# Patient Record
Sex: FEMALE | URBAN - METROPOLITAN AREA
[De-identification: names, ages, dates, MRNs, and addresses within clinical notes are randomized per-mention and may not be internally consistent; named-entity substitution may affect disease eponyms.]

---

## 2023-08-30 ENCOUNTER — TRANSFERRED RECORDS (OUTPATIENT)
Dept: HEALTH INFORMATION MANAGEMENT | Facility: CLINIC | Age: 68
End: 2023-08-30

## 2023-11-17 ENCOUNTER — TELEPHONE (OUTPATIENT)
Dept: TRANSPLANT | Facility: CLINIC | Age: 68
End: 2023-11-17
Payer: MEDICAID

## 2023-11-17 ENCOUNTER — MEDICAL CORRESPONDENCE (OUTPATIENT)
Dept: TRANSPLANT | Facility: CLINIC | Age: 68
End: 2023-11-17
Payer: MEDICAID

## 2023-11-17 DIAGNOSIS — C90.00 MULTIPLE MYELOMA, REMISSION STATUS UNSPECIFIED (H): Primary | ICD-10-CM

## 2023-12-14 ENCOUNTER — CARE COORDINATION (OUTPATIENT)
Dept: TRANSPLANT | Facility: CLINIC | Age: 68
End: 2023-12-14
Payer: MEDICAID

## 2023-12-14 NOTE — PROGRESS NOTES
[facility] BMT and Cell Therapy Program  RN Coordinator Pre-Visit Documentation      Rosario Terrazas is a 68 year old female who has been referred to the [facility] BMT and Cell Therapy Program for hematopoietic cell transplant or immune effector cell therapy.      Referring MD Name: Dr. Ellison at [facility]    Reason for referral: Multiple Myeloma    Link to BMT & CT Program Algorithms        All relevant clinical notes, labs, imaging, and pathology may be reviewed in Epic Bookmarks under name: Romina Santiago      Patient Care Team    No active team members.           Romina Santiago RN

## 2023-12-20 ENCOUNTER — ALLIED HEALTH/NURSE VISIT (OUTPATIENT)
Dept: TRANSPLANT | Facility: CLINIC | Age: 68
End: 2023-12-20
Attending: INTERNAL MEDICINE
Payer: MEDICAID

## 2023-12-20 VITALS
SYSTOLIC BLOOD PRESSURE: 106 MMHG | HEART RATE: 53 BPM | HEIGHT: 63 IN | TEMPERATURE: 97.7 F | WEIGHT: 209.8 LBS | DIASTOLIC BLOOD PRESSURE: 72 MMHG | RESPIRATION RATE: 20 BRPM | BODY MASS INDEX: 37.17 KG/M2 | OXYGEN SATURATION: 100 %

## 2023-12-20 DIAGNOSIS — C90.00 MULTIPLE MYELOMA, REMISSION STATUS UNSPECIFIED (H): Primary | ICD-10-CM

## 2023-12-20 DIAGNOSIS — Z71.9 VISIT FOR COUNSELING: Primary | ICD-10-CM

## 2023-12-20 PROCEDURE — G0463 HOSPITAL OUTPT CLINIC VISIT: HCPCS | Performed by: INTERNAL MEDICINE

## 2023-12-20 PROCEDURE — 99215 OFFICE O/P EST HI 40 MIN: CPT | Performed by: INTERNAL MEDICINE

## 2023-12-20 RX ORDER — ONDANSETRON 4 MG/1
4 TABLET, FILM COATED ORAL EVERY 8 HOURS
COMMUNITY
Start: 2023-08-23 | End: 2024-03-17

## 2023-12-20 RX ORDER — SULFAMETHOXAZOLE AND TRIMETHOPRIM 400; 80 MG/1; MG/1
1 TABLET ORAL DAILY
COMMUNITY
Start: 2023-09-13 | End: 2024-04-08

## 2023-12-20 RX ORDER — BORTEZOMIB 3.5 MG/1
INJECTION, POWDER, LYOPHILIZED, FOR SOLUTION INTRAVENOUS; SUBCUTANEOUS
COMMUNITY
Start: 2023-09-28 | End: 2024-03-15

## 2023-12-20 RX ORDER — FOLIC ACID 1 MG/1
1 TABLET ORAL DAILY
Status: ON HOLD | COMMUNITY
Start: 2023-08-23 | End: 2024-04-02

## 2023-12-20 RX ORDER — GABAPENTIN 100 MG/1
1 CAPSULE ORAL 3 TIMES DAILY
COMMUNITY
Start: 2023-10-12 | End: 2024-03-17

## 2023-12-20 RX ORDER — PROCHLORPERAZINE MALEATE 10 MG
10 TABLET ORAL
Status: ON HOLD | COMMUNITY
Start: 2023-10-30 | End: 2024-04-02

## 2023-12-20 RX ORDER — DOCUSATE SODIUM 100 MG/1
100 CAPSULE, LIQUID FILLED ORAL 2 TIMES DAILY
COMMUNITY
Start: 2023-10-04 | End: 2024-03-17

## 2023-12-20 RX ORDER — LOPERAMIDE HCL 2 MG
2 CAPSULE ORAL 4 TIMES DAILY PRN
Status: ON HOLD | COMMUNITY
Start: 2023-10-26 | End: 2024-04-02

## 2023-12-20 RX ORDER — VITAMIN B COMPLEX
2 TABLET ORAL DAILY
Status: ON HOLD | COMMUNITY
Start: 2023-11-02 | End: 2024-04-02

## 2023-12-20 RX ORDER — ISONIAZID 300 MG/1
1 TABLET ORAL DAILY
Status: ON HOLD | COMMUNITY
Start: 2023-09-28 | End: 2024-04-02

## 2023-12-20 RX ORDER — OXYCODONE HYDROCHLORIDE 5 MG/1
1 TABLET ORAL EVERY 4 HOURS PRN
COMMUNITY
Start: 2023-12-08 | End: 2024-01-05

## 2023-12-20 RX ORDER — LIDOCAINE 50 MG/G
OINTMENT TOPICAL
Status: ON HOLD | COMMUNITY
Start: 2023-09-27 | End: 2024-04-02

## 2023-12-20 RX ORDER — NYSTATIN 100000 U/G
CREAM TOPICAL
Status: ON HOLD | COMMUNITY
Start: 2023-10-12 | End: 2024-04-02

## 2023-12-20 RX ORDER — ACYCLOVIR 400 MG/1
400 TABLET ORAL
COMMUNITY
Start: 2023-09-13 | End: 2024-03-15

## 2023-12-20 RX ORDER — ACETAMINOPHEN 325 MG/1
2 TABLET ORAL EVERY 4 HOURS PRN
COMMUNITY
Start: 2023-10-04 | End: 2024-03-04

## 2023-12-20 RX ORDER — PYRIDOXINE HCL (VITAMIN B6) 25 MG
1 TABLET ORAL DAILY
Status: ON HOLD | COMMUNITY
Start: 2023-09-28 | End: 2024-04-02

## 2023-12-20 RX ORDER — AMOXICILLIN 250 MG
1 CAPSULE ORAL 2 TIMES DAILY
Status: ON HOLD | COMMUNITY
Start: 2023-10-12 | End: 2024-04-02

## 2023-12-20 RX ORDER — POLYETHYLENE GLYCOL 3350 17 G/17G
17 POWDER, FOR SOLUTION ORAL DAILY PRN
COMMUNITY
Start: 2023-08-23 | End: 2024-03-17

## 2023-12-20 ASSESSMENT — PAIN SCALES - GENERAL: PAINLEVEL: SEVERE PAIN (6)

## 2023-12-20 NOTE — NURSING NOTE
"Oncology Rooming Note    December 20, 2023 8:37 AM   Rosario Terrazas is a 68 year old female who presents for:    Chief Complaint   Patient presents with    Oncology Clinic Visit     Patient with Multiple Myeloma here for provider visit      Initial Vitals: /72 (BP Location: Right arm, Patient Position: Sitting, Cuff Size: Adult Large)   Pulse 53   Temp 97.7  F (36.5  C) (Tympanic)   Resp 20   Ht 1.606 m (5' 3.23\")   Wt 95.2 kg (209 lb 12.8 oz)   SpO2 100%   BMI 36.90 kg/m   Estimated body mass index is 36.9 kg/m  as calculated from the following:    Height as of this encounter: 1.606 m (5' 3.23\").    Weight as of this encounter: 95.2 kg (209 lb 12.8 oz). Body surface area is 2.06 meters squared.  Severe Pain (6) Comment: Data Unavailable   No LMP recorded. Patient is postmenopausal.  Allergies reviewed: Yes  Medications reviewed: Yes    Medications: Medication refills not needed today.  Pharmacy name entered into EPIC: Data Unavailable    Frailty Screening:   Is the patient here for a new oncology consult visit in cancer care? 2. No      Clinical concerns:     BMT Fried Frailty was performed and charted in chart.       service not available.  Patient is here with her daughter Katherin Hernandez.  Patient and her daughter signed the Patient or  waiver of  service form.  Provider was notified.        Bettina Fisher CMA                "

## 2023-12-20 NOTE — PROGRESS NOTES
Blood and Marrow Transplant - New Evaluation Appointment    Spoke with Tza, patient's child, following visit with Dr. Rocha. I explained the role of the nurse coordinator throughout the process, as well as general time line and expectations for next steps. We discussed the necessity of a caregiver and the program's proximity requirements. All questions were answered.     Plan: Autologous Transplant Myeloma-Outpatient, pending 2 cycles of therapy, around February     Contact information provided for :  yes      Auto for MM/Amyloidosis:  Outpatient Infusion: Yes    Phase Status updated: yes

## 2023-12-20 NOTE — LETTER
12/20/2023         RE: Rosario Terrazas  [address]  [facility] [address]        Dear Colleague,    Thank you for referring your patient, Rosario Terrazas, to the [facility] BLOOD AND MARROW TRANSPLANT PROGRAM [place]. Please see a copy of my visit note below.    Date: Dec 20, 2023    Referring Physician:   Rome Jones    CC: IgD [facility] Myeloma    HPI:    Declined . Daughter was helping interpret.    Was hospitalized in August of 2023 with compression fractures and found to have myeloma. Started on VRd. Pain improved. Still in a wheelchair and TLSO brace. Fatigued and takes regular naps but is able to care for herself independently. Her son and daughter help with housework and making food but she is able to move around and bathe and dress herself, heat up food, etc. No fevers. Has some shoulder pain that she attributes to arthritis. 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    ROS: A 14-point ROS has been performed and is negative unless otherwise specified in the HPI.    Brief Onc History:  08/30/23 - IgD KLA. VRdx4. ME.    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  - IgD MM    PSH:  L TKA - 2017    SH:  Social History     Tobacco Use    Smoking status: Never    Smokeless tobacco: Never   Substance Use Topics    Alcohol use: Never    Drug use: Never      - E/T/I: occasional drinking, none now  - Sib/parents/children Info: 3 brothers and 2 sisters, 5 kids, 3 back in [facility], 2 here taking care of her  - Occupation: Gardening  - Geographic location: [place]    FH:  - Diabetes  - HTN  - Arthritis  - No cancers    ALL:   No Known Allergies    Medications:  Current Outpatient Medications   Medication    acetaminophen (TYLENOL) 325 MG tablet    bortezomib (VELCADE) 3.5 MG injection    calcium carbonate-vitamin D (OSCAL) 500-5 MG-MCG tablet    diclofenac (VOLTAREN) 1 % topical gel    docusate sodium (DSS) 100 MG capsule    folic acid (FOLVITE)  "1 MG tablet    gabapentin (NEURONTIN) 100 MG capsule    isoniazid (NYDRAZID) 300 MG tablet    lidocaine (XYLOCAINE) 5 % external ointment    loperamide (IMODIUM) 2 MG capsule    nystatin (MYCOSTATIN) 033893 UNIT/GM external cream    omeprazole (PRILOSEC) 20 MG DR capsule    ondansetron (ZOFRAN) 4 MG tablet    oxyCODONE (ROXICODONE) 5 MG tablet    polyethylene glycol (MIRALAX) 17 GM/Dose powder    prochlorperazine (COMPAZINE) 10 MG tablet    pyridOXINE (VITAMIN B6) 25 MG tablet    rivaroxaban ANTICOAGULANT (XARELTO) 10 MG TABS tablet    senna-docusate (SENOKOT-S/PERICOLACE) 8.6-50 MG tablet    sulfamethoxazole-trimethoprim (BACTRIM) 400-80 MG tablet    tiZANidine (ZANAFLEX) 4 MG tablet    Vitamin D3 (CHOLECALCIFEROL) 25 mcg (1000 units) tablet    acyclovir (ZOVIRAX) 400 MG tablet     No current facility-administered medications for this visit.     Physical Exam:  Vitals: /72 (BP Location: Right arm, Patient Position: Sitting, Cuff Size: Adult Large)   Pulse 53   Temp 97.7  F (36.5  C) (Tympanic)   Resp 20   Ht 1.606 m (5' 3.23\")   Wt 95.2 kg (209 lb 12.8 oz)   SpO2 100%   BMI 36.90 kg/m    GA: NAD, appears as stated age  HEENT: EOMI, PERRL, OP clear  Neck: Supple, no LAD  CV: RRR, no m/r/g  Lungs: Clear, no w/c/r  Abd: BS+, soft, NT, ND  Ext: warm and well perfused, no edema  Neuro: AAO, moves all extremities  Lymphatics: no palpable cervical, axillary, or inguinal LAD. No HSM  Skin: No acute rashes, no lesions  Psyc: appropriate, reactive    KPS: 70    Labs, pathology and other diagnostics reviewed    08/30/23 DIAGNOSIS:   Bone marrow, aspirate, clot, and trephine core:   - Bone marrow involved by multiple myeloma, see comment   - Hypercellular marrow (60% cellularity) with trilineage hematopoiesis and 80% kappa-restricted   plasma cells   - Congo red stain is negative for amyloid   - Increased iron stores and decreased sideroblastic iron (anemia of chronic disease pattern)     09/05/23 " PET/CT  Impression:   1. No definite FDG avid myelomatous lesions are identified. There is uptake associated with the presumed pathologic fractures of the T7 vertebral body and the left seventh rib, although this uptake is not out of proportion to what would be expected for fracture healing. Notably, there is a suggestion of a lucent lesion underlying the rib fracture. Diffuse lucency and heterogeneity throughout the visualized bones could be compatible with myelomatous infiltration or osteopenia, although noting the relative lack of abnormal T1 signal in the vertebral column on the comparison MRI which would be more typical for osteopenia rather than myelomatous infiltration.   2. Focal uptake in the right maxilla/mandible is most likely due to periodontal inflammatory disease.   3. Stable non-FDG avid right thyroid nodule. Thyroid ultrasound should be considered for further characterization.                   A/P    #IgD KLC  #Latent TB on INH therapy  #Dental Caries on PET  #Debility    Today, I met with Rosario Terrazas and her daughter. We discussed the natural history of the disease and treatment to date. We reviewed all the available diagnostic information and briefly discussed some of the more important prognostic points. We talked about general indications of transplant that include patient and disease factors. We also reviewed again the role of high dose chemotherapy and autologous stem cell rescue in this disease. I described the process of work up prior to collection, the process of collection itself including the possibility for a need for a central line with possible associated complications of infections and clotting. I discussed the process of work up prior to to confirm good organ function and reserve and reassess disease prior to proceeding. We also discussed in great detail the process of the actual transplant itself. We discussed the expected toxicities and side effects, including organ  toxicity, expected pancytopenia and need for transfusion support, risk of infection or bleeding, and the risk of treatment related mortality which is estimated to be around 1-2%. We also discussed supportive care, needing a line with associated complications, and the critical need for a caregiver and proximity to [facility].    All of their questions were answered to their satisfaction.    - Received 4 cycles of VRd and in a FL, bordering on VGPR. Ideally, she would get 2 more cycles and I would add Opal SC to her regimen given her sluggish response. She will need Opal maintenance regimen post ASCT  - We will refer her to [facility] for an opinion regarding this elective transplant and optimal timing with INH  - We will refer her to IR for possible vertebroplasty given pain and difficulty with mobility  - She will need dental evaluation ASAP through [facility] prior to transplant    Rishi Rocha MD  Hematology and Bone Marrow Transplant    60 minutes spent on the date of the encounter doing chart review, interpretation of results, patient visit, documentation and coordination of care.

## 2023-12-20 NOTE — PROGRESS NOTES
Date: Dec 20, 2023    Referring Physician:   Rome Jones    CC: IgD [facility] Myeloma    HPI:    Declined . Daughter was helping interpret.    Was hospitalized in August of 2023 with compression fractures and found to have myeloma. Started on VRd. Pain improved. Still in a wheelchair and TLSO brace. Fatigued and takes regular naps but is able to care for herself independently. Her son and daughter help with housework and making food but she is able to move around and bathe and dress herself, heat up food, etc. No fevers. Has some shoulder pain that she attributes to arthritis. 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    ROS: A 14-point ROS has been performed and is negative unless otherwise specified in the HPI.    Brief Onc History:  08/30/23 - IgD KLA. VRdx4. AK.    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  - IgD MM    PSH:  L TKA - 2017    SH:  Social History     Tobacco Use    Smoking status: Never    Smokeless tobacco: Never   Substance Use Topics    Alcohol use: Never    Drug use: Never      - E/T/I: occasional drinking, none now  - Sib/parents/children Info: 3 brothers and 2 sisters, 5 kids, 3 back in [facility], 2 here taking care of her  - Occupation: Gardening  - Geographic location: [place]    FH:  - Diabetes  - HTN  - Arthritis  - No cancers    ALL:   No Known Allergies    Medications:  Current Outpatient Medications   Medication    acetaminophen (TYLENOL) 325 MG tablet    bortezomib (VELCADE) 3.5 MG injection    calcium carbonate-vitamin D (OSCAL) 500-5 MG-MCG tablet    diclofenac (VOLTAREN) 1 % topical gel    docusate sodium (DSS) 100 MG capsule    folic acid (FOLVITE) 1 MG tablet    gabapentin (NEURONTIN) 100 MG capsule    isoniazid (NYDRAZID) 300 MG tablet    lidocaine (XYLOCAINE) 5 % external ointment    loperamide (IMODIUM) 2 MG capsule    nystatin (MYCOSTATIN) 723001 UNIT/GM external cream    omeprazole (PRILOSEC) 20 MG DR capsule    ondansetron (ZOFRAN) 4 MG  "tablet    oxyCODONE (ROXICODONE) 5 MG tablet    polyethylene glycol (MIRALAX) 17 GM/Dose powder    prochlorperazine (COMPAZINE) 10 MG tablet    pyridOXINE (VITAMIN B6) 25 MG tablet    rivaroxaban ANTICOAGULANT (XARELTO) 10 MG TABS tablet    senna-docusate (SENOKOT-S/PERICOLACE) 8.6-50 MG tablet    sulfamethoxazole-trimethoprim (BACTRIM) 400-80 MG tablet    tiZANidine (ZANAFLEX) 4 MG tablet    Vitamin D3 (CHOLECALCIFEROL) 25 mcg (1000 units) tablet    acyclovir (ZOVIRAX) 400 MG tablet     No current facility-administered medications for this visit.     Physical Exam:  Vitals: /72 (BP Location: Right arm, Patient Position: Sitting, Cuff Size: Adult Large)   Pulse 53   Temp 97.7  F (36.5  C) (Tympanic)   Resp 20   Ht 1.606 m (5' 3.23\")   Wt 95.2 kg (209 lb 12.8 oz)   SpO2 100%   BMI 36.90 kg/m    GA: NAD, appears as stated age  HEENT: EOMI, PERRL, OP clear  Neck: Supple, no LAD  CV: RRR, no m/r/g  Lungs: Clear, no w/c/r  Abd: BS+, soft, NT, ND  Ext: warm and well perfused, no edema  Neuro: AAO, moves all extremities  Lymphatics: no palpable cervical, axillary, or inguinal LAD. No HSM  Skin: No acute rashes, no lesions  Psyc: appropriate, reactive    KPS: 70    Labs, pathology and other diagnostics reviewed    08/30/23 DIAGNOSIS:   Bone marrow, aspirate, clot, and trephine core:   - Bone marrow involved by multiple myeloma, see comment   - Hypercellular marrow (60% cellularity) with trilineage hematopoiesis and 80% kappa-restricted   plasma cells   - Congo red stain is negative for amyloid   - Increased iron stores and decreased sideroblastic iron (anemia of chronic disease pattern)     09/05/23 PET/CT  Impression:   1. No definite FDG avid myelomatous lesions are identified. There is uptake associated with the presumed pathologic fractures of the T7 vertebral body and the left seventh rib, although this uptake is not out of proportion to what would be expected for fracture healing. Notably, there is a " suggestion of a lucent lesion underlying the rib fracture. Diffuse lucency and heterogeneity throughout the visualized bones could be compatible with myelomatous infiltration or osteopenia, although noting the relative lack of abnormal T1 signal in the vertebral column on the comparison MRI which would be more typical for osteopenia rather than myelomatous infiltration.   2. Focal uptake in the right maxilla/mandible is most likely due to periodontal inflammatory disease.   3. Stable non-FDG avid right thyroid nodule. Thyroid ultrasound should be considered for further characterization.                   A/P    #IgD KLC  #Latent TB on INH therapy  #Dental Caries on PET  #Debility    Today, I met with Rosario Terrazas and her daughter. We discussed the natural history of the disease and treatment to date. We reviewed all the available diagnostic information and briefly discussed some of the more important prognostic points. We talked about general indications of transplant that include patient and disease factors. We also reviewed again the role of high dose chemotherapy and autologous stem cell rescue in this disease. I described the process of work up prior to collection, the process of collection itself including the possibility for a need for a central line with possible associated complications of infections and clotting. I discussed the process of work up prior to to confirm good organ function and reserve and reassess disease prior to proceeding. We also discussed in great detail the process of the actual transplant itself. We discussed the expected toxicities and side effects, including organ toxicity, expected pancytopenia and need for transfusion support, risk of infection or bleeding, and the risk of treatment related mortality which is estimated to be around 1-2%. We also discussed supportive care, needing a line with associated complications, and the critical need for a caregiver and proximity to  [facility].    All of their questions were answered to their satisfaction.    - Received 4 cycles of VRd and in a OR, bordering on VGPR. Ideally, she would get 2 more cycles and I would add Opal SC to her regimen given her sluggish response. She will need Opal maintenance regimen post ASCT  - We will refer her to [facility] for an opinion regarding this elective transplant and optimal timing with INH  - We will refer her to IR for possible vertebroplasty given pain and difficulty with mobility  - She will need dental evaluation ASAP through [facility] prior to transplant    Rishi Rocha MD  Hematology and Bone Marrow Transplant    60 minutes spent on the date of the encounter doing chart review, interpretation of results, patient visit, documentation and coordination of care.

## 2023-12-20 NOTE — PROGRESS NOTES
Blood and Marrow Transplant   New Transplant Visit with   Clinical     Assessment completed on 2023 in the BMT clinic of living situation, support system, financial status, functional status, coping, stressors, need for resources and social work intervention provided as needed. Information for this assessment was provided by pt and pt's harsh Pandey's report, consultation with medical team, and medical chart review.      Present:  Patient: Rosario Terrazas  Daughter: Katherin Hernandez  : JAYDE Pritchard, [facility]    Medical Team   Nurse Coordinator: Romina Santiago RN  BMT Physician: Rishi Rocha MD  Referring Physician: Mika Ellison MD    Diagnosis: Multiple Myeloma  Diagnosis Date: 2023    Presenting Information:  Pt is a 68 year old female diagnosed with Multiple Myeloma. Pt was diagnosed on 2023. Pt presents for Autologous stem cell transplant discussion.    Contact Information:  Cell Phone: [phone]  Daughter Katherin Hernandez's Phone: [phone]    Special Needs:   Pt requires a Kisii (Eritrean) .  Pts daughter will assist with providing interpreting services.    Relocation Requirement:   Pt lives in [place], [place] (approximately [place] from [facility] which is within the required distance of the hospital. Pt does not need to relocate.     Living Situation:   Pt lives in Harsh Pandey's home with daughter, son in law, and 2 grandsons. Harsh Pandey has 3 children ages 26, 20, and 13.  Only the 20 and 13 year old live with patient/daughter at this current time.    Family Information:   Spouse:  more than 40 years ago in [place].  Cultural/Custom marriage.    Parents:   Siblings: 1 living sister in [place];  3 brothers and 1 sister .  Children: Harsh Hernandez; 1 Son in [place], [place]; 1 Son in [facility]; 2 Daughters in [place].    Education/Employment:  Currently employed: No-has never had employment in the [place].    Daughter  Katherin Hernandez  Employed: Yes-F/T Occupation  Manufacturing    Insurance:   [email or web address]. No insurance concerns identified at this time. SW provided information regarding the insurance authorization process and the role of the [employer] . SW provided contact info for the [employer]  and referred pt to them for future insurance questions.     Finances:   Pt's source of income is salary/wages from daughter Katherin's employment.  Pts daughter shared that they have applied for and received financial assistance via [employer]. At this time, Harsh Pandey shared that they will reach out to their OP SW should any further financial needs arise.    Caregiver:   SW discussed with the pt/daughter the caregiver role and expectation at length. Pt is agreeable to having a full time caregiver for the minimum of 30 days until cleared by the BMT Physician. Pt's identified caregiver is harsh Hernandez. Caregiver education and information provided. No caregiver concerns identified.     Healthcare Directive:  No. SW provided education and forms. SW encouraged pt to have discussions with their family regarding their health care wishes.  In the absence of a healthcare document, SW discussed the [place] Policy on who would make decisions on pts behalf if pt did not have the capacity to make healthcare decisions.    Resources Provided:  -BMT Information Book  -BMT Resources Packet  -Healthcare Directive  -Honoring Choices - Your Rights: Making Your Own Health Care Treatment Decisions  -Caregiver Contract/Description  -Transplant Unit Description and Information   -Lodging Resources    Identified Concerns:  No concerns identified at this time.     Summary:  Pt presents to [facility] regarding an OP Autologous stem cell transplant. Pt and pt's daughter asked good/appropriate questions regarding psychosocial factors related to BMT; all questions were addressed. Pt  presented as reserved. Pt remained silent during the assessment as pts daughter spoke on behalf of pt.  Family's affect was appropriate.     Plan:   SW provided contact information and encouraged pt to contact SW with any additional questions, concerns, resources and/or for support. SW will continue to follow pt to provide support and guidance with resources as needed.     JAYDE Pritchard, [facility]  Adult Blood & Marrow Transplant   Phone: [phone]  Pager: [phone]

## 2023-12-21 NOTE — PROGRESS NOTES
BMT work up referral to ID Routine next aval.     Plan: Autologous Transplant Myeloma-Outpatient, pending 2 cycles of therapy, around February

## 2023-12-22 ENCOUNTER — TELEPHONE (OUTPATIENT)
Dept: INTERVENTIONAL RADIOLOGY/VASCULAR | Facility: CLINIC | Age: 68
End: 2023-12-22
Payer: MEDICAID

## 2023-12-22 DIAGNOSIS — S22.000A COMPRESSION FRACTURE OF THORACIC VERTEBRA (H): Primary | ICD-10-CM

## 2023-12-22 NOTE — TELEPHONE ENCOUNTER
I called and spoke with pts' daughter Katherin. I explained that I had ordered a MRI for her mother and that it was needed for possible vertebral augmentation with Dr. Whittaker. She verbalized understanding.     Nicky Amezcua RN  Nurse Care Coordinator-Interventional Radiology  [phone]

## 2023-12-31 ENCOUNTER — HEALTH MAINTENANCE LETTER (OUTPATIENT)
Age: 68
End: 2023-12-31

## 2024-01-19 ENCOUNTER — ANCILLARY PROCEDURE (OUTPATIENT)
Dept: MRI IMAGING | Facility: CLINIC | Age: 69
End: 2024-01-19
Attending: RADIOLOGY
Payer: MEDICAID

## 2024-01-19 DIAGNOSIS — S22.000A COMPRESSION FRACTURE OF THORACIC VERTEBRA (H): ICD-10-CM

## 2024-01-19 PROCEDURE — 72146 MRI CHEST SPINE W/O DYE: CPT | Performed by: RADIOLOGY

## 2024-01-31 DIAGNOSIS — Z01.818 EXAMINATION PRIOR TO CHEMOTHERAPY: ICD-10-CM

## 2024-01-31 DIAGNOSIS — C90.00 MULTIPLE MYELOMA, REMISSION STATUS UNSPECIFIED (H): Primary | ICD-10-CM

## 2024-01-31 DIAGNOSIS — Z86.2 PERSONAL HISTORY OF DISEASES OF BLOOD AND BLOOD-FORMING ORGANS: ICD-10-CM

## 2024-02-06 PROBLEM — Z96.652 HISTORY OF TOTAL LEFT KNEE REPLACEMENT: Status: ACTIVE | Noted: 2022-10-31

## 2024-02-06 PROBLEM — R80.0 ISOLATED PROTEINURIA WITHOUT SPECIFIC MORPHOLOGIC LESION: Status: ACTIVE | Noted: 2023-08-23

## 2024-02-06 PROBLEM — N17.9 AKI (ACUTE KIDNEY INJURY) (H): Status: ACTIVE | Noted: 2023-08-23

## 2024-02-06 PROBLEM — D64.9 NORMOCYTIC ANEMIA: Status: ACTIVE | Noted: 2023-08-23

## 2024-02-06 PROBLEM — S46.219A BICEPS TENDON TEAR: Status: ACTIVE | Noted: 2023-03-20

## 2024-02-06 PROBLEM — M25.811 SHOULDER IMPINGEMENT, RIGHT: Status: ACTIVE | Noted: 2022-10-31

## 2024-02-06 PROBLEM — S22.068A: Status: ACTIVE | Noted: 2023-08-19

## 2024-02-06 PROBLEM — C80.1 MALIGNANT NEOPLASM (H): Status: ACTIVE | Noted: 2023-09-08

## 2024-02-06 PROBLEM — C90.00 MULTIPLE MYELOMA (H): Status: ACTIVE | Noted: 2023-09-13

## 2024-02-06 PROBLEM — S22.32XA CLOSED FRACTURE OF ONE RIB OF LEFT SIDE: Status: ACTIVE | Noted: 2023-08-23

## 2024-02-06 PROBLEM — M75.102 ROTATOR CUFF TEAR ARTHROPATHY OF LEFT SHOULDER: Status: ACTIVE | Noted: 2023-03-20

## 2024-02-06 PROBLEM — M12.812 ROTATOR CUFF TEAR ARTHROPATHY OF LEFT SHOULDER: Status: ACTIVE | Noted: 2023-03-20

## 2024-02-06 PROBLEM — R76.8 ELEVATED SERUM IMMUNOGLOBULIN FREE LIGHT CHAIN LEVEL: Status: ACTIVE | Noted: 2023-08-24

## 2024-02-06 PROBLEM — K59.09 OTHER CONSTIPATION: Status: ACTIVE | Noted: 2023-08-23

## 2024-02-06 NOTE — CONFIDENTIAL NOTE
DIAGNOSIS:   Multiple myeloma, remission status unspecified    DATE RECEIVED: 02.16.2024     NOTES (Gather within 2 years) STATUS DETAILS   OFFICE NOTE from referring provider   Internal 12.2023 Rishi Rocha MD    OFFICE NOTE from other specialist Care Everywhere 09.05.2023 Sabino Kyle MD  [facility]   DISCHARGE SUMMARY from hospital     DISCHARGE REPORT from the ER     LABS (any labs) Internal / CE    MEDICATION LIST Internal    IMAGING  (NEED IMAGES AND REPORTS)     Osteomyelitis: Foot imaging      Liver Abscess: Abdominal imaging     Other (anything related to diagnoses

## 2024-02-07 ENCOUNTER — TELEPHONE (OUTPATIENT)
Dept: TRANSPLANT | Facility: CLINIC | Age: 69
End: 2024-02-07
Payer: COMMERCIAL

## 2024-02-08 ENCOUNTER — OFFICE VISIT (OUTPATIENT)
Dept: VASCULAR SURGERY | Facility: CLINIC | Age: 69
End: 2024-02-08
Payer: COMMERCIAL

## 2024-02-08 VITALS — OXYGEN SATURATION: 95 % | HEART RATE: 66 BPM | SYSTOLIC BLOOD PRESSURE: 106 MMHG | DIASTOLIC BLOOD PRESSURE: 72 MMHG

## 2024-02-08 DIAGNOSIS — S22.059A T6 VERTEBRAL FRACTURE (H): ICD-10-CM

## 2024-02-08 DIAGNOSIS — C90.00 MULTIPLE MYELOMA, REMISSION STATUS UNSPECIFIED (H): Primary | ICD-10-CM

## 2024-02-08 DIAGNOSIS — S22.069A T7 VERTEBRAL FRACTURE (H): ICD-10-CM

## 2024-02-08 PROCEDURE — 99204 OFFICE O/P NEW MOD 45 MIN: CPT | Mod: GC | Performed by: RADIOLOGY

## 2024-02-08 RX ORDER — ASPIRIN 81 MG/1
1 TABLET ORAL DAILY
Status: ON HOLD | COMMUNITY
Start: 2023-12-21 | End: 2024-04-02

## 2024-02-08 NOTE — LETTER
2/8/2024       RE: Rosario Terrazas  [address] [place] [address]     Dear Colleague,    Thank you for referring your patient, Rosario Terrazas, to the [facility] VASCULAR CLINIC [place] at [facility]. Please see a copy of my visit note below.    Vascular Interventional Radiology Clinic  Rosario Terrazas   02/08/24     Assessment & Plan  Rosario Terrazas is a 68 year old woman with PMH of multiple myeloma, severe T6 anterior compression fracture, and T7 vertebra plana who presents for consultation on compression fracture treatment options.    Patient was initially found to have T7 vertebra plana and severe T6 compression deformities in the setting of multiple myeloma in August 2023. She was placed in a TLSO brace and has since experienced ongoing pain, which has worsened in the past month. Based on most recent MRI from 1/19/24, the fractures are unchanged in appearance, with persistent focal kyphosis, edema, and mild to moderate associated spinal canal narrowing.    Given her ongoing pain and unchanged appearance of the fractures on imaging, she may benefit from vertebroplasty. The T6 compression fracture would likely be amenable to treatment, however the T7 vertebra plana would not benefit from intervention, and is unlikely to be contributing significantly to pain at this stage. While intervention is likely technically possible, there is a concern for additional risk of spinal cord injury given the fracture severity and presence of associated spinal canal narrowing.    We discussed the option of vertebroplasty versus no surgical intervention for the T6 compression fracture with Rosario and her daughter, particularly highlighting the potential risks. At present, we feel the risks of treatment likely outweight the benefits in her case. However, we will plan to discuss her case with colleagues, and may consider proceeding with the  "procedure pending their review. If we were to proceed with vertebroplasty, may also consider ablation of the underlying multiple myeloma metastatic lesion at the same time.    Plan  Severe T6 anterior compression fracture  T7 vertebra plana  Multiple myeloma  - No plans for immediate vertebroplasty at this time  - Plan to review case with colleagues and re-consider treatment options pending their review      BMI  Estimated body mass index is 36.9 kg/m  as calculated from the following:    Height as of 12/20/23: 1.606 m (5' 3.23\").    Weight as of 12/20/23: 95.2 kg (209 lb 12.8 oz).       Subjective  Rosario is a 68 year old, presenting with ongoing back pain and reduced mobility secondary to T6 and T7 compression fractures in the setting of multiple myeloma.  New Patient (Consult for multiple myeloma/back pain)    HPI   After receiving her initial diagnosis and being placed in TLSO brace, Rosario's pain initially improved. However, she began to experience worsening pain in the past month. The pain is relatively constant and severe enough that she is awoken with pain every night. Overall, the pain is worse when she is changing positions, especially with twisting motions. She would rate her current pain around 6-8 while taking Tylenol 650 three times per day.    Her ability to tolerate activity had somewhat improved last Fall and she was able to walk short distances, but with the recent increase in her pain she has been largely sedentary.     No history of bowel or bladder incontinence.      Objective   /72 (BP Location: Right arm, Patient Position: Sitting, Cuff Size: Adult Large)   Pulse 66   SpO2 95%   There is no height or weight on file to calculate BMI.    Physical Exam   GENERAL: alert and no distress  MS: spine exam significant for point tenderness over the spinous processes at the fracture level  NEURO: Normal lower extremity strength and muscle tone, mentation intact and speech normal    Imaging  MR " Thoracic Spine w/o contrast (1/19/2024)  1. No change in T7 vertebra plana and severe T6 compression  deformities with focal kyphosis, persistent edema, and mild to  moderate associated spinal canal narrowing.  2. No myelopathic cord signal.        Dakota Abdul, MS4     Signed Electronically by: Christiano Whittaker MD  Physician Attestation  I, Christiano Whittaker MD, was present with the medical/YAO student who participated in the service and in the documentation of the note.  I have verified the history and personally performed the physical exam and medical decision making.  I agree with the assessment and plan of care as documented in the note.      Items personally reviewed: vitals, labs, and imaging and agree with the interpretation documented in the note.          Again, thank you for allowing me to participate in the care of your patient.      Sincerely,    Christiano Whittaker MD

## 2024-02-08 NOTE — PROGRESS NOTES
Vascular Interventional Radiology Clinic  Rosario Terrazas   02/08/24     Assessment & Plan   Rosario Terrazas is a 68 year old woman with PMH of multiple myeloma, severe T6 anterior compression fracture, and T7 vertebra plana who presents for consultation on compression fracture treatment options.    Patient was initially found to have T7 vertebra plana and severe T6 compression deformities in the setting of multiple myeloma in August 2023. She was placed in a TLSO brace and has since experienced ongoing pain, which has worsened in the past month. Based on most recent MRI from 1/19/24, the fractures are unchanged in appearance, with persistent focal kyphosis, edema, and mild to moderate associated spinal canal narrowing.    Given her ongoing pain and unchanged appearance of the fractures on imaging, she may benefit from vertebroplasty. The T6 compression fracture would likely be amenable to treatment, however the T7 vertebra plana would not benefit from intervention, and is unlikely to be contributing significantly to pain at this stage. While intervention is likely technically possible, there is a concern for additional risk of spinal cord injury given the fracture severity and presence of associated spinal canal narrowing.    We discussed the option of vertebroplasty versus no surgical intervention for the T6 compression fracture with Rosario and her daughter, particularly highlighting the potential risks. At present, we feel the risks of treatment likely outweight the benefits in her case. However, we will plan to discuss her case with colleagues, and may consider proceeding with the procedure pending their review. If we were to proceed with vertebroplasty, may also consider ablation of the underlying multiple myeloma metastatic lesion at the same time.    Plan  Severe T6 anterior compression fracture  T7 vertebra plana  Multiple myeloma  - No plans for immediate vertebroplasty at this time  - Plan to  "review case with colleagues and re-consider treatment options pending their review      BMI  Estimated body mass index is 36.9 kg/m  as calculated from the following:    Height as of 12/20/23: 1.606 m (5' 3.23\").    Weight as of 12/20/23: 95.2 kg (209 lb 12.8 oz).       Subjective   Rosario is a 68 year old, presenting with ongoing back pain and reduced mobility secondary to T6 and T7 compression fractures in the setting of multiple myeloma.  New Patient (Consult for multiple myeloma/back pain)    HPI   After receiving her initial diagnosis and being placed in TLSO brace, Rosario's pain initially improved. However, she began to experience worsening pain in the past month. The pain is relatively constant and severe enough that she is awoken with pain every night. Overall, the pain is worse when she is changing positions, especially with twisting motions. She would rate her current pain around 6-8 while taking Tylenol 650 three times per day.    Her ability to tolerate activity had somewhat improved last Fall and she was able to walk short distances, but with the recent increase in her pain she has been largely sedentary.     No history of bowel or bladder incontinence.      Objective    /72 (BP Location: Right arm, Patient Position: Sitting, Cuff Size: Adult Large)   Pulse 66   SpO2 95%   There is no height or weight on file to calculate BMI.    Physical Exam   GENERAL: alert and no distress  MS: spine exam significant for point tenderness over the spinous processes at the fracture level  NEURO: Normal lower extremity strength and muscle tone, mentation intact and speech normal    Imaging  MR Thoracic Spine w/o contrast (1/19/2024)  1. No change in T7 vertebra plana and severe T6 compression  deformities with focal kyphosis, persistent edema, and mild to  moderate associated spinal canal narrowing.  2. No myelopathic cord signal.        Dakota Abdul, MS4     Signed Electronically by: Christiano Whittaker, " MD  Physician Attestation   I, Christiano Whittaker MD, was present with the medical/YAO student who participated in the service and in the documentation of the note.  I have verified the history and personally performed the physical exam and medical decision making.  I agree with the assessment and plan of care as documented in the note.      Items personally reviewed: vitals, labs, and imaging and agree with the interpretation documented in the note.    Christiano Whittaker MD

## 2024-02-09 DIAGNOSIS — C90.00 MULTIPLE MYELOMA (H): Primary | ICD-10-CM

## 2024-02-16 ENCOUNTER — ANCILLARY PROCEDURE (OUTPATIENT)
Dept: GENERAL RADIOLOGY | Facility: CLINIC | Age: 69
End: 2024-02-16
Attending: INTERNAL MEDICINE
Payer: COMMERCIAL

## 2024-02-16 ENCOUNTER — OFFICE VISIT (OUTPATIENT)
Dept: INFECTIOUS DISEASES | Facility: CLINIC | Age: 69
End: 2024-02-16
Attending: STUDENT IN AN ORGANIZED HEALTH CARE EDUCATION/TRAINING PROGRAM
Payer: COMMERCIAL

## 2024-02-16 ENCOUNTER — PRE VISIT (OUTPATIENT)
Dept: INFECTIOUS DISEASES | Facility: CLINIC | Age: 69
End: 2024-02-16
Payer: COMMERCIAL

## 2024-02-16 VITALS
HEART RATE: 73 BPM | BODY MASS INDEX: 38.2 KG/M2 | WEIGHT: 217.2 LBS | SYSTOLIC BLOOD PRESSURE: 107 MMHG | OXYGEN SATURATION: 99 % | DIASTOLIC BLOOD PRESSURE: 74 MMHG | TEMPERATURE: 97.4 F

## 2024-02-16 DIAGNOSIS — Z01.818 EXAMINATION PRIOR TO CHEMOTHERAPY: ICD-10-CM

## 2024-02-16 DIAGNOSIS — Z86.2 PERSONAL HISTORY OF DISEASES OF BLOOD AND BLOOD-FORMING ORGANS: ICD-10-CM

## 2024-02-16 DIAGNOSIS — C90.00 MULTIPLE MYELOMA, REMISSION STATUS UNSPECIFIED (H): ICD-10-CM

## 2024-02-16 DIAGNOSIS — Z76.82 BONE MARROW TRANSPLANT CANDIDATE: ICD-10-CM

## 2024-02-16 DIAGNOSIS — Z22.7 LATENT TUBERCULOSIS INFECTION: Primary | ICD-10-CM

## 2024-02-16 PROCEDURE — 99203 OFFICE O/P NEW LOW 30 MIN: CPT | Performed by: STUDENT IN AN ORGANIZED HEALTH CARE EDUCATION/TRAINING PROGRAM

## 2024-02-16 PROCEDURE — G0463 HOSPITAL OUTPT CLINIC VISIT: HCPCS | Performed by: STUDENT IN AN ORGANIZED HEALTH CARE EDUCATION/TRAINING PROGRAM

## 2024-02-16 PROCEDURE — 71046 X-RAY EXAM CHEST 2 VIEWS: CPT | Mod: GC | Performed by: RADIOLOGY

## 2024-02-16 NOTE — LETTER
2/16/2024       RE: Rosario Terrazas  [address]  [place] [address]     Dear Colleague,    Thank you for referring your patient, Rosario Terrazas, to the [facility] INFECTIOUS DISEASE CLINIC [place] at [facility]. Please see a copy of my visit note below.          [facility] TRANSPLANT INFECTIOUS DISEASE CLINIC [place]    [address] [address]  Phone: [phone]  Fax: [phone]      Today's Date: 02/16/2024    Recommendations:   CXR today   Agree with continuing latent TB treatment through transplant     Thank you for involving Infectious Disease in the care of this patient.     Dedra Weeks  , Division of Infectious Disease  [facility]      Assessment:  Rosario Terrazas is a 68 year old with PMH of multiple myeloma on bortezomib and scheduled to get autologous bone marrow transplant next week.    Latent TB: patient from [facility], strongly pos quant, on latent TB treatment with INH and B6 for the past 4 months with plan for 6 months of treatment. I agree with this plan and to proceed with auto BMT. I will check a CXR today to be sure since 8/2023 CXR showed some changes but I dont have those images since it was done at [facility].     Dedra Weeks MD    -------------------------------------------------------------------------------------------------------------------    Reason for consult / Chief complaint:   Consulted by Dr. Rocha for latent TB     Patient daughter serves as the .     History of presenting illness:  Rosario Terrazas is a 68 year old with PMH of multiple myeloma on bortezomib and scheduled to get autologous bone marrow transplant next week.    Patient is from [facility], came [place] the [place] in [place]. No known exposure to TB. No symptoms of active TB.   Quant was strongly pos 8/28/23, strongy ab was neg   She was seen at [facility] TB clinic and started in October latent TB treatment with plan for 6 months of INH and B6 . It was Stopped for a week or so due to mildly elevatd liver enzymes within a month of starting latent TB treatment. However it was resumed and has had no problems since. She has completed 4 months treatment now.     Her only complaint is pain and fatigue but they are better now than when first diagnosed with myeloma.       Patient Active Problem List   Diagnosis    BARRERA (acute kidney injury) (H24)    Biceps tendon tear    Closed fracture of one rib of left side    Elevated serum immunoglobulin free light chain level    History of total left knee replacement    Isolated proteinuria without specific morphologic lesion    Malignant neoplasm (H)    Multiple myeloma (H)    Normocytic anemia    Other closed fracture of seventh thoracic vertebra, initial encounter (H)    Other constipation    Rotator cuff tear arthropathy of left shoulder    Shoulder impingement, right         Allergies:   No Known Allergies      Medications:  Current Outpatient Medications   Medication Sig Dispense Refill    acetaminophen (TYLENOL) 325 MG tablet Take 2 tablets by mouth every 4 hours as needed      aspirin 81 MG EC tablet Take 1 tablet by mouth daily      bortezomib (VELCADE) 3.5 MG injection Give 1.3mg/m2 (2.8) subcutaneous every week for 3 weeks. Provide enough doses for three week cycle (10.5mg).      calcium carbonate-vitamin D (OSCAL) 500-5 MG-MCG tablet Take 1 tablet by mouth 2 times daily      diclofenac (VOLTAREN) 1 % topical gel APPLY 4 GRAMS TO PAINFUL AREAS IN KNEES/SHOULDERS 4 TIMES A DAY AS DIRECTED. MAX OF 32 GRAMS PER DAY.      docusate sodium (DSS) 100 MG capsule Take 100 mg by mouth      folic acid (FOLVITE) 1 MG tablet Take 1 tablet by mouth daily      gabapentin (NEURONTIN) 100 MG capsule Take 1 capsule by mouth 3 times daily      isoniazid (NYDRAZID) 300 MG tablet Take 1 tablet by mouth daily      lidocaine (XYLOCAINE) 5 %  external ointment Apply to ribs a couple times a day up to 4 times daily as needed for pain      loperamide (IMODIUM) 2 MG capsule Take 2 mg by mouth      nystatin (MYCOSTATIN) 446171 UNIT/GM external cream Apply to rash in genital area twice daily.      omeprazole (PRILOSEC) 20 MG DR capsule Take 20 mg by mouth      ondansetron (ZOFRAN) 4 MG tablet Take 4 mg by mouth      polyethylene glycol (MIRALAX) 17 GM/Dose powder Take 17 g by mouth      prochlorperazine (COMPAZINE) 10 MG tablet Take 10 mg by mouth      pyridOXINE (VITAMIN B6) 25 MG tablet Take 1 tablet by mouth daily      senna-docusate (SENOKOT-S/PERICOLACE) 8.6-50 MG tablet Take 1 tablet by mouth 2 times daily      sulfamethoxazole-trimethoprim (BACTRIM) 400-80 MG tablet Take 1 tablet by mouth daily      tiZANidine (ZANAFLEX) 4 MG tablet Take HALF to 1 tablet by mouth up to 2 times daily for muscle pain/spasm      Vitamin D3 (CHOLECALCIFEROL) 25 mcg (1000 units) tablet Take 2 tablets by mouth daily      acyclovir (ZOVIRAX) 400 MG tablet Take 400 mg by mouth (Patient not taking: Reported on 2/16/2024)      rivaroxaban ANTICOAGULANT (XARELTO) 10 MG TABS tablet Take 10 mg by mouth daily (Patient not taking: Reported on 2/8/2024)           Immunizations:  Immunization History   Administered Date(s) Administered    COVID-19 12+ (2023-24) (MODERNA) 11/21/2023    COVID-19 MONOVALENT 12+ (Pfizer) 08/01/2021, 08/21/2021         Examination:  Vital signs:   /74   Pulse 73   Temp 97.4  F (36.3  C) (Oral)   Wt 98.5 kg (217 lb 3.2 oz)   SpO2 99%   BMI 38.20 kg/m      Constitutional: Patient in no distress  Eyes: not pale, not jaundiced  CVS: no added sounds  RS: clear  Abdomen: soft, BS+  Skin: no rash  Extremities: no pedal edema  Psych: Alert and oriented x 3      Laboratory:  Reviewed in [facility] 1/24/24, liver enzymes normal       Microbiology: [facility]  8/28/23  QuantiFERON TB Gold Plus  Negative Positive Abnormal    QFT TB 1 4.56   QFT TB 2 6.28   QFT TB MITOGEN  9.79   QFT NIL 0.21     8/28/23 strongyloides Ab neg     Imaging:  CXR 8/18/23 [facility]  1. Posterior left basilar airspace opacities of atelectasis or infiltrate.   2. Compression deformities of the midthoracic spine, presumably chronic.       Dedra Weeks MD

## 2024-02-16 NOTE — PROGRESS NOTES
[facility] TRANSPLANT INFECTIOUS DISEASE CLINIC [address] [address]  Phone: [phone]  Fax: [phone]      Today's Date: 02/16/2024    Recommendations:   CXR today   Agree with continuing latent TB treatment through transplant     Thank you for involving Infectious Disease in the care of this patient.     Dedra Weeks  , Division of Infectious Disease  [facility]      Assessment:  Rosario Terrazas is a 68 year old with PMH of multiple myeloma on bortezomib and scheduled to get autologous bone marrow transplant next week.    Latent TB: patient from [facility], strongly pos sandra, on latent TB treatment with INH and B6 for the past 4 months with plan for 6 months of treatment. I agree with this plan and to proceed with auto BMT. I will check a CXR today to be sure since 8/2023 CXR showed some changes but I dont have those images since it was done at [facility].     Dedra Weeks MD    -------------------------------------------------------------------------------------------------------------------    Reason for consult / Chief complaint:   Consulted by Dr. Rocha for latent TB     Patient daughter serves as the .     History of presenting illness:  Rosario Terrazas is a 68 year old with PMH of multiple myeloma on bortezomib and scheduled to get autologous bone marrow transplant next week.    Patient is from [facility], came [place] in [place]. No known exposure to TB. No symptoms of active TB.   Quant was strongly pos 8/28/23, strongy ab was neg   She was seen at [facility] TB clinic and started in October latent TB treatment with plan for 6 months of INH and B6 . It was Stopped for a week or so due to mildly elevatd liver enzymes within a month of starting latent TB treatment. However it was resumed and has had no problems since. She has completed 4 months treatment now.     Her only complaint is pain  and fatigue but they are better now than when first diagnosed with myeloma.       Patient Active Problem List   Diagnosis    BARRERA (acute kidney injury) (H24)    Biceps tendon tear    Closed fracture of one rib of left side    Elevated serum immunoglobulin free light chain level    History of total left knee replacement    Isolated proteinuria without specific morphologic lesion    Malignant neoplasm (H)    Multiple myeloma (H)    Normocytic anemia    Other closed fracture of seventh thoracic vertebra, initial encounter (H)    Other constipation    Rotator cuff tear arthropathy of left shoulder    Shoulder impingement, right         Allergies:   No Known Allergies      Medications:  Current Outpatient Medications   Medication Sig Dispense Refill    acetaminophen (TYLENOL) 325 MG tablet Take 2 tablets by mouth every 4 hours as needed      aspirin 81 MG EC tablet Take 1 tablet by mouth daily      bortezomib (VELCADE) 3.5 MG injection Give 1.3mg/m2 (2.8) subcutaneous every week for 3 weeks. Provide enough doses for three week cycle (10.5mg).      calcium carbonate-vitamin D (OSCAL) 500-5 MG-MCG tablet Take 1 tablet by mouth 2 times daily      diclofenac (VOLTAREN) 1 % topical gel APPLY 4 GRAMS TO PAINFUL AREAS IN KNEES/SHOULDERS 4 TIMES A DAY AS DIRECTED. MAX OF 32 GRAMS PER DAY.      docusate sodium (DSS) 100 MG capsule Take 100 mg by mouth      folic acid (FOLVITE) 1 MG tablet Take 1 tablet by mouth daily      gabapentin (NEURONTIN) 100 MG capsule Take 1 capsule by mouth 3 times daily      isoniazid (NYDRAZID) 300 MG tablet Take 1 tablet by mouth daily      lidocaine (XYLOCAINE) 5 % external ointment Apply to ribs a couple times a day up to 4 times daily as needed for pain      loperamide (IMODIUM) 2 MG capsule Take 2 mg by mouth      nystatin (MYCOSTATIN) 564114 UNIT/GM external cream Apply to rash in genital area twice daily.      omeprazole (PRILOSEC) 20 MG DR capsule Take 20 mg by mouth      ondansetron (ZOFRAN) 4  MG tablet Take 4 mg by mouth      polyethylene glycol (MIRALAX) 17 GM/Dose powder Take 17 g by mouth      prochlorperazine (COMPAZINE) 10 MG tablet Take 10 mg by mouth      pyridOXINE (VITAMIN B6) 25 MG tablet Take 1 tablet by mouth daily      senna-docusate (SENOKOT-S/PERICOLACE) 8.6-50 MG tablet Take 1 tablet by mouth 2 times daily      sulfamethoxazole-trimethoprim (BACTRIM) 400-80 MG tablet Take 1 tablet by mouth daily      tiZANidine (ZANAFLEX) 4 MG tablet Take HALF to 1 tablet by mouth up to 2 times daily for muscle pain/spasm      Vitamin D3 (CHOLECALCIFEROL) 25 mcg (1000 units) tablet Take 2 tablets by mouth daily      acyclovir (ZOVIRAX) 400 MG tablet Take 400 mg by mouth (Patient not taking: Reported on 2/16/2024)      rivaroxaban ANTICOAGULANT (XARELTO) 10 MG TABS tablet Take 10 mg by mouth daily (Patient not taking: Reported on 2/8/2024)           Immunizations:  Immunization History   Administered Date(s) Administered    COVID-19 12+ (2023-24) (MODERNA) 11/21/2023    COVID-19 MONOVALENT 12+ (Pfizer) 08/01/2021, 08/21/2021         Examination:  Vital signs:   /74   Pulse 73   Temp 97.4  F (36.3  C) (Oral)   Wt 98.5 kg (217 lb 3.2 oz)   SpO2 99%   BMI 38.20 kg/m      Constitutional: Patient in no distress  Eyes: not pale, not jaundiced  CVS: no added sounds  RS: clear  Abdomen: soft, BS+  Skin: no rash  Extremities: no pedal edema  Psych: Alert and oriented x 3      Laboratory:  Reviewed in [facility] 1/24/24, liver enzymes normal       Microbiology: [facility]  8/28/23  QuantiFERON TB Gold Plus  Negative Positive Abnormal    QFT TB 1 4.56   QFT TB 2 6.28   QFT TB MITOGEN 9.79   QFT NIL 0.21     8/28/23 strongyloides Ab neg     Imaging:  CXR 8/18/23 [facility]  1. Posterior left basilar airspace opacities of atelectasis or infiltrate.   2. Compression deformities of the midthoracic spine, presumably chronic.

## 2024-02-19 LAB
ABO/RH(D): NORMAL
ANTIBODY SCREEN: NEGATIVE
SPECIMEN EXPIRATION DATE: NORMAL

## 2024-02-20 ENCOUNTER — ONCOLOGY VISIT (OUTPATIENT)
Dept: TRANSPLANT | Facility: CLINIC | Age: 69
End: 2024-02-20
Attending: INTERNAL MEDICINE
Payer: COMMERCIAL

## 2024-02-20 ENCOUNTER — MEDICAL CORRESPONDENCE (OUTPATIENT)
Dept: TRANSPLANT | Facility: CLINIC | Age: 69
End: 2024-02-20
Payer: COMMERCIAL

## 2024-02-20 ENCOUNTER — LAB (OUTPATIENT)
Dept: LAB | Facility: CLINIC | Age: 69
End: 2024-02-20
Attending: INTERNAL MEDICINE
Payer: COMMERCIAL

## 2024-02-20 DIAGNOSIS — Z86.2 PERSONAL HISTORY OF DISEASES OF BLOOD AND BLOOD-FORMING ORGANS: ICD-10-CM

## 2024-02-20 DIAGNOSIS — C90.00 MULTIPLE MYELOMA, REMISSION STATUS UNSPECIFIED (H): Primary | ICD-10-CM

## 2024-02-20 DIAGNOSIS — Z01.818 EXAMINATION PRIOR TO CHEMOTHERAPY: ICD-10-CM

## 2024-02-20 DIAGNOSIS — C90.00 MULTIPLE MYELOMA, REMISSION STATUS UNSPECIFIED (H): ICD-10-CM

## 2024-02-20 DIAGNOSIS — Z71.9 VISIT FOR COUNSELING: Primary | ICD-10-CM

## 2024-02-20 DIAGNOSIS — C90.00 MULTIPLE MYELOMA (H): Primary | ICD-10-CM

## 2024-02-20 LAB
ALBUMIN SERPL BCG-MCNC: 3.9 G/DL (ref 3.5–5.2)
ALBUMIN UR-MCNC: NEGATIVE MG/DL
ALP SERPL-CCNC: 58 U/L (ref 40–150)
ALT SERPL W P-5'-P-CCNC: 9 U/L (ref 0–50)
ANION GAP SERPL CALCULATED.3IONS-SCNC: 9 MMOL/L (ref 7–15)
APPEARANCE UR: CLEAR
APTT PPP: 29 SECONDS (ref 22–38)
AST SERPL W P-5'-P-CCNC: 17 U/L (ref 0–45)
BASOPHILS # BLD AUTO: 0.1 10E3/UL (ref 0–0.2)
BASOPHILS NFR BLD AUTO: 2 %
BILIRUB SERPL-MCNC: 0.2 MG/DL
BILIRUB UR QL STRIP: NEGATIVE
BMT WORKUP IRRADIATED BLOOD REQUIRED: NORMAL
BUN SERPL-MCNC: 6.5 MG/DL (ref 8–23)
CALCIUM SERPL-MCNC: 9.4 MG/DL (ref 8.8–10.2)
CHLORIDE SERPL-SCNC: 105 MMOL/L (ref 98–107)
COLOR UR AUTO: ABNORMAL
CREAT SERPL-MCNC: 0.6 MG/DL (ref 0.51–0.95)
CREAT SERPL-MCNC: 0.6 MG/DL (ref 0.51–0.95)
DEPRECATED HCO3 PLAS-SCNC: 27 MMOL/L (ref 22–29)
EGFRCR SERPLBLD CKD-EPI 2021: >90 ML/MIN/1.73M2
EOSINOPHIL # BLD AUTO: 0 10E3/UL (ref 0–0.7)
EOSINOPHIL NFR BLD AUTO: 1 %
ERYTHROCYTE [DISTWIDTH] IN BLOOD BY AUTOMATED COUNT: 15.9 % (ref 10–15)
GLUCOSE SERPL-MCNC: 87 MG/DL (ref 70–99)
GLUCOSE UR STRIP-MCNC: NEGATIVE MG/DL
HCT VFR BLD AUTO: 32.3 % (ref 35–47)
HGB BLD-MCNC: 10.5 G/DL (ref 11.7–15.7)
HGB UR QL STRIP: NEGATIVE
HOLD SPECIMEN: NORMAL
HOLD SPECIMEN: NORMAL
HYALINE CASTS: 2 /LPF
IMM GRANULOCYTES # BLD: 0 10E3/UL
IMM GRANULOCYTES NFR BLD: 0 %
INR PPP: 1.18 (ref 0.85–1.15)
KETONES UR STRIP-MCNC: NEGATIVE MG/DL
LDH SERPL L TO P-CCNC: 233 U/L (ref 0–250)
LEUKOCYTE ESTERASE UR QL STRIP: NEGATIVE
LYMPHOCYTES # BLD AUTO: 0.9 10E3/UL (ref 0.8–5.3)
LYMPHOCYTES NFR BLD AUTO: 23 %
MAGNESIUM SERPL-MCNC: 2.1 MG/DL (ref 1.7–2.3)
MCH RBC QN AUTO: 32 PG (ref 26.5–33)
MCHC RBC AUTO-ENTMCNC: 32.5 G/DL (ref 31.5–36.5)
MCV RBC AUTO: 99 FL (ref 78–100)
MONOCYTES # BLD AUTO: 0.6 10E3/UL (ref 0–1.3)
MONOCYTES NFR BLD AUTO: 14 %
MUCOUS THREADS #/AREA URNS LPF: PRESENT /LPF
NEUTROPHILS # BLD AUTO: 2.5 10E3/UL (ref 1.6–8.3)
NEUTROPHILS NFR BLD AUTO: 60 %
NITRATE UR QL: NEGATIVE
NRBC # BLD AUTO: 0 10E3/UL
NRBC BLD AUTO-RTO: 0 /100
PH UR STRIP: 6.5 [PH] (ref 5–7)
PHOSPHATE SERPL-MCNC: 3.5 MG/DL (ref 2.5–4.5)
PLATELET # BLD AUTO: 221 10E3/UL (ref 150–450)
POTASSIUM SERPL-SCNC: 4 MMOL/L (ref 3.4–5.3)
PROT SERPL-MCNC: 6.2 G/DL (ref 6.4–8.3)
RBC # BLD AUTO: 3.28 10E6/UL (ref 3.8–5.2)
RBC URINE: 1 /HPF
SODIUM SERPL-SCNC: 141 MMOL/L (ref 135–145)
SP GR UR STRIP: 1.01 (ref 1–1.03)
SPECIMEN EXPIRATION DATE: NORMAL
SQUAMOUS EPITHELIAL: 2 /HPF
TOTAL PROTEIN SERUM FOR ELP: 5.7 G/DL (ref 6.4–8.3)
URATE SERPL-MCNC: 4.8 MG/DL (ref 2.4–5.7)
UROBILINOGEN UR STRIP-MCNC: NORMAL MG/DL
WBC # BLD AUTO: 4.1 10E3/UL (ref 4–11)
WBC URINE: 1 /HPF

## 2024-02-20 PROCEDURE — 84550 ASSAY OF BLOOD/URIC ACID: CPT

## 2024-02-20 PROCEDURE — 83521 IG LIGHT CHAINS FREE EACH: CPT

## 2024-02-20 PROCEDURE — 86900 BLOOD TYPING SEROLOGIC ABO: CPT

## 2024-02-20 PROCEDURE — 86665 EPSTEIN-BARR CAPSID VCA: CPT

## 2024-02-20 PROCEDURE — 86696 HERPES SIMPLEX TYPE 2 TEST: CPT

## 2024-02-20 PROCEDURE — 93010 ELECTROCARDIOGRAM REPORT: CPT | Performed by: INTERNAL MEDICINE

## 2024-02-20 PROCEDURE — 86334 IMMUNOFIX E-PHORESIS SERUM: CPT | Mod: 26 | Performed by: PATHOLOGY

## 2024-02-20 PROCEDURE — 82785 ASSAY OF IGE: CPT

## 2024-02-20 PROCEDURE — 84165 PROTEIN E-PHORESIS SERUM: CPT | Mod: 26 | Performed by: PATHOLOGY

## 2024-02-20 PROCEDURE — 93005 ELECTROCARDIOGRAM TRACING: CPT

## 2024-02-20 PROCEDURE — 86334 IMMUNOFIX E-PHORESIS SERUM: CPT | Performed by: PATHOLOGY

## 2024-02-20 PROCEDURE — 84155 ASSAY OF PROTEIN SERUM: CPT

## 2024-02-20 PROCEDURE — 82784 ASSAY IGA/IGD/IGG/IGM EACH: CPT

## 2024-02-20 PROCEDURE — 85610 PROTHROMBIN TIME: CPT

## 2024-02-20 PROCEDURE — 85730 THROMBOPLASTIN TIME PARTIAL: CPT

## 2024-02-20 PROCEDURE — 86335 IMMUNFIX E-PHORSIS/URINE/CSF: CPT | Performed by: PATHOLOGY

## 2024-02-20 PROCEDURE — 83735 ASSAY OF MAGNESIUM: CPT

## 2024-02-20 PROCEDURE — 83615 LACTATE (LD) (LDH) ENZYME: CPT

## 2024-02-20 PROCEDURE — 82306 VITAMIN D 25 HYDROXY: CPT

## 2024-02-20 PROCEDURE — 86703 HIV-1/HIV-2 1 RESULT ANTBDY: CPT

## 2024-02-20 PROCEDURE — 84165 PROTEIN E-PHORESIS SERUM: CPT | Mod: TC | Performed by: PATHOLOGY

## 2024-02-20 PROCEDURE — G0463 HOSPITAL OUTPT CLINIC VISIT: HCPCS | Mod: 25

## 2024-02-20 PROCEDURE — 86777 TOXOPLASMA ANTIBODY: CPT

## 2024-02-20 PROCEDURE — 36415 COLL VENOUS BLD VENIPUNCTURE: CPT

## 2024-02-20 PROCEDURE — 85025 COMPLETE CBC W/AUTO DIFF WBC: CPT

## 2024-02-20 PROCEDURE — 86335 IMMUNFIX E-PHORSIS/URINE/CSF: CPT | Mod: 26 | Performed by: PATHOLOGY

## 2024-02-20 PROCEDURE — 99215 OFFICE O/P EST HI 40 MIN: CPT

## 2024-02-20 PROCEDURE — 88240 CELL CRYOPRESERVE/STORAGE: CPT

## 2024-02-20 PROCEDURE — 80053 COMPREHEN METABOLIC PANEL: CPT

## 2024-02-20 PROCEDURE — 83020 HEMOGLOBIN ELECTROPHORESIS: CPT

## 2024-02-20 PROCEDURE — 86695 HERPES SIMPLEX TYPE 1 TEST: CPT

## 2024-02-20 PROCEDURE — 81001 URINALYSIS AUTO W/SCOPE: CPT

## 2024-02-20 PROCEDURE — 84100 ASSAY OF PHOSPHORUS: CPT

## 2024-02-20 PROCEDURE — 82232 ASSAY OF BETA-2 PROTEIN: CPT

## 2024-02-20 PROCEDURE — 86753 PROTOZOA ANTIBODY NOS: CPT

## 2024-02-20 PROCEDURE — 87516 HEPATITIS B DNA AMP PROBE: CPT

## 2024-02-20 ASSESSMENT — ANXIETY QUESTIONNAIRES
2. NOT BEING ABLE TO STOP OR CONTROL WORRYING: NOT AT ALL
5. BEING SO RESTLESS THAT IT IS HARD TO SIT STILL: NOT AT ALL
6. BECOMING EASILY ANNOYED OR IRRITABLE: NOT AT ALL
1. FEELING NERVOUS, ANXIOUS, OR ON EDGE: NOT AT ALL
GAD7 TOTAL SCORE: 0
IF YOU CHECKED OFF ANY PROBLEMS ON THIS QUESTIONNAIRE, HOW DIFFICULT HAVE THESE PROBLEMS MADE IT FOR YOU TO DO YOUR WORK, TAKE CARE OF THINGS AT HOME, OR GET ALONG WITH OTHER PEOPLE: NOT DIFFICULT AT ALL
GAD7 TOTAL SCORE: 0
7. FEELING AFRAID AS IF SOMETHING AWFUL MIGHT HAPPEN: NOT AT ALL
3. WORRYING TOO MUCH ABOUT DIFFERENT THINGS: NOT AT ALL

## 2024-02-20 ASSESSMENT — PATIENT HEALTH QUESTIONNAIRE - PHQ9
SUM OF ALL RESPONSES TO PHQ QUESTIONS 1-9: 0
5. POOR APPETITE OR OVEREATING: NOT AT ALL

## 2024-02-20 NOTE — NURSING NOTE
Chief Complaint   Patient presents with    Labs Only     Labs drawn via PIV by RN in lab.         Labs drawn from PIV placed by RN, then removed.  Pt checked in for appointment(s).    Radha Issa RN

## 2024-02-20 NOTE — PROGRESS NOTES
Blood and Marrow Transplant   Psychosocial Assessment with   Clinical     Assessment completed on 2/20/2024 via virtual visit of Pt's living situation, support system, financial status, functional status, coping, stressors, need for resources and social work intervention provided as needed.  Information for this assessment was provided by Pt and Pts daughter's report in addition to medical chart review and consultation with medical team.     Present at Assessment:   Patient: Rosario Terrazas  Daughter: Katherin Hernandez  : JAYDE Pritchard LGSW    Diagnosis: Multiple Myeloma     Date of Diagnosis: 8/2023    Transplant type: Autologous    Donor: Autologous     Physician: Rishi Rocha MD    Nurse Coordinator: Romina Santiago RN    Social Workers: JAYDE Pritchard LGSW    Permanent Address:   [address] [place] [address]    Living Situation: Pt lives in [place], [place] with daughter Katherin, Son-in-Law, and 2 Grandsons.    Local Address:   [address] [place] [address]    Contact Information:  Jules Pnadey's Cell Phone: [phone]   Jules Pandey's Email: [email or web address]    Presenting Information:  Pt is a 68 year old female diagnosed with MM who presents for evaluation for Autologous transplant at the [facility] ([facility]). Pt was accompanied to today's visit by daughter .     Decision Making: Self    Health Care Directive: Pt declined a completed Health Care Directive (HCD) at this time. SW previously provided pt with a copy of HCD and encouraged Pt to have discussion with family members and complete form. SW provided education and forms. SW encouraged pt to have discussions with their family regarding their health care wishes. NELLIE explained that since pt does not have a healthcare directive, legally pts children would make decisions on her behalf, if pt did not have capacity to make her own medical decisions. Pt/Daughter  understood.     Relationship Status: Single. Pt described relationship with daughter as stable/supportive.     Special Needs: None identified at this time.     Family/Support System: Pt endorsed a strong support system including family and close friends who will be available to support pt throughout transplant process.     Family Information:  Spouse: N/A  Children: Harsh Pandey; Son ([place]); Son ([facility]), and 2 Daughter ([facility])  Siblings: 1 living sister in [facility]; 3 brothers and 1 sister .   Parents:   Grandchildren: approximately 15 grandchildren total.      Caregiver: SW discussed with pt and pt's daughter the caregiver role and expectation at length. Pt is agreeable to having a full time caregiver for a minimum of 30 days until cleared by the BMT physician. Pt and pt's daughter confirmed understanding of the caregiver requirement. Pt's primary caregiver will be harsh Pandey with Son in [place] as a secondary or back-up to assist as needed. Pt reviewed and signed the caregiver contract which will be scanned into the EMR. Caregiver education and resources provided. No caregiver concerns identified.     Caregiver Contact Information:  Harsh Hernandez's Phone: [phone]    Transportation Mode: Private vehicle to be primary source of transportation anticipated. Pt is aware of driving restrictions post-BMT and the need for the caregiver is to drive until cleared to drive by the BMT physician. SW provided information on parking info and monthly parking pass options.    Insurance: Pt has [email or web address] health insurance. Pt denied specific insurance concerns at this time. SW reiterated information about the BMT Financial  should specific insurance questions arise as pt moves through transplant process.     Sources of Income: Pt's source of income is salary/wages from pts daughter's employment. No financial concerns identified at this time. SW discussed margaret options and asked  pt to let SW know if they would like to apply in the future. Pts daughter shared that they have applied for and received financial assistance via [employer]. On 2/20/2024, CSW emailed additional margaret resources-per request of pts daughter.    Employment: Pt has never been employed in the [place].     Mental Health: Pt denied a history of mental health concerns, specific diagnoses or medications at this time. We discussed how many patients may see an increase in feelings of anxiety or depression while hospitalized for extended periods of time and pursue  isolation precautions post-BMT. Encouraged pt and pts daughter to let us know if they are noticing an increase in symptoms. Pt/daughter believes she would be able to identify symptoms of depression/anxiety throughout the transplant process. We talked about the variety of modalities available to use as coping mechanisms (including but not limited to guided imagery, relaxation techniques, progressive muscle relaxation, counseling/talk therapy and medication).    PHQ-9:  Pt scored a 0 which indicates none on the depression severity scale. Pt endorses this is an accurate reflection of her emotional state.    GAD7:  Pt scored a 0 which indicates no sign of anxiety on the Generalized Anxiety Disorder Questionnaire. Pt endorses this is an accurate reflection of her emotional state.    Chemical Use:   Tobacco: No hx reported  Alcohol: No hx reported  Marijuana: No hx reported  Other Drugs: No hx reported  Based on the information provided, there appear to be no specific risks or concerns identified at this time.     Trauma/Loss/Abuse History: Multiple losses associated with cancer diagnosis and treatment, including health, changes to physical appearance, etc.     Spirituality: Pt identified as Holiness. SW explained that there are Chaplains on the unit and pt can request to meet with a  at anytime.      Coping: Pt noted that she is currently feeling  "\"positive, and hopeful\". Pt shared that her main coping mechanisms are \"prayer/spiritual practices, listening to  music, listening to sermons, and reading the bible\". Pt noted that she enjoys \"gardening and walking\". SW and pt discussed additional positive coping mechanisms that pt can utilize while in the hospital. While hospitalized, pt plans to \"listen to sermons, listening to Faith music, and walk the halls\".     Caregiver Coping: Pt's daughter noted that she is feeling \"positive, hopeful, and nervous\" at this time. Pt's daughter noted that she toan by \"prayer/spiritual practices, listening to music, and reading the Bible\". Caregiver resources offered/reviewed.     Education Provided: Transplant process expectations, Caregiver requirements, Caregiver self-care, Financial issues related to transplant, Financial resources/grants available, Common psychosocial stressors pre/post transplant, Support group(s) available, Hospital resources available, Web site information, Social Work role and Resources for children/siblings    Interventions Provided: Psychosocial Support and Education     Assessment and Recommendations for Team:  Pt is a 68 year old female diagnosed with MM who is here undergoing preparation for a planned Autologous transplant.     Assessment was completed via virtual visit as patient/daughter stated that they were not aware of appointment with  and left the clinic.  CSW primarily spoke with pts daughter Katherin via phone with pt listening in on the conversation. Pt/Daughter is pleasant, calm and able to articulate concerns/coping mechanisms in an appropriate manner. During our meeting pt/daughter were alert, interactive, and displayed appropriate memory and thought processes. Pt feels comfortable communicating with the medical team. Pt has a strong supportive network of family who are involved. Pt has developed strong coping mechanisms.     Pt will benefit from ongoing psychosocial " support in regards to coping with the adjustment to the BMT process. CSW has discussed  psychosocial support options in regards to coping with the adjustment to the BMT process and support groups opportunities.      Pt has a strong support system and a confirmed caregiver plan. Pt verbalizes understanding of the transplant process and wanting to proceed. SW provided contact information and encouraged pt to contact SW with questions, concerns, resources and for support.    Per this assessment, SW did not identify any barriers to this patient moving forward with transplant.    Important Information:   Pt requires a [place] ([place]) . Pts daughter will assist with providing interpreting services.     Follow up Planned:   Psychosocial support    JAYDE Pritchard, NELLIE  Adult Blood & Marrow Transplant   Phone: [phone]  Pager: [phone]

## 2024-02-20 NOTE — LETTER
2/20/2024         RE: Rosario Terrazas  [address]  [place] [address]        Dear Colleague,    Thank you for referring your patient, Rosario Terrazas, to the [facility] BLOOD AND MARROW TRANSPLANT PROGRAM [place]. Please see a copy of my visit note below.    [facility]  BMTCT OPEN VISIT    February 20, 2024      Rosario Terrazas is a 68 year old female undergoing evaluation prior to hematopoietic cell transplant or immune effector cell therapy.    Reason for BMTCT: IgD KLC Multiple myeloma    Recent chemotherapy: VRd + Opal SC through Feb    Recent infections: No    Blood thinner use? If yes, why?  Previously on xarelto, held since 2/8 . Given for DVT prophylaxis (US 12/2023 neg for DVT); Aspirin only moving forward    Treatment for diabetes? No    Today, the patient notes the following symptoms:  Review Of Systems  Skin: negative  Eyes: negative; occasionally itching/dry   Ears/Nose/Throat: negative  Respiratory: No shortness of breath, cough occasional, no hemoptysis   Cardiovascular: no chest pain or palpitations, stable swelling in bilateral ankles  Gastrointestinal: negative  Genitourinary: negative  Musculoskeletal: arthritis pain in shoulders stable; pain in left knee since knee replacement   Neurologic: negative  Psychiatric: negative  Hematologic/Lymphatic/Immunologic: occasional night sweats  Endocrine: negative      Rosario Terrazas's History    No chronic medical issues, arthritis     Surgical history:   Left knee replacement - 2017    No family history on file.    Social History     Tobacco Use    Smoking status: Never    Smokeless tobacco: Never   Substance Use Topics    Alcohol use: Never           Rosario Terrazas's Medications and Allergies    Current Outpatient Medications   Medication    acetaminophen (TYLENOL) 325 MG tablet    acyclovir (ZOVIRAX) 400 MG tablet    aspirin 81 MG EC tablet    bortezomib (VELCADE) 3.5 MG injection     calcium carbonate-vitamin D (OSCAL) 500-5 MG-MCG tablet    diclofenac (VOLTAREN) 1 % topical gel    docusate sodium (DSS) 100 MG capsule    folic acid (FOLVITE) 1 MG tablet    gabapentin (NEURONTIN) 100 MG capsule    isoniazid (NYDRAZID) 300 MG tablet    lidocaine (XYLOCAINE) 5 % external ointment    loperamide (IMODIUM) 2 MG capsule    nystatin (MYCOSTATIN) 245707 UNIT/GM external cream    omeprazole (PRILOSEC) 20 MG DR capsule    ondansetron (ZOFRAN) 4 MG tablet    polyethylene glycol (MIRALAX) 17 GM/Dose powder    prochlorperazine (COMPAZINE) 10 MG tablet    pyridOXINE (VITAMIN B6) 25 MG tablet    rivaroxaban ANTICOAGULANT (XARELTO) 10 MG TABS tablet    senna-docusate (SENOKOT-S/PERICOLACE) 8.6-50 MG tablet    sulfamethoxazole-trimethoprim (BACTRIM) 400-80 MG tablet    tiZANidine (ZANAFLEX) 4 MG tablet    Vitamin D3 (CHOLECALCIFEROL) 25 mcg (1000 units) tablet     No current facility-administered medications for this visit.        No Known Allergies        Physical Examination    VSS reviewed    Exam:  Constitutional: healthy, alert, and no distress  Head: Normocephalic. No masses, lesions, tenderness or abnormalities  ENT: ENT exam normal, no neck nodes or sinus tenderness  Cardiovascular: RRR  Respiratory: negative, CTA upper anterior exam only as wearing brace  Gastrointestinal: Abdomen soft, non-tender. BS normal.  : Deferred  Musculoskeletal: extremities normal- no gross deformities noted, gait normal, and normal muscle tone  Skin: no suspicious lesions or rashes  Psychiatric: mentation appears normal and affect normal/bright  Hematologic/Lymphatic/Immunologic: Normal cervical lymph nodes      Frailty Screening  BMT Fried Frailty          2/20/2024    13:17 12/20/2023    08:19   Fried Frailty   Lost>10 pounds unintenionally last year Y Y   Exhaustion Score 0 0   Slowness Score 1 1   Weakness/ Strength Score 0 1   Low Activity Level Score 1 1   Final Score Frail Frail   Final Score Number 3 4   Sit  Stand Assessment   Patient able to perform 5 chair stands N Y   Chair Stands in seconds needed to push off shair with her hands-could not have hands in front of chest and stand up from chair 26   Patient is able to perform stand with Feet Side by Side? Y Y   First attempt (in seconds): 10 10   Patient is able to perform Semi-Tandem Stand? Y Y   First attemp (in seconds):  10   Patient is able to perform Tandem Stand? N       she couldn't stand with feet infront of toes Y   First attemp (in seconds): 10 10         Overall Assessment    I have reviewed the diagnostic data, medications, frailty screening, and general processes prior to BMTCT.  I have notified the Primary BMT Physician/and or Attending Physician in the clinic of any issues. We also discussed in detail the database and biorepository research for which Rosario Terrazas is eligible. We discussed the potential risks and potential benefits of each of these protocols individually. We explained potential alternatives to the protocols discussed. We explained to the patient that participation is voluntary and that consent may be withdrawn at any time.       Consents Signed:  [facility]  [facility] BMTCT Database    Present during the discussion were Rosario and her daughter Katherin (, witness)+. Copies of the signed consent forms will be provided to the patient on admission. No procedures specific to any studies were performed prior to the patient signing the consent form.    Rosario Terrazas had the opportunity to ask questions, and I answered all of the questions to the best of my ability.      Smita Hargrove, PA-C  [phone]

## 2024-02-20 NOTE — PROGRESS NOTES
[facility]  BMTCT OPEN VISIT    February 20, 2024      Rosario Terrazas is a 68 year old female undergoing evaluation prior to hematopoietic cell transplant or immune effector cell therapy.    Reason for BMTCT: IgD KLC Multiple myeloma    Recent chemotherapy: VRd + Opal SC through Feb    Recent infections: No    Blood thinner use? If yes, why?  Previously on xarelto, held since 2/8 . Given for DVT prophylaxis (US 12/2023 neg for DVT); Aspirin only moving forward    Treatment for diabetes? No    Today, the patient notes the following symptoms:  Review Of Systems  Skin: negative  Eyes: negative; occasionally itching/dry   Ears/Nose/Throat: negative  Respiratory: No shortness of breath, cough occasional, no hemoptysis   Cardiovascular: no chest pain or palpitations, stable swelling in bilateral ankles  Gastrointestinal: negative  Genitourinary: negative  Musculoskeletal: arthritis pain in shoulders stable; pain in left knee since knee replacement   Neurologic: negative  Psychiatric: negative  Hematologic/Lymphatic/Immunologic: occasional night sweats  Endocrine: negative      Rosario Terrazas's History    No chronic medical issues, arthritis     Surgical history:   Left knee replacement - 2017    No family history on file.    Social History     Tobacco Use    Smoking status: Never    Smokeless tobacco: Never   Substance Use Topics    Alcohol use: Never           Rosario Terrazas's Medications and Allergies    Current Outpatient Medications   Medication    acetaminophen (TYLENOL) 325 MG tablet    acyclovir (ZOVIRAX) 400 MG tablet    aspirin 81 MG EC tablet    bortezomib (VELCADE) 3.5 MG injection    calcium carbonate-vitamin D (OSCAL) 500-5 MG-MCG tablet    diclofenac (VOLTAREN) 1 % topical gel    docusate sodium (DSS) 100 MG capsule    folic acid (FOLVITE) 1 MG tablet    gabapentin (NEURONTIN) 100 MG capsule    isoniazid (NYDRAZID) 300 MG tablet    lidocaine (XYLOCAINE) 5 % external ointment     loperamide (IMODIUM) 2 MG capsule    nystatin (MYCOSTATIN) 805779 UNIT/GM external cream    omeprazole (PRILOSEC) 20 MG DR capsule    ondansetron (ZOFRAN) 4 MG tablet    polyethylene glycol (MIRALAX) 17 GM/Dose powder    prochlorperazine (COMPAZINE) 10 MG tablet    pyridOXINE (VITAMIN B6) 25 MG tablet    rivaroxaban ANTICOAGULANT (XARELTO) 10 MG TABS tablet    senna-docusate (SENOKOT-S/PERICOLACE) 8.6-50 MG tablet    sulfamethoxazole-trimethoprim (BACTRIM) 400-80 MG tablet    tiZANidine (ZANAFLEX) 4 MG tablet    Vitamin D3 (CHOLECALCIFEROL) 25 mcg (1000 units) tablet     No current facility-administered medications for this visit.        No Known Allergies        Physical Examination    VSS reviewed    Exam:  Constitutional: healthy, alert, and no distress  Head: Normocephalic. No masses, lesions, tenderness or abnormalities  ENT: ENT exam normal, no neck nodes or sinus tenderness  Cardiovascular: RRR  Respiratory: negative, CTA upper anterior exam only as wearing brace  Gastrointestinal: Abdomen soft, non-tender. BS normal.  : Deferred  Musculoskeletal: extremities normal- no gross deformities noted, gait normal, and normal muscle tone  Skin: no suspicious lesions or rashes  Psychiatric: mentation appears normal and affect normal/bright  Hematologic/Lymphatic/Immunologic: Normal cervical lymph nodes      Frailty Screening  BMT Fried Frailty          2/20/2024    13:17 12/20/2023    08:19   Fried Frailty   Lost>10 pounds unintenionally last year Y Y   Exhaustion Score 0 0   Slowness Score 1 1   Weakness/ Strength Score 0 1   Low Activity Level Score 1 1   Final Score Frail Frail   Final Score Number 3 4   Sit Stand Assessment   Patient able to perform 5 chair stands N Y   Chair Stands in seconds needed to push off shair with her hands-could not have hands in front of chest and stand up from chair 26   Patient is able to perform stand with Feet Side by Side? Y Y   First attempt (in seconds): 10 10   Patient is  able to perform Semi-Tandem Stand? Y Y   First attemp (in seconds):  10   Patient is able to perform Tandem Stand? N       she couldn't stand with feet infront of toes Y   First attemp (in seconds): 10 10         Overall Assessment    I have reviewed the diagnostic data, medications, frailty screening, and general processes prior to BMTCT.  I have notified the Primary BMT Physician/and or Attending Physician in the clinic of any issues. We also discussed in detail the database and biorepository research for which Rosario Terrazas is eligible. We discussed the potential risks and potential benefits of each of these protocols individually. We explained potential alternatives to the protocols discussed. We explained to the patient that participation is voluntary and that consent may be withdrawn at any time.       Consents Signed:  [facility] database  [facility] BMTCT Database    Present during the discussion were Rosario and her daughter Katherin (, witness)+. Copies of the signed consent forms will be provided to the patient on admission. No procedures specific to any studies were performed prior to the patient signing the consent form.    Rosario Terrazas had the opportunity to ask questions, and I answered all of the questions to the best of my ability.      Smita Hargrove, PA-C  [phone]

## 2024-02-20 NOTE — LETTER
"    2/20/2024         RE: Rosario Terrazas  [address] [place] [address]        Dear Colleague,    Thank you for referring your patient, Rosario Terrazas, to the [facility] BLOOD AND MARROW TRANSPLANT PROGRAM [place]. Please see a copy of my visit note below.    BMT Teaching Flowsheet    Rosario Terrazas is a 68 year old female  There were no encounter diagnoses.    Teaching Topic: 2016-35    Person(s) involved in teaching: Patient and daughter. Patient declined an . Release form previously signed.     Motivation Level  Asks Questions: Yes  Eager to Learn: Yes  Cooperative: Yes  Receptive (willing/able to accept information): Yes  Any cultural factors/Mu-ism beliefs that may influence understanding or compliance? Yes, explain: Patient is non-English speaking. Daughter is .    Patient and Family demonstrates understanding of the following:   Reason for the appointment, diagnosis and treatment plan: Yes  Knowledge of proper use of medications and conditions for which they are ordered (with special attention to potential side effects or drug interactions): Yes  Which situations necessitate calling provider and whom to contact: Yes  Proper use and care of (medical equipment, care aids, etc.) Yes  Pain management techniques: Yes  How and/when to access community resources: Yes    Teaching/ learning concerns addressed: None identified    Infection Control:  Patient and Family instructed on hand hygiene: Yes  Signs and symptoms of infection taught: Yes    Instructional Materials Used/Given: Patient was given and reviewed BMT Auto Transplant Teaching Binder, including: medication pamphlets, sample treatment calendars, consents, contact information for \"When to Call for Help\" (including after-hours contact), post-transplant precautions and guidelines.  Patient was encouraged to call with any additional questions.      Patient was provided with handouts for " caring for their central venous catheter (proper hand hygiene, changing end caps, flushing line, changing CVC dressing). Yes    Patient was encouraged to view step-by-step instructional videos for central venous catheter care and report any questions or concerns. Yes     Time spent with patient: 90 minutes.  Specific Concerns: NA      Again, thank you for allowing me to participate in the care of your patient.        Sincerely,        Romina Santiago RN

## 2024-02-20 NOTE — NURSING NOTE
"Oncology Rooming Note    February 20, 2024 12:40 PM   Rosario Terrazas is a 68 year old female who presents for:    No chief complaint on file.    Initial Vitals: There were no vitals taken for this visit. Estimated body mass index is 38.2 kg/m  as calculated from the following:    Height as of 12/20/23: 1.606 m (5' 3.23\").    Weight as of 2/16/24: 98.5 kg (217 lb 3.2 oz). There is no height or weight on file to calculate BSA.  Data Unavailable Comment: Data Unavailable   No LMP recorded. Patient is postmenopausal.  Allergies reviewed: Yes  Medications reviewed: No    Medications: Medication refills not needed today.  Pharmacy name entered into EPIC: Data Unavailable    Frailty Screening:   Is the patient here for a new oncology consult visit in cancer care? 1. Yes. Over the past month, have you experienced difficulty or required a caregiver to assist with:   1. Balance, walking or general mobility (including any falls)? NO  2. Completion of self-care tasks such as bathing, dressing, toileting, grooming/hygiene?  YES  3. Concentration or memory that affects your daily life?  NO       Clinical concerns: NONE      Elizabeth Obregon RN              "

## 2024-02-20 NOTE — NURSING NOTE
EKG was performed today per order written by Rishi Rocha. Name and  verified with patient. Patient tolerated well without incident. File transmitted to chart.    Fabricio Martin on 2024 at 11:45 AM

## 2024-02-21 ENCOUNTER — OFFICE VISIT (OUTPATIENT)
Dept: TRANSPLANT | Facility: CLINIC | Age: 69
End: 2024-02-21
Attending: PHYSICIAN ASSISTANT
Payer: COMMERCIAL

## 2024-02-21 ENCOUNTER — OFFICE VISIT (OUTPATIENT)
Dept: PULMONOLOGY | Facility: CLINIC | Age: 69
End: 2024-02-21
Payer: COMMERCIAL

## 2024-02-21 ENCOUNTER — APPOINTMENT (OUTPATIENT)
Dept: LAB | Facility: CLINIC | Age: 69
End: 2024-02-21
Attending: PHYSICIAN ASSISTANT
Payer: COMMERCIAL

## 2024-02-21 VITALS
TEMPERATURE: 97.9 F | WEIGHT: 219.2 LBS | RESPIRATION RATE: 16 BRPM | OXYGEN SATURATION: 100 % | DIASTOLIC BLOOD PRESSURE: 63 MMHG | HEART RATE: 70 BPM | SYSTOLIC BLOOD PRESSURE: 109 MMHG | BODY MASS INDEX: 38.55 KG/M2

## 2024-02-21 DIAGNOSIS — Z01.818 EXAMINATION PRIOR TO CHEMOTHERAPY: ICD-10-CM

## 2024-02-21 DIAGNOSIS — Z86.2 PERSONAL HISTORY OF DISEASES OF BLOOD AND BLOOD-FORMING ORGANS: ICD-10-CM

## 2024-02-21 DIAGNOSIS — C90.00 MULTIPLE MYELOMA, REMISSION STATUS UNSPECIFIED (H): Primary | ICD-10-CM

## 2024-02-21 DIAGNOSIS — C90.00 MULTIPLE MYELOMA, REMISSION STATUS UNSPECIFIED (H): ICD-10-CM

## 2024-02-21 DIAGNOSIS — C90.00 MULTIPLE MYELOMA (H): Primary | ICD-10-CM

## 2024-02-21 LAB
ALBUMIN SERPL ELPH-MCNC: 3.6 G/DL (ref 3.7–5.1)
ALPHA1 GLOB SERPL ELPH-MCNC: 0.3 G/DL (ref 0.2–0.4)
ALPHA2 GLOB SERPL ELPH-MCNC: 0.8 G/DL (ref 0.5–0.9)
B-GLOBULIN SERPL ELPH-MCNC: 0.6 G/DL (ref 0.6–1)
B2 MICROGLOB TUMOR MARKER SER-MCNC: 2.7 MG/L
BASOPHILS # BLD AUTO: 0.1 10E3/UL (ref 0–0.2)
BASOPHILS NFR BLD AUTO: 2 %
EBV VCA IGG SER IA-ACNC: 203 U/ML
EBV VCA IGG SER IA-ACNC: POSITIVE
EOSINOPHIL # BLD AUTO: 0.1 10E3/UL (ref 0–0.7)
EOSINOPHIL NFR BLD AUTO: 1 %
ERYTHROCYTE [DISTWIDTH] IN BLOOD BY AUTOMATED COUNT: 16.1 % (ref 10–15)
GAMMA GLOB SERPL ELPH-MCNC: 0.3 G/DL (ref 0.7–1.6)
HCT VFR BLD AUTO: 33.4 % (ref 35–47)
HGB BLD-MCNC: 10.6 G/DL (ref 11.7–15.7)
HSV1 IGG SERPL QL IA: 4.67 INDEX
HSV1 IGG SERPL QL IA: ABNORMAL
HSV2 IGG SERPL QL IA: 0.06 INDEX
HSV2 IGG SERPL QL IA: ABNORMAL
IGA SERPL-MCNC: 9 MG/DL (ref 84–499)
IGG SERPL-MCNC: 322 MG/DL (ref 610–1616)
IGM SERPL-MCNC: <10 MG/DL (ref 35–242)
IMM GRANULOCYTES # BLD: 0 10E3/UL
IMM GRANULOCYTES NFR BLD: 0 %
INTERPRETATION: NORMAL
KAPPA LC FREE SER-MCNC: 10.89 MG/DL (ref 0.33–1.94)
KAPPA LC FREE/LAMBDA FREE SER NEPH: 11 {RATIO} (ref 0.26–1.65)
LAMBDA LC FREE SERPL-MCNC: 0.99 MG/DL (ref 0.57–2.63)
LOCATION OF TASK: ABNORMAL
LOCATION OF TASK: NORMAL
LOCATION OF TASK: NORMAL
LYMPHOCYTES # BLD AUTO: 1 10E3/UL (ref 0.8–5.3)
LYMPHOCYTES NFR BLD AUTO: 24 %
M PROTEIN SERPL ELPH-MCNC: 0 G/DL
MCH RBC QN AUTO: 31.7 PG (ref 26.5–33)
MCHC RBC AUTO-ENTMCNC: 31.7 G/DL (ref 31.5–36.5)
MCV RBC AUTO: 100 FL (ref 78–100)
MONOCYTES # BLD AUTO: 0.6 10E3/UL (ref 0–1.3)
MONOCYTES NFR BLD AUTO: 15 %
NEUTROPHILS # BLD AUTO: 2.5 10E3/UL (ref 1.6–8.3)
NEUTROPHILS NFR BLD AUTO: 58 %
NRBC # BLD AUTO: 0 10E3/UL
NRBC BLD AUTO-RTO: 0 /100
PLATELET # BLD AUTO: 222 10E3/UL (ref 150–450)
PROT ELPH PNL UR ELPH: NORMAL
PROT PATTERN SERPL ELPH-IMP: ABNORMAL
PROT PATTERN SERPL IFE-IMP: NORMAL
RBC # BLD AUTO: 3.34 10E6/UL (ref 3.8–5.2)
WBC # BLD AUTO: 4.3 10E3/UL (ref 4–11)

## 2024-02-21 PROCEDURE — 0001U RBC DNA HEA 35 AG 11 BLD GRP: CPT | Performed by: INTERNAL MEDICINE

## 2024-02-21 PROCEDURE — 36415 COLL VENOUS BLD VENIPUNCTURE: CPT

## 2024-02-21 PROCEDURE — 85097 BONE MARROW INTERPRETATION: CPT | Performed by: STUDENT IN AN ORGANIZED HEALTH CARE EDUCATION/TRAINING PROGRAM

## 2024-02-21 PROCEDURE — 85041 AUTOMATED RBC COUNT: CPT

## 2024-02-21 PROCEDURE — 94726 PLETHYSMOGRAPHY LUNG VOLUMES: CPT | Performed by: INTERNAL MEDICINE

## 2024-02-21 PROCEDURE — 88342 IMHCHEM/IMCYTCHM 1ST ANTB: CPT | Mod: TC

## 2024-02-21 PROCEDURE — 88369 M/PHMTRC ALYSISHQUANT/SEMIQ: CPT | Mod: 26 | Performed by: MEDICAL GENETICS

## 2024-02-21 PROCEDURE — 38222 DX BONE MARROW BX & ASPIR: CPT | Mod: RT

## 2024-02-21 PROCEDURE — 88311 DECALCIFY TISSUE: CPT | Mod: TC

## 2024-02-21 PROCEDURE — 88342 IMHCHEM/IMCYTCHM 1ST ANTB: CPT | Mod: 26 | Performed by: STUDENT IN AN ORGANIZED HEALTH CARE EDUCATION/TRAINING PROGRAM

## 2024-02-21 PROCEDURE — 88275 CYTOGENETICS 100-300: CPT

## 2024-02-21 PROCEDURE — 88237 TISSUE CULTURE BONE MARROW: CPT

## 2024-02-21 PROCEDURE — 38222 DX BONE MARROW BX & ASPIR: CPT | Performed by: STUDENT IN AN ORGANIZED HEALTH CARE EDUCATION/TRAINING PROGRAM

## 2024-02-21 PROCEDURE — 88368 INSITU HYBRIDIZATION MANUAL: CPT | Mod: 26 | Performed by: MEDICAL GENETICS

## 2024-02-21 PROCEDURE — 88291 CYTO/MOLECULAR REPORT: CPT | Performed by: MEDICAL GENETICS

## 2024-02-21 PROCEDURE — 88185 FLOWCYTOMETRY/TC ADD-ON: CPT | Performed by: INTERNAL MEDICINE

## 2024-02-21 PROCEDURE — 88313 SPECIAL STAINS GROUP 2: CPT | Mod: 26 | Performed by: STUDENT IN AN ORGANIZED HEALTH CARE EDUCATION/TRAINING PROGRAM

## 2024-02-21 PROCEDURE — 88305 TISSUE EXAM BY PATHOLOGIST: CPT | Mod: 26 | Performed by: STUDENT IN AN ORGANIZED HEALTH CARE EDUCATION/TRAINING PROGRAM

## 2024-02-21 PROCEDURE — 94375 RESPIRATORY FLOW VOLUME LOOP: CPT | Performed by: INTERNAL MEDICINE

## 2024-02-21 PROCEDURE — 94729 DIFFUSING CAPACITY: CPT | Performed by: INTERNAL MEDICINE

## 2024-02-21 PROCEDURE — 38221 DX BONE MARROW BIOPSIES: CPT | Performed by: STUDENT IN AN ORGANIZED HEALTH CARE EDUCATION/TRAINING PROGRAM

## 2024-02-21 PROCEDURE — 88341 IMHCHEM/IMCYTCHM EA ADD ANTB: CPT | Mod: 26 | Performed by: STUDENT IN AN ORGANIZED HEALTH CARE EDUCATION/TRAINING PROGRAM

## 2024-02-21 PROCEDURE — 88187 FLOWCYTOMETRY/READ 2-8: CPT | Mod: GC | Performed by: PATHOLOGY

## 2024-02-21 PROCEDURE — 88311 DECALCIFY TISSUE: CPT | Mod: 26 | Performed by: STUDENT IN AN ORGANIZED HEALTH CARE EDUCATION/TRAINING PROGRAM

## 2024-02-21 RX ORDER — HEPARIN SODIUM (PORCINE) LOCK FLUSH IV SOLN 100 UNIT/ML 100 UNIT/ML
5 SOLUTION INTRAVENOUS
Status: CANCELLED | OUTPATIENT
Start: 2024-02-21

## 2024-02-21 RX ORDER — OXYCODONE HYDROCHLORIDE 5 MG/1
5 TABLET ORAL EVERY 6 HOURS PRN
Status: ON HOLD | COMMUNITY
End: 2024-04-02

## 2024-02-21 RX ORDER — HEPARIN SODIUM,PORCINE 10 UNIT/ML
5-20 VIAL (ML) INTRAVENOUS DAILY PRN
Status: CANCELLED | OUTPATIENT
Start: 2024-02-21

## 2024-02-21 ASSESSMENT — PAIN SCALES - GENERAL: PAINLEVEL: SEVERE PAIN (6)

## 2024-02-21 NOTE — PROGRESS NOTES
BMBX Teaching and Assessment       Teaching concerns addressed: Bone marrow biopsy and infection prevention.     Person(s) involved in teaching: Patient  Motivation Level  Asks Questions: Yes  Eager to Learn: Yes  Cooperative: Yes  Receptive (willing/able to accept information): Yes    Patient demonstrates understanding of the following:     Reason for the appointment, diagnosis and treatment plan: Yes  Knowledge of proper use of medications and conditions for which they are ordered (with special attention to potential side effects or drug interactions): Yes  Which situations necessitate calling provider and whom to contact: Yes    Teaching concerns addressed:   Reviewed activity restrictions if received premeds, potential for bleeding and actions to take if develops any of the issues below    Pain management techniques: Yes  Patient instructed on hand hygiene: Yes  How and/when to access community resources: Yes    Infection Control:  Patient demonstrates understanding of the following:   Bone marrow procedure site care taught: Yes  Signs and symptoms of infection taught: Yes       Instructional Materials Used/Given: Pt instructed to keep bmbx site clean and dry for 24hrs. Pt educated to monitor site for signs of infection such as redness, rash, oozing, puss, bleeding, pain, and elevated temp. Pt instructed to go to call the [facility] [place] or go to the ER if any signs of infection should occur. Pt educated to not operate machinery if receiving versed. Pt and daughter verbalize understanding. Patient declined versed.     Pre-procedure labs drawn via . Post procedure: Patient vital signs stable, ambulating, site is clean, dry and intact prior to discharge and line removed. Pt discharged with daughter as .     Radha Black RN

## 2024-02-21 NOTE — PROGRESS NOTES
"BMT Teaching Flowsheet    Rosario Terrazas is a 68 year old female  There were no encounter diagnoses.    Teaching Topic: 2016-35    Person(s) involved in teaching: Patient and daughter. Patient declined an . Release form previously signed.     Motivation Level  Asks Questions: Yes  Eager to Learn: Yes  Cooperative: Yes  Receptive (willing/able to accept information): Yes  Any cultural factors/Yazidi beliefs that may influence understanding or compliance? Yes, explain: Patient is non-English speaking. Daughter is .    Patient and Family demonstrates understanding of the following:   Reason for the appointment, diagnosis and treatment plan: Yes  Knowledge of proper use of medications and conditions for which they are ordered (with special attention to potential side effects or drug interactions): Yes  Which situations necessitate calling provider and whom to contact: Yes  Proper use and care of (medical equipment, care aids, etc.) Yes  Pain management techniques: Yes  How and/when to access community resources: Yes    Teaching/ learning concerns addressed: None identified    Infection Control:  Patient and Family instructed on hand hygiene: Yes  Signs and symptoms of infection taught: Yes    Instructional Materials Used/Given: Patient was given and reviewed BMT Auto Transplant Teaching Binder, including: medication pamphlets, sample treatment calendars, consents, contact information for \"When to Call for Help\" (including after-hours contact), post-transplant precautions and guidelines.  Patient was encouraged to call with any additional questions.      Patient was provided with handouts for caring for their central venous catheter (proper hand hygiene, changing end caps, flushing line, changing CVC dressing). Yes    Patient was encouraged to view step-by-step instructional videos for central venous catheter care and report any questions or concerns. Yes     Time spent with patient: 90 " minutes.  Specific Concerns: NA

## 2024-02-21 NOTE — LETTER
2/21/2024         RE: Rosario Terrazas  [address] [place] [address]        Dear Colleague,    Thank you for referring your patient, Rosario Terrazas, to the [facility] BLOOD AND MARROW TRANSPLANT PROGRAM [place]. Please see a copy of my visit note below.    BMT ONC Adult Bone Marrow Biopsy Procedure Note  February 21, 2024  /63   Pulse 70   Temp 97.9  F (36.6  C)   Resp 16   Wt 99.4 kg (219 lb 3.2 oz)   SpO2 100%   BMI 38.55 kg/m       Learning needs assessment complete within 12 months? NO    DIAGNOSIS: multiple myeloma     PROCEDURE: Unilateral Bone Marrow Biopsy and Unilateral Aspirate    [place] [facility] [place] [facility] [place]    Patient s identification was positively verified by verbal identification and invasive procedure safety checklist was completed. Informed consent was obtained. Without pre-medication, patient was placed in the left lateral decubitus position and prepped and draped in a sterile manner. Approximately 20 cc of 1% Lidocaine was used over the right posterior iliac spine. Following this a 3 mm incision was made. Trephine bone marrow core(s) was (were) obtained from the [facility]. Bone marrow aspirates were obtained from the [facility]. Aspirates were sent for immunophenotyping, cytogenetics, and molecular diagnostics RFLP. A total of approximately 20 ml of marrow was aspirated. Following this procedure a sterile dressing was applied to the bone marrow biopsy site(s). The patient was placed in the supine position to maintain pressure on the biopsy site. Post-procedure wound care instructions were given.     Complications: YES, difficult body habitus, difficult to obtain longer core (not too soft). With discomfort, encouraged versed or sedation in future    Pre-procedural pain: 0 out of 10 on the numeric pain rating scale.     Procedural pain: 8 out of 10 on the numeric pain rating scale.     Post-procedural pain assessment: 0 out of 10 on the numeric pain rating  scale.     Interventions: NO    Length of procedure:20 minutes or less      Procedure performed by: Smita APSTOR

## 2024-02-21 NOTE — NURSING NOTE
"Oncology Rooming Note    February 21, 2024 12:53 PM   Rosario Terrazas is a 68 year old female who presents for:    Chief Complaint   Patient presents with    Oncology Clinic Visit     BMBX r/t MM     Initial Vitals: /63   Pulse 70   Temp 97.9  F (36.6  C)   Resp 16   Wt 99.4 kg (219 lb 3.2 oz)   SpO2 100%   BMI 38.55 kg/m   Estimated body mass index is 38.55 kg/m  as calculated from the following:    Height as of 12/20/23: 1.606 m (5' 3.23\").    Weight as of this encounter: 99.4 kg (219 lb 3.2 oz). Body surface area is 2.11 meters squared.  Severe Pain (6) Comment: Data Unavailable   No LMP recorded. Patient is postmenopausal.  Allergies reviewed: Yes  Medications reviewed: Yes    Medications: Medication refills not needed today.  Pharmacy name entered into EPIC: Data Unavailable    Frailty Screening:   Is the patient here for a new oncology consult visit in cancer care? 2. No      Clinical concerns: VSS. No clinical concerns at this time.       Radha Black RN              "

## 2024-02-21 NOTE — PROGRESS NOTES
"Pharmacy Assessment - Pre-Stem Cell Transplant    Assessments & Recommendations:  1) Ice chips for 2-4 hours before, during, and after melphalan. Daughter reports Rosario does not like to eat or drink cold things but I discussed purpose of ice and encouraged her to try as able.  2) Aspirin for history of DVT. Hold aspirin when platelets <50K.  3) Avoid ondansetron for 72 hours after Aloxi infusion.  4) May continue isoniazid and pyridoxine as stated per infectious disease.    If this patient is admitted under observation, the patient may bring in their own supply of the following medication for use in the hospital:  1) None  -Per \"Medications Not Supplied by Pharmacy\" policy (available on [employer])    History of Present Illness:  Rosario Terrazas is a 68 year old year old female diagnosed with multiple myeloma.  she has been treated with VRd, [place], daratumumab, and bortezomib .  she is now being work up for Autologous Stem Cell Transplant on protocol 2016-35 which utilizes melphalan as a conditioning regimen.    Pertinent labs/tests:  Viral Testing:  HSV(+/-) / EBV(+)  Ejection Fraction: ordered  QTc: 465 msec (2/20/24)    Weights:   Wt Readings from Last 3 Encounters:   02/16/24 98.5 kg (217 lb 3.2 oz)   12/20/23 95.2 kg (209 lb 12.8 oz)   Ideal body weight: 52.9 kg (116 lb 10.8 oz)  Adjusted ideal body weight: 69.8 kg (153 lb 14.8 oz)  % IBW:  180  There is no height or weight on file to calculate BMI.    Primary BMT Physician: Dr. Rocha  BMT RN Coordinator:  Romina Santiago    Past Medical History:  No past medical history on file.    Medication Allergies:  No Known Allergies    Current Medications (pre-admit):  Current Outpatient Medications   Medication Sig Dispense Refill    oxyCODONE (ROXICODONE) 5 MG tablet Take 5 mg by mouth every 6 hours as needed for severe pain      acetaminophen (TYLENOL) 325 MG tablet Take 2 tablets by mouth every 4 hours as needed      acyclovir (ZOVIRAX) 400 MG tablet Take " 400 mg by mouth      aspirin 81 MG EC tablet Take 1 tablet by mouth daily      bortezomib (VELCADE) 3.5 MG injection Give 1.3mg/m2 (2.8) subcutaneous every week for 3 weeks. Provide enough doses for three week cycle (10.5mg).      calcium carbonate-vitamin D (OSCAL) 500-5 MG-MCG tablet Take 1 tablet by mouth 3 times daily (with meals)      diclofenac (VOLTAREN) 1 % topical gel APPLY 4 GRAMS TO PAINFUL AREAS IN KNEES/SHOULDERS 4 TIMES A DAY AS DIRECTED. MAX OF 32 GRAMS PER DAY.      docusate sodium (DSS) 100 MG capsule Take 100 mg by mouth 2 times daily      folic acid (FOLVITE) 1 MG tablet Take 1 tablet by mouth daily      gabapentin (NEURONTIN) 100 MG capsule Take 1 capsule by mouth 3 times daily      isoniazid (NYDRAZID) 300 MG tablet Take 1 tablet by mouth daily      lidocaine (XYLOCAINE) 5 % external ointment Apply to ribs a couple times a day up to 4 times daily as needed for pain      loperamide (IMODIUM) 2 MG capsule Take 2 mg by mouth 4 times daily as needed for diarrhea      nystatin (MYCOSTATIN) 958860 UNIT/GM external cream Apply to rash in genital area twice daily as needed      omeprazole (PRILOSEC) 20 MG DR capsule Take 20 mg by mouth daily      ondansetron (ZOFRAN) 4 MG tablet Take 4 mg by mouth every 8 hours as needed      polyethylene glycol (MIRALAX) 17 GM/Dose powder Take 17 g by mouth daily as needed      prochlorperazine (COMPAZINE) 10 MG tablet Take 10 mg by mouth      pyridOXINE (VITAMIN B6) 25 MG tablet Take 1 tablet by mouth daily      senna-docusate (SENOKOT-S/PERICOLACE) 8.6-50 MG tablet Take 1 tablet by mouth 2 times daily      sulfamethoxazole-trimethoprim (BACTRIM) 400-80 MG tablet Take 1 tablet by mouth daily      tiZANidine (ZANAFLEX) 4 MG tablet Take HALF to 1 tablet by mouth up to 2 times daily for muscle pain/spasm      Vitamin D3 (CHOLECALCIFEROL) 25 mcg (1000 units) tablet Take 2 tablets by mouth daily         Herbal Medication/Nutritional Supplements:   Oscal, cholecalciferol,  folic acid, and vitamin B6      Smoking/Past Drug Use:  None      Nausea/Vomiting, Pain, or other issues:   Oxycodone, APAP, topical diclofenac and topical lidocaine used for arthritis and rib pain.      Summary:  I met with Rosario Terrazas and her daughter for approximately 40 minutes.  We discussed allergies, home medications, filgrastim priming, preparative regimen (melphalan), anti-infective prophylaxis (acyclovir, levofloxacin, fluconazole, Bactrim), supportive medications (allopurinol, mucositis management, antiemetics), vaccines, med box/pharmacy expectations. I reviewed and updated home medication list and drug allergies.    ALBERTO CLEARY, [facility]

## 2024-02-21 NOTE — PROGRESS NOTES
BMT ONC Adult Bone Marrow Biopsy Procedure Note  February 21, 2024  /63   Pulse 70   Temp 97.9  F (36.6  C)   Resp 16   Wt 99.4 kg (219 lb 3.2 oz)   SpO2 100%   BMI 38.55 kg/m       Learning needs assessment complete within 12 months? NO    DIAGNOSIS: multiple myeloma     PROCEDURE: Unilateral Bone Marrow Biopsy and Unilateral Aspirate    LOCATION: [facility] [place] 2nd Floor    Patient s identification was positively verified by verbal identification and invasive procedure safety checklist was completed. Informed consent was obtained. Without pre-medication, patient was placed in the left lateral decubitus position and prepped and draped in a sterile manner. Approximately 20 cc of 1% Lidocaine was used over the right posterior iliac spine. Following this a 3 mm incision was made. Trephine bone marrow core(s) was (were) obtained from the [facility]. Bone marrow aspirates were obtained from the [facility]. Aspirates were sent for immunophenotyping, cytogenetics, and molecular diagnostics RFLP. A total of approximately 20 ml of marrow was aspirated. Following this procedure a sterile dressing was applied to the bone marrow biopsy site(s). The patient was placed in the supine position to maintain pressure on the biopsy site. Post-procedure wound care instructions were given.     Complications: YES, difficult body habitus, difficult to obtain longer core (not too soft). With discomfort, encouraged versed or sedation in future    Pre-procedural pain: 0 out of 10 on the numeric pain rating scale.     Procedural pain: 8 out of 10 on the numeric pain rating scale.     Post-procedural pain assessment: 0 out of 10 on the numeric pain rating scale.     Interventions: NO    Length of procedure:20 minutes or less      Procedure performed by: Smita PASTOR

## 2024-02-21 NOTE — LETTER
"    2/21/2024         RE: Rosario Terrazas  [address] [place] [address]        Dear Colleague,    Thank you for referring your patient, Rosario Terrazas, to the [facility] BLOOD AND MARROW TRANSPLANT PROGRAM [place]. Please see a copy of my visit note below.    Pharmacy Assessment - Pre-Stem Cell Transplant    Assessments & Recommendations:  1) Ice chips for 2-4 hours before, during, and after melphalan. Daughter reports Rosario does not like to eat or drink cold things but I discussed purpose of ice and encouraged her to try as able.  2) Aspirin for history of DVT. Hold aspirin when platelets <50K.  3) Avoid ondansetron for 72 hours after Aloxi infusion.    If this patient is admitted under observation, the patient may bring in their own supply of the following medication for use in the hospital:  1) None  -Per \"Medications Not Supplied by Pharmacy\" policy (available on [employer])    History of Present Illness:  Rosario Terrazas is a 68 year old year old female diagnosed with multiple myeloma.  she has been treated with VRd, [place], daratumumab, and bortezomib .  she is now being work up for Autologous Stem Cell Transplant on protocol 2016-35 which utilizes melphalan as a conditioning regimen.    Pertinent labs/tests:  Viral Testing:  HSV(+/-) / EBV(+)  Ejection Fraction: ordered  QTc: 465 msec (2/20/24)    Weights:   Wt Readings from Last 3 Encounters:   02/16/24 98.5 kg (217 lb 3.2 oz)   12/20/23 95.2 kg (209 lb 12.8 oz)   Ideal body weight: 52.9 kg (116 lb 10.8 oz)  Adjusted ideal body weight: 69.8 kg (153 lb 14.8 oz)  % IBW:  180  There is no height or weight on file to calculate BMI.    Primary BMT Physician: Dr. Rocha  BMT RN Coordinator:  Romina Santiago    Past Medical History:  No past medical history on file.    Medication Allergies:  No Known Allergies    Current Medications (pre-admit):  Current Outpatient Medications   Medication Sig Dispense Refill    " oxyCODONE (ROXICODONE) 5 MG tablet Take 5 mg by mouth every 6 hours as needed for severe pain      acetaminophen (TYLENOL) 325 MG tablet Take 2 tablets by mouth every 4 hours as needed      acyclovir (ZOVIRAX) 400 MG tablet Take 400 mg by mouth      aspirin 81 MG EC tablet Take 1 tablet by mouth daily      bortezomib (VELCADE) 3.5 MG injection Give 1.3mg/m2 (2.8) subcutaneous every week for 3 weeks. Provide enough doses for three week cycle (10.5mg).      calcium carbonate-vitamin D (OSCAL) 500-5 MG-MCG tablet Take 1 tablet by mouth 3 times daily (with meals)      diclofenac (VOLTAREN) 1 % topical gel APPLY 4 GRAMS TO PAINFUL AREAS IN KNEES/SHOULDERS 4 TIMES A DAY AS DIRECTED. MAX OF 32 GRAMS PER DAY.      docusate sodium (DSS) 100 MG capsule Take 100 mg by mouth 2 times daily      folic acid (FOLVITE) 1 MG tablet Take 1 tablet by mouth daily      gabapentin (NEURONTIN) 100 MG capsule Take 1 capsule by mouth 3 times daily      isoniazid (NYDRAZID) 300 MG tablet Take 1 tablet by mouth daily      lidocaine (XYLOCAINE) 5 % external ointment Apply to ribs a couple times a day up to 4 times daily as needed for pain      loperamide (IMODIUM) 2 MG capsule Take 2 mg by mouth 4 times daily as needed for diarrhea      nystatin (MYCOSTATIN) 931087 UNIT/GM external cream Apply to rash in genital area twice daily as needed      omeprazole (PRILOSEC) 20 MG DR capsule Take 20 mg by mouth daily      ondansetron (ZOFRAN) 4 MG tablet Take 4 mg by mouth every 8 hours as needed      polyethylene glycol (MIRALAX) 17 GM/Dose powder Take 17 g by mouth daily as needed      prochlorperazine (COMPAZINE) 10 MG tablet Take 10 mg by mouth      pyridOXINE (VITAMIN B6) 25 MG tablet Take 1 tablet by mouth daily      senna-docusate (SENOKOT-S/PERICOLACE) 8.6-50 MG tablet Take 1 tablet by mouth 2 times daily      sulfamethoxazole-trimethoprim (BACTRIM) 400-80 MG tablet Take 1 tablet by mouth daily      tiZANidine (ZANAFLEX) 4 MG tablet Take HALF to  1 tablet by mouth up to 2 times daily for muscle pain/spasm      Vitamin D3 (CHOLECALCIFEROL) 25 mcg (1000 units) tablet Take 2 tablets by mouth daily         Herbal Medication/Nutritional Supplements:   Oscal, cholecalciferol, folic acid, and vitamin B6      Smoking/Past Drug Use:  None      Nausea/Vomiting, Pain, or other issues:   Oxycodone, APAP, topical diclofenac and topical lidocaine used for arthritis and rib pain.      Summary:  I met with Rosario Terrazas and her daughter for approximately 40 minutes.  We discussed allergies, home medications, filgrastim priming, preparative regimen (melphalan), anti-infective prophylaxis (acyclovir, levofloxacin, fluconazole, Bactrim), supportive medications (allopurinol, mucositis management, antiemetics), vaccines, med box/pharmacy expectations. I reviewed and updated home medication list and drug allergies.    ALBERTO CLEARY [facility]

## 2024-02-22 ENCOUNTER — ANCILLARY PROCEDURE (OUTPATIENT)
Dept: CARDIOLOGY | Facility: CLINIC | Age: 69
End: 2024-02-22
Attending: INTERNAL MEDICINE
Payer: COMMERCIAL

## 2024-02-22 ENCOUNTER — HOSPITAL ENCOUNTER (OUTPATIENT)
Dept: PET IMAGING | Facility: CLINIC | Age: 69
Discharge: HOME | End: 2024-02-22
Attending: INTERNAL MEDICINE | Admitting: INTERNAL MEDICINE
Payer: COMMERCIAL

## 2024-02-22 ENCOUNTER — HOSPITAL ENCOUNTER (OUTPATIENT)
Dept: LAB | Facility: CLINIC | Age: 69
Discharge: HOME | End: 2024-02-22
Attending: INTERNAL MEDICINE
Payer: COMMERCIAL

## 2024-02-22 ENCOUNTER — LAB (OUTPATIENT)
Dept: LAB | Facility: CLINIC | Age: 69
End: 2024-02-22
Attending: INTERNAL MEDICINE
Payer: COMMERCIAL

## 2024-02-22 VITALS
HEIGHT: 63 IN | SYSTOLIC BLOOD PRESSURE: 109 MMHG | DIASTOLIC BLOOD PRESSURE: 60 MMHG | TEMPERATURE: 97.9 F | WEIGHT: 219.2 LBS | BODY MASS INDEX: 38.84 KG/M2 | RESPIRATION RATE: 16 BRPM | HEART RATE: 70 BPM

## 2024-02-22 DIAGNOSIS — C90.00 MULTIPLE MYELOMA, REMISSION STATUS UNSPECIFIED (H): Primary | ICD-10-CM

## 2024-02-22 DIAGNOSIS — Z01.818 EXAMINATION PRIOR TO CHEMOTHERAPY: ICD-10-CM

## 2024-02-22 DIAGNOSIS — Z86.2 PERSONAL HISTORY OF DISEASES OF BLOOD AND BLOOD-FORMING ORGANS: ICD-10-CM

## 2024-02-22 DIAGNOSIS — C90.00 MULTIPLE MYELOMA (H): Primary | ICD-10-CM

## 2024-02-22 DIAGNOSIS — C90.00 MULTIPLE MYELOMA, REMISSION STATUS UNSPECIFIED (H): ICD-10-CM

## 2024-02-22 DIAGNOSIS — C90.00 MULTIPLE MYELOMA (H): ICD-10-CM

## 2024-02-22 LAB
ATRIAL RATE - MUSE: 66 BPM
DIASTOLIC BLOOD PRESSURE - MUSE: NORMAL MMHG
IGD SER-MCNC: 7.6 MG/DL
IGE SERPL-ACNC: <2 KU/L (ref 0–114)
INTERPRETATION ECG - MUSE: NORMAL
LVEF ECHO: NORMAL
P AXIS - MUSE: 34 DEGREES
PATH REPORT.COMMENTS IMP SPEC: ABNORMAL
PATH REPORT.COMMENTS IMP SPEC: YES
PATH REPORT.COMMENTS IMP SPEC: YES
PATH REPORT.FINAL DX SPEC: ABNORMAL
PATH REPORT.FINAL DX SPEC: ABNORMAL
PATH REPORT.GROSS SPEC: ABNORMAL
PATH REPORT.MICROSCOPIC SPEC OTHER STN: ABNORMAL
PATH REPORT.RELEVANT HX SPEC: ABNORMAL
PATH REPORT.RELEVANT HX SPEC: ABNORMAL
PR INTERVAL - MUSE: 202 MS
QRS DURATION - MUSE: 74 MS
QT - MUSE: 444 MS
QTC - MUSE: 465 MS
R AXIS - MUSE: 12 DEGREES
SYSTOLIC BLOOD PRESSURE - MUSE: NORMAL MMHG
T AXIS - MUSE: 51 DEGREES
T GONDII IGG SER-ACNC: 27.6 IU/ML
T GONDII IGM SER-ACNC: <3 AU/ML
VENTRICULAR RATE- MUSE: 66 BPM

## 2024-02-22 PROCEDURE — 343N000001 HC RX 343: Performed by: INTERNAL MEDICINE

## 2024-02-22 PROCEDURE — 78816 PET IMAGE W/CT FULL BODY: CPT | Mod: PS

## 2024-02-22 PROCEDURE — G0463 HOSPITAL OUTPT CLINIC VISIT: HCPCS | Mod: 25

## 2024-02-22 PROCEDURE — 88321 CONSLTJ&REPRT SLD PREP ELSWR: CPT | Performed by: STUDENT IN AN ORGANIZED HEALTH CARE EDUCATION/TRAINING PROGRAM

## 2024-02-22 PROCEDURE — A9552 F18 FDG: HCPCS | Performed by: INTERNAL MEDICINE

## 2024-02-22 PROCEDURE — 78816 PET IMAGE W/CT FULL BODY: CPT | Mod: 26 | Performed by: RADIOLOGY

## 2024-02-22 PROCEDURE — 93306 TTE W/DOPPLER COMPLETE: CPT | Performed by: STUDENT IN AN ORGANIZED HEALTH CARE EDUCATION/TRAINING PROGRAM

## 2024-02-22 RX ADMIN — FLUDEOXYGLUCOSE F-18 13.21 MILLICURIE: 500 INJECTION, SOLUTION INTRAVENOUS at 07:47

## 2024-02-22 NOTE — PROGRESS NOTES
"APHERESIS INITIAL CONSULT CHECKLIST    Current Encounter Information  Current Encounter Information: Reason for Visit, Allergies and Current Meds  Procedure Requested: MNC/PBSC Collection  History of: (Reason for Apheresis): Multiple Myeloma    Access Assessment  Access Assessment  Vein Assessment:  Veins are adequate: N/A  Needs a catheter placed for Apheresis?: Yes, transfusion medicine physician informed.    Vital Signs  Vital Signs  BP: 109/60  Pulse: 70  Temp: 97.9  F (36.6  C)  Temp src: Oral  Resp: 16  Height: 160.6 cm (5' 3.23\") (160.6cm)  Weight: 99.4 kg (219 lb 3.2 oz)    Reviewed   Review With Patient  Have you read the brochure Getting ready for Apheresis?: Yes  Have you had any invasive procedures, surgery, biopsy, bleeding in the last month?: Yes (BM biopsy)  Transfusion medicine physician informed: Yes  Review medications and allergies: Yes  Have you ever been transfused?: No  Do you require pre-medication for blood products?:  (N/A)  Patient given tour of the unit: No (declined)  Photophoresis: sun precautions reviewed with patient:  (N/A)    Additional Information  Notes, needs and time spent with patient  Explain procedure, side effects or reactions, instructions: Yes  Patient has special need?: No  Time spent: 20 minutes face to face time spent reviewing history, need for low fat diet starting the day prior to the collection.        "

## 2024-02-22 NOTE — CONSULTS
Transfusion Medicine Consultation    Rosario Terrazas [id number]   YOB: 1955 Age: 68 year old   Date of Consult: 2/22/2024     Reason for consult: Autologous Hematopoietic Progenitor Cell (HPC)  Collection           Assessment and Plan:   68 year old female presents for consultation for autologous HPC collection for multiple myeloma.  The plan is to collect for 1 to 3 days or until the target goal is met. A central line will be placed and will be used for access for the procedure.          Chief Complaint:   Transfusion medicine consultation.         History of Present Illness:   68 year old female presents for consultation for autologous  HPC  collection.  Her past medical history includes arthritis, T6 and T7 vertebral compression fracture and left 8th rib closed fracture.  She is currently well.  The patient complains of back pain (secondary to vertebral compression fracture), but this pain will prevent her from tolerating the procedure. The patient confirms a recent flu vaccination.  No significant travel history.  The patient has no identifiable risk factors for infectious disease. The procedure, risks/benefits were discussed with the patient and her daughter and their of her questions were addressed at this time.    Please note that the patient's daughter was the  for the patient during the consultation and the consent signature process             Past Medical History:   Multiple myeloma  Arthritis  T6 and T7 vertebral compression fracture   Left 8th rib closed fracture          Past Surgical History:   Left knee arthroplasty            Social History:     Social History     Tobacco Use    Smoking status: Never    Smokeless tobacco: Never   Substance Use Topics    Alcohol use: Never             Family History:   No significant past medical history          Immunizations:     Immunization History   Administered Date(s) Administered    COVID-19 12+ (2023-24) (MODERNA) 11/21/2023     COVID-19 MONOVALENT 12+ (Pfizer) 08/01/2021, 08/21/2021             Allergies:   No Known Allergies          Medications:     Current Outpatient Medications   Medication Sig    acetaminophen (TYLENOL) 325 MG tablet Take 2 tablets by mouth every 4 hours as needed    acyclovir (ZOVIRAX) 400 MG tablet Take 400 mg by mouth    aspirin 81 MG EC tablet Take 1 tablet by mouth daily    bortezomib (VELCADE) 3.5 MG injection Give 1.3mg/m2 (2.8) subcutaneous every week for 3 weeks. Provide enough doses for three week cycle (10.5mg).    calcium carbonate-vitamin D (OSCAL) 500-5 MG-MCG tablet Take 1 tablet by mouth 3 times daily (with meals)    diclofenac (VOLTAREN) 1 % topical gel APPLY 4 GRAMS TO PAINFUL AREAS IN KNEES/SHOULDERS 4 TIMES A DAY AS DIRECTED. MAX OF 32 GRAMS PER DAY.    docusate sodium (DSS) 100 MG capsule Take 100 mg by mouth 2 times daily    folic acid (FOLVITE) 1 MG tablet Take 1 tablet by mouth daily    gabapentin (NEURONTIN) 100 MG capsule Take 1 capsule by mouth 3 times daily    isoniazid (NYDRAZID) 300 MG tablet Take 1 tablet by mouth daily    lidocaine (XYLOCAINE) 5 % external ointment Apply to ribs a couple times a day up to 4 times daily as needed for pain    loperamide (IMODIUM) 2 MG capsule Take 2 mg by mouth 4 times daily as needed for diarrhea    nystatin (MYCOSTATIN) 718385 UNIT/GM external cream Apply to rash in genital area twice daily as needed    omeprazole (PRILOSEC) 20 MG DR capsule Take 20 mg by mouth daily    ondansetron (ZOFRAN) 4 MG tablet Take 4 mg by mouth every 8 hours as needed    oxyCODONE (ROXICODONE) 5 MG tablet Take 5 mg by mouth every 6 hours as needed for severe pain    polyethylene glycol (MIRALAX) 17 GM/Dose powder Take 17 g by mouth daily as needed    prochlorperazine (COMPAZINE) 10 MG tablet Take 10 mg by mouth    pyridOXINE (VITAMIN B6) 25 MG tablet Take 1 tablet by mouth daily    senna-docusate (SENOKOT-S/PERICOLACE) 8.6-50 MG tablet Take 1 tablet by mouth 2 times daily  "   sulfamethoxazole-trimethoprim (BACTRIM) 400-80 MG tablet Take 1 tablet by mouth daily    tiZANidine (ZANAFLEX) 4 MG tablet Take HALF to 1 tablet by mouth up to 2 times daily for muscle pain/spasm    Vitamin D3 (CHOLECALCIFEROL) 25 mcg (1000 units) tablet Take 2 tablets by mouth daily     No current facility-administered medications for this encounter.             Review of Systems:     CONSTITUTIONAL:  negative for  fevers, chills, and fatigue  RESPIRATORY:  negative for  dry cough, cough with sputum, dyspnea, and wheezing  CARDIOVASCULAR:  negative for  dyspnea, palpitations, orthopnea, fatigue  GASTROINTESTINAL:  negative for nausea, vomiting, change in bowel habits, diarrhea, and constipation  HEMATOLOGIC/LYMPHATIC:  negative for easy bruising, bleeding, and petechiae  MUSCULOSKELETAL:  positive for  chest pain secondary to rib fracture and back pain secondary to T6 and T7 vertebral bone compression fracture  NEUROLOGICAL:  negative for headaches, seizures, and numbness           Vital Signs:   /60   Pulse 70   Temp 97.9  F (36.6  C) (Oral)   Resp 16   Ht 1.606 m (5' 3.23\")   Wt 99.4 kg (219 lb 3.2 oz)   BMI 38.55 kg/m              Data:     CBC RESULTS:   Recent Labs   Lab Test 02/21/24  1113   WBC 4.3   RBC 3.34*   HGB 10.6*   HCT 33.4*      MCH 31.7   MCHC 31.7   RDW 16.1*        ABO/RH(D)   Date Value Ref Range Status   02/20/2024 A POS  Final     Antibody Screen   Date Value Ref Range Status   02/20/2024 Negative Negative Final     All other relevant laboratory data reviewed    Jihan Ayala MD  Resident, laboratory medicine and pathology  [facility]  T: [phone]    "

## 2024-02-23 ENCOUNTER — TELEPHONE (OUTPATIENT)
Dept: TRANSPLANT | Facility: CLINIC | Age: 69
End: 2024-02-23
Payer: COMMERCIAL

## 2024-02-23 DIAGNOSIS — C90.01 MULTIPLE MYELOMA IN REMISSION (H): Primary | ICD-10-CM

## 2024-02-23 LAB
HBV DNA SERPL QL NAA+PROBE: NORMAL
HCV RNA SERPL QL NAA+PROBE: NORMAL
HGB S BLD QL: NEGATIVE
HIV1+2 RNA SERPL QL NAA+PROBE: NORMAL
WNV RNA SERPL DONR QL NAA+PROBE: NORMAL

## 2024-02-23 NOTE — TELEPHONE ENCOUNTER
[facility] called patient's daughter to let her know we scheduled patient for an ultrasound of her thyroid due to right thyroid nodule seen on PET scan. Working on getting a biopsy scheduled as well. All questions answered.

## 2024-02-24 ENCOUNTER — LAB (OUTPATIENT)
Dept: LAB | Facility: CLINIC | Age: 69
End: 2024-02-24
Attending: STUDENT IN AN ORGANIZED HEALTH CARE EDUCATION/TRAINING PROGRAM
Payer: COMMERCIAL

## 2024-02-24 DIAGNOSIS — C90.01 MULTIPLE MYELOMA IN REMISSION (H): ICD-10-CM

## 2024-02-24 LAB
DEPRECATED CALCIDIOL+CALCIFEROL SERPL-MC: <57 UG/L (ref 20–75)
VITAMIN D2 SERPL-MCNC: <5 UG/L
VITAMIN D3 SERPL-MCNC: 52 UG/L

## 2024-02-24 PROCEDURE — 87798 DETECT AGENT NOS DNA AMP: CPT

## 2024-02-24 PROCEDURE — 36415 COLL VENOUS BLD VENIPUNCTURE: CPT

## 2024-02-24 NOTE — NURSING NOTE
Chief Complaint   Patient presents with    Blood Draw     Labs drawn via  by RN in lab.    Winsome Wells RN

## 2024-02-26 ENCOUNTER — APPOINTMENT (OUTPATIENT)
Dept: LAB | Facility: CLINIC | Age: 69
End: 2024-02-26
Payer: COMMERCIAL

## 2024-02-26 ENCOUNTER — ANCILLARY PROCEDURE (OUTPATIENT)
Dept: ULTRASOUND IMAGING | Facility: CLINIC | Age: 69
End: 2024-02-26
Attending: STUDENT IN AN ORGANIZED HEALTH CARE EDUCATION/TRAINING PROGRAM
Payer: COMMERCIAL

## 2024-02-26 DIAGNOSIS — C90.00 MULTIPLE MYELOMA, REMISSION STATUS UNSPECIFIED (H): ICD-10-CM

## 2024-02-26 LAB
PATH REPORT.COMMENTS IMP SPEC: ABNORMAL
PATH REPORT.COMMENTS IMP SPEC: YES
PATH REPORT.FINAL DX SPEC: ABNORMAL
PATH REPORT.GROSS SPEC: ABNORMAL
PATH REPORT.MICROSCOPIC SPEC OTHER STN: ABNORMAL
PATH REPORT.RELEVANT HX SPEC: ABNORMAL
PATH REPORT.SITE OF ORIGIN SPEC: ABNORMAL
SCANNED LAB RESULT: NORMAL

## 2024-02-26 PROCEDURE — 76536 US EXAM OF HEAD AND NECK: CPT | Performed by: STUDENT IN AN ORGANIZED HEALTH CARE EDUCATION/TRAINING PROGRAM

## 2024-02-27 ENCOUNTER — ANCILLARY PROCEDURE (OUTPATIENT)
Dept: ULTRASOUND IMAGING | Facility: CLINIC | Age: 69
End: 2024-02-27
Attending: STUDENT IN AN ORGANIZED HEALTH CARE EDUCATION/TRAINING PROGRAM
Payer: COMMERCIAL

## 2024-02-27 ENCOUNTER — OFFICE VISIT (OUTPATIENT)
Dept: TRANSPLANT | Facility: CLINIC | Age: 69
End: 2024-02-27
Attending: STUDENT IN AN ORGANIZED HEALTH CARE EDUCATION/TRAINING PROGRAM
Payer: COMMERCIAL

## 2024-02-27 ENCOUNTER — LAB (OUTPATIENT)
Dept: LAB | Facility: CLINIC | Age: 69
End: 2024-02-27
Attending: STUDENT IN AN ORGANIZED HEALTH CARE EDUCATION/TRAINING PROGRAM
Payer: COMMERCIAL

## 2024-02-27 VITALS
DIASTOLIC BLOOD PRESSURE: 77 MMHG | TEMPERATURE: 97.7 F | OXYGEN SATURATION: 97 % | HEART RATE: 68 BPM | SYSTOLIC BLOOD PRESSURE: 112 MMHG | RESPIRATION RATE: 16 BRPM | BODY MASS INDEX: 38.39 KG/M2 | WEIGHT: 218.3 LBS

## 2024-02-27 DIAGNOSIS — C90.00 MULTIPLE MYELOMA, REMISSION STATUS UNSPECIFIED (H): ICD-10-CM

## 2024-02-27 DIAGNOSIS — C90.01 MULTIPLE MYELOMA IN REMISSION (H): ICD-10-CM

## 2024-02-27 DIAGNOSIS — C90.01 MULTIPLE MYELOMA IN REMISSION (H): Primary | ICD-10-CM

## 2024-02-27 DIAGNOSIS — Z01.818 EXAMINATION PRIOR TO CHEMOTHERAPY: ICD-10-CM

## 2024-02-27 DIAGNOSIS — Z22.7 TB LUNG, LATENT: ICD-10-CM

## 2024-02-27 DIAGNOSIS — C90.00 MULTIPLE MYELOMA NOT HAVING ACHIEVED REMISSION (H): ICD-10-CM

## 2024-02-27 DIAGNOSIS — Z71.9 VISIT FOR COUNSELING: ICD-10-CM

## 2024-02-27 LAB
ALBUMIN MFR UR ELPH: <6 MG/DL
COLLECT DURATION TIME UR: 12 H
COLLECT DURATION TIME UR: 24 H
CREAT 24H UR-MRATE: 0.78 G/SPEC (ref 0.72–1.51)
CREAT CL 24H UR+SERPL-VRATE: 92 ML/MIN (ref 66–143)
CREAT CL/1.73 SQ M 24H UR+SERPL-ARVRAT: 76 ML/MIN/1.7M2 (ref 100–160)
CREAT SERPL-MCNC: 0.59 MG/DL (ref 0.51–0.95)
CREAT SERPL-MCNC: 0.59 MG/DL (ref 0.51–0.95)
CREAT UR-MCNC: 44.6 MG/DL
EGFRCR SERPLBLD CKD-EPI 2021: >90 ML/MIN/1.73M2
HOLD SPECIMEN: NORMAL
PROT 24H UR-MRATE: NORMAL G/(24.H)
SPECIMEN VOL UR: 1750 ML
SPECIMEN VOL UR: 1750 ML

## 2024-02-27 PROCEDURE — 81050 URINALYSIS VOLUME MEASURE: CPT

## 2024-02-27 PROCEDURE — 84166 PROTEIN E-PHORESIS/URINE/CSF: CPT | Mod: 26 | Performed by: PATHOLOGY

## 2024-02-27 PROCEDURE — 81050 URINALYSIS VOLUME MEASURE: CPT | Performed by: PATHOLOGY

## 2024-02-27 PROCEDURE — G2211 COMPLEX E/M VISIT ADD ON: HCPCS | Performed by: STUDENT IN AN ORGANIZED HEALTH CARE EDUCATION/TRAINING PROGRAM

## 2024-02-27 PROCEDURE — 84156 ASSAY OF PROTEIN URINE: CPT

## 2024-02-27 PROCEDURE — 36415 COLL VENOUS BLD VENIPUNCTURE: CPT

## 2024-02-27 PROCEDURE — G0463 HOSPITAL OUTPT CLINIC VISIT: HCPCS

## 2024-02-27 PROCEDURE — 88173 CYTOPATH EVAL FNA REPORT: CPT | Mod: 26 | Performed by: PATHOLOGY

## 2024-02-27 PROCEDURE — 88173 CYTOPATH EVAL FNA REPORT: CPT | Mod: TC | Performed by: STUDENT IN AN ORGANIZED HEALTH CARE EDUCATION/TRAINING PROGRAM

## 2024-02-27 PROCEDURE — 82565 ASSAY OF CREATININE: CPT | Mod: XU

## 2024-02-27 PROCEDURE — 88172 CYTP DX EVAL FNA 1ST EA SITE: CPT | Mod: 26 | Performed by: PATHOLOGY

## 2024-02-27 PROCEDURE — 10005 FNA BX W/US GDN 1ST LES: CPT | Performed by: STUDENT IN AN ORGANIZED HEALTH CARE EDUCATION/TRAINING PROGRAM

## 2024-02-27 RX ORDER — LIDOCAINE HYDROCHLORIDE 10 MG/ML
5 INJECTION, SOLUTION INFILTRATION; PERINEURAL ONCE
Status: COMPLETED | OUTPATIENT
Start: 2024-02-27 | End: 2024-02-27

## 2024-02-27 RX ADMIN — LIDOCAINE HYDROCHLORIDE 2 ML: 10 INJECTION, SOLUTION INFILTRATION; PERINEURAL at 11:48

## 2024-02-27 ASSESSMENT — PAIN SCALES - GENERAL: PAINLEVEL: EXTREME PAIN (8)

## 2024-02-27 NOTE — NURSING NOTE
"Oncology Rooming Note    February 27, 2024 2:27 PM   Rosario Terrazas is a 68 year old female who presents for:    Chief Complaint   Patient presents with    Blood Draw     Vitals taken, venipuncture JIC green top drawn, checked into next appt    Oncology Clinic Visit     Multiple myeloma, remission status unspecified     Initial Vitals: /77 (BP Location: Left arm, Patient Position: Sitting, Cuff Size: Adult Large)   Pulse 68   Temp 97.7  F (36.5  C) (Oral)   Resp 16   Wt 99 kg (218 lb 4.8 oz)   SpO2 97%   BMI 38.39 kg/m   Estimated body mass index is 38.39 kg/m  as calculated from the following:    Height as of 2/22/24: 1.606 m (5' 3.23\").    Weight as of this encounter: 99 kg (218 lb 4.8 oz). Body surface area is 2.1 meters squared.  Extreme Pain (8) Comment: whole torso area   No LMP recorded. Patient is postmenopausal.  Allergies reviewed: Yes  Medications reviewed: Yes    Medications: Medication refills not needed today.  Pharmacy name entered into EPIC: Data Unavailable    Frailty Screening:   Is the patient here for a new oncology consult visit in cancer care? 2. No      Clinical concerns: None       Gabi Wright CMA            "

## 2024-02-27 NOTE — NURSING NOTE
Chief Complaint   Patient presents with    Blood Draw     Vitals taken, venipuncture JIC green top drawn, checked into next appt     /77 (BP Location: Left arm, Patient Position: Sitting, Cuff Size: Adult Large)   Pulse 68   Temp 97.7  F (36.5  C) (Oral)   Resp 16   Wt 99 kg (218 lb 4.8 oz)   SpO2 97%   BMI 38.39 kg/m    John Sanchez RN on 2/27/2024 at 1:02 PM

## 2024-02-27 NOTE — PROGRESS NOTES
BMT / Cell Therapy Consultation      Rosario Terrazas is a 68 year old female referred by Dr. Mika Ellison for multiple myeloma.      Disease presentation and baseline characteristics:   8/30/23: IgD kappa multiple myeloma, standard risk    Date Treatment Name Response Side Effects / Toxicities   9/20/23 VRd x 4 cycles      D-VRd x 2 cycles VGPR           HPI:  Please see my entry above for hematologic history.  Ms. Terrazas is seen today accompanied by her daughter Katherin; she declined a professional  and wants her daughter to interpret.  She endorses diffuse bone pain in her ribs and back, which has been present since her diagnosis and has not changed recently.  She denies shortness of breath, nausea/vomiting, fevers/chills, or other acute concerns.       ASSESSMENT AND PLAN:    BMT and Cell Therapy Informed Consent Discussion     We reviewed the risks/benefits of transplant and discussed potential alternatives, including further chemotherapy followed by maintenance.  Ms. Terrazas feels strongly that she would like to proceed with transplant.    In today's visit, we discussed in detail the research for which Rosario Terrazas is eligible. We discussed the potential risks and potential benefits of each protocol individually. We explained potential alternatives to the protocols discussed. We explained to the patient that participation is voluntary and that consent may be withdrawn at any time.     We discussed:  The rationale for our approach to the disease treatment  The eligibility requirements for treatment in the context of clinical trials  The need for caregiver support and the caregiver's role in recovery  The importance of adherence to the treatment plan and appropriate follow up  The requirements for contraception while undergoing treatment  The potential risks of morbidity and mortality related to this treatment  The requirements for supportive care to reduce the risk of infection  and other complications  The role of the dietician and PT/OT to reduce the risk of muscle loss/sarcopenia  Support that is available through our social workers and care team to mitigate distress  The desired outcomes/goals of treatment, including the possibility of long-term disease control    The patient completed the last round of treatment 1/24.    HCT-CI score: 2 (pulm). We counseled the patient about the impact of this on the risk of treatment related and overall mortality. The score fit within treatment protocol eligibility criteria.    Karnofsky performance score: 70       Active infections:  None.  Prior infections that require additional special prophylaxis considerations: none.  I reviewed and discussed infectious disease evaluation with the patient and the management plan during treatment.    Reproductive status: What methods of birth control does the patient plan to use during the treatment period beginning with conditioning and ending with the discontinuation of immune suppression (indicate with an X all that apply):  X   The patient is confirmed to be sterile or post-menopausal  __ Sexual abstinence  __ Condoms  __ Implants  __ Injectables  __ Oral contraceptives  __ Intrauterine devices (IUD)  __ Other (describe)    The patient received appropriate reproductive counseling and agreed with the need for effective contraception during the treatment procedures.    Dental health suitable to proceed: Yes    Transplants for multiple myeloma:  The patient has Standard risk MM, and the collection goal is 8 million CD34/kg for 2 transplants..  The day for admission to begin melphalan conditioning is Day -1 for melphalan 200 mg/m2 (not frail, creatinine clearance 30+). .    After our detailed discussion above, the patient signed the following consents for treatment and protocols:  [id number]    We reviewed the workup of her thyroid abnormality and discussed possible diagnoses.  I noted that thyroid cancer may be  found based on her imaging, but there are many sub-types of this and we may still be able to proceed with transplant depending on the result.    Plan:   - Follow up thyroid biopsy results; plan to proceed with transplant unless found to be anaplastic thyroid cancer  - If other types of thyroid cancer are noted (papillary etc) will need surg onc referral but can be addressed 2-3 months after transplant  - Continue INH/B6 through transplant for latent TB    I spent 40 minutes in the care of this patient today, which included time necessary for preparation for the visit, obtaining history, ordering medications/tests/procedures as medically indicated, review of pertinent medical literature, counseling of the patient, communication of recommendations to the care team, and documentation time.    Vinny Morales MD    ROS:    10 point ROS neg other than the symptoms noted above in the HPI.      Current Outpatient Medications   Medication Sig Dispense Refill    acetaminophen (TYLENOL) 325 MG tablet Take 2 tablets by mouth every 4 hours as needed      acyclovir (ZOVIRAX) 400 MG tablet Take 400 mg by mouth      aspirin 81 MG EC tablet Take 1 tablet by mouth daily      bortezomib (VELCADE) 3.5 MG injection Give 1.3mg/m2 (2.8) subcutaneous every week for 3 weeks. Provide enough doses for three week cycle (10.5mg).      calcium carbonate-vitamin D (OSCAL) 500-5 MG-MCG tablet Take 1 tablet by mouth 3 times daily (with meals)      diclofenac (VOLTAREN) 1 % topical gel APPLY 4 GRAMS TO PAINFUL AREAS IN KNEES/SHOULDERS 4 TIMES A DAY AS DIRECTED. MAX OF 32 GRAMS PER DAY.      docusate sodium (DSS) 100 MG capsule Take 100 mg by mouth 2 times daily      folic acid (FOLVITE) 1 MG tablet Take 1 tablet by mouth daily      gabapentin (NEURONTIN) 100 MG capsule Take 1 capsule by mouth 3 times daily      isoniazid (NYDRAZID) 300 MG tablet Take 1 tablet by mouth daily      lidocaine (XYLOCAINE) 5 % external ointment Apply to ribs a couple  times a day up to 4 times daily as needed for pain      loperamide (IMODIUM) 2 MG capsule Take 2 mg by mouth 4 times daily as needed for diarrhea      nystatin (MYCOSTATIN) 065665 UNIT/GM external cream Apply to rash in genital area twice daily as needed      omeprazole (PRILOSEC) 20 MG DR capsule Take 20 mg by mouth daily      ondansetron (ZOFRAN) 4 MG tablet Take 4 mg by mouth every 8 hours as needed      oxyCODONE (ROXICODONE) 5 MG tablet Take 5 mg by mouth every 6 hours as needed for severe pain      polyethylene glycol (MIRALAX) 17 GM/Dose powder Take 17 g by mouth daily as needed      prochlorperazine (COMPAZINE) 10 MG tablet Take 10 mg by mouth      pyridOXINE (VITAMIN B6) 25 MG tablet Take 1 tablet by mouth daily      senna-docusate (SENOKOT-S/PERICOLACE) 8.6-50 MG tablet Take 1 tablet by mouth 2 times daily      sulfamethoxazole-trimethoprim (BACTRIM) 400-80 MG tablet Take 1 tablet by mouth daily      tiZANidine (ZANAFLEX) 4 MG tablet Take HALF to 1 tablet by mouth up to 2 times daily for muscle pain/spasm      Vitamin D3 (CHOLECALCIFEROL) 25 mcg (1000 units) tablet Take 2 tablets by mouth daily           Physical Exam:     Vital Signs: /77 (BP Location: Left arm, Patient Position: Sitting, Cuff Size: Adult Large)   Pulse 68   Temp 97.7  F (36.5  C) (Oral)   Resp 16   Wt 99 kg (218 lb 4.8 oz)   SpO2 97%   BMI 38.39 kg/m      KPS:  70  General Appearance: alert, wearing back brace  Eyes: PERRL, conjunctiva and lids normal, sclera nonicteric  Ears/Nose/M/Throat: Oral mucosa and posterior oropharynx normal, moist mucous membranes  Neck supple, non-tender, free range of motion, no adenopathy  Cardio/Vascular:regular rate and rhythm, normal S1 and S2, no murmur  Resp Effort And Auscultation: Normal - Clear to auscultation without rales, rhonchi, or wheezing.  GI: soft, nontender, bowel sounds present in all four quadrants, no hepatosplenomegaly  Musculoskeletal: Musculoskeletal normal  Edema:  none  Skin: Skin color, texture, turgor normal. No rashes or lesions.  Neurologic: Ambulates independently to the exam table without issue.  Sensation grossly WNL.  Psych/Affect: Mood and affect are appropriate.    Blood Counts     Recent Labs   Lab Test 02/21/24  1113 02/20/24  1212   HGB 10.6* 10.5*   HCT 33.4* 32.3*   WBC 4.3 4.1   ANEUTAUTO 2.5 2.5   ALYMPAUTO 1.0 0.9   AMONOAUTO 0.6 0.6   AEOSAUTO 0.1 0.0   ABSBASO 0.1 0.1   NRBCMAN 0.0 0.0    221         Chemistries     Basic Panel  Recent Labs   Lab Test 02/27/24  1430 02/27/24  1328 02/20/24  1212   NA  --   --  141   POTASSIUM  --   --  4.0   CHLORIDE  --   --  105   CO2  --   --  27   BUN  --   --  6.5*   CR 0.59 0.59 0.60  0.60   GLC  --   --  87        Calcium, Magnesium, Phosphorus  Recent Labs   Lab Test 02/20/24  1212   RAMIREZ 9.4   MAG 2.1   PHOS 3.5        LFTs  Recent Labs   Lab Test 02/20/24  1212   BILITOTAL 0.2   ALKPHOS 58   AST 17   ALT 9   ALBUMIN 3.9       LDH  Recent Labs   Lab Test 02/20/24  1212          B2-Microglobulin  Recent Labs   Lab Test 02/20/24  1212   ICLL3XFIR 2.7*         Immunoglobulins     Recent Labs   Lab Test 02/20/24  1212   *       Recent Labs   Lab Test 02/20/24  1212   IGA 9*       Recent Labs   Lab Test 02/20/24  1212   IGM <10*         Monocloncal Protein Studies     M spike    Recent Labs   Lab Test 02/20/24  1212   ELPM 0.0       [place] FLC    Recent Labs   Lab Test 02/20/24  1212   KFLCA 10.89*       Lambda FLC    Recent Labs   Lab Test 02/20/24  1212   LFLCA 0.99       FLC Ratio    Recent Labs   Lab Test 02/20/24  1212   KLRA 11.00*         Bone Marrow Biopsy       Morphology    Results for orders placed or performed in visit on 02/21/24 (from the past 8760 hour(s))   Bone marrow biopsy   Result Value    Final Diagnosis      Bone marrow, posterior iliac crest, right decalcified trephine biopsy and touch imprint; aspirate clot, particle crush, direct aspirate smear, and concentrated aspirate  smear; and peripheral blood smear:    Recurrent/persistent plasma cell neoplasm with the following features:  -Hypercellular marrow for age (overall 45% in intact areas) with trilineage hematopoiesis, no increase in blasts, and 5% kappa-restricted plasma cells  -Peripheral blood showing slight normochromic, normocytic anemia  -See comment       Comment      Flow cytometry analysis on concurrent specimen ([id number]) showed kappa-monotypic plasma cells.     Concurrent ancillary studies are in progress and will be reported separately. Correlation with the results of ancillary tests and clinical findings is recommended.      Clinical Information      From Epic electronic medical record; 68 year old female with a history of IgD Kappa plasma cell myeloma. This biopsy is being performed as workup for potential transplant.      Peripheral Hematologic Data      CBC WITH DIFFERENTIAL (02/21/2024 11:19 AM CST):     RESULT VALUE REF. RANGE UNITS   WBC Count   Hemoglobin    Hematocrit   Platelet Count   RBC Count   MCV  MCH  MCHC   RDW  4.3 (NORMAL)     10.6  ( L )      33.4  ( L )  222 (NORMAL)   3.34  ( L )       100 (NORMAL)     31.7 (NORMAL)     31.7 (NORMAL)      16.1  ( H ) 4.0-11.0  11.7-15.7  35.0-47.0  150-450  3.80-5.20    26.5-33.0  31.5-36.5  10.0-15.0 10e3/uL  g/dL  %  10e3/uL  10e6/uL  fL  pg  g/dL  %   % Neutrophils  % Lymphocytes  % Monocytes  % Eosinophils  % Basophils  % Immature Granulocytes  Absolute Neutrophils  Absolute Lymphocytes  Absolute Monocytes  Absolute Eosinophils  Absolute Basophils  Absolute Immature Granulocytes  NRBCs per 100 WBC  Absolute NRBCs 58  24  15  1  2  0  2.5 (NORMAL)  1.0 (NORMAL)  0.6 (NORMAL)  0.1 (NORMAL)  0.1 (NORMAL)  0.0 (NORMAL)  0 (NORMAL)  0.0 () N/A  N/A  N/A  N/A  N/A  N/A  1.6-8.3  0.8-5.3  0.0-1.3  0.0-0.7  0.0-0.2  <=0.4  <1  <=0.0 %  %  %  %  %  %  10e3/uL  10e3/uL  10e3/uL  10e3/uL  10e3/uL  10e3/uL  /100  10e3/uL          Microscopic Description      The red  cells appear normochromic.  Poikilocytosis includes occasional elliptocytes. Polychromasia is not increased. Rouleaux formation is not increased. The morphology of the platelets is normal. Lymphocytes are polymorphous. Neutrophils show unremarkable cytoplasmic granularity and nuclear morphology.    Bone marrow aspirates and touch imprints of bone biopsy are reviewed.    BONE MARROW DIFFERENTIAL   (500 cells on bone marrow biopsy touch imprints  by TS)  Percent (%) Cell Population Reference Range (%)   1.2 Blasts  (0 - 1)   0.4 Neutrophil promyelocytes (2 - 4)   67.2 Neutrophils and precursors (54 - 63)   15.8 Erythroid precursors (18 - 24)   2.2 Monocytes (1- 1.5)   3.4 Eosinophils (1 - 3)   0.4 Basophils (0 - 1)   8.2 Lymphocytes (8 - 12)   1.2 Plasma cells (0 - 1.5)   (200 cells on bone marrow biopsy touch imprints  by MT)  Percent (%) Cell Population Reference Range (%)   0.0 Blasts  (0 - 1)   1.0 Neutrophil promyelocytes (2 - 4)   55.0 Neutrophils and precursors (54 - 63)   20.0 Erythroid precursors (18 - 24)   4.0 Monocytes (1- 1.5)   5.0 Eosinophils (1 - 3)   0.0 Basophils (0 - 1)   12.0 Lymphocytes (8 - 12)   3.0 Plasma cells (0 - 1.5)   The aspirate smears are hemodilute, therefore the above differential is performed on the bone marrow trephine core biopsy touch imprints.    Granulocytes are adequate in number with progressive and complete maturation; no overt dysplasia is observed. Erythrocytes are relatively decreased in number with progressive and complete maturation; no overt dysplasia is observed. Megakaryocytes are present and show an overall normal morphologic spectrum.     Particle crush slides are reviewed and do not significantly differ from the aspirate smears or touch imprints.    IRON STAINS:   Dacie iron stain on concentrate smears:   Iron stains show 20% sideroblasts. No ringed sideroblasts are seen on scanning.     Prussian blue stain on particle crush:    Marrow iron stores are  decreased.    CLOT SECTIONS:   The clot sections show fragments of marrow with the morphologic findings similar to the trephine sections, fibrin, and blood.    TREPHINE SECTIONS:  Hematoxylin and eosin stains are reviewed. There is marked fragmentation artifact which limits the accurate assessment of marrow cellularity and topohistology of marrow elements. The quality of the trephine core biopsy is adequate. The marrow cellularity is variable in intact areas ranging from 30% to 60%; overall estimated at 45%. The cellular composition reflects the differential. Megakaryocytes with unremarkable morphology are present.    IMMUNOHISTOCHEMISTRY  Immunohistochemical stains are performed on the paraffin-embedded trephine sections with appropriately reactive control tissues.    Stains for , cytoplasmic kappa and lambda immunoglobulin light chains show kappa-restricted plasma cells comprising overall 5% of marrow cellularity; some forming clusters or aggregates.    Note: These immunohistochemical stains are deemed medically necessary. Some of the antigens may also be evaluated by flow cytometry. Concurrent evaluation by immunohistochemistry on clot and/or trephine sections is indicated in this case in order to correlate immunophenotype with cell morphology and determine extent of involvement, spatial pattern, and focality of potential disease distribution.       Gross Description      Procedure/Gross Description   Aspirate(s) and trephine(s) procured by NE[facility] Conde    Specimen sent for Special Studies:         Flow cytometry: right aspirate        Cytogenetics: right aspirate        Molecular Diagnostics: right aspirate        Other: right aspirate - clonoSEQ    Biopsy aspiration site: right posterior iliac crest                                                      (Reference Range)          Amount of aspirate           3.1   mL        Fat and P.V. cell layer        2    %               (1 - 3)        Particles                              2   %        Myeloid-erythroid layer    2    %               (5 - 8)          Clot Section: yes    Trephine biopsy site: right posterior iliac crest    Designated right posterior iliac crest is 1 cylinder of gritty tissue, labeled with the patient's name and hospital number, obtained with 8 gauge needle and a length of 8 mm; entirely submitted in 1 cassette; acetic zinc formalin fixed, decalcified, processed, and stained for hematoxylin and eosin per laboratory protocol.        MCRS Yes (A)    Performing Labs      The technical component of this testing was completed at [facility]           PET Scan       Results for orders placed during the hospital encounter of 02/22/24    PET ONCOLOGY WHOLE BODY    Status: Normal 2/22/2024    Narrative  Combined Report of: PET and CT on  2/22/2024 9:17 AM:    1. PET of the neck, chest, abdomen, and pelvis.  2. PET CT Fusion for Attenuation Correction and Anatomical  Localization:  3. 3D MIP and PET-CT fused images were processed on an independent  workstation and archived to PACS and reviewed by a radiologist.    Technique:    1. PET: The patient received 13.21 mCi of F-18-FDG; the serum glucose  was 106 mg/dL prior to administration, body weight was 99.4 kg. Images  were evaluated in the axial, sagittal, and coronal planes as well as  the rotational whole body MIP. Images were acquired from the Vertex to  the Feet.    UPTAKE WAS MEASURED AT 60 MINUTES.    2. CT: CT only obtained for attenuation correction and not diagnostic  purposes.    INDICATION: Multiple myeloma (H)    ADDITIONAL INFORMATION OBTAINED FROM EMR: 68-year-old female  hospitalized in August 2023 for compression fracture at T7, found to  have IgD kappa multiple myeloma. Started on VRd. Completed radiation  to spine T5-T8 on 11/8/2023. In the process of starting BMT.    COMPARISON: Thoracic spine MR 1/19/2024, outside  imaging CT  10/26/2023, PET 9/5/2023    FINDINGS:  BACKGROUND: Liver SUV max = 3.9, Aorta Blood SUV max = 3.3.    HEAD/NECK:  Stable 1.7 cm right thyroid nodule which demonstrates a focal area of  mild FDG uptake along the inferior lateral aspect, SUV max 4.7.  Remainder of the nodule is non-FDG avid.    Asymmetrically increased focal uptake in the right vocal cord without  CT correlate is favored physiologic; attention on follow-up.    CHEST:  No abnormal uptake. Multifocal atelectasis.    ABDOMEN AND PELVIS:  No abnormal uptake.    Increased soft tissue fullness in the presacral region with uptake  similar to blood pool likely represents a site of extramedullary  hematopoiesis (4/223).    LOWER EXTREMITIES:  No abnormal uptake.    BONES AND SOFT TISSUES:  Stable compression deformities of T6-T7 vertebral bodies, with  resolution of previous focal uptake. There is currently minimal  diffuse FDG uptake and a mildly hypermetabolic component about the  right seventh costovertebral junction, SUV max of 6.6 (4/112).    Subacute appearing anterolateral right 8th rib fracture with mild FDG  uptake and SUV max 5.3, may represent normal healing rib fracture  versus pathologic fracture. No obvious lytic lesion demonstrated on  corresponding CT. Mildly avid healing left 7th posterolateral rib  fracture may represent ongoing healing versus pathologic fracture.  Chronic non-avid posterior left 8th rib fracture. Overall bone density  appears generally decreased with diffuse non-FDG avid heterogeneity,  particularly involving the spine. This includes multifocal lucent  ill-defined regions and sclerosis.    Left knee arthroplasty with moderate FDG uptake. Degenerative changes  of the right knee and bilateral shoulders with mild avidity, likely  inflammatory. Mildly avid muscles along the plantar surface of the  left foot, likely due to muscle activity. Increased size of the  non-avid cystic lesions extending from the bilateral  shoulder joints,  likely representing paralabral cysts.    Impression  IMPRESSION:    1. Stable compression deformities of T6-T7 vertebral bodies with  interval resolution of focal FDG uptake. Currently, there is minimal  diffuse FDG uptake at this site with a mildly hypermetabolic component  about the right seventh costovertebral junction, which is favored to  represent inflammatory changes.    2. Mildly FDG avid subacute appearing anterolateral right 8th rib  fracture, may represent normal healing of a fracture versus pathologic  fracture. No obvious corresponding lytic lesion demonstrated.  Mildly-FDG avid healing left 7th posterolateral rib fracture, may  represent a pathologic fracture.    3. Additional diffuse non-FDG avid heterogenous lucency/sclerosis  throughout the visualized axial and proximal appendicular skeleton  likely represent treated sites.    4. Focal area of mild FDG avidity along the inferior lateral aspect of  the right thyroid nodule. Recommend ultrasound for further  characterization.    I have personally reviewed the examination and initial interpretation  and I agree with the findings.    WILLIAMS HEAD MD      SYSTEM ID:  [id number]     The longitudinal plan of care for the diagnosis(es)/condition(s) as documented were addressed during this visit. Due to the added complexity in care, I will continue to support Rosario in the subsequent management and with ongoing continuity of care.    ------------------------------------------------------------------------------------------------------------------------------------------------    Patient Care Team         Relationship Specialty Notifications Start End    Dusty Maya MD PCP - General Family Medicine  1/19/24     Phone: [phone] Fax: [phone] [address] [facility] [address]    Rishi Rocha MD Assigned Cancer Care Provider   12/30/23     Phone: [phone] Fax: [phone] [address] [facility]  [facility] [id number]    Dedra Weeks MD MD Infectious Diseases  1/11/24     Phone: [phone] Fax: [phone]         [address] [facility] [address] [facility] [address]    Rishi Rocha MD BMT Physician Transplant All results, Admissions 2/20/24     Phone: [phone] Fax: [phone]         [address] [place] [id number]    Niesha Mattson MSW    2/20/24     Phone: [phone] Pager: [phone]        Romina Santiago, RN Registered Nurse  All results, Admissions 2/21/24

## 2024-02-27 NOTE — LETTER
2/27/2024         RE: Rosario Terrazas  [address]  [facility] [address]        Dear Colleague,    Thank you for referring your patient, Rosario Terrazas, to the [facility] [place]. Please see a copy of my visit note below.         BMT / Cell Therapy Consultation      Rosario Terrazas is a 68 year old female referred by Dr. Mika Ellison for multiple myeloma.      Disease presentation and baseline characteristics:   8/30/23: IgD kappa multiple myeloma, standard risk    Date Treatment Name Response Side Effects / Toxicities   9/20/23 VRd x 4 cycles      D-VRd x 2 cycles VGPR           HPI:  Please see my entry above for hematologic history.  Ms. Terrazas is seen today accompanied by her daughter Katherin; she declined a professional  and wants her daughter to interpret.  She endorses diffuse bone pain in her ribs and back, which has been present since her diagnosis and has not changed recently.  She denies shortness of breath, nausea/vomiting, fevers/chills, or other acute concerns.       ASSESSMENT AND PLAN:    BMT and Cell Therapy Informed Consent Discussion     We reviewed the risks/benefits of transplant and discussed potential alternatives, including further chemotherapy followed by maintenance.  Ms. Terrazas feels strongly that she would like to proceed with transplant.    In today's visit, we discussed in detail the research for which Rosario Terrazas is eligible. We discussed the potential risks and potential benefits of each protocol individually. We explained potential alternatives to the protocols discussed. We explained to the patient that participation is voluntary and that consent may be withdrawn at any time.     We discussed:  The rationale for our approach to the disease treatment  The eligibility requirements for treatment in the context of clinical trials  The need for caregiver support and the caregiver's  role in recovery  The importance of adherence to the treatment plan and appropriate follow up  The requirements for contraception while undergoing treatment  The potential risks of morbidity and mortality related to this treatment  The requirements for supportive care to reduce the risk of infection and other complications  The role of the dietician and PT/OT to reduce the risk of muscle loss/sarcopenia  Support that is available through our social workers and care team to mitigate distress  The desired outcomes/goals of treatment, including the possibility of long-term disease control    The patient completed the last round of treatment 1/24.    HCT-CI score: 2 (pulm). We counseled the patient about the impact of this on the risk of treatment related and overall mortality. The score fit within treatment protocol eligibility criteria.    Karnofsky performance score: 70       Active infections:  None.  Prior infections that require additional special prophylaxis considerations: none.  I reviewed and discussed infectious disease evaluation with the patient and the management plan during treatment.    Reproductive status: What methods of birth control does the patient plan to use during the treatment period beginning with conditioning and ending with the discontinuation of immune suppression (indicate with an X all that apply):  X   The patient is confirmed to be sterile or post-menopausal  __ Sexual abstinence  __ Condoms  __ Implants  __ Injectables  __ Oral contraceptives  __ Intrauterine devices (IUD)  __ Other (describe)    The patient received appropriate reproductive counseling and agreed with the need for effective contraception during the treatment procedures.    Dental health suitable to proceed: Yes    Transplants for multiple myeloma:  The patient has Standard risk MM, and the collection goal is 8 million CD34/kg for 2 transplants..  The day for admission to begin melphalan conditioning is Day -1 for  melphalan 200 mg/m2 (not frail, creatinine clearance 30+). .    After our detailed discussion above, the patient signed the following consents for treatment and protocols:  [id number]    We reviewed the workup of her thyroid abnormality and discussed possible diagnoses.  I noted that thyroid cancer may be found based on her imaging, but there are many sub-types of this and we may still be able to proceed with transplant depending on the result.    Plan:   - Follow up thyroid biopsy results; plan to proceed with transplant unless found to be anaplastic thyroid cancer  - If other types of thyroid cancer are noted (papillary etc) will need surg onc referral but can be addressed 2-3 months after transplant  - Continue INH/B6 through transplant for latent TB    I spent 40 minutes in the care of this patient today, which included time necessary for preparation for the visit, obtaining history, ordering medications/tests/procedures as medically indicated, review of pertinent medical literature, counseling of the patient, communication of recommendations to the care team, and documentation time.    Vinny Morales MD    ROS:    10 point ROS neg other than the symptoms noted above in the HPI.      Current Outpatient Medications   Medication Sig Dispense Refill    acetaminophen (TYLENOL) 325 MG tablet Take 2 tablets by mouth every 4 hours as needed      acyclovir (ZOVIRAX) 400 MG tablet Take 400 mg by mouth      aspirin 81 MG EC tablet Take 1 tablet by mouth daily      bortezomib (VELCADE) 3.5 MG injection Give 1.3mg/m2 (2.8) subcutaneous every week for 3 weeks. Provide enough doses for three week cycle (10.5mg).      calcium carbonate-vitamin D (OSCAL) 500-5 MG-MCG tablet Take 1 tablet by mouth 3 times daily (with meals)      diclofenac (VOLTAREN) 1 % topical gel APPLY 4 GRAMS TO PAINFUL AREAS IN KNEES/SHOULDERS 4 TIMES A DAY AS DIRECTED. MAX OF 32 GRAMS PER DAY.      docusate sodium (DSS) 100 MG capsule Take 100 mg by mouth  2 times daily      folic acid (FOLVITE) 1 MG tablet Take 1 tablet by mouth daily      gabapentin (NEURONTIN) 100 MG capsule Take 1 capsule by mouth 3 times daily      isoniazid (NYDRAZID) 300 MG tablet Take 1 tablet by mouth daily      lidocaine (XYLOCAINE) 5 % external ointment Apply to ribs a couple times a day up to 4 times daily as needed for pain      loperamide (IMODIUM) 2 MG capsule Take 2 mg by mouth 4 times daily as needed for diarrhea      nystatin (MYCOSTATIN) 709667 UNIT/GM external cream Apply to rash in genital area twice daily as needed      omeprazole (PRILOSEC) 20 MG DR capsule Take 20 mg by mouth daily      ondansetron (ZOFRAN) 4 MG tablet Take 4 mg by mouth every 8 hours as needed      oxyCODONE (ROXICODONE) 5 MG tablet Take 5 mg by mouth every 6 hours as needed for severe pain      polyethylene glycol (MIRALAX) 17 GM/Dose powder Take 17 g by mouth daily as needed      prochlorperazine (COMPAZINE) 10 MG tablet Take 10 mg by mouth      pyridOXINE (VITAMIN B6) 25 MG tablet Take 1 tablet by mouth daily      senna-docusate (SENOKOT-S/PERICOLACE) 8.6-50 MG tablet Take 1 tablet by mouth 2 times daily      sulfamethoxazole-trimethoprim (BACTRIM) 400-80 MG tablet Take 1 tablet by mouth daily      tiZANidine (ZANAFLEX) 4 MG tablet Take HALF to 1 tablet by mouth up to 2 times daily for muscle pain/spasm      Vitamin D3 (CHOLECALCIFEROL) 25 mcg (1000 units) tablet Take 2 tablets by mouth daily           Physical Exam:     Vital Signs: /77 (BP Location: Left arm, Patient Position: Sitting, Cuff Size: Adult Large)   Pulse 68   Temp 97.7  F (36.5  C) (Oral)   Resp 16   Wt 99 kg (218 lb 4.8 oz)   SpO2 97%   BMI 38.39 kg/m      KPS:  70  General Appearance: alert, wearing back brace  Eyes: PERRL, conjunctiva and lids normal, sclera nonicteric  Ears/Nose/M/Throat: Oral mucosa and posterior oropharynx normal, moist mucous membranes  Neck supple, non-tender, free range of motion, no  adenopathy  Cardio/Vascular:regular rate and rhythm, normal S1 and S2, no murmur  Resp Effort And Auscultation: Normal - Clear to auscultation without rales, rhonchi, or wheezing.  GI: soft, nontender, bowel sounds present in all four quadrants, no hepatosplenomegaly  Musculoskeletal: Musculoskeletal normal  Edema: none  Skin: Skin color, texture, turgor normal. No rashes or lesions.  Neurologic: Ambulates independently to the exam table without issue.  Sensation grossly WNL.  Psych/Affect: Mood and affect are appropriate.    Blood Counts     Recent Labs   Lab Test 02/21/24  1113 02/20/24  1212   HGB 10.6* 10.5*   HCT 33.4* 32.3*   WBC 4.3 4.1   ANEUTAUTO 2.5 2.5   ALYMPAUTO 1.0 0.9   AMONOAUTO 0.6 0.6   AEOSAUTO 0.1 0.0   ABSBASO 0.1 0.1   NRBCMAN 0.0 0.0    221         Chemistries     Basic Panel  Recent Labs   Lab Test 02/27/24  1430 02/27/24  1328 02/20/24  1212   NA  --   --  141   POTASSIUM  --   --  4.0   CHLORIDE  --   --  105   CO2  --   --  27   BUN  --   --  6.5*   CR 0.59 0.59 0.60  0.60   GLC  --   --  87        Calcium, Magnesium, Phosphorus  Recent Labs   Lab Test 02/20/24  1212   RAMIREZ 9.4   MAG 2.1   PHOS 3.5        LFTs  Recent Labs   Lab Test 02/20/24  1212   BILITOTAL 0.2   ALKPHOS 58   AST 17   ALT 9   ALBUMIN 3.9       LDH  Recent Labs   Lab Test 02/20/24  1212          B2-Microglobulin  Recent Labs   Lab Test 02/20/24  1212   [id number] 2.7*         Immunoglobulins     Recent Labs   Lab Test 02/20/24  1212   *       Recent Labs   Lab Test 02/20/24  1212   IGA 9*       Recent Labs   Lab Test 02/20/24  1212   IGM <10*         Monocloncal Protein Studies     M spike    Recent Labs   Lab Test 02/20/24  1212   ELPM 0.0       Flanagan FLC    Recent Labs   Lab Test 02/20/24  1212   KFLCA 10.89*       Lambda FLC    Recent Labs   Lab Test 02/20/24  1212   LFLCA 0.99       FLC Ratio    Recent Labs   Lab Test 02/20/24  1212   KLRA 11.00*         Bone Marrow Biopsy        Morphology    Results for orders placed or performed in visit on 02/21/24 (from the past 8760 hour(s))   Bone marrow biopsy   Result Value    Final Diagnosis      Bone marrow, posterior iliac crest, right decalcified trephine biopsy and touch imprint; aspirate clot, particle crush, direct aspirate smear, and concentrated aspirate smear; and peripheral blood smear:    Recurrent/persistent plasma cell neoplasm with the following features:  -Hypercellular marrow for age (overall 45% in intact areas) with trilineage hematopoiesis, no increase in blasts, and 5% kappa-restricted plasma cells  -Peripheral blood showing slight normochromic, normocytic anemia  -See comment       Comment      Flow cytometry analysis on concurrent specimen ([id number]) showed kappa-monotypic plasma cells.     Concurrent ancillary studies are in progress and will be reported separately. Correlation with the results of ancillary tests and clinical findings is recommended.      Clinical Information      From Epic electronic medical record; 68 year old female with a history of IgD Kappa plasma cell myeloma. This biopsy is being performed as workup for potential transplant.      Peripheral Hematologic Data      CBC WITH DIFFERENTIAL (02/21/2024 11:19 AM CST):     RESULT VALUE REF. RANGE UNITS   WBC Count   Hemoglobin    Hematocrit   Platelet Count   RBC Count   MCV  MCH  MCHC   RDW  4.3 (NORMAL)     10.6  ( L )      33.4  ( L )  222 (NORMAL)   3.34  ( L )       100 (NORMAL)     31.7 (NORMAL)     31.7 (NORMAL)      16.1  ( H ) 4.0-11.0  11.7-15.7  35.0-47.0  150-450  3.80-5.20    26.5-33.0  31.5-36.5  10.0-15.0 10e3/uL  g/dL  %  10e3/uL  10e6/uL  fL  pg  g/dL  %   % Neutrophils  % Lymphocytes  % Monocytes  % Eosinophils  % Basophils  % Immature Granulocytes  Absolute Neutrophils  Absolute Lymphocytes  Absolute Monocytes  Absolute Eosinophils  Absolute Basophils  Absolute Immature Granulocytes  NRBCs per 100 WBC  Absolute NRBCs  58  24  15  1  2  0  2.5 (NORMAL)  1.0 (NORMAL)  0.6 (NORMAL)  0.1 (NORMAL)  0.1 (NORMAL)  0.0 (NORMAL)  0 (NORMAL)  0.0 () N/A  N/A  N/A  N/A  N/A  N/A  1.6-8.3  0.8-5.3  0.0-1.3  0.0-0.7  0.0-0.2  <=0.4  <1  <=0.0 %  %  %  %  %  %  10e3/uL  10e3/uL  10e3/uL  10e3/uL  10e3/uL  10e3/uL  /100  10e3/uL          Microscopic Description      The red cells appear normochromic.  Poikilocytosis includes occasional elliptocytes. Polychromasia is not increased. Rouleaux formation is not increased. The morphology of the platelets is normal. Lymphocytes are polymorphous. Neutrophils show unremarkable cytoplasmic granularity and nuclear morphology.    Bone marrow aspirates and touch imprints of bone biopsy are reviewed.    BONE MARROW DIFFERENTIAL   (500 cells on bone marrow biopsy touch imprints  by TS)  Percent (%) Cell Population Reference Range (%)   1.2 Blasts  (0 - 1)   0.4 Neutrophil promyelocytes (2 - 4)   67.2 Neutrophils and precursors (54 - 63)   15.8 Erythroid precursors (18 - 24)   2.2 Monocytes (1- 1.5)   3.4 Eosinophils (1 - 3)   0.4 Basophils (0 - 1)   8.2 Lymphocytes (8 - 12)   1.2 Plasma cells (0 - 1.5)   (200 cells on bone marrow biopsy touch imprints  by MT)  Percent (%) Cell Population Reference Range (%)   0.0 Blasts  (0 - 1)   1.0 Neutrophil promyelocytes (2 - 4)   55.0 Neutrophils and precursors (54 - 63)   20.0 Erythroid precursors (18 - 24)   4.0 Monocytes (1- 1.5)   5.0 Eosinophils (1 - 3)   0.0 Basophils (0 - 1)   12.0 Lymphocytes (8 - 12)   3.0 Plasma cells (0 - 1.5)   The aspirate smears are hemodilute, therefore the above differential is performed on the bone marrow trephine core biopsy touch imprints.    Granulocytes are adequate in number with progressive and complete maturation; no overt dysplasia is observed. Erythrocytes are relatively decreased in number with progressive and complete maturation; no overt dysplasia is observed. Megakaryocytes are present and show an overall normal morphologic  spectrum.     Particle crush slides are reviewed and do not significantly differ from the aspirate smears or touch imprints.    IRON STAINS:   Dacie iron stain on concentrate smears:   Iron stains show 20% sideroblasts. No ringed sideroblasts are seen on scanning.     Prussian blue stain on particle crush:    Marrow iron stores are decreased.    CLOT SECTIONS:   The clot sections show fragments of marrow with the morphologic findings similar to the trephine sections, fibrin, and blood.    TREPHINE SECTIONS:  Hematoxylin and eosin stains are reviewed. There is marked fragmentation artifact which limits the accurate assessment of marrow cellularity and topohistology of marrow elements. The quality of the trephine core biopsy is adequate. The marrow cellularity is variable in intact areas ranging from 30% to 60%; overall estimated at 45%. The cellular composition reflects the differential. Megakaryocytes with unremarkable morphology are present.    IMMUNOHISTOCHEMISTRY  Immunohistochemical stains are performed on the paraffin-embedded trephine sections with appropriately reactive control tissues.    Stains for , cytoplasmic kappa and lambda immunoglobulin light chains show kappa-restricted plasma cells comprising overall 5% of marrow cellularity; some forming clusters or aggregates.    Note: These immunohistochemical stains are deemed medically necessary. Some of the antigens may also be evaluated by flow cytometry. Concurrent evaluation by immunohistochemistry on clot and/or trephine sections is indicated in this case in order to correlate immunophenotype with cell morphology and determine extent of involvement, spatial pattern, and focality of potential disease distribution.       Gross Description      Procedure/Gross Description   Aspirate(s) and trephine(s) procured by [facility] Conde    Specimen sent for Special Studies:         Flow cytometry: right aspirate        Cytogenetics: right aspirate         Molecular Diagnostics: right aspirate        Other: right aspirate - clonoSEQ    Biopsy aspiration site: right posterior iliac crest                                                      (Reference Range)          Amount of aspirate           3.1   mL        Fat and P.V. cell layer        2    %               (1 - 3)        Particles                             2   %        Myeloid-erythroid layer    2    %               (5 - 8)          Clot Section: yes    Trephine biopsy site: right posterior iliac crest    Designated right posterior iliac crest is 1 cylinder of gritty tissue, labeled with the patient's name and hospital number, obtained with 8 gauge needle and a length of 8 mm; entirely submitted in 1 cassette; acetic zinc formalin fixed, decalcified, processed, and stained for hematoxylin and eosin per laboratory protocol.        MCRS Yes (A)    Performing Labs      The technical component of this testing was completed at [facility]           PET Scan       Results for orders placed during the hospital encounter of 02/22/24    PET ONCOLOGY WHOLE BODY    Status: Normal 2/22/2024    Narrative  Combined Report of: PET and CT on  2/22/2024 9:17 AM:    1. PET of the neck, chest, abdomen, and pelvis.  2. PET CT Fusion for Attenuation Correction and Anatomical  Localization:  3. 3D MIP and PET-CT fused images were processed on an independent  workstation and archived to PACS and reviewed by a radiologist.    Technique:    1. PET: The patient received 13.21 mCi of F-18-FDG; the serum glucose  was 106 mg/dL prior to administration, body weight was 99.4 kg. Images  were evaluated in the axial, sagittal, and coronal planes as well as  the rotational whole body MIP. Images were acquired from the Vertex to  the Feet.    UPTAKE WAS MEASURED AT 60 MINUTES.    2. CT: CT only obtained for attenuation correction and not diagnostic  purposes.    INDICATION:  Multiple myeloma (H)    ADDITIONAL INFORMATION OBTAINED FROM EMR: 68-year-old female  hospitalized in August 2023 for compression fracture at T7, found to  have IgD kappa multiple myeloma. Started on VRd. Completed radiation  to spine T5-T8 on 11/8/2023. In the process of starting BMT.    COMPARISON: Thoracic spine MR 1/19/2024, outside imaging CT  10/26/2023, PET 9/5/2023    FINDINGS:  BACKGROUND: Liver SUV max = 3.9, Aorta Blood SUV max = 3.3.    HEAD/NECK:  Stable 1.7 cm right thyroid nodule which demonstrates a focal area of  mild FDG uptake along the inferior lateral aspect, SUV max 4.7.  Remainder of the nodule is non-FDG avid.    Asymmetrically increased focal uptake in the right vocal cord without  CT correlate is favored physiologic; attention on follow-up.    CHEST:  No abnormal uptake. Multifocal atelectasis.    ABDOMEN AND PELVIS:  No abnormal uptake.    Increased soft tissue fullness in the presacral region with uptake  similar to blood pool likely represents a site of extramedullary  hematopoiesis (4/223).    LOWER EXTREMITIES:  No abnormal uptake.    BONES AND SOFT TISSUES:  Stable compression deformities of T6-T7 vertebral bodies, with  resolution of previous focal uptake. There is currently minimal  diffuse FDG uptake and a mildly hypermetabolic component about the  right seventh costovertebral junction, SUV max of 6.6 (4/112).    Subacute appearing anterolateral right 8th rib fracture with mild FDG  uptake and SUV max 5.3, may represent normal healing rib fracture  versus pathologic fracture. No obvious lytic lesion demonstrated on  corresponding CT. Mildly avid healing left 7th posterolateral rib  fracture may represent ongoing healing versus pathologic fracture.  Chronic non-avid posterior left 8th rib fracture. Overall bone density  appears generally decreased with diffuse non-FDG avid heterogeneity,  particularly involving the spine. This includes multifocal lucent  ill-defined regions and  sclerosis.    Left knee arthroplasty with moderate FDG uptake. Degenerative changes  of the right knee and bilateral shoulders with mild avidity, likely  inflammatory. Mildly avid muscles along the plantar surface of the  left foot, likely due to muscle activity. Increased size of the  non-avid cystic lesions extending from the bilateral shoulder joints,  likely representing paralabral cysts.    Impression  IMPRESSION:    1. Stable compression deformities of T6-T7 vertebral bodies with  interval resolution of focal FDG uptake. Currently, there is minimal  diffuse FDG uptake at this site with a mildly hypermetabolic component  about the right seventh costovertebral junction, which is favored to  represent inflammatory changes.    2. Mildly FDG avid subacute appearing anterolateral right 8th rib  fracture, may represent normal healing of a fracture versus pathologic  fracture. No obvious corresponding lytic lesion demonstrated.  Mildly-FDG avid healing left 7th posterolateral rib fracture, may  represent a pathologic fracture.    3. Additional diffuse non-FDG avid heterogenous lucency/sclerosis  throughout the visualized axial and proximal appendicular skeleton  likely represent treated sites.    4. Focal area of mild FDG avidity along the inferior lateral aspect of  the right thyroid nodule. Recommend ultrasound for further  characterization.    I have personally reviewed the examination and initial interpretation  and I agree with the findings.    WILILAMS HEAD MD      SYSTEM ID:  [id number]     The longitudinal plan of care for the diagnosis(es)/condition(s) as documented were addressed during this visit. Due to the added complexity in care, I will continue to support Rosario in the subsequent management and with ongoing continuity of care.    ------------------------------------------------------------------------------------------------------------------------------------------------    Patient Care Team          Relationship Specialty Notifications Start End    Dusty Maya MD PCP - General Family Medicine  1/19/24     Phone: [phone] Fax: [phone] [address] [facility] [address]    Rishi Rocha MD Assigned Cancer Care Provider   12/30/23     Phone: [phone] Fax: [phone]         [address] [facility] [address]    Dedra Weeks MD MD Infectious Diseases  1/11/24     Phone: [phone] Fax: [phone]         [address] [place] [address] [address] [facility] [address]    Rishi Rocha MD BMT Physician Transplant All results, Admissions 2/20/24     Phone: [phone] Fax: [phone]         [address] [facility] [address]    Niesha Mattson MSW    2/20/24     Phone: [phone] Pager: [phone]        Romina Santiago, RN Registered Nurse  All results, Admissions 2/21/24               [facility] Auto Cell [facility]

## 2024-02-28 ENCOUNTER — TELEPHONE (OUTPATIENT)
Dept: TRANSPLANT | Facility: CLINIC | Age: 69
End: 2024-02-28
Payer: COMMERCIAL

## 2024-02-28 DIAGNOSIS — E04.1 THYROID NODULE: Primary | ICD-10-CM

## 2024-02-28 DIAGNOSIS — C90.01 MULTIPLE MYELOMA IN REMISSION (H): Primary | ICD-10-CM

## 2024-02-28 LAB
LOCATION OF TASK: NORMAL
PATH REPORT.COMMENTS IMP SPEC: ABNORMAL
PATH REPORT.COMMENTS IMP SPEC: YES
PATH REPORT.FINAL DX SPEC: ABNORMAL
PATH REPORT.GROSS SPEC: ABNORMAL
PATH REPORT.MICROSCOPIC SPEC OTHER STN: ABNORMAL
PATH REPORT.RELEVANT HX SPEC: ABNORMAL
PROT PATTERN UR ELPH-IMP: NORMAL

## 2024-02-28 NOTE — TELEPHONE ENCOUNTER
Blood and Marrow Transplant Clinic - Care Coordination    RNCC called patient's daughter, Katherin, to discuss upcoming schedule for GCSF priming and collection. Katherin verbalized that she can see appointments in [facility]. RNCC will call next week to go over preparation for line placement.       Romina Santiago RN BSN  BMT RN Care Coordinator   Phone [phone]  Pager [phone]

## 2024-02-29 ENCOUNTER — TELEPHONE (OUTPATIENT)
Dept: ENDOCRINOLOGY | Facility: CLINIC | Age: 69
End: 2024-02-29
Payer: COMMERCIAL

## 2024-02-29 LAB
ABC CLONOSEQ B-CELL CLONALITY (ID) RESULT: NORMAL
ABC DOMINANT SEQUENCES (B-CELL): 4
ABC TOTAL NUCLEATED CELLS (B-CELL): NORMAL
T GONDII DNA SPEC QL NAA+PROBE: NOT DETECTED

## 2024-02-29 NOTE — TELEPHONE ENCOUNTER
Patient call:     Appointment type: New endocrine   Provider: See below   Return date: sooner than appt on 10/8   Speciality phone number: [phone]   Additional appointment(s) needed:   Additional notes: Call would not go through, Luz Elena Hartman MD  [id number]  Abnormal cytology.  To schedulers : please offer to move forward on schedule with either  Dr Ventura, Jere, Andrae, Ruanpeng, Marysol, Salim, Rodriguez, Radulescu ,Kohlenberg, Bantle, Moheet, Chow using NON-CALL week open new/MILIND (60 minutes) or 2 back to back 30 minute MILIND spaces.      Luz Elena Armstrong MD  Endocrine triage    Examples:  4/16 virtual radulescu mg   4/17 in person radulescu mg  4/17 virtual marysol csc  4/22 virtual alameddine csc  4/24 in person radulescu mg     *If date/times are scheduled or do not work for pt schedule next avail MILIND or 2 return back to back MILIND spots with providers listed above. Thank you.*    Please note that the above appointment(s) will require manual scheduling as they are marked as MILIND and will not appear using auto search. Do not schedule the patient if another patient has already been scheduled in the requested appointment slot.     Sussy Arizmendi on 2/29/2024 at 2:26 PM

## 2024-03-01 ENCOUNTER — TELEPHONE (OUTPATIENT)
Dept: TRANSPLANT | Facility: CLINIC | Age: 69
End: 2024-03-01
Payer: COMMERCIAL

## 2024-03-01 DIAGNOSIS — C90.01 MULTIPLE MYELOMA IN REMISSION (H): Primary | ICD-10-CM

## 2024-03-01 LAB
CULTURE HARVEST COMPLETE DATE: NORMAL
INTERPRETATION: NORMAL

## 2024-03-01 NOTE — TELEPHONE ENCOUNTER
[facility] - Care Coordination    RNCC called patient's daughter to let her know we placed a referral to Endocrine due to results of thyroid biopsy. Explained to Katherin that there were some abnormal cells and so we want to make sure we are appropriately following up. Katherin verbalized understanding.     Romina Santiago, RN BSN  BMT RN Care Coordinator   Phone [phone]  Pager [phone]

## 2024-03-03 ENCOUNTER — INFUSION THERAPY VISIT (OUTPATIENT)
Dept: TRANSPLANT | Facility: CLINIC | Age: 69
End: 2024-03-03
Attending: INTERNAL MEDICINE
Payer: COMMERCIAL

## 2024-03-03 VITALS
OXYGEN SATURATION: 98 % | TEMPERATURE: 98 F | HEART RATE: 79 BPM | RESPIRATION RATE: 16 BRPM | DIASTOLIC BLOOD PRESSURE: 72 MMHG | SYSTOLIC BLOOD PRESSURE: 110 MMHG

## 2024-03-03 DIAGNOSIS — C90.01 MULTIPLE MYELOMA IN REMISSION (H): Primary | ICD-10-CM

## 2024-03-03 PROCEDURE — 250N000011 HC RX IP 250 OP 636: Mod: JZ | Performed by: STUDENT IN AN ORGANIZED HEALTH CARE EDUCATION/TRAINING PROGRAM

## 2024-03-03 PROCEDURE — 96372 THER/PROPH/DIAG INJ SC/IM: CPT | Performed by: STUDENT IN AN ORGANIZED HEALTH CARE EDUCATION/TRAINING PROGRAM

## 2024-03-03 RX ORDER — HEPARIN SODIUM,PORCINE 10 UNIT/ML
5-20 VIAL (ML) INTRAVENOUS DAILY PRN
Status: CANCELLED | OUTPATIENT
Start: 2024-03-09

## 2024-03-03 RX ORDER — HEPARIN SODIUM (PORCINE) LOCK FLUSH IV SOLN 100 UNIT/ML 100 UNIT/ML
5 SOLUTION INTRAVENOUS
Status: CANCELLED | OUTPATIENT
Start: 2024-03-09

## 2024-03-03 RX ADMIN — FILGRASTIM-SNDZ 1080 MCG: 480 INJECTION, SOLUTION INTRAVENOUS; SUBCUTANEOUS at 10:08

## 2024-03-03 ASSESSMENT — PAIN SCALES - GENERAL: PAINLEVEL: EXTREME PAIN (8)

## 2024-03-03 NOTE — PROGRESS NOTES
First dose GCSF Nursing Note:  Rosario Terrazas presents today for scheduled infusion.    Patient seen by provider today: No   present during visit today: Patient refused  services and a waiver form has been signed.     Pregnancy Status: Pregnancy Status: Not Applicable - patient confirmed to be post-menopausal/sterile     Note: Pt here today for first dose GCSF prior to collections. Meds and allergies reviewed. VSS. Pt reports ongoing torso pain rated 8/10 and BLE to ankles and feet, worse on left than right.  denies fever/chills, bleeding, n/v/d, or respiratory symptoms. See assessment flowsheet for details. Pt was educated on medication side effects/symptoms. All questions answered and pt verbalized understanding. Injection was administered in left upper arm. Pt was monitored for 15 minutes post injection.     Post Injection Assessment:  Patient tolerated injection without incident.  Site patent and intact, free from redness, edema or discomfort.     Discharge Plan:   Patient discharged in stable condition accompanied by: daughter.  Departure Mode: Ambulatory.      Winsome Wells RN

## 2024-03-04 ENCOUNTER — ALLIED HEALTH/NURSE VISIT (OUTPATIENT)
Dept: TRANSPLANT | Facility: CLINIC | Age: 69
End: 2024-03-04
Attending: INTERNAL MEDICINE
Payer: COMMERCIAL

## 2024-03-04 ENCOUNTER — TELEPHONE (OUTPATIENT)
Dept: ENDOCRINOLOGY | Facility: CLINIC | Age: 69
End: 2024-03-04

## 2024-03-04 ENCOUNTER — HOME INFUSION (PRE-WILLOW HOME INFUSION) (OUTPATIENT)
Dept: PHARMACY | Facility: CLINIC | Age: 69
End: 2024-03-04

## 2024-03-04 VITALS
RESPIRATION RATE: 16 BRPM | SYSTOLIC BLOOD PRESSURE: 110 MMHG | TEMPERATURE: 97.5 F | OXYGEN SATURATION: 99 % | HEART RATE: 76 BPM | DIASTOLIC BLOOD PRESSURE: 74 MMHG

## 2024-03-04 DIAGNOSIS — C90.01 MULTIPLE MYELOMA IN REMISSION (H): Primary | ICD-10-CM

## 2024-03-04 DIAGNOSIS — S22.068A OTHER CLOSED FRACTURE OF SEVENTH THORACIC VERTEBRA, INITIAL ENCOUNTER (H): ICD-10-CM

## 2024-03-04 DIAGNOSIS — C90.00 MULTIPLE MYELOMA, REMISSION STATUS UNSPECIFIED (H): Primary | ICD-10-CM

## 2024-03-04 LAB
ABC 95% CONFIDENCE INTERVAL (B-CELL): NORMAL
ABC CLONOSEQ B-CELL TRACKING (MRD) RESULT: NORMAL
ABC DOMINANT SEQUENCES (B-CELL): 4
ABC RESIDUAL CLONAL CELLS/ MILL NUCLEATED CELLS BCELL: NORMAL PER MILLION NUCLEATED CELLS

## 2024-03-04 PROCEDURE — 96372 THER/PROPH/DIAG INJ SC/IM: CPT | Performed by: STUDENT IN AN ORGANIZED HEALTH CARE EDUCATION/TRAINING PROGRAM

## 2024-03-04 PROCEDURE — 250N000011 HC RX IP 250 OP 636: Performed by: STUDENT IN AN ORGANIZED HEALTH CARE EDUCATION/TRAINING PROGRAM

## 2024-03-04 RX ORDER — ACETAMINOPHEN 325 MG/1
650 TABLET ORAL EVERY 4 HOURS PRN
Qty: 120 TABLET | Refills: 1 | Status: ON HOLD | OUTPATIENT
Start: 2024-03-04 | End: 2024-04-02

## 2024-03-04 RX ORDER — HEPARIN SODIUM (PORCINE) LOCK FLUSH IV SOLN 100 UNIT/ML 100 UNIT/ML
5 SOLUTION INTRAVENOUS
Status: CANCELLED | OUTPATIENT
Start: 2024-03-09

## 2024-03-04 RX ORDER — HEPARIN SODIUM,PORCINE 10 UNIT/ML
5-20 VIAL (ML) INTRAVENOUS DAILY PRN
Status: CANCELLED | OUTPATIENT
Start: 2024-03-09

## 2024-03-04 RX ADMIN — FILGRASTIM-SNDZ 1080 MCG: 480 INJECTION, SOLUTION INTRAVENOUS; SUBCUTANEOUS at 09:05

## 2024-03-04 ASSESSMENT — PAIN SCALES - GENERAL: PAINLEVEL: EXTREME PAIN (8)

## 2024-03-04 NOTE — PROGRESS NOTES
Therapy: Line Care  Insurance: [employer]   Ded: $0.00  Co-Insurance: 100/0  Max Out of Pocket: $0.00    This patient has 100% coverage for Linecare with her [employer] Plan, No ded or OOP will apply.     In Referenced to [facility] BMT to check Line Care Coverage.   Please contact Intake with any questions, [phone] or In Basket pool, [facility] ([phone]).

## 2024-03-04 NOTE — TELEPHONE ENCOUNTER
Patient call:      Appointment type: New endocrine   Provider: Cordell   Return date: 4/16   Speciality phone number: [phone]   Additional appointment(s) needed:   Additional notes: Called pt daughter and scheduled next avail per below triage that worked for pt and daughter  Luz Elena Armstrong MD  [facility]  Abnormal cytology.  To schedulers : please offer to move forward on schedule with either  Dr Ventura, Jere, Rojas Abebe Trost, Salim, Herrera, Cordell ,Kohlenberg, Bantle, Moheet, Chow using NON-CALL week open new/MILIND (60 minutes) or 2 back to back 30 minute MILIND spaces.      Luz Elena Armstrong MD  Endocrine triage        Sussy Arizmendi on 3/4/2024 at 9:57 AM

## 2024-03-04 NOTE — PROGRESS NOTES
Called patient's daughter Katherin to discuss appointments for GCSF (began 3/3), line placement and stem cell collections.  Patient was provided pre-op instructions for line placement as per recommendations via IR.  They are viewing the schedule in [facility] [employer].  Katherin verbalized understanding of instructions via teach-back and no further questions at this time.      Jazmyn Early RN Care Coordinator - BMT  Phone: [phone]  Pager: [phone]

## 2024-03-05 ENCOUNTER — ANESTHESIA EVENT (OUTPATIENT)
Dept: SURGERY | Facility: AMBULATORY SURGERY CENTER | Age: 69
End: 2024-03-05
Payer: COMMERCIAL

## 2024-03-05 ENCOUNTER — ALLIED HEALTH/NURSE VISIT (OUTPATIENT)
Dept: TRANSPLANT | Facility: CLINIC | Age: 69
End: 2024-03-05
Attending: INTERNAL MEDICINE
Payer: COMMERCIAL

## 2024-03-05 VITALS
DIASTOLIC BLOOD PRESSURE: 70 MMHG | TEMPERATURE: 97.9 F | HEART RATE: 92 BPM | SYSTOLIC BLOOD PRESSURE: 109 MMHG | RESPIRATION RATE: 20 BRPM | OXYGEN SATURATION: 99 %

## 2024-03-05 DIAGNOSIS — C90.01 MULTIPLE MYELOMA IN REMISSION (H): Primary | ICD-10-CM

## 2024-03-05 LAB
DLCOCOR-%PRED-PRE: 136 %
DLCOCOR-PRE: 25.46 ML/MIN/MMHG
DLCOUNC-%PRED-PRE: 122 %
DLCOUNC-PRE: 22.86 ML/MIN/MMHG
DLCOUNC-PRED: 18.6 ML/MIN/MMHG
ERV-%PRED-PRE: 26 %
ERV-PRE: 0.25 L
ERV-PRED: 0.96 L
EXPTIME-PRE: 2.13 SEC
FEF2575-%PRED-PRE: 71 %
FEF2575-PRE: 1.28 L/SEC
FEF2575-PRED: 1.78 L/SEC
FEFMAX-%PRED-PRE: 68 %
FEFMAX-PRE: 3.82 L/SEC
FEFMAX-PRED: 5.59 L/SEC
FEV1-%PRED-PRE: 65 %
FEV1-PRE: 1.33 L
FEV1FEV6-PRE: 85 %
FEV1FEV6-PRED: 79 %
FEV1FVC-PRE: 85 %
FEV1FVC-PRED: 79 %
FEV1SVC-PRE: 69 %
FEV1SVC-PRED: 67 %
FIFMAX-PRE: 2.27 L/SEC
FRCPLETH-%PRED-PRE: 109 %
FRCPLETH-PRE: 2.92 L
FRCPLETH-PRED: 2.65 L
FVC-%PRED-PRE: 61 %
FVC-PRE: 1.58 L
FVC-PRED: 2.57 L
IC-%PRED-PRE: 87 %
IC-PRE: 1.69 L
IC-PRED: 1.93 L
RVPLETH-%PRED-PRE: 133 %
RVPLETH-PRE: 2.67 L
RVPLETH-PRED: 1.99 L
TLCPLETH-%PRED-PRE: 96 %
TLCPLETH-PRE: 4.61 L
TLCPLETH-PRED: 4.77 L
VA-%PRED-PRE: 93 %
VA-PRE: 4.12 L
VC-%PRED-PRE: 64 %
VC-PRE: 1.94 L
VC-PRED: 3.01 L

## 2024-03-05 PROCEDURE — 96372 THER/PROPH/DIAG INJ SC/IM: CPT | Performed by: STUDENT IN AN ORGANIZED HEALTH CARE EDUCATION/TRAINING PROGRAM

## 2024-03-05 PROCEDURE — 250N000011 HC RX IP 250 OP 636: Performed by: STUDENT IN AN ORGANIZED HEALTH CARE EDUCATION/TRAINING PROGRAM

## 2024-03-05 RX ORDER — HEPARIN SODIUM,PORCINE 10 UNIT/ML
5-20 VIAL (ML) INTRAVENOUS DAILY PRN
Status: CANCELLED | OUTPATIENT
Start: 2024-03-09

## 2024-03-05 RX ORDER — HEPARIN SODIUM (PORCINE) LOCK FLUSH IV SOLN 100 UNIT/ML 100 UNIT/ML
5 SOLUTION INTRAVENOUS
Status: CANCELLED | OUTPATIENT
Start: 2024-03-09

## 2024-03-05 RX ADMIN — FILGRASTIM-SNDZ 1080 MCG: 480 INJECTION, SOLUTION INTRAVENOUS; SUBCUTANEOUS at 09:08

## 2024-03-05 ASSESSMENT — PAIN SCALES - GENERAL: PAINLEVEL: WORST PAIN (10)

## 2024-03-06 ENCOUNTER — ONCOLOGY VISIT (OUTPATIENT)
Dept: TRANSPLANT | Facility: CLINIC | Age: 69
End: 2024-03-06
Attending: INTERNAL MEDICINE
Payer: COMMERCIAL

## 2024-03-06 ENCOUNTER — HOSPITAL ENCOUNTER (OUTPATIENT)
Facility: AMBULATORY SURGERY CENTER | Age: 69
Discharge: HOME | End: 2024-03-06
Attending: RADIOLOGY
Payer: COMMERCIAL

## 2024-03-06 ENCOUNTER — ANESTHESIA (OUTPATIENT)
Dept: SURGERY | Facility: AMBULATORY SURGERY CENTER | Age: 69
End: 2024-03-06
Payer: COMMERCIAL

## 2024-03-06 ENCOUNTER — ANCILLARY PROCEDURE (OUTPATIENT)
Dept: RADIOLOGY | Facility: AMBULATORY SURGERY CENTER | Age: 69
End: 2024-03-06
Attending: INTERNAL MEDICINE
Payer: COMMERCIAL

## 2024-03-06 ENCOUNTER — INFUSION THERAPY VISIT (OUTPATIENT)
Dept: TRANSPLANT | Facility: CLINIC | Age: 69
End: 2024-03-06
Attending: PHYSICIAN ASSISTANT
Payer: COMMERCIAL

## 2024-03-06 ENCOUNTER — APPOINTMENT (OUTPATIENT)
Dept: LAB | Facility: CLINIC | Age: 69
End: 2024-03-06
Attending: INTERNAL MEDICINE
Payer: COMMERCIAL

## 2024-03-06 VITALS
SYSTOLIC BLOOD PRESSURE: 112 MMHG | TEMPERATURE: 98.2 F | HEART RATE: 80 BPM | OXYGEN SATURATION: 95 % | DIASTOLIC BLOOD PRESSURE: 67 MMHG | RESPIRATION RATE: 16 BRPM

## 2024-03-06 VITALS
TEMPERATURE: 97.3 F | BODY MASS INDEX: 38.2 KG/M2 | WEIGHT: 217.2 LBS | HEART RATE: 85 BPM | OXYGEN SATURATION: 97 % | SYSTOLIC BLOOD PRESSURE: 104 MMHG | DIASTOLIC BLOOD PRESSURE: 67 MMHG

## 2024-03-06 VITALS
HEART RATE: 69 BPM | HEIGHT: 63 IN | SYSTOLIC BLOOD PRESSURE: 109 MMHG | OXYGEN SATURATION: 97 % | TEMPERATURE: 96.9 F | BODY MASS INDEX: 38.48 KG/M2 | RESPIRATION RATE: 16 BRPM | WEIGHT: 217.2 LBS | DIASTOLIC BLOOD PRESSURE: 73 MMHG

## 2024-03-06 DIAGNOSIS — C90.00 MULTIPLE MYELOMA, REMISSION STATUS UNSPECIFIED (H): Primary | ICD-10-CM

## 2024-03-06 DIAGNOSIS — C90.01 MULTIPLE MYELOMA IN REMISSION (H): Primary | ICD-10-CM

## 2024-03-06 DIAGNOSIS — C90.00 MULTIPLE MYELOMA, REMISSION STATUS UNSPECIFIED (H): ICD-10-CM

## 2024-03-06 DIAGNOSIS — C90.01 MULTIPLE MYELOMA IN REMISSION (H): ICD-10-CM

## 2024-03-06 DIAGNOSIS — C80.1 MALIGNANT NEOPLASM (H): Primary | ICD-10-CM

## 2024-03-06 LAB
BASOPHILS # BLD AUTO: 0.2 10E3/UL (ref 0–0.2)
BASOPHILS NFR BLD AUTO: 1 %
CD34 ABSOLUTE COUNT COMMENT: NORMAL
CD34 CELLS # SPEC: 10 CELLS/UL
CD34 CELLS NFR SPEC: 0.04 %
EOSINOPHIL # BLD AUTO: 0.2 10E3/UL (ref 0–0.7)
EOSINOPHIL NFR BLD AUTO: 1 %
ERYTHROCYTE [DISTWIDTH] IN BLOOD BY AUTOMATED COUNT: 16.9 % (ref 10–15)
HCT VFR BLD AUTO: 32.3 % (ref 35–47)
HGB BLD-MCNC: 10.4 G/DL (ref 11.7–15.7)
HOLD SPECIMEN: NORMAL
IMM GRANULOCYTES # BLD: 1 10E3/UL
IMM GRANULOCYTES NFR BLD: 4 %
LYMPHOCYTES # BLD AUTO: 1.4 10E3/UL (ref 0.8–5.3)
LYMPHOCYTES NFR BLD AUTO: 5 %
MCH RBC QN AUTO: 32.4 PG (ref 26.5–33)
MCHC RBC AUTO-ENTMCNC: 32.2 G/DL (ref 31.5–36.5)
MCV RBC AUTO: 101 FL (ref 78–100)
MONOCYTES # BLD AUTO: 3.3 10E3/UL (ref 0–1.3)
MONOCYTES NFR BLD AUTO: 13 %
NEUTROPHILS # BLD AUTO: 20.4 10E3/UL (ref 1.6–8.3)
NEUTROPHILS NFR BLD AUTO: 76 %
NRBC # BLD AUTO: 0 10E3/UL
NRBC BLD AUTO-RTO: 0 /100
PLATELET # BLD AUTO: 199 10E3/UL (ref 150–450)
PRODUCT NUMBER FLOW CYTOMETRY: NORMAL
RBC # BLD AUTO: 3.21 10E6/UL (ref 3.8–5.2)
VIABLE CD34 CELLS NFR FLD: 94.38 %
WBC # BLD AUTO: 26.4 10E3/UL (ref 4–11)

## 2024-03-06 PROCEDURE — 96372 THER/PROPH/DIAG INJ SC/IM: CPT | Performed by: PHYSICIAN ASSISTANT

## 2024-03-06 PROCEDURE — 36558 INSERT TUNNELED CV CATH: CPT | Performed by: STUDENT IN AN ORGANIZED HEALTH CARE EDUCATION/TRAINING PROGRAM

## 2024-03-06 PROCEDURE — 99000 SPECIMEN HANDLING OFFICE-LAB: CPT | Performed by: PATHOLOGY

## 2024-03-06 PROCEDURE — 96372 THER/PROPH/DIAG INJ SC/IM: CPT | Performed by: STUDENT IN AN ORGANIZED HEALTH CARE EDUCATION/TRAINING PROGRAM

## 2024-03-06 PROCEDURE — 99215 OFFICE O/P EST HI 40 MIN: CPT

## 2024-03-06 PROCEDURE — 250N000011 HC RX IP 250 OP 636: Performed by: STUDENT IN AN ORGANIZED HEALTH CARE EDUCATION/TRAINING PROGRAM

## 2024-03-06 PROCEDURE — 250N000011 HC RX IP 250 OP 636: Mod: JZ | Performed by: PHYSICIAN ASSISTANT

## 2024-03-06 PROCEDURE — 36558 INSERT TUNNELED CV CATH: CPT | Performed by: NURSE ANESTHETIST, CERTIFIED REGISTERED

## 2024-03-06 PROCEDURE — 77001 FLUOROGUIDE FOR VEIN DEVICE: CPT | Mod: 26 | Performed by: RADIOLOGY

## 2024-03-06 PROCEDURE — G0463 HOSPITAL OUTPT CLINIC VISIT: HCPCS | Mod: 25

## 2024-03-06 PROCEDURE — 85025 COMPLETE CBC W/AUTO DIFF WBC: CPT | Performed by: PATHOLOGY

## 2024-03-06 PROCEDURE — G0463 HOSPITAL OUTPT CLINIC VISIT: HCPCS

## 2024-03-06 PROCEDURE — 36558 INSERT TUNNELED CV CATH: CPT | Mod: LT | Performed by: RADIOLOGY

## 2024-03-06 PROCEDURE — 86367 STEM CELLS TOTAL COUNT: CPT | Performed by: RADIOLOGY

## 2024-03-06 PROCEDURE — 76937 US GUIDE VASCULAR ACCESS: CPT | Mod: 26 | Performed by: RADIOLOGY

## 2024-03-06 RX ORDER — HYDROXYZINE HYDROCHLORIDE 10 MG/1
10 TABLET, FILM COATED ORAL EVERY 6 HOURS PRN
Status: DISCONTINUED | OUTPATIENT
Start: 2024-03-06 | End: 2024-03-07 | Stop reason: HOSPADM

## 2024-03-06 RX ORDER — SODIUM CHLORIDE 9 MG/ML
INJECTION, SOLUTION INTRAVENOUS CONTINUOUS
Status: DISCONTINUED | OUTPATIENT
Start: 2024-03-06 | End: 2024-03-07 | Stop reason: HOSPADM

## 2024-03-06 RX ORDER — OXYCODONE HYDROCHLORIDE 5 MG/1
10 TABLET ORAL
Status: DISCONTINUED | OUTPATIENT
Start: 2024-03-06 | End: 2024-03-07 | Stop reason: HOSPADM

## 2024-03-06 RX ORDER — SODIUM CHLORIDE, SODIUM LACTATE, POTASSIUM CHLORIDE, CALCIUM CHLORIDE 600; 310; 30; 20 MG/100ML; MG/100ML; MG/100ML; MG/100ML
INJECTION, SOLUTION INTRAVENOUS CONTINUOUS
Status: DISCONTINUED | OUTPATIENT
Start: 2024-03-06 | End: 2024-03-07 | Stop reason: HOSPADM

## 2024-03-06 RX ORDER — ONDANSETRON 2 MG/ML
4 INJECTION INTRAMUSCULAR; INTRAVENOUS EVERY 30 MIN PRN
Status: DISCONTINUED | OUTPATIENT
Start: 2024-03-06 | End: 2024-03-07 | Stop reason: HOSPADM

## 2024-03-06 RX ORDER — LIDOCAINE 40 MG/G
CREAM TOPICAL
Status: DISCONTINUED | OUTPATIENT
Start: 2024-03-06 | End: 2024-03-07 | Stop reason: HOSPADM

## 2024-03-06 RX ORDER — HEPARIN SODIUM,PORCINE 10 UNIT/ML
5-20 VIAL (ML) INTRAVENOUS DAILY PRN
Status: CANCELLED | OUTPATIENT
Start: 2024-03-07

## 2024-03-06 RX ORDER — HYDROMORPHONE HYDROCHLORIDE 1 MG/ML
0.4 INJECTION, SOLUTION INTRAMUSCULAR; INTRAVENOUS; SUBCUTANEOUS EVERY 5 MIN PRN
Status: DISCONTINUED | OUTPATIENT
Start: 2024-03-06 | End: 2024-03-07 | Stop reason: HOSPADM

## 2024-03-06 RX ORDER — PLERIXAFOR 24 MG/1.2ML
0.24 SOLUTION SUBCUTANEOUS DAILY
Status: CANCELLED
Start: 2024-03-07

## 2024-03-06 RX ORDER — PLERIXAFOR 24 MG/1.2ML
24 SOLUTION SUBCUTANEOUS DAILY
Status: DISCONTINUED | OUTPATIENT
Start: 2024-03-06 | End: 2024-03-06 | Stop reason: HOSPADM

## 2024-03-06 RX ORDER — HEPARIN SODIUM (PORCINE) LOCK FLUSH IV SOLN 100 UNIT/ML 100 UNIT/ML
3 SOLUTION INTRAVENOUS EVERY 24 HOURS
Status: CANCELLED | OUTPATIENT
Start: 2024-03-06

## 2024-03-06 RX ORDER — LORAZEPAM 2 MG/ML
.5-1 INJECTION INTRAMUSCULAR
Status: DISCONTINUED | OUTPATIENT
Start: 2024-03-06 | End: 2024-03-07 | Stop reason: HOSPADM

## 2024-03-06 RX ORDER — HYDRALAZINE HYDROCHLORIDE 20 MG/ML
2.5-5 INJECTION INTRAMUSCULAR; INTRAVENOUS EVERY 10 MIN PRN
Status: DISCONTINUED | OUTPATIENT
Start: 2024-03-06 | End: 2024-03-07 | Stop reason: HOSPADM

## 2024-03-06 RX ORDER — HEPARIN SODIUM (PORCINE) LOCK FLUSH IV SOLN 100 UNIT/ML 100 UNIT/ML
3 SOLUTION INTRAVENOUS ONCE
Status: CANCELLED | OUTPATIENT
Start: 2024-03-06 | End: 2024-03-06

## 2024-03-06 RX ORDER — HALOPERIDOL 5 MG/ML
1 INJECTION INTRAMUSCULAR
Status: DISCONTINUED | OUTPATIENT
Start: 2024-03-06 | End: 2024-03-07 | Stop reason: HOSPADM

## 2024-03-06 RX ORDER — HEPARIN SODIUM (PORCINE) LOCK FLUSH IV SOLN 100 UNIT/ML 100 UNIT/ML
5 SOLUTION INTRAVENOUS
Status: CANCELLED | OUTPATIENT
Start: 2024-03-07

## 2024-03-06 RX ORDER — OXYCODONE HYDROCHLORIDE 5 MG/1
5 TABLET ORAL
Status: DISCONTINUED | OUTPATIENT
Start: 2024-03-06 | End: 2024-03-07 | Stop reason: HOSPADM

## 2024-03-06 RX ORDER — ACETAMINOPHEN 325 MG/1
975 TABLET ORAL ONCE
Status: COMPLETED | OUTPATIENT
Start: 2024-03-06 | End: 2024-03-06

## 2024-03-06 RX ORDER — PROPOFOL 10 MG/ML
INJECTION, EMULSION INTRAVENOUS CONTINUOUS PRN
Status: DISCONTINUED | OUTPATIENT
Start: 2024-03-06 | End: 2024-03-06

## 2024-03-06 RX ORDER — FENTANYL CITRATE 50 UG/ML
25 INJECTION, SOLUTION INTRAMUSCULAR; INTRAVENOUS
Status: DISCONTINUED | OUTPATIENT
Start: 2024-03-06 | End: 2024-03-07 | Stop reason: HOSPADM

## 2024-03-06 RX ORDER — MEPERIDINE HYDROCHLORIDE 25 MG/ML
12.5 INJECTION INTRAMUSCULAR; INTRAVENOUS; SUBCUTANEOUS EVERY 5 MIN PRN
Status: DISCONTINUED | OUTPATIENT
Start: 2024-03-06 | End: 2024-03-07 | Stop reason: HOSPADM

## 2024-03-06 RX ORDER — PLERIXAFOR 24 MG/1.2ML
0.24 SOLUTION SUBCUTANEOUS DAILY
Status: CANCELLED
Start: 2024-03-06

## 2024-03-06 RX ORDER — DEXAMETHASONE SODIUM PHOSPHATE 10 MG/ML
4 INJECTION, SOLUTION INTRAMUSCULAR; INTRAVENOUS
Status: DISCONTINUED | OUTPATIENT
Start: 2024-03-06 | End: 2024-03-07 | Stop reason: HOSPADM

## 2024-03-06 RX ORDER — KETOROLAC TROMETHAMINE 30 MG/ML
15 INJECTION, SOLUTION INTRAMUSCULAR; INTRAVENOUS
Status: DISCONTINUED | OUTPATIENT
Start: 2024-03-06 | End: 2024-03-07 | Stop reason: HOSPADM

## 2024-03-06 RX ORDER — HEPARIN SODIUM (PORCINE) LOCK FLUSH IV SOLN 100 UNIT/ML 100 UNIT/ML
3 SOLUTION INTRAVENOUS
Status: CANCELLED | OUTPATIENT
Start: 2024-03-06

## 2024-03-06 RX ORDER — ACETAMINOPHEN 325 MG/1
975 TABLET ORAL ONCE
Status: DISCONTINUED | OUTPATIENT
Start: 2024-03-06 | End: 2024-03-07 | Stop reason: HOSPADM

## 2024-03-06 RX ORDER — FENTANYL CITRATE 50 UG/ML
50 INJECTION, SOLUTION INTRAMUSCULAR; INTRAVENOUS EVERY 5 MIN PRN
Status: DISCONTINUED | OUTPATIENT
Start: 2024-03-06 | End: 2024-03-07 | Stop reason: HOSPADM

## 2024-03-06 RX ORDER — HEPARIN SODIUM (PORCINE) LOCK FLUSH IV SOLN 100 UNIT/ML 100 UNIT/ML
SOLUTION INTRAVENOUS DAILY PRN
Status: DISCONTINUED | OUTPATIENT
Start: 2024-03-06 | End: 2024-03-06 | Stop reason: HOSPADM

## 2024-03-06 RX ORDER — FENTANYL CITRATE 50 UG/ML
25 INJECTION, SOLUTION INTRAMUSCULAR; INTRAVENOUS EVERY 5 MIN PRN
Status: DISCONTINUED | OUTPATIENT
Start: 2024-03-06 | End: 2024-03-07 | Stop reason: HOSPADM

## 2024-03-06 RX ORDER — ONDANSETRON 4 MG/1
4 TABLET, ORALLY DISINTEGRATING ORAL EVERY 30 MIN PRN
Status: DISCONTINUED | OUTPATIENT
Start: 2024-03-06 | End: 2024-03-07 | Stop reason: HOSPADM

## 2024-03-06 RX ORDER — ALBUTEROL SULFATE 0.83 MG/ML
2.5 SOLUTION RESPIRATORY (INHALATION) EVERY 4 HOURS PRN
Status: DISCONTINUED | OUTPATIENT
Start: 2024-03-06 | End: 2024-03-07 | Stop reason: HOSPADM

## 2024-03-06 RX ORDER — CEFAZOLIN SODIUM 2 G/50ML
2 SOLUTION INTRAVENOUS
Status: COMPLETED | OUTPATIENT
Start: 2024-03-06 | End: 2024-03-06

## 2024-03-06 RX ORDER — HYDROMORPHONE HYDROCHLORIDE 1 MG/ML
0.2 INJECTION, SOLUTION INTRAMUSCULAR; INTRAVENOUS; SUBCUTANEOUS EVERY 5 MIN PRN
Status: DISCONTINUED | OUTPATIENT
Start: 2024-03-06 | End: 2024-03-07 | Stop reason: HOSPADM

## 2024-03-06 RX ORDER — NALOXONE HYDROCHLORIDE 0.4 MG/ML
0.1 INJECTION, SOLUTION INTRAMUSCULAR; INTRAVENOUS; SUBCUTANEOUS
Status: DISCONTINUED | OUTPATIENT
Start: 2024-03-06 | End: 2024-03-07 | Stop reason: HOSPADM

## 2024-03-06 RX ORDER — LABETALOL HYDROCHLORIDE 5 MG/ML
10 INJECTION, SOLUTION INTRAVENOUS
Status: DISCONTINUED | OUTPATIENT
Start: 2024-03-06 | End: 2024-03-07 | Stop reason: HOSPADM

## 2024-03-06 RX ORDER — LIDOCAINE HYDROCHLORIDE 20 MG/ML
INJECTION, SOLUTION INFILTRATION; PERINEURAL PRN
Status: DISCONTINUED | OUTPATIENT
Start: 2024-03-06 | End: 2024-03-06

## 2024-03-06 RX ORDER — DIMENHYDRINATE 50 MG/ML
25 INJECTION, SOLUTION INTRAMUSCULAR; INTRAVENOUS
Status: DISCONTINUED | OUTPATIENT
Start: 2024-03-06 | End: 2024-03-07 | Stop reason: HOSPADM

## 2024-03-06 RX ADMIN — CEFAZOLIN SODIUM 2 G: 2 SOLUTION INTRAVENOUS at 09:00

## 2024-03-06 RX ADMIN — LIDOCAINE HYDROCHLORIDE 80 MG: 20 INJECTION, SOLUTION INFILTRATION; PERINEURAL at 09:02

## 2024-03-06 RX ADMIN — FILGRASTIM-SNDZ 1080 MCG: 480 INJECTION, SOLUTION INTRAVENOUS; SUBCUTANEOUS at 11:31

## 2024-03-06 RX ADMIN — SODIUM CHLORIDE, SODIUM LACTATE, POTASSIUM CHLORIDE, CALCIUM CHLORIDE: 600; 310; 30; 20 INJECTION, SOLUTION INTRAVENOUS at 08:07

## 2024-03-06 RX ADMIN — PLERIXAFOR 24 MG: 24 SOLUTION SUBCUTANEOUS at 17:01

## 2024-03-06 RX ADMIN — PROPOFOL 200 MCG/KG/MIN: 10 INJECTION, EMULSION INTRAVENOUS at 09:03

## 2024-03-06 ASSESSMENT — PAIN SCALES - GENERAL
PAINLEVEL: EXTREME PAIN (8)
PAINLEVEL: WORST PAIN (10)

## 2024-03-06 NOTE — LETTER
3/6/2024         RE: Rosario Terrazas  [address] [place] [address]        Dear Colleague,    Thank you for referring your patient, Rosario Terrazas, to the [facility] BLOOD AND MARROW TRANSPLANT PROGRAM [place]. Please see a copy of my visit note below.    BMT/Cell Therapy Daily Progress Note   03/06/2024    Patient ID:  Rosario Terrazas is a 68 year old female, hx of MM, day +4 GCSF today and line placement in prep for stem cell collections.       INTERVAL  HISTORY     Line placement went okay.  Discussed coming back this afternoon for mozobil.  Daughter preferred to serve as . She says she will always serve as . Her mom is okay with this plan. Declined phone .      Review of Systems: 10 point ROS negative except as noted above.      PHYSICAL EXAM     Weight In/Out     Wt Readings from Last 3 Encounters:   03/06/24 98.5 kg (217 lb 3.2 oz)   03/06/24 98.5 kg (217 lb 3.2 oz)   02/27/24 99 kg (218 lb 4.8 oz)      [unfilled]       /67 (BP Location: Right arm, Patient Position: Sitting, Cuff Size: Adult Regular)   Pulse 85   Temp 97.3  F (36.3  C) (Oral)   Wt 98.5 kg (217 lb 3.2 oz)   SpO2 97%   BMI 38.20 kg/m       General: NAD   Eyes: VALERIY, sclera anicteric   Lungs: CTA bilaterally  Cardiovascular: RRR, no M/R/G   Lymphatics: no edema  Skin: no rashes or petechiae  Neuro: A&O     Additional Findings: Wilder site little bit of dried blood on bandage.     LABS AND IMAGING: I have assessed all abnormal lab values for their clinical significance and any values considered clinically significant have been addressed in the assessment and plan.        Lab Results   Component Value Date    WBC 26.4 (H) 03/06/2024    HGB 10.4 (L) 03/06/2024    HCT 32.3 (L) 03/06/2024     03/06/2024     02/20/2024    POTASSIUM 4.0 02/20/2024    CHLORIDE 105 02/20/2024    CO2 27 02/20/2024    GLC 87 02/20/2024    BUN 6.5 (L) 02/20/2024     CR 0.59 02/27/2024    MAG 2.1 02/20/2024    INR 1.18 (H) 02/20/2024       SYSTEMS-BASED ASSESSMENT AND PLAN       Rosario Terrazas is a 68 year old female with MM, undergoing G + mozobil primed collections.      BMT/IEC PROTOCOL for MM Auto  - Day +4 G today.  - CD34 10  - line placed today went fine.   - Will add mozobil this afternoon and start collecting tomorrow.  - Apheresis aware.   - Of note she is on 81mg ASA be aware with plts.   Transplants for multiple myeloma:  The patient has Standard risk MM, and the collection goal is 8 million CD34/kg for 2 transplants..  The day for admission to begin melphalan conditioning is Day -1 for melphalan 200 mg/m2 (not frail, creatinine clearance 30+).    - Continue INH/B6 through transplant for latent TB   - Thyroid FNA Atypia of undetermined significance diagnosis has a 22 (13-30)% risk of malignancy. Repeat FNA (least 4-6 weeks from previous aspiration with optimal time being 12 weeks after last aspiration) , molecular testing, diagnostic lobectomy , or surveillance is recommended.      40 minutes spent by me on the date of the encounter doing chart review, history and exam, documentation and further activities per the note       Brittaney Morales PA-C

## 2024-03-06 NOTE — ANESTHESIA POSTPROCEDURE EVALUATION
Patient: Rosario Terrazas    Procedure: Procedure(s):  central venous catheter tunneled line Insert vascular access       Anesthesia Type:  No value filed.    Note:  Disposition: Outpatient   Postop Pain Control: Uneventful            Sign Out: Well controlled pain   PONV: No   Neuro/Psych: Uneventful            Sign Out: Acceptable/Baseline neuro status   Airway/Respiratory: Uneventful            Sign Out: Acceptable/Baseline resp. status   CV/Hemodynamics: Uneventful            Sign Out: Acceptable CV status; No obvious hypovolemia; No obvious fluid overload   Other NRE:    DID A NON-ROUTINE EVENT OCCUR?            Last vitals:  Vitals Value Taken Time   /73 03/06/24 1003   Temp 36.1  C (96.9  F) 03/06/24 1003   Pulse 69 03/06/24 1003   Resp 16 03/06/24 1003   SpO2 97 % 03/06/24 1003       Electronically Signed By: Bigg Vazquez MD  March 6, 2024  10:45 AM

## 2024-03-06 NOTE — ANESTHESIA PREPROCEDURE EVALUATION
"Anesthesia Pre-Procedure Evaluation    Patient: Rosario Terrazas   MRN: [id number] : 1955        Procedure : Procedure(s):  central venous catheter tunneled line Insert vascular access          No past medical history on file.   Past Surgical History:   Procedure Laterality Date    IR CVC TUNNEL PLACEMENT > 5 YRS OF AGE  3/6/2024      No Known Allergies   Social History     Tobacco Use    Smoking status: Never    Smokeless tobacco: Never   Substance Use Topics    Alcohol use: Never      Wt Readings from Last 1 Encounters:   24 98.5 kg (217 lb 3.2 oz)           Physical Exam    Airway        Mallampati: II   TM distance: > 3 FB   Neck ROM: full   Mouth opening: > 3 cm    Respiratory Devices and Support         Dental       (+) Modest Abnormalities - crowns, retainers, 1 or 2 missing teeth      Cardiovascular   cardiovascular exam normal          Pulmonary   pulmonary exam normal                OUTSIDE LABS:  CBC:   Lab Results   Component Value Date    WBC 26.4 (H) 2024    WBC 4.3 2024    HGB 10.4 (L) 2024    HGB 10.6 (L) 2024    HCT 32.3 (L) 2024    HCT 33.4 (L) 2024     2024     2024     BMP:   Lab Results   Component Value Date     2024    POTASSIUM 4.0 2024    CHLORIDE 105 2024    CO2 27 2024    BUN 6.5 (L) 2024    CR 0.59 2024    CR 0.59 2024    GLC 87 2024     COAGS:   Lab Results   Component Value Date    PTT 29 2024    INR 1.18 (H) 2024     POC: No results found for: \"BGM\", \"HCG\", \"HCGS\"  HEPATIC:   Lab Results   Component Value Date    ALBUMIN 3.9 2024    PROTTOTAL 6.2 (L) 2024    ALT 9 2024    AST 17 2024    ALKPHOS 58 2024    BILITOTAL 0.2 2024     OTHER:   Lab Results   Component Value Date    RAMIREZ 9.4 2024    PHOS 3.5 2024    MAG 2.1 2024       Anesthesia Plan    ASA Status:  3    NPO Status:  NPO " "Appropriate    Anesthesia Type: MAC.     - Reason for MAC: straight local not clinically adequate   Induction: Intravenous, Propofol.   Maintenance: TIVA.        Consents    Anesthesia Plan(s) and associated risks, benefits, and realistic alternatives discussed. Questions answered and patient/representative(s) expressed understanding.     - Discussed:     - Discussed with:  Patient, Other (See Comment),       - Extended Intubation/Ventilatory Support Discussed: No.      - Patient is DNR/DNI Status: No     Use of blood products discussed: No .     Postoperative Care    Pain management: IV analgesics, Oral pain medications, Multi-modal analgesia.   PONV prophylaxis: Dexamethasone or Solumedrol, Ondansetron (or other 5HT-3), Background Propofol Infusion     Comments:               Bigg Vazquez MD    I have reviewed the pertinent notes and labs in the chart from the past 30 days and (re)examined the patient.  Any updates or changes from those notes are reflected in this note.            # Coagulation Defect: INR = 1.18 (Ref range: 0.85 - 1.15) and/or PTT = 29 Seconds (Ref range: 22 - 38 Seconds), will monitor for bleeding  # Drug Induced Platelet Defect: home medication list includes an antiplatelet medication  # Obesity: Estimated body mass index is 38.48 kg/m  as calculated from the following:    Height as of this encounter: 1.6 m (5' 3\").    Weight as of this encounter: 98.5 kg (217 lb 3.2 oz).      "

## 2024-03-06 NOTE — BRIEF OP NOTE
[facility]    Brief Operative Note    Pre-operative diagnosis: Multiple myeloma in remission (H) [C90.01]  Post-operative diagnosis Same as pre-operative diagnosis    Procedure: central venous catheter tunneled line Insert vascular access, Left - Chest    Surgeon: Surgeon(s) and Role:     * Onel Leonardo MD - Primary  Anesthesia: MAC   Estimated Blood Loss: 7 mL from 3/6/2024  8:57 AM to 3/6/2024  9:46 AM      Drains: None  Specimens: * No specimens in log *  Findings:   None.  Complications: None.  Implants:   Implant Name Type Inv. Item Serial No.  Lot No. LRB No. 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 Tip in the mid right atrium. Heparin locked and ready for use.

## 2024-03-06 NOTE — ANESTHESIA CARE TRANSFER NOTE
Patient: Rosario Terrazas    Procedure: Procedure(s):  central venous catheter tunneled line Insert vascular access       Diagnosis: Multiple myeloma in remission (H) [C90.01]  Diagnosis Additional Information: No value filed.    Anesthesia Type:   No value filed.     Note:  Anesthesia Care Transfer Notewriter  Vitals:  Vitals Value Taken Time   /70 03/06/24 0949   Temp 36  C (96.8  F) 03/06/24 0949   Pulse 72 03/06/24 0949   Resp 15 03/06/24 0949   SpO2 96 % 03/06/24 0949       Electronically Signed By: Yuli De La Vega, KIKO CRNA  March 6, 2024  9:54 AM

## 2024-03-06 NOTE — ANESTHESIA CARE TRANSFER NOTE
Patient: Rosario Terrazas    Procedure: Procedure(s):  central venous catheter tunneled line Insert vascular access       Diagnosis: Multiple myeloma in remission (H) [C90.01]  Diagnosis Additional Information: No value filed.    Anesthesia Type:   No value filed.     Note:    Oropharynx: oropharynx clear of all foreign objects  Level of Consciousness: awake  Oxygen Supplementation: room air    Independent Airway: airway patency satisfactory and stable  Dentition: dentition unchanged  Vital Signs Stable: post-procedure vital signs reviewed and stable  Report to RN Given: handoff report given  Patient transferred to: Phase II  Comments: Report to Phase II RN. Resps easy and reg.   Handoff Report: Identifed the Patient, Identified the Reponsible Provider, Reviewed the pertinent medical history, Discussed the surgical course, Reviewed Intra-OP anesthesia mangement and issues during anesthesia, Set expectations for post-procedure period and Allowed opportunity for questions and acknowledgement of understanding      Vitals:  Vitals Value Taken Time   /70 03/06/24 0949   Temp 36  C (96.8  F) 03/06/24 0949   Pulse 72 03/06/24 0949   Resp 15 03/06/24 0949   SpO2 96 % 03/06/24 0949       Electronically Signed By: KIKO Sorto CRNA  March 6, 2024  9:54 AM

## 2024-03-06 NOTE — PROGRESS NOTES
BMT/Cell Therapy Daily Progress Note   03/06/2024    Patient ID:  Rosario Terrazas is a 68 year old female, hx of MM, day +4 GCSF today and line placement in prep for stem cell collections.       INTERVAL  HISTORY     Line placement went okay.  Discussed coming back this afternoon for mozobil.  Daughter preferred to serve as . She says she will always serve as . Her mom is okay with this plan. Declined phone .      Review of Systems: 10 point ROS negative except as noted above.      PHYSICAL EXAM     Weight In/Out     Wt Readings from Last 3 Encounters:   03/06/24 98.5 kg (217 lb 3.2 oz)   03/06/24 98.5 kg (217 lb 3.2 oz)   02/27/24 99 kg (218 lb 4.8 oz)      [unfilled]       /67 (BP Location: Right arm, Patient Position: Sitting, Cuff Size: Adult Regular)   Pulse 85   Temp 97.3  F (36.3  C) (Oral)   Wt 98.5 kg (217 lb 3.2 oz)   SpO2 97%   BMI 38.20 kg/m       General: NAD   Eyes: VALERIY, sclera anicteric   Lungs: CTA bilaterally  Cardiovascular: RRR, no M/R/G   Lymphatics: no edema  Skin: no rashes or petechiae  Neuro: A&O     Additional Findings: Wilder site little bit of dried blood on bandage.     LABS AND IMAGING: I have assessed all abnormal lab values for their clinical significance and any values considered clinically significant have been addressed in the assessment and plan.        Lab Results   Component Value Date    WBC 26.4 (H) 03/06/2024    HGB 10.4 (L) 03/06/2024    HCT 32.3 (L) 03/06/2024     03/06/2024     02/20/2024    POTASSIUM 4.0 02/20/2024    CHLORIDE 105 02/20/2024    CO2 27 02/20/2024    GLC 87 02/20/2024    BUN 6.5 (L) 02/20/2024    CR 0.59 02/27/2024    MAG 2.1 02/20/2024    INR 1.18 (H) 02/20/2024       SYSTEMS-BASED ASSESSMENT AND PLAN       Rosario Terrazas is a 68 year old female with MM, undergoing G + mozobil primed collections.      BMT/IEC PROTOCOL for MM Auto  - Day +4 G today.  - CD34 10  - line placed today  went fine.   - Will add mozobil this afternoon and start collecting tomorrow.  - Apheresis aware.   - Of note she is on 81mg ASA be aware with plts.   Transplants for multiple myeloma:  The patient has Standard risk MM, and the collection goal is 8 million CD34/kg for 2 transplants..  The day for admission to begin melphalan conditioning is Day -1 for melphalan 200 mg/m2 (not frail, creatinine clearance 30+).    - Continue INH/B6 through transplant for latent TB   - Thyroid FNA Atypia of undetermined significance diagnosis has a 22 (13-30)% risk of malignancy. Repeat FNA (least 4-6 weeks from previous aspiration with optimal time being 12 weeks after last aspiration) , molecular testing, diagnostic lobectomy , or surveillance is recommended.      40 minutes spent by me on the date of the encounter doing chart review, history and exam, documentation and further activities per the note       Brittaney Morales PA-C

## 2024-03-06 NOTE — DISCHARGE INSTRUCTIONS
A collaboration between [facility] and [facility]  Experts in minimally invasive, targeted treatments performed using imaging guidance    Venous Access Device,  Port Catheter or Tunneled or Non-Tunneled Central Line Placement    Today you had a procedure today to install a venous access device; either a tunneled central vein catheter or a subcutaneous port catheter.    After you go home:  Drink plenty of fluids.  Generally 6-8 (8 ounce) glasses a day is recommended.  Resume your regular diet unless otherwise ordered by a medical provider.  Keep any applied tape/gauze dressings clean and dry.  Change tape/gauze dressings if they get wet or soiled.  You may shower the following day after procedure, however cover and protect from moisture any tape/gauze dressings.  You may let water hit and run over dried skin glue, but do not scrub.  Pat the area dry after showering.  Port placement incisions are closed with absorbable suture, meaning they do not need to be removed at a later date, and a topical skin adhesive (skin glue).  This glue will wear off in 7-14 days.  Do not remove before this time.  If 14 days have passed and residual glue is present, you may gently remove it.  Do not apply gels, lotions, or ointments to the glue site for the first 10 days as this may cause the glue to prematurely soften and fail.  Do not perform strenuous activities or lift greater than 10 pounds for the next three days.  If there is bleeding or oozing from the procedure site, apply firm pressure to the area for 5-10 minutes.  If the bleeding continues seek medical advice at the numbers below.  Mild procedure site discomfort can be treated with an ice pack and over-the-counter pain relievers.        For 24 hours after any sedation used:  Relax and take it easy.  No strenuous activities.  Do not drive or operate machines at home or at work.  No alcohol consumption.  Do not make any  important or legal decisions.    Call our Interventional Radiology (IR) service if:  If you start bleeding from the procedure site.  If you do start to bleed from the site, lie down and hold some pressure on the site.  Our radiology provider can help you decide if you need to return to the hospital.  If you have new or worsening pain related to the procedure.  If you have concerning swelling at the procedure site.  If you develop persistent nausea or vomiting.  If you develop hives or a rash or any unexplained itching.  If you have a fever of greater than 100.5  F and chills in the first 5 days after procedure.  Any other concerns related to your procedure.      [facility]  Interventional Radiology (IR)  [address]  [address] [facility] [place] [address]    Contact Number:  [phone]  (IR Nurse Triage)  Monday - Friday 7am - 4pm    After hours for urgent concerns:  [phone]  After 4pm Monday - Friday, Weekends and Holidays.   Ask for Interventional Radiology on-call.  Someone is available 24 hours a day.  [facility] toll free number:  [phone]

## 2024-03-06 NOTE — PROGRESS NOTES
Infusion Nursing Note:  Rosario Terrazas presents today for first dose Mozobil.    Patient seen by provider today: Yes: Brittaney Morales, YAO   present during visit today: Declined , daughter to interpret    Note: Patient and daughter educated on use and side effect of drug. Mozobil injection given subcutaneously in LLQ of abdomen without complication. See MAR for details. Patient monitored for 15 minutes post injection.     Intravenous Access:  No Intravenous access/labs at this visit.    Treatment Conditions:  Results reviewed, labs MET treatment parameters, ok to proceed with treatment.    Post Infusion Assessment:  Patient tolerated injection without incident.     Discharge Plan:   Patient discharged in stable condition accompanied by: daughter.    Radha Black RN

## 2024-03-06 NOTE — NURSING NOTE
"Oncology Rooming Note    March 6, 2024 10:51 AM   Rosario Terrazas is a 68 year old female who presents for:    Chief Complaint   Patient presents with    Blood Draw     Labs drawn via  by RN in lab. VS taken.     Oncology Clinic Visit     Multiple myeloma     Initial Vitals: /67 (BP Location: Right arm, Patient Position: Sitting, Cuff Size: Adult Regular)   Pulse 85   Temp 97.3  F (36.3  C) (Oral)   Wt 98.5 kg (217 lb 3.2 oz)   SpO2 97%   BMI 38.20 kg/m   Estimated body mass index is 38.2 kg/m  as calculated from the following:    Height as of 2/22/24: 1.606 m (5' 3.23\").    Weight as of this encounter: 98.5 kg (217 lb 3.2 oz). Body surface area is 2.1 meters squared.  Worst Pain (10) Comment: Data Unavailable   No LMP recorded. Patient is postmenopausal.  Allergies reviewed: Yes  Medications reviewed: Yes    Medications: Medication refills not needed today.  Pharmacy name entered into EPIC: Data Unavailable    Frailty Screening:   Is the patient here for a new oncology consult visit in cancer care? 2. No      Clinical concerns: Pt was in extreme pain (10) earlier in their upper chest and shoulders. Pain has gone down to a 6 after they took oxycodone.      Caroline Lofton"

## 2024-03-06 NOTE — NURSING NOTE
Chief Complaint   Patient presents with    Blood Draw     Labs drawn via  by RN in lab. VS taken.      Labs collected from venipuncture by RN. Vitals taken.     Lazara Arroyo RN

## 2024-03-07 ENCOUNTER — ONCOLOGY VISIT (OUTPATIENT)
Dept: TRANSPLANT | Facility: CLINIC | Age: 69
End: 2024-03-07
Attending: PHYSICIAN ASSISTANT
Payer: COMMERCIAL

## 2024-03-07 ENCOUNTER — INFUSION THERAPY VISIT (OUTPATIENT)
Dept: TRANSPLANT | Facility: CLINIC | Age: 69
End: 2024-03-07
Attending: INTERNAL MEDICINE
Payer: COMMERCIAL

## 2024-03-07 ENCOUNTER — MEDICAL CORRESPONDENCE (OUTPATIENT)
Dept: HEALTH INFORMATION MANAGEMENT | Facility: CLINIC | Age: 69
End: 2024-03-07

## 2024-03-07 ENCOUNTER — HOSPITAL ENCOUNTER (OUTPATIENT)
Dept: LAB | Facility: CLINIC | Age: 69
Discharge: HOME | End: 2024-03-07
Attending: PHYSICIAN ASSISTANT
Payer: COMMERCIAL

## 2024-03-07 VITALS
TEMPERATURE: 98.2 F | SYSTOLIC BLOOD PRESSURE: 108 MMHG | WEIGHT: 213.63 LBS | DIASTOLIC BLOOD PRESSURE: 66 MMHG | RESPIRATION RATE: 16 BRPM | HEART RATE: 90 BPM | BODY MASS INDEX: 37.84 KG/M2

## 2024-03-07 DIAGNOSIS — C90.01 MULTIPLE MYELOMA IN REMISSION (H): Primary | ICD-10-CM

## 2024-03-07 DIAGNOSIS — C90.00 MULTIPLE MYELOMA, REMISSION STATUS UNSPECIFIED (H): ICD-10-CM

## 2024-03-07 LAB
ANION GAP SERPL CALCULATED.3IONS-SCNC: 9 MMOL/L (ref 7–15)
BASOPHILS # BLD AUTO: ABNORMAL 10*3/UL
BASOPHILS # BLD MANUAL: 1.2 10E3/UL (ref 0–0.2)
BASOPHILS NFR BLD AUTO: ABNORMAL %
BASOPHILS NFR BLD MANUAL: 3 %
BILL ONLY AUTO PBPC FREEZE: NORMAL
BUN SERPL-MCNC: 4.8 MG/DL (ref 8–23)
CA-I BLD-MCNC: 5.1 MG/DL (ref 4.4–5.2)
CALCIUM SERPL-MCNC: 9.2 MG/DL (ref 8.8–10.2)
CD34 ABSOLUTE COUNT COMMENT: NORMAL
CD34 CELLS # SPEC: 67 CELLS/UL
CD34 CELLS NFR SPEC: 0.14 %
CHLORIDE SERPL-SCNC: 107 MMOL/L (ref 98–107)
CREAT SERPL-MCNC: 0.6 MG/DL (ref 0.51–0.95)
CULTURE HARVEST COMPLETE DATE: NORMAL
DEPRECATED HCO3 PLAS-SCNC: 24 MMOL/L (ref 22–29)
EGFRCR SERPLBLD CKD-EPI 2021: >90 ML/MIN/1.73M2
ELLIPTOCYTES BLD QL SMEAR: SLIGHT
EOSINOPHIL # BLD AUTO: ABNORMAL 10*3/UL
EOSINOPHIL # BLD MANUAL: 0 10E3/UL (ref 0–0.7)
EOSINOPHIL NFR BLD AUTO: ABNORMAL %
EOSINOPHIL NFR BLD MANUAL: 0 %
ERYTHROCYTE [DISTWIDTH] IN BLOOD BY AUTOMATED COUNT: 17.1 % (ref 10–15)
GLUCOSE SERPL-MCNC: 121 MG/DL (ref 70–99)
HCT VFR BLD AUTO: 28.8 % (ref 35–47)
HGB BLD-MCNC: 9.1 G/DL (ref 11.7–15.7)
IMM GRANULOCYTES # BLD: ABNORMAL 10*3/UL
IMM GRANULOCYTES NFR BLD: ABNORMAL %
LYMPHOCYTES # BLD AUTO: ABNORMAL 10*3/UL
LYMPHOCYTES # BLD MANUAL: 4.8 10E3/UL (ref 0.8–5.3)
LYMPHOCYTES NFR BLD AUTO: ABNORMAL %
LYMPHOCYTES NFR BLD MANUAL: 12 %
MAGNESIUM SERPL-MCNC: 1.7 MG/DL (ref 1.7–2.3)
MCH RBC QN AUTO: 31.9 PG (ref 26.5–33)
MCHC RBC AUTO-ENTMCNC: 31.6 G/DL (ref 31.5–36.5)
MCV RBC AUTO: 101 FL (ref 78–100)
MONOCYTES # BLD AUTO: ABNORMAL 10*3/UL
MONOCYTES # BLD MANUAL: 4.8 10E3/UL (ref 0–1.3)
MONOCYTES NFR BLD AUTO: ABNORMAL %
MONOCYTES NFR BLD MANUAL: 12 %
NEUTROPHILS # BLD AUTO: ABNORMAL 10*3/UL
NEUTROPHILS # BLD MANUAL: 29.5 10E3/UL (ref 1.6–8.3)
NEUTROPHILS NFR BLD AUTO: ABNORMAL %
NEUTROPHILS NFR BLD MANUAL: 73 %
NRBC # BLD AUTO: 0 10E3/UL
NRBC BLD AUTO-RTO: 0 /100
PLAT MORPH BLD: ABNORMAL
PLATELET # BLD AUTO: 160 10E3/UL (ref 150–450)
POLYCHROMASIA BLD QL SMEAR: SLIGHT
POTASSIUM SERPL-SCNC: 3.4 MMOL/L (ref 3.4–5.3)
PRODUCT NUMBER FLOW CYTOMETRY: NORMAL
RBC # BLD AUTO: 2.85 10E6/UL (ref 3.8–5.2)
RBC MORPH BLD: ABNORMAL
SODIUM SERPL-SCNC: 140 MMOL/L (ref 135–145)
VIABLE CD34 CELLS NFR FLD: 100 %
WBC # BLD AUTO: 40.4 10E3/UL (ref 4–11)

## 2024-03-07 PROCEDURE — 250N000011 HC RX IP 250 OP 636: Performed by: STUDENT IN AN ORGANIZED HEALTH CARE EDUCATION/TRAINING PROGRAM

## 2024-03-07 PROCEDURE — 82330 ASSAY OF CALCIUM: CPT

## 2024-03-07 PROCEDURE — 80048 BASIC METABOLIC PNL TOTAL CA: CPT

## 2024-03-07 PROCEDURE — 96372 THER/PROPH/DIAG INJ SC/IM: CPT | Mod: XS | Performed by: PHYSICIAN ASSISTANT

## 2024-03-07 PROCEDURE — 85007 BL SMEAR W/DIFF WBC COUNT: CPT

## 2024-03-07 PROCEDURE — 38207 CRYOPRESERVE STEM CELLS: CPT

## 2024-03-07 PROCEDURE — 86367 STEM CELLS TOTAL COUNT: CPT

## 2024-03-07 PROCEDURE — 250N000011 HC RX IP 250 OP 636: Mod: JZ | Performed by: PHYSICIAN ASSISTANT

## 2024-03-07 PROCEDURE — 83735 ASSAY OF MAGNESIUM: CPT

## 2024-03-07 PROCEDURE — 38206 HARVEST AUTO STEM CELLS: CPT

## 2024-03-07 PROCEDURE — 85027 COMPLETE CBC AUTOMATED: CPT

## 2024-03-07 PROCEDURE — 99214 OFFICE O/P EST MOD 30 MIN: CPT | Mod: 24

## 2024-03-07 PROCEDURE — 250N000013 HC RX MED GY IP 250 OP 250 PS 637: Performed by: PATHOLOGY

## 2024-03-07 PROCEDURE — 96372 THER/PROPH/DIAG INJ SC/IM: CPT | Performed by: STUDENT IN AN ORGANIZED HEALTH CARE EDUCATION/TRAINING PROGRAM

## 2024-03-07 PROCEDURE — 36592 COLLECT BLOOD FROM PICC: CPT

## 2024-03-07 PROCEDURE — 250N000009 HC RX 250: Performed by: STUDENT IN AN ORGANIZED HEALTH CARE EDUCATION/TRAINING PROGRAM

## 2024-03-07 RX ORDER — HEPARIN SODIUM (PORCINE) LOCK FLUSH IV SOLN 100 UNIT/ML 100 UNIT/ML
3 SOLUTION INTRAVENOUS ONCE
Status: COMPLETED | OUTPATIENT
Start: 2024-03-07 | End: 2024-03-07

## 2024-03-07 RX ORDER — HEPARIN SODIUM (PORCINE) LOCK FLUSH IV SOLN 100 UNIT/ML 100 UNIT/ML
3 SOLUTION INTRAVENOUS ONCE
Status: CANCELLED | OUTPATIENT
Start: 2024-03-07 | End: 2024-03-07

## 2024-03-07 RX ORDER — ACETAMINOPHEN 325 MG/1
650 TABLET ORAL ONCE
Status: COMPLETED | OUTPATIENT
Start: 2024-03-07 | End: 2024-03-07

## 2024-03-07 RX ORDER — HEPARIN SODIUM,PORCINE 10 UNIT/ML
5-20 VIAL (ML) INTRAVENOUS DAILY PRN
Status: CANCELLED | OUTPATIENT
Start: 2024-03-08

## 2024-03-07 RX ORDER — PLERIXAFOR 24 MG/1.2ML
0.24 SOLUTION SUBCUTANEOUS DAILY
Status: CANCELLED
Start: 2024-03-08

## 2024-03-07 RX ORDER — PLERIXAFOR 24 MG/1.2ML
24 SOLUTION SUBCUTANEOUS DAILY
Status: DISCONTINUED | OUTPATIENT
Start: 2024-03-07 | End: 2024-03-07 | Stop reason: HOSPADM

## 2024-03-07 RX ORDER — HEPARIN SODIUM (PORCINE) LOCK FLUSH IV SOLN 100 UNIT/ML 100 UNIT/ML
5 SOLUTION INTRAVENOUS
Status: CANCELLED | OUTPATIENT
Start: 2024-03-08

## 2024-03-07 RX ADMIN — PLERIXAFOR 24 MG: 24 SOLUTION SUBCUTANEOUS at 15:38

## 2024-03-07 RX ADMIN — ANTICOAGULANT CITRATE DEXTROSE SOLUTION FORMULA A 1944 ML: 12.25; 11; 3.65 SOLUTION INTRAVENOUS at 08:43

## 2024-03-07 RX ADMIN — Medication 3 ML: at 14:37

## 2024-03-07 RX ADMIN — ACETAMINOPHEN 650 MG: 325 TABLET ORAL at 11:45

## 2024-03-07 RX ADMIN — FILGRASTIM-SNDZ 1080 MCG: 480 INJECTION, SOLUTION INTRAVENOUS; SUBCUTANEOUS at 08:21

## 2024-03-07 RX ADMIN — CALCIUM GLUCONATE 1970 MG/HR: 98 INJECTION, SOLUTION INTRAVENOUS at 08:44

## 2024-03-07 NOTE — DISCHARGE INSTRUCTIONS
Apheresis Blood Donor Center Post Instructions  You may feel tired after your procedure today.   Please call your doctor if you have:  bleeding that doesn t stop, fever, pain where a needle or tube (catheter) was placed, seizures, trouble breathing, red urine, nausea or vomiting, other health concerns.     If your symptoms are severe, call 421.  If you have a Central Venous Catheter:  Notify your doctor if you have had a fever, chills, shaking  or redness, warmth, swelling, drainage at the exit-site.  This could be a sign of infection.    The Apheresis/Blood Donor Center is open Monday-Friday 7:30 a.m. to 5 p.m.  The phone number is [phone].  A Transfusion Medicine physician can be reached after 5:00 p.m. weekdays and on weekends /Holidays by calling [phone], and asking for the physician on call.      Autologous HPC/MNC Collection:   In most cases, the cell dose report will be available tomorrow morning.   The Bone Marrow Transplant (BMT) clinic staff looks at your report, and a decision is made if you will need another collection.   Remember it is important to follow a low fat diet during the collection process. Sometimes following the procedure, your blood platelet count may be low.  If you are told your platelet count is low, you need to avoid taking aspirin/aspirin containing products and avoid heavy physical activity and activities that may result in bruising or traumatic injury.  To contact the BMT fellow or attending physician after 5 p.m. call [phone].

## 2024-03-07 NOTE — PROGRESS NOTES
BMT/Cell Therapy Daily Progress Note   03/07/2024    Patient ID:  Rosario Terrazas is a 68 year old female, hx of MM, day +4 GCSF today and line placement in prep for stem cell collections.       INTERVAL  HISTORY     Daughter preferred to serve as  today.   Declined phone .  1 episode of diarrhea post mozobil.    Line tenderness. Took oxy which helped.   No bleeding.   No fevers.     Review of Systems: 10 point ROS negative except as noted above.      PHYSICAL EXAM     Weight In/Out     Wt Readings from Last 3 Encounters:   03/07/24 96.9 kg (213 lb 10 oz)   03/06/24 98.5 kg (217 lb 3.2 oz)   03/06/24 98.5 kg (217 lb 3.2 oz)      [unfilled]       VSS--BP a little softer then baseline asymptomatic     General: NAD   Eyes: VALERIY, sclera anicteric   Lungs: CTA anteriorly   Cardiovascular: RRR, no M/R/G   Lymphatics: no edema  Skin: no rashes or petechiae  Neuro: A&O     Additional Findings: Wilder site little bit of dried blood on bandage.     LABS AND IMAGING: I have assessed all abnormal lab values for their clinical significance and any values considered clinically significant have been addressed in the assessment and plan.        Lab Results   Component Value Date    WBC 26.4 (H) 03/06/2024    HGB 9.1 (L) 03/07/2024    HCT 28.8 (L) 03/07/2024     03/07/2024     02/20/2024    POTASSIUM 4.0 02/20/2024    CHLORIDE 105 02/20/2024    CO2 27 02/20/2024    GLC 87 02/20/2024    BUN 6.5 (L) 02/20/2024    CR 0.59 02/27/2024    MAG 2.1 02/20/2024    INR 1.18 (H) 02/20/2024       SYSTEMS-BASED ASSESSMENT AND PLAN       Rosario Terrazas is a 68 year old female with MM, undergoing G + mozobil primed collections.      BMT/IEC PROTOCOL for MM Auto  - Day +5 G/mozobil added yesterday. Day 1 of collections today. Will get mozobil again this afternoon in prep for day 2 of collections.   - Apheresis aware.   - Of note she is on 81mg ASA be aware with plts.   - BP a little softer  today. Asymptomatic. Took an oxy for line pain before coming in. Monitor.   Transplants for multiple myeloma:  The patient has Standard risk MM, and the collection goal is 8 million CD34/kg for 2 transplants..  The day for admission to begin melphalan conditioning is Day -1 for melphalan 200 mg/m2 (not frail, creatinine clearance 30+).    - Continue INH/B6 through transplant for latent TB   - Thyroid FNA Atypia of undetermined significance diagnosis has a 22 (13-30)% risk of malignancy. Repeat FNA (least 4-6 weeks from previous aspiration with optimal time being 12 weeks after last aspiration) , molecular testing, diagnostic lobectomy , or surveillance is recommended.      35 minutes spent by me on the date of the encounter doing chart review, history and exam, documentation and further activities per the note       Brittaney Morales PA-C

## 2024-03-07 NOTE — PROCEDURES
Laboratory Medicine and Pathology  Transfusion Medicine - Apheresis Procedure Note    Rosario Terrazas MRN# [id number]   YOB: 1955 Age: 68 year old   Date of Procedure: 3/7/2024  Procedure: Auto  PBSC Collection  Reason for Procedure: MM                      Assessment and Plan:   Rosario Terrazas is a 68 year old female with MM is undergoing her 1st autologous HSCT collection. She is tolerating the procedure well and is on our schedule for tomorrow      Attestation:   This patient has been seen and evaluated by me, Larry Abbasi.              History of Present Illness   Rosario Terrazas is a 68 year old female with past medical history of osteoarthritis with chest pain and back pain (p/t an OSH found to have a T7 compression fracture and L rib fracture of unknown etiology in Aug 2023-Neurosurgery followed and opted to  manage non-surgically). She was subsequently found to have MM, began treatment in Sept. 2023 and is now here for an autologous HSCT collection.         Past Medical History:   No past medical history on file.          Past Surgical History:     Past Surgical History:   Procedure Laterality Date    INSERT CATHETER VASCULAR ACCESS Left 3/6/2024    Procedure: central venous catheter tunneled line Insert vascular access;  Surgeon: Onel Leonardo MD;  Location: [facility] OR    IR CVC TUNNEL PLACEMENT > 5 YRS OF AGE  3/6/2024              Social History:     Social History     Tobacco Use    Smoking status: Never    Smokeless tobacco: Never   Substance Use Topics    Alcohol use: Never            Allergies:   No Known Allergies          Medications:     Current Outpatient Medications   Medication Sig Dispense Refill    acetaminophen (TYLENOL) 325 MG tablet Take 2 tablets (650 mg) by mouth every 4 hours as needed for pain 120 tablet 1    acyclovir (ZOVIRAX) 400 MG tablet Take 400 mg by mouth      aspirin 81 MG EC tablet Take 1 tablet by mouth daily      calcium  carbonate-vitamin D (OSCAL) 500-5 MG-MCG tablet Take 1 tablet by mouth 3 times daily (with meals) 90 tablet 2    diclofenac (VOLTAREN) 1 % topical gel APPLY 4 GRAMS TO PAINFUL AREAS IN KNEES/SHOULDERS 4 TIMES A DAY AS DIRECTED. MAX OF 32 GRAMS PER DAY.      folic acid (FOLVITE) 1 MG tablet Take 1 tablet by mouth daily      gabapentin (NEURONTIN) 100 MG capsule Take 1 capsule by mouth 3 times daily      isoniazid (NYDRAZID) 300 MG tablet Take 1 tablet by mouth daily      lidocaine (XYLOCAINE) 5 % external ointment Apply to ribs a couple times a day up to 4 times daily as needed for pain      omeprazole (PRILOSEC) 20 MG DR capsule Take 20 mg by mouth daily      oxyCODONE (ROXICODONE) 5 MG tablet Take 5 mg by mouth every 6 hours as needed for severe pain      prochlorperazine (COMPAZINE) 10 MG tablet Take 10 mg by mouth      pyridOXINE (VITAMIN B6) 25 MG tablet Take 1 tablet by mouth daily      sulfamethoxazole-trimethoprim (BACTRIM) 400-80 MG tablet Take 1 tablet by mouth daily      tiZANidine (ZANAFLEX) 4 MG tablet Take HALF to 1 tablet by mouth up to 2 times daily for muscle pain/spasm      Vitamin D3 (CHOLECALCIFEROL) 25 mcg (1000 units) tablet Take 2 tablets by mouth daily      bortezomib (VELCADE) 3.5 MG injection       docusate sodium (DSS) 100 MG capsule Take 100 mg by mouth 2 times daily      loperamide (IMODIUM) 2 MG capsule Take 2 mg by mouth 4 times daily as needed for diarrhea      nystatin (MYCOSTATIN) 396889 UNIT/GM external cream Apply to rash in genital area twice daily as needed      ondansetron (ZOFRAN) 4 MG tablet Take 4 mg by mouth every 8 hours as needed      polyethylene glycol (MIRALAX) 17 GM/Dose powder Take 17 g by mouth daily as needed      senna-docusate (SENOKOT-S/PERICOLACE) 8.6-50 MG tablet Take 1 tablet by mouth 2 times daily                 Abbreviated Physical Exam:   BP 98/50   Pulse 82   Temp 97.4  F (36.3  C) (Oral)   Resp 16   Wt 96.9 kg (213 lb 10 oz)   BMI 37.84 kg/m     Patient Alert & Oriented and in No Acute Distress.         Laboratory Data:     BMP  Recent Labs   Lab 03/07/24  0756      POTASSIUM 3.4   CHLORIDE 107   RAMIREZ 9.2   CO2 24   BUN 4.8*   CR 0.60   *     CBC  Recent Labs   Lab 03/07/24  0756 03/06/24  0802   WBC 40.4* 26.4*   RBC 2.85* 3.21*   HGB 9.1* 10.4*   HCT 28.8* 32.3*   * 101*   MCH 31.9 32.4   MCHC 31.6 32.2   RDW 17.1* 16.9*    199         CD34  CD34 Absolute Count: [id number] Collected 3/7/2024  7:56 AM     Dx: Multiple myeloma, remission status un...        Component  Ref Range & Units    Product Number PRE COLLECTION   CD34 Absolute Count (cells/uL) 67   Viable CD34/Via CD45 (%) 0.14   CD34 Viability (%) 100.00   CD34 Absolute Count Comment    This test was developed and it's performance characteristics determined by [facility]. It has not been cleared or approved by the US Food and Drug Administration. FDA does not require this test to go through premarket FDA review. This test is used for clinical purposes and should not be regarded as investigational or for research. This laboratory is certified under the Clinical Laboratory Improvement Amendments (CLIA) as a qualified to perform high complexity clinical laboratory testing.   [facility]ing [facility]               Procedure Summary:     An ~ 5 hour PBSC collection  is currently being  performed. The L tunneled IJ chest catheter  was used for access. ACD-A was used  for anticoagulation. To offset the effects of the citrate, calcium gluconate was given in the return line. The patient's vital signs are stable and she is tolerating the procedure well.      Attestation:   This patient has been seen and evaluated by me, Larry Abbasi.  Larry Abbasi MD   Division of Transfusion Medicine   Department [place] [address]   Pager: [phone]

## 2024-03-07 NOTE — LETTER
3/7/2024         RE: Rosario Terrazas  [address] [place] [address]        Dear Colleague,    Thank you for referring your patient, Rosario Terrazas, to the [facility]LOOD AND [facility]ARROW [facility]ANSPLANT PRO[facility] [place]. Please see a copy of my visit note below.    BMT/Cell Therapy Daily Progress Note   03/07/2024    Patient ID:  Rosario Terrazas is a 68 year old female, hx of MM, day +4 GCSF today and line placement in prep for stem cell collections.       INTERVAL  HISTORY     Daughter preferred to serve as  today.   Declined phone .  1 episode of diarrhea post mozobil.    Line tenderness. Took oxy which helped.   No bleeding.   No fevers.     Review of Systems: 10 point ROS negative except as noted above.      PHYSICAL EXAM     Weight In/Out     Wt Readings from Last 3 Encounters:   03/07/24 96.9 kg (213 lb 10 oz)   03/06/24 98.5 kg (217 lb 3.2 oz)   03/06/24 98.5 kg (217 lb 3.2 oz)      [unfilled]       VSS--BP a little softer then baseline asymptomatic     General: NAD   Eyes: VALERIY, sclera anicteric   Lungs: CTA anteriorly   Cardiovascular: RRR, no M/R/G   Lymphatics: no edema  Skin: no rashes or petechiae  Neuro: A&O     Additional Findings: Wilder site little bit of dried blood on bandage.     LABS AND IMAGING: I have assessed all abnormal lab values for their clinical significance and any values considered clinically significant have been addressed in the assessment and plan.        Lab Results   Component Value Date    WBC 26.4 (H) 03/06/2024    HGB 9.1 (L) 03/07/2024    HCT 28.8 (L) 03/07/2024     03/07/2024     02/20/2024    POTASSIUM 4.0 02/20/2024    CHLORIDE 105 02/20/2024    CO2 27 02/20/2024    GLC 87 02/20/2024    BUN 6.5 (L) 02/20/2024    CR 0.59 02/27/2024    MAG 2.1 02/20/2024    INR 1.18 (H) 02/20/2024       SYSTEMS-BASED ASSESSMENT AND PLAN       Rosario Terrazas is a 68 year old female with MM, undergoing G +  mozobil primed collections.      BMT/IEC PROTOCOL for MM Auto  - Day +5 G/mozobil added yesterday. Day 1 of collections today. Will get mozobil again this afternoon in prep for day 2 of collections.   - Apheresis aware.   - Of note she is on 81mg ASA be aware with plts.   - BP a little softer today. Asymptomatic. Took an oxy for line pain before coming in. Monitor.   Transplants for multiple myeloma:  The patient has Standard risk MM, and the collection goal is 8 million CD34/kg for 2 transplants..  The day for admission to begin melphalan conditioning is Day -1 for melphalan 200 mg/m2 (not frail, creatinine clearance 30+).    - Continue INH/B6 through transplant for latent TB   - Thyroid FNA Atypia of undetermined significance diagnosis has a 22 (13-30)% risk of malignancy. Repeat FNA (least 4-6 weeks from previous aspiration with optimal time being 12 weeks after last aspiration) , molecular testing, diagnostic lobectomy , or surveillance is recommended.      35 minutes spent by me on the date of the encounter doing chart review, history and exam, documentation and further activities per the note       Brittaney Morales PA-C

## 2024-03-07 NOTE — PROGRESS NOTES
Infusion Nursing Note:  Rosario Terrazas presents today for 2nd dose mozobil.    Patient seen by provider today: Yes: Shantell Morales   present during visit today: Not Applicable.    Note: Mozobil administered in left abd.      Intravenous Access:  No Intravenous access/labs at this visit.    Treatment Conditions:  Results reviewed, labs MET treatment parameters, ok to proceed with treatment.      Post Infusion Assessment:  Patient tolerated injection without incident.       Discharge Plan:   AVS to patient via MYCHART.  Patient will return tomorrow for next appointment.   Patient discharged in stable condition accompanied by: daughter.  Departure Mode: Ambulatory.      Abilio Alatorre RN

## 2024-03-08 ENCOUNTER — HOSPITAL ENCOUNTER (OUTPATIENT)
Dept: LAB | Facility: CLINIC | Age: 69
Discharge: HOME | End: 2024-03-08
Payer: COMMERCIAL

## 2024-03-08 ENCOUNTER — TRANSFERRED RECORDS (OUTPATIENT)
Dept: HEALTH INFORMATION MANAGEMENT | Facility: CLINIC | Age: 69
End: 2024-03-08

## 2024-03-08 VITALS
SYSTOLIC BLOOD PRESSURE: 101 MMHG | RESPIRATION RATE: 16 BRPM | DIASTOLIC BLOOD PRESSURE: 68 MMHG | HEART RATE: 86 BPM | TEMPERATURE: 98.6 F

## 2024-03-08 DIAGNOSIS — C90.01 MULTIPLE MYELOMA IN REMISSION (H): Primary | ICD-10-CM

## 2024-03-08 LAB
ADDITIONAL COMMENTS: NORMAL
ANION GAP SERPL CALCULATED.3IONS-SCNC: 10 MMOL/L (ref 7–15)
BASOPHILS # BLD AUTO: ABNORMAL 10*3/UL
BASOPHILS # BLD MANUAL: 0.5 10E3/UL (ref 0–0.2)
BASOPHILS NFR BLD AUTO: ABNORMAL %
BASOPHILS NFR BLD MANUAL: 1 %
BUN SERPL-MCNC: 6.2 MG/DL (ref 8–23)
CA-I BLD-MCNC: 4.5 MG/DL (ref 4.4–5.2)
CA-I BLD-MCNC: 5.7 MG/DL (ref 4.4–5.2)
CALCIUM SERPL-MCNC: 10.8 MG/DL (ref 8.8–10.2)
CD34 ABSOLUTE COUNT COMMENT: NORMAL
CD34 CELLS # SPEC: 57 CELLS/UL
CD34 CELLS NFR SPEC: 0.11 %
CHLORIDE SERPL-SCNC: 103 MMOL/L (ref 98–107)
CREAT SERPL-MCNC: 0.62 MG/DL (ref 0.51–0.95)
DEPRECATED HCO3 PLAS-SCNC: 30 MMOL/L (ref 22–29)
EGFRCR SERPLBLD CKD-EPI 2021: >90 ML/MIN/1.73M2
EOSINOPHIL # BLD AUTO: ABNORMAL 10*3/UL
EOSINOPHIL # BLD MANUAL: 0.5 10E3/UL (ref 0–0.7)
EOSINOPHIL NFR BLD AUTO: ABNORMAL %
EOSINOPHIL NFR BLD MANUAL: 1 %
ERYTHROCYTE [DISTWIDTH] IN BLOOD BY AUTOMATED COUNT: 17 % (ref 10–15)
GLUCOSE SERPL-MCNC: 97 MG/DL (ref 70–99)
HCT VFR BLD AUTO: 28.5 % (ref 35–47)
HGB BLD-MCNC: 9.3 G/DL (ref 11.7–15.7)
IMM GRANULOCYTES # BLD: ABNORMAL 10*3/UL
IMM GRANULOCYTES NFR BLD: ABNORMAL %
INTERPRETATION: NORMAL
ISCN: NORMAL
LYMPHOCYTES # BLD AUTO: ABNORMAL 10*3/UL
LYMPHOCYTES # BLD MANUAL: 1.8 10E3/UL (ref 0.8–5.3)
LYMPHOCYTES NFR BLD AUTO: ABNORMAL %
LYMPHOCYTES NFR BLD MANUAL: 4 %
MAGNESIUM SERPL-MCNC: 1.3 MG/DL (ref 1.7–2.3)
MCH RBC QN AUTO: 32.9 PG (ref 26.5–33)
MCHC RBC AUTO-ENTMCNC: 32.6 G/DL (ref 31.5–36.5)
MCV RBC AUTO: 101 FL (ref 78–100)
METHODS: NORMAL
MONOCYTES # BLD AUTO: ABNORMAL 10*3/UL
MONOCYTES # BLD MANUAL: 4.1 10E3/UL (ref 0–1.3)
MONOCYTES NFR BLD AUTO: ABNORMAL %
MONOCYTES NFR BLD MANUAL: 9 %
MYELOCYTES # BLD MANUAL: 0.9 10E3/UL
MYELOCYTES NFR BLD MANUAL: 2 %
NEUTROPHILS # BLD AUTO: ABNORMAL 10*3/UL
NEUTROPHILS # BLD MANUAL: 36.2 10E3/UL (ref 1.6–8.3)
NEUTROPHILS NFR BLD AUTO: ABNORMAL %
NEUTROPHILS NFR BLD MANUAL: 80 %
NRBC # BLD AUTO: 0 10E3/UL
NRBC BLD AUTO-RTO: 0 /100
PLAT MORPH BLD: ABNORMAL
PLATELET # BLD AUTO: 112 10E3/UL (ref 150–450)
POTASSIUM SERPL-SCNC: 3.1 MMOL/L (ref 3.4–5.3)
PRODUCT NUMBER FLOW CYTOMETRY: NORMAL
PROMYELOCYTES # BLD MANUAL: 1.4 10E3/UL
PROMYELOCYTES NFR BLD MANUAL: 3 %
RBC # BLD AUTO: 2.83 10E6/UL (ref 3.8–5.2)
RBC MORPH BLD: ABNORMAL
SODIUM SERPL-SCNC: 143 MMOL/L (ref 135–145)
VIABLE CD34 CELLS NFR FLD: 100 %
WBC # BLD AUTO: 45.3 10E3/UL (ref 4–11)

## 2024-03-08 PROCEDURE — 250N000011 HC RX IP 250 OP 636: Performed by: STUDENT IN AN ORGANIZED HEALTH CARE EDUCATION/TRAINING PROGRAM

## 2024-03-08 PROCEDURE — 85007 BL SMEAR W/DIFF WBC COUNT: CPT

## 2024-03-08 PROCEDURE — 250N000009 HC RX 250: Performed by: STUDENT IN AN ORGANIZED HEALTH CARE EDUCATION/TRAINING PROGRAM

## 2024-03-08 PROCEDURE — 82330 ASSAY OF CALCIUM: CPT

## 2024-03-08 PROCEDURE — 250N000011 HC RX IP 250 OP 636: Mod: JZ | Performed by: STUDENT IN AN ORGANIZED HEALTH CARE EDUCATION/TRAINING PROGRAM

## 2024-03-08 PROCEDURE — 96365 THER/PROPH/DIAG IV INF INIT: CPT

## 2024-03-08 PROCEDURE — 96372 THER/PROPH/DIAG INJ SC/IM: CPT | Performed by: STUDENT IN AN ORGANIZED HEALTH CARE EDUCATION/TRAINING PROGRAM

## 2024-03-08 PROCEDURE — 36592 COLLECT BLOOD FROM PICC: CPT | Performed by: STUDENT IN AN ORGANIZED HEALTH CARE EDUCATION/TRAINING PROGRAM

## 2024-03-08 PROCEDURE — 83735 ASSAY OF MAGNESIUM: CPT

## 2024-03-08 PROCEDURE — 82330 ASSAY OF CALCIUM: CPT | Performed by: STUDENT IN AN ORGANIZED HEALTH CARE EDUCATION/TRAINING PROGRAM

## 2024-03-08 PROCEDURE — 80048 BASIC METABOLIC PNL TOTAL CA: CPT

## 2024-03-08 PROCEDURE — 96367 TX/PROPH/DG ADDL SEQ IV INF: CPT | Mod: XS

## 2024-03-08 PROCEDURE — 36592 COLLECT BLOOD FROM PICC: CPT

## 2024-03-08 PROCEDURE — 250N000011 HC RX IP 250 OP 636

## 2024-03-08 PROCEDURE — 96366 THER/PROPH/DIAG IV INF ADDON: CPT

## 2024-03-08 PROCEDURE — 86367 STEM CELLS TOTAL COUNT: CPT

## 2024-03-08 PROCEDURE — 85027 COMPLETE CBC AUTOMATED: CPT

## 2024-03-08 PROCEDURE — 38206 HARVEST AUTO STEM CELLS: CPT

## 2024-03-08 RX ORDER — POTASSIUM CHLORIDE 29.8 MG/ML
20 INJECTION INTRAVENOUS ONCE
Status: COMPLETED | OUTPATIENT
Start: 2024-03-08 | End: 2024-03-08

## 2024-03-08 RX ORDER — HEPARIN SODIUM (PORCINE) LOCK FLUSH IV SOLN 100 UNIT/ML 100 UNIT/ML
3 SOLUTION INTRAVENOUS ONCE
Status: COMPLETED | OUTPATIENT
Start: 2024-03-08 | End: 2024-03-08

## 2024-03-08 RX ORDER — PLERIXAFOR 24 MG/1.2ML
0.24 SOLUTION SUBCUTANEOUS DAILY
Status: CANCELLED
Start: 2024-03-09

## 2024-03-08 RX ORDER — HEPARIN SODIUM (PORCINE) LOCK FLUSH IV SOLN 100 UNIT/ML 100 UNIT/ML
5 SOLUTION INTRAVENOUS
Status: CANCELLED | OUTPATIENT
Start: 2024-03-09

## 2024-03-08 RX ORDER — HEPARIN SODIUM,PORCINE 10 UNIT/ML
5-20 VIAL (ML) INTRAVENOUS DAILY PRN
Status: CANCELLED | OUTPATIENT
Start: 2024-03-09

## 2024-03-08 RX ORDER — MAGNESIUM SULFATE HEPTAHYDRATE 40 MG/ML
2 INJECTION, SOLUTION INTRAVENOUS ONCE
Status: COMPLETED | OUTPATIENT
Start: 2024-03-08 | End: 2024-03-08

## 2024-03-08 RX ADMIN — Medication 3 ML: at 13:32

## 2024-03-08 RX ADMIN — CALCIUM GLUCONATE 1938 MG/HR: 98 INJECTION, SOLUTION INTRAVENOUS at 08:30

## 2024-03-08 RX ADMIN — MAGNESIUM SULFATE 2 G: 2 INJECTION INTRAVENOUS at 11:43

## 2024-03-08 RX ADMIN — POTASSIUM CHLORIDE 20 MEQ: 29.8 INJECTION, SOLUTION INTRAVENOUS at 09:49

## 2024-03-08 RX ADMIN — Medication 3 ML: at 13:31

## 2024-03-08 RX ADMIN — POTASSIUM CHLORIDE 20 MEQ: 29.8 INJECTION, SOLUTION INTRAVENOUS at 10:50

## 2024-03-08 RX ADMIN — FILGRASTIM-SNDZ 1080 MCG: 480 INJECTION, SOLUTION INTRAVENOUS; SUBCUTANEOUS at 08:41

## 2024-03-08 RX ADMIN — ANTICOAGULANT CITRATE DEXTROSE SOLUTION FORMULA A 1525 ML: 12.25; 11; 3.65 SOLUTION INTRAVENOUS at 08:30

## 2024-03-08 NOTE — DISCHARGE INSTRUCTIONS
Autologous HPC/MNC Collection:   In most cases, the cell dose report will be available tomorrow morning.   The Bone Marrow Transplant (BMT) clinic staff looks at your report, and a decision is made if you will need another collection.   Remember it is important to follow a low fat diet during the collection process. Sometimes following the procedure, your blood platelet count may be low.  If you are told your platelet count is low, you need to avoid taking aspirin/aspirin containing products and avoid heavy physical activity and activities that may result in bruising or traumatic injury.  To contact the BMT fellow or attending physician after 5 p.m. call [phone].

## 2024-03-08 NOTE — PROCEDURES
Laboratory Medicine and Pathology  Transfusion Medicine - Apheresis Procedure Note    Rosario Terrazas MRN# [id number]   YOB: 1955 Age: 68 year old   Date of Procedure: 3/8/2024  Procedure: Auto  PBSC Collection  Reason for Procedure: MM                      Assessment and Plan:   Rosario Terrazas is a 68 year old female with MM is undergoing her 1st autologous HSCT collection. She is tolerating the procedure well. By report, today is to be the last scheduled procedure  Attestation:   This patient has been seen and evaluated by me, Larry Abbasi.            History of Present Illness   Rosario Terrazas is a 68 year old female with past medical history of osteoarthritis with chest pain and back pain (p/t an OSH found to have a T7 compression fracture and L rib fracture of unknown etiology in Aug 2023-Neurosurgery followed and opted to  manage non-surgically). She was subsequently found to have MM, began treatment in Sept. 2023 and is now here for an autologous HSCT collection.         Past Medical History:   No past medical history on file.          Past Surgical History:     Past Surgical History:   Procedure Laterality Date    INSERT CATHETER VASCULAR ACCESS Left 3/6/2024    Procedure: central venous catheter tunneled line Insert vascular access;  Surgeon: Onel Leonardo MD;  Location: [facility] OR    IR CVC TUNNEL PLACEMENT > 5 YRS OF AGE  3/6/2024              Social History:     Social History     Tobacco Use    Smoking status: Never    Smokeless tobacco: Never   Substance Use Topics    Alcohol use: Never            Allergies:   No Known Allergies          Medications:     Current Outpatient Medications   Medication Sig Dispense Refill    acetaminophen (TYLENOL) 325 MG tablet Take 2 tablets (650 mg) by mouth every 4 hours as needed for pain 120 tablet 1    acyclovir (ZOVIRAX) 400 MG tablet Take 400 mg by mouth      aspirin 81 MG EC tablet Take 1 tablet by mouth daily       bortezomib (VELCADE) 3.5 MG injection       calcium carbonate-vitamin D (OSCAL) 500-5 MG-MCG tablet Take 1 tablet by mouth 3 times daily (with meals) 90 tablet 2    diclofenac (VOLTAREN) 1 % topical gel APPLY 4 GRAMS TO PAINFUL AREAS IN KNEES/SHOULDERS 4 TIMES A DAY AS DIRECTED. MAX OF 32 GRAMS PER DAY.      docusate sodium (DSS) 100 MG capsule Take 100 mg by mouth 2 times daily      folic acid (FOLVITE) 1 MG tablet Take 1 tablet by mouth daily      gabapentin (NEURONTIN) 100 MG capsule Take 1 capsule by mouth 3 times daily      isoniazid (NYDRAZID) 300 MG tablet Take 1 tablet by mouth daily      lidocaine (XYLOCAINE) 5 % external ointment Apply to ribs a couple times a day up to 4 times daily as needed for pain      loperamide (IMODIUM) 2 MG capsule Take 2 mg by mouth 4 times daily as needed for diarrhea      nystatin (MYCOSTATIN) 773032 UNIT/GM external cream Apply to rash in genital area twice daily as needed      omeprazole (PRILOSEC) 20 MG DR capsule Take 20 mg by mouth daily      ondansetron (ZOFRAN) 4 MG tablet Take 4 mg by mouth every 8 hours as needed      oxyCODONE (ROXICODONE) 5 MG tablet Take 5 mg by mouth every 6 hours as needed for severe pain      polyethylene glycol (MIRALAX) 17 GM/Dose powder Take 17 g by mouth daily as needed      prochlorperazine (COMPAZINE) 10 MG tablet Take 10 mg by mouth      pyridOXINE (VITAMIN B6) 25 MG tablet Take 1 tablet by mouth daily      senna-docusate (SENOKOT-S/PERICOLACE) 8.6-50 MG tablet Take 1 tablet by mouth 2 times daily      sulfamethoxazole-trimethoprim (BACTRIM) 400-80 MG tablet Take 1 tablet by mouth daily      tiZANidine (ZANAFLEX) 4 MG tablet Take HALF to 1 tablet by mouth up to 2 times daily for muscle pain/spasm      Vitamin D3 (CHOLECALCIFEROL) 25 mcg (1000 units) tablet Take 2 tablets by mouth daily            Abbreviated Physical Exam:   /68   Pulse 86   Temp 98.6  F (37  C) (Oral)   Resp 16   Patient Alert & Oriented and in No Acute  Distress.         Laboratory Data:     BMP  Recent Labs   Lab 03/08/24  0836 03/07/24  0756    140   POTASSIUM 3.1* 3.4   CHLORIDE 103 107   RAMIREZ 10.8* 9.2   CO2 30* 24   BUN 6.2* 4.8*   CR 0.62 0.60   GLC 97 121*     CBC  Recent Labs   Lab 03/08/24  0836 03/07/24  0756 03/06/24  0802   WBC 45.3* 40.4* 26.4*   RBC 2.83* 2.85* 3.21*   HGB 9.3* 9.1* 10.4*   HCT 28.5* 28.8* 32.3*   * 101* 101*   MCH 32.9 31.9 32.4   MCHC 32.6 31.6 32.2   RDW 17.0* 17.1* 16.9*   * 160 199         CD34      CD34 Absolute Count: [id number] Collected 3/7/2024  7:56 AM     Dx: Multiple myeloma, remission status un...        Component  Ref Range & Units    Product Number PRE COLLECTION   CD34 Absolute Count (cells/uL) 67   Viable CD34/Via CD45 (%) 0.14   CD34 Viability (%) 100.00   CD34 Absolute Count Comment    This test was developed and it's performance characteristics determined by [facility]. It has not been cleared or approved by the US Food and Drug Administration. FDA does not require this test to go through premarket FDA review. This test is used for clinical purposes and should not be regarded as investigational or for research. This laboratory is certified under the Clinical Laboratory Improvement Amendments (CLIA) as a qualified to perform high complexity clinical laboratory testing.   [facility]        CD34 Absolute Count: [id number]  Collected 3/8/2024  8:36 AM     Dx: Multiple myeloma in remission (H)        Component  Ref Range & Units    Product Number PRE COLLECTION   CD34 Absolute Count  cells/uL 57   Viable CD34/Via CD45  % 0.11   CD34 Viability  % 100.00   CD34 Absolute Count Comment    This test was developed and it's performance characteristics determined by [facility]. It has not been cleared or approved by the US Food and Drug Administration. FDA does not require  this test to go through premarket FDA review. This test is used for clinical purposes and should not be regarded as investigational or for research. This laboratory is certified under the Clinical Laboratory Improvement Amendments (CLIA) as a qualified to perform high complexity clinical laboratory testing.   [facility] Agency [facility]             Procedure Summary:   An ~ 5 hour PBSC collection  is currently being  performed. The L tunneled IJ chest catheter  was used for access. ACD-A was used  for anticoagulation. To offset the effects of the citrate, calcium gluconate was given in the return line. The patient's vital signs are stable and she is tolerating the procedure well.      Attestation:   This patient has been seen and evaluated by meLarry.  Larry Ababsi MD   Division of Transfusion Medicine   Department [place] [address]   Pager: [phone]

## 2024-03-11 DIAGNOSIS — C90.00 MULTIPLE MYELOMA, REMISSION STATUS UNSPECIFIED (H): Primary | ICD-10-CM

## 2024-03-12 ENCOUNTER — OFFICE VISIT (OUTPATIENT)
Dept: TRANSPLANT | Facility: CLINIC | Age: 69
End: 2024-03-12
Attending: INTERNAL MEDICINE
Payer: COMMERCIAL

## 2024-03-12 ENCOUNTER — LAB (OUTPATIENT)
Dept: LAB | Facility: CLINIC | Age: 69
End: 2024-03-12
Attending: INTERNAL MEDICINE
Payer: COMMERCIAL

## 2024-03-12 VITALS
DIASTOLIC BLOOD PRESSURE: 71 MMHG | SYSTOLIC BLOOD PRESSURE: 107 MMHG | HEART RATE: 76 BPM | BODY MASS INDEX: 38.03 KG/M2 | WEIGHT: 214.7 LBS | TEMPERATURE: 97.5 F | RESPIRATION RATE: 16 BRPM | OXYGEN SATURATION: 98 %

## 2024-03-12 DIAGNOSIS — C90.00 MULTIPLE MYELOMA (H): Primary | ICD-10-CM

## 2024-03-12 DIAGNOSIS — C90.00 MULTIPLE MYELOMA NOT HAVING ACHIEVED REMISSION (H): Primary | ICD-10-CM

## 2024-03-12 DIAGNOSIS — C90.00 MULTIPLE MYELOMA, REMISSION STATUS UNSPECIFIED (H): Primary | ICD-10-CM

## 2024-03-12 DIAGNOSIS — C90.01 MULTIPLE MYELOMA IN REMISSION (H): Primary | ICD-10-CM

## 2024-03-12 DIAGNOSIS — C90.00 MULTIPLE MYELOMA (H): ICD-10-CM

## 2024-03-12 LAB
ALBUMIN SERPL BCG-MCNC: 3.6 G/DL (ref 3.5–5.2)
ALP SERPL-CCNC: 123 U/L (ref 40–150)
ALT SERPL W P-5'-P-CCNC: 12 U/L (ref 0–50)
ANION GAP SERPL CALCULATED.3IONS-SCNC: 9 MMOL/L (ref 7–15)
AST SERPL W P-5'-P-CCNC: 24 U/L (ref 0–45)
BASOPHILS # BLD AUTO: ABNORMAL 10*3/UL
BASOPHILS # BLD MANUAL: 0 10E3/UL (ref 0–0.2)
BASOPHILS NFR BLD AUTO: ABNORMAL %
BASOPHILS NFR BLD MANUAL: 0 %
BILIRUB SERPL-MCNC: 0.2 MG/DL
BUN SERPL-MCNC: 8.7 MG/DL (ref 8–23)
CALCIUM SERPL-MCNC: 8.4 MG/DL (ref 8.8–10.2)
CHLORIDE SERPL-SCNC: 105 MMOL/L (ref 98–107)
CREAT SERPL-MCNC: 0.58 MG/DL (ref 0.51–0.95)
DEPRECATED HCO3 PLAS-SCNC: 25 MMOL/L (ref 22–29)
EGFRCR SERPLBLD CKD-EPI 2021: >90 ML/MIN/1.73M2
ELLIPTOCYTES BLD QL SMEAR: SLIGHT
EOSINOPHIL # BLD AUTO: ABNORMAL 10*3/UL
EOSINOPHIL # BLD MANUAL: 0.1 10E3/UL (ref 0–0.7)
EOSINOPHIL NFR BLD AUTO: ABNORMAL %
EOSINOPHIL NFR BLD MANUAL: 3 %
ERYTHROCYTE [DISTWIDTH] IN BLOOD BY AUTOMATED COUNT: 16.7 % (ref 10–15)
FLUAV RNA SPEC QL NAA+PROBE: NEGATIVE
FLUBV RNA RESP QL NAA+PROBE: NEGATIVE
GLUCOSE SERPL-MCNC: 78 MG/DL (ref 70–99)
HCT VFR BLD AUTO: 27.6 % (ref 35–47)
HGB BLD-MCNC: 8.8 G/DL (ref 11.7–15.7)
IMM GRANULOCYTES # BLD: ABNORMAL 10*3/UL
IMM GRANULOCYTES NFR BLD: ABNORMAL %
LYMPHOCYTES # BLD AUTO: ABNORMAL 10*3/UL
LYMPHOCYTES # BLD MANUAL: 0.5 10E3/UL (ref 0.8–5.3)
LYMPHOCYTES NFR BLD AUTO: ABNORMAL %
LYMPHOCYTES NFR BLD MANUAL: 10 %
MCH RBC QN AUTO: 31.9 PG (ref 26.5–33)
MCHC RBC AUTO-ENTMCNC: 31.9 G/DL (ref 31.5–36.5)
MCV RBC AUTO: 100 FL (ref 78–100)
METAMYELOCYTES # BLD MANUAL: 0.1 10E3/UL
METAMYELOCYTES NFR BLD MANUAL: 2 %
MONOCYTES # BLD AUTO: ABNORMAL 10*3/UL
MONOCYTES # BLD MANUAL: 0.2 10E3/UL (ref 0–1.3)
MONOCYTES NFR BLD AUTO: ABNORMAL %
MONOCYTES NFR BLD MANUAL: 5 %
MYELOCYTES # BLD MANUAL: 0 10E3/UL
MYELOCYTES NFR BLD MANUAL: 1 %
NEUTROPHILS # BLD AUTO: ABNORMAL 10*3/UL
NEUTROPHILS # BLD MANUAL: 3.8 10E3/UL (ref 1.6–8.3)
NEUTROPHILS NFR BLD AUTO: ABNORMAL %
NEUTROPHILS NFR BLD MANUAL: 79 %
NRBC # BLD AUTO: 0 10E3/UL
NRBC # BLD AUTO: 0 10E3/UL
NRBC BLD AUTO-RTO: 0 /100
NRBC BLD MANUAL-RTO: 1 %
PLAT MORPH BLD: ABNORMAL
PLATELET # BLD AUTO: 97 10E3/UL (ref 150–450)
POTASSIUM SERPL-SCNC: 3.8 MMOL/L (ref 3.4–5.3)
PROT SERPL-MCNC: 5.5 G/DL (ref 6.4–8.3)
RBC # BLD AUTO: 2.76 10E6/UL (ref 3.8–5.2)
RBC MORPH BLD: ABNORMAL
RSV RNA SPEC NAA+PROBE: NEGATIVE
SARS-COV-2 RNA RESP QL NAA+PROBE: NEGATIVE
SODIUM SERPL-SCNC: 139 MMOL/L (ref 135–145)
URATE SERPL-MCNC: 7 MG/DL (ref 2.4–5.7)
WBC # BLD AUTO: 4.8 10E3/UL (ref 4–11)

## 2024-03-12 PROCEDURE — 96361 HYDRATE IV INFUSION ADD-ON: CPT

## 2024-03-12 PROCEDURE — 85007 BL SMEAR W/DIFF WBC COUNT: CPT | Performed by: STUDENT IN AN ORGANIZED HEALTH CARE EDUCATION/TRAINING PROGRAM

## 2024-03-12 PROCEDURE — 80053 COMPREHEN METABOLIC PANEL: CPT | Performed by: STUDENT IN AN ORGANIZED HEALTH CARE EDUCATION/TRAINING PROGRAM

## 2024-03-12 PROCEDURE — 85027 COMPLETE CBC AUTOMATED: CPT | Performed by: STUDENT IN AN ORGANIZED HEALTH CARE EDUCATION/TRAINING PROGRAM

## 2024-03-12 PROCEDURE — 96375 TX/PRO/DX INJ NEW DRUG ADDON: CPT

## 2024-03-12 PROCEDURE — 258N000003 HC RX IP 258 OP 636: Performed by: STUDENT IN AN ORGANIZED HEALTH CARE EDUCATION/TRAINING PROGRAM

## 2024-03-12 PROCEDURE — 250N000011 HC RX IP 250 OP 636: Performed by: STUDENT IN AN ORGANIZED HEALTH CARE EDUCATION/TRAINING PROGRAM

## 2024-03-12 PROCEDURE — 87637 SARSCOV2&INF A&B&RSV AMP PRB: CPT

## 2024-03-12 PROCEDURE — 36592 COLLECT BLOOD FROM PICC: CPT | Performed by: STUDENT IN AN ORGANIZED HEALTH CARE EDUCATION/TRAINING PROGRAM

## 2024-03-12 PROCEDURE — 96367 TX/PROPH/DG ADDL SEQ IV INF: CPT

## 2024-03-12 PROCEDURE — 96413 CHEMO IV INFUSION 1 HR: CPT

## 2024-03-12 PROCEDURE — 99214 OFFICE O/P EST MOD 30 MIN: CPT | Mod: 24

## 2024-03-12 PROCEDURE — 84550 ASSAY OF BLOOD/URIC ACID: CPT | Performed by: STUDENT IN AN ORGANIZED HEALTH CARE EDUCATION/TRAINING PROGRAM

## 2024-03-12 RX ORDER — LABETALOL 20 MG/4 ML (5 MG/ML) INTRAVENOUS SYRINGE
10-20
Status: CANCELLED | OUTPATIENT
Start: 2024-03-14

## 2024-03-12 RX ORDER — OXYCODONE HYDROCHLORIDE 5 MG/1
5 TABLET ORAL ONCE
Status: CANCELLED
Start: 2024-03-12 | End: 2024-03-12

## 2024-03-12 RX ORDER — HEPARIN SODIUM,PORCINE 10 UNIT/ML
5-20 VIAL (ML) INTRAVENOUS DAILY PRN
Status: CANCELLED | OUTPATIENT
Start: 2024-03-13

## 2024-03-12 RX ORDER — HEPARIN SODIUM (PORCINE) LOCK FLUSH IV SOLN 100 UNIT/ML 100 UNIT/ML
5 SOLUTION INTRAVENOUS
Status: CANCELLED | OUTPATIENT
Start: 2024-03-13

## 2024-03-12 RX ORDER — ACETAMINOPHEN 325 MG/1
650 TABLET ORAL ONCE
Status: CANCELLED
Start: 2024-03-14 | End: 2024-03-14

## 2024-03-12 RX ORDER — ALBUTEROL SULFATE 90 UG/1
1-2 AEROSOL, METERED RESPIRATORY (INHALATION)
Status: CANCELLED
Start: 2024-03-13

## 2024-03-12 RX ORDER — LORAZEPAM 2 MG/ML
0.5 INJECTION INTRAMUSCULAR EVERY 4 HOURS PRN
Status: CANCELLED | OUTPATIENT
Start: 2024-03-13

## 2024-03-12 RX ORDER — HEPARIN SODIUM,PORCINE 10 UNIT/ML
5-20 VIAL (ML) INTRAVENOUS DAILY PRN
Status: CANCELLED | OUTPATIENT
Start: 2024-03-14

## 2024-03-12 RX ORDER — PALONOSETRON 0.05 MG/ML
0.25 INJECTION, SOLUTION INTRAVENOUS ONCE
Status: COMPLETED | OUTPATIENT
Start: 2024-03-12 | End: 2024-03-12

## 2024-03-12 RX ORDER — MEPERIDINE HYDROCHLORIDE 25 MG/ML
25-50 INJECTION INTRAMUSCULAR; INTRAVENOUS; SUBCUTANEOUS
Status: CANCELLED | OUTPATIENT
Start: 2024-03-14 | End: 2024-03-15

## 2024-03-12 RX ORDER — OLANZAPINE 2.5 MG/1
2.5 TABLET, FILM COATED ORAL 2 TIMES DAILY PRN
Qty: 30 TABLET | Refills: 0 | Status: ON HOLD | OUTPATIENT
Start: 2024-03-12 | End: 2024-04-02

## 2024-03-12 RX ORDER — PROCHLORPERAZINE MALEATE 5 MG
5 TABLET ORAL EVERY 6 HOURS PRN
Qty: 30 TABLET | Refills: 1 | Status: ON HOLD | OUTPATIENT
Start: 2024-03-12 | End: 2024-04-02

## 2024-03-12 RX ORDER — ALLOPURINOL 300 MG/1
300 TABLET ORAL DAILY
Qty: 7 TABLET | Refills: 0 | Status: ON HOLD | OUTPATIENT
Start: 2024-03-12 | End: 2024-04-02

## 2024-03-12 RX ORDER — DEXAMETHASONE 4 MG/1
8 TABLET ORAL
Qty: 4 TABLET | Refills: 0 | Status: SHIPPED | OUTPATIENT
Start: 2024-03-12 | End: 2024-03-15

## 2024-03-12 RX ORDER — DIPHENHYDRAMINE HYDROCHLORIDE 50 MG/ML
50 INJECTION INTRAMUSCULAR; INTRAVENOUS
Status: CANCELLED
Start: 2024-03-13

## 2024-03-12 RX ORDER — LEVOFLOXACIN 250 MG/1
250 TABLET, FILM COATED ORAL DAILY
Qty: 30 TABLET | Refills: 0 | Status: ON HOLD | OUTPATIENT
Start: 2024-03-12 | End: 2024-04-02

## 2024-03-12 RX ORDER — HEPARIN SODIUM (PORCINE) LOCK FLUSH IV SOLN 100 UNIT/ML 100 UNIT/ML
5 SOLUTION INTRAVENOUS
Status: CANCELLED | OUTPATIENT
Start: 2024-03-14

## 2024-03-12 RX ORDER — ACYCLOVIR 800 MG/1
800 TABLET ORAL 2 TIMES DAILY
Qty: 60 TABLET | Refills: 11 | Status: ON HOLD | OUTPATIENT
Start: 2024-03-12 | End: 2024-04-02

## 2024-03-12 RX ORDER — EPINEPHRINE 1 MG/ML
0.3 INJECTION, SOLUTION INTRAMUSCULAR; SUBCUTANEOUS EVERY 5 MIN PRN
Status: CANCELLED | OUTPATIENT
Start: 2024-03-13

## 2024-03-12 RX ORDER — ONDANSETRON 8 MG/1
8 TABLET, FILM COATED ORAL EVERY 8 HOURS PRN
Qty: 30 TABLET | Refills: 0 | Status: ON HOLD | OUTPATIENT
Start: 2024-03-12 | End: 2024-04-02

## 2024-03-12 RX ORDER — FLUCONAZOLE 200 MG/1
200 TABLET ORAL DAILY
Qty: 30 TABLET | Refills: 0 | Status: ON HOLD | OUTPATIENT
Start: 2024-03-12 | End: 2024-04-02

## 2024-03-12 RX ORDER — PALONOSETRON 0.05 MG/ML
0.25 INJECTION, SOLUTION INTRAVENOUS ONCE
Status: CANCELLED
Start: 2024-03-13

## 2024-03-12 RX ORDER — PANTOPRAZOLE SODIUM 40 MG/1
40 TABLET, DELAYED RELEASE ORAL DAILY
Qty: 30 TABLET | Refills: 1 | Status: ON HOLD | OUTPATIENT
Start: 2024-03-12 | End: 2024-04-02

## 2024-03-12 RX ORDER — DIPHENHYDRAMINE HCL 25 MG
25 CAPSULE ORAL ONCE
Status: CANCELLED
Start: 2024-03-14 | End: 2024-03-14

## 2024-03-12 RX ORDER — MEPERIDINE HYDROCHLORIDE 25 MG/ML
25 INJECTION INTRAMUSCULAR; INTRAVENOUS; SUBCUTANEOUS EVERY 30 MIN PRN
Status: CANCELLED | OUTPATIENT
Start: 2024-03-13

## 2024-03-12 RX ORDER — ALBUTEROL SULFATE 0.83 MG/ML
2.5 SOLUTION RESPIRATORY (INHALATION)
Status: CANCELLED | OUTPATIENT
Start: 2024-03-13

## 2024-03-12 RX ORDER — METHYLPREDNISOLONE SODIUM SUCCINATE 125 MG/2ML
125 INJECTION, POWDER, LYOPHILIZED, FOR SOLUTION INTRAMUSCULAR; INTRAVENOUS
Status: CANCELLED
Start: 2024-03-13

## 2024-03-12 RX ADMIN — MELPHALAN HYDROCHLORIDE 400 MG: KIT at 13:55

## 2024-03-12 RX ADMIN — DEXAMETHASONE SODIUM PHOSPHATE 12 MG: 10 INJECTION, SOLUTION INTRAMUSCULAR; INTRAVENOUS at 11:54

## 2024-03-12 RX ADMIN — SODIUM CHLORIDE 1000 ML: 9 INJECTION, SOLUTION INTRAVENOUS at 11:11

## 2024-03-12 RX ADMIN — PALONOSETRON HYDROCHLORIDE 0.25 MG: 0.25 INJECTION INTRAVENOUS at 11:54

## 2024-03-12 ASSESSMENT — PAIN SCALES - GENERAL: PAINLEVEL: WORST PAIN (10)

## 2024-03-12 NOTE — PROGRESS NOTES
BMT/Cell Therapy Daily Progress Note   03/12/2024    Patient ID:  Rosario Terrazas is a 68 year old female, hx of MM, day -1 Melphalan today.     INTERVAL  HISTORY     Daughter preferred to serve as  today.  Ms. Ponce has been doing well. Reports ongoing chronic pain under left sided anterior lower rib area. Requesting oxycodone 5 mg dose at infusion center today. She usually takes very 6 hours PRN.  Some arthralgias with granix. Claritin helped a lot. Resolved  Denies fever,chills,  Sob, chest pain, GI or  symptoms. Had 1 episode of loose stools after first dose of granix.   Chronic mild occasional cough, no sputum.  Eating well. Energy levels are ok.      Review of Systems: 10 point ROS negative except as noted above.      PHYSICAL EXAM     Weight In/Out     Wt Readings from Last 3 Encounters:   03/12/24 97.4 kg (214 lb 11.2 oz)   03/07/24 96.9 kg (213 lb 10 oz)   03/06/24 98.5 kg (217 lb 3.2 oz)      [unfilled]       General: NAD   Eyes: VALERIY, sclera anicteric   Lungs: CTA anteriorly   Cardiovascular: RRR, no M/R/G   Lymphatics: no edema  Skin: no rashes or petechiae  Neuro: A&O     Additional Findings: left CVAD     LABS AND IMAGING: I have assessed all abnormal lab values for their clinical significance and any values considered clinically significant have been addressed in the assessment and plan.        Lab Results   Component Value Date    WBC 4.8 03/12/2024    ANEU 3.8 03/12/2024    HGB 8.8 (L) 03/12/2024    HCT 27.6 (L) 03/12/2024    PLT 97 (L) 03/12/2024     03/12/2024    POTASSIUM 3.8 03/12/2024    CHLORIDE 105 03/12/2024    CO2 25 03/12/2024    GLC 78 03/12/2024    BUN 8.7 03/12/2024    CR 0.58 03/12/2024    MAG 1.3 (L) 03/08/2024    INR 1.18 (H) 02/20/2024       SYSTEMS-BASED ASSESSMENT AND PLAN       Rosario Terrazas is a 68 year old female with MM, day -1 melphalan today      BMT/IEC PROTOCOL for MM Auto  - Day -1 Melphalan 200 mg/m2 today.   - Of note she is on  81mg ASA be aware with plts.     Transplants for multiple myeloma:  The patient has Standard risk IgG D MM, and goal of 8 million CD34/kg for 2 transplants collected. She had 6.33E+06 CD34/kg collected 3/7/24 and 5.36E+06 CD34/kg collected on 3/8/24.  Today begin conditioning Day -1 for melphalan 200 mg/m2 (not frail, creatinine clearance 30+).   Reviewed side effects and next steps today. Chemo education. Consent for blood transfusions obtained.  - Continue INH/B6 through transplant for latent TB   - Ppx antibiotics acyclovir, diflucan, levofloxacin started today    #Anemia:  #Mild thrombocytopenia:  Mild drops without bleeding. CTM    #Thyroid FNA Atypia of undetermined significance diagnosis has a 22 (13-30)% risk of malignancy. Repeat FNA (least 4-6 weeks from previous aspiration with optimal time being 12 weeks after last aspiration) , molecular testing, diagnostic lobectomy , or surveillance is recommended.    Has next appt with endocrine 4/16/24.      RETURN TO CLINIC:  Tomorrow for Day 0 Auto HSCT    30 minutes spent by me on the date of the encounter doing chart review, history and exam, documentation and further activities per the note    Patient seen and discussed with Dr. Lao.    Josefa Chamberlain MD  Hematology and Medical Oncology Fellow, PGY-5  [facility]  Pager: [phone]

## 2024-03-12 NOTE — PROGRESS NOTES
"BMT Teaching Flowsheet    Rosario Terrazas is a 68 year old female  There were no encounter diagnoses.    Teaching Topic: 2016-35    Person(s) involved in teaching: Patient and daughter    Motivation Level  Asks Questions: Yes  Eager to Learn: Yes  Cooperative: Yes  Receptive (willing/able to accept information): Yes  Any cultural factors/Taoism beliefs that may influence understanding or compliance? No    Patient demonstrates understanding of the following:   Reason for the appointment, diagnosis and treatment plan: Yes  Knowledge of proper use of medications and conditions for which they are ordered (with special attention to potential side effects or drug interactions): Yes  Which situations necessitate calling provider and whom to contact: Yes  Proper use and care of (medical equipment, care aids, etc.) Yes  Pain management techniques: Yes  How and/when to access community resources: Yes    Teaching/ learning concerns addressed: none    Infection Control:  Patient and Family instructed on hand hygiene: Yes  Signs and symptoms of infection taught: Yes    Instructional Materials Used/Given: Patient was given and reviewed BMT Auto Transplant Teaching Binder, including: medication pamphlets, sample treatment calendars, consents, contact information for \"When to Call for Help\" (including after-hours contact), post-transplant precautions and guidelines.  Patient was encouraged to call with any additional questions.      Time spent with patient: 30 minutes.  Specific Concerns: NA  "

## 2024-03-12 NOTE — LETTER
"    3/12/2024         RE: Rosario Terrazas  [address]  [place] [address]        Dear Colleague,    Thank you for referring your patient, Rosario Terrazas, to the [facility] BLOOD AND MARROW TRANSPLANT PROGRAM [place]. Please see a copy of my visit note below.    BMT Teaching Flowsheet    Rosario Terrazas is a 68 year old female  There were no encounter diagnoses.    Teaching Topic: 2016-35    Person(s) involved in teaching: Patient and daughter    Motivation Level  Asks Questions: Yes  Eager to Learn: Yes  Cooperative: Yes  Receptive (willing/able to accept information): Yes  Any cultural factors/Restorationist beliefs that may influence understanding or compliance? No    Patient demonstrates understanding of the following:   Reason for the appointment, diagnosis and treatment plan: Yes  Knowledge of proper use of medications and conditions for which they are ordered (with special attention to potential side effects or drug interactions): Yes  Which situations necessitate calling provider and whom to contact: Yes  Proper use and care of (medical equipment, care aids, etc.) Yes  Pain management techniques: Yes  How and/when to access community resources: Yes    Teaching/ learning concerns addressed: none    Infection Control:  Patient and Family instructed on hand hygiene: Yes  Signs and symptoms of infection taught: Yes    Instructional Materials Used/Given: Patient was given and reviewed BMT Auto Transplant Teaching Binder, including: medication pamphlets, sample treatment calendars, consents, contact information for \"When to Call for Help\" (including after-hours contact), post-transplant precautions and guidelines.  Patient was encouraged to call with any additional questions.      Time spent with patient: 30 minutes.  Specific Concerns: NA      Again, thank you for allowing me to participate in the care of your patient.        Sincerely,        Romina Santiago RN  "

## 2024-03-12 NOTE — PROGRESS NOTES
Infusion Nursing Note:  Rosario Terrazas presents today for day -1 melphalan.    Patient seen by provider today: Yes: Dr. Lao   present during visit today: Yes, Language: Kisii  used to obtain  waiver, daughter- Katherin, then intrepreted for remainder of visit.     Note: Rosario here today for day -1 melphalan infusion. Labs monitored, no additional infusion needs identified. Pt received 500ml pre flush, premedicated with dexamethasone and aloxi. Tolerated melphalan over 15 minutes followed by 500ml post flush. Pt was hesitant with cryotherapy and needed quite a bit of encouragement. RN provided education to patient and daughter on importance of cryotherapy to prevent mucositis.       Intravenous Access:  [place].    Treatment Conditions:  Lab Results   Component Value Date    HGB 8.8 (L) 03/12/2024    WBC 4.8 03/12/2024    ANEU 3.8 03/12/2024    ANEUTAUTO 20.4 (H) 03/06/2024    PLT 97 (L) 03/12/2024        Lab Results   Component Value Date     03/12/2024    POTASSIUM 3.8 03/12/2024    MAG 1.3 (L) 03/08/2024    CR 0.58 03/12/2024    RAMIREZ 8.4 (L) 03/12/2024    BILITOTAL 0.2 03/12/2024    ALBUMIN 3.6 03/12/2024    ALT 12 03/12/2024    AST 24 03/12/2024         Post Infusion Assessment:  Patient tolerated infusion without incident.  Blood return noted pre and post infusion.  Site patent and intact, free from redness, edema or discomfort.  No evidence of extravasations.  Access discontinued per protocol.       Discharge Plan:   Patient discharged in stable condition accompanied by: daughter.  Departure Mode: Ambulatory.      Radha Weir RN

## 2024-03-12 NOTE — LETTER
3/12/2024         RE: Rosario Terrazas  [address] [place] [address]        Dear Colleague,    Thank you for referring your patient, Rosario Terrazas, to the [facility] BLOOD AND MARROW TRANSPLANT PROGRAM [place]. Please see a copy of my visit note below.    BMT/Cell Therapy Daily Progress Note   03/12/2024    Patient ID:  Rosario Terrazas is a 68 year old female, hx of MM, day -1 Melphalan today.     INTERVAL  HISTORY     Daughter preferred to serve as  today.  Ms. Ponce has been doing well. Reports ongoing chronic pain under left sided anterior lower rib area. Requesting oxycodone 5 mg dose at infusion center today. She usually takes very 6 hours PRN.  Some arthralgias with granix. Claritin helped a lot. Resolved  Denies fever,chills,  Sob, chest pain, GI or  symptoms. Had 1 episode of loose stools after first dose of granix.   Chronic mild occasional cough, no sputum.  Eating well. Energy levels are ok.      Review of Systems: 10 point ROS negative except as noted above.      PHYSICAL EXAM     Weight In/Out     Wt Readings from Last 3 Encounters:   03/12/24 97.4 kg (214 lb 11.2 oz)   03/07/24 96.9 kg (213 lb 10 oz)   03/06/24 98.5 kg (217 lb 3.2 oz)      [unfilled]       General: NAD   Eyes: VALERIY, sclera anicteric   Lungs: CTA anteriorly   Cardiovascular: RRR, no M/R/G   Lymphatics: no edema  Skin: no rashes or petechiae  Neuro: A&O     Additional Findings: left CVAD     LABS AND IMAGING: I have assessed all abnormal lab values for their clinical significance and any values considered clinically significant have been addressed in the assessment and plan.        Lab Results   Component Value Date    WBC 4.8 03/12/2024    ANEU 3.8 03/12/2024    HGB 8.8 (L) 03/12/2024    HCT 27.6 (L) 03/12/2024    PLT 97 (L) 03/12/2024     03/12/2024    POTASSIUM 3.8 03/12/2024    CHLORIDE 105 03/12/2024    CO2 25 03/12/2024    GLC 78 03/12/2024    BUN 8.7  03/12/2024    CR 0.58 03/12/2024    MAG 1.3 (L) 03/08/2024    INR 1.18 (H) 02/20/2024       SYSTEMS-BASED ASSESSMENT AND PLAN       Rosario Terrazas is a 68 year old female with MM, day -1 melphalan today      BMT/IEC PROTOCOL for MM Auto  - Day -1 Melphalan 200 mg/m2 today.   - Of note she is on 81mg ASA be aware with plts.     Transplants for multiple myeloma:  The patient has Standard risk IgG D MM, and goal of 8 million CD34/kg for 2 transplants collected. She had 6.33E+06 CD34/kg collected 3/7/24 and 5.36E+06 CD34/kg collected on 3/8/24.  Today begin conditioning Day -1 for melphalan 200 mg/m2 (not frail, creatinine clearance 30+).   Reviewed side effects and next steps today. Chemo education. Consent for blood transfusions obtained.  - Continue INH/B6 through transplant for latent TB   - Ppx antibiotics acyclovir, diflucan, levofloxacin started today    #Anemia:  #Mild thrombocytopenia:  Mild drops without bleeding. CTM    #Thyroid FNA Atypia of undetermined significance diagnosis has a 22 (13-30)% risk of malignancy. Repeat FNA (least 4-6 weeks from previous aspiration with optimal time being 12 weeks after last aspiration) , molecular testing, diagnostic lobectomy , or surveillance is recommended.    Has next appt with endocrine 4/16/24.      RETURN TO CLINIC:  Tomorrow for Day 0 Auto HSCT    30 minutes spent by me on the date of the encounter doing chart review, history and exam, documentation and further activities per the note    Patient seen and discussed with Dr. Lao.    Josefa Chamberlain MD  Hematology and Medical Oncology Fellow, PGY-5  [facility]  Pager: [phone]        Attestation signed by Minda Lao MBBS at 3/27/2024  9:05 AM:  Physician Attestation   I saw this patient with the fellow and agree with the resident/fellow's findings and plan of care as documented in the note.      69 y/o F with standard risk multiple myeloma who is here for D-1 melphalan.     Minda  Shilo  Department of Hematology, Oncology and Transplantation  [facility] Pager 1300/Text via [employer]

## 2024-03-12 NOTE — LETTER
3/12/2024         RE: Rosario Terrazas  [address] [place] [address]        Dear Colleague,    Thank you for referring your patient, Rosario Terrazas, to the [facility] BLOOD AND MARROW TRANSPLANT PROGRAM [place]. Please see a copy of my visit note below.    Teaching/Med review regarding OP Autologous SCT with HD Melphalan.     I reconciled Rosario Terrazas home medications including the transplant medications, which were filled by the [facility] OP pharmacy, and added to her provided medication box. she did not have any home medications prescribed that needed adding to the med box.     I reviewed the conditioning chemotherapy with Melphalan, including ice cryotherapy for 2 -4 hours around the infusion and at least 2-4 hours following the infusion. I reviewed the antiemetics including the palonosetron and to avoid using ondansetron until 3 days after the infusion to avoid possible additive side effects. I reviewed the emend/dexamethasone infusion as well. Breakthrough nausea/vomiting can be covered with prochlorperazine as needed, Additional medications include allopurinol daily x 7 days.     I reviewed the prophylactic antibiotics including levofloxacin, acyclovir, fluconazole which start today and bactrim which starts in 1 month.        CURRENT MEDICATIONS:   Current Outpatient Medications   Medication Sig Dispense Refill    acetaminophen (TYLENOL) 325 MG tablet Take 2 tablets (650 mg) by mouth every 4 hours as needed for pain 120 tablet 1    acyclovir (ZOVIRAX) 400 MG tablet Take 400 mg by mouth      acyclovir (ZOVIRAX) 800 MG tablet Take 1 tablet (800 mg) by mouth 2 times daily Start Day -1 60 tablet 11    allopurinol (ZYLOPRIM) 300 MG tablet Take 1 tablet (300 mg) by mouth daily Start Day -1 7 tablet 0    aspirin 81 MG EC tablet Take 1 tablet by mouth daily      bortezomib (VELCADE) 3.5 MG injection       calcium carbonate-vitamin D (OSCAL) 500-5 MG-MCG tablet Take 1  tablet by mouth 3 times daily (with meals) 90 tablet 2    dexAMETHasone (DECADRON) 4 MG tablet Take 2 tablets (8 mg) by mouth daily (with breakfast) for 2 days Start Day 0.  To prevent delayed nausea/vomiting. 4 tablet 0    diclofenac (VOLTAREN) 1 % topical gel APPLY 4 GRAMS TO PAINFUL AREAS IN KNEES/SHOULDERS 4 TIMES A DAY AS DIRECTED. MAX OF 32 GRAMS PER DAY.      docusate sodium (DSS) 100 MG capsule Take 100 mg by mouth 2 times daily      fluconazole (DIFLUCAN) 200 MG tablet Take 1 tablet (200 mg) by mouth daily Start Day -1 30 tablet 0    folic acid (FOLVITE) 1 MG tablet Take 1 tablet by mouth daily      gabapentin (NEURONTIN) 100 MG capsule Take 1 capsule by mouth 3 times daily      isoniazid (NYDRAZID) 300 MG tablet Take 1 tablet by mouth daily      levofloxacin (LEVAQUIN) 250 MG tablet Take 1 tablet (250 mg) by mouth daily Start Day -1 30 tablet 0    lidocaine (XYLOCAINE) 5 % external ointment Apply to ribs a couple times a day up to 4 times daily as needed for pain      loperamide (IMODIUM) 2 MG capsule Take 2 mg by mouth 4 times daily as needed for diarrhea      nystatin (MYCOSTATIN) 020480 UNIT/GM external cream Apply to rash in genital area twice daily as needed      OLANZapine (ZYPREXA) 2.5 MG tablet Take 1 tablet (2.5 mg) by mouth 2 times daily as needed (For nausea or vomiting) 30 tablet 0    omeprazole (PRILOSEC) 20 MG DR capsule Take 20 mg by mouth daily      ondansetron (ZOFRAN) 4 MG tablet Take 4 mg by mouth every 8 hours as needed      ondansetron (ZOFRAN) 8 MG tablet Take 1 tablet (8 mg) by mouth every 8 hours as needed (for nausea / vomiting) 30 tablet 0    oxyCODONE (ROXICODONE) 5 MG tablet Take 5 mg by mouth every 6 hours as needed for severe pain      pantoprazole (PROTONIX) 40 MG EC tablet Take 1 tablet (40 mg) by mouth daily Start Day -1 30 tablet 1    polyethylene glycol (MIRALAX) 17 GM/Dose powder Take 17 g by mouth daily as needed      prochlorperazine (COMPAZINE) 10 MG tablet Take 10 mg  by mouth      prochlorperazine (COMPAZINE) 5 MG tablet Take 1 tablet (5 mg) by mouth every 6 hours as needed for nausea or vomiting 30 tablet 1    pyridOXINE (VITAMIN B6) 25 MG tablet Take 1 tablet by mouth daily      senna-docusate (SENOKOT-S/PERICOLACE) 8.6-50 MG tablet Take 1 tablet by mouth 2 times daily      sulfamethoxazole-trimethoprim (BACTRIM) 400-80 MG tablet Take 1 tablet by mouth daily      tiZANidine (ZANAFLEX) 4 MG tablet Take HALF to 1 tablet by mouth up to 2 times daily for muscle pain/spasm      Vitamin D3 (CHOLECALCIFEROL) 25 mcg (1000 units) tablet Take 2 tablets by mouth daily         Pertinent labs considered today:  Lab Results   Component Value Date     03/08/2024     Lab Results   Component Value Date    POTASSIUM 3.1 03/08/2024     Lab Results   Component Value Date    CHLORIDE 103 03/08/2024     Lab Results   Component Value Date    CO2 30 03/08/2024     Lab Results   Component Value Date    BUN 6.2 03/08/2024     Lab Results   Component Value Date    GLC 97 03/08/2024     Lab Results   Component Value Date    CR 0.62 03/08/2024     Lab Results   Component Value Date    RAMIREZ 10.8 03/08/2024     Lab Results   Component Value Date    BILITOTAL 0.2 02/20/2024     Lab Results   Component Value Date    PROTTOTAL 6.2 02/20/2024     Lab Results   Component Value Date    ALBUMIN 3.9 02/20/2024     Lab Results   Component Value Date    ALKPHOS 58 02/20/2024     Lab Results   Component Value Date    ALT 9 02/20/2024     Lab Results   Component Value Date    AST 17 02/20/2024     Lab Results   Component Value Date    HGB 8.8 03/12/2024     Lab Results   Component Value Date    WBC 4.8 03/12/2024     Lab Results   Component Value Date    PLT 97 03/12/2024     Estimated Creatinine Clearance: 96.9 mL/min (based on SCr of 0.62 mg/dL).       Time spent in this activity: 30 minutes     ALBERTO CLEARY [facility]

## 2024-03-12 NOTE — PROGRESS NOTES
Teaching/Med review regarding OP Autologous SCT with HD Melphalan.     I reconciled Rosario Terrazas home medications including the transplant medications, which were filled by the [facility] OP pharmacy, and added to her provided medication box. she did not have any home medications prescribed that needed adding to the med box.     I reviewed the conditioning chemotherapy with Melphalan, including ice cryotherapy for 2 -4 hours around the infusion and at least 2-4 hours following the infusion. I reviewed the antiemetics including the palonosetron and to avoid using ondansetron until 3 days after the infusion to avoid possible additive side effects. I reviewed the emend/dexamethasone infusion as well. Breakthrough nausea/vomiting can be covered with prochlorperazine as needed, Additional medications include allopurinol daily x 7 days.     I reviewed the prophylactic antibiotics including levofloxacin, acyclovir, fluconazole which start today and bactrim which starts in 1 month.        CURRENT MEDICATIONS:   Current Outpatient Medications   Medication Sig Dispense Refill    acetaminophen (TYLENOL) 325 MG tablet Take 2 tablets (650 mg) by mouth every 4 hours as needed for pain 120 tablet 1    acyclovir (ZOVIRAX) 400 MG tablet Take 400 mg by mouth      acyclovir (ZOVIRAX) 800 MG tablet Take 1 tablet (800 mg) by mouth 2 times daily Start Day -1 60 tablet 11    allopurinol (ZYLOPRIM) 300 MG tablet Take 1 tablet (300 mg) by mouth daily Start Day -1 7 tablet 0    aspirin 81 MG EC tablet Take 1 tablet by mouth daily      bortezomib (VELCADE) 3.5 MG injection       calcium carbonate-vitamin D (OSCAL) 500-5 MG-MCG tablet Take 1 tablet by mouth 3 times daily (with meals) 90 tablet 2    dexAMETHasone (DECADRON) 4 MG tablet Take 2 tablets (8 mg) by mouth daily (with breakfast) for 2 days Start Day 0.  To prevent delayed nausea/vomiting. 4 tablet 0    diclofenac (VOLTAREN) 1 % topical gel APPLY 4 GRAMS TO PAINFUL AREAS IN  KNEES/SHOULDERS 4 TIMES A DAY AS DIRECTED. MAX OF 32 GRAMS PER DAY.      docusate sodium (DSS) 100 MG capsule Take 100 mg by mouth 2 times daily      fluconazole (DIFLUCAN) 200 MG tablet Take 1 tablet (200 mg) by mouth daily Start Day -1 30 tablet 0    folic acid (FOLVITE) 1 MG tablet Take 1 tablet by mouth daily      gabapentin (NEURONTIN) 100 MG capsule Take 1 capsule by mouth 3 times daily      isoniazid (NYDRAZID) 300 MG tablet Take 1 tablet by mouth daily      levofloxacin (LEVAQUIN) 250 MG tablet Take 1 tablet (250 mg) by mouth daily Start Day -1 30 tablet 0    lidocaine (XYLOCAINE) 5 % external ointment Apply to ribs a couple times a day up to 4 times daily as needed for pain      loperamide (IMODIUM) 2 MG capsule Take 2 mg by mouth 4 times daily as needed for diarrhea      nystatin (MYCOSTATIN) 555431 UNIT/GM external cream Apply to rash in genital area twice daily as needed      OLANZapine (ZYPREXA) 2.5 MG tablet Take 1 tablet (2.5 mg) by mouth 2 times daily as needed (For nausea or vomiting) 30 tablet 0    omeprazole (PRILOSEC) 20 MG DR capsule Take 20 mg by mouth daily      ondansetron (ZOFRAN) 4 MG tablet Take 4 mg by mouth every 8 hours as needed      ondansetron (ZOFRAN) 8 MG tablet Take 1 tablet (8 mg) by mouth every 8 hours as needed (for nausea / vomiting) 30 tablet 0    oxyCODONE (ROXICODONE) 5 MG tablet Take 5 mg by mouth every 6 hours as needed for severe pain      pantoprazole (PROTONIX) 40 MG EC tablet Take 1 tablet (40 mg) by mouth daily Start Day -1 30 tablet 1    polyethylene glycol (MIRALAX) 17 GM/Dose powder Take 17 g by mouth daily as needed      prochlorperazine (COMPAZINE) 10 MG tablet Take 10 mg by mouth      prochlorperazine (COMPAZINE) 5 MG tablet Take 1 tablet (5 mg) by mouth every 6 hours as needed for nausea or vomiting 30 tablet 1    pyridOXINE (VITAMIN B6) 25 MG tablet Take 1 tablet by mouth daily      senna-docusate (SENOKOT-S/PERICOLACE) 8.6-50 MG tablet Take 1 tablet by mouth  2 times daily      sulfamethoxazole-trimethoprim (BACTRIM) 400-80 MG tablet Take 1 tablet by mouth daily      tiZANidine (ZANAFLEX) 4 MG tablet Take HALF to 1 tablet by mouth up to 2 times daily for muscle pain/spasm      Vitamin D3 (CHOLECALCIFEROL) 25 mcg (1000 units) tablet Take 2 tablets by mouth daily         Pertinent labs considered today:  Lab Results   Component Value Date     03/08/2024     Lab Results   Component Value Date    POTASSIUM 3.1 03/08/2024     Lab Results   Component Value Date    CHLORIDE 103 03/08/2024     Lab Results   Component Value Date    CO2 30 03/08/2024     Lab Results   Component Value Date    BUN 6.2 03/08/2024     Lab Results   Component Value Date    GLC 97 03/08/2024     Lab Results   Component Value Date    CR 0.62 03/08/2024     Lab Results   Component Value Date    RAMIREZ 10.8 03/08/2024     Lab Results   Component Value Date    BILITOTAL 0.2 02/20/2024     Lab Results   Component Value Date    PROTTOTAL 6.2 02/20/2024     Lab Results   Component Value Date    ALBUMIN 3.9 02/20/2024     Lab Results   Component Value Date    ALKPHOS 58 02/20/2024     Lab Results   Component Value Date    ALT 9 02/20/2024     Lab Results   Component Value Date    AST 17 02/20/2024     Lab Results   Component Value Date    HGB 8.8 03/12/2024     Lab Results   Component Value Date    WBC 4.8 03/12/2024     Lab Results   Component Value Date    PLT 97 03/12/2024     Estimated Creatinine Clearance: 96.9 mL/min (based on SCr of 0.62 mg/dL).       Time spent in this activity: 30 minutes     ALBERTO CLEARY RP

## 2024-03-13 ENCOUNTER — INFUSION THERAPY VISIT (OUTPATIENT)
Dept: TRANSPLANT | Facility: CLINIC | Age: 69
End: 2024-03-13
Attending: INTERNAL MEDICINE
Payer: COMMERCIAL

## 2024-03-13 ENCOUNTER — APPOINTMENT (OUTPATIENT)
Dept: LAB | Facility: CLINIC | Age: 69
End: 2024-03-13
Attending: INTERNAL MEDICINE
Payer: COMMERCIAL

## 2024-03-13 VITALS
TEMPERATURE: 97.9 F | HEART RATE: 87 BPM | SYSTOLIC BLOOD PRESSURE: 109 MMHG | OXYGEN SATURATION: 97 % | RESPIRATION RATE: 18 BRPM | DIASTOLIC BLOOD PRESSURE: 68 MMHG

## 2024-03-13 VITALS
DIASTOLIC BLOOD PRESSURE: 68 MMHG | RESPIRATION RATE: 16 BRPM | WEIGHT: 216.7 LBS | HEART RATE: 85 BPM | BODY MASS INDEX: 38.39 KG/M2 | SYSTOLIC BLOOD PRESSURE: 104 MMHG | TEMPERATURE: 97.6 F | OXYGEN SATURATION: 100 %

## 2024-03-13 DIAGNOSIS — C90.00 MULTIPLE MYELOMA, REMISSION STATUS UNSPECIFIED (H): Primary | ICD-10-CM

## 2024-03-13 DIAGNOSIS — C90.00 MULTIPLE MYELOMA NOT HAVING ACHIEVED REMISSION (H): Primary | ICD-10-CM

## 2024-03-13 LAB
ANION GAP SERPL CALCULATED.3IONS-SCNC: 11 MMOL/L (ref 7–15)
BASOPHILS # BLD AUTO: ABNORMAL 10*3/UL
BASOPHILS # BLD MANUAL: 0 10E3/UL (ref 0–0.2)
BASOPHILS NFR BLD AUTO: ABNORMAL %
BASOPHILS NFR BLD MANUAL: 0 %
BILL ONLY STEM CELL INFUSION: NORMAL
BUN SERPL-MCNC: 14.1 MG/DL (ref 8–23)
CALCIUM SERPL-MCNC: 8.4 MG/DL (ref 8.8–10.2)
CHLORIDE SERPL-SCNC: 108 MMOL/L (ref 98–107)
CREAT SERPL-MCNC: 0.56 MG/DL (ref 0.51–0.95)
DEPRECATED HCO3 PLAS-SCNC: 22 MMOL/L (ref 22–29)
EGFRCR SERPLBLD CKD-EPI 2021: >90 ML/MIN/1.73M2
ELLIPTOCYTES BLD QL SMEAR: SLIGHT
EOSINOPHIL # BLD AUTO: ABNORMAL 10*3/UL
EOSINOPHIL # BLD MANUAL: 0 10E3/UL (ref 0–0.7)
EOSINOPHIL NFR BLD AUTO: ABNORMAL %
EOSINOPHIL NFR BLD MANUAL: 0 %
ERYTHROCYTE [DISTWIDTH] IN BLOOD BY AUTOMATED COUNT: 16.6 % (ref 10–15)
FRAGMENTS BLD QL SMEAR: SLIGHT
GLUCOSE SERPL-MCNC: 120 MG/DL (ref 70–99)
HCT VFR BLD AUTO: 29.1 % (ref 35–47)
HGB BLD-MCNC: 9.3 G/DL (ref 11.7–15.7)
IMM GRANULOCYTES # BLD: ABNORMAL 10*3/UL
IMM GRANULOCYTES NFR BLD: ABNORMAL %
INR PPP: 1.22 (ref 0.85–1.15)
LYMPHOCYTES # BLD AUTO: ABNORMAL 10*3/UL
LYMPHOCYTES # BLD MANUAL: 0.4 10E3/UL (ref 0.8–5.3)
LYMPHOCYTES NFR BLD AUTO: ABNORMAL %
LYMPHOCYTES NFR BLD MANUAL: 4 %
MCH RBC QN AUTO: 32 PG (ref 26.5–33)
MCHC RBC AUTO-ENTMCNC: 32 G/DL (ref 31.5–36.5)
MCV RBC AUTO: 100 FL (ref 78–100)
METAMYELOCYTES # BLD MANUAL: 0.2 10E3/UL
METAMYELOCYTES NFR BLD MANUAL: 2 %
MONOCYTES # BLD AUTO: ABNORMAL 10*3/UL
MONOCYTES # BLD MANUAL: 0.1 10E3/UL (ref 0–1.3)
MONOCYTES NFR BLD AUTO: ABNORMAL %
MONOCYTES NFR BLD MANUAL: 1 %
MYELOCYTES # BLD MANUAL: 0.1 10E3/UL
MYELOCYTES NFR BLD MANUAL: 1 %
NEUTROPHILS # BLD AUTO: ABNORMAL 10*3/UL
NEUTROPHILS # BLD MANUAL: 8.6 10E3/UL (ref 1.6–8.3)
NEUTROPHILS NFR BLD AUTO: ABNORMAL %
NEUTROPHILS NFR BLD MANUAL: 92 %
NRBC # BLD AUTO: 0 10E3/UL
NRBC BLD AUTO-RTO: 0 /100
PLAT MORPH BLD: ABNORMAL
PLATELET # BLD AUTO: 116 10E3/UL (ref 150–450)
POTASSIUM SERPL-SCNC: 4.2 MMOL/L (ref 3.4–5.3)
RBC # BLD AUTO: 2.91 10E6/UL (ref 3.8–5.2)
RBC MORPH BLD: ABNORMAL
SODIUM SERPL-SCNC: 141 MMOL/L (ref 135–145)
WBC # BLD AUTO: 9.3 10E3/UL (ref 4–11)

## 2024-03-13 PROCEDURE — G0463 HOSPITAL OUTPT CLINIC VISIT: HCPCS | Mod: 25

## 2024-03-13 PROCEDURE — 85610 PROTHROMBIN TIME: CPT | Performed by: STUDENT IN AN ORGANIZED HEALTH CARE EDUCATION/TRAINING PROGRAM

## 2024-03-13 PROCEDURE — 38241 TRANSPLT AUTOL HCT/DONOR: CPT | Performed by: STUDENT IN AN ORGANIZED HEALTH CARE EDUCATION/TRAINING PROGRAM

## 2024-03-13 PROCEDURE — 999N000022 HC STATISTIC AUTOLOGOUS BM-INITIAL INFUSION

## 2024-03-13 PROCEDURE — 85007 BL SMEAR W/DIFF WBC COUNT: CPT | Performed by: STUDENT IN AN ORGANIZED HEALTH CARE EDUCATION/TRAINING PROGRAM

## 2024-03-13 PROCEDURE — 250N000013 HC RX MED GY IP 250 OP 250 PS 637: Performed by: STUDENT IN AN ORGANIZED HEALTH CARE EDUCATION/TRAINING PROGRAM

## 2024-03-13 PROCEDURE — 85027 COMPLETE CBC AUTOMATED: CPT | Performed by: STUDENT IN AN ORGANIZED HEALTH CARE EDUCATION/TRAINING PROGRAM

## 2024-03-13 PROCEDURE — 38208 THAW PRESERVED STEM CELLS: CPT | Performed by: INTERNAL MEDICINE

## 2024-03-13 PROCEDURE — 80048 BASIC METABOLIC PNL TOTAL CA: CPT | Performed by: STUDENT IN AN ORGANIZED HEALTH CARE EDUCATION/TRAINING PROGRAM

## 2024-03-13 PROCEDURE — 250N000011 HC RX IP 250 OP 636: Performed by: INTERNAL MEDICINE

## 2024-03-13 RX ORDER — OXYCODONE HYDROCHLORIDE 5 MG/1
5 TABLET ORAL ONCE
Status: COMPLETED | OUTPATIENT
Start: 2024-03-13 | End: 2024-03-13

## 2024-03-13 RX ORDER — DIPHENHYDRAMINE HCL 25 MG
25 CAPSULE ORAL ONCE
Status: COMPLETED | OUTPATIENT
Start: 2024-03-13 | End: 2024-03-13

## 2024-03-13 RX ORDER — HEPARIN SODIUM,PORCINE 10 UNIT/ML
5 VIAL (ML) INTRAVENOUS
Status: DISCONTINUED | OUTPATIENT
Start: 2024-03-13 | End: 2024-03-13 | Stop reason: HOSPADM

## 2024-03-13 RX ORDER — ACETAMINOPHEN 325 MG/1
650 TABLET ORAL ONCE
Status: COMPLETED | OUTPATIENT
Start: 2024-03-13 | End: 2024-03-13

## 2024-03-13 RX ADMIN — Medication 5 ML: at 11:49

## 2024-03-13 RX ADMIN — OXYCODONE HYDROCHLORIDE 5 MG: 5 TABLET ORAL at 13:42

## 2024-03-13 RX ADMIN — DIPHENHYDRAMINE HYDROCHLORIDE 25 MG: 25 CAPSULE ORAL at 12:02

## 2024-03-13 RX ADMIN — ACETAMINOPHEN 650 MG: 325 TABLET ORAL at 12:02

## 2024-03-13 ASSESSMENT — PAIN SCALES - GENERAL: PAINLEVEL: SEVERE PAIN (6)

## 2024-03-13 NOTE — LETTER
3/13/2024         RE: Rosario Terrazas  [address]  [place] [address]        Dear Colleague,    Thank you for referring your patient, Rosario Terrazas, to the [facility] BLOOD AND MARROW TRANSPLANT PROGRAM [place]. Please see a copy of my visit note below.    BMT/Cell Therapy Daily Progress Note   03/13/2024    Patient ID:  Rosario Terrazas is a 68 year old female, hx of MM, day s/p Melphalan; day 0 transplant hsct.    INTERVAL  HISTORY     Daughter preferred to serve as  today.  Ms. Terrazas tolerated melphalan fine yesterday. Nursing notes about a half cup of ice chips was what she could tolerate. No mouth sores, n/v or diarrhea to report today. She continues to have small, harder stools.   She is urinating more frequently today, no pain or blood in urine.  Ongoing chronic pain under left sided anterior lower rib area. Worse since she isn't using her brace, someone told her not to wear it with her CVC.  Eating well. Energy levels are good today.    Review of Systems: 10 point ROS negative except as noted above.      PHYSICAL EXAM     Wt Readings from Last 4 Encounters:   03/13/24 98.3 kg (216 lb 11.2 oz)   03/12/24 97.4 kg (214 lb 11.2 oz)   03/07/24 96.9 kg (213 lb 10 oz)   03/06/24 98.5 kg (217 lb 3.2 oz)     /68   Pulse 85   Temp 97.6  F (36.4  C) (Oral)   Resp 16   Wt 98.3 kg (216 lb 11.2 oz)   SpO2 100%   BMI 38.39 kg/m    General: NAD   Eyes: VALERIY, sclera anicteric   Lungs: CTA anteriorly   Cardiovascular: RRR, no M/R/G   Lymphatics: no edema  Skin: no rashes or petechiae  Neuro: A&O   Additional Findings: left CVAD     LABS AND IMAGING: I have assessed all abnormal lab values for their clinical significance and any values considered clinically significant have been addressed in the assessment and plan.        Lab Results   Component Value Date    WBC 9.3 03/13/2024    ANEU 8.6 (H) 03/13/2024    HGB 9.3 (L) 03/13/2024    HCT 29.1 (L)  03/13/2024     (L) 03/13/2024     03/13/2024    POTASSIUM 4.2 03/13/2024    CHLORIDE 108 (H) 03/13/2024    CO2 22 03/13/2024     (H) 03/13/2024    BUN 14.1 03/13/2024    CR 0.56 03/13/2024    MAG 1.3 (L) 03/08/2024    INR 1.22 (H) 03/13/2024       Infusion Procedure Note    Type: Autologous transplant  Diagnosis: multiple myeloma  Indication for Infusion: Reconstitution of hematopoiesis (transplant graft)  Reviewed and Confirmed for the Infusion: Consent previously obtained  Donor:  autologous  Product Characteristics, Cell Dose and Volume: as described on the infusion form ([id number]).  Patient was Premedicated as Ordered: Yes  Complications:  no    I was present at the beginning of the infusion and available on the floor/unit/clinic through the entire infusion.    Smita Hargrove PA-C          SYSTEMS-BASED ASSESSMENT AND PLAN       Rosario Adan Terrazas is a 68 year old female with MM, s/p melphalan, day 0 transplant.    BMT/IEC PROTOCOL for MM Auto    Transplants for multiple myeloma:  The patient has Standard risk IgG D MM, and goal of 8 million CD34/kg for 2 transplants collected. She had 6.33E+06 CD34/kg collected 3/7/24 and 5.36E+06 CD34/kg collected on 3/8/24.  Today begin conditioning Day -1 for melphalan 200 mg/m2 (not frail, creatinine clearance 30+).   Reviewed side effects and next steps today. Chemo education. Consent for blood transfusions obtained.  3/12 Day -1 Melphalan 200 mg/m2   3/13 Day 0 Transplant, cell total post wash pending in media chart  Allopurinol per protocol    ID  - Continue INH/B6 through transplant for latent TB   - Ppx antibiotics acyclovir, diflucan, levofloxacin     Heme transfuse to keep hgb >7, plt >10  Anemia: secondary to disease, melphalan prep  Mild thrombocytopenia: secondary to disease, melphalan prep. Of note she is on 81mg ASA be aware with plts.    FEN/Renal  Cr/Lytes wnl    CV   ECHO 55-60%   EKG NSR    GI:   Risk for n/v/d, has zyprexa,  compazine, zofran.   Historical constipation: use anticonstipation meds with caution going forward    Endocrine  Thyroid FNA Atypia of undetermined significance diagnosis has a 22 (13-30)% risk of malignancy. Repeat FNA (least 4-6 weeks from previous aspiration with optimal time being 12 weeks after last aspiration) , molecular testing, diagnostic lobectomy, or surveillance is recommended.    Has next appt with endocrine 4/16/24.    Neuro/Pain  Neuropathy: continues on eleni, xanaflex   Rib pain from disease, oxy q 6. 5mg given in clinic today. She will return to wearing her brace as this helps control pain.      Plan: transplant today    RETURN TO CLINIC:  Through Friday, then could likely have Saturday off  Requested appointments starting Jose 3/17 through 3/30  GCSF starts Mon 3/18    30 minutes spent by me on the date of the encounter doing chart review, history and exam, documentation and further activities per the note    Smita Hragrove PA-C on 3/13/2024   [phone]          BMT/Cellular Autologous Product Infusion         Patient Vitals for the past 24 hrs:   Temp Temp src Pulse Resp BP   03/13/24 1148 97.6  F (36.4  C) Oral 85 16 104/68      BMT INFUSION DOCUMENTATION (last 48 hours)       BMT/Cellular Product Infusion    No documentation.                                 Baseline Pre-Infusion Evaluation (to be completed by Provider):   Dyspnea: Grade 0 - none  Hypoxia: Grade 0 - not present  Fever: Grade 0 - afebrile  Chills: Grade 0 - none  Febrile Neutropenia: Grade 0 - not present  Sinus Bradycardia: Grade 0 - none  Hypertension: Grade 0 - none  Hypotension: Grade 0 - none  Chest Pain: Grade 0 - none  Bronchospasm: Grade 0 - none  Pain: Grade 0 - none  Rash: Grade 0 - None  Neurologic Specify: none    If adverse reactions, events or complications occur (fever greater than 2 degrees fahrenheit increase, and severe reactions of the following types: chills, dyspnea, bronchospasm, hyper/hypotension,  hypoxia, bradycardia, chest pain, back/flank pain, hypoxia, and any other reaction deemed severe or life threatening; any instance of product bag breakage or unusual product appearance)    Any other events that are >= grade 3, then immediately contact the BMT Attending physician, the Cell Therapy Laboratory Medical Director (pager [phone]) and the Cell Therapy Laboratory ([phone]).  After midnight, holidays & weekends contact the [facility] Bank on the appropriate campus ([facility] [place]: [phone]; [facility] [place]: [phone]).    Smita Hargrove PA-C

## 2024-03-13 NOTE — NURSING NOTE
Chief Complaint   Patient presents with    Blood Draw     Labs drawn via CVC by RN in lab. VS taken.      CVC accessed, labs drawn. Line flushed and Heparin locked. Vital signs taken. Checked into next appointment.   Winsome Wells RN

## 2024-03-13 NOTE — PROGRESS NOTES
BMT/Cellular Autologous Product Infusion         Patient Vitals for the past 24 hrs:   Temp Temp src Pulse Resp BP   03/13/24 1148 97.6  F (36.4  C) Oral 85 16 104/68      BMT INFUSION DOCUMENTATION (last 48 hours)       BMT/Cellular Product Infusion    No documentation.                                 Baseline Pre-Infusion Evaluation (to be completed by Provider):   Dyspnea: Grade 0 - none  Hypoxia: Grade 0 - not present  Fever: Grade 0 - afebrile  Chills: Grade 0 - none  Febrile Neutropenia: Grade 0 - not present  Sinus Bradycardia: Grade 0 - none  Hypertension: Grade 0 - none  Hypotension: Grade 0 - none  Chest Pain: Grade 0 - none  Bronchospasm: Grade 0 - none  Pain: Grade 0 - none  Rash: Grade 0 - None  Neurologic Specify: none    If adverse reactions, events or complications occur (fever greater than 2 degrees fahrenheit increase, and severe reactions of the following types: chills, dyspnea, bronchospasm, hyper/hypotension, hypoxia, bradycardia, chest pain, back/flank pain, hypoxia, and any other reaction deemed severe or life threatening; any instance of product bag breakage or unusual product appearance)    Any other events that are >= grade 3, then immediately contact the BMT Attending physician, the Cell Therapy Laboratory Medical Director (pager [phone]) and the Cell Therapy Laboratory ([phone]).  After midnight, holidays & weekends contact the [facility] Blood Bank on the appropriate campus ([facility] [place]: [phone]; [facility] [place]: [phone]).    Smita Hargrove PA-C

## 2024-03-13 NOTE — PROGRESS NOTES
BMT/Cell Therapy Daily Progress Note   03/13/2024    Patient ID:  Rosario Terrazas is a 68 year old female, hx of MM, day s/p Melphalan; day 0 transplant hsct.    INTERVAL  HISTORY     Daughter preferred to serve as  today.  Ms. Terrazas tolerated melphalan fine yesterday. Nursing notes about a half cup of ice chips was what she could tolerate. No mouth sores, n/v or diarrhea to report today. She continues to have small, harder stools.   She is urinating more frequently today, no pain or blood in urine.  Ongoing chronic pain under left sided anterior lower rib area. Worse since she isn't using her brace, someone told her not to wear it with her CVC.  Eating well. Energy levels are good today.    Review of Systems: 10 point ROS negative except as noted above.      PHYSICAL EXAM     Wt Readings from Last 4 Encounters:   03/13/24 98.3 kg (216 lb 11.2 oz)   03/12/24 97.4 kg (214 lb 11.2 oz)   03/07/24 96.9 kg (213 lb 10 oz)   03/06/24 98.5 kg (217 lb 3.2 oz)     /68   Pulse 85   Temp 97.6  F (36.4  C) (Oral)   Resp 16   Wt 98.3 kg (216 lb 11.2 oz)   SpO2 100%   BMI 38.39 kg/m    General: NAD   Eyes: VALERIY, sclera anicteric   Lungs: CTA anteriorly   Cardiovascular: RRR, no M/R/G   Lymphatics: no edema  Skin: no rashes or petechiae  Neuro: A&O   Additional Findings: left CVAD     LABS AND IMAGING: I have assessed all abnormal lab values for their clinical significance and any values considered clinically significant have been addressed in the assessment and plan.        Lab Results   Component Value Date    WBC 9.3 03/13/2024    ANEU 8.6 (H) 03/13/2024    HGB 9.3 (L) 03/13/2024    HCT 29.1 (L) 03/13/2024     (L) 03/13/2024     03/13/2024    POTASSIUM 4.2 03/13/2024    CHLORIDE 108 (H) 03/13/2024    CO2 22 03/13/2024     (H) 03/13/2024    BUN 14.1 03/13/2024    CR 0.56 03/13/2024    MAG 1.3 (L) 03/08/2024    INR 1.22 (H) 03/13/2024       Infusion Procedure Note    Type:  Autologous transplant  Diagnosis: multiple myeloma  Indication for Infusion: Reconstitution of hematopoiesis (transplant graft)  Reviewed and Confirmed for the Infusion: Consent previously obtained  Donor:  autologous  Product Characteristics, Cell Dose and Volume: as described on the infusion form ([id number]).  Patient was Premedicated as Ordered: Yes  Complications:  no    I was present at the beginning of the infusion and available on the floor/unit/clinic through the entire infusion.    Smita Hargrove PA-C          SYSTEMS-BASED ASSESSMENT AND PLAN       Rosario Adan Terrazas is a 68 year old female with MM, s/p melphalan, day 0 transplant.    BMT/IEC PROTOCOL for MM Auto    Transplants for multiple myeloma:  The patient has Standard risk IgG D MM, and goal of 8 million CD34/kg for 2 transplants collected. She had 6.33E+06 CD34/kg collected 3/7/24 and 5.36E+06 CD34/kg collected on 3/8/24.  Today begin conditioning Day -1 for melphalan 200 mg/m2 (not frail, creatinine clearance 30+).   Reviewed side effects and next steps today. Chemo education. Consent for blood transfusions obtained.  3/12 Day -1 Melphalan 200 mg/m2   3/13 Day 0 Transplant, cell total post wash pending in media chart  Allopurinol per protocol    ID  - Continue INH/B6 through transplant for latent TB   - Ppx antibiotics acyclovir, diflucan, levofloxacin     Heme transfuse to keep hgb >7, plt >10  Anemia: secondary to disease, melphalan prep  Mild thrombocytopenia: secondary to disease, melphalan prep. Of note she is on 81mg ASA be aware with plts.    FEN/Renal  Cr/Lytes wnl    CV   ECHO 55-60%   EKG NSR    GI:   Risk for n/v/d, has zyprexa, compazine, zofran.   Historical constipation: use anticonstipation meds with caution going forward    : frequency urinating likely from melphalan flush 3/12. With dysuria or hematuria will obtain UA/Ucx. Hematuria may be from fragmented RBCs infused with transplant, but will still check if other sx  arise.    Endocrine  Thyroid FNA Atypia of undetermined significance diagnosis has a 22 (13-30)% risk of malignancy. Repeat FNA (least 4-6 weeks from previous aspiration with optimal time being 12 weeks after last aspiration) , molecular testing, diagnostic lobectomy, or surveillance is recommended.    Has next appt with endocrine 4/16/24.    Neuro/Pain  Neuropathy: continues on eleni, xanaflex   Rib pain from disease, oxy q 6. 5mg given in clinic today. She will return to wearing her brace as this helps control pain.      Plan: transplant today    RETURN TO CLINIC:  Through Friday, then could likely have Saturday off  Requested appointments starting Jose 3/17 through 3/30  GCSF starts Mon 3/18, dose adjusted down from collections dose    30 minutes spent by me on the date of the encounter doing chart review, history and exam, documentation and further activities per the note    Smita Hargrove PA-C on 3/13/2024 at 1:48 PM  [phone]

## 2024-03-13 NOTE — PROGRESS NOTES
Type of transplant: Donor: Autologous  Product:   BMT INFUSION DOCUMENTATION (last 48 hours)       BMT/Cellular Product Infusion       Row Name 03/13/24 1200 03/13/24 0003             [REMOVED] Product 03/13/24 1240 HPC, Apheresis    Product Details Product Release Date: 03/13/24  -LL Product Release Time: 1240  -LL Product Type: HPC, Apheresis  -LL DIN: [id number]  -LL Product Description Code: [id number]  -LL Volume Dispensed (mL): 658 mL  -LL Completion Date (RN to complete): 03/13/24  -LG Completion Time (RN to complete): 1405  -LG    Checked by (Patient RN) Radha Weir RN  -LG --      Checked by (Witness) Elizabeth Obregon RN  -SALOME --      Product Volume Infused (mL) 658 mL  -LG --      Flush Volume (mL) 60 mL  -LG --      Volume Dispensed (mL) -- 658 mL  -LL                User Key  (r) = Recorded By, (t) = Taken By, (c) = Cosigned By      Initials Name Effective Dates    CM Elizabeth Obregon RN 07/28/23 -     LL SheilaEstrellita Jimmie 01/08/24 -     LG Radha Weir RN 07/11/21 -                   Preparation: RN Documentation  Patient was premedicated as ordered: yes  Line Type: central line, left  Patient Stable Prior to Infusion: yes  Time Infusion Started: 1330  Teaching: side effects/monitoring  Tolerated/Reaction: Patient tolerance of product infusion  Immediate suspected transfusion reaction to the product: none  Did patient have prior history of similar signs/symptoms during this hospitalization?: NA  Did the patient tolerate the infusion well: yes    Plan: Pt arrives for transplant feeling well. Labs monitored, no additional infusion needs identified. Premedicated with tylenol and benadryl. Tolerated transplant w/o side effect or symptom of reaction. 1x dose oxycodone administered for chronic pain.    Radha Weir RN

## 2024-03-14 ENCOUNTER — APPOINTMENT (OUTPATIENT)
Dept: LAB | Facility: CLINIC | Age: 69
End: 2024-03-14
Attending: INTERNAL MEDICINE
Payer: COMMERCIAL

## 2024-03-14 ENCOUNTER — ALLIED HEALTH/NURSE VISIT (OUTPATIENT)
Dept: TRANSPLANT | Facility: CLINIC | Age: 69
End: 2024-03-14
Attending: INTERNAL MEDICINE
Payer: COMMERCIAL

## 2024-03-14 VITALS
WEIGHT: 217.6 LBS | BODY MASS INDEX: 38.55 KG/M2 | DIASTOLIC BLOOD PRESSURE: 77 MMHG | OXYGEN SATURATION: 100 % | TEMPERATURE: 98 F | SYSTOLIC BLOOD PRESSURE: 113 MMHG | RESPIRATION RATE: 18 BRPM | HEART RATE: 101 BPM

## 2024-03-14 DIAGNOSIS — Z71.9 VISIT FOR COUNSELING: Primary | ICD-10-CM

## 2024-03-14 DIAGNOSIS — C90.00 MULTIPLE MYELOMA, REMISSION STATUS UNSPECIFIED (H): ICD-10-CM

## 2024-03-14 PROBLEM — C90.01 MULTIPLE MYELOMA IN REMISSION (H): Status: ACTIVE | Noted: 2023-09-13

## 2024-03-14 LAB
ANION GAP SERPL CALCULATED.3IONS-SCNC: 10 MMOL/L (ref 7–15)
BASOPHILS # BLD AUTO: ABNORMAL 10*3/UL
BASOPHILS # BLD MANUAL: 0 10E3/UL (ref 0–0.2)
BASOPHILS NFR BLD AUTO: ABNORMAL %
BASOPHILS NFR BLD MANUAL: 0 %
BUN SERPL-MCNC: 17.4 MG/DL (ref 8–23)
CALCIUM SERPL-MCNC: 8 MG/DL (ref 8.8–10.2)
CHLORIDE SERPL-SCNC: 109 MMOL/L (ref 98–107)
CREAT SERPL-MCNC: 0.55 MG/DL (ref 0.51–0.95)
DEPRECATED HCO3 PLAS-SCNC: 22 MMOL/L (ref 22–29)
EGFRCR SERPLBLD CKD-EPI 2021: >90 ML/MIN/1.73M2
ELLIPTOCYTES BLD QL SMEAR: SLIGHT
EOSINOPHIL # BLD AUTO: ABNORMAL 10*3/UL
EOSINOPHIL # BLD MANUAL: 0 10E3/UL (ref 0–0.7)
EOSINOPHIL NFR BLD AUTO: ABNORMAL %
EOSINOPHIL NFR BLD MANUAL: 0 %
ERYTHROCYTE [DISTWIDTH] IN BLOOD BY AUTOMATED COUNT: 17.3 % (ref 10–15)
FRAGMENTS BLD QL SMEAR: SLIGHT
GLUCOSE SERPL-MCNC: 121 MG/DL (ref 70–99)
HCT VFR BLD AUTO: 27.4 % (ref 35–47)
HGB BLD-MCNC: 8.9 G/DL (ref 11.7–15.7)
IMM GRANULOCYTES # BLD: ABNORMAL 10*3/UL
IMM GRANULOCYTES NFR BLD: ABNORMAL %
LYMPHOCYTES # BLD AUTO: ABNORMAL 10*3/UL
LYMPHOCYTES # BLD MANUAL: 0 10E3/UL (ref 0.8–5.3)
LYMPHOCYTES NFR BLD AUTO: ABNORMAL %
LYMPHOCYTES NFR BLD MANUAL: 0 %
MCH RBC QN AUTO: 32.5 PG (ref 26.5–33)
MCHC RBC AUTO-ENTMCNC: 32.5 G/DL (ref 31.5–36.5)
MCV RBC AUTO: 100 FL (ref 78–100)
METAMYELOCYTES # BLD MANUAL: 0.3 10E3/UL
METAMYELOCYTES NFR BLD MANUAL: 3 %
MONOCYTES # BLD AUTO: ABNORMAL 10*3/UL
MONOCYTES # BLD MANUAL: 0 10E3/UL (ref 0–1.3)
MONOCYTES NFR BLD AUTO: ABNORMAL %
MONOCYTES NFR BLD MANUAL: 0 %
MYELOCYTES # BLD MANUAL: 0.2 10E3/UL
MYELOCYTES NFR BLD MANUAL: 2 %
NEUTROPHILS # BLD AUTO: ABNORMAL 10*3/UL
NEUTROPHILS # BLD MANUAL: 8.9 10E3/UL (ref 1.6–8.3)
NEUTROPHILS NFR BLD AUTO: ABNORMAL %
NEUTROPHILS NFR BLD MANUAL: 95 %
NRBC # BLD AUTO: 0 10E3/UL
NRBC BLD AUTO-RTO: 0 /100
PLAT MORPH BLD: ABNORMAL
PLATELET # BLD AUTO: 114 10E3/UL (ref 150–450)
POTASSIUM SERPL-SCNC: 4.1 MMOL/L (ref 3.4–5.3)
RBC # BLD AUTO: 2.74 10E6/UL (ref 3.8–5.2)
RBC MORPH BLD: ABNORMAL
SODIUM SERPL-SCNC: 141 MMOL/L (ref 135–145)
WBC # BLD AUTO: 9.4 10E3/UL (ref 4–11)

## 2024-03-14 PROCEDURE — 99214 OFFICE O/P EST MOD 30 MIN: CPT | Mod: 24

## 2024-03-14 PROCEDURE — 250N000011 HC RX IP 250 OP 636: Performed by: INTERNAL MEDICINE

## 2024-03-14 PROCEDURE — G0463 HOSPITAL OUTPT CLINIC VISIT: HCPCS

## 2024-03-14 PROCEDURE — 36592 COLLECT BLOOD FROM PICC: CPT

## 2024-03-14 PROCEDURE — 80048 BASIC METABOLIC PNL TOTAL CA: CPT

## 2024-03-14 PROCEDURE — 85007 BL SMEAR W/DIFF WBC COUNT: CPT

## 2024-03-14 PROCEDURE — 85027 COMPLETE CBC AUTOMATED: CPT

## 2024-03-14 RX ORDER — DIPHENHYDRAMINE HYDROCHLORIDE 50 MG/ML
50 INJECTION INTRAMUSCULAR; INTRAVENOUS
Status: CANCELLED
Start: 2024-03-15

## 2024-03-14 RX ORDER — HEPARIN SODIUM,PORCINE 10 UNIT/ML
5 VIAL (ML) INTRAVENOUS ONCE
Status: COMPLETED | OUTPATIENT
Start: 2024-03-14 | End: 2024-03-14

## 2024-03-14 RX ORDER — EPINEPHRINE 1 MG/ML
0.3 INJECTION, SOLUTION INTRAMUSCULAR; SUBCUTANEOUS EVERY 5 MIN PRN
Status: CANCELLED | OUTPATIENT
Start: 2024-03-15

## 2024-03-14 RX ORDER — HEPARIN SODIUM,PORCINE 10 UNIT/ML
5-20 VIAL (ML) INTRAVENOUS DAILY PRN
Status: CANCELLED | OUTPATIENT
Start: 2024-03-15

## 2024-03-14 RX ORDER — HEPARIN SODIUM (PORCINE) LOCK FLUSH IV SOLN 100 UNIT/ML 100 UNIT/ML
5 SOLUTION INTRAVENOUS
Status: CANCELLED | OUTPATIENT
Start: 2024-03-15

## 2024-03-14 RX ADMIN — Medication 5 ML: at 12:46

## 2024-03-14 RX ADMIN — Medication 5 ML: at 12:47

## 2024-03-14 ASSESSMENT — PAIN SCALES - GENERAL: PAINLEVEL: SEVERE PAIN (6)

## 2024-03-14 NOTE — PROGRESS NOTES
BMT Post Infusion Documentation    Data   Patient Vitals for the past 72 hrs:   Temp Temp src Pulse Resp BP   03/14/24 1231 98  F (36.7  C) Oral 101 18 113/77     BMT INFUSION DOCUMENTATION (last 24 hours)       BMT/Cellular Product Infusion    No documentation.                     Post-Infusion Evaluation:   Infusion Related Reaction: Grade 0 - none  Dyspnea: Grade 0 - none  Hypoxia: Grade 0 - not present  Fever: Grade 0 - afebrile  Chills: Grade 0 - none  Febrile Neutropenia: Grade 0 - not present  Sinus Bradycardia: Grade 0 - none  Hypertension: Grade 0 - none  Hypotension: Grade 0 - none  Chest Pain: Grade 0 - none  Bronchospasm: Grade 0 - none  Pain: Grade 0 - none  Rash: Grade 0 - None  Neurologic Specify: none    If this was a cord blood transplant, was more than one cord blood unit infused? no    Smita Hargrove PA-C

## 2024-03-14 NOTE — NURSING NOTE
"Oncology Rooming Note    March 14, 2024 12:52 PM   Rosario Terrazas is a 68 year old female who presents for:    Chief Complaint   Patient presents with    Oncology Clinic Visit     Multiple myeloma, remission status unspecified    Blood Draw     Labs drawn via CVC by RN. VS taken.     Initial Vitals: /77 (BP Location: Right arm, Patient Position: Sitting, Cuff Size: Adult Large)   Pulse 101   Temp 98  F (36.7  C) (Oral)   Resp 18   Wt 98.7 kg (217 lb 9.6 oz)   SpO2 100%   BMI 38.55 kg/m   Estimated body mass index is 38.55 kg/m  as calculated from the following:    Height as of 3/6/24: 1.6 m (5' 3\").    Weight as of this encounter: 98.7 kg (217 lb 9.6 oz). Body surface area is 2.09 meters squared.  Severe Pain (6) Comment: Data Unavailable   No LMP recorded. Patient is postmenopausal.  Allergies reviewed: Yes  Medications reviewed: Yes    Medications: Medication refills not needed today.  Pharmacy name entered into EPIC: Data Unavailable    Frailty Screening:   Is the patient here for a new oncology consult visit in cancer care? 2. No      Clinical concerns: none       Caroline Lofton              "

## 2024-03-14 NOTE — LETTER
3/14/2024         RE: Rosario Terrazas  [address] [place] [address]        Dear Colleague,    Thank you for referring your patient, Rosario Terrazas, to the [facility] BLOOD AND MARROW TRANSPLANT PROGRAM [place]. Please see a copy of my visit note below.    BMT/Cell Therapy Daily Progress Note   03/14/2024    Patient ID:  Rosario Terrazas is a 68 year old female, hx of MM, day s/p Melphalan; day 0 transplant hsct.    INTERVAL  HISTORY     Daughter preferred to serve as  today.  Ms. Terrazas notes decrease in appetite, but tolerating porridge, rice, eggs and vegetables. She urinated about four times overnight, but they feel that is normal when they take in more porridge. No dysuria or hematuria.  Good energy, still doing some house chores.  They want to adjust the brace before using it due to the CVC, the daughter feels she can adjust it easily on her own so Rosario can get back to wearing it.    Review of Systems: 10 point ROS negative except as noted above.      PHYSICAL EXAM     Wt Readings from Last 4 Encounters:   03/14/24 98.7 kg (217 lb 9.6 oz)   03/13/24 98.3 kg (216 lb 11.2 oz)   03/12/24 97.4 kg (214 lb 11.2 oz)   03/07/24 96.9 kg (213 lb 10 oz)     /77 (BP Location: Right arm, Patient Position: Sitting, Cuff Size: Adult Large)   Pulse 101   Temp 98  F (36.7  C) (Oral)   Resp 18   Wt 98.7 kg (217 lb 9.6 oz)   SpO2 100%   BMI 38.55 kg/m    General: NAD   Eyes: VALERIY, sclera anicteric   Lungs: CTA anteriorly   Cardiovascular: RRR, no M/R/G   Lymphatics: doughy ankles, no pitting edema  Skin: no rashes or petechiae  Neuro: A&O   Additional Findings: left CVAD     LABS AND IMAGING: I have assessed all abnormal lab values for their clinical significance and any values considered clinically significant have been addressed in the assessment and plan.        Lab Results   Component Value Date    WBC 9.4 03/14/2024    ANEU 8.9 (H) 03/14/2024    HGB  8.9 (L) 03/14/2024    HCT 27.4 (L) 03/14/2024     (L) 03/14/2024     03/14/2024    POTASSIUM 4.1 03/14/2024    CHLORIDE 109 (H) 03/14/2024    CO2 22 03/14/2024     (H) 03/14/2024    BUN 17.4 03/14/2024    CR 0.55 03/14/2024    MAG 1.3 (L) 03/08/2024    INR 1.22 (H) 03/13/2024         SYSTEMS-BASED ASSESSMENT AND PLAN       Rosario Terrazas is a 68 year old female with MM, s/p melphalan, day 1 transplant.    BMT/IEC PROTOCOL for MM Auto    Transplants for multiple myeloma:  The patient has Standard risk IgG D MM, and goal of 8 million CD34/kg for 2 transplants collected. She had 6.33E+06 CD34/kg collected 3/7/24 and 5.36E+06 CD34/kg collected on 3/8/24.  Today begin conditioning Day -1 for melphalan 200 mg/m2 (not frail, creatinine clearance 30+).   Reviewed side effects and next steps today. Chemo education. Consent for blood transfusions obtained.  3/12 Day -1 Melphalan 200 mg/m2   3/13 Day 0 Transplant, cell total post wash pending in media chart  Allopurinol per protocol    ID  - Continue INH/B6 through transplant for latent TB   - Ppx antibiotics acyclovir, diflucan, levofloxacin     Heme transfuse to keep hgb >7, plt >10  Anemia: secondary to disease, melphalan prep  Mild thrombocytopenia: secondary to disease, melphalan prep. Of note she is on 81mg ASA be aware with plts.    FEN/Renal  Cr/Lytes wnl    CV   ECHO 55-60%   EKG NSR    GI:   Risk for n/v/d, has zyprexa, compazine, zofran.   Historical constipation: use anticonstipation meds with caution going forward    : frequency urinating likely from melphalan flush 3/12. With dysuria or hematuria will obtain UA/Ucx. Hematuria may be from fragmented RBCs infused with transplant, but will still check if other sx arise.    Endocrine  Thyroid FNA Atypia of undetermined significance diagnosis has a 22 (13-30)% risk of malignancy. Repeat FNA (least 4-6 weeks from previous aspiration with optimal time being 12 weeks after last aspiration) ,  molecular testing, diagnostic lobectomy, or surveillance is recommended.    Has next appt with endocrine 4/16/24.    Neuro/Pain  Neuropathy: continues on eleni, xanaflex   Rib pain from disease, oxy q 6. 5mg given in clinic today. She will return to wearing her brace as this helps control pain.      Plan/RTC  Sx and counts stable today  Through tomorrow, then plan for Saturday off  Daily appointments starting Jose 3/17 through 3/30  GCSF starts Mon 3/18, dose adjusted down from collections dose    30 minutes spent by me on the date of the encounter doing chart review, history and exam, documentation and further activities per the note    Smita Hargrove PA-C on 3/14/2024 at 2:02 PM    [phone]          BMT Post Infusion Documentation    Data  Patient Vitals for the past 72 hrs:   Temp Temp src Pulse Resp BP   03/14/24 1231 98  F (36.7  C) Oral 101 18 113/77     BMT INFUSION DOCUMENTATION (last 24 hours)       BMT/Cellular Product Infusion    No documentation.                     Post-Infusion Evaluation:   Infusion Related Reaction: Grade 0 - none  Dyspnea: Grade 0 - none  Hypoxia: Grade 0 - not present  Fever: Grade 0 - afebrile  Chills: Grade 0 - none  Febrile Neutropenia: Grade 0 - not present  Sinus Bradycardia: Grade 0 - none  Hypertension: Grade 0 - none  Hypotension: Grade 0 - none  Chest Pain: Grade 0 - none  Bronchospasm: Grade 0 - none  Pain: Grade 0 - none  Rash: Grade 0 - None  Neurologic Specify: none    If this was a cord blood transplant, was more than one cord blood unit infused? no    Smita Hargrove PA-C

## 2024-03-14 NOTE — PROGRESS NOTES
BMT/Cell Therapy Daily Progress Note   03/14/2024    Patient ID:  Rosario Terrazas is a 68 year old female, hx of MM, day s/p Melphalan; day 0 transplant hsct.    INTERVAL  HISTORY     Daughter preferred to serve as  today.  Ms. Terrazas notes decrease in appetite, but tolerating porridge, rice, eggs and vegetables. She urinated about four times overnight, but they feel that is normal when they take in more porridge. No dysuria or hematuria.  Good energy, still doing some house chores.  They want to adjust the brace before using it due to the CVC, the daughter feels she can adjust it easily on her own so Rosario can get back to wearing it.    Review of Systems: 10 point ROS negative except as noted above.      PHYSICAL EXAM     Wt Readings from Last 4 Encounters:   03/14/24 98.7 kg (217 lb 9.6 oz)   03/13/24 98.3 kg (216 lb 11.2 oz)   03/12/24 97.4 kg (214 lb 11.2 oz)   03/07/24 96.9 kg (213 lb 10 oz)     /77 (BP Location: Right arm, Patient Position: Sitting, Cuff Size: Adult Large)   Pulse 101   Temp 98  F (36.7  C) (Oral)   Resp 18   Wt 98.7 kg (217 lb 9.6 oz)   SpO2 100%   BMI 38.55 kg/m    General: NAD   Eyes: VALERIY, sclera anicteric   Lungs: CTA anteriorly   Cardiovascular: RRR, no M/R/G   Lymphatics: doughy ankles, no pitting edema  Skin: no rashes or petechiae  Neuro: A&O   Additional Findings: left CVAD     LABS AND IMAGING: I have assessed all abnormal lab values for their clinical significance and any values considered clinically significant have been addressed in the assessment and plan.        Lab Results   Component Value Date    WBC 9.4 03/14/2024    ANEU 8.9 (H) 03/14/2024    HGB 8.9 (L) 03/14/2024    HCT 27.4 (L) 03/14/2024     (L) 03/14/2024     03/14/2024    POTASSIUM 4.1 03/14/2024    CHLORIDE 109 (H) 03/14/2024    CO2 22 03/14/2024     (H) 03/14/2024    BUN 17.4 03/14/2024    CR 0.55 03/14/2024    MAG 1.3 (L) 03/08/2024    INR 1.22 (H) 03/13/2024          SYSTEMS-BASED ASSESSMENT AND PLAN       Rosario Terrazas is a 68 year old female with MM, s/p melphalan, day 1 transplant.    BMT/IEC PROTOCOL for MM Auto    Transplants for multiple myeloma:  The patient has Standard risk IgG D MM, and goal of 8 million CD34/kg for 2 transplants collected. She had 6.33E+06 CD34/kg collected 3/7/24 and 5.36E+06 CD34/kg collected on 3/8/24.  Today begin conditioning Day -1 for melphalan 200 mg/m2 (not frail, creatinine clearance 30+).   Reviewed side effects and next steps today. Chemo education. Consent for blood transfusions obtained.  3/12 Day -1 Melphalan 200 mg/m2   3/13 Day 0 Transplant, cell total post wash pending in media chart  Allopurinol per protocol    ID  - Continue INH/B6 through transplant for latent TB   - Ppx antibiotics acyclovir, diflucan, levofloxacin     Heme transfuse to keep hgb >7, plt >10  Anemia: secondary to disease, melphalan prep  Mild thrombocytopenia: secondary to disease, melphalan prep. Of note she is on 81mg ASA be aware with plts.    FEN/Renal  Cr/Lytes wnl    CV   ECHO 55-60%   EKG NSR    GI:   Risk for n/v/d, has zyprexa, compazine, zofran.   Historical constipation: use anticonstipation meds with caution going forward    : frequency urinating likely from melphalan flush 3/12. With dysuria or hematuria will obtain UA/Ucx. Hematuria may be from fragmented RBCs infused with transplant, but will still check if other sx arise.    Endocrine  Thyroid FNA Atypia of undetermined significance diagnosis has a 22 (13-30)% risk of malignancy. Repeat FNA (least 4-6 weeks from previous aspiration with optimal time being 12 weeks after last aspiration) , molecular testing, diagnostic lobectomy, or surveillance is recommended.    Has next appt with endocrine 4/16/24.    Neuro/Pain  Neuropathy: continues on eleni, xanaflex   Rib pain from disease, oxy q 6. 5mg given in clinic today. She will return to wearing her brace as this helps control  pain.      Plan/RTC  Sx and counts stable today  Through tomorrow, then plan for Saturday off  Daily appointments starting Jose 3/17 through 3/30  GCSF starts Mon 3/18, dose adjusted down from collections dose    30 minutes spent by me on the date of the encounter doing chart review, history and exam, documentation and further activities per the note    Smita Hargrove PA-C on 3/14/2024 at 2:02 PM    [phone]

## 2024-03-14 NOTE — NURSING NOTE
Chief Complaint   Patient presents with    Oncology Clinic Visit     Multiple myeloma, remission status unspecified    Blood Draw     Labs drawn via CVC by RN. VS taken.     Labs collected from CVC by RN, line flushed with saline and heparin.  Vitals taken. Pt checked in for appointment(s).     Shanice Sarabia RN

## 2024-03-14 NOTE — PROGRESS NOTES
BMT/Cell Therapy Daily Progress Note   03/15/2024    Patient ID:  Rosario Terrazas is a 68 year old female, hx of MM, day + 2 s/p HD melphalan and auto transplant hsct.    INTERVAL  HISTORY     Daughter served as  today.    Ms. Terrazas notes nausea with an episode of emesis this AM.  No diarrhea.  Eating ok.  She hasn't taken any antinausea medications as she wasn't having nausea prior.  We discussed the nausea meds she has and will now take zofran and zyprexa scheduled with compazine as needed.  No fever or bleeding.      Pain is significant but stable.      Review of Systems: 10 point ROS negative except as noted above.      PHYSICAL EXAM     Wt Readings from Last 4 Encounters:   03/15/24 98.1 kg (216 lb 4.8 oz)   03/14/24 98.7 kg (217 lb 9.6 oz)   03/13/24 98.3 kg (216 lb 11.2 oz)   03/12/24 97.4 kg (214 lb 11.2 oz)     /65   Pulse 73   Temp 97.7  F (36.5  C) (Oral)   Resp 16   Wt 98.1 kg (216 lb 4.8 oz)   SpO2 96%   BMI 38.32 kg/m    General: NAD   Eyes: VALERIY, sclera anicteric   Lungs: CTA anteriorly   Cardiovascular: RRR, no M/R/G   Lymphatics: doughy ankles, no pitting edema  Skin: no rashes or petechiae  Neuro: A&O   Additional Findings: left CVAD     LABS AND IMAGING: I have assessed all abnormal lab values for their clinical significance and any values considered clinically significant have been addressed in the assessment and plan.        Lab Results   Component Value Date    WBC 9.4 03/14/2024    ANEU 8.9 (H) 03/14/2024    HGB 8.9 (L) 03/14/2024    HCT 27.4 (L) 03/14/2024     (L) 03/14/2024     03/14/2024    POTASSIUM 4.1 03/14/2024    CHLORIDE 109 (H) 03/14/2024    CO2 22 03/14/2024     (H) 03/14/2024    BUN 17.4 03/14/2024    CR 0.55 03/14/2024    MAG 1.3 (L) 03/08/2024    INR 1.22 (H) 03/13/2024         SYSTEMS-BASED ASSESSMENT AND PLAN       Rosario Terrazas is a 68 year old female with MM, s/p melphalan, day 1 transplant.    BMT/IEC PROTOCOL for  MM Auto    Transplants for multiple myeloma:  The patient has Standard risk IgG D MM, and goal of 8 million CD34/kg for 2 transplants collected. She had 6.33E+06 CD34/kg collected 3/7/24 and 5.36E+06 CD34/kg collected on 3/8/24.    - Day -1 for melphalan 200 mg/m2 (not frail, creatinine clearance 30+).     3/12 Day -1 Melphalan 200 mg/m2   3/13 Day 0 Transplant, cell total post wash 2.23 x 10^6 CD34 + cells/kg (pre freeze dose: 5.36 x 10 ^6 CD34+ cells/kg)  Allopurinol per protocol    ID  - Continue INH/B6 through transplant for latent TB   - Ppx antibiotics acyclovir, diflucan, levofloxacin     Heme transfuse to keep hgb >7g/dL, plt >10k  Pancytopenia: from chemo  - Of note she is on 81mg ASA be aware with plts.    FEN/Renal  Cr/Lytes wnl    CV   ECHO 55-60%   EKG NSR    GI:   Risk for n/v/d, has zyprexa, compazine, zofran--hasn't used these.  Will schedule zofran & zyprexa; compazine prn.   Historical constipation: use anticonstipation meds with caution going forward    Endocrine  Thyroid FNA Atypia of undetermined significance diagnosis has a 22 (13-30)% risk of malignancy. Repeat FNA (least 4-6 weeks from previous aspiration with optimal time being 12 weeks after last aspiration) , molecular testing, diagnostic lobectomy, or surveillance is recommended.    Has next appt with endocrine 4/16/24.    Neuro/Pain  Neuropathy: continues on eleni, xanaflex   Rib pain from disease, oxy q 6. 5mg given in clinic today. She will return to wearing her brace as this helps control pain.    Plan/RTC  Schedule zofran/zyprexa; use compazine prn  IV zofran and 1L IVF today  BMT therapy plan is up to date  Added blood plan today  Daily appointments starting Jose 3/17--can come in 3/16 if needed  GCSF starts Mon 3/18    40 minutes spent by me on the date of the encounter doing chart review, history and exam, documentation and further activities per the note    Sowmya Higuera PA-C

## 2024-03-14 NOTE — PROGRESS NOTES
OUTPATIENT AUTOLOGOUS BMT  BMT SOCIAL WORK POST-TRANSPLANT NOTE    Focus: Discharge Plan    Data: Rosario Terrazas is a 68 year old female, hx of MM, day s/p Melphalan; day +1 transplant hsct.    Interventions: SW inquired about  for Patient and Pt declined requesting her dtr to interpret as they do consistently. SW met with pt and her dtr to assess coping, provide psychosocial support and provide packet with post-transplant resources for patient and caregiver. Patient and daughter state they are doing well and decline concerns or needs. SW provided empathetic listening, validation of concerns, and encouragement. SW encouraged pt to contact SW for support, questions and/or resources.     Education Provided: Post-Transplant Resource Packet, Caregiver Self-Care Education, Expected Emotional Responses to Post-Transplant.    Assessment: Pt presented as pleasant and calm.  Pt appears to be coping appropriately. Pt continues to be supported by her daughter.    Plan: Pt to return home from clinic with her dtr Katherin as primary caregiver. SW will continue to provide psychosocial support and assistance with resources as needed. NELLIE will continue to collaborate with multidisciplinary team regarding pt's plan of care.       JAYDE Vasquez, [facility]  Adult Blood & Marrow Transplant   Phone: [phone]  [facility] Search[employer] at BMT SW 2

## 2024-03-15 ENCOUNTER — ONCOLOGY VISIT (OUTPATIENT)
Dept: TRANSPLANT | Facility: CLINIC | Age: 69
End: 2024-03-15
Attending: PHYSICIAN ASSISTANT
Payer: COMMERCIAL

## 2024-03-15 ENCOUNTER — APPOINTMENT (OUTPATIENT)
Dept: LAB | Facility: CLINIC | Age: 69
End: 2024-03-15
Attending: PHYSICIAN ASSISTANT
Payer: COMMERCIAL

## 2024-03-15 VITALS
OXYGEN SATURATION: 96 % | SYSTOLIC BLOOD PRESSURE: 101 MMHG | WEIGHT: 216.3 LBS | BODY MASS INDEX: 38.32 KG/M2 | DIASTOLIC BLOOD PRESSURE: 65 MMHG | RESPIRATION RATE: 16 BRPM | TEMPERATURE: 97.7 F | HEART RATE: 73 BPM

## 2024-03-15 DIAGNOSIS — C90.00 MULTIPLE MYELOMA, REMISSION STATUS UNSPECIFIED (H): Primary | ICD-10-CM

## 2024-03-15 DIAGNOSIS — C90.01 MULTIPLE MYELOMA IN REMISSION (H): ICD-10-CM

## 2024-03-15 DIAGNOSIS — C90.00 MULTIPLE MYELOMA, REMISSION STATUS UNSPECIFIED (H): ICD-10-CM

## 2024-03-15 LAB
ANION GAP SERPL CALCULATED.3IONS-SCNC: 11 MMOL/L (ref 7–15)
BASOPHILS # BLD AUTO: ABNORMAL 10*3/UL
BASOPHILS # BLD MANUAL: 0 10E3/UL (ref 0–0.2)
BASOPHILS NFR BLD AUTO: ABNORMAL %
BASOPHILS NFR BLD MANUAL: 0 %
BUN SERPL-MCNC: 18.3 MG/DL (ref 8–23)
CALCIUM SERPL-MCNC: 8 MG/DL (ref 8.8–10.2)
CHLORIDE SERPL-SCNC: 108 MMOL/L (ref 98–107)
CREAT SERPL-MCNC: 0.55 MG/DL (ref 0.51–0.95)
DACRYOCYTES BLD QL SMEAR: SLIGHT
DEPRECATED HCO3 PLAS-SCNC: 23 MMOL/L (ref 22–29)
EGFRCR SERPLBLD CKD-EPI 2021: >90 ML/MIN/1.73M2
ELLIPTOCYTES BLD QL SMEAR: SLIGHT
EOSINOPHIL # BLD AUTO: ABNORMAL 10*3/UL
EOSINOPHIL # BLD MANUAL: 0 10E3/UL (ref 0–0.7)
EOSINOPHIL NFR BLD AUTO: ABNORMAL %
EOSINOPHIL NFR BLD MANUAL: 0 %
ERYTHROCYTE [DISTWIDTH] IN BLOOD BY AUTOMATED COUNT: 17.3 % (ref 10–15)
GLUCOSE SERPL-MCNC: 120 MG/DL (ref 70–99)
HCT VFR BLD AUTO: 25.8 % (ref 35–47)
HGB BLD-MCNC: 8.5 G/DL (ref 11.7–15.7)
IMM GRANULOCYTES # BLD: ABNORMAL 10*3/UL
IMM GRANULOCYTES NFR BLD: ABNORMAL %
INR PPP: 1.12 (ref 0.85–1.15)
LYMPHOCYTES # BLD AUTO: ABNORMAL 10*3/UL
LYMPHOCYTES # BLD MANUAL: 0.1 10E3/UL (ref 0.8–5.3)
LYMPHOCYTES NFR BLD AUTO: ABNORMAL %
LYMPHOCYTES NFR BLD MANUAL: 3 %
MCH RBC QN AUTO: 32.8 PG (ref 26.5–33)
MCHC RBC AUTO-ENTMCNC: 32.9 G/DL (ref 31.5–36.5)
MCV RBC AUTO: 100 FL (ref 78–100)
MONOCYTES # BLD AUTO: ABNORMAL 10*3/UL
MONOCYTES # BLD MANUAL: 0 10E3/UL (ref 0–1.3)
MONOCYTES NFR BLD AUTO: ABNORMAL %
MONOCYTES NFR BLD MANUAL: 1 %
NEUTROPHILS # BLD AUTO: ABNORMAL 10*3/UL
NEUTROPHILS # BLD MANUAL: 2.2 10E3/UL (ref 1.6–8.3)
NEUTROPHILS NFR BLD AUTO: ABNORMAL %
NEUTROPHILS NFR BLD MANUAL: 96 %
NRBC # BLD AUTO: 0 10E3/UL
NRBC BLD AUTO-RTO: 0 /100
PLAT MORPH BLD: ABNORMAL
PLATELET # BLD AUTO: 112 10E3/UL (ref 150–450)
POTASSIUM SERPL-SCNC: 3.6 MMOL/L (ref 3.4–5.3)
RBC # BLD AUTO: 2.59 10E6/UL (ref 3.8–5.2)
RBC MORPH BLD: ABNORMAL
SODIUM SERPL-SCNC: 142 MMOL/L (ref 135–145)
WBC # BLD AUTO: 2.3 10E3/UL (ref 4–11)

## 2024-03-15 PROCEDURE — 99215 OFFICE O/P EST HI 40 MIN: CPT | Mod: 24

## 2024-03-15 PROCEDURE — 250N000011 HC RX IP 250 OP 636: Performed by: PHYSICIAN ASSISTANT

## 2024-03-15 PROCEDURE — 36592 COLLECT BLOOD FROM PICC: CPT

## 2024-03-15 PROCEDURE — 85610 PROTHROMBIN TIME: CPT

## 2024-03-15 PROCEDURE — 85007 BL SMEAR W/DIFF WBC COUNT: CPT

## 2024-03-15 PROCEDURE — G0463 HOSPITAL OUTPT CLINIC VISIT: HCPCS | Mod: 25

## 2024-03-15 PROCEDURE — 258N000003 HC RX IP 258 OP 636: Performed by: PHYSICIAN ASSISTANT

## 2024-03-15 PROCEDURE — 85027 COMPLETE CBC AUTOMATED: CPT

## 2024-03-15 PROCEDURE — 82374 ASSAY BLOOD CARBON DIOXIDE: CPT

## 2024-03-15 PROCEDURE — 96361 HYDRATE IV INFUSION ADD-ON: CPT

## 2024-03-15 PROCEDURE — 96374 THER/PROPH/DIAG INJ IV PUSH: CPT

## 2024-03-15 RX ORDER — HEPARIN SODIUM,PORCINE 10 UNIT/ML
5 VIAL (ML) INTRAVENOUS ONCE
Status: COMPLETED | OUTPATIENT
Start: 2024-03-15 | End: 2024-03-15

## 2024-03-15 RX ORDER — ONDANSETRON 2 MG/ML
8 INJECTION INTRAMUSCULAR; INTRAVENOUS ONCE
Status: COMPLETED | OUTPATIENT
Start: 2024-03-15 | End: 2024-03-15

## 2024-03-15 RX ORDER — TRIAMCINOLONE ACETONIDE 5 MG/G
CREAM TOPICAL
Status: ON HOLD | COMMUNITY
Start: 2023-12-21 | End: 2024-04-02

## 2024-03-15 RX ADMIN — SODIUM CHLORIDE 1000 ML: 9 INJECTION, SOLUTION INTRAVENOUS at 11:25

## 2024-03-15 RX ADMIN — Medication 5 ML: at 09:58

## 2024-03-15 RX ADMIN — ONDANSETRON 8 MG: 2 INJECTION INTRAMUSCULAR; INTRAVENOUS at 11:27

## 2024-03-15 ASSESSMENT — PAIN SCALES - GENERAL: PAINLEVEL: SEVERE PAIN (6)

## 2024-03-15 NOTE — NURSING NOTE
"Oncology Rooming Note    March 15, 2024 10:48 AM   Rosario Terrazas is a 68 year old female who presents for:    Chief Complaint   Patient presents with    Blood Draw     Labs drawn via CVC    Oncology Clinic Visit     Multiple myeloma, remission status unspecified     Initial Vitals: /65   Pulse 73   Temp 97.7  F (36.5  C) (Oral)   Resp 16   Wt 98.1 kg (216 lb 4.8 oz)   SpO2 96%   BMI 38.32 kg/m   Estimated body mass index is 38.32 kg/m  as calculated from the following:    Height as of 3/6/24: 1.6 m (5' 3\").    Weight as of this encounter: 98.1 kg (216 lb 4.8 oz). Body surface area is 2.09 meters squared.  Severe Pain (6) Comment: Data Unavailable   No LMP recorded. Patient is postmenopausal.  Allergies reviewed: Yes  Medications reviewed: Yes    Medications: Medication refills not needed today.  Pharmacy name entered into [facility]: [place], [place] - [address] [place] [address] [phone]    Frailty Screening:   Is the patient here for a new oncology consult visit in cancer care? 2. No      Clinical concerns: pt has nausea. Vomited once on the way here.       Fabricio Martin"

## 2024-03-15 NOTE — PROGRESS NOTES
Infusion Nursing Note:  Rosario Terrazas presents today for add on infusion.    Patient seen by provider today: Yes: NEHA Chase   present during visit today: Not Applicable.    Note: Received 1L NS bolus and Zofran 8 mg IVP. Reports feeling improved following IVF.       Intravenous Access:  [place]ilder.    Treatment Conditions:  Lab Results   Component Value Date    HGB 8.5 (L) 03/15/2024    WBC 2.3 (L) 03/15/2024    ANEU 2.2 03/15/2024    ANEUTAUTO 20.4 (H) 03/06/2024     (L) 03/15/2024        Lab Results   Component Value Date     03/15/2024    POTASSIUM 3.6 03/15/2024    MAG 1.3 (L) 03/08/2024    CR 0.55 03/15/2024    RAMIREZ 8.0 (L) 03/15/2024    BILITOTAL 0.2 03/12/2024    ALBUMIN 3.6 03/12/2024    ALT 12 03/12/2024    AST 24 03/12/2024         Post Infusion Assessment:  Patient tolerated infusion without incident.  Blood return noted pre and post infusion.  Site patent and intact, free from redness, edema or discomfort.       Discharge Plan:   AVS to patient via MYCHART.  Patient will return 3/17 for next appointment.   Patient discharged in stable condition accompanied by: daughter.  Departure Mode: Ambulatory.      Winsome Wells RN

## 2024-03-15 NOTE — LETTER
3/15/2024         RE: Rosario Terrazas  [address] [place] [address]        Dear Colleague,    Thank you for referring your patient, Rosario Terrazas, to the [facility] BLOOD AND MARROW TRANSPLANT PROGRAM [place]. Please see a copy of my visit note below.    BMT/Cell Therapy Daily Progress Note   03/15/2024    Patient ID:  Rosario Terrazas is a 68 year old female, hx of MM, day + 2 s/p HD melphalan and auto transplant hsct.    INTERVAL  HISTORY     Daughter served as  today.    Ms. Terrazas notes nausea with an episode of emesis this AM.  No diarrhea.  Eating ok.  She hasn't taken any antinausea medications as she wasn't having nausea prior.  We discussed the nausea meds she has and will now take zofran and zyprexa scheduled with compazine as needed.  No fever or bleeding.      Pain is significant but stable.      Review of Systems: 10 point ROS negative except as noted above.      PHYSICAL EXAM     Wt Readings from Last 4 Encounters:   03/15/24 98.1 kg (216 lb 4.8 oz)   03/14/24 98.7 kg (217 lb 9.6 oz)   03/13/24 98.3 kg (216 lb 11.2 oz)   03/12/24 97.4 kg (214 lb 11.2 oz)     /65   Pulse 73   Temp 97.7  F (36.5  C) (Oral)   Resp 16   Wt 98.1 kg (216 lb 4.8 oz)   SpO2 96%   BMI 38.32 kg/m    General: NAD   Eyes: VALERIY, sclera anicteric   Lungs: CTA anteriorly   Cardiovascular: RRR, no M/R/G   Lymphatics: doughy ankles, no pitting edema  Skin: no rashes or petechiae  Neuro: A&O   Additional Findings: left CVAD     LABS AND IMAGING: I have assessed all abnormal lab values for their clinical significance and any values considered clinically significant have been addressed in the assessment and plan.        Lab Results   Component Value Date    WBC 9.4 03/14/2024    ANEU 8.9 (H) 03/14/2024    HGB 8.9 (L) 03/14/2024    HCT 27.4 (L) 03/14/2024     (L) 03/14/2024     03/14/2024    POTASSIUM 4.1 03/14/2024    CHLORIDE 109 (H) 03/14/2024    CO2  22 03/14/2024     (H) 03/14/2024    BUN 17.4 03/14/2024    CR 0.55 03/14/2024    MAG 1.3 (L) 03/08/2024    INR 1.22 (H) 03/13/2024         SYSTEMS-BASED ASSESSMENT AND PLAN       Rosario Terrazas is a 68 year old female with MM, s/p melphalan, day 1 transplant.    BMT/IEC PROTOCOL for MM Auto    Transplants for multiple myeloma:  The patient has Standard risk IgG D MM, and goal of 8 million CD34/kg for 2 transplants collected. She had 6.33E+06 CD34/kg collected 3/7/24 and 5.36E+06 CD34/kg collected on 3/8/24.    - Day -1 for melphalan 200 mg/m2 (not frail, creatinine clearance 30+).     3/12 Day -1 Melphalan 200 mg/m2   3/13 Day 0 Transplant, cell total post wash 2.23 x 10^6 CD34 + cells/kg (pre freeze dose: 5.36 x 10 ^6 CD34+ cells/kg)  Allopurinol per protocol    ID  - Continue INH/B6 through transplant for latent TB   - Ppx antibiotics acyclovir, diflucan, levofloxacin     Heme transfuse to keep hgb >7g/dL, plt >10k  Pancytopenia: from chemo  - Of note she is on 81mg ASA be aware with plts.    FEN/Renal  Cr/Lytes wnl    CV   ECHO 55-60%   EKG NSR    GI:   Risk for n/v/d, has zyprexa, compazine, zofran--hasn't used these.  Will schedule zofran & zyprexa; compazine prn.   Historical constipation: use anticonstipation meds with caution going forward    Endocrine  Thyroid FNA Atypia of undetermined significance diagnosis has a 22 (13-30)% risk of malignancy. Repeat FNA (least 4-6 weeks from previous aspiration with optimal time being 12 weeks after last aspiration) , molecular testing, diagnostic lobectomy, or surveillance is recommended.    Has next appt with endocrine 4/16/24.    Neuro/Pain  Neuropathy: continues on eleni, xanaflex   Rib pain from disease, oxy q 6. 5mg given in clinic today. She will return to wearing her brace as this helps control pain.    Plan/RTC  Schedule zofran/zyprexa; use compazine prn  IV zofran and 1L IVF today  BMT therapy plan is up to date  Added blood plan today  Daily  appointments starting Jose 3/17--can come in 3/16 if needed  GCSF starts Mon 3/18    40 minutes spent by me on the date of the encounter doing chart review, history and exam, documentation and further activities per the note    ROYAL HartC

## 2024-03-15 NOTE — NURSING NOTE
Chief Complaint   Patient presents with    Blood Draw     Labs drawn via CVC     Labs collected from CVC by RN, line flushed with saline and heparin.  Vitals taken. Pt checked in for appointment(s).     Gladys RIDLEY RN PHN BSN  BMT/Oncology Lab

## 2024-03-17 ENCOUNTER — ONCOLOGY VISIT (OUTPATIENT)
Dept: TRANSPLANT | Facility: CLINIC | Age: 69
End: 2024-03-17
Attending: STUDENT IN AN ORGANIZED HEALTH CARE EDUCATION/TRAINING PROGRAM
Payer: COMMERCIAL

## 2024-03-17 ENCOUNTER — APPOINTMENT (OUTPATIENT)
Dept: LAB | Facility: CLINIC | Age: 69
End: 2024-03-17
Attending: STUDENT IN AN ORGANIZED HEALTH CARE EDUCATION/TRAINING PROGRAM
Payer: COMMERCIAL

## 2024-03-17 VITALS
OXYGEN SATURATION: 99 % | SYSTOLIC BLOOD PRESSURE: 93 MMHG | RESPIRATION RATE: 18 BRPM | DIASTOLIC BLOOD PRESSURE: 62 MMHG | HEART RATE: 97 BPM | BODY MASS INDEX: 38.35 KG/M2 | WEIGHT: 216.5 LBS | TEMPERATURE: 98.2 F

## 2024-03-17 DIAGNOSIS — C90.00 MULTIPLE MYELOMA, REMISSION STATUS UNSPECIFIED (H): ICD-10-CM

## 2024-03-17 LAB
ACANTHOCYTES BLD QL SMEAR: ABNORMAL
ALBUMIN SERPL BCG-MCNC: 3.5 G/DL (ref 3.5–5.2)
ALP SERPL-CCNC: 78 U/L (ref 40–150)
ALT SERPL W P-5'-P-CCNC: 21 U/L (ref 0–50)
ANION GAP SERPL CALCULATED.3IONS-SCNC: 12 MMOL/L (ref 7–15)
AST SERPL W P-5'-P-CCNC: 32 U/L (ref 0–45)
AUER BODIES BLD QL SMEAR: ABNORMAL
BASO STIPL BLD QL SMEAR: ABNORMAL
BASOPHILS # BLD AUTO: ABNORMAL 10*3/UL
BASOPHILS NFR BLD AUTO: ABNORMAL %
BILIRUB SERPL-MCNC: 0.2 MG/DL
BITE CELLS BLD QL SMEAR: ABNORMAL
BLISTER CELLS BLD QL SMEAR: ABNORMAL
BUN SERPL-MCNC: 13.1 MG/DL (ref 8–23)
BURR CELLS BLD QL SMEAR: ABNORMAL
CALCIUM SERPL-MCNC: 8 MG/DL (ref 8.8–10.2)
CHLORIDE SERPL-SCNC: 107 MMOL/L (ref 98–107)
CREAT SERPL-MCNC: 0.58 MG/DL (ref 0.51–0.95)
DACRYOCYTES BLD QL SMEAR: ABNORMAL
DEPRECATED HCO3 PLAS-SCNC: 22 MMOL/L (ref 22–29)
EGFRCR SERPLBLD CKD-EPI 2021: >90 ML/MIN/1.73M2
ELLIPTOCYTES BLD QL SMEAR: SLIGHT
EOSINOPHIL # BLD AUTO: ABNORMAL 10*3/UL
EOSINOPHIL NFR BLD AUTO: ABNORMAL %
ERYTHROCYTE [DISTWIDTH] IN BLOOD BY AUTOMATED COUNT: 16.8 % (ref 10–15)
FRAGMENTS BLD QL SMEAR: ABNORMAL
GLUCOSE SERPL-MCNC: 115 MG/DL (ref 70–99)
HCT VFR BLD AUTO: 25.8 % (ref 35–47)
HGB BLD-MCNC: 8.3 G/DL (ref 11.7–15.7)
HGB C CRYSTALS: ABNORMAL
HOWELL-JOLLY BOD BLD QL SMEAR: ABNORMAL
IMM GRANULOCYTES # BLD: ABNORMAL 10*3/UL
IMM GRANULOCYTES NFR BLD: ABNORMAL %
LYMPHOCYTES # BLD AUTO: ABNORMAL 10*3/UL
LYMPHOCYTES NFR BLD AUTO: ABNORMAL %
MCH RBC QN AUTO: 32.5 PG (ref 26.5–33)
MCHC RBC AUTO-ENTMCNC: 32.2 G/DL (ref 31.5–36.5)
MCV RBC AUTO: 101 FL (ref 78–100)
MONOCYTES # BLD AUTO: ABNORMAL 10*3/UL
MONOCYTES NFR BLD AUTO: ABNORMAL %
NEUTROPHILS # BLD AUTO: ABNORMAL 10*3/UL
NEUTROPHILS NFR BLD AUTO: ABNORMAL %
NEUTS HYPERSEG BLD QL SMEAR: ABNORMAL
PLAT MORPH BLD: ABNORMAL
PLATELET # BLD AUTO: 89 10E3/UL (ref 150–450)
POLYCHROMASIA BLD QL SMEAR: ABNORMAL
POTASSIUM SERPL-SCNC: 3.5 MMOL/L (ref 3.4–5.3)
PROT SERPL-MCNC: 5 G/DL (ref 6.4–8.3)
RBC # BLD AUTO: 2.55 10E6/UL (ref 3.8–5.2)
RBC AGGLUT BLD QL: ABNORMAL
RBC MORPH BLD: ABNORMAL
ROULEAUX BLD QL SMEAR: ABNORMAL
SICKLE CELLS BLD QL SMEAR: ABNORMAL
SMUDGE CELLS BLD QL SMEAR: ABNORMAL
SODIUM SERPL-SCNC: 141 MMOL/L (ref 135–145)
SPHEROCYTES BLD QL SMEAR: ABNORMAL
STOMATOCYTES BLD QL SMEAR: ABNORMAL
TARGETS BLD QL SMEAR: ABNORMAL
TOXIC GRANULES BLD QL SMEAR: ABNORMAL
VARIANT LYMPHS BLD QL SMEAR: ABNORMAL
WBC # BLD AUTO: 0.2 10E3/UL (ref 4–11)

## 2024-03-17 PROCEDURE — 85018 HEMOGLOBIN: CPT

## 2024-03-17 PROCEDURE — G0463 HOSPITAL OUTPT CLINIC VISIT: HCPCS

## 2024-03-17 PROCEDURE — 99215 OFFICE O/P EST HI 40 MIN: CPT

## 2024-03-17 PROCEDURE — 36592 COLLECT BLOOD FROM PICC: CPT

## 2024-03-17 PROCEDURE — 80053 COMPREHEN METABOLIC PANEL: CPT

## 2024-03-17 PROCEDURE — 250N000011 HC RX IP 250 OP 636: Performed by: STUDENT IN AN ORGANIZED HEALTH CARE EDUCATION/TRAINING PROGRAM

## 2024-03-17 RX ORDER — HEPARIN SODIUM,PORCINE 10 UNIT/ML
5 VIAL (ML) INTRAVENOUS ONCE
Status: COMPLETED | OUTPATIENT
Start: 2024-03-17 | End: 2024-03-17

## 2024-03-17 RX ORDER — GABAPENTIN 100 MG/1
100 CAPSULE ORAL 3 TIMES DAILY
Qty: 90 CAPSULE | Refills: 0 | Status: ON HOLD | OUTPATIENT
Start: 2024-03-17 | End: 2024-04-02

## 2024-03-17 RX ADMIN — Medication 5 ML: at 08:12

## 2024-03-17 ASSESSMENT — PAIN SCALES - GENERAL: PAINLEVEL: EXTREME PAIN (8)

## 2024-03-17 NOTE — PROGRESS NOTES
BMT/Cell Therapy Daily Progress Note   03/17/2024    Patient ID:  Rosario Terrazas is a 68 year old female, hx of MM, day + 4 s/p HD melphalan and auto transplant hsct.    INTERVAL  HISTORY     Daughter served as  today.    Main issue today is pain under the ribcage and general tiredness/ aches. Has been taking oxycodone with relief and is also wearing her back brace today. She has also been on gabapentin, which was refilled today.  No nausea, controlled with scheduled zyprexa and zofran. One episode of diarrhea yesterday morning. She has imodium if diarrhea increases. Adequate PO intake. No abdominal pain. No dizziness or lightheadedness.     Review of Systems: 10 point ROS negative except as noted above    Current Outpatient Medications   Medication Sig Dispense Refill    acetaminophen (TYLENOL) 325 MG tablet Take 2 tablets (650 mg) by mouth every 4 hours as needed for pain 120 tablet 1    acyclovir (ZOVIRAX) 800 MG tablet Take 1 tablet (800 mg) by mouth 2 times daily Start Day -1 60 tablet 11    allopurinol (ZYLOPRIM) 300 MG tablet Take 1 tablet (300 mg) by mouth daily Start Day -1 7 tablet 0    aspirin 81 MG EC tablet Take 1 tablet by mouth daily      calcium carbonate-vitamin D (OSCAL) 500-5 MG-MCG tablet Take 1 tablet by mouth 3 times daily (with meals) 90 tablet 2    diclofenac (VOLTAREN) 1 % topical gel APPLY 4 GRAMS TO PAINFUL AREAS IN KNEES/SHOULDERS 4 TIMES A DAY AS DIRECTED. MAX OF 32 GRAMS PER DAY.      fluconazole (DIFLUCAN) 200 MG tablet Take 1 tablet (200 mg) by mouth daily Start Day -1 30 tablet 0    folic acid (FOLVITE) 1 MG tablet Take 1 tablet by mouth daily      gabapentin (NEURONTIN) 100 MG capsule Take 1 capsule (100 mg) by mouth 3 times daily for 30 days 90 capsule 0    isoniazid (NYDRAZID) 300 MG tablet Take 1 tablet by mouth daily      levofloxacin (LEVAQUIN) 250 MG tablet Take 1 tablet (250 mg) by mouth daily Start Day -1 30 tablet 0    lidocaine (XYLOCAINE) 5 % external  ointment Apply to ribs a couple times a day up to 4 times daily as needed for pain      OLANZapine (ZYPREXA) 2.5 MG tablet Take 1 tablet (2.5 mg) by mouth 2 times daily as needed (For nausea or vomiting) 30 tablet 0    ondansetron (ZOFRAN) 8 MG tablet Take 1 tablet (8 mg) by mouth every 8 hours as needed (for nausea / vomiting) 30 tablet 0    oxyCODONE (ROXICODONE) 5 MG tablet Take 5 mg by mouth every 6 hours as needed for severe pain      pantoprazole (PROTONIX) 40 MG EC tablet Take 1 tablet (40 mg) by mouth daily Start Day -1 30 tablet 1    prochlorperazine (COMPAZINE) 5 MG tablet Take 1 tablet (5 mg) by mouth every 6 hours as needed for nausea or vomiting 30 tablet 1    pyridOXINE (VITAMIN B6) 25 MG tablet Take 1 tablet by mouth daily      triamcinolone (ARISTOCORT HP) 0.5 % external cream Apply to rash on abdomen twice daily      Vitamin D3 (CHOLECALCIFEROL) 25 mcg (1000 units) tablet Take 2 tablets by mouth daily      loperamide (IMODIUM) 2 MG capsule Take 2 mg by mouth 4 times daily as needed for diarrhea (Patient not taking: Reported on 3/13/2024)      nystatin (MYCOSTATIN) 356951 UNIT/GM external cream Apply to rash in genital area twice daily as needed (Patient not taking: Reported on 3/17/2024)      prochlorperazine (COMPAZINE) 10 MG tablet Take 10 mg by mouth (Patient not taking: Reported on 3/17/2024)      senna-docusate (SENOKOT-S/PERICOLACE) 8.6-50 MG tablet Take 1 tablet by mouth 2 times daily (Patient not taking: Reported on 3/17/2024)      sulfamethoxazole-trimethoprim (BACTRIM) 400-80 MG tablet Take 1 tablet by mouth daily (Patient not taking: Reported on 3/13/2024)          PHYSICAL EXAM     Wt Readings from Last 4 Encounters:   03/17/24 98.2 kg (216 lb 8 oz)   03/15/24 98.1 kg (216 lb 4.8 oz)   03/14/24 98.7 kg (217 lb 9.6 oz)   03/13/24 98.3 kg (216 lb 11.2 oz)     BP 93/62 (BP Location: Right arm, Patient Position: Sitting, Cuff Size: Adult Large)   Pulse 97   Temp 98.2  F (36.8  C) (Oral)    Resp 18   Wt 98.2 kg (216 lb 8 oz)   SpO2 99%   BMI 38.35 kg/m      General: NAD, wearing back brace, walks with cane   Eyes: VALERIY, sclera anicteric   Lungs: no distress    Abdomen: no tenderness to palpation  Lymphatics: no pitting edema  Skin: no rashes or petechiae  Neuro: A&O   Additional Findings: left CVAD     LABS AND IMAGING: I have assessed all abnormal lab values for their clinical significance and any values considered clinically significant have been addressed in the assessment and plan.        Lab Results   Component Value Date    WBC 0.2 (LL) 03/17/2024    ANEU 2.2 03/15/2024    HGB 8.3 (L) 03/17/2024    HCT 25.8 (L) 03/17/2024    PLT 89 (L) 03/17/2024     03/17/2024    POTASSIUM 3.5 03/17/2024    CHLORIDE 107 03/17/2024    CO2 22 03/17/2024     (H) 03/17/2024    BUN 13.1 03/17/2024    CR 0.58 03/17/2024    MAG 1.3 (L) 03/08/2024    INR 1.12 03/15/2024         SYSTEMS-BASED ASSESSMENT AND PLAN       Rosario Terrazas is a 68 year old female with MM, s/p melphalan, day 4 transplant.    BMT/IEC PROTOCOL for MM Auto    Transplants for multiple myeloma:  The patient has Standard risk IgG D MM, and goal of 8 million CD34/kg for 2 transplants collected. She had 6.33E+06 CD34/kg collected 3/7/24 and 5.36E+06 CD34/kg collected on 3/8/24.    3/12 Day -1 Melphalan 200 mg/m2   3/13 Day 0 Transplant, cell total post wash 2.23 x 10^6 CD34 + cells/kg (pre freeze dose: 5.36 x 10 ^6 CD34+ cells/kg)  Allopurinol per protocol    ID  Continue INH/B6 through transplant for latent TB   Ppx antibiotics acyclovir, fluconazole, levofloxacin. Bactrim from D28     Heme   - transfuse to keep hgb >7g/dL, plt >10k  - Of note she is on 81mg ASA. Hold aspirin (3/17), resume once platelets have recovered.     FEN/Renal  Mild hypocalcemia: On calcium supplements three times daily. Continue.     CV   ECHO 55-60%   EKG NSR  BP runs low at baseline (100s/ 60s).     GI:   Nausea: schedule zofran TID & zyprexa qHS;  compazine prn.   Diarrhea: Use imodium PRN     Endocrine  Thyroid FNA Atypia of undetermined significance diagnosis has a 22 (13-30)% risk of malignancy. Repeat FNA (least 4-6 weeks from previous aspiration with optimal time being 12 weeks after last aspiration) , molecular testing, diagnostic lobectomy, or surveillance is recommended.    Has next appt with endocrine 4/16/24.    Neuro/Pain  Neuropathy: continues on eleni, xanaflex. Gabapentin refilled.   Rib pain: continue oxycodone and back brace    Plan/RTC  Hold aspirin   G-CSF starting 3/18   RTC for daily labs and appointments     40 minutes spent by me on the date of the encounter doing chart review, history and exam, documentation and further activities per the note    Minad Lao  Department of Hematology, Oncology and Transplantation  [facility] Pager 1300/Text via Qlue

## 2024-03-17 NOTE — NURSING NOTE
Chief Complaint   Patient presents with    Blood Draw     Labs drawn via CVC by RN. VS taken.     Labs collected from CVC by RN, line flushed with saline and heparin.  Vitals taken. Pt checked in for appointment(s).     Shanice Sarabia RN

## 2024-03-17 NOTE — LETTER
3/17/2024         RE: Rosario Terrazas  [address] [place] [address]        Dear Colleague,    Thank you for referring your patient, Rosario Terrazas, to the [facility] BLOOD AND MARROW TRANSPLANT PROGRAM [place]. Please see a copy of my visit note below.    BMT/Cell Therapy Daily Progress Note   03/17/2024    Patient ID:  Rosario Terrazas is a 68 year old female, hx of MM, day + 4 s/p HD melphalan and auto transplant hsct.    INTERVAL  HISTORY     Daughter served as  today.    Main issue today is pain under the ribcage and general tiredness/ aches. Has been taking oxycodone with relief and is also wearing her back brace today. She has also been on gabapentin, which was refilled today.  No nausea, controlled with scheduled zyprexa and zofran. One episode of diarrhea yesterday morning. She has imodium if diarrhea increases. Adequate PO intake. No abdominal pain. No dizziness or lightheadedness.     Review of Systems: 10 point ROS negative except as noted above    Current Outpatient Medications   Medication Sig Dispense Refill    acetaminophen (TYLENOL) 325 MG tablet Take 2 tablets (650 mg) by mouth every 4 hours as needed for pain 120 tablet 1    acyclovir (ZOVIRAX) 800 MG tablet Take 1 tablet (800 mg) by mouth 2 times daily Start Day -1 60 tablet 11    allopurinol (ZYLOPRIM) 300 MG tablet Take 1 tablet (300 mg) by mouth daily Start Day -1 7 tablet 0    aspirin 81 MG EC tablet Take 1 tablet by mouth daily      calcium carbonate-vitamin D (OSCAL) 500-5 MG-MCG tablet Take 1 tablet by mouth 3 times daily (with meals) 90 tablet 2    diclofenac (VOLTAREN) 1 % topical gel APPLY 4 GRAMS TO PAINFUL AREAS IN KNEES/SHOULDERS 4 TIMES A DAY AS DIRECTED. MAX OF 32 GRAMS PER DAY.      fluconazole (DIFLUCAN) 200 MG tablet Take 1 tablet (200 mg) by mouth daily Start Day -1 30 tablet 0    folic acid (FOLVITE) 1 MG tablet Take 1 tablet by mouth daily      gabapentin (NEURONTIN)  100 MG capsule Take 1 capsule (100 mg) by mouth 3 times daily for 30 days 90 capsule 0    isoniazid (NYDRAZID) 300 MG tablet Take 1 tablet by mouth daily      levofloxacin (LEVAQUIN) 250 MG tablet Take 1 tablet (250 mg) by mouth daily Start Day -1 30 tablet 0    lidocaine (XYLOCAINE) 5 % external ointment Apply to ribs a couple times a day up to 4 times daily as needed for pain      OLANZapine (ZYPREXA) 2.5 MG tablet Take 1 tablet (2.5 mg) by mouth 2 times daily as needed (For nausea or vomiting) 30 tablet 0    ondansetron (ZOFRAN) 8 MG tablet Take 1 tablet (8 mg) by mouth every 8 hours as needed (for nausea / vomiting) 30 tablet 0    oxyCODONE (ROXICODONE) 5 MG tablet Take 5 mg by mouth every 6 hours as needed for severe pain      pantoprazole (PROTONIX) 40 MG EC tablet Take 1 tablet (40 mg) by mouth daily Start Day -1 30 tablet 1    prochlorperazine (COMPAZINE) 5 MG tablet Take 1 tablet (5 mg) by mouth every 6 hours as needed for nausea or vomiting 30 tablet 1    pyridOXINE (VITAMIN B6) 25 MG tablet Take 1 tablet by mouth daily      triamcinolone (ARISTOCORT HP) 0.5 % external cream Apply to rash on abdomen twice daily      Vitamin D3 (CHOLECALCIFEROL) 25 mcg (1000 units) tablet Take 2 tablets by mouth daily      loperamide (IMODIUM) 2 MG capsule Take 2 mg by mouth 4 times daily as needed for diarrhea (Patient not taking: Reported on 3/13/2024)      nystatin (MYCOSTATIN) 137837 UNIT/GM external cream Apply to rash in genital area twice daily as needed (Patient not taking: Reported on 3/17/2024)      prochlorperazine (COMPAZINE) 10 MG tablet Take 10 mg by mouth (Patient not taking: Reported on 3/17/2024)      senna-docusate (SENOKOT-S/PERICOLACE) 8.6-50 MG tablet Take 1 tablet by mouth 2 times daily (Patient not taking: Reported on 3/17/2024)      sulfamethoxazole-trimethoprim (BACTRIM) 400-80 MG tablet Take 1 tablet by mouth daily (Patient not taking: Reported on 3/13/2024)          PHYSICAL EXAM     Wt Readings  from Last 4 Encounters:   03/17/24 98.2 kg (216 lb 8 oz)   03/15/24 98.1 kg (216 lb 4.8 oz)   03/14/24 98.7 kg (217 lb 9.6 oz)   03/13/24 98.3 kg (216 lb 11.2 oz)     BP 93/62 (BP Location: Right arm, Patient Position: Sitting, Cuff Size: Adult Large)   Pulse 97   Temp 98.2  F (36.8  C) (Oral)   Resp 18   Wt 98.2 kg (216 lb 8 oz)   SpO2 99%   BMI 38.35 kg/m      General: NAD, wearing back brace, walks with cane   Eyes: VALERIY, sclera anicteric   Lungs: no distress    Abdomen: no tenderness to palpation  Lymphatics: no pitting edema  Skin: no rashes or petechiae  Neuro: A&O   Additional Findings: left CVAD     LABS AND IMAGING: I have assessed all abnormal lab values for their clinical significance and any values considered clinically significant have been addressed in the assessment and plan.        Lab Results   Component Value Date    WBC 0.2 (LL) 03/17/2024    ANEU 2.2 03/15/2024    HGB 8.3 (L) 03/17/2024    HCT 25.8 (L) 03/17/2024    PLT 89 (L) 03/17/2024     03/17/2024    POTASSIUM 3.5 03/17/2024    CHLORIDE 107 03/17/2024    CO2 22 03/17/2024     (H) 03/17/2024    BUN 13.1 03/17/2024    CR 0.58 03/17/2024    MAG 1.3 (L) 03/08/2024    INR 1.12 03/15/2024         SYSTEMS-BASED ASSESSMENT AND PLAN       Rosario Terrazas is a 68 year old female with MM, s/p melphalan, day 4 transplant.    BMT/IEC PROTOCOL for MM Auto    Transplants for multiple myeloma:  The patient has Standard risk IgG D MM, and goal of 8 million CD34/kg for 2 transplants collected. She had 6.33E+06 CD34/kg collected 3/7/24 and 5.36E+06 CD34/kg collected on 3/8/24.    3/12 Day -1 Melphalan 200 mg/m2   3/13 Day 0 Transplant, cell total post wash 2.23 x 10^6 CD34 + cells/kg (pre freeze dose: 5.36 x 10 ^6 CD34+ cells/kg)  Allopurinol per protocol    ID  Continue INH/B6 through transplant for latent TB   Ppx antibiotics acyclovir, fluconazole, levofloxacin. Bactrim from D28     Heme   - transfuse to keep hgb >7g/dL, plt  >10k  - Of note she is on 81mg ASA. Hold aspirin (3/17), resume once platelets have recovered.     FEN/Renal  Mild hypocalcemia: On calcium supplements three times daily. Continue.     CV   ECHO 55-60%   EKG NSR  BP runs low at baseline (100s/ 60s).     GI:   Nausea: schedule zofran TID & zyprexa qHS; compazine prn.   Diarrhea: Use imodium PRN     Endocrine  Thyroid FNA Atypia of undetermined significance diagnosis has a 22 (13-30)% risk of malignancy. Repeat FNA (least 4-6 weeks from previous aspiration with optimal time being 12 weeks after last aspiration) , molecular testing, diagnostic lobectomy, or surveillance is recommended.    Has next appt with endocrine 4/16/24.    Neuro/Pain  Neuropathy: continues on eleni, xanaflex. Gabapentin refilled.   Rib pain: continue oxycodone and back brace    Plan/RTC  Hold aspirin   G-CSF starting 3/18   RTC for daily labs and appointments     40 minutes spent by me on the date of the encounter doing chart review, history and exam, documentation and further activities per the note    Minda Lao  Department of Hematology, Oncology and Transplantation  [facility] Pager 1300/Text via [employer]

## 2024-03-18 ENCOUNTER — INFUSION THERAPY VISIT (OUTPATIENT)
Dept: TRANSPLANT | Facility: CLINIC | Age: 69
End: 2024-03-18
Attending: STUDENT IN AN ORGANIZED HEALTH CARE EDUCATION/TRAINING PROGRAM
Payer: COMMERCIAL

## 2024-03-18 ENCOUNTER — APPOINTMENT (OUTPATIENT)
Dept: LAB | Facility: CLINIC | Age: 69
End: 2024-03-18
Attending: STUDENT IN AN ORGANIZED HEALTH CARE EDUCATION/TRAINING PROGRAM
Payer: COMMERCIAL

## 2024-03-18 VITALS
RESPIRATION RATE: 18 BRPM | HEART RATE: 104 BPM | WEIGHT: 212.5 LBS | BODY MASS INDEX: 37.65 KG/M2 | TEMPERATURE: 97.7 F | OXYGEN SATURATION: 99 % | SYSTOLIC BLOOD PRESSURE: 96 MMHG | HEIGHT: 63 IN | DIASTOLIC BLOOD PRESSURE: 61 MMHG

## 2024-03-18 DIAGNOSIS — C90.01 MULTIPLE MYELOMA IN REMISSION (H): Primary | ICD-10-CM

## 2024-03-18 DIAGNOSIS — C90.00 MULTIPLE MYELOMA, REMISSION STATUS UNSPECIFIED (H): ICD-10-CM

## 2024-03-18 DIAGNOSIS — C90.00 MULTIPLE MYELOMA, REMISSION STATUS UNSPECIFIED (H): Primary | ICD-10-CM

## 2024-03-18 LAB
ACANTHOCYTES BLD QL SMEAR: ABNORMAL
ALBUMIN SERPL BCG-MCNC: 3.6 G/DL (ref 3.5–5.2)
ALP SERPL-CCNC: 74 U/L (ref 40–150)
ALT SERPL W P-5'-P-CCNC: 19 U/L (ref 0–50)
ANION GAP SERPL CALCULATED.3IONS-SCNC: 11 MMOL/L (ref 7–15)
AST SERPL W P-5'-P-CCNC: 24 U/L (ref 0–45)
AUER BODIES BLD QL SMEAR: ABNORMAL
BASO STIPL BLD QL SMEAR: ABNORMAL
BASOPHILS # BLD AUTO: ABNORMAL 10*3/UL
BASOPHILS NFR BLD AUTO: ABNORMAL %
BILIRUB DIRECT SERPL-MCNC: <0.2 MG/DL (ref 0–0.3)
BILIRUB SERPL-MCNC: 0.3 MG/DL
BITE CELLS BLD QL SMEAR: ABNORMAL
BLISTER CELLS BLD QL SMEAR: ABNORMAL
BUN SERPL-MCNC: 8.9 MG/DL (ref 8–23)
BURR CELLS BLD QL SMEAR: ABNORMAL
CALCIUM SERPL-MCNC: 7.7 MG/DL (ref 8.8–10.2)
CHLORIDE SERPL-SCNC: 108 MMOL/L (ref 98–107)
CREAT SERPL-MCNC: 0.52 MG/DL (ref 0.51–0.95)
DACRYOCYTES BLD QL SMEAR: ABNORMAL
DEPRECATED HCO3 PLAS-SCNC: 23 MMOL/L (ref 22–29)
EGFRCR SERPLBLD CKD-EPI 2021: >90 ML/MIN/1.73M2
ELLIPTOCYTES BLD QL SMEAR: SLIGHT
EOSINOPHIL # BLD AUTO: ABNORMAL 10*3/UL
EOSINOPHIL NFR BLD AUTO: ABNORMAL %
ERYTHROCYTE [DISTWIDTH] IN BLOOD BY AUTOMATED COUNT: 16.8 % (ref 10–15)
FRAGMENTS BLD QL SMEAR: SLIGHT
GLUCOSE SERPL-MCNC: 126 MG/DL (ref 70–99)
HCT VFR BLD AUTO: 26.3 % (ref 35–47)
HGB BLD-MCNC: 8.4 G/DL (ref 11.7–15.7)
HGB C CRYSTALS: ABNORMAL
HOWELL-JOLLY BOD BLD QL SMEAR: ABNORMAL
IMM GRANULOCYTES # BLD: ABNORMAL 10*3/UL
IMM GRANULOCYTES NFR BLD: ABNORMAL %
LYMPHOCYTES # BLD AUTO: ABNORMAL 10*3/UL
LYMPHOCYTES NFR BLD AUTO: ABNORMAL %
MCH RBC QN AUTO: 32.2 PG (ref 26.5–33)
MCHC RBC AUTO-ENTMCNC: 31.9 G/DL (ref 31.5–36.5)
MCV RBC AUTO: 101 FL (ref 78–100)
MONOCYTES # BLD AUTO: ABNORMAL 10*3/UL
MONOCYTES NFR BLD AUTO: ABNORMAL %
NEUTROPHILS # BLD AUTO: ABNORMAL 10*3/UL
NEUTROPHILS NFR BLD AUTO: ABNORMAL %
NEUTS HYPERSEG BLD QL SMEAR: ABNORMAL
PLAT MORPH BLD: ABNORMAL
PLATELET # BLD AUTO: 69 10E3/UL (ref 150–450)
POLYCHROMASIA BLD QL SMEAR: ABNORMAL
POTASSIUM SERPL-SCNC: 3.6 MMOL/L (ref 3.4–5.3)
PROT SERPL-MCNC: 5.2 G/DL (ref 6.4–8.3)
RBC # BLD AUTO: 2.61 10E6/UL (ref 3.8–5.2)
RBC AGGLUT BLD QL: ABNORMAL
RBC MORPH BLD: ABNORMAL
ROULEAUX BLD QL SMEAR: ABNORMAL
SICKLE CELLS BLD QL SMEAR: ABNORMAL
SMUDGE CELLS BLD QL SMEAR: ABNORMAL
SODIUM SERPL-SCNC: 142 MMOL/L (ref 135–145)
SPHEROCYTES BLD QL SMEAR: ABNORMAL
STOMATOCYTES BLD QL SMEAR: ABNORMAL
TARGETS BLD QL SMEAR: ABNORMAL
TOXIC GRANULES BLD QL SMEAR: ABNORMAL
VARIANT LYMPHS BLD QL SMEAR: ABNORMAL
WBC # BLD AUTO: 0.2 10E3/UL (ref 4–11)

## 2024-03-18 PROCEDURE — 258N000003 HC RX IP 258 OP 636: Performed by: PHYSICIAN ASSISTANT

## 2024-03-18 PROCEDURE — 99214 OFFICE O/P EST MOD 30 MIN: CPT

## 2024-03-18 PROCEDURE — 85027 COMPLETE CBC AUTOMATED: CPT

## 2024-03-18 PROCEDURE — 96360 HYDRATION IV INFUSION INIT: CPT

## 2024-03-18 PROCEDURE — 250N000011 HC RX IP 250 OP 636: Performed by: STUDENT IN AN ORGANIZED HEALTH CARE EDUCATION/TRAINING PROGRAM

## 2024-03-18 PROCEDURE — 80048 BASIC METABOLIC PNL TOTAL CA: CPT

## 2024-03-18 PROCEDURE — 250N000011 HC RX IP 250 OP 636: Mod: JZ | Performed by: STUDENT IN AN ORGANIZED HEALTH CARE EDUCATION/TRAINING PROGRAM

## 2024-03-18 PROCEDURE — 96372 THER/PROPH/DIAG INJ SC/IM: CPT | Mod: XS | Performed by: STUDENT IN AN ORGANIZED HEALTH CARE EDUCATION/TRAINING PROGRAM

## 2024-03-18 PROCEDURE — 82248 BILIRUBIN DIRECT: CPT

## 2024-03-18 PROCEDURE — G0463 HOSPITAL OUTPT CLINIC VISIT: HCPCS | Mod: 25

## 2024-03-18 PROCEDURE — 36592 COLLECT BLOOD FROM PICC: CPT

## 2024-03-18 RX ORDER — HEPARIN SODIUM,PORCINE 10 UNIT/ML
5-20 VIAL (ML) INTRAVENOUS DAILY PRN
Status: CANCELLED | OUTPATIENT
Start: 2024-03-19

## 2024-03-18 RX ORDER — HEPARIN SODIUM (PORCINE) LOCK FLUSH IV SOLN 100 UNIT/ML 100 UNIT/ML
5 SOLUTION INTRAVENOUS
Status: CANCELLED | OUTPATIENT
Start: 2024-03-19

## 2024-03-18 RX ORDER — HEPARIN SODIUM,PORCINE 10 UNIT/ML
5-20 VIAL (ML) INTRAVENOUS DAILY PRN
Status: DISCONTINUED | OUTPATIENT
Start: 2024-03-18 | End: 2024-03-18 | Stop reason: HOSPADM

## 2024-03-18 RX ORDER — HEPARIN SODIUM,PORCINE 10 UNIT/ML
5 VIAL (ML) INTRAVENOUS
Status: DISCONTINUED | OUTPATIENT
Start: 2024-03-18 | End: 2024-03-18 | Stop reason: HOSPADM

## 2024-03-18 RX ADMIN — SODIUM CHLORIDE 1000 ML: 9 INJECTION, SOLUTION INTRAVENOUS at 08:23

## 2024-03-18 RX ADMIN — Medication 5 ML: at 09:26

## 2024-03-18 RX ADMIN — Medication 5 ML: at 07:11

## 2024-03-18 RX ADMIN — FILGRASTIM-SNDZ 480 MCG: 480 INJECTION, SOLUTION INTRAVENOUS; SUBCUTANEOUS at 08:42

## 2024-03-18 ASSESSMENT — PAIN SCALES - GENERAL: PAINLEVEL: EXTREME PAIN (8)

## 2024-03-18 NOTE — NURSING NOTE
Chief Complaint   Patient presents with    Infusion     Add on IV fluids, scheduled g-csf injection. History of multiple myeloma.     Rachelle Palumbo RN

## 2024-03-18 NOTE — LETTER
3/18/2024         RE: Rosario Terrazas  [address] [place] [address]        Dear Colleague,    Thank you for referring your patient, Rosario Terrazas, to the [facility] BLOOD AND MARROW TRANSPLANT PROGRAM [place]. Please see a copy of my visit note below.    BMT/Cell Therapy Daily Progress Note   03/18/2024    Patient ID:  Rosario Terrazas is a 68 year old female, hx of MM, day + 5 s/p HD melphalan and auto transplant hsct.    INTERVAL  HISTORY     Daughter served as  today.    She feels poorly.  She is having diarrhea and sore throat with swallowing.  No abd pain.  No n/v.  Still on scheduled antiemetics.     No dizziness or lightheadedness.  Feels steady on her feet.  No bleeding or fever.    Review of Systems: 10 point ROS negative except as noted above      PHYSICAL EXAM     Wt Readings from Last 4 Encounters:   03/18/24 96.4 kg (212 lb 8 oz)   03/17/24 98.2 kg (216 lb 8 oz)   03/15/24 98.1 kg (216 lb 4.8 oz)   03/14/24 98.7 kg (217 lb 9.6 oz)     BP 96/61   Pulse 104   Temp 97.7  F (36.5  C) (Oral)   Resp 18   Wt 96.4 kg (212 lb 8 oz)   SpO2 99%   BMI 37.64 kg/m      General: NAD, wearing back brace, walks with cane   Eyes: VALERIY, sclera anicteric   Lungs: CTA  CV:  mild tachy, regular rhythm  Abdomen: no tenderness to palpation  Lymphatics: no pitting edema  Skin: no rashes or petechiae  Neuro: A&O   Additional Findings: left CVAD     LABS AND IMAGING: I have assessed all abnormal lab values for their clinical significance and any values considered clinically significant have been addressed in the assessment and plan.        Lab Results   Component Value Date    WBC 0.2 (LL) 03/17/2024    ANEU 2.2 03/15/2024    HGB 8.3 (L) 03/17/2024    HCT 25.8 (L) 03/17/2024    PLT 89 (L) 03/17/2024     03/17/2024    POTASSIUM 3.5 03/17/2024    CHLORIDE 107 03/17/2024    CO2 22 03/17/2024     (H) 03/17/2024    BUN 13.1 03/17/2024    CR 0.58  03/17/2024    MAG 1.3 (L) 03/08/2024    INR 1.12 03/15/2024         SYSTEMS-BASED ASSESSMENT AND PLAN       Rosario Terrazas is a 68 year old female with MM, s/p melphalan, day + 5 transplant.    BMT/IEC PROTOCOL for MM Auto    Transplants for multiple myeloma:  The patient has Standard risk IgG D MM, planning on 2 transplants    3/12 Day -1 Melphalan 200 mg/m2   3/13 Day 0 Transplant, cell total post wash 2.23 x 10^6 CD34 + cells/kg (pre freeze dose: 5.36 x 10 ^6 CD34+ cells/kg)  Allopurinol per protocol    ID  Continue INH/B6 through transplant for latent TB   Ppx antibiotics acyclovir, fluconazole, levofloxacin. Bactrim from D28     Heme   - transfuse to keep hgb >7g/dL, plt >10k  - Of note she is on 81mg ASA. Hold aspirin (3/17), resume once platelets have recovered.     FEN/Renal  Mild hypocalcemia: On calcium supplements three times daily. Continue.     CV   ECHO 55-60%   EKG NSR  BP runs low at baseline (100s/ 60s).     GI:   Nausea: schedule zofran TID & zyprexa qHS; compazine prn.   Diarrhea: Use imodium PRN     Endocrine  Thyroid FNA Atypia of undetermined significance diagnosis has a 22 (13-30)% risk of malignancy. Repeat FNA (least 4-6 weeks from previous aspiration with optimal time being 12 weeks after last aspiration) , molecular testing, diagnostic lobectomy, or surveillance is recommended.    Has next appt with endocrine 4/16/24.    Neuro/Pain  Neuropathy: continues on eleni, xanaflex. Gabapentin refilled.   Rib pain: continue oxycodone and back brace    Plan/RTC  Continue to hold aspirin   G-CSF starting today,3/18   RTC for daily labs and appointments   1 L IVF today  Neutropenic precautions reiterated    30 minutes spent by me on the date of the encounter doing chart review, history and exam, documentation and further activities per the note    Sowmya Higuera pa-c  [phone]

## 2024-03-18 NOTE — PROGRESS NOTES
Infusion Nursing Note:  Rosario Terrazas presents today for IV fluids and g-csf injection.    Patient seen by provider today: Yes: Sowmya SAMAYOA   present during visit today: Not Applicable.    Note:   Pt received a liter of 0.9NS over an hour.    Pt received zarxio 480 mcg by subcutaneous route to right lower abdomen.      Intravenous Access:  Wilder.    Treatment Conditions:  Labs were monitored. Pt above parameters for rbc, platelets, or electrolyte replacement.      Post Infusion Assessment:  Patient tolerated infusion without incident.  Patient tolerated injection without incident.  Blood return noted pre and post infusion.  Site patent and intact, free from redness, edema or discomfort.  No evidence of extravasations.  Access discontinued per protocol.       Discharge Plan:   Discharge instructions reviewed with: Patient.  Patient and/or family verbalized understanding of discharge instructions and all questions answered.  Patient discharged in stable condition accompanied by: self and daughter.  Departure Mode: Ambulatory.      Bree Palumbo RN

## 2024-03-18 NOTE — NURSING NOTE
"Oncology Rooming Note    March 18, 2024 8:02 AM   Rosario Terrazas is a 68 year old female who presents for:    Chief Complaint   Patient presents with    Blood Draw     Labs drawn via CVC by RN in lab. VS taken.     Oncology Clinic Visit     UMP RETURN - MULTIPLE MYELOMA      Initial Vitals: BP 96/61   Pulse 104   Temp 97.7  F (36.5  C) (Oral)   Resp 18   Ht 1.606 m (5' 3.23\")   Wt 96.4 kg (212 lb 8 oz)   SpO2 99%   BMI 37.37 kg/m   Estimated body mass index is 37.37 kg/m  as calculated from the following:    Height as of this encounter: 1.606 m (5' 3.23\").    Weight as of this encounter: 96.4 kg (212 lb 8 oz). Body surface area is 2.07 meters squared.  Extreme Pain (8) Comment: Data Unavailable   No LMP recorded. Patient is postmenopausal.  Allergies reviewed: Yes  Medications reviewed: Yes    Medications: Medication refills not needed today.  Pharmacy name entered into [facility]: [place], [place] - 0 [facility] [phone]    Frailty Screening:   Is the patient here for a new oncology consult visit in cancer care? 2. No    Stephen Peterson LPN              "

## 2024-03-18 NOTE — PROGRESS NOTES
BMT/Cell Therapy Daily Progress Note   03/18/2024    Patient ID:  Rosario Terrazas is a 68 year old female, hx of MM, day + 5 s/p HD melphalan and auto transplant hsct.    INTERVAL  HISTORY     Daughter served as  today.    She feels poorly.  She is having diarrhea and sore throat with swallowing.  No abd pain.  No n/v.  Still on scheduled antiemetics.     No dizziness or lightheadedness.  Feels steady on her feet.  No bleeding or fever.    Review of Systems: 10 point ROS negative except as noted above      PHYSICAL EXAM     Wt Readings from Last 4 Encounters:   03/18/24 96.4 kg (212 lb 8 oz)   03/17/24 98.2 kg (216 lb 8 oz)   03/15/24 98.1 kg (216 lb 4.8 oz)   03/14/24 98.7 kg (217 lb 9.6 oz)     BP 96/61   Pulse 104   Temp 97.7  F (36.5  C) (Oral)   Resp 18   Wt 96.4 kg (212 lb 8 oz)   SpO2 99%   BMI 37.64 kg/m      General: NAD, wearing back brace, walks with cane   Eyes: VALERIY, sclera anicteric   Lungs: CTA  CV:  mild tachy, regular rhythm  Abdomen: no tenderness to palpation  Lymphatics: no pitting edema  Skin: no rashes or petechiae  Neuro: A&O   Additional Findings: left CVAD     LABS AND IMAGING: I have assessed all abnormal lab values for their clinical significance and any values considered clinically significant have been addressed in the assessment and plan.        Lab Results   Component Value Date    WBC 0.2 (LL) 03/17/2024    ANEU 2.2 03/15/2024    HGB 8.3 (L) 03/17/2024    HCT 25.8 (L) 03/17/2024    PLT 89 (L) 03/17/2024     03/17/2024    POTASSIUM 3.5 03/17/2024    CHLORIDE 107 03/17/2024    CO2 22 03/17/2024     (H) 03/17/2024    BUN 13.1 03/17/2024    CR 0.58 03/17/2024    MAG 1.3 (L) 03/08/2024    INR 1.12 03/15/2024         SYSTEMS-BASED ASSESSMENT AND PLAN       Rosario Terrazas is a 68 year old female with MM, s/p melphalan, day + 5 transplant.    BMT/IEC PROTOCOL for MM Auto    Transplants for multiple myeloma:  The patient has Standard risk IgG D MM,  planning on 2 transplants    3/12 Day -1 Melphalan 200 mg/m2   3/13 Day 0 Transplant, cell total post wash 2.23 x 10^6 CD34 + cells/kg (pre freeze dose: 5.36 x 10 ^6 CD34+ cells/kg)  Allopurinol per protocol    ID  Continue INH/B6 through transplant for latent TB   Ppx antibiotics acyclovir, fluconazole, levofloxacin. Bactrim from D28     Heme   - transfuse to keep hgb >7g/dL, plt >10k  - Of note she is on 81mg ASA. Hold aspirin (3/17), resume once platelets have recovered.     FEN/Renal  Mild hypocalcemia: On calcium supplements three times daily. Continue.     CV   ECHO 55-60%   EKG NSR  BP runs low at baseline (100s/ 60s).     GI:   Nausea: schedule zofran TID & zyprexa qHS; compazine prn.   Diarrhea: Use imodium PRN     Endocrine  Thyroid FNA Atypia of undetermined significance diagnosis has a 22 (13-30)% risk of malignancy. Repeat FNA (least 4-6 weeks from previous aspiration with optimal time being 12 weeks after last aspiration) , molecular testing, diagnostic lobectomy, or surveillance is recommended.    Has next appt with endocrine 4/16/24.    Neuro/Pain  Neuropathy: continues on eleni, xanaflex. Gabapentin refilled.   Rib pain: continue oxycodone and back brace    Plan/RTC  Continue to hold aspirin   G-CSF starting today,3/18   RTC for daily labs and appointments   1 L IVF today  Neutropenic precautions reiterated    30 minutes spent by me on the date of the encounter doing chart review, history and exam, documentation and further activities per the note    Sowmya Higuera pa-c  [phone]

## 2024-03-19 ENCOUNTER — INFUSION THERAPY VISIT (OUTPATIENT)
Dept: TRANSPLANT | Facility: CLINIC | Age: 69
End: 2024-03-19
Attending: STUDENT IN AN ORGANIZED HEALTH CARE EDUCATION/TRAINING PROGRAM
Payer: COMMERCIAL

## 2024-03-19 ENCOUNTER — APPOINTMENT (OUTPATIENT)
Dept: LAB | Facility: CLINIC | Age: 69
End: 2024-03-19
Attending: STUDENT IN AN ORGANIZED HEALTH CARE EDUCATION/TRAINING PROGRAM
Payer: COMMERCIAL

## 2024-03-19 VITALS
RESPIRATION RATE: 16 BRPM | TEMPERATURE: 98 F | BODY MASS INDEX: 37.34 KG/M2 | SYSTOLIC BLOOD PRESSURE: 95 MMHG | WEIGHT: 212.3 LBS | OXYGEN SATURATION: 100 % | HEART RATE: 104 BPM | DIASTOLIC BLOOD PRESSURE: 62 MMHG

## 2024-03-19 VITALS — SYSTOLIC BLOOD PRESSURE: 107 MMHG | DIASTOLIC BLOOD PRESSURE: 59 MMHG | HEART RATE: 79 BPM

## 2024-03-19 DIAGNOSIS — C90.01 MULTIPLE MYELOMA IN REMISSION (H): Primary | ICD-10-CM

## 2024-03-19 DIAGNOSIS — C90.00 MULTIPLE MYELOMA, REMISSION STATUS UNSPECIFIED (H): ICD-10-CM

## 2024-03-19 LAB
ACANTHOCYTES BLD QL SMEAR: NORMAL
ANION GAP SERPL CALCULATED.3IONS-SCNC: 11 MMOL/L (ref 7–15)
AUER BODIES BLD QL SMEAR: NORMAL
BASO STIPL BLD QL SMEAR: NORMAL
BASOPHILS # BLD AUTO: ABNORMAL 10*3/UL
BASOPHILS NFR BLD AUTO: ABNORMAL %
BITE CELLS BLD QL SMEAR: NORMAL
BLD PROD TYP BPU: NORMAL
BLISTER CELLS BLD QL SMEAR: NORMAL
BLOOD COMPONENT TYPE: NORMAL
BUN SERPL-MCNC: 8.1 MG/DL (ref 8–23)
BURR CELLS BLD QL SMEAR: NORMAL
CALCIUM SERPL-MCNC: 7.3 MG/DL (ref 8.8–10.2)
CHLORIDE SERPL-SCNC: 110 MMOL/L (ref 98–107)
CODING SYSTEM: NORMAL
CREAT SERPL-MCNC: 0.46 MG/DL (ref 0.51–0.95)
DACRYOCYTES BLD QL SMEAR: NORMAL
DEPRECATED HCO3 PLAS-SCNC: 22 MMOL/L (ref 22–29)
EGFRCR SERPLBLD CKD-EPI 2021: >90 ML/MIN/1.73M2
ELLIPTOCYTES BLD QL SMEAR: NORMAL
EOSINOPHIL # BLD AUTO: ABNORMAL 10*3/UL
EOSINOPHIL NFR BLD AUTO: ABNORMAL %
ERYTHROCYTE [DISTWIDTH] IN BLOOD BY AUTOMATED COUNT: 16.4 % (ref 10–15)
FRAGMENTS BLD QL SMEAR: NORMAL
GLUCOSE SERPL-MCNC: 122 MG/DL (ref 70–99)
HCT VFR BLD AUTO: 25.5 % (ref 35–47)
HGB BLD-MCNC: 8.4 G/DL (ref 11.7–15.7)
HGB C CRYSTALS: NORMAL
HOWELL-JOLLY BOD BLD QL SMEAR: NORMAL
IMM GRANULOCYTES # BLD: ABNORMAL 10*3/UL
IMM GRANULOCYTES NFR BLD: ABNORMAL %
LYMPHOCYTES # BLD AUTO: ABNORMAL 10*3/UL
LYMPHOCYTES NFR BLD AUTO: ABNORMAL %
MCH RBC QN AUTO: 32.4 PG (ref 26.5–33)
MCHC RBC AUTO-ENTMCNC: 32.9 G/DL (ref 31.5–36.5)
MCV RBC AUTO: 99 FL (ref 78–100)
MONOCYTES # BLD AUTO: ABNORMAL 10*3/UL
MONOCYTES NFR BLD AUTO: ABNORMAL %
NEUTROPHILS # BLD AUTO: ABNORMAL 10*3/UL
NEUTROPHILS NFR BLD AUTO: ABNORMAL %
NEUTS HYPERSEG BLD QL SMEAR: NORMAL
PLAT MORPH BLD: NORMAL
PLATELET # BLD AUTO: 53 10E3/UL (ref 150–450)
POLYCHROMASIA BLD QL SMEAR: NORMAL
POTASSIUM SERPL-SCNC: 3.5 MMOL/L (ref 3.4–5.3)
RBC # BLD AUTO: 2.59 10E6/UL (ref 3.8–5.2)
RBC AGGLUT BLD QL: NORMAL
RBC MORPH BLD: NORMAL
ROULEAUX BLD QL SMEAR: NORMAL
SICKLE CELLS BLD QL SMEAR: NORMAL
SMUDGE CELLS BLD QL SMEAR: NORMAL
SODIUM SERPL-SCNC: 143 MMOL/L (ref 135–145)
SPHEROCYTES BLD QL SMEAR: NORMAL
STOMATOCYTES BLD QL SMEAR: NORMAL
TARGETS BLD QL SMEAR: NORMAL
TOXIC GRANULES BLD QL SMEAR: NORMAL
UNIT ABO/RH: NORMAL
UNIT NUMBER: NORMAL
UNIT STATUS: NORMAL
UNIT TYPE ISBT: 6200
VARIANT LYMPHS BLD QL SMEAR: NORMAL
WBC # BLD AUTO: 0.1 10E3/UL (ref 4–11)

## 2024-03-19 PROCEDURE — 99211 OFF/OP EST MAY X REQ PHY/QHP: CPT

## 2024-03-19 PROCEDURE — 96372 THER/PROPH/DIAG INJ SC/IM: CPT | Performed by: STUDENT IN AN ORGANIZED HEALTH CARE EDUCATION/TRAINING PROGRAM

## 2024-03-19 PROCEDURE — 36592 COLLECT BLOOD FROM PICC: CPT

## 2024-03-19 PROCEDURE — 85027 COMPLETE CBC AUTOMATED: CPT

## 2024-03-19 PROCEDURE — 80048 BASIC METABOLIC PNL TOTAL CA: CPT

## 2024-03-19 PROCEDURE — G0463 HOSPITAL OUTPT CLINIC VISIT: HCPCS

## 2024-03-19 PROCEDURE — 258N000003 HC RX IP 258 OP 636: Performed by: STUDENT IN AN ORGANIZED HEALTH CARE EDUCATION/TRAINING PROGRAM

## 2024-03-19 PROCEDURE — 96360 HYDRATION IV INFUSION INIT: CPT

## 2024-03-19 PROCEDURE — 250N000011 HC RX IP 250 OP 636: Mod: JZ | Performed by: STUDENT IN AN ORGANIZED HEALTH CARE EDUCATION/TRAINING PROGRAM

## 2024-03-19 PROCEDURE — 99214 OFFICE O/P EST MOD 30 MIN: CPT

## 2024-03-19 PROCEDURE — 250N000011 HC RX IP 250 OP 636: Performed by: STUDENT IN AN ORGANIZED HEALTH CARE EDUCATION/TRAINING PROGRAM

## 2024-03-19 RX ORDER — HEPARIN SODIUM (PORCINE) LOCK FLUSH IV SOLN 100 UNIT/ML 100 UNIT/ML
5 SOLUTION INTRAVENOUS
Status: CANCELLED | OUTPATIENT
Start: 2024-03-19

## 2024-03-19 RX ORDER — HEPARIN SODIUM,PORCINE 10 UNIT/ML
5 VIAL (ML) INTRAVENOUS
Status: DISCONTINUED | OUTPATIENT
Start: 2024-03-19 | End: 2024-03-19 | Stop reason: HOSPADM

## 2024-03-19 RX ORDER — EPINEPHRINE 1 MG/ML
0.3 INJECTION, SOLUTION INTRAMUSCULAR; SUBCUTANEOUS EVERY 5 MIN PRN
Status: CANCELLED | OUTPATIENT
Start: 2024-03-19

## 2024-03-19 RX ORDER — DIPHENHYDRAMINE HYDROCHLORIDE 50 MG/ML
50 INJECTION INTRAMUSCULAR; INTRAVENOUS
Status: CANCELLED
Start: 2024-03-19

## 2024-03-19 RX ORDER — HEPARIN SODIUM,PORCINE 10 UNIT/ML
5-20 VIAL (ML) INTRAVENOUS DAILY PRN
Status: CANCELLED | OUTPATIENT
Start: 2024-03-20

## 2024-03-19 RX ORDER — HEPARIN SODIUM (PORCINE) LOCK FLUSH IV SOLN 100 UNIT/ML 100 UNIT/ML
5 SOLUTION INTRAVENOUS
Status: CANCELLED | OUTPATIENT
Start: 2024-03-20

## 2024-03-19 RX ORDER — HEPARIN SODIUM,PORCINE 10 UNIT/ML
5-20 VIAL (ML) INTRAVENOUS DAILY PRN
Status: CANCELLED | OUTPATIENT
Start: 2024-03-19

## 2024-03-19 RX ADMIN — Medication 5 ML: at 07:22

## 2024-03-19 RX ADMIN — SODIUM CHLORIDE 1000 ML: 9 INJECTION, SOLUTION INTRAVENOUS at 07:44

## 2024-03-19 RX ADMIN — FILGRASTIM-SNDZ 480 MCG: 480 INJECTION, SOLUTION INTRAVENOUS; SUBCUTANEOUS at 08:47

## 2024-03-19 ASSESSMENT — PAIN SCALES - GENERAL: PAINLEVEL: SEVERE PAIN (6)

## 2024-03-19 NOTE — NURSING NOTE
Chief Complaint   Patient presents with    Oncology Clinic Visit     Multiple myeloma, remission status unspecified    Blood Draw     Labs drawn via CVC by RN in lab. VS Taken.      Labs drawn via CVC. Caps changed. Line flushed and Heparin locked. Vital signs taken. Checked into next appointment.   Winsome Wells RN

## 2024-03-19 NOTE — PROGRESS NOTES
Infusion Nursing Note:  Rosario Terrazas presents today for scheduled infusion.    Patient seen by provider today: Yes: Sanchez Kyle   present during visit today: Not Applicable.    Note: Labs monitored. 1L NS bolus for nausea and diarrhea. Pt is having poor oral intake per daughter. G given. Dressing changed.      Intravenous Access:  Wilder.    Treatment Conditions:  Lab Results   Component Value Date    HGB 8.4 (L) 03/19/2024    WBC 0.1 (LL) 03/19/2024    ANEU 2.2 03/15/2024    ANEUTAUTO 20.4 (H) 03/06/2024    PLT 53 (L) 03/19/2024        Lab Results   Component Value Date     03/19/2024    POTASSIUM 3.5 03/19/2024    MAG 1.3 (L) 03/08/2024    CR 0.46 (L) 03/19/2024    RAMIREZ 7.3 (L) 03/19/2024    BILITOTAL 0.3 03/18/2024    ALBUMIN 3.6 03/18/2024    ALT 19 03/18/2024    AST 24 03/18/2024         Post Infusion Assessment:  Patient tolerated infusion without incident.  Patient tolerated injection without incident.       Discharge Plan:   Patient discharged in stable condition accompanied by: daughter.  Departure Mode: Ambulatory.      Gladys Dotson RN

## 2024-03-19 NOTE — PROGRESS NOTES
BMT/Cell Therapy Daily Progress Note   03/19/2024    Patient ID:  Rosario Terrazas is a 68 year old female, hx of MM, day + 6 s/p HD melphalan and auto transplant hsct.    INTERVAL  HISTORY     Daughter served as  today.    Emesis x4 (small volume) and loose stools x4 (small volume) yesterday, poor food and fluid intake, using all three antiemetics and imodium 2 mg x5 yesterday. Throat is bothersome. No cough. She is still taking Tylenol for pain control and instructed that she needs to stop since she is neutropenic. Denies dizziness/LH.     Review of Systems: 10 point ROS negative except as noted above      PHYSICAL EXAM     Wt Readings from Last 4 Encounters:   03/18/24 96.4 kg (212 lb 8 oz)   03/17/24 98.2 kg (216 lb 8 oz)   03/15/24 98.1 kg (216 lb 4.8 oz)   03/14/24 98.7 kg (217 lb 9.6 oz)     There were no vitals taken for this visit.    General: NAD, wearing back brace, walks with cane   Eyes: VALERIY, sclera anicteric   Lungs: CTA  CV:  mild tachy, regular rhythm  Abdomen: no tenderness to palpation  Lymphatics: no pitting edema  Skin: no rashes or petechiae  Neuro: A&O   Additional Findings: left CVAD     LABS AND IMAGING: I have assessed all abnormal lab values for their clinical significance and any values considered clinically significant have been addressed in the assessment and plan.        Lab Results   Component Value Date    WBC 0.2 (LL) 03/18/2024    ANEU 2.2 03/15/2024    HGB 8.4 (L) 03/18/2024    HCT 26.3 (L) 03/18/2024    PLT 69 (L) 03/18/2024     03/18/2024    POTASSIUM 3.6 03/18/2024    CHLORIDE 108 (H) 03/18/2024    CO2 23 03/18/2024     (H) 03/18/2024    BUN 8.9 03/18/2024    CR 0.52 03/18/2024    MAG 1.3 (L) 03/08/2024    INR 1.12 03/15/2024         SYSTEMS-BASED ASSESSMENT AND PLAN       Rosario Terrazas is a 68 year old female with MM, s/p melphalan, day + 6 transplant.    BMT/IEC PROTOCOL for MM Auto    Transplants for multiple myeloma:  The patient has  Standard risk IgG D MM, planning on 2 transplants    3/12 Day -1 Melphalan 200 mg/m2   3/13 Day 0 Transplant, cell total post wash 2.23 x 10^6 CD34 + cells/kg (pre freeze dose: 5.36 x 10 ^6 CD34+ cells/kg)  Allopurinol per protocol    ID  Continue INH/B6 through transplant for latent TB   Ppx antibiotics acyclovir, fluconazole, levofloxacin. Bactrim from D28     Heme   - transfuse to keep hgb >7g/dL, plt >10k  - Of note she is on 81mg ASA. Hold aspirin (3/17), resume once platelets have recovered.     FEN/Renal  Mild hypocalcemia: On calcium supplements three times daily. Continue.     CV   ECHO 55-60%   EKG NSR  BP runs low at baseline (100s/ 60s).     GI:   Nausea: schedule zofran TID & zyprexa qHS; compazine TID.   Diarrhea: Use imodium 4 mg up to QID     Endocrine  Thyroid FNA Atypia of undetermined significance diagnosis has a 22 (13-30)% risk of malignancy. Repeat FNA (least 4-6 weeks from previous aspiration with optimal time being 12 weeks after last aspiration) , molecular testing, diagnostic lobectomy, or surveillance is recommended.    Has next appt with endocrine 4/16/24.    Neuro/Pain  Neuropathy: continues on eleni, xanaflex. Gabapentin   Rib pain: continue oxycodone and back brace, Tylenol on hold since 3/19     Summary:   - Continue Zofran TID, compazine TID and Zyprexa at night (given instructions)   - G-CSF today   - 1L IVF   - instructed to stop taking Tylenol   - Continue imodium 4 mg up to QID   - dressing change     30 minutes spent by me on the date of the encounter doing chart review, history and exam, documentation and further activities per the note    Sanchez Kyle PA-C

## 2024-03-19 NOTE — LETTER
3/19/2024         RE: Rosario Terrazas  [address] [place] [address]        Dear Colleague,    Thank you for referring your patient, Rosario Terrazas, to the [facility] BLOOD AND MARROW TRANSPLANT PROGRAM [place]. Please see a copy of my visit note below.    BMT/Cell Therapy Daily Progress Note   03/19/2024    Patient ID:  Rosario Terrazas is a 68 year old female, hx of MM, day + 6 s/p HD melphalan and auto transplant hsct.    INTERVAL  HISTORY     Daughter served as  today.    Emesis x4 (small volume) and loose stools x4 (small volume) yesterday, poor food and fluid intake, using all three antiemetics and imodium 2 mg x5 yesterday. Throat is bothersome. No cough. She is still taking Tylenol for pain control and instructed that she needs to stop since she is neutropenic. Denies dizziness/LH.     Review of Systems: 10 point ROS negative except as noted above      PHYSICAL EXAM     Wt Readings from Last 4 Encounters:   03/18/24 96.4 kg (212 lb 8 oz)   03/17/24 98.2 kg (216 lb 8 oz)   03/15/24 98.1 kg (216 lb 4.8 oz)   03/14/24 98.7 kg (217 lb 9.6 oz)     There were no vitals taken for this visit.    General: NAD, wearing back brace, walks with cane   Eyes: VALERIY, sclera anicteric   Lungs: CTA  CV:  mild tachy, regular rhythm  Abdomen: no tenderness to palpation  Lymphatics: no pitting edema  Skin: no rashes or petechiae  Neuro: A&O   Additional Findings: left CVAD     LABS AND IMAGING: I have assessed all abnormal lab values for their clinical significance and any values considered clinically significant have been addressed in the assessment and plan.        Lab Results   Component Value Date    WBC 0.2 (LL) 03/18/2024    ANEU 2.2 03/15/2024    HGB 8.4 (L) 03/18/2024    HCT 26.3 (L) 03/18/2024    PLT 69 (L) 03/18/2024     03/18/2024    POTASSIUM 3.6 03/18/2024    CHLORIDE 108 (H) 03/18/2024    CO2 23 03/18/2024     (H) 03/18/2024    BUN 8.9  03/18/2024    CR 0.52 03/18/2024    MAG 1.3 (L) 03/08/2024    INR 1.12 03/15/2024         SYSTEMS-BASED ASSESSMENT AND PLAN       Rosario Terrazas is a 68 year old female with MM, s/p melphalan, day + 6 transplant.    BMT/IEC PROTOCOL for MM Auto    Transplants for multiple myeloma:  The patient has Standard risk IgG D MM, planning on 2 transplants    3/12 Day -1 Melphalan 200 mg/m2   3/13 Day 0 Transplant, cell total post wash 2.23 x 10^6 CD34 + cells/kg (pre freeze dose: 5.36 x 10 ^6 CD34+ cells/kg)  Allopurinol per protocol    ID  Continue INH/B6 through transplant for latent TB   Ppx antibiotics acyclovir, fluconazole, levofloxacin. Bactrim from D28     Heme   - transfuse to keep hgb >7g/dL, plt >10k  - Of note she is on 81mg ASA. Hold aspirin (3/17), resume once platelets have recovered.     FEN/Renal  Mild hypocalcemia: On calcium supplements three times daily. Continue.     CV   ECHO 55-60%   EKG NSR  BP runs low at baseline (100s/ 60s).     GI:   Nausea: schedule zofran TID & zyprexa qHS; compazine TID.   Diarrhea: Use imodium 4 mg up to QID     Endocrine  Thyroid FNA Atypia of undetermined significance diagnosis has a 22 (13-30)% risk of malignancy. Repeat FNA (least 4-6 weeks from previous aspiration with optimal time being 12 weeks after last aspiration) , molecular testing, diagnostic lobectomy, or surveillance is recommended.    Has next appt with endocrine 4/16/24.    Neuro/Pain  Neuropathy: continues on eleni, xanaflex. Gabapentin   Rib pain: continue oxycodone and back brace, Tylenol on hold since 3/19     Summary:   - Continue Zofran TID, compazine TID and Zyprexa at night (given instructions)   - G-CSF today   - 1L IVF   - instructed to stop taking Tylenol   - Continue imodium 4 mg up to QID   - dressing change     30 minutes spent by me on the date of the encounter doing chart review, history and exam, documentation and further activities per the note    Sanchez Kyle PA-C

## 2024-03-20 ENCOUNTER — INFUSION THERAPY VISIT (OUTPATIENT)
Dept: TRANSPLANT | Facility: CLINIC | Age: 69
End: 2024-03-20
Attending: STUDENT IN AN ORGANIZED HEALTH CARE EDUCATION/TRAINING PROGRAM
Payer: COMMERCIAL

## 2024-03-20 ENCOUNTER — APPOINTMENT (OUTPATIENT)
Dept: LAB | Facility: CLINIC | Age: 69
End: 2024-03-20
Attending: STUDENT IN AN ORGANIZED HEALTH CARE EDUCATION/TRAINING PROGRAM
Payer: COMMERCIAL

## 2024-03-20 VITALS
RESPIRATION RATE: 18 BRPM | BODY MASS INDEX: 36.81 KG/M2 | TEMPERATURE: 97.9 F | HEART RATE: 101 BPM | WEIGHT: 209.3 LBS | SYSTOLIC BLOOD PRESSURE: 125 MMHG | OXYGEN SATURATION: 100 % | DIASTOLIC BLOOD PRESSURE: 69 MMHG

## 2024-03-20 DIAGNOSIS — C90.01 MULTIPLE MYELOMA IN REMISSION (H): Primary | ICD-10-CM

## 2024-03-20 LAB
ABO/RH(D): NORMAL
ACANTHOCYTES BLD QL SMEAR: ABNORMAL
ANION GAP SERPL CALCULATED.3IONS-SCNC: 14 MMOL/L (ref 7–15)
ANTIBODY SCREEN: NEGATIVE
AUER BODIES BLD QL SMEAR: ABNORMAL
BASO STIPL BLD QL SMEAR: ABNORMAL
BASOPHILS # BLD AUTO: ABNORMAL 10*3/UL
BASOPHILS NFR BLD AUTO: ABNORMAL %
BITE CELLS BLD QL SMEAR: ABNORMAL
BLD PROD TYP BPU: NORMAL
BLISTER CELLS BLD QL SMEAR: ABNORMAL
BLOOD COMPONENT TYPE: NORMAL
BUN SERPL-MCNC: 7.6 MG/DL (ref 8–23)
BURR CELLS BLD QL SMEAR: ABNORMAL
CALCIUM SERPL-MCNC: 7.3 MG/DL (ref 8.8–10.2)
CHLORIDE SERPL-SCNC: 108 MMOL/L (ref 98–107)
CODING SYSTEM: NORMAL
CREAT SERPL-MCNC: 0.46 MG/DL (ref 0.51–0.95)
DACRYOCYTES BLD QL SMEAR: SLIGHT
DEPRECATED HCO3 PLAS-SCNC: 20 MMOL/L (ref 22–29)
DONOR CYTOMEGALOVIRUS ABY: POSITIVE
DONOR HEP B CORE ABY: ABNORMAL
DONOR HEP B SURF AGN: ABNORMAL
DONOR HEPATITIS C ABY: ABNORMAL
DONOR HTLV 1&2 ANTIBODY: ABNORMAL
DONOR TREPONEMA PAL ABY: ABNORMAL
EGFRCR SERPLBLD CKD-EPI 2021: >90 ML/MIN/1.73M2
ELLIPTOCYTES BLD QL SMEAR: ABNORMAL
EOSINOPHIL # BLD AUTO: ABNORMAL 10*3/UL
EOSINOPHIL NFR BLD AUTO: ABNORMAL %
ERYTHROCYTE [DISTWIDTH] IN BLOOD BY AUTOMATED COUNT: 15.9 % (ref 10–15)
FRAGMENTS BLD QL SMEAR: ABNORMAL
GLUCOSE SERPL-MCNC: 121 MG/DL (ref 70–99)
HCT VFR BLD AUTO: 24.6 % (ref 35–47)
HGB BLD-MCNC: 8.3 G/DL (ref 11.7–15.7)
HGB C CRYSTALS: ABNORMAL
HIV1+2 AB SERPL QL IA: ABNORMAL
HOWELL-JOLLY BOD BLD QL SMEAR: ABNORMAL
IMM GRANULOCYTES # BLD: ABNORMAL 10*3/UL
IMM GRANULOCYTES NFR BLD: ABNORMAL %
ISSUE DATE AND TIME: NORMAL
LYMPHOCYTES # BLD AUTO: ABNORMAL 10*3/UL
LYMPHOCYTES NFR BLD AUTO: ABNORMAL %
MCH RBC QN AUTO: 33.1 PG (ref 26.5–33)
MCHC RBC AUTO-ENTMCNC: 33.7 G/DL (ref 31.5–36.5)
MCV RBC AUTO: 98 FL (ref 78–100)
MONOCYTES # BLD AUTO: ABNORMAL 10*3/UL
MONOCYTES NFR BLD AUTO: ABNORMAL %
NEUTROPHILS # BLD AUTO: ABNORMAL 10*3/UL
NEUTROPHILS NFR BLD AUTO: ABNORMAL %
NEUTS HYPERSEG BLD QL SMEAR: ABNORMAL
PLAT MORPH BLD: ABNORMAL
PLATELET # BLD AUTO: 22 10E3/UL (ref 150–450)
POLYCHROMASIA BLD QL SMEAR: ABNORMAL
POTASSIUM SERPL-SCNC: 3.3 MMOL/L (ref 3.4–5.3)
RBC # BLD AUTO: 2.51 10E6/UL (ref 3.8–5.2)
RBC AGGLUT BLD QL: ABNORMAL
RBC MORPH BLD: ABNORMAL
ROULEAUX BLD QL SMEAR: ABNORMAL
SICKLE CELLS BLD QL SMEAR: ABNORMAL
SMUDGE CELLS BLD QL SMEAR: ABNORMAL
SODIUM SERPL-SCNC: 142 MMOL/L (ref 135–145)
SPECIMEN EXPIRATION DATE: NORMAL
SPHEROCYTES BLD QL SMEAR: ABNORMAL
STOMATOCYTES BLD QL SMEAR: ABNORMAL
TARGETS BLD QL SMEAR: ABNORMAL
TOXIC GRANULES BLD QL SMEAR: ABNORMAL
TRYPANOSOMA CRUZI: ABNORMAL
UNIT ABO/RH: NORMAL
UNIT NUMBER: NORMAL
UNIT STATUS: NORMAL
UNIT TYPE ISBT: 6200
VARIANT LYMPHS BLD QL SMEAR: ABNORMAL
WBC # BLD AUTO: <0.1 10E3/UL (ref 4–11)

## 2024-03-20 PROCEDURE — 85027 COMPLETE CBC AUTOMATED: CPT

## 2024-03-20 PROCEDURE — 250N000011 HC RX IP 250 OP 636: Mod: JZ | Performed by: STUDENT IN AN ORGANIZED HEALTH CARE EDUCATION/TRAINING PROGRAM

## 2024-03-20 PROCEDURE — 36592 COLLECT BLOOD FROM PICC: CPT

## 2024-03-20 PROCEDURE — 99214 OFFICE O/P EST MOD 30 MIN: CPT

## 2024-03-20 PROCEDURE — 96366 THER/PROPH/DIAG IV INF ADDON: CPT

## 2024-03-20 PROCEDURE — 80048 BASIC METABOLIC PNL TOTAL CA: CPT

## 2024-03-20 PROCEDURE — 96372 THER/PROPH/DIAG INJ SC/IM: CPT | Mod: XS | Performed by: STUDENT IN AN ORGANIZED HEALTH CARE EDUCATION/TRAINING PROGRAM

## 2024-03-20 PROCEDURE — G0463 HOSPITAL OUTPT CLINIC VISIT: HCPCS | Mod: 25

## 2024-03-20 PROCEDURE — 250N000011 HC RX IP 250 OP 636: Performed by: STUDENT IN AN ORGANIZED HEALTH CARE EDUCATION/TRAINING PROGRAM

## 2024-03-20 PROCEDURE — 250N000011 HC RX IP 250 OP 636: Performed by: INTERNAL MEDICINE

## 2024-03-20 PROCEDURE — 96368 THER/DIAG CONCURRENT INF: CPT

## 2024-03-20 PROCEDURE — 96365 THER/PROPH/DIAG IV INF INIT: CPT

## 2024-03-20 PROCEDURE — 250N000011 HC RX IP 250 OP 636

## 2024-03-20 PROCEDURE — 258N000003 HC RX IP 258 OP 636

## 2024-03-20 RX ORDER — HEPARIN SODIUM,PORCINE 10 UNIT/ML
5-20 VIAL (ML) INTRAVENOUS DAILY PRN
Status: CANCELLED | OUTPATIENT
Start: 2024-03-20

## 2024-03-20 RX ORDER — HEPARIN SODIUM,PORCINE 10 UNIT/ML
5-20 VIAL (ML) INTRAVENOUS DAILY PRN
Status: CANCELLED | OUTPATIENT
Start: 2024-03-21

## 2024-03-20 RX ORDER — POTASSIUM CHLORIDE 29.8 MG/ML
20 INJECTION INTRAVENOUS
Status: COMPLETED | OUTPATIENT
Start: 2024-03-20 | End: 2024-03-20

## 2024-03-20 RX ORDER — HEPARIN SODIUM (PORCINE) LOCK FLUSH IV SOLN 100 UNIT/ML 100 UNIT/ML
5 SOLUTION INTRAVENOUS
Status: CANCELLED | OUTPATIENT
Start: 2024-03-20

## 2024-03-20 RX ORDER — DIPHENHYDRAMINE HYDROCHLORIDE 50 MG/ML
50 INJECTION INTRAMUSCULAR; INTRAVENOUS
Status: CANCELLED
Start: 2024-03-20

## 2024-03-20 RX ORDER — EPINEPHRINE 1 MG/ML
0.3 INJECTION, SOLUTION INTRAMUSCULAR; SUBCUTANEOUS EVERY 5 MIN PRN
Status: CANCELLED | OUTPATIENT
Start: 2024-03-20

## 2024-03-20 RX ORDER — HEPARIN SODIUM (PORCINE) LOCK FLUSH IV SOLN 100 UNIT/ML 100 UNIT/ML
5 SOLUTION INTRAVENOUS
Status: CANCELLED | OUTPATIENT
Start: 2024-03-21

## 2024-03-20 RX ORDER — HEPARIN SODIUM,PORCINE 10 UNIT/ML
5-20 VIAL (ML) INTRAVENOUS DAILY PRN
Status: DISCONTINUED | OUTPATIENT
Start: 2024-03-20 | End: 2024-03-20 | Stop reason: HOSPADM

## 2024-03-20 RX ADMIN — FILGRASTIM-SNDZ 480 MCG: 480 INJECTION, SOLUTION INTRAVENOUS; SUBCUTANEOUS at 08:44

## 2024-03-20 RX ADMIN — CALCIUM GLUCONATE 1 G: 98 INJECTION, SOLUTION INTRAVENOUS at 09:43

## 2024-03-20 RX ADMIN — Medication 5 ML: at 07:57

## 2024-03-20 RX ADMIN — POTASSIUM CHLORIDE 20 MEQ: 29.8 INJECTION, SOLUTION INTRAVENOUS at 09:43

## 2024-03-20 RX ADMIN — FOSAPREPITANT 150 MG: 150 INJECTION, POWDER, LYOPHILIZED, FOR SOLUTION INTRAVENOUS at 09:21

## 2024-03-20 RX ADMIN — SODIUM CHLORIDE 1000 ML: 9 INJECTION, SOLUTION INTRAVENOUS at 08:12

## 2024-03-20 RX ADMIN — POTASSIUM CHLORIDE 20 MEQ: 29.8 INJECTION, SOLUTION INTRAVENOUS at 08:44

## 2024-03-20 RX ADMIN — Medication 5 ML: at 07:56

## 2024-03-20 ASSESSMENT — PAIN SCALES - GENERAL: PAINLEVEL: SEVERE PAIN (6)

## 2024-03-20 NOTE — PROGRESS NOTES
Infusion Nursing Note:  Rosario Terrazas presents today for IVF.    Patient seen by provider today: Yes: Sanchez Kyle   present during visit today: Patient refused  services and a waiver form has been signed.     Note: Rosario here today feeling poorly, having lots of nausea/vomiting/diarrhea. Received 1L NS bolus, GCSF, 40mEQ IV KCL, 1gm Calcium gluconate, and Emend. Pt tolerated infusion without difficulty. Encouraged fluids at home. Pt/daughter know to call with worsening symptoms.      Intravenous Access:  Wilder.    Treatment Conditions:  Lab Results   Component Value Date    HGB 8.3 (L) 03/20/2024    WBC <0.1 (LL) 03/20/2024    ANEU 2.2 03/15/2024    ANEUTAUTO 20.4 (H) 03/06/2024    PLT 22 (LL) 03/20/2024        Lab Results   Component Value Date     03/20/2024    POTASSIUM 3.3 (L) 03/20/2024    MAG 1.3 (L) 03/08/2024    CR 0.46 (L) 03/20/2024    RAMIREZ 7.3 (L) 03/20/2024    BILITOTAL 0.3 03/18/2024    ALBUMIN 3.6 03/18/2024    ALT 19 03/18/2024    AST 24 03/18/2024         Post Infusion Assessment:  Patient tolerated infusion without incident.  Blood return noted pre and post infusion.  Site patent and intact, free from redness, edema or discomfort.  No evidence of extravasations.  Access discontinued per protocol.       Discharge Plan:   Patient discharged in stable condition accompanied by: daughter.  Departure Mode: Ambulatory.      Radha Weir RN

## 2024-03-20 NOTE — NURSING NOTE
Chief Complaint   Patient presents with    Blood Draw     CVC accessed and labs drawn by rn in lab. Vital signs taken.     CVC accessed by RN in lab, labs drawn. Both lines flushed with heparin. Vital signs taken. Pt checked in to next appointment.    Rachelle Palumbo RN

## 2024-03-20 NOTE — PROGRESS NOTES
BMT/Cell Therapy Daily Progress Note   03/20/2024    Patient ID:  Rosario Terrazas is a 68 year old female, hx of MM, day + 7 s/p HD melphalan and auto transplant hsct.    INTERVAL  HISTORY     Daughter served as  today.    Rosario is not feeling well again today. Having difficulty tolerating any PO intake despite taking her Zofran and compazine on a scheduled basis. She had 3 episodes of diarrhea yesterday associated with lower abdominal cramping that is better after her BM. No urinary complaints, she feels cold but has been afebrile.     Review of Systems: 10 point ROS negative except as noted above      PHYSICAL EXAM     Wt Readings from Last 4 Encounters:   03/20/24 94.9 kg (209 lb 4.8 oz)   03/19/24 96.3 kg (212 lb 4.8 oz)   03/18/24 96.4 kg (212 lb 8 oz)   03/17/24 98.2 kg (216 lb 8 oz)     /69 (BP Location: Right arm, Patient Position: Sitting, Cuff Size: Adult Regular)   Pulse 101   Temp 97.9  F (36.6  C) (Oral)   Resp 18   Wt 94.9 kg (209 lb 4.8 oz)   SpO2 100%   BMI 36.81 kg/m      General: wrapped in blankets, appears uncomfortable, walks with cane   Eyes: VALERIY, sclera anicteric   Lungs: CTA  CV:  mild tachy, regular rhythm  Abdomen: no tenderness to palpation  Lymphatics: no pitting edema  Skin: no rashes or petechiae  Neuro: A&O   Additional Findings: left CVAD     LABS AND IMAGING: I have assessed all abnormal lab values for their clinical significance and any values considered clinically significant have been addressed in the assessment and plan.        Lab Results   Component Value Date    WBC <0.1 (LL) 03/20/2024    ANEU 2.2 03/15/2024    HGB 8.3 (L) 03/20/2024    HCT 24.6 (L) 03/20/2024    PLT 22 (LL) 03/20/2024     03/20/2024    POTASSIUM 3.3 (L) 03/20/2024    CHLORIDE 108 (H) 03/20/2024    CO2 20 (L) 03/20/2024     (H) 03/20/2024    BUN 7.6 (L) 03/20/2024    CR 0.46 (L) 03/20/2024    MAG 1.3 (L) 03/08/2024    INR 1.12 03/15/2024         SYSTEMS-BASED  ASSESSMENT AND PLAN       Rosario Terrazas is a 68 year old female with MM, s/p melphalan, day + 7 transplant.    BMT/IEC PROTOCOL for MM Auto    Transplants for multiple myeloma:  The patient has Standard risk IgG D MM, planning on 2 transplants    3/12 Day -1 Melphalan 200 mg/m2   3/13 Day 0 Transplant, cell total post wash 2.23 x 10^6 CD34 + cells/kg (pre freeze dose: 5.36 x 10 ^6 CD34+ cells/kg)  Allopurinol per protocol    ID  Continue INH/B6 through transplant for latent TB   Ppx antibiotics acyclovir, fluconazole, levofloxacin. Bactrim from D28     Heme   - transfuse to keep hgb >7g/dL, plt >10k  - Of note she is on 81mg ASA. Hold aspirin (3/17), resume once platelets have recovered.     FEN/Renal  Mild hypocalcemia: On calcium supplements three times daily. Continue. Administered calcium gluconate 3/20, for Calcium 7.3 and not tolerating oral replacement.     CV   ECHO 55-60%   EKG NSR  BP runs low at baseline (100s/ 60s).     GI:   Nausea: schedule zofran TID & zyprexa qHS; compazine TID.   Diarrhea: Use imodium 4 mg up to QID     Endocrine  Thyroid FNA Atypia of undetermined significance diagnosis has a 22 (13-30)% risk of malignancy. Repeat FNA (least 4-6 weeks from previous aspiration with optimal time being 12 weeks after last aspiration) , molecular testing, diagnostic lobectomy, or surveillance is recommended.    Has next appt with endocrine 4/16/24.    Neuro/Pain  Neuropathy: continues on eleni, xanaflex. Gabapentin   Rib pain: continue oxycodone and back brace, Tylenol on hold since 3/19     Summary:   - Emend   - IV K+   - IV calcium gluconate   - G-CSF   - continue scheduled Zofran, compazine and imodium     30 minutes spent by me on the date of the encounter doing chart review, history and exam, documentation and further activities per the note    Sanchez Kyle PA-C

## 2024-03-20 NOTE — LETTER
3/20/2024         RE: Rosario Terrazas  [address] [place] [address]        Dear Colleague,    Thank you for referring your patient, Rosario Terrazas, to the [facility] BLOOD AND MARROW TRANSPLANT PROGRAM [place]. Please see a copy of my visit note below.    BMT/Cell Therapy Daily Progress Note   03/20/2024    Patient ID:  Rosario Terrazas is a 68 year old female, hx of MM, day + 7 s/p HD melphalan and auto transplant hsct.    INTERVAL  HISTORY     Daughter served as  today.    Rosario is not feeling well again today. Having difficulty tolerating any PO intake despite taking her Zofran and compazine on a scheduled basis. She had 3 episodes of diarrhea yesterday associated with lower abdominal cramping that is better after her BM. No urinary complaints, she feels cold but has been afebrile.     Review of Systems: 10 point ROS negative except as noted above      PHYSICAL EXAM     Wt Readings from Last 4 Encounters:   03/20/24 94.9 kg (209 lb 4.8 oz)   03/19/24 96.3 kg (212 lb 4.8 oz)   03/18/24 96.4 kg (212 lb 8 oz)   03/17/24 98.2 kg (216 lb 8 oz)     /69 (BP Location: Right arm, Patient Position: Sitting, Cuff Size: Adult Regular)   Pulse 101   Temp 97.9  F (36.6  C) (Oral)   Resp 18   Wt 94.9 kg (209 lb 4.8 oz)   SpO2 100%   BMI 36.81 kg/m      General: wrapped in blankets, appears uncomfortable, walks with cane   Eyes: VALERIY, sclera anicteric   Lungs: CTA  CV:  mild tachy, regular rhythm  Abdomen: no tenderness to palpation  Lymphatics: no pitting edema  Skin: no rashes or petechiae  Neuro: A&O   Additional Findings: left CVAD     LABS AND IMAGING: I have assessed all abnormal lab values for their clinical significance and any values considered clinically significant have been addressed in the assessment and plan.        Lab Results   Component Value Date    WBC <0.1 (LL) 03/20/2024    ANEU 2.2 03/15/2024    HGB 8.3 (L) 03/20/2024    HCT 24.6 (L)  03/20/2024    PLT 22 (LL) 03/20/2024     03/20/2024    POTASSIUM 3.3 (L) 03/20/2024    CHLORIDE 108 (H) 03/20/2024    CO2 20 (L) 03/20/2024     (H) 03/20/2024    BUN 7.6 (L) 03/20/2024    CR 0.46 (L) 03/20/2024    MAG 1.3 (L) 03/08/2024    INR 1.12 03/15/2024         SYSTEMS-BASED ASSESSMENT AND PLAN       Rosario Terrazas is a 68 year old female with MM, s/p melphalan, day + 7 transplant.    BMT/IEC PROTOCOL for MM Auto    Transplants for multiple myeloma:  The patient has Standard risk IgG D MM, planning on 2 transplants    3/12 Day -1 Melphalan 200 mg/m2   3/13 Day 0 Transplant, cell total post wash 2.23 x 10^6 CD34 + cells/kg (pre freeze dose: 5.36 x 10 ^6 CD34+ cells/kg)  Allopurinol per protocol    ID  Continue INH/B6 through transplant for latent TB   Ppx antibiotics acyclovir, fluconazole, levofloxacin. Bactrim from D28     Heme   - transfuse to keep hgb >7g/dL, plt >10k  - Of note she is on 81mg ASA. Hold aspirin (3/17), resume once platelets have recovered.     FEN/Renal  Mild hypocalcemia: On calcium supplements three times daily. Continue. Administered calcium gluconate 3/20, for Calcium 7.3 and not tolerating oral replacement.     CV   ECHO 55-60%   EKG NSR  BP runs low at baseline (100s/ 60s).     GI:   Nausea: schedule zofran TID & zyprexa qHS; compazine TID.   Diarrhea: Use imodium 4 mg up to QID     Endocrine  Thyroid FNA Atypia of undetermined significance diagnosis has a 22 (13-30)% risk of malignancy. Repeat FNA (least 4-6 weeks from previous aspiration with optimal time being 12 weeks after last aspiration) , molecular testing, diagnostic lobectomy, or surveillance is recommended.    Has next appt with endocrine 4/16/24.    Neuro/Pain  Neuropathy: continues on eleni, xanaflex. Gabapentin   Rib pain: continue oxycodone and back brace, Tylenol on hold since 3/19     Summary:   - Emend   - IV K+   - IV calcium gluconate   - G-CSF   - continue scheduled Zofran, compazine and  imodium     30 minutes spent by me on the date of the encounter doing chart review, history and exam, documentation and further activities per the note    Sanchez Kyle PA-C

## 2024-03-21 ENCOUNTER — INFUSION THERAPY VISIT (OUTPATIENT)
Dept: TRANSPLANT | Facility: CLINIC | Age: 69
End: 2024-03-21
Attending: INTERNAL MEDICINE
Payer: COMMERCIAL

## 2024-03-21 ENCOUNTER — APPOINTMENT (OUTPATIENT)
Dept: LAB | Facility: CLINIC | Age: 69
End: 2024-03-21
Attending: INTERNAL MEDICINE
Payer: COMMERCIAL

## 2024-03-21 VITALS
RESPIRATION RATE: 18 BRPM | DIASTOLIC BLOOD PRESSURE: 70 MMHG | WEIGHT: 208.7 LBS | OXYGEN SATURATION: 100 % | TEMPERATURE: 98.3 F | HEART RATE: 108 BPM | SYSTOLIC BLOOD PRESSURE: 118 MMHG | BODY MASS INDEX: 36.7 KG/M2

## 2024-03-21 VITALS
TEMPERATURE: 99.8 F | OXYGEN SATURATION: 100 % | RESPIRATION RATE: 18 BRPM | SYSTOLIC BLOOD PRESSURE: 120 MMHG | DIASTOLIC BLOOD PRESSURE: 78 MMHG | HEART RATE: 108 BPM

## 2024-03-21 DIAGNOSIS — C90.00 MULTIPLE MYELOMA, REMISSION STATUS UNSPECIFIED (H): ICD-10-CM

## 2024-03-21 DIAGNOSIS — C90.01 MULTIPLE MYELOMA IN REMISSION (H): ICD-10-CM

## 2024-03-21 DIAGNOSIS — C90.01 MULTIPLE MYELOMA IN REMISSION (H): Primary | ICD-10-CM

## 2024-03-21 LAB
ACANTHOCYTES BLD QL SMEAR: ABNORMAL
ANION GAP SERPL CALCULATED.3IONS-SCNC: 14 MMOL/L (ref 7–15)
AUER BODIES BLD QL SMEAR: ABNORMAL
BASO STIPL BLD QL SMEAR: ABNORMAL
BASOPHILS # BLD AUTO: ABNORMAL 10*3/UL
BASOPHILS NFR BLD AUTO: ABNORMAL %
BITE CELLS BLD QL SMEAR: ABNORMAL
BLISTER CELLS BLD QL SMEAR: ABNORMAL
BUN SERPL-MCNC: 8.3 MG/DL (ref 8–23)
BURR CELLS BLD QL SMEAR: ABNORMAL
CALCIUM SERPL-MCNC: 7.3 MG/DL (ref 8.8–10.2)
CHLORIDE SERPL-SCNC: 109 MMOL/L (ref 98–107)
CREAT SERPL-MCNC: 0.5 MG/DL (ref 0.51–0.95)
DACRYOCYTES BLD QL SMEAR: SLIGHT
DEPRECATED HCO3 PLAS-SCNC: 19 MMOL/L (ref 22–29)
EGFRCR SERPLBLD CKD-EPI 2021: >90 ML/MIN/1.73M2
ELLIPTOCYTES BLD QL SMEAR: ABNORMAL
EOSINOPHIL # BLD AUTO: ABNORMAL 10*3/UL
EOSINOPHIL NFR BLD AUTO: ABNORMAL %
ERYTHROCYTE [DISTWIDTH] IN BLOOD BY AUTOMATED COUNT: 15.8 % (ref 10–15)
FRAGMENTS BLD QL SMEAR: ABNORMAL
GLUCOSE SERPL-MCNC: 107 MG/DL (ref 70–99)
HCT VFR BLD AUTO: 23.5 % (ref 35–47)
HGB BLD-MCNC: 7.7 G/DL (ref 11.7–15.7)
HGB C CRYSTALS: ABNORMAL
HOWELL-JOLLY BOD BLD QL SMEAR: ABNORMAL
IMM GRANULOCYTES # BLD: ABNORMAL 10*3/UL
IMM GRANULOCYTES NFR BLD: ABNORMAL %
INR PPP: 1.74 (ref 0.85–1.15)
LYMPHOCYTES # BLD AUTO: ABNORMAL 10*3/UL
LYMPHOCYTES NFR BLD AUTO: ABNORMAL %
MAGNESIUM SERPL-MCNC: 1.5 MG/DL (ref 1.7–2.3)
MCH RBC QN AUTO: 31.7 PG (ref 26.5–33)
MCHC RBC AUTO-ENTMCNC: 32.8 G/DL (ref 31.5–36.5)
MCV RBC AUTO: 97 FL (ref 78–100)
MONOCYTES # BLD AUTO: ABNORMAL 10*3/UL
MONOCYTES NFR BLD AUTO: ABNORMAL %
NEUTROPHILS # BLD AUTO: ABNORMAL 10*3/UL
NEUTROPHILS NFR BLD AUTO: ABNORMAL %
NEUTS HYPERSEG BLD QL SMEAR: ABNORMAL
PLAT MORPH BLD: ABNORMAL
PLATELET # BLD AUTO: 7 10E3/UL (ref 150–450)
POLYCHROMASIA BLD QL SMEAR: ABNORMAL
POTASSIUM SERPL-SCNC: 3.2 MMOL/L (ref 3.4–5.3)
RBC # BLD AUTO: 2.43 10E6/UL (ref 3.8–5.2)
RBC AGGLUT BLD QL: ABNORMAL
RBC MORPH BLD: ABNORMAL
ROULEAUX BLD QL SMEAR: ABNORMAL
SICKLE CELLS BLD QL SMEAR: ABNORMAL
SMUDGE CELLS BLD QL SMEAR: ABNORMAL
SODIUM SERPL-SCNC: 142 MMOL/L (ref 135–145)
SPHEROCYTES BLD QL SMEAR: ABNORMAL
STOMATOCYTES BLD QL SMEAR: ABNORMAL
TARGETS BLD QL SMEAR: ABNORMAL
TOXIC GRANULES BLD QL SMEAR: ABNORMAL
URATE SERPL-MCNC: 3.1 MG/DL (ref 2.4–5.7)
VARIANT LYMPHS BLD QL SMEAR: ABNORMAL
WBC # BLD AUTO: 0.1 10E3/UL (ref 4–11)

## 2024-03-21 PROCEDURE — 250N000011 HC RX IP 250 OP 636: Performed by: PHYSICIAN ASSISTANT

## 2024-03-21 PROCEDURE — 80048 BASIC METABOLIC PNL TOTAL CA: CPT

## 2024-03-21 PROCEDURE — 96372 THER/PROPH/DIAG INJ SC/IM: CPT | Performed by: STUDENT IN AN ORGANIZED HEALTH CARE EDUCATION/TRAINING PROGRAM

## 2024-03-21 PROCEDURE — 36430 TRANSFUSION BLD/BLD COMPNT: CPT

## 2024-03-21 PROCEDURE — 258N000003 HC RX IP 258 OP 636: Performed by: PHYSICIAN ASSISTANT

## 2024-03-21 PROCEDURE — 250N000013 HC RX MED GY IP 250 OP 250 PS 637: Performed by: PHYSICIAN ASSISTANT

## 2024-03-21 PROCEDURE — 84550 ASSAY OF BLOOD/URIC ACID: CPT | Performed by: INTERNAL MEDICINE

## 2024-03-21 PROCEDURE — 96365 THER/PROPH/DIAG IV INF INIT: CPT

## 2024-03-21 PROCEDURE — 86923 COMPATIBILITY TEST ELECTRIC: CPT | Performed by: PHYSICIAN ASSISTANT

## 2024-03-21 PROCEDURE — 250N000011 HC RX IP 250 OP 636: Mod: JZ | Performed by: STUDENT IN AN ORGANIZED HEALTH CARE EDUCATION/TRAINING PROGRAM

## 2024-03-21 PROCEDURE — 87186 SC STD MICRODIL/AGAR DIL: CPT

## 2024-03-21 PROCEDURE — 99215 OFFICE O/P EST HI 40 MIN: CPT

## 2024-03-21 PROCEDURE — 96366 THER/PROPH/DIAG IV INF ADDON: CPT

## 2024-03-21 PROCEDURE — 86900 BLOOD TYPING SEROLOGIC ABO: CPT

## 2024-03-21 PROCEDURE — G0463 HOSPITAL OUTPT CLINIC VISIT: HCPCS

## 2024-03-21 PROCEDURE — 250N000011 HC RX IP 250 OP 636: Performed by: INTERNAL MEDICINE

## 2024-03-21 PROCEDURE — 36592 COLLECT BLOOD FROM PICC: CPT

## 2024-03-21 PROCEDURE — P9037 PLATE PHERES LEUKOREDU IRRAD: HCPCS | Performed by: PHYSICIAN ASSISTANT

## 2024-03-21 PROCEDURE — 36415 COLL VENOUS BLD VENIPUNCTURE: CPT

## 2024-03-21 PROCEDURE — 96368 THER/DIAG CONCURRENT INF: CPT

## 2024-03-21 PROCEDURE — 87149 DNA/RNA DIRECT PROBE: CPT

## 2024-03-21 PROCEDURE — 83735 ASSAY OF MAGNESIUM: CPT

## 2024-03-21 PROCEDURE — 85027 COMPLETE CBC AUTOMATED: CPT

## 2024-03-21 PROCEDURE — G0463 HOSPITAL OUTPT CLINIC VISIT: HCPCS | Mod: 25

## 2024-03-21 PROCEDURE — 85610 PROTHROMBIN TIME: CPT | Performed by: INTERNAL MEDICINE

## 2024-03-21 RX ORDER — HEPARIN SODIUM,PORCINE 10 UNIT/ML
5 VIAL (ML) INTRAVENOUS
Status: DISCONTINUED | OUTPATIENT
Start: 2024-03-21 | End: 2024-03-21 | Stop reason: HOSPADM

## 2024-03-21 RX ORDER — LOPERAMIDE HCL 2 MG
4 CAPSULE ORAL ONCE
Status: COMPLETED | OUTPATIENT
Start: 2024-03-21 | End: 2024-03-21

## 2024-03-21 RX ORDER — MAGNESIUM SULFATE HEPTAHYDRATE 40 MG/ML
2 INJECTION, SOLUTION INTRAVENOUS ONCE
Status: COMPLETED | OUTPATIENT
Start: 2024-03-21 | End: 2024-03-21

## 2024-03-21 RX ORDER — HEPARIN SODIUM (PORCINE) LOCK FLUSH IV SOLN 100 UNIT/ML 100 UNIT/ML
5 SOLUTION INTRAVENOUS
Status: CANCELLED | OUTPATIENT
Start: 2024-03-22

## 2024-03-21 RX ORDER — HEPARIN SODIUM,PORCINE 10 UNIT/ML
5-20 VIAL (ML) INTRAVENOUS DAILY PRN
Status: CANCELLED | OUTPATIENT
Start: 2024-03-22

## 2024-03-21 RX ORDER — POTASSIUM CHLORIDE 29.8 MG/ML
20 INJECTION INTRAVENOUS
Status: COMPLETED | OUTPATIENT
Start: 2024-03-21 | End: 2024-03-21

## 2024-03-21 RX ADMIN — CALCIUM GLUCONATE 2 G: 98 INJECTION, SOLUTION INTRAVENOUS at 09:28

## 2024-03-21 RX ADMIN — PHYTONADIONE 10 MG: 10 INJECTION, EMULSION INTRAMUSCULAR; INTRAVENOUS; SUBCUTANEOUS at 09:29

## 2024-03-21 RX ADMIN — MAGNESIUM SULFATE 2 G: 2 INJECTION INTRAVENOUS at 08:18

## 2024-03-21 RX ADMIN — POTASSIUM CHLORIDE 20 MEQ: 29.8 INJECTION, SOLUTION INTRAVENOUS at 08:18

## 2024-03-21 RX ADMIN — POTASSIUM CHLORIDE 20 MEQ: 29.8 INJECTION, SOLUTION INTRAVENOUS at 09:10

## 2024-03-21 RX ADMIN — Medication 5 ML: at 07:02

## 2024-03-21 RX ADMIN — SODIUM CHLORIDE 1000 ML: 9 INJECTION, SOLUTION INTRAVENOUS at 08:18

## 2024-03-21 RX ADMIN — LOPERAMIDE HYDROCHLORIDE 4 MG: 2 CAPSULE ORAL at 09:29

## 2024-03-21 RX ADMIN — FILGRASTIM-SNDZ 480 MCG: 480 INJECTION, SOLUTION INTRAVENOUS; SUBCUTANEOUS at 09:36

## 2024-03-21 ASSESSMENT — PAIN SCALES - GENERAL: PAINLEVEL: EXTREME PAIN (8)

## 2024-03-21 NOTE — PROGRESS NOTES
BMT/Cell Therapy Daily Progress Note   03/21/2024    Patient ID:  Rosario Terrazas is a 68 year old female, hx of MM, day + 8 s/p HD melphalan and auto transplant hsct.    INTERVAL  HISTORY     Daughter served as  today.    Rosario is not feeling well again today. Getting her pills in but otherwise difficult to tolerate PO. Some diarrhea with some cramping at times. No fever. Using her cane. Denies fever. Offered to admit her since she is feeling so poorly but she declined. We discussed if something changes or she spikes a fever later she will need to come in to ED for admission. Did use imodium last night. Does think emend was helpful.    Review of Systems: 10 point ROS negative except as noted above      PHYSICAL EXAM     Wt Readings from Last 4 Encounters:   03/21/24 94.7 kg (208 lb 11.2 oz)   03/20/24 94.9 kg (209 lb 4.8 oz)   03/19/24 96.3 kg (212 lb 4.8 oz)   03/18/24 96.4 kg (212 lb 8 oz)     /70 (BP Location: Right arm, Patient Position: Sitting, Cuff Size: Adult Regular)   Pulse 108   Temp 98.3  F (36.8  C) (Oral)   Resp 18   Wt 94.7 kg (208 lb 11.2 oz)   SpO2 100%   BMI 36.70 kg/m      General: wrapped in blankets, appears uncomfortable, walks with cane   Eyes: VALERIY, sclera anicteric   Lungs: CTA  CV:  mild tachy, regular rhythm  Abdomen: no tenderness to moderate palpation. Some mild tenderness to deep palpation but vague.   Lymphatics: no pitting edema  Skin: no rashes or petechiae  Neuro: A&O   Additional Findings: left CVAD     LABS AND IMAGING: I have assessed all abnormal lab values for their clinical significance and any values considered clinically significant have been addressed in the assessment and plan.        Lab Results   Component Value Date    WBC 0.1 (LL) 03/21/2024    ANEU 2.2 03/15/2024    HGB 7.7 (L) 03/21/2024    HCT 23.5 (L) 03/21/2024    PLT 7 (LL) 03/21/2024     03/20/2024    POTASSIUM 3.3 (L) 03/20/2024    CHLORIDE 108 (H) 03/20/2024    CO2 20 (L)  03/20/2024     (H) 03/20/2024    BUN 7.6 (L) 03/20/2024    CR 0.46 (L) 03/20/2024    MAG 1.3 (L) 03/08/2024    INR 1.74 (H) 03/21/2024         SYSTEMS-BASED ASSESSMENT AND PLAN       Rosario Terrazas is a 68 year old female with MM, s/p melphalan, day + 8 transplant.    BMT/IEC PROTOCOL for MM Auto    Transplants for multiple myeloma:  The patient has Standard risk IgG D MM, planning on 2 transplants    3/12 Day -1 Melphalan 200 mg/m2   3/13 Day 0 Transplant, cell total post wash 2.23 x 10^6 CD34 + cells/kg (pre freeze dose: 5.36 x 10 ^6 CD34+ cells/kg)  Allopurinol per protocol    ID  Continue INH/B6 through transplant for latent TB   Ppx antibiotics acyclovir, fluconazole, levofloxacin. Bactrim from D28     Check c.diff and enteric.   Sent BC's for surveillance.    Heme   - transfuse to keep hgb >7g/dL, plt >10k  - Of note she is on 81mg ASA. Hold aspirin (3/17), resume once platelets have recovered.   - plts today.  - type and screen today for possible blood tomorrow.  - INR was ordered per treatment plan today and it's 1.7 with plts 7K. Likely nutritional as above. Will give IV vit K today as can't tolerate more PO. Will repeat INR tomorrow. No bleeding.    FEN/Renal  hypocalcemia: On calcium supplements three times daily. Continue. Given 2g ca gluconate today.  IV mg today.  IV K today.  IVF today.    CV   ECHO 55-60%   EKG NSR  BP runs low at baseline (100s/ 60s).     GI:   Nausea: schedule zofran TID & zyprexa qHS; compazine TID.   Diarrhea: can use imodium but sending a c.diff and enteric today.     Endocrine  Thyroid FNA Atypia of undetermined significance diagnosis has a 22 (13-30)% risk of malignancy. Repeat FNA (least 4-6 weeks from previous aspiration with optimal time being 12 weeks after last aspiration) , molecular testing, diagnostic lobectomy, or surveillance is recommended.    Has next appt with endocrine 4/16/24.    Neuro/Pain  Neuropathy: continues on eleni, xanaflex. Gabapentin    Rib pain: continue oxycodone and back brace, Tylenol on hold since 3/19     RTC: daily. Discussed if clinical change or fever needs to come in to our ED for admission.     40 minutes spent by me on the date of the encounter doing chart review, history and exam, documentation and further activities per the note    Brittaney Morales PA-C  [phone]

## 2024-03-21 NOTE — LETTER
3/21/2024         RE: Rosario Terrazas  [address] [place] [address]        Dear Colleague,    Thank you for referring your patient, Rosario Terrazas, to the [facility] BLOOD AND MARROW TRANSPLANT PROGRAM [place]. Please see a copy of my visit note below.    BMT/Cell Therapy Daily Progress Note   03/21/2024    Patient ID:  Rosario Terrazas is a 68 year old female, hx of MM, day + 8 s/p HD melphalan and auto transplant hsct.    INTERVAL  HISTORY     Daughter served as  today.    Rosario is not feeling well again today. Getting her pills in but otherwise difficult to tolerate PO. Some diarrhea with some cramping at times. No fever. Using her cane. Denies fever. Offered to admit her since she is feeling so poorly but she declined. We discussed if something changes or she spikes a fever later she will need to come in to ED for admission. Did use imodium last night. Does think emend was helpful.    Review of Systems: 10 point ROS negative except as noted above      PHYSICAL EXAM     Wt Readings from Last 4 Encounters:   03/21/24 94.7 kg (208 lb 11.2 oz)   03/20/24 94.9 kg (209 lb 4.8 oz)   03/19/24 96.3 kg (212 lb 4.8 oz)   03/18/24 96.4 kg (212 lb 8 oz)     /70 (BP Location: Right arm, Patient Position: Sitting, Cuff Size: Adult Regular)   Pulse 108   Temp 98.3  F (36.8  C) (Oral)   Resp 18   Wt 94.7 kg (208 lb 11.2 oz)   SpO2 100%   BMI 36.70 kg/m      General: wrapped in blankets, appears uncomfortable, walks with cane   Eyes: VALERIY, sclera anicteric   Lungs: CTA  CV:  mild tachy, regular rhythm  Abdomen: no tenderness to moderate palpation. Some mild tenderness to deep palpation but vague.   Lymphatics: no pitting edema  Skin: no rashes or petechiae  Neuro: A&O   Additional Findings: left CVAD     LABS AND IMAGING: I have assessed all abnormal lab values for their clinical significance and any values considered clinically significant have been  addressed in the assessment and plan.        Lab Results   Component Value Date    WBC 0.1 (LL) 03/21/2024    ANEU 2.2 03/15/2024    HGB 7.7 (L) 03/21/2024    HCT 23.5 (L) 03/21/2024    PLT 7 (LL) 03/21/2024     03/20/2024    POTASSIUM 3.3 (L) 03/20/2024    CHLORIDE 108 (H) 03/20/2024    CO2 20 (L) 03/20/2024     (H) 03/20/2024    BUN 7.6 (L) 03/20/2024    CR 0.46 (L) 03/20/2024    MAG 1.3 (L) 03/08/2024    INR 1.74 (H) 03/21/2024         SYSTEMS-BASED ASSESSMENT AND PLAN       Rosario Terrazas is a 68 year old female with MM, s/p melphalan, day + 8 transplant.    BMT/IEC PROTOCOL for MM Auto    Transplants for multiple myeloma:  The patient has Standard risk IgG D MM, planning on 2 transplants    3/12 Day -1 Melphalan 200 mg/m2   3/13 Day 0 Transplant, cell total post wash 2.23 x 10^6 CD34 + cells/kg (pre freeze dose: 5.36 x 10 ^6 CD34+ cells/kg)  Allopurinol per protocol    ID  Continue INH/B6 through transplant for latent TB   Ppx antibiotics acyclovir, fluconazole, levofloxacin. Bactrim from D28     Check c.diff and enteric.     Heme   - transfuse to keep hgb >7g/dL, plt >10k  - Of note she is on 81mg ASA. Hold aspirin (3/17), resume once platelets have recovered.   - plts today.  - type and screen today for possible blood tomorrow.  - INR was ordered per treatment plan today and it's 1.7 with plts 7K. Likely nutritional as above. Will give IV vit K today as can't tolerate more PO. Will repeat INR tomorrow. No bleeding.    FEN/Renal  hypocalcemia: On calcium supplements three times daily. Continue. Given 2g ca gluconate today.  IV mg today.  IV K today.  IVF today.    CV   ECHO 55-60%   EKG NSR  BP runs low at baseline (100s/ 60s).     GI:   Nausea: schedule zofran TID & zyprexa qHS; compazine TID.   Diarrhea: can use imodium but sending a c.diff and enteric today.     Endocrine  Thyroid FNA Atypia of undetermined significance diagnosis has a 22 (13-30)% risk of malignancy. Repeat FNA (least  4-6 weeks from previous aspiration with optimal time being 12 weeks after last aspiration) , molecular testing, diagnostic lobectomy, or surveillance is recommended.    Has next appt with endocrine 4/16/24.    Neuro/Pain  Neuropathy: continues on eleni, xanaflex. Gabapentin   Rib pain: continue oxycodone and back brace, Tylenol on hold since 3/19     RTC: daily. Discussed if clinical change or fever needs to come in to our ED for admission.     40 minutes spent by me on the date of the encounter doing chart review, history and exam, documentation and further activities per the note    Brittaney Morales PA-C  [phone]

## 2024-03-21 NOTE — PROGRESS NOTES
Infusion Nursing Note:  Rosario Terrazas presents today for add-on infusion.    Patient seen by provider today: Yes: Brittaney Morales PA-C   present during visit today: Not Applicable.    Note: Pt assessed by provider prior to infusion. Labs monitored; Plt 7, K+ 3.2, and Mag 1.5- all meet parameters for replacement. Pt received 1 unit platelets, 1L 0.9% NS, 40 mEq K+ IV, 2G magnesium sulfate IV, 2G calcium gluconate IV,10 mg phytonadione IV, and 4 mg Imodium PO. Pt also received 480 mcg Zarxio subcutaneously into left abdomen. Blood cultures collected per orders and sent to lab for processing. Tmax 100.2 during infusions but down to 99.8 at time of discharge. Reinforced that patient should continue to monitor temperature at home today and call if temp is 100.4 or higher. Pt and daughter verbalized understanding.      Intravenous Access:  Wilder.    Treatment Conditions:  Lab Results   Component Value Date    HGB 7.7 (L) 03/21/2024    WBC 0.1 (LL) 03/21/2024    ANEU 2.2 03/15/2024    ANEUTAUTO 20.4 (H) 03/06/2024    PLT 7 (LL) 03/21/2024        Lab Results   Component Value Date     03/21/2024    POTASSIUM 3.2 (L) 03/21/2024    MAG 1.5 (L) 03/21/2024    CR 0.50 (L) 03/21/2024    RAMIREZ 7.3 (L) 03/21/2024    BILITOTAL 0.3 03/18/2024    ALBUMIN 3.6 03/18/2024    ALT 19 03/18/2024    AST 24 03/18/2024       Results reviewed, labs MET treatment parameters, ok to proceed with treatment.      Post Infusion Assessment:  Patient tolerated infusion without incident.  Patient tolerated injection without incident.  Blood return noted pre and post infusion.  Site patent and intact, free from redness, edema or discomfort.  No evidence of extravasations.       Discharge Plan:   Patient discharged in stable condition accompanied by: daughter.  Departure Mode: Ambulatory.      Aaliyah Fam RN

## 2024-03-21 NOTE — NURSING NOTE
"Oncology Rooming Note    March 21, 2024 7:30 AM   Rosario Terrazas is a 68 year old female who presents for:    Chief Complaint   Patient presents with    Blood Draw     Labs drawn via CVC by RN.     Oncology Clinic Visit     Multiple myeloma in remission     Initial Vitals: /70 (BP Location: Right arm, Patient Position: Sitting, Cuff Size: Adult Regular)   Pulse 108   Temp 98.3  F (36.8  C) (Oral)   Resp 18   Wt 94.7 kg (208 lb 11.2 oz)   SpO2 100%   BMI 36.70 kg/m   Estimated body mass index is 36.7 kg/m  as calculated from the following:    Height as of 3/18/24: 1.606 m (5' 3.23\").    Weight as of this encounter: 94.7 kg (208 lb 11.2 oz). Body surface area is 2.06 meters squared.  Extreme Pain (8) Comment: Data Unavailable   No LMP recorded. Patient is postmenopausal.  Allergies reviewed: Yes  Medications reviewed: Yes    Medications: Medication refills not needed today.  Pharmacy name entered into [employer]: [place], [place] - [address] [place] [address] [phone]    Frailty Screening:   Is the patient here for a new oncology consult visit in cancer care? 2. No      Clinical concerns: Pt family member states she has some concerns for the provider.      Angely Soto CMA              "

## 2024-03-21 NOTE — NURSING NOTE
Chief Complaint   Patient presents with    Blood Draw     Labs drawn via CVC by RN.      Labs drawn via CVC by RN. Flushed with saline and heparin. Pt tolerated well. Vitals taken. Pt checked into next appointment.    Azra Collins RN

## 2024-03-22 ENCOUNTER — TELEPHONE (OUTPATIENT)
Dept: ONCOLOGY | Facility: CLINIC | Age: 69
End: 2024-03-22
Payer: COMMERCIAL

## 2024-03-22 ENCOUNTER — APPOINTMENT (OUTPATIENT)
Dept: GENERAL RADIOLOGY | Facility: CLINIC | Age: 69
End: 2024-03-22
Attending: EMERGENCY MEDICINE
Payer: COMMERCIAL

## 2024-03-22 ENCOUNTER — HOSPITAL ENCOUNTER (INPATIENT)
Facility: CLINIC | Age: 69
LOS: 11 days | Discharge: HOME | End: 2024-04-02
Attending: EMERGENCY MEDICINE | Admitting: EMERGENCY MEDICINE
Payer: COMMERCIAL

## 2024-03-22 DIAGNOSIS — R78.81 POSITIVE BLOOD CULTURE: ICD-10-CM

## 2024-03-22 DIAGNOSIS — C90.01 MULTIPLE MYELOMA IN REMISSION (H): ICD-10-CM

## 2024-03-22 DIAGNOSIS — Z94.81 BONE MARROW TRANSPLANT STATUS (H): ICD-10-CM

## 2024-03-22 DIAGNOSIS — Z94.81 STATUS POST BONE MARROW TRANSPLANT (H): ICD-10-CM

## 2024-03-22 DIAGNOSIS — R50.81 FEVER PRESENTING WITH CONDITIONS CLASSIFIED ELSEWHERE: ICD-10-CM

## 2024-03-22 DIAGNOSIS — E87.6 HYPOKALEMIA: ICD-10-CM

## 2024-03-22 DIAGNOSIS — Z16.12 EXTENDED SPECTRUM BETA LACTAMASE (ESBL) RESISTANCE: ICD-10-CM

## 2024-03-22 DIAGNOSIS — B96.20 UNSPECIFIED ESCHERICHIA COLI (E. COLI) AS THE CAUSE OF DISEASES CLASSIFIED ELSEWHERE: ICD-10-CM

## 2024-03-22 DIAGNOSIS — R50.9 FEVER, UNSPECIFIED FEVER CAUSE: ICD-10-CM

## 2024-03-22 DIAGNOSIS — C90.00 MULTIPLE MYELOMA NOT HAVING ACHIEVED REMISSION (H): ICD-10-CM

## 2024-03-22 DIAGNOSIS — S22.068A OTHER CLOSED FRACTURE OF SEVENTH THORACIC VERTEBRA, INITIAL ENCOUNTER (H): ICD-10-CM

## 2024-03-22 DIAGNOSIS — I48.0 PAROXYSMAL ATRIAL FIBRILLATION (H): ICD-10-CM

## 2024-03-22 DIAGNOSIS — D70.9 NEUTROPENIA, UNSPECIFIED TYPE (H): ICD-10-CM

## 2024-03-22 DIAGNOSIS — I47.10 SUPRAVENTRICULAR TACHYCARDIA (H): ICD-10-CM

## 2024-03-22 DIAGNOSIS — C90.00 MULTIPLE MYELOMA, REMISSION STATUS UNSPECIFIED (H): ICD-10-CM

## 2024-03-22 DIAGNOSIS — D64.9 ANEMIA, UNSPECIFIED TYPE: Primary | ICD-10-CM

## 2024-03-22 LAB
ACANTHOCYTES BLD QL SMEAR: ABNORMAL
ACANTHOCYTES BLD QL SMEAR: NORMAL
ACANTHOCYTES BLD QL SMEAR: NORMAL
ACINETOBACTER SPECIES: NOT DETECTED
ALBUMIN SERPL BCG-MCNC: 3.1 G/DL (ref 3.5–5.2)
ALBUMIN UR-MCNC: 100 MG/DL
ALP SERPL-CCNC: 56 U/L (ref 40–150)
ALT SERPL W P-5'-P-CCNC: 17 U/L (ref 0–50)
ANION GAP SERPL CALCULATED.3IONS-SCNC: 11 MMOL/L (ref 7–15)
ANION GAP SERPL CALCULATED.3IONS-SCNC: 12 MMOL/L (ref 7–15)
ANION GAP SERPL CALCULATED.3IONS-SCNC: 15 MMOL/L (ref 7–15)
ANION GAP SERPL CALCULATED.3IONS-SCNC: 15 MMOL/L (ref 7–15)
APPEARANCE UR: ABNORMAL
AST SERPL W P-5'-P-CCNC: 17 U/L (ref 0–45)
ATRIAL RATE - MUSE: 110 BPM
ATRIAL RATE - MUSE: 115 BPM
ATRIAL RATE - MUSE: NORMAL BPM
AUER BODIES BLD QL SMEAR: ABNORMAL
AUER BODIES BLD QL SMEAR: NORMAL
AUER BODIES BLD QL SMEAR: NORMAL
BASE EXCESS BLDV CALC-SCNC: -7.6 MMOL/L (ref -3–3)
BASO STIPL BLD QL SMEAR: ABNORMAL
BASO STIPL BLD QL SMEAR: NORMAL
BASO STIPL BLD QL SMEAR: NORMAL
BASOPHILS # BLD AUTO: ABNORMAL 10*3/UL
BASOPHILS NFR BLD AUTO: ABNORMAL %
BILIRUB SERPL-MCNC: 0.5 MG/DL
BILIRUB UR QL STRIP: NEGATIVE
BITE CELLS BLD QL SMEAR: ABNORMAL
BITE CELLS BLD QL SMEAR: NORMAL
BITE CELLS BLD QL SMEAR: NORMAL
BLD PROD TYP BPU: NORMAL
BLD PROD TYP BPU: NORMAL
BLISTER CELLS BLD QL SMEAR: ABNORMAL
BLISTER CELLS BLD QL SMEAR: NORMAL
BLISTER CELLS BLD QL SMEAR: NORMAL
BLOOD COMPONENT TYPE: NORMAL
BLOOD COMPONENT TYPE: NORMAL
BUN SERPL-MCNC: 10.6 MG/DL (ref 8–23)
BUN SERPL-MCNC: 11.2 MG/DL (ref 8–23)
BUN SERPL-MCNC: 9.3 MG/DL (ref 8–23)
BUN SERPL-MCNC: 9.3 MG/DL (ref 8–23)
BURR CELLS BLD QL SMEAR: NORMAL
BURR CELLS BLD QL SMEAR: NORMAL
BURR CELLS BLD QL SMEAR: SLIGHT
C PNEUM DNA SPEC QL NAA+PROBE: NOT DETECTED
CALCIUM SERPL-MCNC: 7.1 MG/DL (ref 8.8–10.2)
CALCIUM SERPL-MCNC: 7.1 MG/DL (ref 8.8–10.2)
CALCIUM SERPL-MCNC: 7.2 MG/DL (ref 8.8–10.2)
CALCIUM SERPL-MCNC: 7.2 MG/DL (ref 8.8–10.2)
CHLORIDE SERPL-SCNC: 110 MMOL/L (ref 98–107)
CHLORIDE SERPL-SCNC: 110 MMOL/L (ref 98–107)
CHLORIDE SERPL-SCNC: 113 MMOL/L (ref 98–107)
CHLORIDE SERPL-SCNC: 114 MMOL/L (ref 98–107)
CITROBACTER SPECIES: NOT DETECTED
CODING SYSTEM: NORMAL
CODING SYSTEM: NORMAL
COLOR UR AUTO: YELLOW
CREAT SERPL-MCNC: 0.58 MG/DL (ref 0.51–0.95)
CREAT SERPL-MCNC: 0.59 MG/DL (ref 0.51–0.95)
CROSSMATCH: NORMAL
CTX-M: DETECTED
DACRYOCYTES BLD QL SMEAR: NORMAL
DACRYOCYTES BLD QL SMEAR: NORMAL
DACRYOCYTES BLD QL SMEAR: SLIGHT
DEPRECATED HCO3 PLAS-SCNC: 16 MMOL/L (ref 22–29)
DEPRECATED HCO3 PLAS-SCNC: 16 MMOL/L (ref 22–29)
DEPRECATED HCO3 PLAS-SCNC: 17 MMOL/L (ref 22–29)
DEPRECATED HCO3 PLAS-SCNC: 17 MMOL/L (ref 22–29)
DIASTOLIC BLOOD PRESSURE - MUSE: NORMAL MMHG
EGFRCR SERPLBLD CKD-EPI 2021: >90 ML/MIN/1.73M2
ELLIPTOCYTES BLD QL SMEAR: ABNORMAL
ELLIPTOCYTES BLD QL SMEAR: NORMAL
ELLIPTOCYTES BLD QL SMEAR: NORMAL
ENTEROBACTER SPECIES: NOT DETECTED
ENTEROCOCCUS FAECALIS: NOT DETECTED
ENTEROCOCCUS FAECIUM: NOT DETECTED
EOSINOPHIL # BLD AUTO: ABNORMAL 10*3/UL
EOSINOPHIL NFR BLD AUTO: ABNORMAL %
ERYTHROCYTE [DISTWIDTH] IN BLOOD BY AUTOMATED COUNT: 16.1 % (ref 10–15)
ERYTHROCYTE [DISTWIDTH] IN BLOOD BY AUTOMATED COUNT: 16.4 % (ref 10–15)
ERYTHROCYTE [DISTWIDTH] IN BLOOD BY AUTOMATED COUNT: 18.6 % (ref 10–15)
ESCHERICHIA COLI: DETECTED
FLUAV H1 2009 PAND RNA SPEC QL NAA+PROBE: NOT DETECTED
FLUAV H1 RNA SPEC QL NAA+PROBE: NOT DETECTED
FLUAV H3 RNA SPEC QL NAA+PROBE: NOT DETECTED
FLUAV RNA SPEC QL NAA+PROBE: NEGATIVE
FLUAV RNA SPEC QL NAA+PROBE: NOT DETECTED
FLUBV RNA RESP QL NAA+PROBE: NEGATIVE
FLUBV RNA SPEC QL NAA+PROBE: NOT DETECTED
FRAGMENTS BLD QL SMEAR: ABNORMAL
FRAGMENTS BLD QL SMEAR: NORMAL
FRAGMENTS BLD QL SMEAR: NORMAL
GLUCOSE BLDC GLUCOMTR-MCNC: 78 MG/DL (ref 70–99)
GLUCOSE BLDC GLUCOMTR-MCNC: 81 MG/DL (ref 70–99)
GLUCOSE SERPL-MCNC: 82 MG/DL (ref 70–99)
GLUCOSE SERPL-MCNC: 88 MG/DL (ref 70–99)
GLUCOSE SERPL-MCNC: 88 MG/DL (ref 70–99)
GLUCOSE SERPL-MCNC: 94 MG/DL (ref 70–99)
GLUCOSE UR STRIP-MCNC: NEGATIVE MG/DL
GROUP A STREP BY PCR: NOT DETECTED
HADV DNA SPEC QL NAA+PROBE: NOT DETECTED
HCO3 BLDV-SCNC: 18 MMOL/L (ref 21–28)
HCOV PNL SPEC NAA+PROBE: NOT DETECTED
HCT VFR BLD AUTO: 20.6 % (ref 35–47)
HCT VFR BLD AUTO: 20.9 % (ref 35–47)
HCT VFR BLD AUTO: 22.3 % (ref 35–47)
HGB BLD-MCNC: 6.7 G/DL (ref 11.7–15.7)
HGB BLD-MCNC: 6.9 G/DL (ref 11.7–15.7)
HGB BLD-MCNC: 7.4 G/DL (ref 11.7–15.7)
HGB C CRYSTALS: ABNORMAL
HGB C CRYSTALS: NORMAL
HGB C CRYSTALS: NORMAL
HGB UR QL STRIP: ABNORMAL
HMPV RNA SPEC QL NAA+PROBE: NOT DETECTED
HOLD SPECIMEN: NORMAL
HOLD SPECIMEN: NORMAL
HOWELL-JOLLY BOD BLD QL SMEAR: ABNORMAL
HOWELL-JOLLY BOD BLD QL SMEAR: NORMAL
HOWELL-JOLLY BOD BLD QL SMEAR: NORMAL
HPIV1 RNA SPEC QL NAA+PROBE: NOT DETECTED
HPIV2 RNA SPEC QL NAA+PROBE: NOT DETECTED
HPIV3 RNA SPEC QL NAA+PROBE: NOT DETECTED
HPIV4 RNA SPEC QL NAA+PROBE: NOT DETECTED
IMM GRANULOCYTES # BLD: ABNORMAL 10*3/UL
IMM GRANULOCYTES NFR BLD: ABNORMAL %
IMP: NOT DETECTED
INTERPRETATION ECG - MUSE: NORMAL
ISSUE DATE AND TIME: NORMAL
ISSUE DATE AND TIME: NORMAL
KETONES UR STRIP-MCNC: 40 MG/DL
KLEBSIELLA OXYTOCA: NOT DETECTED
KLEBSIELLA PNEUMONIAE: NOT DETECTED
KPC: NOT DETECTED
LACTATE SERPL-SCNC: 0.5 MMOL/L (ref 0.7–2)
LACTATE SERPL-SCNC: 0.6 MMOL/L (ref 0.7–2)
LEUKOCYTE ESTERASE UR QL STRIP: NEGATIVE
LISTERIA SPECIES (DETECTED/NOT DETECTED): NOT DETECTED
LYMPHOCYTES # BLD AUTO: ABNORMAL 10*3/UL
LYMPHOCYTES NFR BLD AUTO: ABNORMAL %
M PNEUMO DNA SPEC QL NAA+PROBE: NOT DETECTED
MAGNESIUM SERPL-MCNC: 1.9 MG/DL (ref 1.7–2.3)
MCH RBC QN AUTO: 31.4 PG (ref 26.5–33)
MCH RBC QN AUTO: 31.9 PG (ref 26.5–33)
MCH RBC QN AUTO: 33 PG (ref 26.5–33)
MCHC RBC AUTO-ENTMCNC: 32.5 G/DL (ref 31.5–36.5)
MCHC RBC AUTO-ENTMCNC: 33 G/DL (ref 31.5–36.5)
MCHC RBC AUTO-ENTMCNC: 33.2 G/DL (ref 31.5–36.5)
MCV RBC AUTO: 100 FL (ref 78–100)
MCV RBC AUTO: 95 FL (ref 78–100)
MCV RBC AUTO: 98 FL (ref 78–100)
MONOCYTES # BLD AUTO: ABNORMAL 10*3/UL
MONOCYTES NFR BLD AUTO: ABNORMAL %
MUCOUS THREADS #/AREA URNS LPF: PRESENT /LPF
NDM: NOT DETECTED
NEUTROPHILS # BLD AUTO: ABNORMAL 10*3/UL
NEUTROPHILS NFR BLD AUTO: ABNORMAL %
NEUTS HYPERSEG BLD QL SMEAR: ABNORMAL
NEUTS HYPERSEG BLD QL SMEAR: NORMAL
NEUTS HYPERSEG BLD QL SMEAR: NORMAL
NITRATE UR QL: NEGATIVE
O2/TOTAL GAS SETTING VFR VENT: 21 %
OXA (DETECTED/NOT DETECTED): NOT DETECTED
OXYHGB MFR BLDV: 69 % (ref 70–75)
P AXIS - MUSE: 79 DEGREES
P AXIS - MUSE: 88 DEGREES
P AXIS - MUSE: NORMAL DEGREES
PCO2 BLDV: 33 MM HG (ref 40–50)
PH BLDV: 7.33 [PH] (ref 7.32–7.43)
PH UR STRIP: 6 [PH] (ref 5–7)
PHOSPHATE SERPL-MCNC: 2.1 MG/DL (ref 2.5–4.5)
PLAT MORPH BLD: ABNORMAL
PLAT MORPH BLD: NORMAL
PLAT MORPH BLD: NORMAL
PLATELET # BLD AUTO: 12 10E3/UL (ref 150–450)
PLATELET # BLD AUTO: 14 10E3/UL (ref 150–450)
PLATELET # BLD AUTO: 34 10E3/UL (ref 150–450)
PO2 BLDV: 39 MM HG (ref 25–47)
POLYCHROMASIA BLD QL SMEAR: ABNORMAL
POLYCHROMASIA BLD QL SMEAR: NORMAL
POLYCHROMASIA BLD QL SMEAR: NORMAL
POTASSIUM SERPL-SCNC: 3.2 MMOL/L (ref 3.4–5.3)
POTASSIUM SERPL-SCNC: 3.2 MMOL/L (ref 3.4–5.3)
POTASSIUM SERPL-SCNC: 3.6 MMOL/L (ref 3.4–5.3)
POTASSIUM SERPL-SCNC: 3.8 MMOL/L (ref 3.4–5.3)
PR INTERVAL - MUSE: 130 MS
PR INTERVAL - MUSE: 134 MS
PR INTERVAL - MUSE: NORMAL MS
PROCALCITONIN SERPL IA-MCNC: 2.68 NG/ML
PROT SERPL-MCNC: 5.1 G/DL (ref 6.4–8.3)
PROTEUS SPECIES: NOT DETECTED
PSEUDOMONAS AERUGINOSA: NOT DETECTED
QRS DURATION - MUSE: 68 MS
QRS DURATION - MUSE: 68 MS
QRS DURATION - MUSE: 70 MS
QT - MUSE: 292 MS
QT - MUSE: 320 MS
QT - MUSE: 338 MS
QTC - MUSE: 442 MS
QTC - MUSE: 457 MS
QTC - MUSE: 479 MS
R AXIS - MUSE: 48 DEGREES
R AXIS - MUSE: 49 DEGREES
R AXIS - MUSE: 51 DEGREES
RBC # BLD AUTO: 2.09 10E6/UL (ref 3.8–5.2)
RBC # BLD AUTO: 2.1 10E6/UL (ref 3.8–5.2)
RBC # BLD AUTO: 2.36 10E6/UL (ref 3.8–5.2)
RBC AGGLUT BLD QL: ABNORMAL
RBC AGGLUT BLD QL: NORMAL
RBC AGGLUT BLD QL: NORMAL
RBC MORPH BLD: ABNORMAL
RBC MORPH BLD: NORMAL
RBC MORPH BLD: NORMAL
RBC URINE: <1 /HPF
ROULEAUX BLD QL SMEAR: ABNORMAL
ROULEAUX BLD QL SMEAR: NORMAL
ROULEAUX BLD QL SMEAR: NORMAL
RSV RNA SPEC NAA+PROBE: NEGATIVE
RSV RNA SPEC QL NAA+PROBE: NOT DETECTED
RSV RNA SPEC QL NAA+PROBE: NOT DETECTED
RV+EV RNA SPEC QL NAA+PROBE: NOT DETECTED
SAO2 % BLDV: 70.5 % (ref 70–75)
SARS-COV-2 RNA RESP QL NAA+PROBE: NEGATIVE
SARS-COV-2 RNA RESP QL NAA+PROBE: NEGATIVE
SICKLE CELLS BLD QL SMEAR: ABNORMAL
SICKLE CELLS BLD QL SMEAR: NORMAL
SICKLE CELLS BLD QL SMEAR: NORMAL
SMUDGE CELLS BLD QL SMEAR: ABNORMAL
SMUDGE CELLS BLD QL SMEAR: NORMAL
SMUDGE CELLS BLD QL SMEAR: NORMAL
SODIUM SERPL-SCNC: 140 MMOL/L (ref 135–145)
SODIUM SERPL-SCNC: 142 MMOL/L (ref 135–145)
SP GR UR STRIP: 1.02 (ref 1–1.03)
SPHEROCYTES BLD QL SMEAR: ABNORMAL
SPHEROCYTES BLD QL SMEAR: NORMAL
SPHEROCYTES BLD QL SMEAR: NORMAL
SQUAMOUS EPITHELIAL: 4 /HPF
STAPHYLOCOCCUS AUREUS: NOT DETECTED
STAPHYLOCOCCUS EPIDERMIDIS: NOT DETECTED
STAPHYLOCOCCUS LUGDUNENSIS: NOT DETECTED
STAPHYLOCOCCUS SPECIES: NOT DETECTED
STOMATOCYTES BLD QL SMEAR: ABNORMAL
STOMATOCYTES BLD QL SMEAR: NORMAL
STOMATOCYTES BLD QL SMEAR: NORMAL
STREPTOCOCCUS AGALACTIAE: NOT DETECTED
STREPTOCOCCUS ANGINOSUS GROUP: NOT DETECTED
STREPTOCOCCUS PNEUMONIAE: NOT DETECTED
STREPTOCOCCUS PYOGENES: NOT DETECTED
STREPTOCOCCUS SPECIES: NOT DETECTED
SYSTOLIC BLOOD PRESSURE - MUSE: NORMAL MMHG
T AXIS - MUSE: 55 DEGREES
T AXIS - MUSE: 57 DEGREES
T AXIS - MUSE: 58 DEGREES
TARGETS BLD QL SMEAR: ABNORMAL
TARGETS BLD QL SMEAR: NORMAL
TARGETS BLD QL SMEAR: NORMAL
TOBRAMYCIN SERPL-MCNC: 8.5 UG/ML
TOXIC GRANULES BLD QL SMEAR: ABNORMAL
TOXIC GRANULES BLD QL SMEAR: NORMAL
TOXIC GRANULES BLD QL SMEAR: NORMAL
TRANSITIONAL EPI: <1 /HPF
TROPONIN T SERPL HS-MCNC: 32 NG/L
TSH SERPL DL<=0.005 MIU/L-ACNC: 1.5 UIU/ML (ref 0.3–4.2)
UNIT ABO/RH: NORMAL
UNIT ABO/RH: NORMAL
UNIT NUMBER: NORMAL
UNIT NUMBER: NORMAL
UNIT STATUS: NORMAL
UNIT STATUS: NORMAL
UNIT TYPE ISBT: 6200
UNIT TYPE ISBT: 6200
UROBILINOGEN UR STRIP-MCNC: NORMAL MG/DL
VARIANT LYMPHS BLD QL SMEAR: ABNORMAL
VARIANT LYMPHS BLD QL SMEAR: NORMAL
VARIANT LYMPHS BLD QL SMEAR: NORMAL
VENTRICULAR RATE- MUSE: 110 BPM
VENTRICULAR RATE- MUSE: 115 BPM
VENTRICULAR RATE- MUSE: 162 BPM
VIM: NOT DETECTED
WBC # BLD AUTO: 0.1 10E3/UL (ref 4–11)
WBC # BLD AUTO: <0.1 10E3/UL (ref 4–11)
WBC # BLD AUTO: <0.1 10E3/UL (ref 4–11)
WBC URINE: 3 /HPF

## 2024-03-22 PROCEDURE — 87149 DNA/RNA DIRECT PROBE: CPT | Performed by: EMERGENCY MEDICINE

## 2024-03-22 PROCEDURE — 85027 COMPLETE CBC AUTOMATED: CPT | Performed by: STUDENT IN AN ORGANIZED HEALTH CARE EDUCATION/TRAINING PROGRAM

## 2024-03-22 PROCEDURE — P9037 PLATE PHERES LEUKOREDU IRRAD: HCPCS

## 2024-03-22 PROCEDURE — 250N000009 HC RX 250: Performed by: STUDENT IN AN ORGANIZED HEALTH CARE EDUCATION/TRAINING PROGRAM

## 2024-03-22 PROCEDURE — 93005 ELECTROCARDIOGRAM TRACING: CPT

## 2024-03-22 PROCEDURE — 82330 ASSAY OF CALCIUM: CPT | Performed by: PHYSICIAN ASSISTANT

## 2024-03-22 PROCEDURE — 250N000013 HC RX MED GY IP 250 OP 250 PS 637: Performed by: PHYSICIAN ASSISTANT

## 2024-03-22 PROCEDURE — 84443 ASSAY THYROID STIM HORMONE: CPT | Performed by: STUDENT IN AN ORGANIZED HEALTH CARE EDUCATION/TRAINING PROGRAM

## 2024-03-22 PROCEDURE — 250N000013 HC RX MED GY IP 250 OP 250 PS 637: Performed by: STUDENT IN AN ORGANIZED HEALTH CARE EDUCATION/TRAINING PROGRAM

## 2024-03-22 PROCEDURE — 250N000013 HC RX MED GY IP 250 OP 250 PS 637

## 2024-03-22 PROCEDURE — 84100 ASSAY OF PHOSPHORUS: CPT | Performed by: PHYSICIAN ASSISTANT

## 2024-03-22 PROCEDURE — 87637 SARSCOV2&INF A&B&RSV AMP PRB: CPT | Performed by: EMERGENCY MEDICINE

## 2024-03-22 PROCEDURE — 83605 ASSAY OF LACTIC ACID: CPT

## 2024-03-22 PROCEDURE — 87633 RESP VIRUS 12-25 TARGETS: CPT | Performed by: STUDENT IN AN ORGANIZED HEALTH CARE EDUCATION/TRAINING PROGRAM

## 2024-03-22 PROCEDURE — 84145 PROCALCITONIN (PCT): CPT | Performed by: STUDENT IN AN ORGANIZED HEALTH CARE EDUCATION/TRAINING PROGRAM

## 2024-03-22 PROCEDURE — 250N000011 HC RX IP 250 OP 636: Performed by: EMERGENCY MEDICINE

## 2024-03-22 PROCEDURE — 85027 COMPLETE CBC AUTOMATED: CPT

## 2024-03-22 PROCEDURE — 81001 URINALYSIS AUTO W/SCOPE: CPT | Performed by: EMERGENCY MEDICINE

## 2024-03-22 PROCEDURE — 71045 X-RAY EXAM CHEST 1 VIEW: CPT

## 2024-03-22 PROCEDURE — 87651 STREP A DNA AMP PROBE: CPT | Performed by: STUDENT IN AN ORGANIZED HEALTH CARE EDUCATION/TRAINING PROGRAM

## 2024-03-22 PROCEDURE — 99285 EMERGENCY DEPT VISIT HI MDM: CPT | Mod: 25 | Performed by: EMERGENCY MEDICINE

## 2024-03-22 PROCEDURE — 99254 IP/OBS CNSLTJ NEW/EST MOD 60: CPT | Mod: GC | Performed by: INTERNAL MEDICINE

## 2024-03-22 PROCEDURE — 87086 URINE CULTURE/COLONY COUNT: CPT | Performed by: EMERGENCY MEDICINE

## 2024-03-22 PROCEDURE — 250N000011 HC RX IP 250 OP 636: Mod: JZ

## 2024-03-22 PROCEDURE — 258N000003 HC RX IP 258 OP 636: Performed by: EMERGENCY MEDICINE

## 2024-03-22 PROCEDURE — 250N000011 HC RX IP 250 OP 636: Performed by: INTERNAL MEDICINE

## 2024-03-22 PROCEDURE — 250N000009 HC RX 250

## 2024-03-22 PROCEDURE — 82805 BLOOD GASES W/O2 SATURATION: CPT

## 2024-03-22 PROCEDURE — 80053 COMPREHEN METABOLIC PANEL: CPT | Performed by: EMERGENCY MEDICINE

## 2024-03-22 PROCEDURE — 200N000002 HC R&B ICU UMMC

## 2024-03-22 PROCEDURE — 85027 COMPLETE CBC AUTOMATED: CPT | Performed by: EMERGENCY MEDICINE

## 2024-03-22 PROCEDURE — 87081 CULTURE SCREEN ONLY: CPT | Performed by: PHYSICIAN ASSISTANT

## 2024-03-22 PROCEDURE — 85610 PROTHROMBIN TIME: CPT | Performed by: PHYSICIAN ASSISTANT

## 2024-03-22 PROCEDURE — 99255 IP/OBS CONSLTJ NEW/EST HI 80: CPT | Performed by: INTERNAL MEDICINE

## 2024-03-22 PROCEDURE — 93010 ELECTROCARDIOGRAM REPORT: CPT | Performed by: EMERGENCY MEDICINE

## 2024-03-22 PROCEDURE — 86923 COMPATIBILITY TEST ELECTRIC: CPT | Performed by: EMERGENCY MEDICINE

## 2024-03-22 PROCEDURE — 99223 1ST HOSP IP/OBS HIGH 75: CPT | Mod: 25 | Performed by: STUDENT IN AN ORGANIZED HEALTH CARE EDUCATION/TRAINING PROGRAM

## 2024-03-22 PROCEDURE — 258N000003 HC RX IP 258 OP 636: Performed by: PHYSICIAN ASSISTANT

## 2024-03-22 PROCEDURE — 258N000003 HC RX IP 258 OP 636: Performed by: STUDENT IN AN ORGANIZED HEALTH CARE EDUCATION/TRAINING PROGRAM

## 2024-03-22 PROCEDURE — 258N000003 HC RX IP 258 OP 636: Performed by: INTERNAL MEDICINE

## 2024-03-22 PROCEDURE — 71045 X-RAY EXAM CHEST 1 VIEW: CPT | Mod: 26 | Performed by: RADIOLOGY

## 2024-03-22 PROCEDURE — 250N000009 HC RX 250: Performed by: INTERNAL MEDICINE

## 2024-03-22 PROCEDURE — P9040 RBC LEUKOREDUCED IRRADIATED: HCPCS | Performed by: EMERGENCY MEDICINE

## 2024-03-22 PROCEDURE — 83735 ASSAY OF MAGNESIUM: CPT | Performed by: PHYSICIAN ASSISTANT

## 2024-03-22 PROCEDURE — 87635 SARS-COV-2 COVID-19 AMP PRB: CPT | Performed by: STUDENT IN AN ORGANIZED HEALTH CARE EDUCATION/TRAINING PROGRAM

## 2024-03-22 PROCEDURE — 84484 ASSAY OF TROPONIN QUANT: CPT | Performed by: PHYSICIAN ASSISTANT

## 2024-03-22 PROCEDURE — 87186 SC STD MICRODIL/AGAR DIL: CPT | Performed by: EMERGENCY MEDICINE

## 2024-03-22 PROCEDURE — 83605 ASSAY OF LACTIC ACID: CPT | Performed by: EMERGENCY MEDICINE

## 2024-03-22 PROCEDURE — 258N000003 HC RX IP 258 OP 636

## 2024-03-22 PROCEDURE — 99291 CRITICAL CARE FIRST HOUR: CPT | Performed by: PHYSICIAN ASSISTANT

## 2024-03-22 PROCEDURE — 80048 BASIC METABOLIC PNL TOTAL CA: CPT

## 2024-03-22 PROCEDURE — 250N000011 HC RX IP 250 OP 636: Mod: JZ | Performed by: STUDENT IN AN ORGANIZED HEALTH CARE EDUCATION/TRAINING PROGRAM

## 2024-03-22 PROCEDURE — 80200 ASSAY OF TOBRAMYCIN: CPT | Performed by: INTERNAL MEDICINE

## 2024-03-22 PROCEDURE — 80048 BASIC METABOLIC PNL TOTAL CA: CPT | Performed by: PHYSICIAN ASSISTANT

## 2024-03-22 PROCEDURE — 93005 ELECTROCARDIOGRAM TRACING: CPT | Performed by: EMERGENCY MEDICINE

## 2024-03-22 PROCEDURE — 36415 COLL VENOUS BLD VENIPUNCTURE: CPT | Performed by: EMERGENCY MEDICINE

## 2024-03-22 RX ORDER — METOPROLOL TARTRATE 25 MG/1
25 TABLET, FILM COATED ORAL 2 TIMES DAILY
Status: DISCONTINUED | OUTPATIENT
Start: 2024-03-22 | End: 2024-03-23

## 2024-03-22 RX ORDER — OXYCODONE HYDROCHLORIDE 5 MG/1
5 TABLET ORAL EVERY 6 HOURS PRN
Status: DISCONTINUED | OUTPATIENT
Start: 2024-03-22 | End: 2024-03-31

## 2024-03-22 RX ORDER — ACETAMINOPHEN 325 MG/1
325-650 TABLET ORAL EVERY 4 HOURS PRN
Status: DISCONTINUED | OUTPATIENT
Start: 2024-03-22 | End: 2024-04-02 | Stop reason: HOSPADM

## 2024-03-22 RX ORDER — POTASSIUM CHLORIDE 29.8 MG/ML
20 INJECTION INTRAVENOUS
Status: COMPLETED | OUTPATIENT
Start: 2024-03-22 | End: 2024-03-22

## 2024-03-22 RX ORDER — GABAPENTIN 100 MG/1
100 CAPSULE ORAL 3 TIMES DAILY
Status: DISCONTINUED | OUTPATIENT
Start: 2024-03-22 | End: 2024-04-02 | Stop reason: HOSPADM

## 2024-03-22 RX ORDER — CEFEPIME HYDROCHLORIDE 2 G/1
2 INJECTION, POWDER, FOR SOLUTION INTRAVENOUS EVERY 8 HOURS
Status: DISCONTINUED | OUTPATIENT
Start: 2024-03-22 | End: 2024-03-22

## 2024-03-22 RX ORDER — SODIUM CHLORIDE, SODIUM LACTATE, POTASSIUM CHLORIDE, CALCIUM CHLORIDE 600; 310; 30; 20 MG/100ML; MG/100ML; MG/100ML; MG/100ML
INJECTION, SOLUTION INTRAVENOUS CONTINUOUS
Status: DISCONTINUED | OUTPATIENT
Start: 2024-03-22 | End: 2024-03-25

## 2024-03-22 RX ORDER — CALCIUM GLUCONATE 20 MG/ML
2 INJECTION, SOLUTION INTRAVENOUS ONCE
Status: COMPLETED | OUTPATIENT
Start: 2024-03-22 | End: 2024-03-22

## 2024-03-22 RX ORDER — POTASSIUM PHOS IN 0.9 % NACL 15MMOL/250
15 PLASTIC BAG, INJECTION (ML) INTRAVENOUS ONCE
Qty: 250 ML | Refills: 0 | Status: COMPLETED | OUTPATIENT
Start: 2024-03-22 | End: 2024-03-23

## 2024-03-22 RX ORDER — NALOXONE HYDROCHLORIDE 0.4 MG/ML
0.4 INJECTION, SOLUTION INTRAMUSCULAR; INTRAVENOUS; SUBCUTANEOUS
Status: DISCONTINUED | OUTPATIENT
Start: 2024-03-22 | End: 2024-04-02 | Stop reason: HOSPADM

## 2024-03-22 RX ORDER — NALOXONE HYDROCHLORIDE 0.4 MG/ML
0.2 INJECTION, SOLUTION INTRAMUSCULAR; INTRAVENOUS; SUBCUTANEOUS
Status: DISCONTINUED | OUTPATIENT
Start: 2024-03-22 | End: 2024-04-02 | Stop reason: HOSPADM

## 2024-03-22 RX ORDER — OLANZAPINE 2.5 MG/1
2.5 TABLET, FILM COATED ORAL 2 TIMES DAILY PRN
Status: DISCONTINUED | OUTPATIENT
Start: 2024-03-22 | End: 2024-03-24

## 2024-03-22 RX ORDER — PROCHLORPERAZINE MALEATE 5 MG
5 TABLET ORAL EVERY 6 HOURS PRN
Status: DISCONTINUED | OUTPATIENT
Start: 2024-03-22 | End: 2024-03-22

## 2024-03-22 RX ORDER — LOPERAMIDE HCL 2 MG
2 CAPSULE ORAL 4 TIMES DAILY PRN
Status: DISCONTINUED | OUTPATIENT
Start: 2024-03-22 | End: 2024-03-22

## 2024-03-22 RX ORDER — SODIUM CHLORIDE 9 MG/ML
INJECTION, SOLUTION INTRAVENOUS CONTINUOUS
Status: DISCONTINUED | OUTPATIENT
Start: 2024-03-22 | End: 2024-03-22

## 2024-03-22 RX ORDER — SULFAMETHOXAZOLE AND TRIMETHOPRIM 400; 80 MG/1; MG/1
1 TABLET ORAL DAILY
Status: DISCONTINUED | OUTPATIENT
Start: 2024-03-22 | End: 2024-04-02 | Stop reason: HOSPADM

## 2024-03-22 RX ORDER — ALLOPURINOL 300 MG/1
300 TABLET ORAL DAILY
Status: DISCONTINUED | OUTPATIENT
Start: 2024-03-22 | End: 2024-03-22

## 2024-03-22 RX ORDER — ONDANSETRON 2 MG/ML
4 INJECTION INTRAMUSCULAR; INTRAVENOUS EVERY 6 HOURS PRN
Status: DISCONTINUED | OUTPATIENT
Start: 2024-03-22 | End: 2024-03-24

## 2024-03-22 RX ORDER — AMOXICILLIN 250 MG
2 CAPSULE ORAL 2 TIMES DAILY PRN
Status: DISCONTINUED | OUTPATIENT
Start: 2024-03-22 | End: 2024-03-30

## 2024-03-22 RX ORDER — AMOXICILLIN 250 MG
1 CAPSULE ORAL 2 TIMES DAILY PRN
Status: DISCONTINUED | OUTPATIENT
Start: 2024-03-22 | End: 2024-03-30

## 2024-03-22 RX ORDER — LIDOCAINE 4 G/G
1 PATCH TOPICAL
Status: DISCONTINUED | OUTPATIENT
Start: 2024-03-22 | End: 2024-04-02

## 2024-03-22 RX ORDER — DEXTROSE MONOHYDRATE 50 MG/ML
10-20 INJECTION, SOLUTION INTRAVENOUS
Status: DISCONTINUED | OUTPATIENT
Start: 2024-03-22 | End: 2024-03-29

## 2024-03-22 RX ORDER — FLUCONAZOLE 200 MG/1
200 TABLET ORAL DAILY
Status: DISCONTINUED | OUTPATIENT
Start: 2024-03-22 | End: 2024-03-26

## 2024-03-22 RX ORDER — ONDANSETRON 4 MG/1
4 TABLET, ORALLY DISINTEGRATING ORAL EVERY 6 HOURS PRN
Status: DISCONTINUED | OUTPATIENT
Start: 2024-03-22 | End: 2024-03-24

## 2024-03-22 RX ORDER — LORAZEPAM 0.5 MG/1
.5-1 TABLET ORAL EVERY 4 HOURS PRN
Status: DISCONTINUED | OUTPATIENT
Start: 2024-03-22 | End: 2024-04-02 | Stop reason: HOSPADM

## 2024-03-22 RX ORDER — POTASSIUM PHOS IN 0.9 % NACL 15MMOL/250
15 PLASTIC BAG, INJECTION (ML) INTRAVENOUS ONCE
Status: COMPLETED | OUTPATIENT
Start: 2024-03-22 | End: 2024-03-22

## 2024-03-22 RX ORDER — PANTOPRAZOLE SODIUM 40 MG/1
40 TABLET, DELAYED RELEASE ORAL DAILY
Status: DISCONTINUED | OUTPATIENT
Start: 2024-03-22 | End: 2024-04-02 | Stop reason: HOSPADM

## 2024-03-22 RX ORDER — MAGNESIUM SULFATE HEPTAHYDRATE 40 MG/ML
2 INJECTION, SOLUTION INTRAVENOUS ONCE
Status: COMPLETED | OUTPATIENT
Start: 2024-03-22 | End: 2024-03-22

## 2024-03-22 RX ORDER — PROCHLORPERAZINE MALEATE 5 MG
5 TABLET ORAL EVERY 6 HOURS PRN
Status: DISCONTINUED | OUTPATIENT
Start: 2024-03-22 | End: 2024-04-02 | Stop reason: HOSPADM

## 2024-03-22 RX ORDER — ACETAMINOPHEN 325 MG/1
650 TABLET ORAL EVERY 4 HOURS PRN
Status: DISCONTINUED | OUTPATIENT
Start: 2024-03-22 | End: 2024-03-22

## 2024-03-22 RX ORDER — CALCIUM CARBONATE 500 MG/1
1000 TABLET, CHEWABLE ORAL 4 TIMES DAILY PRN
Status: DISCONTINUED | OUTPATIENT
Start: 2024-03-22 | End: 2024-04-02 | Stop reason: HOSPADM

## 2024-03-22 RX ORDER — POTASSIUM CHLORIDE 1.5 G/1.58G
60 POWDER, FOR SOLUTION ORAL ONCE
Qty: 3 PACKET | Refills: 0 | Status: DISCONTINUED | OUTPATIENT
Start: 2024-03-22 | End: 2024-03-22

## 2024-03-22 RX ORDER — ISONIAZID 300 MG/1
300 TABLET ORAL DAILY
Status: DISCONTINUED | OUTPATIENT
Start: 2024-03-22 | End: 2024-04-02 | Stop reason: HOSPADM

## 2024-03-22 RX ORDER — PROCHLORPERAZINE 25 MG
12.5 SUPPOSITORY, RECTAL RECTAL EVERY 12 HOURS PRN
Status: DISCONTINUED | OUTPATIENT
Start: 2024-03-22 | End: 2024-03-24

## 2024-03-22 RX ORDER — PYRIDOXINE HCL (VITAMIN B6) 25 MG
25 TABLET ORAL DAILY
Status: DISCONTINUED | OUTPATIENT
Start: 2024-03-22 | End: 2024-04-02 | Stop reason: HOSPADM

## 2024-03-22 RX ORDER — ACYCLOVIR 800 MG/1
800 TABLET ORAL 2 TIMES DAILY
Status: DISCONTINUED | OUTPATIENT
Start: 2024-03-22 | End: 2024-04-02 | Stop reason: HOSPADM

## 2024-03-22 RX ORDER — LIDOCAINE 40 MG/G
CREAM TOPICAL
Status: DISCONTINUED | OUTPATIENT
Start: 2024-03-22 | End: 2024-04-02 | Stop reason: HOSPADM

## 2024-03-22 RX ORDER — FOLIC ACID 1 MG/1
1000 TABLET ORAL DAILY
Status: DISCONTINUED | OUTPATIENT
Start: 2024-03-22 | End: 2024-04-02 | Stop reason: HOSPADM

## 2024-03-22 RX ORDER — LORAZEPAM 2 MG/ML
.5-1 INJECTION INTRAMUSCULAR EVERY 4 HOURS PRN
Status: DISCONTINUED | OUTPATIENT
Start: 2024-03-22 | End: 2024-04-02 | Stop reason: HOSPADM

## 2024-03-22 RX ORDER — LOPERAMIDE HCL 2 MG
4 CAPSULE ORAL 4 TIMES DAILY
Status: DISCONTINUED | OUTPATIENT
Start: 2024-03-22 | End: 2024-03-27

## 2024-03-22 RX ORDER — METOPROLOL TARTRATE 1 MG/ML
5 INJECTION, SOLUTION INTRAVENOUS ONCE
Status: DISCONTINUED | OUTPATIENT
Start: 2024-03-22 | End: 2024-03-22

## 2024-03-22 RX ADMIN — LOPERAMIDE HYDROCHLORIDE 4 MG: 2 CAPSULE ORAL at 21:52

## 2024-03-22 RX ADMIN — ACETAMINOPHEN 650 MG: 325 TABLET, FILM COATED ORAL at 23:50

## 2024-03-22 RX ADMIN — ISONIAZID 300 MG: 300 TABLET ORAL at 07:40

## 2024-03-22 RX ADMIN — VANCOMYCIN HYDROCHLORIDE 2000 MG: 1 INJECTION, POWDER, LYOPHILIZED, FOR SOLUTION INTRAVENOUS at 16:11

## 2024-03-22 RX ADMIN — PANTOPRAZOLE SODIUM 40 MG: 40 TABLET, DELAYED RELEASE ORAL at 07:40

## 2024-03-22 RX ADMIN — LOPERAMIDE HYDROCHLORIDE 4 MG: 2 CAPSULE ORAL at 11:26

## 2024-03-22 RX ADMIN — SODIUM CHLORIDE 1000 ML: 9 INJECTION, SOLUTION INTRAVENOUS at 03:46

## 2024-03-22 RX ADMIN — ACETAMINOPHEN 650 MG: 325 TABLET, FILM COATED ORAL at 05:16

## 2024-03-22 RX ADMIN — GABAPENTIN 100 MG: 100 CAPSULE ORAL at 14:36

## 2024-03-22 RX ADMIN — GABAPENTIN 100 MG: 100 CAPSULE ORAL at 21:53

## 2024-03-22 RX ADMIN — LOPERAMIDE HYDROCHLORIDE 4 MG: 2 CAPSULE ORAL at 16:11

## 2024-03-22 RX ADMIN — SODIUM CHLORIDE 500 ML: 9 INJECTION, SOLUTION INTRAVENOUS at 05:19

## 2024-03-22 RX ADMIN — MEROPENEM 2 G: 1 INJECTION, POWDER, FOR SOLUTION INTRAVENOUS at 13:54

## 2024-03-22 RX ADMIN — SODIUM CHLORIDE 0.5 MG/MIN: 9 INJECTION, SOLUTION INTRAVENOUS at 18:52

## 2024-03-22 RX ADMIN — CALCIUM GLUCONATE 2 G: 20 INJECTION, SOLUTION INTRAVENOUS at 11:26

## 2024-03-22 RX ADMIN — POTASSIUM PHOSPHATE, MONOBASIC POTASSIUM PHOSPHATE, DIBASIC 15 MMOL: 224; 236 INJECTION, SOLUTION, CONCENTRATE INTRAVENOUS at 07:43

## 2024-03-22 RX ADMIN — Medication 1 LOZENGE: at 22:04

## 2024-03-22 RX ADMIN — POTASSIUM CHLORIDE 20 MEQ: 29.8 INJECTION, SOLUTION INTRAVENOUS at 05:35

## 2024-03-22 RX ADMIN — ACYCLOVIR 800 MG: 800 TABLET ORAL at 21:52

## 2024-03-22 RX ADMIN — ACYCLOVIR 800 MG: 800 TABLET ORAL at 07:40

## 2024-03-22 RX ADMIN — METOPROLOL TARTRATE 25 MG: 25 TABLET, FILM COATED ORAL at 05:46

## 2024-03-22 RX ADMIN — DEXTROSE MONOHYDRATE 480 MCG: 50 INJECTION, SOLUTION INTRAVENOUS at 20:01

## 2024-03-22 RX ADMIN — FOLIC ACID 1000 MCG: 1 TABLET ORAL at 07:40

## 2024-03-22 RX ADMIN — ACETAMINOPHEN 650 MG: 325 TABLET, FILM COATED ORAL at 16:22

## 2024-03-22 RX ADMIN — MEROPENEM 2 G: 1 INJECTION, POWDER, FOR SOLUTION INTRAVENOUS at 05:21

## 2024-03-22 RX ADMIN — SODIUM CHLORIDE, POTASSIUM CHLORIDE, SODIUM LACTATE AND CALCIUM CHLORIDE: 600; 310; 30; 20 INJECTION, SOLUTION INTRAVENOUS at 17:59

## 2024-03-22 RX ADMIN — SODIUM CHLORIDE, POTASSIUM CHLORIDE, SODIUM LACTATE AND CALCIUM CHLORIDE: 600; 310; 30; 20 INJECTION, SOLUTION INTRAVENOUS at 09:29

## 2024-03-22 RX ADMIN — GABAPENTIN 100 MG: 100 CAPSULE ORAL at 07:40

## 2024-03-22 RX ADMIN — DEXTROSE MONOHYDRATE 10 ML: 50 INJECTION, SOLUTION INTRAVENOUS at 19:58

## 2024-03-22 RX ADMIN — POTASSIUM CHLORIDE 20 MEQ: 29.8 INJECTION, SOLUTION INTRAVENOUS at 06:13

## 2024-03-22 RX ADMIN — SODIUM CHLORIDE: 9 INJECTION, SOLUTION INTRAVENOUS at 05:24

## 2024-03-22 RX ADMIN — MEROPENEM 2 G: 1 INJECTION, POWDER, FOR SOLUTION INTRAVENOUS at 22:07

## 2024-03-22 RX ADMIN — TOBRAMYCIN 360 MG: 40 INJECTION INTRAMUSCULAR; INTRAVENOUS at 09:29

## 2024-03-22 RX ADMIN — Medication 25 MG: at 07:40

## 2024-03-22 RX ADMIN — FLUCONAZOLE 200 MG: 200 TABLET ORAL at 07:40

## 2024-03-22 RX ADMIN — CEFEPIME HYDROCHLORIDE 2 G: 2 INJECTION, POWDER, FOR SOLUTION INTRAVENOUS at 03:51

## 2024-03-22 RX ADMIN — POTASSIUM PHOSPHATE, MONOBASIC POTASSIUM PHOSPHATE, DIBASIC 15 MMOL: 224; 236 INJECTION, SOLUTION, CONCENTRATE INTRAVENOUS at 21:24

## 2024-03-22 RX ADMIN — MAGNESIUM SULFATE HEPTAHYDRATE 2 G: 2 INJECTION, SOLUTION INTRAVENOUS at 05:21

## 2024-03-22 RX ADMIN — LOPERAMIDE HYDROCHLORIDE 2 MG: 2 CAPSULE ORAL at 08:20

## 2024-03-22 RX ADMIN — AMIODARONE HYDROCHLORIDE 150 MG: 1.5 INJECTION, SOLUTION INTRAVENOUS at 17:03

## 2024-03-22 RX ADMIN — DEXTROSE MONOHYDRATE 15 ML: 50 INJECTION, SOLUTION INTRAVENOUS at 20:46

## 2024-03-22 RX ADMIN — PHYTONADIONE 5 MG: 10 INJECTION, EMULSION INTRAMUSCULAR; INTRAVENOUS; SUBCUTANEOUS at 11:26

## 2024-03-22 RX ADMIN — LIDOCAINE 1 PATCH: 4 PATCH TOPICAL at 21:54

## 2024-03-22 RX ADMIN — SODIUM CHLORIDE 1000 ML: 9 INJECTION, SOLUTION INTRAVENOUS at 03:52

## 2024-03-22 RX ADMIN — SODIUM CHLORIDE, POTASSIUM CHLORIDE, SODIUM LACTATE AND CALCIUM CHLORIDE 500 ML: 600; 310; 30; 20 INJECTION, SOLUTION INTRAVENOUS at 18:00

## 2024-03-22 ASSESSMENT — ACTIVITIES OF DAILY LIVING (ADL)
ADLS_ACUITY_SCORE: 41
ADLS_ACUITY_SCORE: 35
ADLS_ACUITY_SCORE: 41
ADLS_ACUITY_SCORE: 35
ADLS_ACUITY_SCORE: 41
ADLS_ACUITY_SCORE: 39
ADLS_ACUITY_SCORE: 39
ADLS_ACUITY_SCORE: 41
ADLS_ACUITY_SCORE: 41
ADLS_ACUITY_SCORE: 35
ADLS_ACUITY_SCORE: 33
ADLS_ACUITY_SCORE: 41

## 2024-03-22 ASSESSMENT — COLUMBIA-SUICIDE SEVERITY RATING SCALE - C-SSRS
2. HAVE YOU ACTUALLY HAD ANY THOUGHTS OF KILLING YOURSELF IN THE PAST MONTH?: NO
1. IN THE PAST MONTH, HAVE YOU WISHED YOU WERE DEAD OR WISHED YOU COULD GO TO SLEEP AND NOT WAKE UP?: NO
6. HAVE YOU EVER DONE ANYTHING, STARTED TO DO ANYTHING, OR PREPARED TO DO ANYTHING TO END YOUR LIFE?: NO

## 2024-03-22 NOTE — PROGRESS NOTES
Pt transferred to/from: ER around 0430 3/22  Reason for transfer: Fever, +BC  Family aware of transfer: Yes, Katherin (daughter @ bedside)  Disposition of belongings: W/ daughter  Teaching: Fall prevention, infection prevention.  Report called to/from: JUANY Rayo  Skin double check completed by: JUANY Jha

## 2024-03-22 NOTE — CODE/RAPID RESPONSE
Rapid Response Team Note    Assessment   In assessment a rapid response was called on Rosario Terrazas due to hypotension and new onset arrhythmia. This presentation is likely due to bacteremia, new atrial fibrillation and medication side effect (amiodarone).     Plan   -   mL bolus x 1  -  Stat BMP, Mg. Phos, Trop  -  Primary team reaching out to Cardiology   -  The  BMT  primary team was  at bedside and in agreement with the plan.  -  Disposition: The patient will be transferred [place] [facility].  -  Reassessment and plan follow-up will be performed by the primary team      Addendum: Cardiology to bedside. In agreement for fluids and monitoring blood pressure. Once BP stabilizes to >90, recommended starting amiodarone infusion at 0.5 mg/min rate. Notified by rapid RN that 2 consecutive BP >90. Updated amiodarone infusion to start now and discontinue prior order for 1 mg/min rate to avoid error.    Rosario is critically ill with  atrial fibrillation with associated hypotension . These features are alarming and indicate that the patient is at imminent risk of life-threatening organ failure. Acute deterioration is being managed by the following critical interventions: anti-arrhythmics and IV fluids    Total Critical Care time spent by me, excluding procedures, was 40 minutes.  Radha Kyle PA-C  [facility] RRT [facility] Job Code Contact #[phone]  [facility] Paging/Directory    Hospital Course   Brief Summary of events leading to rapid response:   Rapid response called for hypotension in setting of atrial fibrillation and after completion of amiodarone bolus. Has been fluctuating between SVT and Afib throughout the day with HR ranging 150-170s. Completed amiodarone bolus with drop in BP to 70/50s and MAP in upper 50s. Reported some left shoulder pain as well but denies chest pressure or pain. Some pain with movement of the shoulder. EKG obtained which seems consistent with ongoing atrial fibrillation.     Admission  Diagnosis:   Positive blood culture [R78.81]  Bone marrow transplant status (H) [Z94.81]  Fever, unspecified fever cause [R50.9]    Physical Exam   Temp: (!) 101.3  F (38.5  C) Temp  Min: 99.4  F (37.4  C)  Max: 102.9  F (39.4  C)  Resp: 18 Resp  Min: 16  Max: 28  SpO2: 100 % SpO2  Min: 97 %  Max: 100 %  Pulse: (!) 150 Pulse  Min: 67  Max: 163    No data recorded  BP: 93/62 Systolic (24hrs), Av , Min:78 , Max:144   Diastolic (24hrs), Av, Min:51, Max:115     I/Os: I/O last 3 completed shifts:  In: 3430 [I.V.:1410; IV Piggyback:1500]  Out: 350 [Urine:350]     Exam:   General: No acute distress. Laying in bed. Daughter at bedside.  CV: Irregularly, irregular.   Pulm: Normal work of breathing on room air. Lungs CTAB.   Mental Status: Baseline mental status.     Significant Results and Procedures   Lactic Acid:   Recent Labs   Lab Test 24  0918 24  0231   LACT 0.5* 0.6*     CBC:   Recent Labs   Lab Test 24  1220 24  0333 24  0231   WBC 0.1* <0.1* <0.1*   HGB 7.4* 6.9* 6.7*   HCT 22.3* 20.9* 20.6*   PLT 34* 12* 14*

## 2024-03-22 NOTE — PROGRESS NOTES
03/22/24 0500   Call Information   Date of Call 03/22/24   Time of Call 0454   Name of person requesting the team Constanza Sosa   Title of person requesting team RN   RRT Arrival time 0454   Time RRT ended 0535   Reason for call   Type of RRT Adult   Primary reason for call Cardiovascular;Fluid status;Sepsis suspected   Cardiovascular SBP less than 90;HR greater than 160;EKG changes   Sepsis Suspected Temperature <95 or >101;Heart Rate > 100;WBC <4 or >12;BMT/Oncology patient with WBC<0.5 or >12 or ANC <500   Was patient transferred from the ED, ICU, or PACU within last 24 hours prior to RRT call? Yes   SBAR   Situation  MAP 59   Background Per Doctors note, 67 Y/O F with MM s/p HD melphan and auto transplant HSCT +9 admitted for neutropenic fever was found to have GNB with likely ESBL E coli.   Notable History/Conditions Cancer;Transplant   Assessment Pt resting in bed, appears to be uncomfortable. BP intermittently low with runs of SVT. Self coverting back to 's. Pt daughter at bedside reporting pt having diarrhea and general malaise.   Interventions ECG;Fluid bolus;IV fluids;Labs;Meds   RRT Team   Attending/Primary/Covering Physician SEAMUS Oates   Date Attending Physician notified 03/22/24   Time Attending Physician notified 0500   Physician(s) O Иван   Lead JUANY ANDUJAR   Other staff Bernadette CARIAS   Post RRT Intervention Assessment   Post RRT Assessment Stable/Improved   Date Follow Up Done 03/22/24   Time Follow Up Done 0849   Comments MAP 70, HR high 90's. Patient alert and speaking with visitor at bedside. Lung sounds clear. Patient on room air. S1/S2. Bowel sounds auscultated, abdomen non tender.

## 2024-03-22 NOTE — PHARMACY-VANCOMYCIN DOSING SERVICE
Pharmacy Vancomycin Initial Note  Date of Service 2024  Patient's  1955  68 year old, female    Indication: Sepsis    Current estimated CrCl = Estimated Creatinine Clearance: 105.7 mL/min (based on SCr of 0.58 mg/dL).    Creatinine for last 3 days  3/19/2024:  7:25 AM Creatinine 0.46 mg/dL  3/20/2024:  8:00 AM Creatinine 0.46 mg/dL  3/21/2024:  7:08 AM Creatinine 0.50 mg/dL  3/22/2024:  2:31 AM Creatinine 0.58 mg/dL;  2:31 AM Creatinine 0.58 mg/dL    Recent Vancomycin Level(s) for last 3 days  No results found for requested labs within last 3 days.      Vancomycin IV Administrations (past 72 hours)        No vancomycin orders with administrations in past 72 hours.                    Nephrotoxins and other renal medications (From now, onward)      Start     Dose/Rate Route Frequency Ordered Stop    24 0400  vancomycin (VANCOCIN) 2,250 mg in sodium chloride 0.9 % 500 mL intermittent infusion         2,250 mg  over 120 Minutes Intravenous ONCE 24 0319              Contrast Orders - past 72 hours (72h ago, onward)      None            [employer] Prediction of Planned Initial Vancomycin Regimen  Loading dose: 2250 mg at 04:00 20.  Regimen: 1500 mg IV every 12 hours.  Start time: 16:00 on 2024  Exposure target: AUC24 (range)400-600 mg/L.hr   AUC24,ss: 593 mg/L.hr  Probability of AUC24 > 400: 85 %  Ctrough,ss: 18.3 mg/L  Probability of Ctrough,ss > 20: 43 %  Probability of nephrotoxicity (Lodise ORLANDO ): 15 %        Plan:  Start vancomycin  2250 mg IV once, followed by 1500 mg IV q12h.   Vancomycin monitoring method: AUC  Vancomycin therapeutic monitoring goal: 400-600 mg*h/L  Pharmacy will check vancomycin levels as appropriate in 1-3 Days.    Serum creatinine levels will be ordered daily for the first week of therapy and at least twice weekly for subsequent weeks.      Ashish Jacob, [facility]

## 2024-03-22 NOTE — PROGRESS NOTES
"Cross Cover Note    Time notified: 5:45 pm    Substance of notification: \"patient has completed amiodarone bolus, but has low BP in 70s, but  & sharp pain in her left shoulder. I ordered stat EKG\"    Assessment/Plan    Tachyarrhythmia - flipping between AF with RVR and SVT, ECG shows AF w RVR  - continue amiodarone, given bolus of fluid; trop, lytes ordered  - fyi paged cardiology  - aiming to keep HR below 130 and systolics at least in the 90s  - [place] [facility] ([place] [facility] [place] available in [place])  - contingency plan to give CCB or BB unless hear otherwise from cards    Subjective    Patient seen at bedside, has some mild lower abdominal pain, otherwise feeling alright at the moment. Daughter present and declines Remysii , interprets for her mother.    Objective  Vitals:    03/22/24 1300 03/22/24 1400 03/22/24 1619 03/22/24 1713   BP: 107/69   93/62   BP Location:    Right arm   Pulse: (!) 147 (!) 149  (!) 150   Resp:   18    Temp:   (!) 101.3  F (38.5  C)    TempSrc:   Axillary    SpO2:       Weight:       Height:          Bps last 24 hours  103/60 108/78 144/... 105/69 99/66 90/68 107/69 107... 93/62 79...       Exam    awake, alert, no distress  speech is clear and coherent  RR and WOB are normal  irregularly irregular rhythm with rate > 100  abdomen soft, non tender, non distended  no lower extremity edema  Palpable pulses +2/4 bilateral radials and DP's        Echo result w/o MOPS: Interpretation SummaryGlobal and regional left ventricular function is normal with an EF of 55-60%.Global right ventricular function is normal.Mild left atrial enlargement is present.The inferior vena cava was normal in size with preserved respiratoryvariability.No pericardial effusion is present.    "

## 2024-03-22 NOTE — CONSULTS
[facility]  Transplant Infectious Disease Consult Note - New Patient     Patient:  Rosario Terrazas, Date of birth 1955, Medical record number [id number]  Date of Visit:  03/22/2024  Consult requested by Dr. Zach Monet for evaluation of ESBL Bacteremia    Recommendations:   Agree with starting Vancomycin given growth of gram positive cocci in pairs and chains - which I anticipate will either be streptococcus or enterococcus (likely also of GI origin)   Okay to continue Meropenem at this time, but would ultimately switch to Ertapenem with no Pseudomonas isolated.   Recommend to continue to  monitoring abdominal exam - if worsening obtain a CT abdomen (PO and IV contrast) to assess for neutropenic enterocolitis   I would not give any additional doses of an aminoglycoside at this time   Please repeat an additional set of blood cultures on 3/23  Continue Acyclovir and Fluconazole ppx     Thank you very much for this consultation. Transplant Infectious Disease will continue to follow with you.  Dr. Pearce ([id number]) will take over the service on 3/23.     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     Neutropenic Fevers (3/21):   3/21 blood culture: Ecoli (ESBL)   3/22 blood culture: Gram negative bacilli, Gram positive cocci in pairs and chains   3/22 blood culture: Gram negative bacilli, gram positive cocci in pairs and chains     Source - likely GI given neutropenic fevers, abdominal cramping, and tenderness to palpation with rebound tenderness.   At the time of exam, abdomen was soft and non-distended.     Does have a CVC, however, given the organisms isolated, less likely to the be source.   She had no respiratory symptoms and CXR did nto show any evidence of infiltrate or  consolidation.     Latent TB - Followed at [facility] TB clinic. Started on latent TB treatment in October with plans for 6 months of INH/B6 therapy.     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     She presented to outpatient Oncology clinic appointment with complaints of fevers, chills, abdominal pain, abdominal cramping, and fevers of 102. Blood cultures   Symptoms were acute onset. She also had nausea and vomiting, but that resolved on Wednesday after receiving antiemetic.     Review of Systems: Remaining systems all reviewed and negative.  CONSTITUTIONAL:  + fevers + chills  EYES: negative for icterus or acute vision changes  ENT:  negative for acute hearing loss, tinnitus, sore throat  RESPIRATORY:  negative for cough, sputum or dyspnea  CARDIOVASCULAR:  negative for chest pain, palpitations  GASTROINTESTINAL:  negative for nausea, vomiting, + diarrhea  GENITOURINARY:  negative for dysuria or hematuria  HEME:  No easy bruising or bleeding  INTEGUMENT:  negative for rash or pruritus  NEURO:  Negative for headache or tremor    Infectious Diseases History:    No past medical history on file.  Latent TB   Multiple Myeloma     Past Surgical History:   Procedure Laterality Date    INSERT CATHETER VASCULAR ACCESS Left 3/6/2024    Procedure: central venous catheter tunneled line Insert vascular access;  Surgeon: Onel Leonardo MD;  Location: [facility] OR    IR CVC TUNNEL PLACEMENT > 5  YRS OF AGE  3/6/2024       No family history on file.  Non-contributory     Social History     Tobacco Use    Smoking status: Never    Smokeless tobacco: Never   Substance Use Topics    Alcohol use: Never    Drug use: Never       Immunization History   Administered Date(s) Administered    COVID-19 12+ (2023-24) (MODERNA) 11/21/2023    COVID-19 MONOVALENT 12+ (Pfizer) 08/01/2021, 08/21/2021       Patient Active Problem List   Diagnosis    BARRERA (acute kidney injury) (H24)    Biceps tendon tear    Closed fracture of one rib of left side    Elevated serum immunoglobulin free light chain level    History of total left knee replacement    Isolated proteinuria without specific morphologic lesion    Malignant neoplasm (H)    Multiple myeloma in remission (H)    Normocytic anemia    Other closed fracture of seventh thoracic vertebra, initial encounter (H)    Other constipation    Rotator cuff tear arthropathy of left shoulder    Shoulder impingement, right    Positive blood culture    Bone marrow transplant status (H)    Fever, unspecified fever cause            Current Medications & Allergies:      acyclovir  800 mg Oral BID    calcium gluconate  2 g Intravenous Once    dextrose 5% water  10-20 mL Intravenous Daily at 8 pm    And    filgrastim-aafi  5 mcg/kg Intravenous Daily at 8 pm    And    dextrose 5% water  10-20 mL Intravenous Daily at 8 pm    fluconazole  200 mg Oral Daily    folic acid  1,000 mcg Oral Daily    gabapentin  100 mg Oral TID    isoniazid  300 mg Oral Daily    loperamide  4 mg Oral 4x Daily    meropenem  2 g Intravenous Q8H    metoprolol tartrate  25 mg Oral BID    pantoprazole  40 mg Oral Daily    phytonadione  5 mg Oral Once    sodium phosphate  15 mmol Intravenous Once    pyridOXINE  25 mg Oral Daily    sodium chloride (PF)  3 mL Intracatheter Q8H    [Held by provider] sulfamethoxazole-trimethoprim  1 tablet Oral Daily    tobramycin  5 mg/kg (Adjusted) Intravenous Once       Infusions/Drips:      "lactated ringers         No Known Allergies         Physical Exam:   Patient Vitals for the past 24 hrs:   BP Temp Temp src Pulse Resp SpO2 Height Weight   03/22/24 0833 91/59 (!) 100.7  F (38.2  C) Axillary 98 -- -- -- --   03/22/24 0821 (!) 88/62 -- -- 104 -- -- -- --   03/22/24 0808 -- -- -- -- -- -- -- 97.1 kg (214 lb 1.6 oz)   03/22/24 0747 105/69 (!) 100.9  F (38.3  C) Axillary 109 16 97 % -- --   03/22/24 0609 (!) 130/95 -- -- 114 -- 99 % -- --   03/22/24 0546 (!) 144/109 -- Axillary (!) 163 -- -- -- --   03/22/24 0525 116/83 (!) 102.3  F (39.1  C) Axillary 113 26 98 % 1.65 m (5' 4.96\") 95.4 kg (210 lb 6.4 oz)   03/22/24 0512 122/72 (!) 102.9  F (39.4  C) Axillary 114 28 100 % -- --   03/22/24 0508 -- -- -- (!) 161 -- -- -- --   03/22/24 0506 100/70 -- -- (!) 162 -- -- -- --   03/22/24 0458 98/64 -- -- (!) 160 -- -- -- --   03/22/24 0456 (!) 129/115 -- -- 114 -- -- -- --   03/22/24 0454 102/55 -- -- 110 -- -- -- --   03/22/24 0451 (!) 78/51 -- -- (!) 161 -- -- -- --   03/22/24 0401 -- -- -- 114 27 98 % -- --   03/22/24 0400 120/65 100.1  F (37.8  C) Oral 113 26 97 % -- --   03/22/24 0300 108/78 -- -- (!) 128 28 100 % -- --   03/22/24 0220 -- 99.4  F (37.4  C) Oral (!) 124 26 99 % -- --   03/22/24 0205 110/60 -- -- -- -- -- -- --   03/22/24 0155 103/60 99.4  F (37.4  C) -- 67 20 98 % 1.651 m (5' 5\") --     Ranges for vital signs:  Temp:  [99.4  F (37.4  C)-102.9  F (39.4  C)] 100.7  F (38.2  C)  Pulse:  [] 98  Resp:  [16-28] 16  BP: ()/() 91/59  SpO2:  [97 %-100 %] 97 %  Vitals:    03/22/24 0525 03/22/24 0808   Weight: 95.4 kg (210 lb 6.4 oz) 97.1 kg (214 lb 1.6 oz)       Physical Examination:  GENERAL:  acutely ill appearing, in no acute distress. In bed  HEAD:  Head is normocephalic, atraumatic   EYES:  Eyes have anicteric sclerae without conjunctival injection. Pupils reactive bilaterally.    ENT:  Oropharynx is dry without exudates or ulcers. Tongue is midline  NECK:  Supple. No cervical " lymphadenopathy  LUNGS:  Clear to auscultation bilaterally. No accessory muscle use. Not on oxygen.   CARDIOVASCULAR:  Tachycardic Regular rate and rhythm with no murmurs, gallops or rubs.   ABDOMEN:  soft, tender to palpation, + rebound tenderness, non-distended. No appreciable hepatosplenomegaly.  SKIN:  No acute rashes.   EXTREMITIES: no joint swelling or erythema  NEUROLOGIC:  Alert. Oriented. Grossly nonfocal. Active x4 extremities. Strength equal.  LINES: CVC in place. No erythema around insertion site and dressing intact.          Laboratory Data:     Metabolic Studies       Recent Labs   Lab Test 03/22/24  0333 03/22/24  0231 03/21/24  0708   NA  --  142  142 142   POTASSIUM  --  3.2*  3.2* 3.2*   CHLORIDE  --  110*  110* 109*   CO2  --  17*  17* 19*   ANIONGAP  --  15  15 14   BUN  --  9.3  9.3 8.3   CR  --  0.58  0.58 0.50*   GFRESTIMATED  --  >90  >90 >90   GLC  --  88  88 107*   RAMIREZ  --  7.2*  7.2* 7.3*   PHOS 2.1*  --   --    MAG 1.7  --  1.5*   LACT  --  0.6*  --    PCAL  --  2.68*  --        Hepatic Studies    Recent Labs   Lab Test 03/22/24  0231 03/18/24  0714 03/17/24  0807 03/12/24  1025 02/20/24  1212   BILITOTAL 0.5 0.3 0.2   < > 0.2   DBIL  --  <0.20  --   --   --    ALKPHOS 56 74 78   < > 58   PROTTOTAL 5.1* 5.2* 5.0*   < > 6.2*   ALBUMIN 3.1* 3.6 3.5   < > 3.9   AST 17 24 32   < > 17   ALT 17 19 21   < > 9   LDH  --   --   --   --  233    < > = values in this interval not displayed.       Hematology Studies      Recent Labs   Lab Test 03/22/24  0333 03/22/24  0231 03/21/24  0708 03/20/24  0800 03/19/24  0725 03/18/24  0714 03/17/24  0807 03/15/24  1001 03/14/24  1242   WBC <0.1* <0.1* 0.1* <0.1* 0.1* 0.2*   < > 2.3* 9.4   ANEU  --   --   --   --   --   --   --  2.2 8.9*   ALYM  --   --   --   --   --   --   --  0.1* 0.0*   RAGINI  --   --   --   --   --   --   --  0.0 0.0   AEOS  --   --   --   --   --   --   --  0.0 0.0   HGB 6.9* 6.7* 7.7* 8.3* 8.4* 8.4*   < > 8.5* 8.9*   HCT 20.9*  "20.6* 23.5* 24.6* 25.5* 26.3*   < > 25.8* 27.4*   PLT 12* 14* 7* 22* 53* 69*   < > 112* 114*    < > = values in this interval not displayed.       Arterial Blood Gas Testing  No lab results found.     Medication levels  No lab results found.    Invalid input(s): \"AMIK\"    No results found for: \"ACD4\"    Inflammatory Markers  No lab results found.    Invalid input(s): \"AUTO\", \"WESR\"    Immune Globulin Studies     Recent Labs   Lab Test 02/20/24  1212   *   IGM <10*   IGE <2   IGA 9*       Pancreatitis testing  No lab results found.    Gout Labs      Recent Labs   Lab Test 03/21/24  0708 03/12/24  1025 02/20/24  1212   URIC 3.1 7.0* 4.8       Clotting Studies    Recent Labs   Lab Test 03/22/24  0333 03/21/24  0708 03/15/24  1001 03/13/24  1152 02/20/24  1212   INR 1.83* 1.74* 1.12 1.22* 1.18*   PTT  --   --   --   --  29         Markers  No lab results found.    Invalid input(s): \"FETOPROTEIN\", \"SERUM\", \"AFP\"    Autoimmune Testing  No lab results found.    Invalid input(s): \"PRO3\", \"C3\", \"C4\", \"VASPAN\"    Thyroid Studies     Recent Labs   Lab Test 03/22/24  0231   TSH 1.50       Urine Studies     Recent Labs   Lab Test 03/22/24  0548 02/20/24  1212   URINEPH 6.0 6.5   NITRITE Negative Negative   LEUKEST Negative Negative   WBCU 3 1       CSF testing   No lab results found.    Invalid input(s): \"CADAM\", \"EVPCR\", \"ENTPCR\", \"ENTEROVIRUS\"    Body fluid stats  No lab results found.    Microbiology:  Fungal testing  No lab results found.    Invalid input(s): \"HIFUN\", \"FUNGL\"    Culture   Date Value Ref Range Status   03/21/2024 Positive on the 1st day of incubation (A)  Preliminary   03/21/2024 Gram negative bacilli (AA)  Preliminary     Comment:     1 of 2 bottles   03/21/2024 No growth after 12 hours  Preliminary   (    Last check of C difficile  No results found for: \"CDBPCT\"    Infection Studies to assess Diarrhea No lab results found.    Virology:  Coronavirus-19 testing    Recent Labs   Lab Test 03/22/24  0413 " "03/12/24  1528 08/14/23  1252   [id number] Negative Negative  --    COVIDPCREXT  --   --  Not Detected       Respiratory virus testing    Recent Labs   Lab Test 03/22/24  0413   INFZA Negative   INFZB Negative   IRSV Negative       No results found for: \"CMVIGG\", \"CMVM\", \"CMVIM\", \"CMIG\", \"CMVG\", \"CMIGG\", \"CMIM\", \"CMVIGM\", \"CMLTX\", \"HSVG1\", \"HSVG2\", \"HSVTP1\", \"JH3442\", \"HS12M\", \"HS12GR\", \"HS1GR\", \"HS2GR\", \"HSIM\", \"HSIG\", \"HSIGR\", \"HSVIGMAB\", \"VZVIGG\", \"VARICZOSAB\"  EBV Capsid Antibody IgG   Date Value Ref Range Status   02/20/2024 Positive (A) No detectable antibody. Final     Comment:     Suggests recent or past exposure.     Herpes Simplex Virus Type 1 IgG Antibody   Date Value Ref Range Status   02/20/2024 Positive.  IgG antibody to HSV-1 detected. (A) No HSV-1 IgG antibodies detected Final     Herpes Simplex Virus Type 2 IgG Antibody   Date Value Ref Range Status   02/20/2024 No HSV-2 IgG antibodies detected. No HSV-2 IgG antibodies detected Final       CMV viral loads  No lab results found.    CMV resistance testing  No lab results found.    No results found for: \"H6RES\"    No results found for: \"EBRES\"    No results found for: \"60400\", \"BKRES\"    Parvovirus Testing  No lab results found.    Invalid input(s): \"PRVRES\"    Adenovirus Testing  No lab results found.    Invalid input(s): \"ADENAB\", \"ADENOVIRUS\", \"ADQT\"    Hepatitis B Testing   No lab results found.   No results found for: \"HCVAB\", \"HQTG\", \"HCGENO\", \"HCPCR\", \"HQTRNA\", \"HEPRNA\", \"CRYOG\"    Imaging:  Recent Results (from the past 48 hour(s))   XR Chest Port 1 View    Narrative    EXAM: XR CHEST PORT 1 VIEW  LOCATION: [facility]  DATE: 3/22/2024    INDICATION: fever, S P BMT  COMPARISON: 02/16/2024      Impression    IMPRESSION: New left-sided central venous catheter with the tip in the RA. No pneumothorax. Mild cardiac enlargement. Pulmonary vascularity at the upper limits of normal. No new pulmonary alveolar " infiltrate or consolidation. Degenerative changes in the   spine and both shoulders.

## 2024-03-22 NOTE — PROGRESS NOTES
"4520 - 6528 Shift Summary:      Upon arrival to the unit, RRT was called due to tachycardia. Provider at bedside. BP was 78/51, HR intermittently in 160s-170s, then around 110s. IV bolus was infusing. EKG completed x2. Pt denied chest pain. RBCs started. IV Meropenem hung through red lumen. IV Mg, IV Kcl, K Phos started. IVF maintenance started after 0.5 bolus @ 100mL/hr. UA collected. BC drawn in ER, and COVID swab done in ER too. Pt had a Tmax of 102.5 axillary, provider notified. PO tylenol given.    Most Recent Vitals:  BP (!) 130/95 (BP Location: Right arm)   Pulse 109   Temp (!) 102.3  F (39.1  C) (Axillary)   Resp 26   Ht 1.65 m (5' 4.96\")   Wt 95.4 kg (210 lb 6.4 oz)   SpO2 97%   BMI 35.05 kg/m    Body mass index is 35.05 kg/m .    Pain: 4/10 bone pain in rib cage r/t MM.    Neuro: Confusion during fever. Extreme fatigue.  HEENT: WDL  Resp: Tachypneic, denies SOB, but GUIDRY. spO2 > 92% on RA. Diminished breath sounds.  Cardiac: Irregular, service aware, and low BP initially, responded well to bolus, electrolyte replacement, and RBCs.  Diet: Regular. Per daughter, patient has not eaten in the last 3 days.  GI: 2 watery BM, both stool incontinence.  : Urinary incontinence  Skin: Flushed w/ fever, generalized bruising. Skin moist, diaphoretic. Trace-mild edema.   Activity: x1GBW, Bed Alarm for safety. Unsteady Gait.  Lines: LUE PIV, RUE PIV, L Chest LTCVC    Replacements: 1U RBC infusing, IV Kcl x2, IV Mg, K-Phos, IV Ca needs to be hung.   "

## 2024-03-22 NOTE — ED PROVIDER NOTES
"      ED Provider Note  March 22, 2024  [facility]      History     Chief Complaint: Blood Culture Positive      HPI  Rosario Terrazas is a 68 year old female hx of MM, s/p BMT 9 days ago yesterday with fever to 103 throughout day, now called back d/t + BCx GNR    On arrival with myalgias and fever.   Notes mild sore throat and abdominal cramping  Water stools for last 4 days, no blood.   Treated with immodium per BMT team.     + mild cough, minimally productive   No urinary sx.           Past Medical History  No past medical history on file.  Past Surgical History:   Procedure Laterality Date    INSERT CATHETER VASCULAR ACCESS Left 3/6/2024    Procedure: central venous catheter tunneled line Insert vascular access;  Surgeon: Onel Leonardo MD;  Location: [facility] OR    IR CVC TUNNEL PLACEMENT > 5 YRS OF AGE  3/6/2024     No current outpatient medications on file.    No Known Allergies  Family History  No family history on file.  Social History   Social History     Tobacco Use    Smoking status: Never    Smokeless tobacco: Never   Substance Use Topics    Alcohol use: Never    Drug use: Never        Past medical history, past surgical history, medications, allergies, family history, and social history were reviewed with the patient. No additional pertinent items.      A medically appropriate review of systems was performed with pertinent positives and negatives noted in the HPI, and all other systems negative.    Physical Exam   BP (!) 130/95 (BP Location: Right arm)   Pulse 114   Temp (!) 102.3  F (39.1  C) (Axillary)   Resp 26   Ht 1.65 m (5' 4.96\")   Wt 95.4 kg (210 lb 6.4 oz)   SpO2 99%   BMI 35.05 kg/m      Regular tachycardia  ?Clear lungs, somewhat decreased on right compared with left  Abd soft, mild diffuse TTP, cramping  Ext from,   Op clear, no overt lesions    ED Course, Procedures, & Data      Procedures                    Results for orders placed or performed " in visit on 03/22/24   Phosphorus     Status: Abnormal   Result Value Ref Range    Phosphorus 2.1 (L) 2.5 - 4.5 mg/dL   INR     Status: Abnormal   Result Value Ref Range    INR 1.83 (H) 0.85 - 1.15   Ionized Calcium     Status: Abnormal   Result Value Ref Range    Calcium Ionized Whole Blood 4.0 (L) 4.4 - 5.2 mg/dL   Magnesium     Status: Normal   Result Value Ref Range    Magnesium 1.7 1.7 - 2.3 mg/dL   Prepare red blood cells (unit)     Status: None (Preliminary result)   Result Value Ref Range    Blood Component Type Red Blood Cells     Product Code [id number]     Unit Status Ready for issue     Unit Number [id number]     CROSSMATCH Compatible     CODING SYSTEM [id number]    Results for orders placed or performed during the hospital encounter of 03/22/24   XR Chest Port 1 View     Status: None    Narrative    EXAM: XR CHEST PORT 1 VIEW  LOCATION: [facility]  DATE: 3/22/2024    INDICATION: fever, S P BMT  COMPARISON: 02/16/2024      Impression    IMPRESSION: New left-sided central venous catheter with the tip in the RA. No pneumothorax. Mild cardiac enlargement. Pulmonary vascularity at the upper limits of normal. No new pulmonary alveolar infiltrate or consolidation. Degenerative changes in the   spine and both shoulders.   [place] Draw     Status: None (In process)    Narrative    The following orders were created for panel order [place] Draw.  Procedure                               Abnormality         Status                     ---------                               -----------         ------                     Extra Blue Top Tube[[id number]]                              Final result               Extra Red Top Tube[[id number]]                               Final result               Extra Green Top (Lithium...[[id number]]                                                 Extra Purple Top Tube[[id number]]                                                       Extra Green  Top (Lithium...[[id number]]                                                   Please view results for these tests on the individual orders.   Extra Blue Top Tube     Status: None   Result Value Ref Range    Hold Specimen JIC    Extra Red Top Tube     Status: None   Result Value Ref Range    Hold Specimen JIC    Lactic acid whole blood     Status: Abnormal   Result Value Ref Range    Lactic Acid 0.6 (L) 0.7 - 2.0 mmol/L   Comprehensive metabolic panel     Status: Abnormal   Result Value Ref Range    Sodium 142 135 - 145 mmol/L    Potassium 3.2 (L) 3.4 - 5.3 mmol/L    Carbon Dioxide (CO2) 17 (L) 22 - 29 mmol/L    Anion Gap 15 7 - 15 mmol/L    Urea Nitrogen 9.3 8.0 - 23.0 mg/dL    Creatinine 0.58 0.51 - 0.95 mg/dL    GFR Estimate >90 >60 mL/min/1.73m2    Calcium 7.2 (L) 8.8 - 10.2 mg/dL    Chloride 110 (H) 98 - 107 mmol/L    Glucose 88 70 - 99 mg/dL    Alkaline Phosphatase 56 40 - 150 U/L    AST 17 0 - 45 U/L    ALT 17 0 - 50 U/L    Protein Total 5.1 (L) 6.4 - 8.3 g/dL    Albumin 3.1 (L) 3.5 - 5.2 g/dL    Bilirubin Total 0.5 <=1.2 mg/dL   CBC with platelets and differential     Status: Abnormal   Result Value Ref Range    WBC Count <0.1 (LL) 4.0 - 11.0 10e3/uL    RBC Count 2.10 (L) 3.80 - 5.20 10e6/uL    Hemoglobin 6.7 (LL) 11.7 - 15.7 g/dL    Hematocrit 20.6 (L) 35.0 - 47.0 %    MCV 98 78 - 100 fL    MCH 31.9 26.5 - 33.0 pg    MCHC 32.5 31.5 - 36.5 g/dL    RDW 16.1 (H) 10.0 - 15.0 %    Platelet Count 14 (LL) 150 - 450 10e3/uL    % Neutrophils      % Lymphocytes      % Monocytes      % Eosinophils      % Basophils      % Immature Granulocytes      Absolute Neutrophils      Absolute Lymphocytes      Absolute Monocytes      Absolute Eosinophils      Absolute Basophils      Absolute Immature Granulocytes     Basic metabolic panel     Status: Abnormal   Result Value Ref Range    Sodium 142 135 - 145 mmol/L    Potassium 3.2 (L) 3.4 - 5.3 mmol/L    Chloride 110 (H) 98 - 107 mmol/L    Carbon Dioxide (CO2) 17 (L) 22 - 29 mmol/L     Anion Gap 15 7 - 15 mmol/L    Urea Nitrogen 9.3 8.0 - 23.0 mg/dL    Creatinine 0.58 0.51 - 0.95 mg/dL    GFR Estimate >90 >60 mL/min/1.73m2    Calcium 7.2 (L) 8.8 - 10.2 mg/dL    Glucose 88 70 - 99 mg/dL   UA with Microscopic reflex to Culture     Status: Abnormal    Specimen: Urine, Clean Catch   Result Value Ref Range    Color Urine Yellow Colorless, Straw, Light Yellow, Yellow    Appearance Urine Slightly Cloudy (A) Clear    Glucose Urine Negative Negative mg/dL    Bilirubin Urine Negative Negative    Ketones Urine 40 (A) Negative mg/dL    Specific Gravity Urine 1.019 1.003 - 1.035    Blood Urine Trace (A) Negative    pH Urine 6.0 5.0 - 7.0    Protein Albumin Urine 100 (A) Negative mg/dL    Urobilinogen Urine Normal Normal, 2.0 mg/dL    Nitrite Urine Negative Negative    Leukocyte Esterase Urine Negative Negative    Mucus Urine Present (A) None Seen /LPF    RBC Urine <1 <=2 /HPF    WBC Urine 3 <=5 /HPF    Squamous Epithelials Urine 4 (H) <=1 /HPF    Transitional Epithelials Urine <1 <=1 /HPF    Narrative    Urine Culture not indicated   Symptomatic Influenza A/B, RSV, & SARS-CoV2 PCR (COVID-19) Nasopharyngeal     Status: Normal    Specimen: Nasopharyngeal; Swab   Result Value Ref Range    Influenza A PCR Negative Negative    Influenza B PCR Negative Negative    RSV PCR Negative Negative    SARS CoV2 PCR Negative Negative    Narrative    Testing was performed using the Xpert Xpress CoV2/Flu/RSV Assay on the [employer] GeneXpert Instrument. This test should be ordered for the detection of SARS-CoV-2, influenza, and RSV viruses in individuals who meet clinical and/or epidemiological criteria. Test performance is unknown in asymptomatic patients. This test is for in vitro diagnostic use under the FDA EUA for laboratories certified under CLIA to perform high or moderate complexity testing. This test has not been FDA cleared or approved. A negative result does not rule out the presence of PCR inhibitors in the specimen  or target RNA in concentration below the limit of detection for the assay. If only one viral target is positive but coinfection with multiple targets is suspected, the sample should be re-tested with another FDA cleared, approved, or authorized test, if coinfection would change clinical management. This test was validated by the [facility] [employer]. These laboratories are certified under the Clinical Laboratory Improvement Amendments of 1988 (CLIA-88) as qualified to perform high complexity laboratory testing.   RBC and Platelet Morphology     Status: None   Result Value Ref Range    Platelet Assessment  Automated Count Confirmed. Platelet morphology is normal.     Automated Count Confirmed. Platelet morphology is normal.    Acanthocytes      Mike Rods      Basophilic Stippling      Bite Cells      Blister Cells      [place] Cells      Elliptocytes      Hgb C Crystals      [place]ero-Jolly Bodies      Hypersegmented Neutrophils      Polychromasia      RBC agglutination      RBC Fragments      Reactive Lymphocytes      Rouleaux      Sickle Cells      Smudge Cells      Spherocytes      Stomatocytes      Target Cells      Teardrop Cells      Toxic Neutrophils      RBC Morphology Confirmed RBC Indices    Procalcitonin     Status: Abnormal   Result Value Ref Range    Procalcitonin 2.68 (H) <0.50 ng/mL   TSH with free T4 reflex     Status: Normal   Result Value Ref Range    TSH 1.50 0.30 - 4.20 uIU/mL   EKG 12-lead, tracing only     Status: None (Preliminary result)   Result Value Ref Range    Systolic Blood Pressure  mmHg    Diastolic Blood Pressure  mmHg    Ventricular Rate 110 BPM    Atrial Rate 110 BPM    NV Interval 134 ms    QRS Duration 68 ms     ms    QTc 457 ms    P Axis 88 degrees    R AXIS 48 degrees    T Axis 55 degrees    Interpretation ECG       Sinus tachycardia  Septal infarct , age undetermined  Abnormal ECG     EKG 12-lead, complete     Status: None (Preliminary result)   Result Value Ref Range     Systolic Blood Pressure  mmHg    Diastolic Blood Pressure  mmHg    Ventricular Rate 115 BPM    Atrial Rate 115 BPM    WA Interval 130 ms    QRS Duration 68 ms     ms    QTc 442 ms    P Axis 79 degrees    R AXIS 51 degrees    T Axis 58 degrees    Interpretation ECG       Sinus tachycardia with Premature atrial complexes  Otherwise normal ECG  When compared with ECG of 22-MAR-2024 02:42, (unconfirmed)  Premature atrial complexes are now Present  Criteria for Septal infarct are no longer Present     EKG 12-lead, complete     Status: None (Preliminary result)   Result Value Ref Range    Systolic Blood Pressure  mmHg    Diastolic Blood Pressure  mmHg    Ventricular Rate 162 BPM    Atrial Rate  BPM    WA Interval  ms    QRS Duration 70 ms     ms    QTc 479 ms    P Axis  degrees    R AXIS 49 degrees    T Axis 57 degrees    Interpretation ECG       Supraventricular tachycardia  Nonspecific ST and T wave abnormality  Abnormal ECG  When compared with ECG of 22-MAR-2024 05:03, (unconfirmed)  Premature atrial complexes are no longer Present  ST now depressed in Anterior leads  Nonspecific T wave abnormality now evident in Lateral leads     Prepare red blood cells (unit)     Status: None   Result Value Ref Range    Blood Component Type Red Blood Cells     Product Code [id number]     Unit Status Transfused     Unit Number [id number]     CROSSMATCH Compatible     CODING SYSTEM [id number]     ISSUE DATE AND TIME [id number]     UNIT ABO/RH A+     UNIT TYPE ISBT 6200    CBC with platelets differential     Status: Abnormal    Narrative    The following orders were created for panel order CBC with platelets differential.  Procedure                               Abnormality         Status                     ---------                               -----------         ------                     CBC with platelets and d...[[id number]]  Abnormal            Final result               RBC and Platelet Morphology[[id number]]                       Final result                 Please view results for these tests on the individual orders.   CBC with platelets differential *Canceled*     Status: None ()    Narrative    The following orders were created for panel order CBC with platelets differential.  Procedure                               Abnormality         Status                     ---------                               -----------         ------                       Please view results for these tests on the individual orders.   ABO/Rh type and screen *Canceled*     Status: None ()    Narrative    The following orders were created for panel order ABO/Rh type and screen.  Procedure                               Abnormality         Status                     ---------                               -----------         ------                     Adult Type and Screen[[id number]]                                                         Please view results for these tests on the individual orders.     Medications   acyclovir (ZOVIRAX) tablet 800 mg (has no administration in time range)   allopurinol (ZYLOPRIM) tablet 300 mg (has no administration in time range)   fluconazole (DIFLUCAN) tablet 200 mg (has no administration in time range)   folic acid (FOLVITE) tablet 1,000 mcg (has no administration in time range)   gabapentin (NEURONTIN) capsule 100 mg (has no administration in time range)   isoniazid (NYDRAZID) tablet 300 mg (has no administration in time range)   OLANZapine (zyPREXA) tablet 2.5 mg (has no administration in time range)   oxyCODONE (ROXICODONE) tablet 5 mg (has no administration in time range)   pantoprazole (PROTONIX) EC tablet 40 mg (has no administration in time range)   pyridOXINE (VITAMIN B6) tablet 25 mg (has no administration in time range)   sulfamethoxazole-trimethoprim (BACTRIM) 400-80 MG per tablet 1 tablet ( Oral Automatically Held 3/25/24 0800)   lidocaine 1 % 0.1-1 mL (has no administration in time range)    lidocaine (LMX4) cream (has no administration in time range)   sodium chloride (PF) 0.9% PF flush 3 mL (3 mLs Intracatheter Not Given 3/22/24 0524)   sodium chloride (PF) 0.9% PF flush 3 mL (has no administration in time range)   senna-docusate (SENOKOT-S/PERICOLACE) 8.6-50 MG per tablet 1 tablet (has no administration in time range)     Or   senna-docusate (SENOKOT-S/PERICOLACE) 8.6-50 MG per tablet 2 tablet (has no administration in time range)   calcium carbonate (TUMS) chewable tablet 1,000 mg (has no administration in time range)   melatonin tablet 1 mg (has no administration in time range)   ondansetron (ZOFRAN ODT) ODT tab 4 mg (has no administration in time range)     Or   ondansetron (ZOFRAN) injection 4 mg (has no administration in time range)   prochlorperazine (COMPAZINE) injection 5 mg (has no administration in time range)     Or   prochlorperazine (COMPAZINE) tablet 5 mg (has no administration in time range)     Or   prochlorperazine (COMPAZINE) suppository 12.5 mg (has no administration in time range)   acetaminophen (TYLENOL) tablet 650 mg (650 mg Oral $Given 3/22/24 0516)   calcium gluconate 2 g in  mL intermittent infusion (has no administration in time range)   meropenem (MERREM) 2 g in sodium chloride 0.9 % 100 mL intermittent infusion (has no administration in time range)   benzocaine-menthol (CHLORASEPTIC MAX) 15-10 MG lozenge 1 lozenge (has no administration in time range)   naloxone (NARCAN) injection 0.2 mg (has no administration in time range)     Or   naloxone (NARCAN) injection 0.4 mg (has no administration in time range)     Or   naloxone (NARCAN) injection 0.2 mg (has no administration in time range)     Or   naloxone (NARCAN) injection 0.4 mg (has no administration in time range)   sodium chloride 0.9 % infusion ( Intravenous $New Bag 3/22/24 0524)   Potassium Phosphate 15 mmol in NS intermittent infusion 15 mmol (has no administration in time range)   metoprolol tartrate  (LOPRESSOR) tablet 25 mg (25 mg Oral $Given 3/22/24 0546)   potassium chloride 20 mEq in 50 mL intermittent infusion (20 mEq Intravenous $New Bag 3/22/24 0613)   sodium chloride 0.9% BOLUS 1,000 mL (0 mLs Intravenous Stopped 3/22/24 0400)   sodium chloride 0.9% BOLUS 1,000 mL (1,000 mLs Intravenous $New Bag 3/22/24 0352)   meropenem (MERREM) 2 g in sodium chloride 0.9 % 100 mL intermittent infusion (2 g Intravenous $New Bag 3/22/24 0521)   magnesium sulfate 2 g in 50 mL sterile water intermittent infusion (2 g Intravenous $New Bag 3/22/24 0521)   sodium chloride 0.9% BOLUS 500 mL (500 mLs Intravenous $New Bag 3/22/24 0519)     Labs Ordered and Resulted from Time of ED Arrival to Time of ED Departure   LACTIC ACID WHOLE BLOOD - Abnormal       Result Value    Lactic Acid 0.6 (*)    COMPREHENSIVE METABOLIC PANEL - Abnormal    Sodium 142      Potassium 3.2 (*)     Carbon Dioxide (CO2) 17 (*)     Anion Gap 15      Urea Nitrogen 9.3      Creatinine 0.58      GFR Estimate >90      Calcium 7.2 (*)     Chloride 110 (*)     Glucose 88      Alkaline Phosphatase 56      AST 17      ALT 17      Protein Total 5.1 (*)     Albumin 3.1 (*)     Bilirubin Total 0.5     CBC WITH PLATELETS AND DIFFERENTIAL - Abnormal    WBC Count <0.1 (*)     RBC Count 2.10 (*)     Hemoglobin 6.7 (*)     Hematocrit 20.6 (*)     MCV 98      MCH 31.9      MCHC 32.5      RDW 16.1 (*)     Platelet Count 14 (*)     % Neutrophils        % Lymphocytes        % Monocytes        % Eosinophils        % Basophils        % Immature Granulocytes        Absolute Neutrophils        Absolute Lymphocytes        Absolute Monocytes        Absolute Eosinophils        Absolute Basophils        Absolute Immature Granulocytes       BASIC METABOLIC PANEL - Abnormal    Sodium 142      Potassium 3.2 (*)     Chloride 110 (*)     Carbon Dioxide (CO2) 17 (*)     Anion Gap 15      Urea Nitrogen 9.3      Creatinine 0.58      GFR Estimate >90      Calcium 7.2 (*)     Glucose 88      RBC AND PLATELET MORPHOLOGY    Platelet Assessment        Value: Automated Count Confirmed. Platelet morphology is normal.    Acanthocytes        Mike Rods        Basophilic Stippling        Bite Cells        Blister Cells        [place] Cells        Elliptocytes        Hgb C Crystals        [place]-Jolly Bodies        Hypersegmented Neutrophils        Polychromasia        RBC agglutination        RBC Fragments        Reactive Lymphocytes        Rouleaux        Sickle Cells        Smudge Cells        Spherocytes        Stomatocytes        Target Cells        Teardrop Cells        Toxic Neutrophils        RBC Morphology Confirmed RBC Indices     PREPARE RED BLOOD CELLS (UNIT)    Blood Component Type Red Blood Cells      Product Code [id number]      Unit Status Transfused      Unit Number [id number]      CROSSMATCH Compatible      CODING SYSTEM [id number]      ISSUE DATE AND TIME [id number]      UNIT ABO/RH A+      UNIT TYPE ISBT 6200     PREPARE RED BLOOD CELLS (UNIT)   BLOOD CULTURE   BLOOD CULTURE   BLOOD CULTURE   BLOOD CULTURE   URINE CULTURE   C. DIFFICILE TOXIN B PCR WITH REFLEX TO C. DIFFICILE ANTIGEN AND TOXINS A/B EIA   ENTERIC BACTERIA AND VIRUS PANEL BY PCR   RESPIRATORY PANEL PCR     XR Chest Port 1 View   Final Result   IMPRESSION: New left-sided central venous catheter with the tip in the RA. No pneumothorax. Mild cardiac enlargement. Pulmonary vascularity at the upper limits of normal. No new pulmonary alveolar infiltrate or consolidation. Degenerative changes in the    spine and both shoulders.               Medical Decision Making Matrix    Problems Addressed   MDM High: 1 acute or chronic illness or injury that poses a threat to life or bodily function     Data   Considered        Risk of Patient Management       High Risk: Decision regarding hospitalization or escalation of hospital level of care           Assessment & Plan    68-year-old female multiple myeloma status post BMT presenting with  neutropenic fever    - Febrile yesterday blood cultures positive now growing ESBL E. Coli  Chest x-ray without evidence of new infectious alternative etiology  Stool culture will be sent as well due to frequent watery stools although likely related to recent clinical course PMT, will continue to evaluate for potential infection here.    Anemia  - Hemoglobin transfusion goal to 7, will give 1 unit of RBCs    SVT  -Very brief and intermittently captured on monitor.  Narrow complex regular, rate in the 160s self resolves in less than 30 seconds, but has had multiple episodes since to the emergency department.    Hypokalemia  -Will replace orally, additionally suspect Kim depletion will give IV.    -Respiratory panel ordered and pending  -Repeat blood cultures ordered and pending    Discussed and admitted to the internal medicine service on BMT.      I have reviewed the nursing notes. I have reviewed the findings, diagnosis, plan and need for follow up with the patient.    Current Discharge Medication List          Final diagnoses:   Bone marrow transplant status (H)   Fever, unspecified fever cause   Positive blood culture       Rishi Stuart MD  [facility] EMERGENCY DEPARTMENT  March 22, 2024       Rishi Stuart MD  03/22/24 0679

## 2024-03-22 NOTE — PROGRESS NOTES
BMT Daily Progress Note   03/22/2024    Patient ID:  Rosario Terrazas is a 68 year old female, currently day 9 s/p auto for MM readmitted 3/21 with N/F and GNB+ sepsis.  INTERVAL  HISTORY     Overnight continued to be febrile. SVT with rates up to 170bpm, given metoprolol PO x1 and resolved to sinus tachycardia. Later in the morning she remained febrile, BP labile MAPS ~60's, lethargic and obtunded occasionally AMS. Given 2.5L bolus overnight, 125ml/hr fluids. Received RBCs and platelets and BP improved. Stable with maps 70-80's. Less confused and more A&O.     In the afternoon SVT episodes on telemetry became more and more frequent, unable to catch on EKG. Rates to 170's. Pt feels palpitations, but is otherwise asymptomatic. Valsalva manuevers difficult with diarrhea and haven't been effective. Cards consulted for better management. Persistent rates 150-170's, mostly asymptomatic and hemodynamically stable. BP maintaining.   Review of Systems: 10 point ROS negative except as noted above.    Scheduled Medications   acyclovir  800 mg Oral BID    dextrose 5% water  10-20 mL Intravenous Daily at 8 pm    And    filgrastim-aafi  5 mcg/kg Intravenous Daily at 8 pm    And    dextrose 5% water  10-20 mL Intravenous Daily at 8 pm    fluconazole  200 mg Oral Daily    folic acid  1,000 mcg Oral Daily    gabapentin  100 mg Oral TID    isoniazid  300 mg Oral Daily    lactated ringers  1,000 mL Intravenous Once    loperamide  4 mg Oral 4x Daily    meropenem  2 g Intravenous Q8H    metoprolol tartrate  25 mg Oral BID    pantoprazole  40 mg Oral Daily    pyridOXINE  25 mg Oral Daily    sodium chloride (PF)  3 mL Intracatheter Q8H    [Held by provider] sulfamethoxazole-trimethoprim  1 tablet Oral Daily     Current Facility-Administered Medications   Medication    acetaminophen (TYLENOL) tablet 325-650 mg    acyclovir (ZOVIRAX) tablet 800 mg    benzocaine-menthol (CHLORASEPTIC MAX) 15-10 MG lozenge 1 lozenge    calcium  carbonate (TUMS) chewable tablet 1,000 mg    dextrose 5 % flush PRE/POST medication    And    filgrastim-aafi (NIVESTYM) 480 mcg in D5W 25 mL infusion    And    dextrose 5 % flush PRE/POST medication    fluconazole (DIFLUCAN) tablet 200 mg    folic acid (FOLVITE) tablet 1,000 mcg    gabapentin (NEURONTIN) capsule 100 mg    isoniazid (NYDRAZID) tablet 300 mg    lactated ringers BOLUS 1,000 mL    lactated ringers infusion    lidocaine (LMX4) cream    lidocaine 1 % 0.1-1 mL    loperamide (IMODIUM) capsule 4 mg    LORazepam (ATIVAN) injection 0.5-1 mg    Or    LORazepam (ATIVAN) tablet 0.5-1 mg    melatonin tablet 1 mg    meropenem (MERREM) 2 g in sodium chloride 0.9 % 100 mL intermittent infusion    metoprolol tartrate (LOPRESSOR) tablet 25 mg    naloxone (NARCAN) injection 0.2 mg    Or    naloxone (NARCAN) injection 0.4 mg    Or    naloxone (NARCAN) injection 0.2 mg    Or    naloxone (NARCAN) injection 0.4 mg    OLANZapine (zyPREXA) tablet 2.5 mg    ondansetron (ZOFRAN ODT) ODT tab 4 mg    Or    ondansetron (ZOFRAN) injection 4 mg    oxyCODONE (ROXICODONE) tablet 5 mg    pantoprazole (PROTONIX) EC tablet 40 mg    prochlorperazine (COMPAZINE) injection 5 mg    Or    prochlorperazine (COMPAZINE) tablet 5 mg    Or    prochlorperazine (COMPAZINE) suppository 12.5 mg    pyridOXINE (VITAMIN B6) tablet 25 mg    senna-docusate (SENOKOT-S/PERICOLACE) 8.6-50 MG per tablet 1 tablet    Or    senna-docusate (SENOKOT-S/PERICOLACE) 8.6-50 MG per tablet 2 tablet    sodium chloride (PF) 0.9% PF flush 3 mL    sodium chloride (PF) 0.9% PF flush 3 mL    [Held by provider] sulfamethoxazole-trimethoprim (BACTRIM) 400-80 MG per tablet 1 tablet       PHYSICAL EXAM     Weight In/Out     Wt Readings from Last 3 Encounters:   03/22/24 97.1 kg (214 lb 1.6 oz)   03/21/24 94.7 kg (208 lb 11.2 oz)   03/20/24 94.9 kg (209 lb 4.8 oz)      I/O last 3 completed shifts:  In: 1900 [I.V.:400; IV Piggyback:1500]  Out: 250 [Urine:250]         /69    "Pulse (!) 149   Temp 99.6  F (37.6  C) (Axillary)   Resp 18   Ht 1.65 m (5' 4.96\")   Wt 97.1 kg (214 lb 1.6 oz)   SpO2 100%   BMI 35.67 kg/m         General: obtunded on exam this morning, very lethargic and eyes closed  Lungs: CTA bilaterally  Cardiovascular: RRR, no M/R/G   Abdominal/Rectal: +BS hyperactive, diffusely tender, ND  Lymphatics: no edema  Skin: no rashes or petechaie  Neuro: A&O x4, moving all extremities spontaneously, PERRL  Additional Findings: Wilder site NT, no drainage.    LABS AND IMAGING - PAST 24 HOURS     Results for orders placed or performed during the hospital encounter of 03/22/24 (from the past 24 hour(s))   [place] Draw    Narrative    The following orders were created for panel order [place] Draw.  Procedure                               Abnormality         Status                     ---------                               -----------         ------                     Extra Blue Top Tube[[id number]]                              Final result               Extra Red Top Tube[[id number]]                               Final result               Extra Green Top (Lithium...[[id number]]                                                 Extra Purple Top Tube[[id number]]                                                       Extra Green Top (Lithium...[[id number]]                                                   Please view results for these tests on the individual orders.   Extra Blue Top Tube   Result Value Ref Range    Hold Specimen JIC    Extra Red Top Tube   Result Value Ref Range    Hold Specimen JIC    Lactic acid whole blood   Result Value Ref Range    Lactic Acid 0.6 (L) 0.7 - 2.0 mmol/L   CBC with platelets differential    Narrative    The following orders were created for panel order CBC with platelets differential.  Procedure                               Abnormality         Status                     ---------                               -----------         ------               "       CBC with platelets and d...[[id number]]  Abnormal            Final result               RBC and Platelet Morphology[[id number]]                      Final result                 Please view results for these tests on the individual orders.   Comprehensive metabolic panel   Result Value Ref Range    Sodium 142 135 - 145 mmol/L    Potassium 3.2 (L) 3.4 - 5.3 mmol/L    Carbon Dioxide (CO2) 17 (L) 22 - 29 mmol/L    Anion Gap 15 7 - 15 mmol/L    Urea Nitrogen 9.3 8.0 - 23.0 mg/dL    Creatinine 0.58 0.51 - 0.95 mg/dL    GFR Estimate >90 >60 mL/min/1.73m2    Calcium 7.2 (L) 8.8 - 10.2 mg/dL    Chloride 110 (H) 98 - 107 mmol/L    Glucose 88 70 - 99 mg/dL    Alkaline Phosphatase 56 40 - 150 U/L    AST 17 0 - 45 U/L    ALT 17 0 - 50 U/L    Protein Total 5.1 (L) 6.4 - 8.3 g/dL    Albumin 3.1 (L) 3.5 - 5.2 g/dL    Bilirubin Total 0.5 <=1.2 mg/dL   CBC with platelets and differential   Result Value Ref Range    WBC Count <0.1 (LL) 4.0 - 11.0 10e3/uL    RBC Count 2.10 (L) 3.80 - 5.20 10e6/uL    Hemoglobin 6.7 (LL) 11.7 - 15.7 g/dL    Hematocrit 20.6 (L) 35.0 - 47.0 %    MCV 98 78 - 100 fL    MCH 31.9 26.5 - 33.0 pg    MCHC 32.5 31.5 - 36.5 g/dL    RDW 16.1 (H) 10.0 - 15.0 %    Platelet Count 14 (LL) 150 - 450 10e3/uL    % Neutrophils      % Lymphocytes      % Monocytes      % Eosinophils      % Basophils      % Immature Granulocytes      Absolute Neutrophils      Absolute Lymphocytes      Absolute Monocytes      Absolute Eosinophils      Absolute Basophils      Absolute Immature Granulocytes     Basic metabolic panel   Result Value Ref Range    Sodium 142 135 - 145 mmol/L    Potassium 3.2 (L) 3.4 - 5.3 mmol/L    Chloride 110 (H) 98 - 107 mmol/L    Carbon Dioxide (CO2) 17 (L) 22 - 29 mmol/L    Anion Gap 15 7 - 15 mmol/L    Urea Nitrogen 9.3 8.0 - 23.0 mg/dL    Creatinine 0.58 0.51 - 0.95 mg/dL    GFR Estimate >90 >60 mL/min/1.73m2    Calcium 7.2 (L) 8.8 - 10.2 mg/dL    Glucose 88 70 - 99 mg/dL   RBC and Platelet Morphology    Result Value Ref Range    Platelet Assessment  Automated Count Confirmed. Platelet morphology is normal.     Automated Count Confirmed. Platelet morphology is normal.    Acanthocytes      Mike Rods      Basophilic Stippling      Bite Cells      Blister Cells      [place] Cells      Elliptocytes      Hgb C Crystals      Tinajero-Jolly Bodies      Hypersegmented Neutrophils      Polychromasia      RBC agglutination      RBC Fragments      Reactive Lymphocytes      Rouleaux      Sickle Cells      Smudge Cells      Spherocytes      Stomatocytes      Target Cells      Teardrop Cells      Toxic Neutrophils      RBC Morphology Confirmed RBC Indices    Procalcitonin   Result Value Ref Range    Procalcitonin 2.68 (H) <0.50 ng/mL   TSH with free T4 reflex   Result Value Ref Range    TSH 1.50 0.30 - 4.20 uIU/mL   CBC with platelets differential *Canceled*    Narrative    The following orders were created for panel order CBC with platelets differential.  Procedure                               Abnormality         Status                     ---------                               -----------         ------                       Please view results for these tests on the individual orders.   EKG 12-lead, tracing only   Result Value Ref Range    Systolic Blood Pressure  mmHg    Diastolic Blood Pressure  mmHg    Ventricular Rate 110 BPM    Atrial Rate 110 BPM    TX Interval 134 ms    QRS Duration 68 ms     ms    QTc 457 ms    P Axis 88 degrees    R AXIS 48 degrees    T Axis 55 degrees    Interpretation ECG       Sinus tachycardia  Septal infarct , age undetermined  Abnormal ECG    Unconfirmed report - interpretation of this ECG is computer generated - see medical record for final interpretation  Confirmed by - EMERGENCY ROOM, PHYSICIAN ([id number]),  SENTHIL SHAY ([id number]) on 3/22/2024 7:28:51 AM     Blood Culture Red Lumen    Specimen: Red Lumen; Blood   Result Value Ref Range    Culture Positive on the 1st day of  incubation (A)     Culture Gram negative bacilli (AA)    Prepare red blood cells (unit)   Result Value Ref Range    Blood Component Type Red Blood Cells     Product Code [id number]     Unit Status Transfused     Unit Number [id number]     CROSSMATCH Compatible     CODING SYSTEM [id number]     ISSUE DATE AND TIME [id number]     UNIT ABO/RH A+     UNIT TYPE ISBT 6200    ABO/Rh type and screen *Canceled*    Narrative    The following orders were created for panel order ABO/Rh type and screen.  Procedure                               Abnormality         Status                     ---------                               -----------         ------                     Adult Type and Screen[[id number]]                                                         Please view results for these tests on the individual orders.   XR Chest Port 1 View    Narrative    EXAM: XR CHEST PORT 1 VIEW  LOCATION: [facility]  DATE: 3/22/2024    INDICATION: fever, S P BMT  COMPARISON: 02/16/2024      Impression    IMPRESSION: New left-sided central venous catheter with the tip in the RA. No pneumothorax. Mild cardiac enlargement. Pulmonary vascularity at the upper limits of normal. No new pulmonary alveolar infiltrate or consolidation. Degenerative changes in the   spine and both shoulders.   Symptomatic Influenza A/B, RSV, & SARS-CoV2 PCR (COVID-19) Nasopharyngeal    Specimen: Nasopharyngeal; Swab   Result Value Ref Range    Influenza A PCR Negative Negative    Influenza B PCR Negative Negative    RSV PCR Negative Negative    SARS CoV2 PCR Negative Negative    Narrative    Testing was performed using the Xpert Xpress CoV2/Flu/RSV Assay on the [employer] Gene[employer]pert Instrument. This test should be ordered for the detection of SARS-CoV-2, influenza, and RSV viruses in individuals who meet clinical and/or epidemiological criteria. Test performance is unknown in asymptomatic patients. This test is for in  vitro diagnostic use under the FDA EUA for laboratories certified under CLIA to perform high or moderate complexity testing. This test has not been FDA cleared or approved. A negative result does not rule out the presence of PCR inhibitors in the specimen or target RNA in concentration below the limit of detection for the assay. If only one viral target is positive but coinfection with multiple targets is suspected, the sample should be re-tested with another FDA cleared, approved, or authorized test, if coinfection would change clinical management. This test was validated by the [facility]. These laboratories are certified under the Clinical Laboratory Improvement Amendments of 1988 (CLIA-88) as qualified to perform high complexity laboratory testing.   EKG 12-lead, complete   Result Value Ref Range    Systolic Blood Pressure  mmHg    Diastolic Blood Pressure  mmHg    Ventricular Rate 115 BPM    Atrial Rate 115 BPM    GA Interval 130 ms    QRS Duration 68 ms     ms    QTc 442 ms    P Axis 79 degrees    R AXIS 51 degrees    T Axis 58 degrees    Interpretation ECG       Sinus tachycardia with Premature atrial complexes  Otherwise normal ECG  When compared with ECG of 22-MAR-2024 02:42, (unconfirmed)  Premature atrial complexes are now Present  Criteria for Septal infarct are no longer Present  Confirmed by MD SANTOS JANE ([id number]) on 3/22/2024 9:49:51 AM     EKG 12-lead, complete   Result Value Ref Range    Systolic Blood Pressure  mmHg    Diastolic Blood Pressure  mmHg    Ventricular Rate 162 BPM    Atrial Rate  BPM    GA Interval  ms    QRS Duration 70 ms     ms    QTc 479 ms    P Axis  degrees    R AXIS 49 degrees    T Axis 57 degrees    Interpretation ECG       Supraventricular tachycardia  Nonspecific ST and T wave abnormality  Abnormal ECG  When compared with ECG of 22-MAR-2024 05:03, (unconfirmed)  SVT has replaced sinus rhythm  Nonspecific T wave abnormality now evident in  Lateral leads  Confirmed by MD SANTOS JANE ([id number]) on 3/22/2024 9:49:41 AM     UA with Microscopic reflex to Culture    Specimen: Urine, Clean Catch   Result Value Ref Range    Color Urine Yellow Colorless, Straw, Light Yellow, Yellow    Appearance Urine Slightly Cloudy (A) Clear    Glucose Urine Negative Negative mg/dL    Bilirubin Urine Negative Negative    Ketones Urine 40 (A) Negative mg/dL    Specific Gravity Urine 1.019 1.003 - 1.035    Blood Urine Trace (A) Negative    pH Urine 6.0 5.0 - 7.0    Protein Albumin Urine 100 (A) Negative mg/dL    Urobilinogen Urine Normal Normal, 2.0 mg/dL    Nitrite Urine Negative Negative    Leukocyte Esterase Urine Negative Negative    Mucus Urine Present (A) None Seen /LPF    RBC Urine <1 <=2 /HPF    WBC Urine 3 <=5 /HPF    Squamous Epithelials Urine 4 (H) <=1 /HPF    Transitional Epithelials Urine <1 <=1 /HPF    Narrative    Urine Culture not indicated   Respiratory Panel PCR    Specimen: Nasopharyngeal; Swab   Result Value Ref Range    Adenovirus Not Detected Not Detected    Coronavirus Not Detected Not Detected    Human Metapneumovirus Not Detected Not Detected    Human Rhin/Enterovirus Not Detected Not Detected    Influenza A Not Detected Not Detected    Influenza A, H1 Not Detected Not Detected    Influenza A 2009 H1N1 Not Detected Not Detected    Influenza A, H3 Not Detected Not Detected    Influenza B Not Detected Not Detected    Parainfluenza Virus 1 Not Detected Not Detected    Parainfluenza Virus 2 Not Detected Not Detected    Parainfluenza Virus 3 Not Detected Not Detected    Parainfluenza Virus 4 Not Detected Not Detected    Respiratory Syncytial Virus A Not Detected Not Detected    Respiratory Syncytial Virus B Not Detected Not Detected    Chlamydia Pneumoniae Not Detected Not Detected    Mycoplasma Pneumoniae Not Detected Not Detected    Narrative    The ePlex Respiratory Panel is a qualitative nucleic acid, multiplex, in vitro diagnostic test for the  simultaneous detection and identification of multiple respiratory viral and bacterial nucleic acids in nasopharyngeal swabs collected in viral transport media from individual exhibiting signs and symptoms of respiratory infection. The assay has received FDA approval for the testing of nasopharyngeal (NP) swabs only. The [facility] at [facility] has validated the performance characteristics for bronchial alveolar lavage specimens. This test is used for clinical purposes and should not be regarded as investigational or for research. This laboratory is certified under the Clinical Laboratory Improvement Amendments of 1988 (CLIA-88) as qualified to perform high complexity clinical laboratory testing.    Symptomatic COVID-19 Virus (Coronavirus) by PCR Nose    Specimen: Nose; Swab   Result Value Ref Range    SARS CoV2 PCR Negative Negative    Narrative    Testing was performed using the TableAppert Xpress SARS-CoV-2 Assay on the [employer] Gene-Xpert Instrument Systems. Additional information about this Emergency Use Authorization (EUA) assay can be found via the Lab Guide. This test should be ordered for the detection of SARS-CoV-2 in individuals who meet SARS-CoV-2 clinical and/or epidemiological criteria as well as from individuals without symptoms or other reasons to suspect COVID-19. Test performance for asymptomatic patients has only been established in anterior nasal swab specimens. This test is for in vitro diagnostic use under the FDA EUA for laboratories certified under CLIA to perform high complexity testing. This test has not been FDA cleared or approved. A negative result does not rule out the presence of PCR inhibitors in the specimen or target RNA concentration below the limit of detection for the assay. The possibility of a false negative should be considered if the patient's recent exposure or clinical presentation suggests COVID-19. This test was validated by [facility]  [facility]. These Laboratories are certified under the Clinical Laboratory Improvement Amendments (CLIA) as qualified to perform high complexity testing.     Group A Streptococcus PCR Throat Swab    Specimen: Throat; Swab   Result Value Ref Range    Group A strep by PCR Not Detected Not Detected    Narrative    The Xpert Xpress Strep A test, performed on the [employer] Systems, is a rapid, qualitative in vitro diagnostic test for the detection of Streptococcus pyogenes (Group A ß-hemolytic Streptococcus, Strep A) in throat swab specimens from patients with signs and symptoms of pharyngitis. The Xpert Xpress Strep A test can be used as an aid in the diagnosis of Group A Streptococcal pharyngitis. The assay is not intended to monitor treatment for Group A Streptococcus infections. The Xpert Xpress Strep A test utilizes an automated real-time polymerase chain reaction (PCR) to detect Streptococcus pyogenes DNA.   Prepare pheresed platelets (unit)   Result Value Ref Range    Blood Component Type Platelets     Product Code [id number]     Unit Status Transfused     Unit Number [id number]     CODING SYSTEM [id number]     ISSUE DATE AND TIME [id number]     UNIT ABO/RH A+     UNIT TYPE ISBT 6200    Lactic acid whole blood   Result Value Ref Range    Lactic Acid 0.5 (L) 0.7 - 2.0 mmol/L   Blood gas venous   Result Value Ref Range    pH Venous 7.33 7.32 - 7.43    pCO2 Venous 33 (L) 40 - 50 mm Hg    pO2 Venous 39 25 - 47 mm Hg    Bicarbonate Venous 18 (L) 21 - 28 mmol/L    Base Excess/Deficit Venous -7.6 (L) -3.0 - 3.0 mmol/L    FIO2 21     Oxyhemoglobin Venous 69 (L) 70 - 75 %    O2 Sat, Venous 70.5 70.0 - 75.0 %    Narrative    In healthy individuals, oxyhemoglobin (O2Hb) and oxygen saturation (SO2) are approximately equal. In the presence of dyshemoglobins, oxyhemoglobin can be considerably lower than oxygen saturation.   ABO/RH Type and Screen     Narrative    The following orders were created for  panel order ABO/RH Type and Screen .  Procedure                               Abnormality         Status                     ---------                               -----------         ------                     Adult Type and Screen[[id number]]                                                         Please view results for these tests on the individual orders.   CBC with Platelets & Differential    Narrative    The following orders were created for panel order CBC with Platelets & Differential.  Procedure                               Abnormality         Status                     ---------                               -----------         ------                     CBC with platelets and d...[[id number]]  Abnormal            Final result               RBC and Platelet Morphology[[id number]]                      Final result                 Please view results for these tests on the individual orders.   Basic metabolic panel   Result Value Ref Range    Sodium 140 135 - 145 mmol/L    Potassium 3.8 3.4 - 5.3 mmol/L    Chloride 113 (H) 98 - 107 mmol/L    Carbon Dioxide (CO2) 16 (L) 22 - 29 mmol/L    Anion Gap 11 7 - 15 mmol/L    Urea Nitrogen 11.2 8.0 - 23.0 mg/dL    Creatinine 0.59 0.51 - 0.95 mg/dL    GFR Estimate >90 >60 mL/min/1.73m2    Calcium 7.1 (L) 8.8 - 10.2 mg/dL    Glucose 82 70 - 99 mg/dL   Tobramycin   Result Value Ref Range    Tobramycin 8.5   ug/mL   CBC with platelets and differential   Result Value Ref Range    WBC Count 0.1 (LL) 4.0 - 11.0 10e3/uL    RBC Count 2.36 (L) 3.80 - 5.20 10e6/uL    Hemoglobin 7.4 (L) 11.7 - 15.7 g/dL    Hematocrit 22.3 (L) 35.0 - 47.0 %    MCV 95 78 - 100 fL    MCH 31.4 26.5 - 33.0 pg    MCHC 33.2 31.5 - 36.5 g/dL    RDW 18.6 (H) 10.0 - 15.0 %    Platelet Count 34 (LL) 150 - 450 10e3/uL    % Neutrophils      % Lymphocytes      % Monocytes      % Eosinophils      % Basophils      % Immature Granulocytes      Absolute Neutrophils      Absolute Lymphocytes      Absolute Monocytes       Absolute Eosinophils      Absolute Basophils      Absolute Immature Granulocytes     RBC and Platelet Morphology   Result Value Ref Range    Platelet Assessment  Automated Count Confirmed. Platelet morphology is normal.     Automated Count Confirmed. Platelet morphology is normal.    Acanthocytes      Mike Rods      Basophilic Stippling      Bite Cells      Blister Cells      Antonia Cells      Elliptocytes      Hgb C Crystals      Tinajero-Jolly Bodies      Hypersegmented Neutrophils      Polychromasia      RBC agglutination      RBC Fragments      Reactive Lymphocytes      Rouleaux      Sickle Cells      Smudge Cells      Spherocytes      Stomatocytes      Target Cells      Teardrop Cells      Toxic Neutrophils      RBC Morphology Confirmed RBC Indices    EKG 12-lead, complete   Result Value Ref Range    Systolic Blood Pressure  mmHg    Diastolic Blood Pressure  mmHg    Ventricular Rate 134 BPM    Atrial Rate 141 BPM    ME Interval 200 ms    QRS Duration 72 ms     ms    QTc 465 ms    P Axis 58 degrees    R AXIS 35 degrees    T Axis 43 degrees    Interpretation ECG       Sinus tachycardia with Premature supraventricular complexes  Otherwise normal ECG  When compared with ECG of 22-MAR-2024 05:06,  Premature supraventricular complexes are now Present  Nonspecific T wave abnormality no longer evident in Lateral leads           ASSESSMENT BY SYSTEMS     Rosario Adan Terrazas is a 68 year old female, currently day 9 s/p auto for MM readmitted 3/21 with N/F and GNB+ sepsis.     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   *CTAP when patient stabilized and if persistently febrile 3/23?   *BC x2 days, will get daily cultures x3 days   *CXR unremarkable, UA unremarkable  #Sepsis (hypotension, tachycardia, fever)  #Latent TB: Continue INH/B6 through transplant  #Diarrhea: c.diff, enteric pending   Ppx antibiotics acyclovir, fluconazole, levofloxacin (held). Bactrim from D28      Heme   - transfuse to keep hgb >7g/dL, plt >10k   *given dose of platelets 3/22 AM   - Of note she is on 81mg ASA. Hold aspirin (3/17), resume once platelets have recovered.   - INR was ordered per treatment plan today and it's 1.8, given vitamin K in clinic 3/21. Will recheck 3/25     FEN/Renal  hypocalcemia: On calcium supplements three times daily. Continue. Given 2g ca gluconate today.  Electrolyte replacements per protocol, high replacements   Rehydration with MIVF     CV  #Hypotension- likely 2/2 GNB sepsis. Improved with abx and IVF.  #SVT episodes, now persistent a fib with RVR-  Mostly asx. Hemodynamically stable. - metoprolol 25mg BID started overnight . HELD with addition of amiodarone gtt in setting of sepsis. Recommend rates </130, leave room for compensatory tachycardia in setting acute illness.   *Cards consulted- amiodarone gtt started (3/22-x), bolus + 6hrs 1mg/kg, 0.5mg/kg for 3 days, no A/C w/ pancytopenia   *Patient educated to try to use valsalva manuevers during these episodes.   ECHO 55-60%   EKG NSR  BP runs low at baseline (100s/ 60s).      GI:   Nausea: schedule zofran TID & zyprexa qHS; compazine TID. S/p emend.   Diarrhea: imodium QID. sending a c.diff and enteric today.   MIVF 125ml/hr     Endocrine  Thyroid FNA Atypia of undetermined significance diagnosis has a 22 (13-30)% risk of malignancy. Repeat FNA (least 4-6 weeks from previous aspiration with optimal time being 12 weeks after last  aspiration) , molecular testing, diagnostic lobectomy, or surveillance is recommended.    Has next appt with endocrine 4/16/24.   *TSH WNL 3/21     Neuro/Pain  Neuropathy: continues on eleni, xanaflex. Gabapentin   Rib pain: continue oxycodone and back brace, Tylenol on hold since 3/19        60 minutes spent by me on the date of the encounter doing chart review, history and exam, documentation and further activities per the note    OVERALL PLAN     Anticipated hospital dismissal: post resolution of acute illness and engraftment    Mony Do PA-C  [id number]

## 2024-03-22 NOTE — TELEPHONE ENCOUNTER
Rosario Terrazas is a 68 year old female, hx of MM, day + 8 s/p HD melphalan and auto transplant hsct.    Informed by lab regarding critical lab results. Blood cultures from yesterday are growing gram negative bacilli.     Unable to contact patient however I was able to reach daughter Katherin listed as alternate contact. Informed of results and advised ED evaluation.     They were in agreement with the plan.     Shauna Yanez MD  Hematology/Oncology On call fellow

## 2024-03-22 NOTE — CODE/RAPID RESPONSE
Rapid Response Team Note    Assessment   A rapid response was called on Rosario Terrazas due to tachycardia.    Plan   -  management per primary team at bedside        KIKO Mccollum CNP  [facility] Paging/Directory    Admission Diagnosis:   Positive blood culture [R78.81]  Bone marrow transplant status (H) [Z94.81]  Fever, unspecified fever cause [R50.9]    Physical Exam   Temp: 100.1  F (37.8  C) Temp  Min: 98.3  F (36.8  C)  Max: 100.2  F (37.9  C)  Resp: 27 Resp  Min: 18  Max: 28  SpO2: 98 % SpO2  Min: 97 %  Max: 100 %  Pulse: 114 Pulse  Min: 67  Max: 128    No data recorded  BP: 120/65 (Simultaneous filing. User may not have seen previous data.) Systolic (24hrs), Av , Min:103 , Max:120   Diastolic (24hrs), Av, Min:60, Max:78     I/Os: No intake/output data recorded.       Significant Results and Procedures   Lactic Acid:   Recent Labs   Lab Test 24  0231   LACT 0.6*     CBC:   Recent Labs   Lab Test 24  0333 24  0231 24  0708   WBC <0.1* <0.1* 0.1*   HGB 6.9* 6.7* 7.7*   HCT 20.9* 20.6* 23.5*   PLT 12* 14* 7*

## 2024-03-22 NOTE — CONSULTS
Care Management Initial Consult    General Information  Assessment completed with: Patient, Family, Katherin  Type of CM/SW Visit: Compliance    Primary Care Provider verified and updated as needed: Yes   Readmission within the last 30 days: no previous admission in last 30 days      Reason for Consult: other (see comments) (BMT support)  Advance Care Planning: Advance Care Planning Reviewed: verified with patient  No HCD on file.     Communication Assessment  Patient's communication style: spoken language (non-English)        Cognitive  Cognitive/Neuro/Behavioral: WDL                      Living Environment:   People in home: child(jesus), adult, grandchild(jesus)  Katherin, Son-in-Law, and 2 Grandsons.  Current living Arrangements: house      Able to return to prior arrangements: yes     Family/Social Support:  Care provided by: child(jesus)  Provides care for: no one  Marital Status: Single  Children          Description of Support System: Supportive, Involved    Support Assessment: Adequate family and caregiver support, Adequate social supports    Current Resources:   Patient receiving home care services: No     Community Resources: None  Equipment currently used at home:    Supplies currently used at home: None    Employment/Financial:  Employment Status: unemployed        Financial Concerns: none   Referral to Financial Worker: No       Does the patient's insurance plan have a 3 day qualifying hospital stay waiver?  No    Lifestyle & Psychosocial Needs:  Social Determinants of Health     Food Insecurity: Not on file   Depression: Not at risk (2/20/2024)    PHQ-2     PHQ-2 Score: 0   Housing Stability: Not on file   Tobacco Use: Low Risk  (3/18/2024)    Patient History     Smoking Tobacco Use: Never     Smokeless Tobacco Use: Never     Passive Exposure: Not on file   Financial Resource Strain: Not on file   Alcohol Use: Not on file   Transportation Needs: Not on file   Physical Activity: Not on file   Interpersonal Safety:  "Not on file   Stress: Not on file   Social Connections: Not on file     Functional Status:  Prior to admission patient needed assistance:   Dependent ADLs:: Independent  Dependent IADLs::  (IADL support post-BMT)  Assesssment of Functional Status: Not at baseline with ADL Functioning, Not at baseline with mobility    Mental Health Status:  Mental Health Status: No Current Concerns       Chemical Dependency Status:  Chemical Dependency Status: No Current Concerns           Values/Beliefs:  Spiritual, Cultural Beliefs, Islam Practices, Values that affect care: yes          Values/Beliefs Comment: [place]    Additional Information:  Rosario Terrazas is a 68 year old female, currently day 9 s/p auto for MM readmitted 3/21 with N/F and GNB+ sepsis.    SW met w/ Pt and daughter at bedside. Pt was sleeping during SW visit. Pt's daughter shared that they are \"doing ok.\" She shared that they are \"taking it day by day.\" She plans to stay at Pt's bedside to provide interpretation needs and support to her mother. SW provided empathetic listening, validation, and support. Pt had previously indicated that her evan is important to her. SW offered spiritual care consult and Pt's daughter declined at this time. Please see initial BMT psychosocial assessment for additional details to Pt's psychosocial situation.      JAYDE Vasquez, [facility]  Adult Blood & Marrow Transplant   Phone: [phone]  VOCERA Searchable at BMT SW 2    "

## 2024-03-22 NOTE — PHARMACY-ADMISSION MEDICATION HISTORY
Pharmacist Admission Medication History    Admission medication history is complete. The information provided in this note is only as accurate as the sources available at the time of the update.    Information Source(s): Clinic records and Facility ([facility] medication list/MAR via N/A    Pertinent Information: Also receiving filgrastim daily in clinic    Changes made to PTA medication list:  Added: None  Deleted: None  Changed: Indicated that ondansetron, prochlorperazine and loperamide were being taken around the clock prior to admission.  Aspirin on hold.  Bactrim starts day +28.    Allergies reviewed with patient and updates made in EHR: no    Medication History Completed By: Andy J. Kurtzweil, [facility] 3/22/2024 8:17 AM    PTA Med List   Medication Sig Note Last Dose    acetaminophen (TYLENOL) 325 MG tablet Take 2 tablets (650 mg) by mouth every 4 hours as needed for pain  Unknown    acyclovir (ZOVIRAX) 800 MG tablet Take 1 tablet (800 mg) by mouth 2 times daily Start Day -1  Unknown    allopurinol (ZYLOPRIM) 300 MG tablet Take 1 tablet (300 mg) by mouth daily Start Day -1  Unknown    calcium carbonate-vitamin D (OSCAL) 500-5 MG-MCG tablet Take 1 tablet by mouth 3 times daily (with meals)  Unknown    diclofenac (VOLTAREN) 1 % topical gel   Unknown    fluconazole (DIFLUCAN) 200 MG tablet Take 1 tablet (200 mg) by mouth daily Start Day -1  Unknown    folic acid (FOLVITE) 1 MG tablet Take 1 tablet by mouth daily  Unknown    gabapentin (NEURONTIN) 100 MG capsule Take 1 capsule (100 mg) by mouth 3 times daily for 30 days  Unknown    isoniazid (NYDRAZID) 300 MG tablet Take 1 tablet by mouth daily  Unknown    levofloxacin (LEVAQUIN) 250 MG tablet Take 1 tablet (250 mg) by mouth daily Start Day -1  Unknown    lidocaine (XYLOCAINE) 5 % external ointment Apply to ribs a couple times a day up to 4 times daily as needed for pain  Unknown    loperamide (IMODIUM) 2 MG capsule Take 2 mg by mouth 4 times daily as needed for  diarrhea 3/22/2024: Was taking around the clock prior to admission Unknown    nystatin (MYCOSTATIN) 083157 UNIT/GM external cream Apply to rash in genital area twice daily as needed  Unknown    OLANZapine (ZYPREXA) 2.5 MG tablet Take 1 tablet (2.5 mg) by mouth 2 times daily as needed (For nausea or vomiting)  Unknown    ondansetron (ZOFRAN) 8 MG tablet Take 1 tablet (8 mg) by mouth every 8 hours as needed (for nausea / vomiting) 3/22/2024: Was taking around the clock prior to admission Unknown    oxyCODONE (ROXICODONE) 5 MG tablet Take 5 mg by mouth every 6 hours as needed for severe pain  Unknown    pantoprazole (PROTONIX) 40 MG EC tablet Take 1 tablet (40 mg) by mouth daily Start Day -1  Unknown    phenol (CHLORASEPTIC) 1.4 % spray Take 1 spray (1 mL) by mouth every hour as needed for sore throat  Unknown    prochlorperazine (COMPAZINE) 10 MG tablet Take 10 mg by mouth  Unknown    prochlorperazine (COMPAZINE) 5 MG tablet Take 1 tablet (5 mg) by mouth every 6 hours as needed for nausea or vomiting 3/22/2024: Was taking around the clock prior to admission Unknown    pyridOXINE (VITAMIN B6) 25 MG tablet Take 1 tablet by mouth daily  Unknown    senna-docusate (SENOKOT-S/PERICOLACE) 8.6-50 MG tablet Take 1 tablet by mouth 2 times daily  Unknown    sulfamethoxazole-trimethoprim (BACTRIM) 400-80 MG tablet Take 1 tablet by mouth daily 3/22/2024: Hold until at least day +28 post transplant Unknown    triamcinolone (ARISTOCORT HP) 0.5 % external cream Apply to rash on abdomen twice daily  Unknown    Vitamin D3 (CHOLECALCIFEROL) 25 mcg (1000 units) tablet Take 2 tablets by mouth daily  Unknown

## 2024-03-22 NOTE — PHARMACY-VANCOMYCIN DOSING SERVICE
Pharmacy Vancomycin Initial Note  Date of Service 2024  Patient's  1955  68 year old, female  Actual Body Weight: 97 kg    Indication: Bacteremia    Current estimated CrCl = Estimated Creatinine Clearance: 105.2 mL/min (based on SCr of 0.59 mg/dL).    Creatinine for last 3 days  3/20/2024:  8:00 AM Creatinine 0.46 mg/dL  3/21/2024:  7:08 AM Creatinine 0.50 mg/dL  3/22/2024:  2:31 AM Creatinine 0.58 mg/dL;  2:31 AM Creatinine 0.58 mg/dL; 12:20 PM Creatinine 0.59 mg/dL    Recent Vancomycin Level(s) for last 3 days  No results found for requested labs within last 3 days.      Vancomycin IV Administrations (past 72 hours)        No vancomycin orders with administrations in past 72 hours.                    Nephrotoxins and other renal medications (From now, onward)      Start     Dose/Rate Route Frequency Ordered Stop    24 0800  acyclovir (ZOVIRAX) tablet 800 mg            800 mg Oral 2 TIMES DAILY 24 0437              Contrast Orders - past 72 hours (72h ago, onward)      None            Insight[employer] Prediction of Planned Initial Vancomycin Regimen  Loading dose: 2000 mg at 16:00 2024.  Regimen: 1250 mg IV every 12 hours.  Start time: 04:00 on 2024  Exposure target: AUC24 (range) 400-600 mg/L.hr   AUC24,ss: 491 mg/L.hr  Probability of AUC24 > 400: 71 %  Ctrough,ss: 15.2 mg/L  Probability of Ctrough,ss > 20: 29 %  Probability of nephrotoxicity (Lodise ORLANDO ): 10 %         Plan:  Start vancomycin 2000 mg load then 1250 mg IV q12h.   Vancomycin monitoring method: AUC  Vancomycin therapeutic monitoring goal: 400-600 mg*h/L  Pharmacy will check vancomycin levels as appropriate in 1-3 Days.    Serum creatinine levels will be ordered daily for the first week of therapy and at least twice weekly for subsequent weeks.      Freya Baez [facility]

## 2024-03-22 NOTE — PLAN OF CARE
Patient tachycardic for most of shift. HR ranged from 99-170s. Intermittent SVT noted, but becoming more frequent & received call from telemetry technician. Patient was resting during episodes & only noted racing heart rate, but not chest pain or shortness of breath. Mony SOLOMON aware & noted SVT & also noted AFib with RVR. EKG 12 lead completed x2. Cardiology consulted & ordered to start amiodarone. After amiodarone bolus completed patient had soft BPs 72/51 & rapid response was called. Patient also reported new sudden sharp left shoulder pain. 12 lead EKG completed. Rapid response team at bedside & ordered BMP, troponin & 500cc bolus. Patient's BP recovered with systolic >90, so started amiodarone drip. Provider ordered for transfer to [facility] & will transfer to ICU ([facility] downgrade & positive pressure room with patient's neutropenia) for closer monitoring. Daughter at bedside to assist with interpreting.  Evening BMP per rapid response & patient will need Kphos replaced.   Problem: Adult Inpatient Plan of Care  Goal: Absence of Hospital-Acquired Illness or Injury  Outcome: Progressing     Problem: Adult Inpatient Plan of Care  Goal: Absence of Hospital-Acquired Illness or Injury  Intervention: Prevent Infection  Recent Flowsheet Documentation  Taken 3/22/2024 [id number] by Yudelka Linn RN  Infection Prevention:   cohorting utilized   environmental surveillance performed   equipment surfaces disinfected   hand hygiene promoted   personal protective equipment utilized   rest/sleep promoted   single patient room provided   visitors restricted/screened     Problem: Adult Inpatient Plan of Care  Goal: Optimal Comfort and Wellbeing  Outcome: Progressing   Goal Outcome Evaluation:

## 2024-03-22 NOTE — ED TRIAGE NOTES
Ambulatory to ED, daughter is providing translation assist. Went to clinic yesterday for fever, blood cultures were obtained and resulted today for positive growth and advised to come to the ED. Transplant patient.

## 2024-03-22 NOTE — CONSULTS
Cardiology Consult   03/22/2024    Rosario Terrazas MRN# [id number]   Age: 68 year old YOB: 1955        HPI   Rosario Terrazas is a pleasant 68 year old female with a history of multiple myeloma status post autologous HSCT on 3/13/24 whose post transplant course has been complicated by ESBL E coli bacteremia and new episodes of supraventricular tachycardia. Cardiology is consulted for assistance in management of these tachyarrhythmias.    Initially presented to ER after she noted progressive malaise, difficulty toelrating PO, and diarrhea at an outpatient appointment.  At the beginning of her admission it was noticed on telemetry that she would spike tachycardias into the 150s.  These episodes were brief and intermittent and initially managed with metoprolol tartrate 25 mg twice daily.  Though despite this she did increasingly frequent episodes of SVT culminating in narrow persistent tachycardia into the 140s and 50s.  Always episodes of and hemodynamically stable.  On interview with the patient she denies a prior cardiac history of atrial fibrillation or other SVT.  She also denies a history of underlying cardiomyopathy.  She had an echocardiogram done about 1 month ago which revealed normal biventricular function though she did have mild left atrial enlargement at that time.  At the current time she endorses some palpitations but otherwise denies chest pain, shortness of breath, orthopnea, PND, or lower extremity edema.      Assessment and Plan:   Assessment:  Rosario Terrazas is a pleasant 68 year old female with a history of multiple myeloma status post autologous HSCT on 3/13/24 whose post transplant course has been complicated by ESBL E coli bacteremia and new episodes of supraventricular tachycardia. Cardiology is consulted for assistance in management of these tachyarrhythmias. Our consultation today addressed the following medical issues:    Atrial fibrillation with rapid  ventricular response, new diagnosis  SVT, unspecified  ESBL E. coli bacteremia  Multiple myeloma status post bone marrow transplant    Discussion:    Patient with 2 distinct atrial tachyarrhythmias.  One is a regular narrow complex tachycardia for which the differential includes atrial tachycardia, AVNRT, or AVRT.  I am uncertain exactly which this is.  The second is atrial fibrillation.  This represents a new diagnosis for her is likely provoked in the setting of her acute illness as she otherwise has no underlying cardiomyopathy or valvular abnormalities.  Long-term she would benefit from anticoagulation (TFL2II8-OYDg of 2) though this should be deferred in the setting of her profound thrombocytopenia.  In the short-term she should be started on amiodarone while still allowing for permissive tachycardia in the setting of her sepsis.  Goal heart rates at this time less than 130.  Prior to discharge however she should ideally be in a sinus rhythm.  Rest as below    Recommendations:  Start amiodarone bolus and continuous infusion  This will treat both SVTs and atrial fibrillation  Goal heart rates less than 130 given concurrent sepsis  Can hold beta blockade to allow extra inotropic support  As her sepsis resolves, please reach back out to cardiology for further recommendations  She will need to be weaned from amiodarone  She will need an outpatient Zio patch  She will need outpatient cardiology follow-up  Defer anticoagulation at this time; revisit this issue when her counts recover as an outpatient    Thank you for the opportunity to participate in the care of Rosario Terrazas. This plan was discussed with the staff physician. Please feel free to contact me with any additional questions or concerns.    This note was dictated with voice recognition software and then edited. Please excuse any unintentional errors.      Zach Carrington MD  Cardiology Fellow      Past Medical History     Patient Active Problem List    Diagnosis    BARRERA (acute kidney injury) (H24)    Biceps tendon tear    Closed fracture of one rib of left side    Elevated serum immunoglobulin free light chain level    History of total left knee replacement    Isolated proteinuria without specific morphologic lesion    Malignant neoplasm (H)    Multiple myeloma in remission (H)    Normocytic anemia    Other closed fracture of seventh thoracic vertebra, initial encounter (H)    Other constipation    Rotator cuff tear arthropathy of left shoulder    Shoulder impingement, right    Positive blood culture    Bone marrow transplant status (H)    Fever, unspecified fever cause       Social History     Social History     Tobacco Use    Smoking status: Never    Smokeless tobacco: Never   Substance Use Topics    Alcohol use: Never    Drug use: Never        Family History   No history of cardiomyopathy or afib     Past Surgical History     Past Surgical History:   Procedure Laterality Date    INSERT CATHETER VASCULAR ACCESS Left 3/6/2024    Procedure: central venous catheter tunneled line Insert vascular access;  Surgeon: Onel Leonardo MD;  Location: [facility] OR    IR CVC TUNNEL PLACEMENT > 5 YRS OF AGE  3/6/2024       Prior to Admission Medications:     No current outpatient medications on file.        Physical Exam:   Ranges for vital signs:    Temp:  [99.4  F (37.4  C)-102.9  F (39.4  C)] (P) 99.6  F (37.6  C)  Pulse:  [] 105  Resp:  [16-28] (P) 18  BP: ()/() 90/68  SpO2:  [97 %-100 %] 100 %    Intake/Output Summary (Last 24 hours) at 3/22/2024 1305  Last data filed at 3/22/2024 1053  Gross per 24 hour   Intake 2420 ml   Output 250 ml   Net 2170 ml       Exam:  Gen: No acute distress  HEENT: Sclera anicteric  CV: irregularly irregular  Resp: Clear to auscultation bilaterally  Abd: Nondistended  Ext: No pitting edema  Skin: WWP  Neuro: No focal deficits       Labs:     CMP  Recent Labs   Lab 03/22/24  0333 03/22/24  0231 03/21/24  0708 03/20/24  0800  03/19/24  0725 03/18/24  0714 03/17/24  0807   NA  --  142  142 142 142 143 142 141   POTASSIUM  --  3.2*  3.2* 3.2* 3.3* 3.5 3.6 3.5   CHLORIDE  --  110*  110* 109* 108* 110* 108* 107   CO2  --  17*  17* 19* 20* 22 23 22   ANIONGAP  --  15  15 14 14 11 11 12   GLC  --  88  88 107* 121* 122* 126* 115*   BUN  --  9.3  9.3 8.3 7.6* 8.1 8.9 13.1   CR  --  0.58  0.58 0.50* 0.46* 0.46* 0.52 0.58   GFRESTIMATED  --  >90  >90 >90 >90 >90 >90 >90   RAMIREZ  --  7.2*  7.2* 7.3* 7.3* 7.3* 7.7* 8.0*   MAG 1.7  --  1.5*  --   --   --   --    PHOS 2.1*  --   --   --   --   --   --    PROTTOTAL  --  5.1*  --   --   --  5.2* 5.0*   ALBUMIN  --  3.1*  --   --   --  3.6 3.5   BILITOTAL  --  0.5  --   --   --  0.3 0.2   ALKPHOS  --  56  --   --   --  74 78   AST  --  17  --   --   --  24 32   ALT  --  17  --   --   --  19 21     CBC  Recent Labs   Lab 03/22/24  1220 03/22/24  0333 03/22/24  0231 03/21/24  0708   WBC 0.1* <0.1* <0.1* 0.1*   RBC 2.36* 2.09* 2.10* 2.43*   HGB 7.4* 6.9* 6.7* 7.7*   HCT 22.3* 20.9* 20.6* 23.5*   MCV 95 100 98 97   MCH 31.4 33.0 31.9 31.7   MCHC 33.2 33.0 32.5 32.8   RDW 18.6* 16.4* 16.1* 15.8*   PLT 34* 12* 14* 7*     INR  Recent Labs   Lab 03/22/24  0333 03/21/24  0708   INR 1.83* 1.74*     Arterial Blood Gas  Recent Labs   Lab 03/22/24  0926   O2PER 21         Recent Cardiac Testing:   TTE 2/2024  Interpretation Summary  Global and regional left ventricular function is normal with an EF of 55-60%.  Global right ventricular function is normal.  Mild left atrial enlargement is present.  The inferior vena cava was normal in size with preserved respiratory  variability.  No pericardial effusion is present.

## 2024-03-22 NOTE — H&P
[facility]    History and Physical - Hospitalist Service       Date of Admission:  3/22/2024    Assessment & Plan      69 Y/O F with MM s/p HD melphan and auto transplant HSCT +9 admitted for neutropenic fever was found to have GNB with likely ESBL E coli    # Neutropenic fever  # GNB likely ESBL E coli I/S/O  # MM s/p BMT +9  # NAGMA  # Pancytopenia   # Coagulopathy   Was seen outpt in BMT clinic 1 day prior to presentation. Was not feeling well then. Difficulty tolerating PO and had some diarrhea and cramping. Also had a fever and blood Cx was taken and sent home. Now coming back due to Blood Cx positive for ESBL E coli and presented to ED for further eval        Plan:  - Meropenem SOT 03/22 which covers ESBL and Anerobes in the setting of diarrhea   - Discontinue Vanc and Cefepime   - Follow up Blood Cx  - Urine Cx, CXR   - If no source found can consider CT abdomen with contrast depending on clinical status    - C diff and enteric panel ordered   - Resp panel, COVID test   - ProCal, Strep throat test  - Transfuse to keep Hb > 7 and platelets > 10 for now   - Continue prophylactic Fluconazole, Acyclovir and Bactrim. Hold Levaquin while on Abx above  - Continue Allopurinol, gabapentin  - Continue Olanzapine for N  - Continue PTA Oxycodone   - Hold ASA       # Intermittent SVT  Started in the ED and self resolves in few seconds. No Sx during the episodes. HR goes up to 170s. BP stable. Self resolves everytime in few seconds.     Plan:  - Tele  - Optimize electrolytes  - Fluids  - TSH  - Recent TTE done 02/2024 WNL  - BB tartrate BID   - No need for adenosine at this time as resolves on it's own in few seconds without sx and BP stable. Treat infection as above     # Latent TB:  - Continue PTA Isoniazid with B6    # Hypocalcemia:  - Will give 2 g Calcium Gluconate     # GERD:  - Continue PTA PPI    # Hx of thyroid nodule  - Follow up with Endo 04/16/2024                "  Diet:  Regular   DVT Prophylaxis: VTE Prophylaxis contraindicated due to Low platelets   Ricci Catheter: Not present  Lines: PRESENT             Cardiac Monitoring: None  Code Status:  Full Code     Clinically Significant Risk Factors Present on Admission        # Hypokalemia: Lowest K = 3.2 mmol/L in last 2 days, will replace as needed   # Hypocalcemia: Lowest iCa = 4 mg/dL in last 2 days, will monitor and replace as appropriate   # Hypomagnesemia: Lowest Mg = 1.5 mg/dL in last 2 days, will replace as needed   # Hypoalbuminemia: Lowest albumin = 3.1 g/dL at 3/22/2024  2:31 AM, will monitor as appropriate  # Coagulation Defect: INR = 1.74 (Ref range: 0.85 - 1.15) and/or PTT = 29 Seconds (Ref range: 22 - 38 Seconds), will monitor for bleeding  # Drug Induced Platelet Defect: home medication list includes an antiplatelet medication        # Obesity: Estimated body mass index is 34.73 kg/m  as calculated from the following:    Height as of this encounter: 1.651 m (5' 5\").    Weight as of 3/21/24: 94.7 kg (208 lb 11.2 oz).              Disposition Plan      Expected Discharge Date: 03/24/2024                  AISHA SYKES MD  Hospitalist Service  [facility]  Securely message with [employer] (more info)  Text page via [facility] Paging/Directory     ______________________________________________________________________    Chief Complaint   Bacteremia     History is obtained from the patient and chart review     History of Present Illness   69 Y/O F with MM s/p HD melphan and auto transplant HSCT +9 admitted for neutropenic fever was found to have GNB with likely ESBL E coli. Was seen in BMT clinic 1 day prior to presentation. Was not feeling well then. Difficulty tolerating PO and had some diarrhea and cramping. Also had a fever and blood Cx was taken and sent home. Now coming back due to Blood Cx positive for ESBL E coli and presented to ED for further eval      Upon interview: " Daughter Katherin was at bedside and was used for interpretation. Pt doesn't have Sob but does have some cough, Also complains of abdominal pain and diarrhea. No dysuria. Pt is also complaining of sore throat       Past Medical History    No past medical history on file.    Past Surgical History   Past Surgical History:   Procedure Laterality Date    INSERT CATHETER VASCULAR ACCESS Left 3/6/2024    Procedure: central venous catheter tunneled line Insert vascular access;  Surgeon: Onel Leonardo MD;  Location: [facility] OR    IR CVC TUNNEL PLACEMENT > 5 YRS OF AGE  3/6/2024       Prior to Admission Medications   Prior to Admission Medications   Prescriptions Last Dose Informant Patient Reported? Taking?   OLANZapine (ZYPREXA) 2.5 MG tablet   No No   Sig: Take 1 tablet (2.5 mg) by mouth 2 times daily as needed (For nausea or vomiting)   Vitamin D3 (CHOLECALCIFEROL) 25 mcg (1000 units) tablet   Yes No   Sig: Take 2 tablets by mouth daily   acetaminophen (TYLENOL) 325 MG tablet   No No   Sig: Take 2 tablets (650 mg) by mouth every 4 hours as needed for pain   acyclovir (ZOVIRAX) 800 MG tablet   No No   Sig: Take 1 tablet (800 mg) by mouth 2 times daily Start Day -1   allopurinol (ZYLOPRIM) 300 MG tablet   No No   Sig: Take 1 tablet (300 mg) by mouth daily Start Day -1   aspirin 81 MG EC tablet   Yes No   Sig: Take 1 tablet by mouth daily   calcium carbonate-vitamin D (OSCAL) 500-5 MG-MCG tablet   No No   Sig: Take 1 tablet by mouth 3 times daily (with meals)   diclofenac (VOLTAREN) 1 % topical gel   Yes No   Sig: APPLY 4 GRAMS TO PAINFUL AREAS IN KNEES/SHOULDERS 4 TIMES A DAY AS DIRECTED. MAX OF 32 GRAMS PER DAY.   Patient not taking: Reported on 3/21/2024   fluconazole (DIFLUCAN) 200 MG tablet   No No   Sig: Take 1 tablet (200 mg) by mouth daily Start Day -1   folic acid (FOLVITE) 1 MG tablet   Yes No   Sig: Take 1 tablet by mouth daily   gabapentin (NEURONTIN) 100 MG capsule   No No   Sig: Take 1 capsule (100 mg) by  mouth 3 times daily for 30 days   isoniazid (NYDRAZID) 300 MG tablet   Yes No   Sig: Take 1 tablet by mouth daily   levofloxacin (LEVAQUIN) 250 MG tablet   No No   Sig: Take 1 tablet (250 mg) by mouth daily Start Day -1   lidocaine (XYLOCAINE) 5 % external ointment   Yes No   Sig: Apply to ribs a couple times a day up to 4 times daily as needed for pain   loperamide (IMODIUM) 2 MG capsule   Yes No   Sig: Take 2 mg by mouth 4 times daily as needed for diarrhea   nystatin (MYCOSTATIN) 124461 UNIT/GM external cream   Yes No   Sig: Apply to rash in genital area twice daily as needed   Patient not taking: Reported on 3/17/2024   ondansetron (ZOFRAN) 8 MG tablet   No No   Sig: Take 1 tablet (8 mg) by mouth every 8 hours as needed (for nausea / vomiting)   oxyCODONE (ROXICODONE) 5 MG tablet   Yes No   Sig: Take 5 mg by mouth every 6 hours as needed for severe pain   pantoprazole (PROTONIX) 40 MG EC tablet   No No   Sig: Take 1 tablet (40 mg) by mouth daily Start Day -1   phenol (CHLORASEPTIC) 1.4 % spray   No No   Sig: Take 1 spray (1 mL) by mouth every hour as needed for sore throat   prochlorperazine (COMPAZINE) 10 MG tablet   Yes No   Sig: Take 10 mg by mouth   prochlorperazine (COMPAZINE) 5 MG tablet   No No   Sig: Take 1 tablet (5 mg) by mouth every 6 hours as needed for nausea or vomiting   pyridOXINE (VITAMIN B6) 25 MG tablet   Yes No   Sig: Take 1 tablet by mouth daily   senna-docusate (SENOKOT-S/PERICOLACE) 8.6-50 MG tablet   Yes No   Sig: Take 1 tablet by mouth 2 times daily   sulfamethoxazole-trimethoprim (BACTRIM) 400-80 MG tablet   Yes No   Sig: Take 1 tablet by mouth daily   Patient not taking: Reported on 3/13/2024   triamcinolone (ARISTOCORT HP) 0.5 % external cream   Yes No   Sig: Apply to rash on abdomen twice daily      Facility-Administered Medications: None        Review of Systems    The 5 point Review of Systems is negative other than noted in the HPI     Physical Exam   Vital Signs: Temp: 99.4  F  (37.4  C) Temp src: Oral BP: 108/78 Pulse: (!) 128   Resp: 28 SpO2: 100 % O2 Device: None (Room air)    Weight: 0 lbs 0 oz    Constitutional: Lying in bed in no acute distress, able to ambulate to bathroom   Respiratory: On RA with no distress   Cardiovascular: No LE edema   GI: NTTP      Medical Decision Making       88 MINUTES SPENT BY ME on the date of service doing chart review, history, exam, documentation & further activities per the note.      Data   ------------------------- PAST 24 HR DATA REVIEWED -----------------------------------------------

## 2024-03-22 NOTE — PLAN OF CARE
Goal Outcome Evaluation:      Plan of Care Reviewed With: patient, caregiver    Overall Patient Progress: improvingOverall Patient Progress: improving    Outcome Evaluation: IDT will continue to follow for discharge planning needs pending clinical course post-BMT.

## 2024-03-22 NOTE — PHARMACY-AMINOGLYCOSIDE DOSING SERVICE
Pharmacy Aminoglycoside Initial Note  Date of Service 2024  Patient's  1955  68 year old, female    Weight (Adjusted):  73 kg    Indication: Bacteremia    Current estimated CrCl = Estimated Creatinine Clearance: 107 mL/min (based on SCr of 0.58 mg/dL).    Creatinine for last 3 days  3/20/2024:  8:00 AM Creatinine 0.46 mg/dL  3/21/2024:  7:08 AM Creatinine 0.50 mg/dL  3/22/2024:  2:31 AM Creatinine 0.58 mg/dL;  2:31 AM Creatinine 0.58 mg/dL     Nephrotoxins and other renal medications (From now, onward)      Start     Dose/Rate Route Frequency Ordered Stop    24 0900  tobramycin (NEBCIN) 360 mg in sodium chloride 0.9 % 50 mL intermittent infusion         5 mg/kg × 73 kg (Adjusted)  over 60 Minutes Intravenous ONCE 24 0858      24 0800  acyclovir (ZOVIRAX) tablet 800 mg        Note to Pharmacy: PTA Sig:Take 1 tablet (800 mg) by mouth 2 times daily Start Day -1      800 mg Oral 2 TIMES DAILY 24 0437              Contrast Orders - past 72 hours (72h ago, onward)      None            Aminoglycoside Levels - past 2 days  No results found for requested labs within last 2 days.    Aminoglycosides IV Administrations (past 72 hours)        No aminoglycosides orders with administrations in past 72 hours.                        Plan:  1.  Start Tobramycin 360 mg (5 mg/kg adjustedBW) IV once.   2.  Target goals based on extended interval dosing  3.  Goal peak level: 17-24 mg/L  4.  Goal trough level: <0.5mg/L  5.  Pharmacy will continue to follow and check levels as appropriate in 1-3 Days      Joshua Nicholson [facility]

## 2024-03-23 ENCOUNTER — APPOINTMENT (OUTPATIENT)
Dept: PHYSICAL THERAPY | Facility: CLINIC | Age: 69
End: 2024-03-23
Attending: PHYSICIAN ASSISTANT
Payer: COMMERCIAL

## 2024-03-23 LAB
ACANTHOCYTES BLD QL SMEAR: ABNORMAL
ADV 40+41 DNA STL QL NAA+NON-PROBE: NEGATIVE
ALBUMIN SERPL BCG-MCNC: 2.7 G/DL (ref 3.5–5.2)
ALP SERPL-CCNC: 57 U/L (ref 40–150)
ALT SERPL W P-5'-P-CCNC: 14 U/L (ref 0–50)
ANION GAP SERPL CALCULATED.3IONS-SCNC: 10 MMOL/L (ref 7–15)
ANION GAP SERPL CALCULATED.3IONS-SCNC: 11 MMOL/L (ref 7–15)
AST SERPL W P-5'-P-CCNC: 12 U/L (ref 0–45)
ASTRO TYP 1-8 RNA STL QL NAA+NON-PROBE: NEGATIVE
AUER BODIES BLD QL SMEAR: ABNORMAL
BACTERIA BLD CULT: ABNORMAL
BACTERIA BLD CULT: ABNORMAL
BASO STIPL BLD QL SMEAR: ABNORMAL
BASOPHILS # BLD AUTO: ABNORMAL 10*3/UL
BASOPHILS NFR BLD AUTO: ABNORMAL %
BILIRUB SERPL-MCNC: 0.5 MG/DL
BITE CELLS BLD QL SMEAR: ABNORMAL
BLISTER CELLS BLD QL SMEAR: ABNORMAL
BUN SERPL-MCNC: 10 MG/DL (ref 8–23)
BUN SERPL-MCNC: 8.5 MG/DL (ref 8–23)
BURR CELLS BLD QL SMEAR: ABNORMAL
C CAYETANENSIS DNA STL QL NAA+NON-PROBE: NEGATIVE
C DIFF TOX B STL QL: NEGATIVE
CA-I BLD-MCNC: 4 MG/DL (ref 4.4–5.2)
CALCIUM SERPL-MCNC: 6.9 MG/DL (ref 8.8–10.2)
CALCIUM SERPL-MCNC: 7.3 MG/DL (ref 8.8–10.2)
CAMPYLOBACTER DNA SPEC NAA+PROBE: NEGATIVE
CHLORIDE SERPL-SCNC: 110 MMOL/L (ref 98–107)
CHLORIDE SERPL-SCNC: 114 MMOL/L (ref 98–107)
CREAT SERPL-MCNC: 0.54 MG/DL (ref 0.51–0.95)
CREAT SERPL-MCNC: 0.57 MG/DL (ref 0.51–0.95)
CRYPTOSP DNA STL QL NAA+NON-PROBE: NEGATIVE
DACRYOCYTES BLD QL SMEAR: ABNORMAL
DEPRECATED HCO3 PLAS-SCNC: 18 MMOL/L (ref 22–29)
DEPRECATED HCO3 PLAS-SCNC: 19 MMOL/L (ref 22–29)
E COLI O157 DNA STL QL NAA+NON-PROBE: NORMAL
E HISTOLYT DNA STL QL NAA+NON-PROBE: NEGATIVE
EAEC ASTA GENE ISLT QL NAA+PROBE: NEGATIVE
EC STX1+STX2 GENES STL QL NAA+NON-PROBE: NEGATIVE
EGFRCR SERPLBLD CKD-EPI 2021: >90 ML/MIN/1.73M2
EGFRCR SERPLBLD CKD-EPI 2021: >90 ML/MIN/1.73M2
ELLIPTOCYTES BLD QL SMEAR: SLIGHT
EOSINOPHIL # BLD AUTO: ABNORMAL 10*3/UL
EOSINOPHIL NFR BLD AUTO: ABNORMAL %
EPEC EAE GENE STL QL NAA+NON-PROBE: NEGATIVE
ERYTHROCYTE [DISTWIDTH] IN BLOOD BY AUTOMATED COUNT: 19.4 % (ref 10–15)
ETEC LTA+ST1A+ST1B TOX ST NAA+NON-PROBE: NEGATIVE
FRAGMENTS BLD QL SMEAR: ABNORMAL
G LAMBLIA DNA STL QL NAA+NON-PROBE: NEGATIVE
GLUCOSE BLDC GLUCOMTR-MCNC: 80 MG/DL (ref 70–99)
GLUCOSE BLDC GLUCOMTR-MCNC: 88 MG/DL (ref 70–99)
GLUCOSE BLDC GLUCOMTR-MCNC: 90 MG/DL (ref 70–99)
GLUCOSE SERPL-MCNC: 106 MG/DL (ref 70–99)
GLUCOSE SERPL-MCNC: 96 MG/DL (ref 70–99)
HCT VFR BLD AUTO: 21.7 % (ref 35–47)
HGB BLD-MCNC: 7.2 G/DL (ref 11.7–15.7)
HGB C CRYSTALS: ABNORMAL
HOWELL-JOLLY BOD BLD QL SMEAR: ABNORMAL
IMM GRANULOCYTES # BLD: ABNORMAL 10*3/UL
IMM GRANULOCYTES NFR BLD: ABNORMAL %
LIPASE SERPL-CCNC: 5 U/L (ref 13–60)
LYMPHOCYTES # BLD AUTO: ABNORMAL 10*3/UL
LYMPHOCYTES NFR BLD AUTO: ABNORMAL %
MAGNESIUM SERPL-MCNC: 2.1 MG/DL (ref 1.7–2.3)
MCH RBC QN AUTO: 31.3 PG (ref 26.5–33)
MCHC RBC AUTO-ENTMCNC: 33.2 G/DL (ref 31.5–36.5)
MCV RBC AUTO: 94 FL (ref 78–100)
MONOCYTES # BLD AUTO: ABNORMAL 10*3/UL
MONOCYTES NFR BLD AUTO: ABNORMAL %
NEUTROPHILS # BLD AUTO: ABNORMAL 10*3/UL
NEUTROPHILS NFR BLD AUTO: ABNORMAL %
NEUTS HYPERSEG BLD QL SMEAR: ABNORMAL
NOROVIRUS GI+II RNA STL QL NAA+NON-PROBE: NEGATIVE
P SHIGELLOIDES DNA STL QL NAA+NON-PROBE: NEGATIVE
PHOSPHATE SERPL-MCNC: 2.2 MG/DL (ref 2.5–4.5)
PLAT MORPH BLD: ABNORMAL
PLATELET # BLD AUTO: 26 10E3/UL (ref 150–450)
POLYCHROMASIA BLD QL SMEAR: ABNORMAL
POTASSIUM SERPL-SCNC: 3 MMOL/L (ref 3.4–5.3)
POTASSIUM SERPL-SCNC: 3.3 MMOL/L (ref 3.4–5.3)
POTASSIUM SERPL-SCNC: 3.9 MMOL/L (ref 3.4–5.3)
PROT SERPL-MCNC: 4.6 G/DL (ref 6.4–8.3)
RBC # BLD AUTO: 2.3 10E6/UL (ref 3.8–5.2)
RBC AGGLUT BLD QL: ABNORMAL
RBC MORPH BLD: ABNORMAL
ROULEAUX BLD QL SMEAR: ABNORMAL
RVA RNA STL QL NAA+NON-PROBE: NEGATIVE
SALMONELLA SP RPOD STL QL NAA+PROBE: NEGATIVE
SAPO I+II+IV+V RNA STL QL NAA+NON-PROBE: NEGATIVE
SHIGELLA SP+EIEC IPAH ST NAA+NON-PROBE: NEGATIVE
SICKLE CELLS BLD QL SMEAR: ABNORMAL
SMUDGE CELLS BLD QL SMEAR: ABNORMAL
SODIUM SERPL-SCNC: 140 MMOL/L (ref 135–145)
SODIUM SERPL-SCNC: 142 MMOL/L (ref 135–145)
SPHEROCYTES BLD QL SMEAR: ABNORMAL
STOMATOCYTES BLD QL SMEAR: ABNORMAL
TARGETS BLD QL SMEAR: ABNORMAL
TOXIC GRANULES BLD QL SMEAR: ABNORMAL
V CHOLERAE DNA SPEC QL NAA+PROBE: NEGATIVE
VARIANT LYMPHS BLD QL SMEAR: ABNORMAL
VIBRIO DNA SPEC NAA+PROBE: NEGATIVE
WBC # BLD AUTO: 0.1 10E3/UL (ref 4–11)
Y ENTEROCOL DNA STL QL NAA+PROBE: NEGATIVE

## 2024-03-23 PROCEDURE — 250N000009 HC RX 250: Performed by: INTERNAL MEDICINE

## 2024-03-23 PROCEDURE — 250N000013 HC RX MED GY IP 250 OP 250 PS 637: Performed by: PHYSICIAN ASSISTANT

## 2024-03-23 PROCEDURE — 84132 ASSAY OF SERUM POTASSIUM: CPT | Performed by: INTERNAL MEDICINE

## 2024-03-23 PROCEDURE — 120N000002 HC R&B MED SURG/OB UMMC

## 2024-03-23 PROCEDURE — 97161 PT EVAL LOW COMPLEX 20 MIN: CPT | Mod: GP | Performed by: STUDENT IN AN ORGANIZED HEALTH CARE EDUCATION/TRAINING PROGRAM

## 2024-03-23 PROCEDURE — 83690 ASSAY OF LIPASE: CPT | Performed by: PHYSICIAN ASSISTANT

## 2024-03-23 PROCEDURE — 250N000011 HC RX IP 250 OP 636: Mod: JZ | Performed by: STUDENT IN AN ORGANIZED HEALTH CARE EDUCATION/TRAINING PROGRAM

## 2024-03-23 PROCEDURE — 258N000003 HC RX IP 258 OP 636

## 2024-03-23 PROCEDURE — 80053 COMPREHEN METABOLIC PANEL: CPT | Performed by: STUDENT IN AN ORGANIZED HEALTH CARE EDUCATION/TRAINING PROGRAM

## 2024-03-23 PROCEDURE — 82330 ASSAY OF CALCIUM: CPT | Performed by: PHYSICIAN ASSISTANT

## 2024-03-23 PROCEDURE — 87040 BLOOD CULTURE FOR BACTERIA: CPT | Performed by: PHYSICIAN ASSISTANT

## 2024-03-23 PROCEDURE — 87493 C DIFF AMPLIFIED PROBE: CPT | Performed by: STUDENT IN AN ORGANIZED HEALTH CARE EDUCATION/TRAINING PROGRAM

## 2024-03-23 PROCEDURE — 85027 COMPLETE CBC AUTOMATED: CPT | Performed by: STUDENT IN AN ORGANIZED HEALTH CARE EDUCATION/TRAINING PROGRAM

## 2024-03-23 PROCEDURE — 250N000011 HC RX IP 250 OP 636: Performed by: INTERNAL MEDICINE

## 2024-03-23 PROCEDURE — 99233 SBSQ HOSP IP/OBS HIGH 50: CPT | Mod: FS | Performed by: PHYSICIAN ASSISTANT

## 2024-03-23 PROCEDURE — 87507 IADNA-DNA/RNA PROBE TQ 12-25: CPT | Performed by: STUDENT IN AN ORGANIZED HEALTH CARE EDUCATION/TRAINING PROGRAM

## 2024-03-23 PROCEDURE — 83735 ASSAY OF MAGNESIUM: CPT | Performed by: INTERNAL MEDICINE

## 2024-03-23 PROCEDURE — 250N000011 HC RX IP 250 OP 636: Performed by: PHYSICIAN ASSISTANT

## 2024-03-23 PROCEDURE — 258N000003 HC RX IP 258 OP 636: Performed by: STUDENT IN AN ORGANIZED HEALTH CARE EDUCATION/TRAINING PROGRAM

## 2024-03-23 PROCEDURE — 250N000011 HC RX IP 250 OP 636: Mod: JZ | Performed by: PHYSICIAN ASSISTANT

## 2024-03-23 PROCEDURE — 84100 ASSAY OF PHOSPHORUS: CPT | Performed by: STUDENT IN AN ORGANIZED HEALTH CARE EDUCATION/TRAINING PROGRAM

## 2024-03-23 PROCEDURE — 250N000013 HC RX MED GY IP 250 OP 250 PS 637

## 2024-03-23 PROCEDURE — 258N000003 HC RX IP 258 OP 636: Performed by: INTERNAL MEDICINE

## 2024-03-23 PROCEDURE — 250N000013 HC RX MED GY IP 250 OP 250 PS 637: Performed by: INTERNAL MEDICINE

## 2024-03-23 PROCEDURE — 250N000009 HC RX 250: Performed by: PHYSICIAN ASSISTANT

## 2024-03-23 PROCEDURE — 250N000011 HC RX IP 250 OP 636: Mod: JZ

## 2024-03-23 PROCEDURE — 250N000011 HC RX IP 250 OP 636: Performed by: STUDENT IN AN ORGANIZED HEALTH CARE EDUCATION/TRAINING PROGRAM

## 2024-03-23 PROCEDURE — 258N000003 HC RX IP 258 OP 636: Performed by: PHYSICIAN ASSISTANT

## 2024-03-23 PROCEDURE — 250N000013 HC RX MED GY IP 250 OP 250 PS 637: Performed by: STUDENT IN AN ORGANIZED HEALTH CARE EDUCATION/TRAINING PROGRAM

## 2024-03-23 PROCEDURE — 93005 ELECTROCARDIOGRAM TRACING: CPT

## 2024-03-23 PROCEDURE — 97530 THERAPEUTIC ACTIVITIES: CPT | Mod: GP | Performed by: STUDENT IN AN ORGANIZED HEALTH CARE EDUCATION/TRAINING PROGRAM

## 2024-03-23 RX ORDER — CALCIUM GLUCONATE 20 MG/ML
2 INJECTION, SOLUTION INTRAVENOUS ONCE
Status: COMPLETED | OUTPATIENT
Start: 2024-03-23 | End: 2024-03-23

## 2024-03-23 RX ORDER — FUROSEMIDE 10 MG/ML
40 INJECTION INTRAMUSCULAR; INTRAVENOUS ONCE
Status: COMPLETED | OUTPATIENT
Start: 2024-03-23 | End: 2024-03-23

## 2024-03-23 RX ORDER — POTASSIUM CHLORIDE 750 MG/1
40 TABLET, EXTENDED RELEASE ORAL ONCE
Status: COMPLETED | OUTPATIENT
Start: 2024-03-23 | End: 2024-03-23

## 2024-03-23 RX ORDER — POTASSIUM PHOS IN 0.9 % NACL 15MMOL/250
15 PLASTIC BAG, INJECTION (ML) INTRAVENOUS ONCE
Status: COMPLETED | OUTPATIENT
Start: 2024-03-23 | End: 2024-03-23

## 2024-03-23 RX ORDER — POTASSIUM CHLORIDE 750 MG/1
20 TABLET, EXTENDED RELEASE ORAL ONCE
Status: COMPLETED | OUTPATIENT
Start: 2024-03-23 | End: 2024-03-23

## 2024-03-23 RX ORDER — MAGNESIUM SULFATE 1 G/100ML
1 INJECTION INTRAVENOUS ONCE
Status: COMPLETED | OUTPATIENT
Start: 2024-03-23 | End: 2024-03-23

## 2024-03-23 RX ORDER — POTASSIUM CHLORIDE 29.8 MG/ML
20 INJECTION INTRAVENOUS ONCE
Status: COMPLETED | OUTPATIENT
Start: 2024-03-23 | End: 2024-03-23

## 2024-03-23 RX ORDER — METOPROLOL TARTRATE 1 MG/ML
5 INJECTION, SOLUTION INTRAVENOUS ONCE
Status: COMPLETED | OUTPATIENT
Start: 2024-03-23 | End: 2024-03-23

## 2024-03-23 RX ORDER — METOPROLOL TARTRATE 25 MG/1
25 TABLET, FILM COATED ORAL 2 TIMES DAILY
Status: DISCONTINUED | OUTPATIENT
Start: 2024-03-23 | End: 2024-03-24

## 2024-03-23 RX ORDER — MEROPENEM 1 G/1
1 INJECTION, POWDER, FOR SOLUTION INTRAVENOUS EVERY 8 HOURS
Status: DISCONTINUED | OUTPATIENT
Start: 2024-03-23 | End: 2024-03-27

## 2024-03-23 RX ADMIN — MEROPENEM 1 G: 1 INJECTION, POWDER, FOR SOLUTION INTRAVENOUS at 13:03

## 2024-03-23 RX ADMIN — LOPERAMIDE HYDROCHLORIDE 4 MG: 2 CAPSULE ORAL at 20:28

## 2024-03-23 RX ADMIN — METOPROLOL TARTRATE 5 MG: 5 INJECTION INTRAVENOUS at 11:12

## 2024-03-23 RX ADMIN — METOPROLOL TARTRATE 25 MG: 25 TABLET, FILM COATED ORAL at 11:16

## 2024-03-23 RX ADMIN — GABAPENTIN 100 MG: 100 CAPSULE ORAL at 13:03

## 2024-03-23 RX ADMIN — MEROPENEM 2 G: 1 INJECTION, POWDER, FOR SOLUTION INTRAVENOUS at 05:48

## 2024-03-23 RX ADMIN — SODIUM CHLORIDE 0.5 MG/MIN: 9 INJECTION, SOLUTION INTRAVENOUS at 22:19

## 2024-03-23 RX ADMIN — CALCIUM GLUCONATE 2 G: 20 INJECTION, SOLUTION INTRAVENOUS at 09:43

## 2024-03-23 RX ADMIN — FOLIC ACID 1000 MCG: 1 TABLET ORAL at 09:22

## 2024-03-23 RX ADMIN — POTASSIUM CHLORIDE 20 MEQ: 750 TABLET, EXTENDED RELEASE ORAL at 20:28

## 2024-03-23 RX ADMIN — DEXTROSE MONOHYDRATE 20 ML: 50 INJECTION, SOLUTION INTRAVENOUS at 20:29

## 2024-03-23 RX ADMIN — ACYCLOVIR 800 MG: 800 TABLET ORAL at 20:28

## 2024-03-23 RX ADMIN — ISONIAZID 300 MG: 300 TABLET ORAL at 09:21

## 2024-03-23 RX ADMIN — SODIUM CHLORIDE, POTASSIUM CHLORIDE, SODIUM LACTATE AND CALCIUM CHLORIDE: 600; 310; 30; 20 INJECTION, SOLUTION INTRAVENOUS at 18:40

## 2024-03-23 RX ADMIN — METOPROLOL TARTRATE 25 MG: 25 TABLET, FILM COATED ORAL at 20:29

## 2024-03-23 RX ADMIN — VANCOMYCIN HYDROCHLORIDE 1500 MG: 10 INJECTION, POWDER, LYOPHILIZED, FOR SOLUTION INTRAVENOUS at 18:40

## 2024-03-23 RX ADMIN — DEXTROSE MONOHYDRATE 480 MCG: 50 INJECTION, SOLUTION INTRAVENOUS at 20:17

## 2024-03-23 RX ADMIN — ACETAMINOPHEN 650 MG: 325 TABLET, FILM COATED ORAL at 23:23

## 2024-03-23 RX ADMIN — Medication 1 LOZENGE: at 16:41

## 2024-03-23 RX ADMIN — MAGNESIUM SULFATE IN DEXTROSE 1 G: 10 INJECTION, SOLUTION INTRAVENOUS at 00:54

## 2024-03-23 RX ADMIN — Medication 25 MG: at 09:21

## 2024-03-23 RX ADMIN — FUROSEMIDE 40 MG: 10 INJECTION, SOLUTION INTRAVENOUS at 11:26

## 2024-03-23 RX ADMIN — POTASSIUM CHLORIDE 40 MEQ: 750 TABLET, EXTENDED RELEASE ORAL at 18:36

## 2024-03-23 RX ADMIN — FLUCONAZOLE 200 MG: 200 TABLET ORAL at 09:22

## 2024-03-23 RX ADMIN — PANTOPRAZOLE SODIUM 40 MG: 40 TABLET, DELAYED RELEASE ORAL at 09:21

## 2024-03-23 RX ADMIN — DEXTROSE MONOHYDRATE 20 ML: 50 INJECTION, SOLUTION INTRAVENOUS at 20:17

## 2024-03-23 RX ADMIN — POTASSIUM CHLORIDE 20 MEQ: 29.8 INJECTION, SOLUTION INTRAVENOUS at 01:47

## 2024-03-23 RX ADMIN — GABAPENTIN 100 MG: 100 CAPSULE ORAL at 09:21

## 2024-03-23 RX ADMIN — GABAPENTIN 100 MG: 100 CAPSULE ORAL at 20:28

## 2024-03-23 RX ADMIN — POTASSIUM PHOSPHATE, MONOBASIC POTASSIUM PHOSPHATE, DIBASIC 15 MMOL: 224; 236 INJECTION, SOLUTION, CONCENTRATE INTRAVENOUS at 07:01

## 2024-03-23 RX ADMIN — LOPERAMIDE HYDROCHLORIDE 4 MG: 2 CAPSULE ORAL at 11:16

## 2024-03-23 RX ADMIN — ACYCLOVIR 800 MG: 800 TABLET ORAL at 09:21

## 2024-03-23 RX ADMIN — LOPERAMIDE HYDROCHLORIDE 4 MG: 2 CAPSULE ORAL at 16:41

## 2024-03-23 RX ADMIN — ACETAMINOPHEN 650 MG: 325 TABLET, FILM COATED ORAL at 11:29

## 2024-03-23 RX ADMIN — SODIUM CHLORIDE, POTASSIUM CHLORIDE, SODIUM LACTATE AND CALCIUM CHLORIDE: 600; 310; 30; 20 INJECTION, SOLUTION INTRAVENOUS at 10:14

## 2024-03-23 RX ADMIN — VANCOMYCIN HYDROCHLORIDE 1250 MG: 10 INJECTION, POWDER, LYOPHILIZED, FOR SOLUTION INTRAVENOUS at 03:46

## 2024-03-23 RX ADMIN — MEROPENEM 1 G: 1 INJECTION, POWDER, FOR SOLUTION INTRAVENOUS at 22:11

## 2024-03-23 RX ADMIN — ACETAMINOPHEN 650 MG: 325 TABLET, FILM COATED ORAL at 16:46

## 2024-03-23 ASSESSMENT — ACTIVITIES OF DAILY LIVING (ADL)
ADLS_ACUITY_SCORE: 24
ADLS_ACUITY_SCORE: 28
ADLS_ACUITY_SCORE: 24
ADLS_ACUITY_SCORE: 41
ADLS_ACUITY_SCORE: 24
ADLS_ACUITY_SCORE: 41
ADLS_ACUITY_SCORE: 24
ADLS_ACUITY_SCORE: 22
ADLS_ACUITY_SCORE: 41
ADLS_ACUITY_SCORE: 41
ADLS_ACUITY_SCORE: 24
ADLS_ACUITY_SCORE: 22
ADLS_ACUITY_SCORE: 22
ADLS_ACUITY_SCORE: 24
ADLS_ACUITY_SCORE: 22
ADLS_ACUITY_SCORE: 41
ADLS_ACUITY_SCORE: 22
ADLS_ACUITY_SCORE: 24
ADLS_ACUITY_SCORE: 41
ADLS_ACUITY_SCORE: 24
ADLS_ACUITY_SCORE: 28
ADLS_ACUITY_SCORE: 41
ADLS_ACUITY_SCORE: 41

## 2024-03-23 NOTE — PLAN OF CARE
Admitted/transferred from: 5c  Reason for admission/transfer: hypotension  Patient status upon admission/transfer: on amio .5  Interventions: skin assessments   Plan: amio, close monitoring   2 RN skin assessment: completed by bon   Sexual Orientation and Gender Identity (BRENTONGI) smartfom completed: Done  Result of skin assessment and interventions/actions: nothing found.  Height, weight, drug calc weight: Done  Patient belongings (see Flowsheet - Adult Profile for details): with daughter   MDRO education (if applicable): n/a

## 2024-03-23 NOTE — PROGRESS NOTES
BMT Daily Progress Note   03/23/2024    Patient ID:  Rosario Terrazas is a 68 year old female, currently day 10 s/p auto for MM readmitted 3/21 with N/F and GNB+ sepsis.  INTERVAL  HISTORY   Rosario was seen with her daughter at the bedside, translating and no Kisii  is available.  She was transferred to the ICU last night from hypotension associated with afib and SVT.  Her blood pressure normalized nicely on amiodarone; however, she continues to spike her heart rate up to the 150s or so, randomly and briefly. She continues to have abdominal pain in her central upper abdomen.  She continues on antibiotics for her dual bacteremia. Frequently sucking out her own oral secretions.   Review of Systems: ROS negative except as noted above.    Scheduled Medications   acyclovir  800 mg Oral BID    dextrose 5% water  10-20 mL Intravenous Daily at 8 pm    And    filgrastim-aafi  5 mcg/kg Intravenous Daily at 8 pm    And    dextrose 5% water  10-20 mL Intravenous Daily at 8 pm    fluconazole  200 mg Oral Daily    folic acid  1,000 mcg Oral Daily    furosemide  40 mg Intravenous Once    gabapentin  100 mg Oral TID    isoniazid  300 mg Oral Daily    lidocaine  1 patch Transdermal Q24h    loperamide  4 mg Oral 4x Daily    meropenem  1 g Intravenous Q8H    metoprolol  5 mg Intravenous Once    metoprolol tartrate  25 mg Oral or Feeding Tube BID    [Held by provider] metoprolol tartrate  25 mg Oral BID    pantoprazole  40 mg Oral Daily    pyridOXINE  25 mg Oral Daily    sodium chloride (PF)  3 mL Intracatheter Q8H    [Held by provider] sulfamethoxazole-trimethoprim  1 tablet Oral Daily    vancomycin  1,500 mg Intravenous Q12H     Current Facility-Administered Medications   Medication    acetaminophen (TYLENOL) tablet 325-650 mg    acyclovir (ZOVIRAX) tablet 800 mg    amiodarone (NEXTERONE) 1.8 mg/mL in sodium chloride 0.9% in non-PVC container 500 mL ADULT STANDARD infusion    benzocaine-menthol (CHLORASEPTIC MAX)  15-10 MG lozenge 1 lozenge    calcium carbonate (TUMS) chewable tablet 1,000 mg    dextrose 5 % flush PRE/POST medication    And    filgrastim-aafi (NIVESTYM) 480 mcg in D5W 25 mL infusion    And    dextrose 5 % flush PRE/POST medication    fluconazole (DIFLUCAN) tablet 200 mg    folic acid (FOLVITE) tablet 1,000 mcg    furosemide (LASIX) injection 40 mg    gabapentin (NEURONTIN) capsule 100 mg    isoniazid (NYDRAZID) tablet 300 mg    lactated ringers infusion    Lidocaine (LIDOCARE) 4 % Patch 1 patch    lidocaine (LMX4) cream    lidocaine 1 % 0.1-1 mL    loperamide (IMODIUM) capsule 4 mg    LORazepam (ATIVAN) injection 0.5-1 mg    Or    LORazepam (ATIVAN) tablet 0.5-1 mg    melatonin tablet 1 mg    meropenem (MERREM) 1 g vial to attach to  mL bag    metoprolol (LOPRESSOR) injection 5 mg    metoprolol tartrate (LOPRESSOR) tablet 25 mg    [Held by provider] metoprolol tartrate (LOPRESSOR) tablet 25 mg    naloxone (NARCAN) injection 0.2 mg    Or    naloxone (NARCAN) injection 0.4 mg    Or    naloxone (NARCAN) injection 0.2 mg    Or    naloxone (NARCAN) injection 0.4 mg    OLANZapine (zyPREXA) tablet 2.5 mg    ondansetron (ZOFRAN ODT) ODT tab 4 mg    Or    ondansetron (ZOFRAN) injection 4 mg    oxyCODONE (ROXICODONE) tablet 5 mg    pantoprazole (PROTONIX) EC tablet 40 mg    prochlorperazine (COMPAZINE) injection 5 mg    Or    prochlorperazine (COMPAZINE) tablet 5 mg    Or    prochlorperazine (COMPAZINE) suppository 12.5 mg    pyridOXINE (VITAMIN B6) tablet 25 mg    senna-docusate (SENOKOT-S/PERICOLACE) 8.6-50 MG per tablet 1 tablet    Or    senna-docusate (SENOKOT-S/PERICOLACE) 8.6-50 MG per tablet 2 tablet    sodium chloride (PF) 0.9% PF flush 3 mL    sodium chloride (PF) 0.9% PF flush 3 mL    [Held by provider] sulfamethoxazole-trimethoprim (BACTRIM) 400-80 MG per tablet 1 tablet    vancomycin (VANCOCIN) 1,500 mg in 0.9% NaCl 250 mL intermittent infusion       PHYSICAL EXAM     Weight In/Out     Wt Readings  "from Last 3 Encounters:   03/22/24 97.1 kg (214 lb 1.6 oz)   03/21/24 94.7 kg (208 lb 11.2 oz)   03/20/24 94.9 kg (209 lb 4.8 oz)      I/O last 3 completed shifts:  In: 5171.71 [I.V.:4151.71; IV Piggyback:500]  Out: 350 [Urine:350]         /80   Pulse (!) 136   Temp 99.7  F (37.6  C) (Oral)   Resp 20   Ht 1.65 m (5' 4.96\")   Wt 97.1 kg (214 lb 1.6 oz)   SpO2 100%   BMI 35.67 kg/m         General: alert, interactive  Lungs: CTA bilaterally  Cardiovascular: irregular rhythm and intermittent tachycardia, no M/R/G   Abdominal/Rectal: +BS hyperactive, diffusely tender, distended (daughter states this is her normal habitus), soft.  No guarding.   Skin: no rashes or petechaie  Neuro: A&O x4, moving all extremities spontaneously, PERRL  Additional Findings: Wilder site NT, no drainage.    LABS AND IMAGING - PAST 24 HOURS     Results for orders placed or performed during the hospital encounter of 03/22/24 (from the past 24 hour(s))   ABO/RH Type and Screen  *Canceled*    Narrative    The following orders were created for panel order ABO/RH Type and Screen .  Procedure                               Abnormality         Status                     ---------                               -----------         ------                     Adult Type and Screen[[id number]]                                                         Please view results for these tests on the individual orders.   CBC with Platelets & Differential    Narrative    The following orders were created for panel order CBC with Platelets & Differential.  Procedure                               Abnormality         Status                     ---------                               -----------         ------                     CBC with platelets and d...[[id number]]  Abnormal            Final result               RBC and Platelet Morphology[[id number]]                      Final result                 Please view results for these tests on the individual " orders.   Basic metabolic panel   Result Value Ref Range    Sodium 140 135 - 145 mmol/L    Potassium 3.8 3.4 - 5.3 mmol/L    Chloride 113 (H) 98 - 107 mmol/L    Carbon Dioxide (CO2) 16 (L) 22 - 29 mmol/L    Anion Gap 11 7 - 15 mmol/L    Urea Nitrogen 11.2 8.0 - 23.0 mg/dL    Creatinine 0.59 0.51 - 0.95 mg/dL    GFR Estimate >90 >60 mL/min/1.73m2    Calcium 7.1 (L) 8.8 - 10.2 mg/dL    Glucose 82 70 - 99 mg/dL   Tobramycin   Result Value Ref Range    Tobramycin 8.5   ug/mL   CBC with platelets and differential   Result Value Ref Range    WBC Count 0.1 (LL) 4.0 - 11.0 10e3/uL    RBC Count 2.36 (L) 3.80 - 5.20 10e6/uL    Hemoglobin 7.4 (L) 11.7 - 15.7 g/dL    Hematocrit 22.3 (L) 35.0 - 47.0 %    MCV 95 78 - 100 fL    MCH 31.4 26.5 - 33.0 pg    MCHC 33.2 31.5 - 36.5 g/dL    RDW 18.6 (H) 10.0 - 15.0 %    Platelet Count 34 (LL) 150 - 450 10e3/uL    % Neutrophils      % Lymphocytes      % Monocytes      % Eosinophils      % Basophils      % Immature Granulocytes      Absolute Neutrophils      Absolute Lymphocytes      Absolute Monocytes      Absolute Eosinophils      Absolute Basophils      Absolute Immature Granulocytes     RBC and Platelet Morphology   Result Value Ref Range    Platelet Assessment  Automated Count Confirmed. Platelet morphology is normal.     Automated Count Confirmed. Platelet morphology is normal.    Acanthocytes      Mike Rods      Basophilic Stippling      Bite Cells      Blister Cells      Antonia Cells      Elliptocytes      Hgb C Crystals      Tinajero-Jolly Bodies      Hypersegmented Neutrophils      Polychromasia      RBC agglutination      RBC Fragments      Reactive Lymphocytes      Rouleaux      Sickle Cells      Smudge Cells      Spherocytes      Stomatocytes      Target Cells      Teardrop Cells      Toxic Neutrophils      RBC Morphology Confirmed RBC Indices    EKG 12-lead, complete   Result Value Ref Range    Systolic Blood Pressure  mmHg    Diastolic Blood Pressure  mmHg    Ventricular Rate  134 BPM    Atrial Rate 141 BPM    MA Interval 200 ms    QRS Duration 72 ms     ms    QTc 465 ms    P Axis 58 degrees    R AXIS 35 degrees    T Axis 43 degrees    Interpretation ECG       Sinus tachycardia with Premature supraventricular complexes  Otherwise normal ECG  When compared with ECG of 22-MAR-2024 05:06,  Premature supraventricular complexes are now Present  Nonspecific T wave abnormality no longer evident in Lateral leads     EKG 12-lead, complete   Result Value Ref Range    Systolic Blood Pressure  mmHg    Diastolic Blood Pressure  mmHg    Ventricular Rate 147 BPM    Atrial Rate  BPM    MA Interval  ms    QRS Duration 66 ms     ms    QTc 416 ms    P Axis  degrees    R AXIS 53 degrees    T Axis 41 degrees    Interpretation ECG       Atrial fibrillation with rapid ventricular response  Abnormal ECG  When compared with ECG of 22-MAR-2024 12:48, (unconfirmed)  Atrial fibrillation has replaced Sinus rhythm     EKG 12-lead, complete   Result Value Ref Range    Systolic Blood Pressure  mmHg    Diastolic Blood Pressure  mmHg    Ventricular Rate 143 BPM    Atrial Rate 150 BPM    MA Interval  ms    QRS Duration 74 ms     ms    QTc 472 ms    P Axis  degrees    R AXIS 64 degrees    T Axis 66 degrees    Interpretation ECG       Undetermined rhythm  Otherwise normal ECG  When compared with ECG of 22-MAR-2024 15:14, (unconfirmed)  Current undetermined rhythm precludes rhythm comparison, needs review  ST elevation now present in Inferior leads     Troponin T, High Sensitivity   Result Value Ref Range    Troponin T, High Sensitivity 32 (H) <=14 ng/L   Basic metabolic panel   Result Value Ref Range    Sodium 142 135 - 145 mmol/L    Potassium 3.6 3.4 - 5.3 mmol/L    Chloride 114 (H) 98 - 107 mmol/L    Carbon Dioxide (CO2) 16 (L) 22 - 29 mmol/L    Anion Gap 12 7 - 15 mmol/L    Urea Nitrogen 10.6 8.0 - 23.0 mg/dL    Creatinine 0.58 0.51 - 0.95 mg/dL    GFR Estimate >90 >60 mL/min/1.73m2    Calcium 7.1 (L)  8.8 - 10.2 mg/dL    Glucose 94 70 - 99 mg/dL   Magnesium   Result Value Ref Range    Magnesium 1.9 1.7 - 2.3 mg/dL   Phosphorus   Result Value Ref Range    Phosphorus 2.1 (L) 2.5 - 4.5 mg/dL   Glucose by meter   Result Value Ref Range    GLUCOSE BY METER POCT 81 70 - 99 mg/dL   Glucose by meter   Result Value Ref Range    GLUCOSE BY METER POCT 78 70 - 99 mg/dL   EKG 12-lead, complete   Result Value Ref Range    Systolic Blood Pressure  mmHg    Diastolic Blood Pressure  mmHg    Ventricular Rate 143 BPM    Atrial Rate 147 BPM    CO Interval 200 ms    QRS Duration 74 ms     ms    QTc 512 ms    P Axis 65 degrees    R AXIS 39 degrees    T Axis 61 degrees    Interpretation ECG       Sinus tachycardia  Otherwise normal ECG  When compared with ECG of 22-MAR-2024 17:51, (unconfirmed)  Previous ECG has undetermined rhythm, needs review     CBC with Platelets & Differential    Narrative    The following orders were created for panel order CBC with Platelets & Differential.  Procedure                               Abnormality         Status                     ---------                               -----------         ------                     CBC with platelets and d...[[id number]]  Abnormal            Final result               RBC and Platelet Morphology[[id number]]  Abnormal            Final result                 Please view results for these tests on the individual orders.   Comprehensive metabolic panel   Result Value Ref Range    Sodium 142 135 - 145 mmol/L    Potassium 3.9 3.4 - 5.3 mmol/L    Carbon Dioxide (CO2) 18 (L) 22 - 29 mmol/L    Anion Gap 10 7 - 15 mmol/L    Urea Nitrogen 10.0 8.0 - 23.0 mg/dL    Creatinine 0.57 0.51 - 0.95 mg/dL    GFR Estimate >90 >60 mL/min/1.73m2    Calcium 6.9 (L) 8.8 - 10.2 mg/dL    Chloride 114 (H) 98 - 107 mmol/L    Glucose 96 70 - 99 mg/dL    Alkaline Phosphatase 57 40 - 150 U/L    AST 12 0 - 45 U/L    ALT 14 0 - 50 U/L    Protein Total 4.6 (L) 6.4 - 8.3 g/dL    Albumin 2.7 (L)  3.5 - 5.2 g/dL    Bilirubin Total 0.5 <=1.2 mg/dL   Phosphorus   Result Value Ref Range    Phosphorus 2.2 (L) 2.5 - 4.5 mg/dL   Magnesium   Result Value Ref Range    Magnesium 2.1 1.7 - 2.3 mg/dL   CBC with platelets and differential   Result Value Ref Range    WBC Count 0.1 (LL) 4.0 - 11.0 10e3/uL    RBC Count 2.30 (L) 3.80 - 5.20 10e6/uL    Hemoglobin 7.2 (L) 11.7 - 15.7 g/dL    Hematocrit 21.7 (L) 35.0 - 47.0 %    MCV 94 78 - 100 fL    MCH 31.3 26.5 - 33.0 pg    MCHC 33.2 31.5 - 36.5 g/dL    RDW 19.4 (H) 10.0 - 15.0 %    Platelet Count 26 (LL) 150 - 450 10e3/uL    % Neutrophils      % Lymphocytes      % Monocytes      % Eosinophils      % Basophils      % Immature Granulocytes      Absolute Neutrophils      Absolute Lymphocytes      Absolute Monocytes      Absolute Eosinophils      Absolute Basophils      Absolute Immature Granulocytes     RBC and Platelet Morphology   Result Value Ref Range    Platelet Assessment  Automated Count Confirmed. Platelet morphology is normal.     Automated Count Confirmed. Platelet morphology is normal.    Acanthocytes      Mike Rods      Basophilic Stippling      Bite Cells      Blister Cells      [place] Cells      Elliptocytes Slight (A) None Seen    Hgb C Crystals      Tinajero-Jolly Bodies      Hypersegmented Neutrophils      Polychromasia      RBC agglutination      RBC Fragments      Reactive Lymphocytes      Rouleaux      Sickle Cells      Smudge Cells      Spherocytes      Stomatocytes      Target Cells      Teardrop Cells      Toxic Neutrophils      RBC Morphology Confirmed RBC Indices    Lipase   Result Value Ref Range    Lipase 5 (L) 13 - 60 U/L   Glucose by meter   Result Value Ref Range    GLUCOSE BY METER POCT 88 70 - 99 mg/dL   C. difficile Toxin B PCR with reflex to C. difficile Antigen and Toxins A/B EIA    Specimen: Per Rectum; Stool   Result Value Ref Range    C Difficile Toxin B by PCR Negative Negative    Narrative    The [employer] Xpert C. difficile Assay, performed  on the [employer]ument Systems, is a qualitative in vitro diagnostic test for rapid detection of toxin B gene sequences from unformed (liquid or soft) stool specimens collected from patients suspected of having Clostridioides difficile infection (CDI). The test utilizes automated real-time polymerase chain reaction (PCR) to detect toxin gene sequences associated with toxin producing C. difficile. The Xpert C. difficile Assay is intended as an aid in the diagnosis of CDI.   Enteric Bacteria and Virus Panel PCR    Specimen: Per Rectum; Stool   Result Value Ref Range    Campylobacter species Negative Negative    Salmonella species Negative Negative    Vibrio species Negative Negative    Vibrio cholerae Negative Negative    Yersinia enterocolitica Negative Negative    Enteropathogenic E. coli (EPEC) Negative Negative, NA    Shiga-like toxin-producing E. coli (STEC) Negative Negative    Shigella/Enteroinvasive E. coli (EIEC) Negative Negative    Cryptosporidium species Negative Negative    Giardia lamblia Negative Negative    Norovirus Gl/Gll Negative Negative    Rotavirus A Negative Negative    Plesiomonas shigelloides Negative Negative    Enteroaggregative E. coli (EAEC) Negative Negative    Enterotoxigenic E. coli (ETEC) Negative Negative    E. coli O157 NA Negative, NA    Cyclospora cayetanensis Negative Negative    Entamoeba histolytica Negative Negative    Adenovirus F40/41 Negative Negative    Astrovirus Negative Negative    Sapovirus Negative Negative    Narrative    Assay performed using the FDA-cleared FilmArray GI Panel from [employer]  A negative result should not rule out infection in patients with a probability for gastrointestinal infection. The assay does not test for all potential infectious agents of diarrheal disease.  Positive results do not distinguish between a viable or replicating organism and the presence of a nonviable organism or nucleic acid, nor do they exclude  the possibility of coinfection by organisms not in the panel.  Results are intended to aid in the diagnosis of illness and are meant to be used in conjunction with other clinical findings.  This test has been verified and is performed by the [facility] at [facility]. This laboratory is certified under the Clinical Laboratory Improvement Amendments of 1988 (CLIA-88) as qualified to perform high complexity clinical laboratory testing.   Ionized Calcium   Result Value Ref Range    Calcium Ionized Whole Blood 4.0 (L) 4.4 - 5.2 mg/dL   Glucose by meter   Result Value Ref Range    GLUCOSE BY METER POCT 90 70 - 99 mg/dL         ASSESSMENT BY SYSTEMS     Rosario Adan Terrazas is a 68 year old female, currently day 10  s/p auto for MM readmitted 3/21 with N/F and GNB+ sepsis.     BMT/IEC PROTOCOL for MM Auto     Transplants for multiple myeloma:  The patient has Standard risk IgG D MM, planning on 2 transplants     3/12 Day -1 Melphalan 200 mg/m2   3/13 Day 0 Transplant, cell total post wash 2.23 x 10^6 CD34 + cells/kg (pre freeze dose: 5.36 x 10 ^6 CD34+ cells/kg)  Allopurinol per protocol     ID  #N/F - Meropenem (3/22-x)  #Gram + Cocci in pairs and chains (3/22) Vanco started (3/22-x)  #E. Coli bacteremia- Tobraymycin x1 (3/22); meropenem (3/22-x)  Follow up cultures 3/23-3/25.    #Abdominal pain, diarrhea.  Stool studies ok.  Likely related to transplant prep.   #Sepsis (hypotension, tachycardia, fever); resolved  #Latent TB: Continue INH/B6 through transplant  #Diarrhea: studies negative.  Continue scheduled imodium.   Ppx antibiotics acyclovir, fluconazole, levofloxacin (held). Bactrim from D28      Heme   - transfuse to keep hgb >7g/dL, plt >10k   *given dose of platelets 3/22 AM   - Of note she is on 81mg ASA. Hold aspirin (3/17), resume once platelets have recovered.   - INR 1.8, given vitamin K in clinic 3/21. Will recheck 3/25     FEN/Renal  hypocalcemia: received 2g calc  gluconate 3/22; repeat today (ionized calcium of just 4).  Electrolyte replacements per protocol, high replacements   Fluid overload related to sepsis: lasix 40mg IV once today; recheck BMP at 1600.     CV  #Hypotension: relates to SVT and afib with RVR.  Continue amiodarone; single dose metoprolol 5mg IV today, then resume home oral dose.   *Cards note 3/22; reconsult if/when needed.    ECHO 55-60%        GI:   Nausea: schedule zofran TID & zyprexa qHS; compazine TID. S/p emend.   Diarrhea: imodium QID.        Endocrine  Thyroid FNA Atypia of undetermined significance diagnosis has a 22 (13-30)% risk of malignancy. Repeat FNA (least 4-6 weeks from previous aspiration with optimal time being 12 weeks after last aspiration) , molecular testing, diagnostic lobectomy, or surveillance is recommended.    Has next appt with endocrine 4/16/24.   *TSH WNL 3/21     Neuro/Pain  Neuropathy: continues on eleni, xanaflex. Gabapentin   Rib pain: continue oxycodone and back brace, Tylenol on hold since 3/19        30 minutes spent by me on the date of the encounter doing chart review, history and exam, documentation and further activities per the note    Dispo: transfer to  when heart rates are better controlled; adding metoprolol today toward that goal.     OVERALL PLAN     Anticipated hospital dismissal: post resolution of acute illness and engraftment    Yudelka Richards PA-C

## 2024-03-23 NOTE — PLAN OF CARE
"ICU End of Shift Summary. See flowsheets for vital signs and detailed assessment.    Changes this shift:   Alert and oriented x 4 following commands and moving all extremity's, complaining of pain in abd and pain in back. Tolerating room air overnight sats >95. Afib overnight rates 100-160, bp's wnl, qtc's over 500 BMT team notified, Tmax 101.3 now 99.7. X2 loose bowel movements, able to void spontaneously. On amio drip @.5 & LR @125.    Plan:   Continue plan of care       Goal Outcome Evaluation:    Problem: Adult Inpatient Plan of Care  Goal: Plan of Care Review  Description: The Plan of Care Review/Shift note should be completed every shift.  The Outcome Evaluation is a brief statement about your assessment that the patient is improving, declining, or no change.  This information will be displayed automatically on your shift  note.  Outcome: Progressing  Goal: Patient-Specific Goal (Individualized)  Description: You can add care plan individualizations to a care plan. Examples of Individualization might be:  \"Parent requests to be called daily at 9am for status\", \"I have a hard time hearing out of my right ear\", or \"Do not touch me to wake me up as it startles  me\".  Outcome: Progressing  Goal: Absence of Hospital-Acquired Illness or Injury  Outcome: Progressing  Intervention: Identify and Manage Fall Risk  Recent Flowsheet Documentation  Taken 3/23/2024 0000 by David Lay RN  Safety Promotion/Fall Prevention: safety round/check completed  Taken 3/22/2024 2000 by David Lay RN  Safety Promotion/Fall Prevention: safety round/check completed  Intervention: Prevent Skin Injury  Recent Flowsheet Documentation  Taken 3/23/2024 0026 by David Lay RN  Body Position:   turned   left   position changed independently  Taken 3/23/2024 0000 by David Lay RN  Body Position:   turned   left  Taken 3/22/2024 2200 by David Lay RN  Body Position:   turned   right  Taken 3/22/2024 2130 by " David Lay RN  Body Position:   turned   left  Taken 3/22/2024 2000 by David Lay RN  Body Position:   supine, head elevated   position changed independently  Intervention: Prevent Infection  Recent Flowsheet Documentation  Taken 3/23/2024 0000 by David Lay RN  Infection Prevention:   cohorting utilized   environmental surveillance performed   equipment surfaces disinfected   hand hygiene promoted   personal protective equipment utilized   rest/sleep promoted   single patient room provided   visitors restricted/screened  Taken 3/22/2024 2000 by David Lay RN  Infection Prevention:   cohorting utilized   environmental surveillance performed   equipment surfaces disinfected   hand hygiene promoted   personal protective equipment utilized   rest/sleep promoted   single patient room provided   visitors restricted/screened  Goal: Optimal Comfort and Wellbeing  Outcome: Progressing  Intervention: Provide Person-Centered Care  Recent Flowsheet Documentation  Taken 3/23/2024 0000 by David Lay RN  Trust Relationship/Rapport: care explained  Taken 3/22/2024 2000 by David Lay RN  Trust Relationship/Rapport: care explained  Goal: Readiness for Transition of Care  Outcome: Progressing

## 2024-03-23 NOTE — PROGRESS NOTES
1900-2030    Report taken from Zeeshan Jha RN in  room taking care of pt.  Yudelka called Zeeshan monteiro transferred pt to MICU.

## 2024-03-24 ENCOUNTER — APPOINTMENT (OUTPATIENT)
Dept: OCCUPATIONAL THERAPY | Facility: CLINIC | Age: 69
End: 2024-03-24
Attending: PHYSICIAN ASSISTANT
Payer: COMMERCIAL

## 2024-03-24 LAB
ACANTHOCYTES BLD QL SMEAR: ABNORMAL
ALBUMIN SERPL BCG-MCNC: 2.5 G/DL (ref 3.5–5.2)
ALP SERPL-CCNC: 59 U/L (ref 40–150)
ALT SERPL W P-5'-P-CCNC: 13 U/L (ref 0–50)
ANION GAP SERPL CALCULATED.3IONS-SCNC: 11 MMOL/L (ref 7–15)
AST SERPL W P-5'-P-CCNC: 10 U/L (ref 0–45)
AUER BODIES BLD QL SMEAR: ABNORMAL
BACTERIA BLD CULT: ABNORMAL
BACTERIA SPEC CULT: NORMAL
BACTERIA UR CULT: NO GROWTH
BASO STIPL BLD QL SMEAR: ABNORMAL
BASOPHILS # BLD AUTO: ABNORMAL 10*3/UL
BASOPHILS NFR BLD AUTO: ABNORMAL %
BILIRUB SERPL-MCNC: 0.6 MG/DL
BITE CELLS BLD QL SMEAR: ABNORMAL
BLISTER CELLS BLD QL SMEAR: ABNORMAL
BUN SERPL-MCNC: 7.4 MG/DL (ref 8–23)
BURR CELLS BLD QL SMEAR: ABNORMAL
CA-I BLD-MCNC: 4.1 MG/DL (ref 4.4–5.2)
CALCIUM SERPL-MCNC: 7.1 MG/DL (ref 8.8–10.2)
CHLORIDE SERPL-SCNC: 110 MMOL/L (ref 98–107)
CREAT SERPL-MCNC: 0.48 MG/DL (ref 0.51–0.95)
DACRYOCYTES BLD QL SMEAR: ABNORMAL
DEPRECATED HCO3 PLAS-SCNC: 19 MMOL/L (ref 22–29)
EGFRCR SERPLBLD CKD-EPI 2021: >90 ML/MIN/1.73M2
ELLIPTOCYTES BLD QL SMEAR: ABNORMAL
EOSINOPHIL # BLD AUTO: ABNORMAL 10*3/UL
EOSINOPHIL NFR BLD AUTO: ABNORMAL %
ERYTHROCYTE [DISTWIDTH] IN BLOOD BY AUTOMATED COUNT: 19.1 % (ref 10–15)
FRAGMENTS BLD QL SMEAR: ABNORMAL
GLUCOSE SERPL-MCNC: 85 MG/DL (ref 70–99)
HCT VFR BLD AUTO: 22.1 % (ref 35–47)
HGB BLD-MCNC: 7.3 G/DL (ref 11.7–15.7)
HGB C CRYSTALS: ABNORMAL
HOWELL-JOLLY BOD BLD QL SMEAR: ABNORMAL
IMM GRANULOCYTES # BLD: ABNORMAL 10*3/UL
IMM GRANULOCYTES NFR BLD: ABNORMAL %
LYMPHOCYTES # BLD AUTO: ABNORMAL 10*3/UL
LYMPHOCYTES NFR BLD AUTO: ABNORMAL %
MAGNESIUM SERPL-MCNC: 1.5 MG/DL (ref 1.7–2.3)
MAGNESIUM SERPL-MCNC: 2.2 MG/DL (ref 1.7–2.3)
MCH RBC QN AUTO: 30.7 PG (ref 26.5–33)
MCHC RBC AUTO-ENTMCNC: 33 G/DL (ref 31.5–36.5)
MCV RBC AUTO: 93 FL (ref 78–100)
MONOCYTES # BLD AUTO: ABNORMAL 10*3/UL
MONOCYTES NFR BLD AUTO: ABNORMAL %
NEUTROPHILS # BLD AUTO: ABNORMAL 10*3/UL
NEUTROPHILS NFR BLD AUTO: ABNORMAL %
NEUTS HYPERSEG BLD QL SMEAR: ABNORMAL
PHOSPHATE SERPL-MCNC: 1.5 MG/DL (ref 2.5–4.5)
PHOSPHATE SERPL-MCNC: 2.1 MG/DL (ref 2.5–4.5)
PLAT MORPH BLD: ABNORMAL
PLATELET # BLD AUTO: 12 10E3/UL (ref 150–450)
POLYCHROMASIA BLD QL SMEAR: ABNORMAL
POTASSIUM SERPL-SCNC: 3.4 MMOL/L (ref 3.4–5.3)
POTASSIUM SERPL-SCNC: 3.6 MMOL/L (ref 3.4–5.3)
POTASSIUM SERPL-SCNC: 4 MMOL/L (ref 3.4–5.3)
POTASSIUM SERPL-SCNC: 4.2 MMOL/L (ref 3.4–5.3)
PROT SERPL-MCNC: 4.5 G/DL (ref 6.4–8.3)
RBC # BLD AUTO: 2.38 10E6/UL (ref 3.8–5.2)
RBC AGGLUT BLD QL: ABNORMAL
RBC MORPH BLD: ABNORMAL
ROULEAUX BLD QL SMEAR: ABNORMAL
SICKLE CELLS BLD QL SMEAR: ABNORMAL
SMUDGE CELLS BLD QL SMEAR: ABNORMAL
SODIUM SERPL-SCNC: 140 MMOL/L (ref 135–145)
SPHEROCYTES BLD QL SMEAR: ABNORMAL
STOMATOCYTES BLD QL SMEAR: ABNORMAL
TARGETS BLD QL SMEAR: ABNORMAL
TOXIC GRANULES BLD QL SMEAR: ABNORMAL
VARIANT LYMPHS BLD QL SMEAR: ABNORMAL
WBC # BLD AUTO: 0.4 10E3/UL (ref 4–11)

## 2024-03-24 PROCEDURE — 83735 ASSAY OF MAGNESIUM: CPT | Performed by: STUDENT IN AN ORGANIZED HEALTH CARE EDUCATION/TRAINING PROGRAM

## 2024-03-24 PROCEDURE — 250N000011 HC RX IP 250 OP 636: Mod: JZ

## 2024-03-24 PROCEDURE — 258N000003 HC RX IP 258 OP 636: Performed by: INTERNAL MEDICINE

## 2024-03-24 PROCEDURE — 250N000011 HC RX IP 250 OP 636: Performed by: INTERNAL MEDICINE

## 2024-03-24 PROCEDURE — 97165 OT EVAL LOW COMPLEX 30 MIN: CPT | Mod: GO

## 2024-03-24 PROCEDURE — 999N000248 HC STATISTIC IV INSERT WITH US BY RN

## 2024-03-24 PROCEDURE — 93005 ELECTROCARDIOGRAM TRACING: CPT

## 2024-03-24 PROCEDURE — 97535 SELF CARE MNGMENT TRAINING: CPT | Mod: GO

## 2024-03-24 PROCEDURE — 250N000013 HC RX MED GY IP 250 OP 250 PS 637: Performed by: STUDENT IN AN ORGANIZED HEALTH CARE EDUCATION/TRAINING PROGRAM

## 2024-03-24 PROCEDURE — 85014 HEMATOCRIT: CPT | Performed by: STUDENT IN AN ORGANIZED HEALTH CARE EDUCATION/TRAINING PROGRAM

## 2024-03-24 PROCEDURE — 250N000013 HC RX MED GY IP 250 OP 250 PS 637: Performed by: PHYSICIAN ASSISTANT

## 2024-03-24 PROCEDURE — 87040 BLOOD CULTURE FOR BACTERIA: CPT | Performed by: PHYSICIAN ASSISTANT

## 2024-03-24 PROCEDURE — 82330 ASSAY OF CALCIUM: CPT | Performed by: PHYSICIAN ASSISTANT

## 2024-03-24 PROCEDURE — 250N000013 HC RX MED GY IP 250 OP 250 PS 637

## 2024-03-24 PROCEDURE — 258N000003 HC RX IP 258 OP 636

## 2024-03-24 PROCEDURE — 80053 COMPREHEN METABOLIC PANEL: CPT | Performed by: STUDENT IN AN ORGANIZED HEALTH CARE EDUCATION/TRAINING PROGRAM

## 2024-03-24 PROCEDURE — 250N000009 HC RX 250: Performed by: PHYSICIAN ASSISTANT

## 2024-03-24 PROCEDURE — 99233 SBSQ HOSP IP/OBS HIGH 50: CPT | Mod: FS | Performed by: PHYSICIAN ASSISTANT

## 2024-03-24 PROCEDURE — 93010 ELECTROCARDIOGRAM REPORT: CPT | Performed by: INTERNAL MEDICINE

## 2024-03-24 PROCEDURE — 250N000009 HC RX 250: Performed by: INTERNAL MEDICINE

## 2024-03-24 PROCEDURE — 206N000001 HC R&B BMT UMMC

## 2024-03-24 PROCEDURE — 84100 ASSAY OF PHOSPHORUS: CPT | Performed by: INTERNAL MEDICINE

## 2024-03-24 PROCEDURE — 84132 ASSAY OF SERUM POTASSIUM: CPT | Performed by: PHYSICIAN ASSISTANT

## 2024-03-24 PROCEDURE — 250N000011 HC RX IP 250 OP 636: Mod: JZ | Performed by: PHYSICIAN ASSISTANT

## 2024-03-24 PROCEDURE — 83735 ASSAY OF MAGNESIUM: CPT | Performed by: INTERNAL MEDICINE

## 2024-03-24 PROCEDURE — 250N000013 HC RX MED GY IP 250 OP 250 PS 637: Performed by: INTERNAL MEDICINE

## 2024-03-24 RX ORDER — POTASSIUM PHOS IN 0.9 % NACL 15MMOL/250
15 PLASTIC BAG, INJECTION (ML) INTRAVENOUS
Status: COMPLETED | OUTPATIENT
Start: 2024-03-24 | End: 2024-03-24

## 2024-03-24 RX ORDER — POTASSIUM PHOS IN 0.9 % NACL 15MMOL/250
15 PLASTIC BAG, INJECTION (ML) INTRAVENOUS ONCE
Qty: 250 ML | Refills: 0 | Status: COMPLETED | OUTPATIENT
Start: 2024-03-24 | End: 2024-03-25

## 2024-03-24 RX ORDER — OLANZAPINE 2.5 MG/1
2.5 TABLET, FILM COATED ORAL 2 TIMES DAILY
Status: DISCONTINUED | OUTPATIENT
Start: 2024-03-24 | End: 2024-03-25

## 2024-03-24 RX ORDER — MAGNESIUM SULFATE HEPTAHYDRATE 40 MG/ML
4 INJECTION, SOLUTION INTRAVENOUS ONCE
Status: COMPLETED | OUTPATIENT
Start: 2024-03-24 | End: 2024-03-24

## 2024-03-24 RX ORDER — CALCIUM GLUCONATE 20 MG/ML
1 INJECTION, SOLUTION INTRAVENOUS ONCE
Qty: 50 ML | Refills: 0 | Status: COMPLETED | OUTPATIENT
Start: 2024-03-24 | End: 2024-03-24

## 2024-03-24 RX ORDER — METOPROLOL TARTRATE 25 MG/1
50 TABLET, FILM COATED ORAL 2 TIMES DAILY
Status: DISCONTINUED | OUTPATIENT
Start: 2024-03-24 | End: 2024-03-27

## 2024-03-24 RX ORDER — FUROSEMIDE 10 MG/ML
20 INJECTION INTRAMUSCULAR; INTRAVENOUS ONCE
Status: COMPLETED | OUTPATIENT
Start: 2024-03-24 | End: 2024-03-24

## 2024-03-24 RX ORDER — DEXAMETHASONE SODIUM PHOSPHATE 10 MG/ML
4 INJECTION, SOLUTION INTRAMUSCULAR; INTRAVENOUS EVERY 24 HOURS
Status: COMPLETED | OUTPATIENT
Start: 2024-03-24 | End: 2024-03-26

## 2024-03-24 RX ORDER — METOPROLOL TARTRATE 25 MG/1
25 TABLET, FILM COATED ORAL ONCE
Status: COMPLETED | OUTPATIENT
Start: 2024-03-24 | End: 2024-03-24

## 2024-03-24 RX ORDER — LANOLIN ALCOHOL/MO/W.PET/CERES
3 CREAM (GRAM) TOPICAL
Status: DISCONTINUED | OUTPATIENT
Start: 2024-03-24 | End: 2024-04-02 | Stop reason: HOSPADM

## 2024-03-24 RX ORDER — POTASSIUM CHLORIDE 750 MG/1
40 TABLET, EXTENDED RELEASE ORAL ONCE
Qty: 4 TABLET | Refills: 0 | Status: COMPLETED | OUTPATIENT
Start: 2024-03-24 | End: 2024-03-24

## 2024-03-24 RX ADMIN — POTASSIUM PHOSPHATE, MONOBASIC POTASSIUM PHOSPHATE, DIBASIC 15 MMOL: 224; 236 INJECTION, SOLUTION, CONCENTRATE INTRAVENOUS at 20:18

## 2024-03-24 RX ADMIN — CALCIUM GLUCONATE 1 G: 20 INJECTION, SOLUTION INTRAVENOUS at 16:37

## 2024-03-24 RX ADMIN — FLUCONAZOLE 200 MG: 200 TABLET ORAL at 09:08

## 2024-03-24 RX ADMIN — FUROSEMIDE 20 MG: 10 INJECTION, SOLUTION INTRAMUSCULAR; INTRAVENOUS at 10:11

## 2024-03-24 RX ADMIN — MEROPENEM 1 G: 1 INJECTION, POWDER, FOR SOLUTION INTRAVENOUS at 05:28

## 2024-03-24 RX ADMIN — LOPERAMIDE HYDROCHLORIDE 4 MG: 2 CAPSULE ORAL at 16:35

## 2024-03-24 RX ADMIN — LOPERAMIDE HYDROCHLORIDE 4 MG: 2 CAPSULE ORAL at 21:17

## 2024-03-24 RX ADMIN — METOPROLOL TARTRATE 25 MG: 25 TABLET, FILM COATED ORAL at 10:11

## 2024-03-24 RX ADMIN — DEXAMETHASONE SODIUM PHOSPHATE 4 MG: 10 INJECTION INTRAMUSCULAR; INTRAVENOUS at 10:12

## 2024-03-24 RX ADMIN — POTASSIUM PHOSPHATE, MONOBASIC POTASSIUM PHOSPHATE, DIBASIC 15 MMOL: 224; 236 INJECTION, SOLUTION, CONCENTRATE INTRAVENOUS at 10:03

## 2024-03-24 RX ADMIN — POTASSIUM PHOSPHATE, MONOBASIC POTASSIUM PHOSPHATE, DIBASIC 15 MMOL: 224; 236 INJECTION, SOLUTION, CONCENTRATE INTRAVENOUS at 12:44

## 2024-03-24 RX ADMIN — Medication 25 MG: at 09:08

## 2024-03-24 RX ADMIN — TOPICAL ANESTHETIC 0.5 ML: 200 SPRAY DENTAL; PERIODONTAL at 21:53

## 2024-03-24 RX ADMIN — PANTOPRAZOLE SODIUM 40 MG: 40 TABLET, DELAYED RELEASE ORAL at 09:08

## 2024-03-24 RX ADMIN — GABAPENTIN 100 MG: 100 CAPSULE ORAL at 14:14

## 2024-03-24 RX ADMIN — DEXTROSE MONOHYDRATE 25 ML: 50 INJECTION, SOLUTION INTRAVENOUS at 17:29

## 2024-03-24 RX ADMIN — MEROPENEM 1 G: 1 INJECTION, POWDER, FOR SOLUTION INTRAVENOUS at 12:49

## 2024-03-24 RX ADMIN — METOPROLOL TARTRATE 25 MG: 25 TABLET, FILM COATED ORAL at 09:08

## 2024-03-24 RX ADMIN — LOPERAMIDE HYDROCHLORIDE 4 MG: 2 CAPSULE ORAL at 09:08

## 2024-03-24 RX ADMIN — ISONIAZID 300 MG: 300 TABLET ORAL at 09:08

## 2024-03-24 RX ADMIN — POTASSIUM CHLORIDE 40 MEQ: 750 TABLET, EXTENDED RELEASE ORAL at 05:26

## 2024-03-24 RX ADMIN — SODIUM CHLORIDE, POTASSIUM CHLORIDE, SODIUM LACTATE AND CALCIUM CHLORIDE: 600; 310; 30; 20 INJECTION, SOLUTION INTRAVENOUS at 12:47

## 2024-03-24 RX ADMIN — GABAPENTIN 100 MG: 100 CAPSULE ORAL at 09:08

## 2024-03-24 RX ADMIN — DEXTROSE MONOHYDRATE 480 MCG: 50 INJECTION, SOLUTION INTRAVENOUS at 17:29

## 2024-03-24 RX ADMIN — Medication 1 LOZENGE: at 16:58

## 2024-03-24 RX ADMIN — ACYCLOVIR 800 MG: 800 TABLET ORAL at 09:08

## 2024-03-24 RX ADMIN — MEROPENEM 1 G: 1 INJECTION, POWDER, FOR SOLUTION INTRAVENOUS at 21:17

## 2024-03-24 RX ADMIN — GABAPENTIN 100 MG: 100 CAPSULE ORAL at 20:12

## 2024-03-24 RX ADMIN — DEXTROSE MONOHYDRATE 25 ML: 50 INJECTION, SOLUTION INTRAVENOUS at 17:30

## 2024-03-24 RX ADMIN — VANCOMYCIN HYDROCHLORIDE 1500 MG: 10 INJECTION, POWDER, LYOPHILIZED, FOR SOLUTION INTRAVENOUS at 04:50

## 2024-03-24 RX ADMIN — LOPERAMIDE HYDROCHLORIDE 4 MG: 2 CAPSULE ORAL at 11:34

## 2024-03-24 RX ADMIN — METOPROLOL TARTRATE 50 MG: 25 TABLET, FILM COATED ORAL at 20:12

## 2024-03-24 RX ADMIN — ACYCLOVIR 800 MG: 800 TABLET ORAL at 20:12

## 2024-03-24 RX ADMIN — MAGNESIUM SULFATE IN WATER 4 G: 40 INJECTION, SOLUTION INTRAVENOUS at 07:37

## 2024-03-24 RX ADMIN — FOLIC ACID 1000 MCG: 1 TABLET ORAL at 09:07

## 2024-03-24 ASSESSMENT — ACTIVITIES OF DAILY LIVING (ADL)
ADLS_ACUITY_SCORE: 34
ADLS_ACUITY_SCORE: 34
IADL_COMMENTS: FAMILY COMPLETES
ADLS_ACUITY_SCORE: 28
ADLS_ACUITY_SCORE: 28
ADLS_ACUITY_SCORE: 34
ADLS_ACUITY_SCORE: 28
ADLS_ACUITY_SCORE: 34
ADLS_ACUITY_SCORE: 34
ADLS_ACUITY_SCORE: 28
ADLS_ACUITY_SCORE: 34
ADLS_ACUITY_SCORE: 28
ADLS_ACUITY_SCORE: 34
ADLS_ACUITY_SCORE: 28
ADLS_ACUITY_SCORE: 28

## 2024-03-24 NOTE — PROGRESS NOTES
Brief progress note:    Antibiotic sensitivities returned for gram positive bacteremia, strep mitis which is susceptible to meropenem. Discussed with pharmacy, deescalated off Vancomycin. Continue meropenem. Reescalate if hemodynamically unstable.     Mony Do PA-C  [id number]

## 2024-03-24 NOTE — PLAN OF CARE
"Blood pressure 128/80, pulse 107, temperature 99.6  F (37.6  C), temperature source Oral, resp. rate 20, height 1.65 m (5' 4.96\"), weight 97.1 kg (214 lb 1.6 oz), SpO2 98%.  Goal Outcome Evaluation:Pt.transferred from  at 1230,grandson was at bedside providing Kisii interpretation as no interpretor is available,grandson left now her daughter is at bedside.pt denied pain and nausea,LS clear diminished,HR irregular  intermittently tachy BS+,had x 1 large incontinent loose/watery stool,adequate urinary output ,purewick is in place,+2 edema ,skin is intact,pt has CVC catheter double lumen infusing Amiodarone 0.5 mg /hr and   ml/hr,PIV TKO and phosphorus IV replacement is infusing.Pt is up with assist of 1 with walker.                      "

## 2024-03-24 NOTE — PROGRESS NOTES
Transferred to:  @ 1210  Status at time of transfer: VSS. A&O x4.   Belongings: All belongings sent with pt.  Ricci removed? (if no, why?): NA  Chart and medications: Sent up with pt  Family notified:  Family at bedside at time of transfer

## 2024-03-24 NOTE — PROGRESS NOTES
"   03/24/24 1400   Appointment Info   Signing Clinician's Name / Credentials (OT) Johanna Jorge, OTD, OTR/L   Rehab Comments (OT) monitor HR; protective spinals      Language daughter providing Kisii interpretation as no  available   Living Environment   People in Home child(jesus), adult   Current Living Arrangements house   Home Accessibility stairs within home   Number of Stairs, Within Home, Primary greater than 10 stairs   Stair Railings, Within Home, Primary railings safe and in good condition   Transportation Anticipated family or friend will provide   Living Environment Comments Patient lives with her adult daughter in a house. Her room is in the basement down at least 12-15 steps.   Self-Care   Usual Activity Tolerance good   Current Activity Tolerance moderate   Equipment Currently Used at Home cane, straight   Fall history within last six months no   Activity/Exercise/Self-Care Comment Patient was independent with all ADLS but relies on family for IADLS at this time. Went home between transplant and this admission.   Instrumental Activities of Daily Living (IADL)   IADL Comments family completes   General Information   Onset of Illness/Injury or Date of Surgery 03/22/24   Referring Physician Yasmani Dumont PA-C   Additional Occupational Profile Info/Pertinent History of Current Problem \"Rosario Terrazas is a 68 year old female, currently day 11 s/p auto for MM readmitted 3/21 with N/F and GNB+ sepsis.\"   Existing Precautions/Restrictions spinal;fall  (typically wears brace for dec risk of spinal injury due to MM, however unable to wear with central line)   Left Upper Extremity (Weight-bearing Status)   (<10lbs)   Right Upper Extremity (Weight-bearing Status)   (<10lbs)   Left Lower Extremity (Weight-bearing Status) full weight-bearing (FWB)   Right Lower Extremity (Weight-bearing Status) full weight-bearing (FWB)   General Observations and Info OT order: BMT patient "   Cognitive Status Examination   Orientation Status orientation to person, place and time   Visual Perception   Visual Impairment/Limitations WFL   Range of Motion Comprehensive   Comment, General Range of Motion appears WFL   Strength Comprehensive (MMT)   Comment, General Manual Muscle Testing (MMT) Assessment generally weak   Coordination   Upper Extremity Coordination No deficits were identified   Bed Mobility   Bed Mobility supine-sit   Supine-Sit [place] (Bed Mobility) minimum assist (75% patient effort)   Transfers   Transfers sit-stand transfer   Transfer Comments CGA   Activities of Daily Living   BADL Assessment/Intervention upper body dressing;lower body dressing;toileting   Upper Body Dressing Assessment/Training   Comment, (Upper Body Dressing) anticipate setup per clinical judgement   Lower Body Dressing Assessment/Training   Comment, (Lower Body Dressing) anticipate mod A per clinical judgement   Toileting   Comment, (Toileting) anticipate mod A per clinical judgement   Clinical Impression   Criteria for Skilled Therapeutic Interventions Met (OT) Yes, treatment indicated   OT Diagnosis dec IND with I/ADLs, overall dec strength   OT Problem List-Impairments impacting ADL problems related to;activity tolerance impaired;mobility;strength   Assessment of Occupational Performance 1-3 Performance Deficits   Identified Performance Deficits bathing, dressing, toileting   Planned Therapy Interventions (OT) ADL retraining;IADL retraining;balance training;bed mobility training;strengthening;transfer training;home program guidelines;progressive activity/exercise;risk factor education   Clinical Decision Making Complexity (OT) problem focused assessment/low complexity   Risk & Benefits of therapy have been explained evaluation/treatment results reviewed;care plan/treatment goals reviewed;risks/benefits reviewed;current/potential barriers reviewed;participants voiced agreement with care plan;participants  included;patient;spouse/significant other;daughter   OT Total Evaluation Time   OT Eval, Low Complexity Minutes (78318) 6   OT Goals   Therapy Frequency (OT) 5 times/week   OT Predicted Duration/Target Date for Goal Attainment 04/07/24   OT Goals Hygiene/Grooming;Upper Body Dressing;Lower Body Dressing;Toilet Transfer/Toileting;OT Goal 1   OT: Hygiene/Grooming modified independent   OT: Upper Body Dressing Modified independent   OT: Lower Body Dressing Modified independent   OT: Toilet Transfer/Toileting Modified independent   OT: Goal 1 pt will demo mod IND with shower transfer simulating home setup and using DME as needed   OT Discharge Planning   OT Plan commode transfer, standing ADLs   OT Discharge Recommendation (DC Rec) home with assist;home with home care occupational therapy   OT Rationale for DC Rec pt moving well however below baseline, has good home support. with continued IP therapies anticipate pt will be safe to d/c home with HHOT services.   OT Brief overview of current status CGA SPT   Total Session Time   Total Session Time (sum of timed and untimed services) 6

## 2024-03-24 NOTE — PROGRESS NOTES
Brief XC note  - alerted by nursing that cardiac monitoring fell off with transfer. Reviewed EMR - patient with recent SVT on amio + metoprolol prn.  Still with some tachycardiac in last 24 ours. Will re-order to continue x48. Primary team to reassess need based on clinical course.    Yordy Reynoso MD

## 2024-03-24 NOTE — PROGRESS NOTES
BMT Daily Progress Note   03/24/2024    Patient ID:  Rosario Terrazas is a 68 year old female, currently day 11 s/p auto for MM readmitted 3/21 with N/F and GNB+ sepsis.  INTERVAL  HISTORY   Rosario was seen with her daughter at the bedside, translating and no Kii  is available.  She is tired and hasn't eaten in a few days.  Her throat hurts, and they would like a numbing spray.  She continued with afib with RVR overnight and NO reports of SVT per discussion with charge RN this morning.  IV metoprolol dose was very helpful for controlling heart rate yesterday; then tachycardia recurred after that wore off, despite being back on the oral metoprolol as well.   Review of Systems: ROS negative except as noted above.  Scheduled Medications   acyclovir  800 mg Oral BID    dexAMETHasone  4 mg Intravenous Q24H    dextrose 5% water  10-20 mL Intravenous Daily at 8 pm    And    filgrastim-aafi  5 mcg/kg Intravenous Daily at 8 pm    And    dextrose 5% water  10-20 mL Intravenous Daily at 8 pm    fluconazole  200 mg Oral Daily    folic acid  1,000 mcg Oral Daily    furosemide  20 mg Intravenous Once    gabapentin  100 mg Oral TID    isoniazid  300 mg Oral Daily    lidocaine  1 patch Transdermal Q24h    loperamide  4 mg Oral 4x Daily    meropenem  1 g Intravenous Q8H    metoprolol tartrate  25 mg Oral Once    metoprolol tartrate  50 mg Oral or Feeding Tube BID    pantoprazole  40 mg Oral Daily    sodium phosphate  15 mmol Intravenous Q2H    pyridOXINE  25 mg Oral Daily    sodium chloride (PF)  3 mL Intracatheter Q8H    [Held by provider] sulfamethoxazole-trimethoprim  1 tablet Oral Daily    vancomycin  1,500 mg Intravenous Q12H     Current Facility-Administered Medications   Medication    acetaminophen (TYLENOL) tablet 325-650 mg    acyclovir (ZOVIRAX) tablet 800 mg    amiodarone (NEXTERONE) 1.8 mg/mL in sodium chloride 0.9% in non-PVC container 500 mL ADULT STANDARD infusion    benzocaine 20% (HURRICAINE/TOPEX)  20 % spray 0.5-1 mL    benzocaine-menthol (CHLORASEPTIC MAX) 15-10 MG lozenge 1 lozenge    calcium carbonate (TUMS) chewable tablet 1,000 mg    dexAMETHasone PF (DECADRON) injection 4 mg    dextrose 5 % flush PRE/POST medication    And    filgrastim-aafi (NIVESTYM) 480 mcg in D5W 25 mL infusion    And    dextrose 5 % flush PRE/POST medication    fluconazole (DIFLUCAN) tablet 200 mg    folic acid (FOLVITE) tablet 1,000 mcg    furosemide (LASIX) injection 20 mg    gabapentin (NEURONTIN) capsule 100 mg    isoniazid (NYDRAZID) tablet 300 mg    lactated ringers infusion    Lidocaine (LIDOCARE) 4 % Patch 1 patch    lidocaine (LMX4) cream    lidocaine 1 % 0.1-1 mL    loperamide (IMODIUM) capsule 4 mg    LORazepam (ATIVAN) injection 0.5-1 mg    Or    LORazepam (ATIVAN) tablet 0.5-1 mg    melatonin tablet 1 mg    melatonin tablet 3 mg    meropenem (MERREM) 1 g vial to attach to  mL bag    metoprolol tartrate (LOPRESSOR) tablet 25 mg    metoprolol tartrate (LOPRESSOR) tablet 50 mg    naloxone (NARCAN) injection 0.2 mg    Or    naloxone (NARCAN) injection 0.4 mg    Or    naloxone (NARCAN) injection 0.2 mg    Or    naloxone (NARCAN) injection 0.4 mg    OLANZapine (zyPREXA) tablet 2.5 mg    ondansetron (ZOFRAN ODT) ODT tab 4 mg    Or    ondansetron (ZOFRAN) injection 4 mg    oxyCODONE (ROXICODONE) tablet 5 mg    pantoprazole (PROTONIX) EC tablet 40 mg    Potassium Phosphate 15 mmol in NS intermittent infusion 15 mmol    prochlorperazine (COMPAZINE) injection 5 mg    Or    prochlorperazine (COMPAZINE) tablet 5 mg    Or    prochlorperazine (COMPAZINE) suppository 12.5 mg    pyridOXINE (VITAMIN B6) tablet 25 mg    senna-docusate (SENOKOT-S/PERICOLACE) 8.6-50 MG per tablet 1 tablet    Or    senna-docusate (SENOKOT-S/PERICOLACE) 8.6-50 MG per tablet 2 tablet    sodium chloride (PF) 0.9% PF flush 3 mL    sodium chloride (PF) 0.9% PF flush 3 mL    [Held by provider] sulfamethoxazole-trimethoprim (BACTRIM) 400-80 MG per tablet 1  "tablet    vancomycin (VANCOCIN) 1,500 mg in 0.9% NaCl 250 mL intermittent infusion       PHYSICAL EXAM     Weight In/Out     Wt Readings from Last 3 Encounters:   03/22/24 97.1 kg (214 lb 1.6 oz)   03/21/24 94.7 kg (208 lb 11.2 oz)   03/20/24 94.9 kg (209 lb 4.8 oz)      I/O last 3 completed shifts:  In: 5035.08 [P.O.:600; I.V.:4435.08]  Out: 1500 [Urine:1500]         /73   Pulse (!) 134   Temp 99.2  F (37.3  C) (Oral)   Resp 20   Ht 1.65 m (5' 4.96\")   Wt 97.1 kg (214 lb 1.6 oz)   SpO2 99%   BMI 35.67 kg/m       General: alert, interactive  Lungs: CTA anteriorly  Cardiovascular: irregular rhythm and intermittent tachycardia to ~150 bpm, no M/R/G   Abdominal/Rectal: +BS (no longer hyperactive), not tende, distended (daughter states this is her normal habitus), soft.     Skin: no rashes or petechaie  Lymph: no peripheral edema  Neuro: A&O x4, moving all extremities spontaneously, PERRL  Additional Findings: Wilder site NT, no drainage.    LABS AND IMAGING - PAST 24 HOURS     Results for orders placed or performed during the hospital encounter of 03/22/24 (from the past 24 hour(s))   Glucose by meter   Result Value Ref Range    GLUCOSE BY METER POCT 90 70 - 99 mg/dL   Basic metabolic panel   Result Value Ref Range    Sodium 140 135 - 145 mmol/L    Potassium 3.0 (L) 3.4 - 5.3 mmol/L    Chloride 110 (H) 98 - 107 mmol/L    Carbon Dioxide (CO2) 19 (L) 22 - 29 mmol/L    Anion Gap 11 7 - 15 mmol/L    Urea Nitrogen 8.5 8.0 - 23.0 mg/dL    Creatinine 0.54 0.51 - 0.95 mg/dL    GFR Estimate >90 >60 mL/min/1.73m2    Calcium 7.3 (L) 8.8 - 10.2 mg/dL    Glucose 106 (H) 70 - 99 mg/dL   Potassium   Result Value Ref Range    Potassium 3.3 (L) 3.4 - 5.3 mmol/L   Glucose by meter   Result Value Ref Range    GLUCOSE BY METER POCT 80 70 - 99 mg/dL   CBC with Platelets & Differential    Narrative    The following orders were created for panel order CBC with Platelets & Differential.  Procedure                               " Abnormality         Status                     ---------                               -----------         ------                     CBC with platelets and d...[[id number]]  Abnormal            Final result               RBC and Platelet Morphology[[id number]]  Abnormal            Final result                 Please view results for these tests on the individual orders.   Comprehensive metabolic panel   Result Value Ref Range    Sodium 140 135 - 145 mmol/L    Potassium 3.4 3.4 - 5.3 mmol/L    Carbon Dioxide (CO2) 19 (L) 22 - 29 mmol/L    Anion Gap 11 7 - 15 mmol/L    Urea Nitrogen 7.4 (L) 8.0 - 23.0 mg/dL    Creatinine 0.48 (L) 0.51 - 0.95 mg/dL    GFR Estimate >90 >60 mL/min/1.73m2    Calcium 7.1 (L) 8.8 - 10.2 mg/dL    Chloride 110 (H) 98 - 107 mmol/L    Glucose 85 70 - 99 mg/dL    Alkaline Phosphatase 59 40 - 150 U/L    AST 10 0 - 45 U/L    ALT 13 0 - 50 U/L    Protein Total 4.5 (L) 6.4 - 8.3 g/dL    Albumin 2.5 (L) 3.5 - 5.2 g/dL    Bilirubin Total 0.6 <=1.2 mg/dL   Phosphorus   Result Value Ref Range    Phosphorus 1.5 (L) 2.5 - 4.5 mg/dL   Magnesium   Result Value Ref Range    Magnesium 1.5 (L) 1.7 - 2.3 mg/dL   CBC with platelets and differential   Result Value Ref Range    WBC Count 0.4 (LL) 4.0 - 11.0 10e3/uL    RBC Count 2.38 (L) 3.80 - 5.20 10e6/uL    Hemoglobin 7.3 (L) 11.7 - 15.7 g/dL    Hematocrit 22.1 (L) 35.0 - 47.0 %    MCV 93 78 - 100 fL    MCH 30.7 26.5 - 33.0 pg    MCHC 33.0 31.5 - 36.5 g/dL    RDW 19.1 (H) 10.0 - 15.0 %    Platelet Count 12 (LL) 150 - 450 10e3/uL    % Neutrophils      % Lymphocytes      % Monocytes      % Eosinophils      % Basophils      % Immature Granulocytes      Absolute Neutrophils      Absolute Lymphocytes      Absolute Monocytes      Absolute Eosinophils      Absolute Basophils      Absolute Immature Granulocytes     RBC and Platelet Morphology   Result Value Ref Range    Platelet Assessment  Automated Count Confirmed. Platelet morphology is normal.     Automated Count  Confirmed. Platelet morphology is normal.    Acanthocytes      Mike Rods      Basophilic Stippling      Bite Cells      Blister Cells      Aromas Cells      Elliptocytes Moderate (A) None Seen    Hgb C Crystals      [place] Bodies      Hypersegmented Neutrophils      Polychromasia      RBC agglutination      RBC Fragments      Reactive Lymphocytes      Rouleaux      Sickle Cells      Smudge Cells      Spherocytes      Stomatocytes      Target Cells      Teardrop Cells      Toxic Neutrophils      RBC Morphology Confirmed RBC Indices          ASSESSMENT BY SYSTEMS     Rosario Adan Terrazas is a 68 year old female, currently day 11  s/p auto for MM readmitted 3/21 with N/F and GNB+ sepsis.     BMT/IEC PROTOCOL for MM Auto     Transplants for multiple myeloma:  The patient has Standard risk IgG D MM, planning on 2 transplants     3/12 Day -1 Melphalan 200 mg/m2   3/13 Day 0 Transplant, cell total post wash 2.23 x 10^6 CD34 + cells/kg (pre freeze dose: 5.36 x 10 ^6 CD34+ cells/kg)     ID  #N/F 3/22  #Strep mitis (3/22) Vanco started (3/22-x)  #E. Coli bacteremia (3/21, 3/22)- Tobraymycin x1 (3/22); meropenem (3/22-x)  Follow up cultures 3/23-3/25: negative to date.    #Abdominal pain, diarrhea.  Stool studies ok.  Likely related to transplant prep.   #Sepsis (hypotension, tachycardia, fever); resolved  #Latent TB: Continue INH/B6 through transplant  #Diarrhea: studies negative.  Continue scheduled imodium.   Ppx antibiotics acyclovir, fluconazole, levofloxacin (held). Bactrim from D28      Heme   - transfuse to keep hgb >7g/dL, plt >10k   *given dose of platelets 3/22 AM   - Of note she is on 81mg ASA. Hold aspirin (3/17), resume once platelets have recovered.   - INR 1.8, given vitamin K in clinic 3/21. Will recheck 3/25     FEN/Renal  #Hypocalcemia:  received 2g calc gluconate 3/22, 3/23; repeat ICa++ pending 3/24.  Electrolyte replacements per protocol, high replacements   #Fluid overload related to sepsis  management: lasix 40mg IV 3/23; repeat with 20mg IV 3/24.  Q 6 hour potassium checks due to concurrent afib.       CV  #Hypotension: relates to SVT and afib with RVR; resolved with amiodarone.  Continue amiodarone 0.5 stable dose; single dose metoprolol 5mg IV 3/23, which was helpful.  Increase oral metoprolol to 50mg bid.  If still having significant tachycardia with this change, plan to reconsult cardiology.     #Afib: management as above.  Additionally, due to the concern that this is being driven by an overall inflammatory state, add decadron 4mg IV daily x 3 days (3/24-3/26).   #SVT: management as above   *Cards note 3/22; reconsult if/when needed.    ECHO 55-60%       GI:   Nausea: prn compazine. Zofran/zyprexa had been scheduled, but are not currently ordered.  Resume zyprexa 2.5mg bid 3/24.   Diarrhea: imodium QID.   Mucositis: hurricaine spray prn       Endocrine  Thyroid FNA Atypia of undetermined significance diagnosis has a 22 (13-30)% risk of malignancy. Repeat FNA (least 4-6 weeks from previous aspiration with optimal time being 12 weeks after last aspiration) , molecular testing, diagnostic lobectomy, or surveillance is recommended.    Has next appt with endocrine 4/16/24.   *TSH WNL 3/21     Neuro/Pain  Neuropathy: continues on eleni, xanaflex. Gabapentin   Rib pain: continue oxycodone and back brace, Tylenol on hold since 3/19       Dispo: transfer back to unit 5C    OVERALL PLAN     Anticipated hospital dismissal: post resolution of acute illness and engraftment  Clinically Significant Risk Factors        # Hypokalemia: Lowest K = 3 mmol/L in last 2 days, will replace as needed   # Hypocalcemia: Lowest iCa = 4 mg/dL in last 2 days, will monitor and replace as appropriate   # Hypomagnesemia: Lowest Mg = 1.5 mg/dL in last 2 days, will replace as needed   # Hypoalbuminemia: Lowest albumin = 2.5 g/dL at 3/24/2024  3:59 AM, will monitor as appropriate  # Coagulation Defect: INR = 1.83 (Ref range: 0.85 -  "1.15) and/or PTT = 29 Seconds (Ref range: 22 - 38 Seconds), will monitor for bleeding  # Thrombocytopenia: Lowest platelets = 12 in last 2 days, will monitor for bleeding          # Obesity: Estimated body mass index is 35.67 kg/m  as calculated from the following:    Height as of this encounter: 1.65 m (5' 4.96\").    Weight as of this encounter: 97.1 kg (214 lb 1.6 oz)., PRESENT ON ADMISSION     # Financial/Environmental Concerns: none       I spent 30 minutes in the care of this patient today, which included time necessary for preparation for the visit, obtaining history, ordering medications/tests/procedures as medically indicated, review of pertinent medical literature, counseling of the patient, communication of recommendations to the care team, and documentation time.    Yudelka Richards PA-C      "

## 2024-03-24 NOTE — PLAN OF CARE
"ICU End of Shift Summary. See flowsheets for vital signs and detailed assessment.    Changes this shift:     Alert and oriented x4, poor rest overnight. Afib rates in the 100s-140s, bp wnl overnight. Tolerating guicho air overnight and denies sob. X2 loose bms overnight, appetite poor, electrolytes replaced per protocol     Plan:     Goal Outcome Evaluation:    Problem: Adult Inpatient Plan of Care  Goal: Plan of Care Review  Description: The Plan of Care Review/Shift note should be completed every shift.  The Outcome Evaluation is a brief statement about your assessment that the patient is improving, declining, or no change.  This information will be displayed automatically on your shift  note.  Outcome: Progressing  Goal: Patient-Specific Goal (Individualized)  Description: You can add care plan individualizations to a care plan. Examples of Individualization might be:  \"Parent requests to be called daily at 9am for status\", \"I have a hard time hearing out of my right ear\", or \"Do not touch me to wake me up as it startles  me\".  Outcome: Progressing  Goal: Absence of Hospital-Acquired Illness or Injury  Outcome: Progressing  Intervention: Identify and Manage Fall Risk  Recent Flowsheet Documentation  Taken 3/24/2024 0000 by David Lay RN  Safety Promotion/Fall Prevention: safety round/check completed  Taken 3/23/2024 2000 by David Lay RN  Safety Promotion/Fall Prevention: safety round/check completed  Intervention: Prevent Skin Injury  Recent Flowsheet Documentation  Taken 3/24/2024 0000 by David Lay RN  Body Position: position changed independently  Taken 3/23/2024 2000 by David Lay RN  Body Position: position changed independently  Intervention: Prevent Infection  Recent Flowsheet Documentation  Taken 3/24/2024 0000 by David Lay RN  Infection Prevention:   cohorting utilized   environmental surveillance performed   equipment surfaces disinfected   hand hygiene promoted   " personal protective equipment utilized   rest/sleep promoted   single patient room provided   visitors restricted/screened  Taken 3/23/2024 2000 by David Lay RN  Infection Prevention:   cohorting utilized   environmental surveillance performed   equipment surfaces disinfected   hand hygiene promoted   personal protective equipment utilized   rest/sleep promoted   single patient room provided   visitors restricted/screened  Goal: Optimal Comfort and Wellbeing  Outcome: Progressing  Intervention: Monitor Pain and Promote Comfort  Recent Flowsheet Documentation  Taken 3/23/2024 2000 by David Lay RN  Pain Management Interventions:   care clustered   declines  Intervention: Provide Person-Centered Care  Recent Flowsheet Documentation  Taken 3/24/2024 0000 by David Lay RN  Trust Relationship/Rapport: care explained  Taken 3/23/2024 2000 by David Lay RN  Trust Relationship/Rapport: care explained  Goal: Readiness for Transition of Care  Outcome: Progressing     Problem: Oral Intake Inadequate  Goal: Improved Oral Intake  Outcome: Progressing  Intervention: Promote and Optimize Oral Intake  Recent Flowsheet Documentation  Taken 3/24/2024 0000 by David Lay RN  Oral Nutrition Promotion:   rest periods promoted   social interaction promoted   physical activity promoted  Taken 3/23/2024 2000 by David Lay RN  Oral Nutrition Promotion:   rest periods promoted   social interaction promoted   physical activity promoted     Problem: Infection  Goal: Absence of Infection Signs and Symptoms  Outcome: Progressing  Intervention: Prevent or Manage Infection  Recent Flowsheet Documentation  Taken 3/24/2024 0000 by David Lay RN  Isolation Precautions: contact precautions maintained  Taken 3/23/2024 2000 by David Lay RN  Isolation Precautions: contact precautions maintained     Problem: Fall Injury Risk  Goal: Absence of Fall and Fall-Related Injury  Outcome:  Progressing  Intervention: Identify and Manage Contributors  Recent Flowsheet Documentation  Taken 3/24/2024 0000 by David Lay, RN  Medication Review/Management:   medications reviewed   infusion initiated   provider consulted  Taken 3/23/2024 2000 by David Lay RN  Medication Review/Management:   medications reviewed   infusion initiated   provider consulted  Intervention: Promote Injury-Free Environment  Recent Flowsheet Documentation  Taken 3/24/2024 0000 by David Lay, RN  Safety Promotion/Fall Prevention: safety round/check completed  Taken 3/23/2024 2000 by David Lay RN  Safety Promotion/Fall Prevention: safety round/check completed

## 2024-03-24 NOTE — PLAN OF CARE
ICU End of Shift Summary. See flowsheets for vital signs and detailed assessment.    Changes this shift: VSS. Afib rates 110's-160's. Normotensive. Amio infusing. Rates sustained 130's-150's. IV metoprolol given with improvement in rates. Remains febrile/ low grade throughout the day. A&O x4. Pt lethargic wanting to rest in bed. OOB for approx 45 min. Pt demonstrated good strength with only a light assist of 1. Good UOP. 1 large unmeasured occurrence. C. Diff (-). Loose stools x3. Imodium given. Very poor oral intake. Pt reports having no appetite. Daughter and grandson at bedside throughout the day to assist with translation as there is no  available for Lizzy.     Plan: Monitor fevers and treat appropriately. Encourage PO intake and OOB activity.       Goal Outcome Evaluation:      Plan of Care Reviewed With: patient, child    Overall Patient Progress: no changeOverall Patient Progress: no change    Outcome Evaluation: VSS. Remains febrile. Afib. Amio infusing.

## 2024-03-25 LAB
ABO/RH(D): NORMAL
ALBUMIN SERPL BCG-MCNC: 2.7 G/DL (ref 3.5–5.2)
ALP SERPL-CCNC: 54 U/L (ref 40–150)
ALT SERPL W P-5'-P-CCNC: 13 U/L (ref 0–50)
ANION GAP SERPL CALCULATED.3IONS-SCNC: 10 MMOL/L (ref 7–15)
ANTIBODY SCREEN: NEGATIVE
AST SERPL W P-5'-P-CCNC: 12 U/L (ref 0–45)
ATRIAL RATE - MUSE: 141 BPM
ATRIAL RATE - MUSE: 147 BPM
ATRIAL RATE - MUSE: 150 BPM
ATRIAL RATE - MUSE: 94 BPM
ATRIAL RATE - MUSE: NORMAL BPM
BACTERIA BLD CULT: ABNORMAL
BASOPHILS # BLD AUTO: ABNORMAL 10*3/UL
BASOPHILS # BLD MANUAL: 0 10E3/UL (ref 0–0.2)
BASOPHILS NFR BLD AUTO: ABNORMAL %
BASOPHILS NFR BLD MANUAL: 0 %
BILIRUB DIRECT SERPL-MCNC: <0.2 MG/DL (ref 0–0.3)
BILIRUB SERPL-MCNC: 0.5 MG/DL
BLD PROD TYP BPU: NORMAL
BLOOD COMPONENT TYPE: NORMAL
BUN SERPL-MCNC: 13.4 MG/DL (ref 8–23)
CALCIUM SERPL-MCNC: 7 MG/DL (ref 8.8–10.2)
CHLORIDE SERPL-SCNC: 109 MMOL/L (ref 98–107)
CODING SYSTEM: NORMAL
CREAT SERPL-MCNC: 0.52 MG/DL (ref 0.51–0.95)
DEPRECATED HCO3 PLAS-SCNC: 21 MMOL/L (ref 22–29)
DIASTOLIC BLOOD PRESSURE - MUSE: NORMAL MMHG
EGFRCR SERPLBLD CKD-EPI 2021: >90 ML/MIN/1.73M2
EOSINOPHIL # BLD AUTO: ABNORMAL 10*3/UL
EOSINOPHIL # BLD MANUAL: 0 10E3/UL (ref 0–0.7)
EOSINOPHIL NFR BLD AUTO: ABNORMAL %
EOSINOPHIL NFR BLD MANUAL: 0 %
ERYTHROCYTE [DISTWIDTH] IN BLOOD BY AUTOMATED COUNT: 18.6 % (ref 10–15)
GLUCOSE SERPL-MCNC: 107 MG/DL (ref 70–99)
HCT VFR BLD AUTO: 22.2 % (ref 35–47)
HGB BLD-MCNC: 7.4 G/DL (ref 11.7–15.7)
IMM GRANULOCYTES # BLD: ABNORMAL 10*3/UL
IMM GRANULOCYTES NFR BLD: ABNORMAL %
INR PPP: 1.5 (ref 0.85–1.15)
INTERPRETATION ECG - MUSE: NORMAL
ISSUE DATE AND TIME: NORMAL
LYMPHOCYTES # BLD AUTO: ABNORMAL 10*3/UL
LYMPHOCYTES # BLD MANUAL: 0.2 10E3/UL (ref 0.8–5.3)
LYMPHOCYTES NFR BLD AUTO: ABNORMAL %
LYMPHOCYTES NFR BLD MANUAL: 16 %
MAGNESIUM SERPL-MCNC: 1.8 MG/DL (ref 1.7–2.3)
MCH RBC QN AUTO: 31 PG (ref 26.5–33)
MCHC RBC AUTO-ENTMCNC: 33.3 G/DL (ref 31.5–36.5)
MCV RBC AUTO: 93 FL (ref 78–100)
MONOCYTES # BLD AUTO: ABNORMAL 10*3/UL
MONOCYTES # BLD MANUAL: 0.7 10E3/UL (ref 0–1.3)
MONOCYTES NFR BLD AUTO: ABNORMAL %
MONOCYTES NFR BLD MANUAL: 49 %
MYELOCYTES # BLD MANUAL: 0 10E3/UL
MYELOCYTES NFR BLD MANUAL: 1 %
NEUTROPHILS # BLD AUTO: ABNORMAL 10*3/UL
NEUTROPHILS # BLD MANUAL: 0.5 10E3/UL (ref 1.6–8.3)
NEUTROPHILS NFR BLD AUTO: ABNORMAL %
NEUTROPHILS NFR BLD MANUAL: 34 %
NRBC # BLD AUTO: 0 10E3/UL
NRBC BLD AUTO-RTO: 0 /100
P AXIS - MUSE: -14 DEGREES
P AXIS - MUSE: 58 DEGREES
P AXIS - MUSE: 65 DEGREES
P AXIS - MUSE: NORMAL DEGREES
P AXIS - MUSE: NORMAL DEGREES
PHOSPHATE SERPL-MCNC: 2.3 MG/DL (ref 2.5–4.5)
PLAT MORPH BLD: ABNORMAL
PLATELET # BLD AUTO: 7 10E3/UL (ref 150–450)
POLYCHROMASIA BLD QL SMEAR: SLIGHT
POTASSIUM SERPL-SCNC: 4.2 MMOL/L (ref 3.4–5.3)
PR INTERVAL - MUSE: 136 MS
PR INTERVAL - MUSE: 200 MS
PR INTERVAL - MUSE: 200 MS
PR INTERVAL - MUSE: NORMAL MS
PR INTERVAL - MUSE: NORMAL MS
PROT SERPL-MCNC: 4.5 G/DL (ref 6.4–8.3)
QRS DURATION - MUSE: 66 MS
QRS DURATION - MUSE: 70 MS
QRS DURATION - MUSE: 72 MS
QRS DURATION - MUSE: 74 MS
QRS DURATION - MUSE: 74 MS
QT - MUSE: 266 MS
QT - MUSE: 306 MS
QT - MUSE: 312 MS
QT - MUSE: 332 MS
QT - MUSE: 392 MS
QTC - MUSE: 416 MS
QTC - MUSE: 465 MS
QTC - MUSE: 472 MS
QTC - MUSE: 491 MS
QTC - MUSE: 512 MS
R AXIS - MUSE: 34 DEGREES
R AXIS - MUSE: 35 DEGREES
R AXIS - MUSE: 39 DEGREES
R AXIS - MUSE: 53 DEGREES
R AXIS - MUSE: 64 DEGREES
RBC # BLD AUTO: 2.39 10E6/UL (ref 3.8–5.2)
RBC MORPH BLD: ABNORMAL
SODIUM SERPL-SCNC: 140 MMOL/L (ref 135–145)
SPECIMEN EXPIRATION DATE: NORMAL
SYSTOLIC BLOOD PRESSURE - MUSE: NORMAL MMHG
T AXIS - MUSE: 41 DEGREES
T AXIS - MUSE: 43 DEGREES
T AXIS - MUSE: 47 DEGREES
T AXIS - MUSE: 61 DEGREES
T AXIS - MUSE: 66 DEGREES
UNIT ABO/RH: NORMAL
UNIT NUMBER: NORMAL
UNIT STATUS: NORMAL
UNIT TYPE ISBT: 6200
VENTRICULAR RATE- MUSE: 134 BPM
VENTRICULAR RATE- MUSE: 143 BPM
VENTRICULAR RATE- MUSE: 143 BPM
VENTRICULAR RATE- MUSE: 147 BPM
VENTRICULAR RATE- MUSE: 94 BPM
VIT D+METAB SERPL-MCNC: 25 NG/ML (ref 20–50)
WBC # BLD AUTO: 1.4 10E3/UL (ref 4–11)

## 2024-03-25 PROCEDURE — 250N000011 HC RX IP 250 OP 636: Mod: JZ

## 2024-03-25 PROCEDURE — 250N000013 HC RX MED GY IP 250 OP 250 PS 637

## 2024-03-25 PROCEDURE — 250N000013 HC RX MED GY IP 250 OP 250 PS 637: Performed by: STUDENT IN AN ORGANIZED HEALTH CARE EDUCATION/TRAINING PROGRAM

## 2024-03-25 PROCEDURE — 250N000013 HC RX MED GY IP 250 OP 250 PS 637: Performed by: PHYSICIAN ASSISTANT

## 2024-03-25 PROCEDURE — 82248 BILIRUBIN DIRECT: CPT | Performed by: PHYSICIAN ASSISTANT

## 2024-03-25 PROCEDURE — 93005 ELECTROCARDIOGRAM TRACING: CPT

## 2024-03-25 PROCEDURE — 250N000011 HC RX IP 250 OP 636: Performed by: INTERNAL MEDICINE

## 2024-03-25 PROCEDURE — 85610 PROTHROMBIN TIME: CPT | Performed by: PHYSICIAN ASSISTANT

## 2024-03-25 PROCEDURE — 85027 COMPLETE CBC AUTOMATED: CPT | Performed by: STUDENT IN AN ORGANIZED HEALTH CARE EDUCATION/TRAINING PROGRAM

## 2024-03-25 PROCEDURE — 258N000003 HC RX IP 258 OP 636

## 2024-03-25 PROCEDURE — 99233 SBSQ HOSP IP/OBS HIGH 50: CPT | Mod: FS | Performed by: PHYSICIAN ASSISTANT

## 2024-03-25 PROCEDURE — 206N000001 HC R&B BMT UMMC

## 2024-03-25 PROCEDURE — 86900 BLOOD TYPING SEROLOGIC ABO: CPT | Performed by: PHYSICIAN ASSISTANT

## 2024-03-25 PROCEDURE — 258N000003 HC RX IP 258 OP 636: Performed by: PHYSICIAN ASSISTANT

## 2024-03-25 PROCEDURE — 84100 ASSAY OF PHOSPHORUS: CPT | Performed by: PHYSICIAN ASSISTANT

## 2024-03-25 PROCEDURE — 93010 ELECTROCARDIOGRAM REPORT: CPT | Performed by: INTERNAL MEDICINE

## 2024-03-25 PROCEDURE — 258N000003 HC RX IP 258 OP 636: Performed by: INTERNAL MEDICINE

## 2024-03-25 PROCEDURE — P9037 PLATE PHERES LEUKOREDU IRRAD: HCPCS

## 2024-03-25 PROCEDURE — 250N000011 HC RX IP 250 OP 636: Mod: JZ | Performed by: PHYSICIAN ASSISTANT

## 2024-03-25 PROCEDURE — 82306 VITAMIN D 25 HYDROXY: CPT | Performed by: PHYSICIAN ASSISTANT

## 2024-03-25 PROCEDURE — 87040 BLOOD CULTURE FOR BACTERIA: CPT | Performed by: PHYSICIAN ASSISTANT

## 2024-03-25 PROCEDURE — 82040 ASSAY OF SERUM ALBUMIN: CPT | Performed by: STUDENT IN AN ORGANIZED HEALTH CARE EDUCATION/TRAINING PROGRAM

## 2024-03-25 PROCEDURE — 250N000011 HC RX IP 250 OP 636: Performed by: PHYSICIAN ASSISTANT

## 2024-03-25 PROCEDURE — 250N000009 HC RX 250: Performed by: INTERNAL MEDICINE

## 2024-03-25 PROCEDURE — 85007 BL SMEAR W/DIFF WBC COUNT: CPT | Performed by: STUDENT IN AN ORGANIZED HEALTH CARE EDUCATION/TRAINING PROGRAM

## 2024-03-25 PROCEDURE — 83735 ASSAY OF MAGNESIUM: CPT | Performed by: PHYSICIAN ASSISTANT

## 2024-03-25 PROCEDURE — 99232 SBSQ HOSP IP/OBS MODERATE 35: CPT | Mod: GC | Performed by: INTERNAL MEDICINE

## 2024-03-25 RX ORDER — HEPARIN SODIUM,PORCINE 10 UNIT/ML
5-10 VIAL (ML) INTRAVENOUS EVERY 24 HOURS
Status: DISCONTINUED | OUTPATIENT
Start: 2024-03-25 | End: 2024-04-02 | Stop reason: HOSPADM

## 2024-03-25 RX ORDER — AMIODARONE HYDROCHLORIDE 200 MG/1
400 TABLET ORAL DAILY
Status: DISCONTINUED | OUTPATIENT
Start: 2024-04-01 | End: 2024-03-28

## 2024-03-25 RX ORDER — CALCIUM CARBONATE 500(1250)
500 TABLET ORAL 2 TIMES DAILY WITH MEALS
Status: DISCONTINUED | OUTPATIENT
Start: 2024-03-25 | End: 2024-04-02 | Stop reason: HOSPADM

## 2024-03-25 RX ORDER — HEPARIN SODIUM,PORCINE 10 UNIT/ML
5-10 VIAL (ML) INTRAVENOUS
Status: DISCONTINUED | OUTPATIENT
Start: 2024-03-25 | End: 2024-04-02 | Stop reason: HOSPADM

## 2024-03-25 RX ORDER — FUROSEMIDE 10 MG/ML
20 INJECTION INTRAMUSCULAR; INTRAVENOUS ONCE
Status: COMPLETED | OUTPATIENT
Start: 2024-03-25 | End: 2024-03-25

## 2024-03-25 RX ORDER — POTASSIUM PHOS IN 0.9 % NACL 15MMOL/250
15 PLASTIC BAG, INJECTION (ML) INTRAVENOUS ONCE
Status: COMPLETED | OUTPATIENT
Start: 2024-03-25 | End: 2024-03-25

## 2024-03-25 RX ORDER — ERGOCALCIFEROL 1.25 MG/1
50000 CAPSULE, LIQUID FILLED ORAL
Status: DISCONTINUED | OUTPATIENT
Start: 2024-03-25 | End: 2024-04-02 | Stop reason: HOSPADM

## 2024-03-25 RX ORDER — AMIODARONE HYDROCHLORIDE 200 MG/1
400 TABLET ORAL DAILY
Status: DISCONTINUED | OUTPATIENT
Start: 2024-04-01 | End: 2024-03-25

## 2024-03-25 RX ORDER — CALCIUM GLUCONATE 20 MG/ML
2 INJECTION, SOLUTION INTRAVENOUS ONCE
Status: COMPLETED | OUTPATIENT
Start: 2024-03-25 | End: 2024-03-25

## 2024-03-25 RX ORDER — AMIODARONE HYDROCHLORIDE 200 MG/1
400 TABLET ORAL 2 TIMES DAILY
Qty: 28 TABLET | Refills: 0 | Status: DISCONTINUED | OUTPATIENT
Start: 2024-03-25 | End: 2024-03-28

## 2024-03-25 RX ADMIN — Medication 25 MG: at 09:30

## 2024-03-25 RX ADMIN — CALCIUM 500 MG: 500 TABLET ORAL at 20:57

## 2024-03-25 RX ADMIN — DEXTROSE MONOHYDRATE 480 MCG: 50 INJECTION, SOLUTION INTRAVENOUS at 20:58

## 2024-03-25 RX ADMIN — LOPERAMIDE HYDROCHLORIDE 4 MG: 2 CAPSULE ORAL at 15:40

## 2024-03-25 RX ADMIN — GABAPENTIN 100 MG: 100 CAPSULE ORAL at 20:56

## 2024-03-25 RX ADMIN — METOPROLOL TARTRATE 50 MG: 25 TABLET, FILM COATED ORAL at 20:55

## 2024-03-25 RX ADMIN — DICLOFENAC SODIUM 2 G: 10 GEL TOPICAL at 15:40

## 2024-03-25 RX ADMIN — CALCIUM 500 MG: 500 TABLET ORAL at 12:47

## 2024-03-25 RX ADMIN — LOPERAMIDE HYDROCHLORIDE 4 MG: 2 CAPSULE ORAL at 20:55

## 2024-03-25 RX ADMIN — METOPROLOL TARTRATE 50 MG: 25 TABLET, FILM COATED ORAL at 09:31

## 2024-03-25 RX ADMIN — FLUCONAZOLE 200 MG: 200 TABLET ORAL at 09:30

## 2024-03-25 RX ADMIN — SODIUM CHLORIDE, POTASSIUM CHLORIDE, SODIUM LACTATE AND CALCIUM CHLORIDE: 600; 310; 30; 20 INJECTION, SOLUTION INTRAVENOUS at 00:10

## 2024-03-25 RX ADMIN — ERGOCALCIFEROL 50000 UNITS: 1.25 CAPSULE, LIQUID FILLED ORAL at 12:48

## 2024-03-25 RX ADMIN — ACYCLOVIR 800 MG: 800 TABLET ORAL at 09:31

## 2024-03-25 RX ADMIN — SODIUM CHLORIDE 0.5 MG/MIN: 9 INJECTION, SOLUTION INTRAVENOUS at 04:40

## 2024-03-25 RX ADMIN — AMIODARONE HYDROCHLORIDE 400 MG: 200 TABLET ORAL at 12:47

## 2024-03-25 RX ADMIN — GABAPENTIN 100 MG: 100 CAPSULE ORAL at 09:30

## 2024-03-25 RX ADMIN — POTASSIUM PHOSPHATE, MONOBASIC POTASSIUM PHOSPHATE, DIBASIC 15 MMOL: 224; 236 INJECTION, SOLUTION, CONCENTRATE INTRAVENOUS at 09:24

## 2024-03-25 RX ADMIN — Medication 1 LOZENGE: at 09:38

## 2024-03-25 RX ADMIN — FUROSEMIDE 20 MG: 10 INJECTION, SOLUTION INTRAMUSCULAR; INTRAVENOUS at 11:23

## 2024-03-25 RX ADMIN — MEROPENEM 1 G: 1 INJECTION, POWDER, FOR SOLUTION INTRAVENOUS at 04:40

## 2024-03-25 RX ADMIN — ISONIAZID 300 MG: 300 TABLET ORAL at 09:30

## 2024-03-25 RX ADMIN — FOLIC ACID 1000 MCG: 1 TABLET ORAL at 09:30

## 2024-03-25 RX ADMIN — MEROPENEM 1 G: 1 INJECTION, POWDER, FOR SOLUTION INTRAVENOUS at 12:47

## 2024-03-25 RX ADMIN — Medication 1 LOZENGE: at 15:40

## 2024-03-25 RX ADMIN — ACYCLOVIR 800 MG: 800 TABLET ORAL at 20:56

## 2024-03-25 RX ADMIN — DEXTROSE MONOHYDRATE 20 ML: 50 INJECTION, SOLUTION INTRAVENOUS at 20:58

## 2024-03-25 RX ADMIN — Medication 10 ML: at 14:13

## 2024-03-25 RX ADMIN — LOPERAMIDE HYDROCHLORIDE 4 MG: 2 CAPSULE ORAL at 12:47

## 2024-03-25 RX ADMIN — DICLOFENAC SODIUM 2 G: 10 GEL TOPICAL at 11:24

## 2024-03-25 RX ADMIN — DEXAMETHASONE SODIUM PHOSPHATE 4 MG: 10 INJECTION INTRAMUSCULAR; INTRAVENOUS at 11:17

## 2024-03-25 RX ADMIN — CALCIUM GLUCONATE 2 G: 20 INJECTION, SOLUTION INTRAVENOUS at 09:24

## 2024-03-25 RX ADMIN — GABAPENTIN 100 MG: 100 CAPSULE ORAL at 15:40

## 2024-03-25 RX ADMIN — AMIODARONE HYDROCHLORIDE 400 MG: 200 TABLET ORAL at 20:56

## 2024-03-25 RX ADMIN — MEROPENEM 1 G: 1 INJECTION, POWDER, FOR SOLUTION INTRAVENOUS at 20:58

## 2024-03-25 RX ADMIN — DICLOFENAC SODIUM 2 G: 10 GEL TOPICAL at 20:59

## 2024-03-25 RX ADMIN — LOPERAMIDE HYDROCHLORIDE 4 MG: 2 CAPSULE ORAL at 09:30

## 2024-03-25 RX ADMIN — ACETAMINOPHEN 650 MG: 325 TABLET, FILM COATED ORAL at 00:15

## 2024-03-25 RX ADMIN — OXYCODONE HYDROCHLORIDE 5 MG: 5 TABLET ORAL at 04:50

## 2024-03-25 RX ADMIN — PANTOPRAZOLE SODIUM 40 MG: 40 TABLET, DELAYED RELEASE ORAL at 09:30

## 2024-03-25 RX ADMIN — Medication 1 LOZENGE: at 12:56

## 2024-03-25 ASSESSMENT — ACTIVITIES OF DAILY LIVING (ADL)
ADLS_ACUITY_SCORE: 34
ADLS_ACUITY_SCORE: 31
ADLS_ACUITY_SCORE: 31
ADLS_ACUITY_SCORE: 34
ADLS_ACUITY_SCORE: 31
ADLS_ACUITY_SCORE: 34
ADLS_ACUITY_SCORE: 31
ADLS_ACUITY_SCORE: 33
ADLS_ACUITY_SCORE: 34
ADLS_ACUITY_SCORE: 31
ADLS_ACUITY_SCORE: 33
ADLS_ACUITY_SCORE: 34
ADLS_ACUITY_SCORE: 34
ADLS_ACUITY_SCORE: 31
ADLS_ACUITY_SCORE: 34
ADLS_ACUITY_SCORE: 34
ADLS_ACUITY_SCORE: 31
ADLS_ACUITY_SCORE: 31
ADLS_ACUITY_SCORE: 34

## 2024-03-25 NOTE — PROGRESS NOTES
S-pt from icu- good eye contact. Smiling-Converses well thru daughter as . Electrolytes needing freq replacemnt -see flowsheet. Eating some-- 3 tsp rice and 1 bite of meat. Had receiverd total 50mg lopressor prior to transfer to unit 5c- bp sys soft. 101- second dose of lopresser due and given at 2000. Amiodarone gtt continues at low dose. Reviewed with on call MD of concern to hold gtt if sys of 90 is tooo low natalie since pt was also septic in this hospital coarse. No adjustments to parameters made at this time- will keep on-call informed if trend is down.   B-multiple myeloma  A-post tx day 11- post icu status transfer to unit today  R-assist with all cares as needed. On going assessment of pulse rhythmn  and bp status since got full dose of oral loresser 50mg this lilian and is on amiodarone gtt also. Attend to daughter's needs with her bedside care / assistance and aid in communicating. Freq need for electrolyte checks.-be attentive to supplement needs.

## 2024-03-25 NOTE — PROGRESS NOTES
BMT Daily Progress Note   03/25/2024    Patient ID:  Rosario Terrazas is a 68 year old female, currently day 12 s/p auto for MM readmitted 3/21 with N/F and GNB+ sepsis.  INTERVAL  HISTORY   Rosario is doing okay.  Her heart rate is much better controlled and she is not hypotensive.  She hopes to come off telemetry.  She is tired and weak.  She had a frontal headache this morning, but it resolved by the time of our visit.  No abdominal pain.  Her left shoulder sometimes hurts, related to her arthritis.  Her ankles are still swollen.  She had two large stools yesterday, none so far today.  She is eating very small amounts.   Review of Systems: ROS negative except as noted above.  Scheduled Medications   acyclovir  800 mg Oral BID    amiodarone  400 mg Oral BID    [START ON 4/1/2024] amiodarone  400 mg Oral Daily    calcium carbonate  500 mg Oral BID w/meals    dexAMETHasone  4 mg Intravenous Q24H    dextrose 5% water  10-20 mL Intravenous Daily at 8 pm    And    filgrastim-aafi  5 mcg/kg Intravenous Daily at 8 pm    And    dextrose 5% water  10-20 mL Intravenous Daily at 8 pm    diclofenac  2 g Topical 4x Daily    fluconazole  200 mg Oral Daily    folic acid  1,000 mcg Oral Daily    gabapentin  100 mg Oral TID    isoniazid  300 mg Oral Daily    lidocaine  1 patch Transdermal Q24h    loperamide  4 mg Oral 4x Daily    meropenem  1 g Intravenous Q8H    metoprolol tartrate  50 mg Oral or Feeding Tube BID    [Held by provider] OLANZapine  2.5 mg Oral BID    pantoprazole  40 mg Oral Daily    sodium phosphate  15 mmol Intravenous Once    pyridOXINE  25 mg Oral Daily    sodium chloride (PF)  3 mL Intracatheter Q8H    [Held by provider] sulfamethoxazole-trimethoprim  1 tablet Oral Daily    vitamin D2  50,000 Units Oral Q7 Days     Current Facility-Administered Medications   Medication    acetaminophen (TYLENOL) tablet 325-650 mg    acyclovir (ZOVIRAX) tablet 800 mg    amiodarone (PACERONE) tablet 400 mg    [START ON  4/1/2024] amiodarone (PACERONE) tablet 400 mg    benzocaine 20% (HURRICAINE/TOPEX) 20 % spray 0.5-1 mL    benzocaine-menthol (CHLORASEPTIC MAX) 15-10 MG lozenge 1 lozenge    calcium carbonate (TUMS) chewable tablet 1,000 mg    calcium carbonate 500 mg (elemental) (OSCAL) tablet 500 mg    dexAMETHasone PF (DECADRON) injection 4 mg    dextrose 5 % flush PRE/POST medication    And    filgrastim-aafi (NIVESTYM) 480 mcg in D5W 25 mL infusion    And    dextrose 5 % flush PRE/POST medication    diclofenac (VOLTAREN) 1 % topical gel 2 g    fluconazole (DIFLUCAN) tablet 200 mg    folic acid (FOLVITE) tablet 1,000 mcg    gabapentin (NEURONTIN) capsule 100 mg    isoniazid (NYDRAZID) tablet 300 mg    Lidocaine (LIDOCARE) 4 % Patch 1 patch    lidocaine (LMX4) cream    lidocaine 1 % 0.1-1 mL    loperamide (IMODIUM) capsule 4 mg    LORazepam (ATIVAN) injection 0.5-1 mg    Or    LORazepam (ATIVAN) tablet 0.5-1 mg    melatonin tablet 3 mg    meropenem (MERREM) 1 g vial to attach to  mL bag    metoprolol tartrate (LOPRESSOR) tablet 50 mg    naloxone (NARCAN) injection 0.2 mg    Or    naloxone (NARCAN) injection 0.4 mg    Or    naloxone (NARCAN) injection 0.2 mg    Or    naloxone (NARCAN) injection 0.4 mg    [Held by provider] OLANZapine (zyPREXA) tablet 2.5 mg    oxyCODONE (ROXICODONE) tablet 5 mg    pantoprazole (PROTONIX) EC tablet 40 mg    Potassium Phosphate 15 mmol in NS intermittent infusion 15 mmol    prochlorperazine (COMPAZINE) injection 5 mg    Or    prochlorperazine (COMPAZINE) tablet 5 mg    pyridOXINE (VITAMIN B6) tablet 25 mg    senna-docusate (SENOKOT-S/PERICOLACE) 8.6-50 MG per tablet 1 tablet    Or    senna-docusate (SENOKOT-S/PERICOLACE) 8.6-50 MG per tablet 2 tablet    sodium chloride (PF) 0.9% PF flush 3 mL    sodium chloride (PF) 0.9% PF flush 3 mL    [Held by provider] sulfamethoxazole-trimethoprim (BACTRIM) 400-80 MG per tablet 1 tablet    vitamin D2 (ERGOCALCIFEROL) 30945 units (1250 mcg) capsule 50,000  "Units       PHYSICAL EXAM     Weight In/Out     Wt Readings from Last 3 Encounters:   03/25/24 106 kg (233 lb 9.6 oz)   03/21/24 94.7 kg (208 lb 11.2 oz)   03/20/24 94.9 kg (209 lb 4.8 oz)      I/O last 3 completed shifts:  In: 4231.19 [P.O.:500; I.V.:3731.19]  Out: 1200 [Urine:1200]         BP 95/67 (BP Location: Left arm)   Pulse 118   Temp 98  F (36.7  C) (Axillary)   Resp 16   Ht 1.65 m (5' 4.96\")   Wt 106 kg (233 lb 9.6 oz)   SpO2 100%   BMI 38.92 kg/m       General: alert, interactive  Lungs: CTAB  Cardiovascular: irregular rhythm, no tachycardia  Abdominal/Rectal: +BS (no longer hyperactive), not tender, distended (daughter states this is her normal habitus), soft.     Skin: no rashes or petechaie  Lymph: 1-2+ peripheral edema at ankles  Neuro: A&O x4, moving all extremities spontaneously, PERRL  Additional Findings: Wilder site NT, no drainage.    LABS AND IMAGING - PAST 24 HOURS     Results for orders placed or performed during the hospital encounter of 03/22/24 (from the past 24 hour(s))   EKG 12-lead, complete   Result Value Ref Range    Systolic Blood Pressure  mmHg    Diastolic Blood Pressure  mmHg    Ventricular Rate 94 BPM    Atrial Rate 94 BPM    IN Interval 136 ms    QRS Duration 70 ms     ms    QTc 491 ms    P Axis -14 degrees    R AXIS 34 degrees    T Axis 47 degrees    Interpretation ECG       Sinus rhythm with PACs  Prolonged QT  Abnormal ECG  When compared with ECG of 22-MAR-2024 23:56, (unconfirmed)  PACs  are now Present  Vent. rate has decreased BY  49 BPM  Confirmed by fellow Danielle Jarquin [id number]) on 3/25/2024 10:06:29 AM  Confirmed by MD SANTOS JANE [id number]) on 3/25/2024 11:13:33 AM     Potassium   Result Value Ref Range    Potassium 4.0 3.4 - 5.3 mmol/L   Phosphorus   Result Value Ref Range    Phosphorus 2.1 (L) 2.5 - 4.5 mg/dL   Potassium   Result Value Ref Range    Potassium 4.2 3.4 - 5.3 mmol/L   CBC with Platelets & Differential    Narrative    The following orders were " created for panel order CBC with Platelets & Differential.  Procedure                               Abnormality         Status                     ---------                               -----------         ------                     CBC with platelets and d...[[id number]]  Abnormal            Final result               Manual Differential[[id number]]          Abnormal            Final result                 Please view results for these tests on the individual orders.   ABO/Rh type and screen    Narrative    The following orders were created for panel order ABO/Rh type and screen.  Procedure                               Abnormality         Status                     ---------                               -----------         ------                     Adult Type and Screen[[id number]]                            Final result                 Please view results for these tests on the individual orders.   Comprehensive metabolic panel   Result Value Ref Range    Sodium 140 135 - 145 mmol/L    Potassium 4.2 3.4 - 5.3 mmol/L    Carbon Dioxide (CO2) 21 (L) 22 - 29 mmol/L    Anion Gap 10 7 - 15 mmol/L    Urea Nitrogen 13.4 8.0 - 23.0 mg/dL    Creatinine 0.52 0.51 - 0.95 mg/dL    GFR Estimate >90 >60 mL/min/1.73m2    Calcium 7.0 (L) 8.8 - 10.2 mg/dL    Chloride 109 (H) 98 - 107 mmol/L    Glucose 107 (H) 70 - 99 mg/dL    Alkaline Phosphatase 54 40 - 150 U/L    AST 12 0 - 45 U/L    ALT 13 0 - 50 U/L    Protein Total 4.5 (L) 6.4 - 8.3 g/dL    Albumin 2.7 (L) 3.5 - 5.2 g/dL    Bilirubin Total 0.5 <=1.2 mg/dL   INR   Result Value Ref Range    INR 1.50 (H) 0.85 - 1.15   Magnesium   Result Value Ref Range    Magnesium 1.8 1.7 - 2.3 mg/dL   Phosphorus   Result Value Ref Range    Phosphorus 2.3 (L) 2.5 - 4.5 mg/dL   Bilirubin direct   Result Value Ref Range    Bilirubin Direct <0.20 0.00 - 0.30 mg/dL   CBC with platelets and differential   Result Value Ref Range    WBC Count 1.4 (L) 4.0 - 11.0 10e3/uL    RBC Count 2.39 (L) 3.80 -  5.20 10e6/uL    Hemoglobin 7.4 (L) 11.7 - 15.7 g/dL    Hematocrit 22.2 (L) 35.0 - 47.0 %    MCV 93 78 - 100 fL    MCH 31.0 26.5 - 33.0 pg    MCHC 33.3 31.5 - 36.5 g/dL    RDW 18.6 (H) 10.0 - 15.0 %    Platelet Count 7 (LL) 150 - 450 10e3/uL    % Neutrophils      % Lymphocytes      % Monocytes      % Eosinophils      % Basophils      % Immature Granulocytes      NRBCs per 100 WBC 0 <1 /100    Absolute Neutrophils      Absolute Lymphocytes      Absolute Monocytes      Absolute Eosinophils      Absolute Basophils      Absolute Immature Granulocytes      Absolute NRBCs 0.0 10e3/uL   Adult Type and Screen   Result Value Ref Range    ABO/RH(D) A POS     Antibody Screen Negative Negative    SPECIMEN EXPIRATION DATE [id number]    Manual Differential   Result Value Ref Range    % Neutrophils 34 %    % Lymphocytes 16 %    % Monocytes 49 %    % Eosinophils 0 %    % Basophils 0 %    % Myelocytes 1 %    Absolute Neutrophils 0.5 (L) 1.6 - 8.3 10e3/uL    Absolute Lymphocytes 0.2 (L) 0.8 - 5.3 10e3/uL    Absolute Monocytes 0.7 0.0 - 1.3 10e3/uL    Absolute Eosinophils 0.0 0.0 - 0.7 10e3/uL    Absolute Basophils 0.0 0.0 - 0.2 10e3/uL    Absolute Myelocytes 0.0 <=0.0 10e3/uL    RBC Morphology Confirmed RBC Indices     Platelet Assessment  Automated Count Confirmed. Platelet morphology is normal.     Automated Count Confirmed. Platelet morphology is normal.    Polychromasia Slight (A) None Seen   Vitamin D Deficiency   Result Value Ref Range    Vitamin D, Total (25-Hydroxy) 25 20 - 50 ng/mL    Narrative    Season, race, dietary intake, and treatment affect the concentration of 25-hydroxy-Vitamin D. Values may decrease during winter months and increase during summer months.    Vitamin D determination is routinely performed by an immunoassay specific for 25 hydroxyvitamin D3.  If an individual is on vitamin D2(ergocalciferol) supplementation, please specify 25 OH vitamin D2 and D3 level determination by LCMSMS test VITD23.      CONDITIONAL Prepare pheresed platelets (unit)   Result Value Ref Range    Blood Component Type Platelets     Product Code [id number]     Unit Status Transfused     Unit Number [id number]     CODING SYSTEM [id number]     ISSUE DATE AND TIME [id number]     UNIT ABO/RH A+     UNIT TYPE ISBT 6200    EKG 12-lead, complete   Result Value Ref Range    Systolic Blood Pressure  mmHg    Diastolic Blood Pressure  mmHg    Ventricular Rate 87 BPM    Atrial Rate 87 BPM    AR Interval 142 ms    QRS Duration 74 ms     ms    QTc 517 ms    P Axis 56 degrees    R AXIS 15 degrees    T Axis 48 degrees    Interpretation ECG       Sinus rhythm with Premature atrial complexes  Prolonged QT  Abnormal ECG  When compared with ECG of 24-MAR-2024 12:40, (unconfirmed)  Premature atrial complexes are now Present           ASSESSMENT BY SYSTEMS     Rosario Adan Terrazas is a 68 year old female, currently day +12 s/p auto for MM readmitted 3/21 with N/F and GNB+ sepsis.     BMT/IEC PROTOCOL for MM Auto     Transplants for multiple myeloma:  The patient has Standard risk IgG D MM, planning on 2 transplants     3/12 Day -1 Melphalan 200 mg/m2   3/13 Day 0 Transplant, cell total post wash 2.23 x 10^6 CD34 + cells/kg (pre freeze dose: 5.36 x 10 ^6 CD34+ cells/kg)     ID  #N/F 3/22  #Strep mitis (3/22) Vanco started (3/22-3/24; discontinued as it is sensitive to merrem)  #E. Coli bacteremia (3/21, 3/22)- Tobraymycin x1 (3/22); meropenem (3/22-x)  Follow up cultures 3/23-3/25: negative to date.    #Abdominal pain, diarrhea.  Stool studies ok.  Likely related to transplant prep.  Pain has resolved.  #Sepsis (hypotension, tachycardia, fever); resolved  #Latent TB: Continue INH/B6 through transplant  #Diarrhea: studies negative.  Continue scheduled imodium.   Ppx antibiotics acyclovir, fluconazole. Bactrim from D28      Heme   - pancytopenia secondary to chemotherapy and multiple myeloma  - transfuse to keep hgb >7g/dL, plt >10k  - Hold home  aspirin (3/17), resume once platelets have recovered.   - INR 1.8, given vitamin K in clinic 3/21. Recheck 3/25 down to 1.5.     FEN/Renal  #Hypocalcemia:  2g IV and add oral replacement 3/25  #Vitamin D level: adequate.  Keep on ergocalciferol at this time to prevent, as it is still winter in [place].  #Fluid overload related to sepsis management: diurese with lasix 20mg IV on 3/25 (cautious dosing with lower blood pressures).      CV  #Afib: paroxysmal afib with RVR and hypotension requiring transfer to MICU.  Returned to unit 5C on 3/24.  She is now stable and no longer having very rapid heart rates.  Amiodarone gtt conversion to PO 3/25 per cardiology note; off after 14 days of oral amiodarone.  Continue metoprolol tartrate, which was increased from 25mg bid to 50mg bid on 3/24 (and seemed to make a big difference for controlling her HR spikes to the 150s). Additionally, due to the concern that this is being driven by an overall inflammatory state, added decadron 4mg IV daily x 3 days (3/24-3/26).     Please have techs placed a ziopatch prior to discharge and arrange for cardiology follow up after discharge.     #SVT: management as above    ECHO 55-60%     QT prolongation: repeat EKG 3/25 with QTc of 517; continue to hold zyprexa/zofran; hold fluconazole starting 3/25.     GI:   Nausea: prn compazine. Zofran/zyprexa on hold due to prolonged QT.  Diarrhea: imodium QID.   Mucositis: hurricaine spray prn       Endocrine  Thyroid FNA Atypia of undetermined significance diagnosis has a 22 (13-30)% risk of malignancy. Repeat FNA (least 4-6 weeks from previous aspiration with optimal time being 12 weeks after last aspiration) , molecular testing, diagnostic lobectomy, or surveillance is recommended.    Has next appt with endocrine 4/16/24.   *TSH WNL 3/21     Neuro/Pain  Neuropathy: continues on eleni, xanaflex. Gabapentin   Rib pain: continue oxycodone and back brace, Tylenol on hold since 3/19       Dispo: anticipate  "discharge toward the end of this week if she is stable.    OVERALL PLAN     Anticipated hospital dismissal: post resolution of acute illness and engraftment  Clinically Significant Risk Factors        # Hypokalemia: Lowest K = 3 mmol/L in last 2 days, will replace as needed   # Hypocalcemia: Lowest iCa = 4.1 mg/dL in last 2 days, will monitor and replace as appropriate   # Hypomagnesemia: Lowest Mg = 1.5 mg/dL in last 2 days, will replace as needed   # Hypoalbuminemia: Lowest albumin = 2.5 g/dL at 3/24/2024  3:59 AM, will monitor as appropriate    # Coagulation Defect: INR = 1.50 (Ref range: 0.85 - 1.15) and/or PTT = 29 Seconds (Ref range: 22 - 38 Seconds), will monitor for bleeding  # Thrombocytopenia: Lowest platelets = 7 in last 2 days, will monitor for bleeding          # Obesity: Estimated body mass index is 38.92 kg/m  as calculated from the following:    Height as of this encounter: 1.65 m (5' 4.96\").    Weight as of this encounter: 106 kg (233 lb 9.6 oz)., PRESENT ON ADMISSION       # Financial/Environmental Concerns: none       I spent 30 minutes in the care of this patient today, which included time necessary for preparation for the visit, obtaining history, ordering medications/tests/procedures as medically indicated, review of pertinent medical literature, counseling of the patient, communication of recommendations to the care team, and documentation time.    Yudelka Richards PA-C      "

## 2024-03-25 NOTE — PROGRESS NOTES
Cardiology Daily Progress Note   03/25/2024    Rosario Terrazas MRN# [id number]   Age: 68 year old YOB: 1955       Assessment and Plan:   Assessment:  Rosario Terrazas is a pleasant 68 year old female with a history of multiple myeloma status post autologous HSCT on 3/13/24 whose post transplant course has been complicated by ESBL E coli bacteremia and new episodes of supraventricular tachycardia. Cardiology is consulted for assistance in management of these tachyarrhythmias.      Review of her most recent telmetry reveals her predominant rhythm is now sinus tach with frequent PACs. Defer to primary team for ongoing management and investigation of sinus tachycardia. Her amiodarone can be converted to orals to complete a 10g load. Once her sinus tachycardia has resolved and she is clinically ready for discharge would start metoprolol succinate 25mg daily to minimize frequency of SVT. Rest as below:     Recommendations:  - telemetry reveals predominant rhythm is sinus tachycardia  - stop amiodarone drip and swap to oral therapy  - start PO Amiodarone 400mg BID x 7 days then PO Amiodarone 400mg daily x 7 days then stop  - when ready for discharge would start Metoprolol Succinate 100mg daily to minimize burden of SVT  - will need to follow up with cardiology as an outpatient for these arrhythmias                - please ensure a ziopatch has been ordered prior to this                - at this time we can also re-address the topic of anticoagulation     Zach Carrington MD  Cardiology Fellow     Thank you for the opportunity to participate in the care of Rosario Terrazas. This plan was discussed with the staff physician.     We will sign off at this time.    Please feel free to contact me with any additional questions or concerns.    This note was dictated with voice recognition software and then edited. Please excuse any unintentional errors.      Zach Carrington MD  Cardiology Fellow     Subjective  / Interval:   NAOE. Rhythm now mostly sinus tach. Denies chest pain, palpitations, dyspnea, or LH.      Physical Exam:   Ranges for vital signs:    Temp:  [98  F (36.7  C)-99.6  F (37.6  C)] 98  F (36.7  C)  Pulse:  [] 118  Resp:  [16-20] 16  BP: ()/(58-80) 95/67  SpO2:  [97 %-100 %] 100 %    Intake/Output Summary (Last 24 hours) at 3/25/2024 1142  Last data filed at 3/25/2024 1100  Gross per 24 hour   Intake 3966.2 ml   Output 1375 ml   Net 2591.2 ml       Exam:  Gen: No acute distress  HEENT: Sclera anicteric  CV: tachycardic, RRR  Resp: Clear to auscultation bilaterally  Abd: Nondistended  Ext: No pitting edema  Skin: WWP  Neuro: No focal deficits       Labs:   Personally Reviewed    Current Medications:   Amiodarone drip  Tartrate 50mg bid    Relevant Imaging:   Personally Reviewed    Physician Attestation   I saw this patient and agree with the resident/fellow's findings and plan of care as documented in the note.    Key findings:   Paroxysmal atrial fibrillation, post recent stem cell transplantation for multiple myeloma   Patient in sinus rhythm and euvolemic.   Will transition to oral amiodarone today.  Jany Jaquez MD  Date of Service (when I saw the patient): March 25, 2024

## 2024-03-25 NOTE — PROGRESS NOTES
"CLINICAL NUTRITION SERVICES - ASSESSMENT NOTE     Nutrition Prescription    RECOMMENDATIONS FOR MDs/PROVIDERS TO ORDER:  Encourage oral intake     Malnutrition Status:    Severe malnutrition in the context of acute illness    Recommendations already ordered by Registered Dietitian (RD):  Ordered apple ensure clear and chocolate ensure enlive     Future/Additional Recommendations:  Monitor appetite, oral intake and use of supplements   Monitor weight, ability to achieve dry weight      REASON FOR ASSESSMENT  Rosario Terrazas is a/an 68 year old female assessed by the dietitian for Nutrition Risk Monitoring    CLINICAL HISTORY   currently day 13 s/p auto for MM readmitted 3/21 with N/F and GNB+     NUTRITION HISTORY  Debby was sleeping soundly but daughter in room. Daughter reported her mother's appetite has been very poor and on top of that, she is a very picky eater. She was able to eat a little more today, had a slice of pizza and 1 boiled egg in addition to fluids. She thought her mother would tolerate the apple ensure clear and possibly the chocolate ensure enlive     CURRENT NUTRITION ORDERS  Diet: High Kcal/High Protein + snacks/supplements PRN   Intake/Tolerance: 16 oz porridge from home and 1 boiled egg, 1 small square pizza     LABS  Creatinine 0.48 (L)  Phos 2.2 (L)  Glucose 125 (H)  Vitamin D (3/25, 25)   WBC 3.6 (L) - up trending     MEDICATIONS  Acyclovir  Calcium carbonate  Decadron   Folic acid  Lasix  Imodium  Protonix  Pyridozine  Phosphorus     ANTHROPOMETRICS  Height: 165 cm (5' 4.961\")  Most Recent Weight: 106 kg (233 lb 9.6 oz)    IBW: 56.8 kg  BMI: Obesity Grade II BMI 35-39.9 --<Weight currently up 24 lbs from admission   Weight History:   Wt Readings from Last 25 Encounters:   03/26/24 106.5 kg (234 lb 14.4 oz)   03/21/24 94.7 kg (208 lb 11.2 oz)   03/20/24 94.9 kg (209 lb 4.8 oz)   03/19/24 96.3 kg (212 lb 4.8 oz)   03/18/24 96.4 kg (212 lb 8 oz)   03/17/24 98.2 kg (216 lb 8 oz) "   03/15/24 98.1 kg (216 lb 4.8 oz)   03/14/24 98.7 kg (217 lb 9.6 oz)   03/13/24 98.3 kg (216 lb 11.2 oz)   03/12/24 97.4 kg (214 lb 11.2 oz)   03/07/24 96.9 kg (213 lb 10 oz)   03/06/24 98.5 kg (217 lb 3.2 oz)   03/06/24 98.5 kg (217 lb 3.2 oz)   02/27/24 99 kg (218 lb 4.8 oz)   02/22/24 99.4 kg (219 lb 3.2 oz)   02/21/24 99.4 kg (219 lb 3.2 oz)   02/16/24 98.5 kg (217 lb 3.2 oz)   12/20/23 95.2 kg (209 lb 12.8 oz)       Dosing Weight: 66.4 kg (adjusted)     ASSESSED NUTRITION NEEDS  Estimated Energy Needs: 6686-8281 kcals/day (25 - 30 kcals/kg)  Justification: Maintenance  Estimated Protein Needs:  grams protein/day (1.2 - 1.5 grams of pro/kg)  Justification: Increased needs  Estimated Fluid Needs: 1660 mL/day (25 mL/kg)   Justification: Maintenance    PHYSICAL FINDINGS  See malnutrition section below.    MALNUTRITION  % Intake: </= 50% for >/= 5 days (severe)  % Weight Loss: Unable to assess  Subcutaneous Fat Loss: Facial region:  mild  Muscle Loss: Unable to assess  Fluid Accumulation/Edema: Moderate  Malnutrition Diagnosis: Severe malnutrition in the context of acute illness     NUTRITION DIAGNOSIS  Inadequate oral intake related to decreased appetite and increased metabolic demand 2/2 transplant as evidenced by minimal intakes x5 days       INTERVENTIONS  Implementation  Nutrition Education: RD role in care, supplement options    Collaboration with other providers - 5c rounds  Medical food supplement    Goals  Patient to consume % of nutritionally adequate meal trays TID, or the equivalent with supplements/snacks.    Achieve dry weight      Monitoring/Evaluation  Progress toward goals will be monitored and evaluated per protocol.  Rachel Herrera RD, LD  Available on Vocera:   5c clinical Dietitian (Monday-Friday)  Weekend Clinical dietitian (Saturday-Sunday)    Clinical Nutrition will no longer be available via paging system.

## 2024-03-25 NOTE — PLAN OF CARE
"  .BP 92/70 (BP Location: Right arm)   Pulse 101   Temp 98.4  F (36.9  C) (Oral)   Resp 18   Ht 1.65 m (5' 4.96\")   Wt 97.1 kg (214 lb 1.6 oz)   SpO2 98%   BMI 35.67 kg/m      Pt alert and oriented. Afebrile. Ovss on room air. On telemetry- in/out afib. Surveillance blood cultures sent on both lumen. Denies nausea. C/o left shoulder pain, prn Tylenol x1 and Oxycodone 5mg x1 with good relief. Pure wick in place. No stool during shift. Will need 15mmol Sodium Phos this morning. Daughter at bedside. Amio gtt at 0.5mg/min. Lactated ringer at 125 ml/hr. Cont with poc.      Problem: Adult Inpatient Plan of Care  Goal: Plan of Care Review  Description: The Plan of Care Review/Shift note should be completed every shift.  The Outcome Evaluation is a brief statement about your assessment that the patient is improving, declining, or no change.  This information will be displayed automatically on your shift  note.  Outcome: Progressing     Problem: Infection  Goal: Absence of Infection Signs and Symptoms  Outcome: Progressing      "

## 2024-03-25 NOTE — PLAN OF CARE
"Vital signs:  Temp: 97.5  F (36.4  C) Temp src: Oral BP: 104/70 Pulse: 98   Resp: 18 SpO2: 99 % O2 Device: None (Room air)   Height: 165 cm (5' 4.96\") Weight: 106 kg (233 lb 9.6 oz)  Estimated body mass index is 38.92 kg/m  as calculated from the following:    Height as of this encounter: 1.65 m (5' 4.96\").    Weight as of this encounter: 106 kg (233 lb 9.6 oz).      AVSS on room air. Pt denies SOB although new dry cough noted this evening. Complains of left shoulder pain, relieved with voltaren gel. Complains of sore throat and painful swallow, given cepacol lozenge x3. Complained of some nausea this morning after taking meds but declined intervention and states it resolved on its own. Poor appetite but eating small amounts throughout the day well. 2 small loose stools today, on scheduled imodium. Purewick removed and pt up to bathroom and tolerating well. Lasix 20mg given with 800 ml out. Ca gluc, k phos, and platelets replacements given. Tele discontinued, EKG done, NSR with prolonged QT and PACs. CVC heparin locked. Shower and CHG wipes completed today. SBA in room  with FWW. Pleasant and cooperative of cares. Kisii speaking, family acting as  throughout the day as there wasn't one available. Phone  arranged for 9pm 3/25, 9am and 9pm 3/26, see sticky note for phone number to call at scheduled times.      Problem: Adult Inpatient Plan of Care  Goal: Plan of Care Review  Description: The Plan of Care Review/Shift note should be completed every shift.  The Outcome Evaluation is a brief statement about your assessment that the patient is improving, declining, or no change.  This information will be displayed automatically on your shift  note.  Outcome: Progressing  Flowsheets (Taken 3/25/2024 1731)  Plan of Care Reviewed With:   patient   family  Overall Patient Progress: improving  Goal: Absence of Hospital-Acquired Illness or Injury  Intervention: Identify and Manage Fall Risk  Recent " Flowsheet Documentation  Taken 3/25/2024 1550 by Lorna Mcgraw RN  Safety Promotion/Fall Prevention:   safety round/check completed   activity supervised   clutter free environment maintained   increased rounding and observation   increase visualization of patient   lighting adjusted   mobility aid in reach   nonskid shoes/slippers when out of bed   patient and family education   room organization consistent   supervised activity   treat reversible contributory factors  Taken 3/25/2024 1535 by Lorna Mcgraw RN  Safety Promotion/Fall Prevention: safety round/check completed  Taken 3/25/2024 1317 by Lorna Mcgraw RN  Safety Promotion/Fall Prevention: safety round/check completed  Taken 3/25/2024 1246 by Lorna Mcgraw RN  Safety Promotion/Fall Prevention: safety round/check completed  Taken 3/25/2024 1130 by Lorna Mcgraw RN  Safety Promotion/Fall Prevention:   safety round/check completed   activity supervised   clutter free environment maintained   increased rounding and observation   increase visualization of patient   lighting adjusted   mobility aid in reach   nonskid shoes/slippers when out of bed   patient and family education   room organization consistent   supervised activity   treat reversible contributory factors  Taken 3/25/2024 1116 by Lorna Mcgraw RN  Safety Promotion/Fall Prevention: safety round/check completed  Taken 3/25/2024 0923 by Lorna Mcgraw RN  Safety Promotion/Fall Prevention: safety round/check completed  Taken 3/25/2024 0800 by Lorna Mcgraw RN  Safety Promotion/Fall Prevention:   safety round/check completed   activity supervised   clutter free environment maintained   increased rounding and observation   increase visualization of patient   lighting adjusted   mobility aid in reach   nonskid shoes/slippers when out of bed   patient and family education   room organization consistent   supervised activity   treat reversible contributory factors  Intervention: Prevent Skin Injury  Recent  Flowsheet Documentation  Taken 3/25/2024 1600 by Lorna Mcgraw RN  Body Position: position changed independently  Taken 3/25/2024 1401 by Lorna Mcgraw RN  Body Position: position changed independently  Taken 3/25/2024 1200 by Lorna Mcgraw RN  Body Position: position changed independently  Taken 3/25/2024 1000 by Lorna Mcgraw RN  Body Position: position changed independently  Taken 3/25/2024 0800 by Lorna Mcgraw RN  Body Position: position changed independently  Skin Protection: adhesive use limited  Device Skin Pressure Protection:   tubing/devices free from skin contact   positioning supports utilized   adhesive use limited  Intervention: Prevent and Manage VTE (Venous Thromboembolism) Risk  Recent Flowsheet Documentation  Taken 3/25/2024 0800 by Lorna Mgcraw RN  VTE Prevention/Management: SCDs (sequential compression devices) off  Intervention: Prevent Infection  Recent Flowsheet Documentation  Taken 3/25/2024 1550 by Lorna Mcgraw RN  Infection Prevention:   hand hygiene promoted   rest/sleep promoted   single patient room provided   visitors restricted/screened   personal protective equipment utilized  Taken 3/25/2024 1130 by Lorna Mcgraw RN  Infection Prevention:   hand hygiene promoted   rest/sleep promoted   single patient room provided   visitors restricted/screened   personal protective equipment utilized  Taken 3/25/2024 0800 by Lorna Mcgraw RN  Infection Prevention:   hand hygiene promoted   rest/sleep promoted   single patient room provided   visitors restricted/screened   personal protective equipment utilized  Goal: Optimal Comfort and Wellbeing  Outcome: Progressing  Intervention: Provide Person-Centered Care  Recent Flowsheet Documentation  Taken 3/25/2024 0800 by Lorna Mcgraw RN  Trust Relationship/Rapport:   care explained   choices provided   emotional support provided  Goal: Readiness for Transition of Care  Outcome: Progressing   Goal Outcome Evaluation:      Plan of Care Reviewed With:  patient, family    Overall Patient Progress: improvingOverall Patient Progress: improving

## 2024-03-26 ENCOUNTER — APPOINTMENT (OUTPATIENT)
Dept: OCCUPATIONAL THERAPY | Facility: CLINIC | Age: 69
End: 2024-03-26
Payer: COMMERCIAL

## 2024-03-26 ENCOUNTER — APPOINTMENT (OUTPATIENT)
Dept: PHYSICAL THERAPY | Facility: CLINIC | Age: 69
End: 2024-03-26
Payer: COMMERCIAL

## 2024-03-26 LAB
ALBUMIN SERPL BCG-MCNC: 2.6 G/DL (ref 3.5–5.2)
ALP SERPL-CCNC: 53 U/L (ref 40–150)
ALT SERPL W P-5'-P-CCNC: 27 U/L (ref 0–50)
ANION GAP SERPL CALCULATED.3IONS-SCNC: 9 MMOL/L (ref 7–15)
AST SERPL W P-5'-P-CCNC: 26 U/L (ref 0–45)
ATRIAL RATE - MUSE: 87 BPM
BACTERIA BLD CULT: NO GROWTH
BASOPHILS # BLD AUTO: ABNORMAL 10*3/UL
BASOPHILS # BLD MANUAL: 0 10E3/UL (ref 0–0.2)
BASOPHILS NFR BLD AUTO: ABNORMAL %
BASOPHILS NFR BLD MANUAL: 0 %
BILIRUB SERPL-MCNC: 0.4 MG/DL
BUN SERPL-MCNC: 16.3 MG/DL (ref 8–23)
CALCIUM SERPL-MCNC: 7.2 MG/DL (ref 8.8–10.2)
CHLORIDE SERPL-SCNC: 108 MMOL/L (ref 98–107)
CREAT SERPL-MCNC: 0.48 MG/DL (ref 0.51–0.95)
DEPRECATED HCO3 PLAS-SCNC: 23 MMOL/L (ref 22–29)
DIASTOLIC BLOOD PRESSURE - MUSE: NORMAL MMHG
EGFRCR SERPLBLD CKD-EPI 2021: >90 ML/MIN/1.73M2
ELLIPTOCYTES BLD QL SMEAR: SLIGHT
EOSINOPHIL # BLD AUTO: ABNORMAL 10*3/UL
EOSINOPHIL # BLD MANUAL: 0 10E3/UL (ref 0–0.7)
EOSINOPHIL NFR BLD AUTO: ABNORMAL %
EOSINOPHIL NFR BLD MANUAL: 0 %
ERYTHROCYTE [DISTWIDTH] IN BLOOD BY AUTOMATED COUNT: 18.6 % (ref 10–15)
GLUCOSE SERPL-MCNC: 125 MG/DL (ref 70–99)
HCT VFR BLD AUTO: 22 % (ref 35–47)
HGB BLD-MCNC: 7.4 G/DL (ref 11.7–15.7)
IMM GRANULOCYTES # BLD: ABNORMAL 10*3/UL
IMM GRANULOCYTES NFR BLD: ABNORMAL %
INTERPRETATION ECG - MUSE: NORMAL
LYMPHOCYTES # BLD AUTO: ABNORMAL 10*3/UL
LYMPHOCYTES # BLD MANUAL: 0.6 10E3/UL (ref 0.8–5.3)
LYMPHOCYTES NFR BLD AUTO: ABNORMAL %
LYMPHOCYTES NFR BLD MANUAL: 16 %
MAGNESIUM SERPL-MCNC: 1.7 MG/DL (ref 1.7–2.3)
MCH RBC QN AUTO: 31.2 PG (ref 26.5–33)
MCHC RBC AUTO-ENTMCNC: 33.6 G/DL (ref 31.5–36.5)
MCV RBC AUTO: 93 FL (ref 78–100)
METAMYELOCYTES # BLD MANUAL: 0.1 10E3/UL
METAMYELOCYTES NFR BLD MANUAL: 2 %
MONOCYTES # BLD AUTO: ABNORMAL 10*3/UL
MONOCYTES # BLD MANUAL: 0.9 10E3/UL (ref 0–1.3)
MONOCYTES NFR BLD AUTO: ABNORMAL %
MONOCYTES NFR BLD MANUAL: 24 %
NEUTROPHILS # BLD AUTO: ABNORMAL 10*3/UL
NEUTROPHILS # BLD MANUAL: 2.1 10E3/UL (ref 1.6–8.3)
NEUTROPHILS NFR BLD AUTO: ABNORMAL %
NEUTROPHILS NFR BLD MANUAL: 58 %
NRBC # BLD AUTO: 0 10E3/UL
NRBC BLD AUTO-RTO: 0 /100
P AXIS - MUSE: 56 DEGREES
PHOSPHATE SERPL-MCNC: 2.2 MG/DL (ref 2.5–4.5)
PLAT MORPH BLD: ABNORMAL
PLATELET # BLD AUTO: 31 10E3/UL (ref 150–450)
POTASSIUM SERPL-SCNC: 3.7 MMOL/L (ref 3.4–5.3)
PR INTERVAL - MUSE: 142 MS
PROT SERPL-MCNC: 4.6 G/DL (ref 6.4–8.3)
QRS DURATION - MUSE: 74 MS
QT - MUSE: 430 MS
QTC - MUSE: 517 MS
R AXIS - MUSE: 15 DEGREES
RBC # BLD AUTO: 2.37 10E6/UL (ref 3.8–5.2)
RBC MORPH BLD: ABNORMAL
SODIUM SERPL-SCNC: 140 MMOL/L (ref 135–145)
SYSTOLIC BLOOD PRESSURE - MUSE: NORMAL MMHG
T AXIS - MUSE: 48 DEGREES
VENTRICULAR RATE- MUSE: 87 BPM
WBC # BLD AUTO: 3.6 10E3/UL (ref 4–11)

## 2024-03-26 PROCEDURE — 250N000011 HC RX IP 250 OP 636: Performed by: INTERNAL MEDICINE

## 2024-03-26 PROCEDURE — 250N000011 HC RX IP 250 OP 636

## 2024-03-26 PROCEDURE — 97530 THERAPEUTIC ACTIVITIES: CPT | Mod: GP

## 2024-03-26 PROCEDURE — 97116 GAIT TRAINING THERAPY: CPT | Mod: GP

## 2024-03-26 PROCEDURE — 250N000013 HC RX MED GY IP 250 OP 250 PS 637: Performed by: INTERNAL MEDICINE

## 2024-03-26 PROCEDURE — 85007 BL SMEAR W/DIFF WBC COUNT: CPT | Performed by: STUDENT IN AN ORGANIZED HEALTH CARE EDUCATION/TRAINING PROGRAM

## 2024-03-26 PROCEDURE — 250N000011 HC RX IP 250 OP 636: Mod: JZ

## 2024-03-26 PROCEDURE — 84100 ASSAY OF PHOSPHORUS: CPT | Performed by: INTERNAL MEDICINE

## 2024-03-26 PROCEDURE — 83735 ASSAY OF MAGNESIUM: CPT | Performed by: INTERNAL MEDICINE

## 2024-03-26 PROCEDURE — 85027 COMPLETE CBC AUTOMATED: CPT | Performed by: STUDENT IN AN ORGANIZED HEALTH CARE EDUCATION/TRAINING PROGRAM

## 2024-03-26 PROCEDURE — 250N000013 HC RX MED GY IP 250 OP 250 PS 637: Performed by: PHYSICIAN ASSISTANT

## 2024-03-26 PROCEDURE — 93010 ELECTROCARDIOGRAM REPORT: CPT | Performed by: INTERNAL MEDICINE

## 2024-03-26 PROCEDURE — 250N000013 HC RX MED GY IP 250 OP 250 PS 637

## 2024-03-26 PROCEDURE — 82040 ASSAY OF SERUM ALBUMIN: CPT | Performed by: STUDENT IN AN ORGANIZED HEALTH CARE EDUCATION/TRAINING PROGRAM

## 2024-03-26 PROCEDURE — 258N000003 HC RX IP 258 OP 636

## 2024-03-26 PROCEDURE — 258N000003 HC RX IP 258 OP 636: Performed by: INTERNAL MEDICINE

## 2024-03-26 PROCEDURE — 250N000011 HC RX IP 250 OP 636: Performed by: PHYSICIAN ASSISTANT

## 2024-03-26 PROCEDURE — 250N000009 HC RX 250: Performed by: INTERNAL MEDICINE

## 2024-03-26 PROCEDURE — 97535 SELF CARE MNGMENT TRAINING: CPT | Mod: GO

## 2024-03-26 PROCEDURE — 250N000013 HC RX MED GY IP 250 OP 250 PS 637: Performed by: STUDENT IN AN ORGANIZED HEALTH CARE EDUCATION/TRAINING PROGRAM

## 2024-03-26 PROCEDURE — 206N000001 HC R&B BMT UMMC

## 2024-03-26 PROCEDURE — 93005 ELECTROCARDIOGRAM TRACING: CPT

## 2024-03-26 RX ORDER — MAGNESIUM SULFATE HEPTAHYDRATE 40 MG/ML
2 INJECTION, SOLUTION INTRAVENOUS ONCE
Qty: 50 ML | Refills: 0 | Status: COMPLETED | OUTPATIENT
Start: 2024-03-26 | End: 2024-03-26

## 2024-03-26 RX ORDER — FUROSEMIDE 10 MG/ML
20 INJECTION INTRAMUSCULAR; INTRAVENOUS ONCE
Status: COMPLETED | OUTPATIENT
Start: 2024-03-26 | End: 2024-03-26

## 2024-03-26 RX ORDER — POTASSIUM CHLORIDE 29.8 MG/ML
20 INJECTION INTRAVENOUS ONCE
Qty: 50 ML | Refills: 0 | Status: COMPLETED | OUTPATIENT
Start: 2024-03-26 | End: 2024-03-26

## 2024-03-26 RX ORDER — POTASSIUM PHOS IN 0.9 % NACL 15MMOL/250
15 PLASTIC BAG, INJECTION (ML) INTRAVENOUS ONCE
Status: COMPLETED | OUTPATIENT
Start: 2024-03-26 | End: 2024-03-26

## 2024-03-26 RX ADMIN — LOPERAMIDE HYDROCHLORIDE 4 MG: 2 CAPSULE ORAL at 15:25

## 2024-03-26 RX ADMIN — Medication 1 LOZENGE: at 15:29

## 2024-03-26 RX ADMIN — GABAPENTIN 100 MG: 100 CAPSULE ORAL at 21:42

## 2024-03-26 RX ADMIN — DEXAMETHASONE SODIUM PHOSPHATE 4 MG: 10 INJECTION INTRAMUSCULAR; INTRAVENOUS at 08:15

## 2024-03-26 RX ADMIN — CALCIUM 500 MG: 500 TABLET ORAL at 21:42

## 2024-03-26 RX ADMIN — DICLOFENAC SODIUM 2 G: 10 GEL TOPICAL at 15:26

## 2024-03-26 RX ADMIN — DICLOFENAC SODIUM 2 G: 10 GEL TOPICAL at 12:22

## 2024-03-26 RX ADMIN — ACYCLOVIR 800 MG: 800 TABLET ORAL at 21:42

## 2024-03-26 RX ADMIN — OXYCODONE HYDROCHLORIDE 5 MG: 5 TABLET ORAL at 21:51

## 2024-03-26 RX ADMIN — Medication 1 LOZENGE: at 08:27

## 2024-03-26 RX ADMIN — ACYCLOVIR 800 MG: 800 TABLET ORAL at 08:20

## 2024-03-26 RX ADMIN — DICLOFENAC SODIUM 2 G: 10 GEL TOPICAL at 08:20

## 2024-03-26 RX ADMIN — Medication 5 ML: at 02:37

## 2024-03-26 RX ADMIN — Medication 5 ML: at 15:25

## 2024-03-26 RX ADMIN — DEXTROSE MONOHYDRATE 20 ML: 50 INJECTION, SOLUTION INTRAVENOUS at 21:28

## 2024-03-26 RX ADMIN — LOPERAMIDE HYDROCHLORIDE 4 MG: 2 CAPSULE ORAL at 11:51

## 2024-03-26 RX ADMIN — MEROPENEM 1 G: 1 INJECTION, POWDER, FOR SOLUTION INTRAVENOUS at 05:56

## 2024-03-26 RX ADMIN — MEROPENEM 1 G: 1 INJECTION, POWDER, FOR SOLUTION INTRAVENOUS at 21:37

## 2024-03-26 RX ADMIN — Medication 10 ML: at 12:22

## 2024-03-26 RX ADMIN — FUROSEMIDE 20 MG: 10 INJECTION, SOLUTION INTRAMUSCULAR; INTRAVENOUS at 10:29

## 2024-03-26 RX ADMIN — Medication 25 MG: at 08:19

## 2024-03-26 RX ADMIN — CALCIUM 500 MG: 500 TABLET ORAL at 08:20

## 2024-03-26 RX ADMIN — POTASSIUM CHLORIDE 20 MEQ: 29.8 INJECTION, SOLUTION INTRAVENOUS at 10:28

## 2024-03-26 RX ADMIN — DEXTROSE MONOHYDRATE 480 MCG: 50 INJECTION, SOLUTION INTRAVENOUS at 21:28

## 2024-03-26 RX ADMIN — GABAPENTIN 100 MG: 100 CAPSULE ORAL at 15:25

## 2024-03-26 RX ADMIN — Medication 3 MG: at 02:37

## 2024-03-26 RX ADMIN — MEROPENEM 1 G: 1 INJECTION, POWDER, FOR SOLUTION INTRAVENOUS at 12:22

## 2024-03-26 RX ADMIN — LOPERAMIDE HYDROCHLORIDE 4 MG: 2 CAPSULE ORAL at 21:41

## 2024-03-26 RX ADMIN — AMIODARONE HYDROCHLORIDE 400 MG: 200 TABLET ORAL at 21:41

## 2024-03-26 RX ADMIN — GABAPENTIN 100 MG: 100 CAPSULE ORAL at 08:20

## 2024-03-26 RX ADMIN — AMIODARONE HYDROCHLORIDE 400 MG: 200 TABLET ORAL at 08:20

## 2024-03-26 RX ADMIN — DICLOFENAC SODIUM 2 G: 10 GEL TOPICAL at 21:45

## 2024-03-26 RX ADMIN — METOPROLOL TARTRATE 50 MG: 25 TABLET, FILM COATED ORAL at 08:19

## 2024-03-26 RX ADMIN — POTASSIUM PHOSPHATE, MONOBASIC POTASSIUM PHOSPHATE, DIBASIC 15 MMOL: 224; 236 INJECTION, SOLUTION, CONCENTRATE INTRAVENOUS at 06:29

## 2024-03-26 RX ADMIN — METOPROLOL TARTRATE 50 MG: 25 TABLET, FILM COATED ORAL at 21:41

## 2024-03-26 RX ADMIN — DEXTROSE MONOHYDRATE 20 ML: 50 INJECTION, SOLUTION INTRAVENOUS at 21:40

## 2024-03-26 RX ADMIN — ISONIAZID 300 MG: 300 TABLET ORAL at 08:19

## 2024-03-26 RX ADMIN — MAGNESIUM SULFATE HEPTAHYDRATE 2 G: 2 INJECTION, SOLUTION INTRAVENOUS at 10:29

## 2024-03-26 RX ADMIN — FOLIC ACID 1000 MCG: 1 TABLET ORAL at 08:20

## 2024-03-26 RX ADMIN — PANTOPRAZOLE SODIUM 40 MG: 40 TABLET, DELAYED RELEASE ORAL at 08:20

## 2024-03-26 ASSESSMENT — ACTIVITIES OF DAILY LIVING (ADL)
ADLS_ACUITY_SCORE: 28
ADLS_ACUITY_SCORE: 28
ADLS_ACUITY_SCORE: 26
ADLS_ACUITY_SCORE: 28
ADLS_ACUITY_SCORE: 26
ADLS_ACUITY_SCORE: 28
ADLS_ACUITY_SCORE: 31
ADLS_ACUITY_SCORE: 28
ADLS_ACUITY_SCORE: 26
ADLS_ACUITY_SCORE: 26
ADLS_ACUITY_SCORE: 28
ADLS_ACUITY_SCORE: 28
ADLS_ACUITY_SCORE: 26
ADLS_ACUITY_SCORE: 28
ADLS_ACUITY_SCORE: 30
ADLS_ACUITY_SCORE: 28
ADLS_ACUITY_SCORE: 26
ADLS_ACUITY_SCORE: 28
ADLS_ACUITY_SCORE: 28
ADLS_ACUITY_SCORE: 26
ADLS_ACUITY_SCORE: 28
ADLS_ACUITY_SCORE: 28
ADLS_ACUITY_SCORE: 26

## 2024-03-26 NOTE — PLAN OF CARE
"BP 96/60 (BP Location: Left arm)   Pulse 90   Temp 98.1  F (36.7  C) (Axillary)   Resp 16   Ht 1.65 m (5' 4.96\")   Wt 106 kg (233 lb 9.6 oz)   SpO2 98%   BMI 38.92 kg/m      Afebrile, intermittently hypotensive - MD made aware, pt Aox4, denied dizziness and any new symptoms, no new orders placed. BP recovered after pt had oral fluids. OVSS on room air. Pt denied pain, nausea and SOB. No acute events overnight. No pt complaints. Purewick overnight per pt request, low urine output, pt encouraged to increase oral fluids throughout shift. No bowel movement. Phosphorus replacement infusing in red lumen. Purple lumen heparin locked. Family member at bedside, bed alarm on. Continue with POC. Notify primary team with changes.     Problem: Adult Inpatient Plan of Care  Goal: Plan of Care Review  Description: The Plan of Care Review/Shift note should be completed every shift.  The Outcome Evaluation is a brief statement about your assessment that the patient is improving, declining, or no change.  This information will be displayed automatically on your shift  note.  Outcome: Progressing  Goal: Patient-Specific Goal (Individualized)  Description: You can add care plan individualizations to a care plan. Examples of Individualization might be:  \"Parent requests to be called daily at 9am for status\", \"I have a hard time hearing out of my right ear\", or \"Do not touch me to wake me up as it startles  me\".  Outcome: Progressing  Goal: Optimal Comfort and Wellbeing  Outcome: Progressing  Intervention: Provide Person-Centered Care  Recent Flowsheet Documentation  Taken 3/25/2024 2100 by Angela Hansen, RN  Trust Relationship/Rapport:   care explained   choices provided   emotional support provided   questions encouraged   questions answered   thoughts/feelings acknowledged   Goal Outcome Evaluation:                        "

## 2024-03-26 NOTE — PROVIDER NOTIFICATION
Spoke to Mahogany SOLOMON:  Pt BP 89/62 after lasix.     Please recheck and if still low may consider more fluids.

## 2024-03-26 NOTE — PLAN OF CARE
Goal Outcome Evaluation:      Plan of Care Reviewed With: child    Overall Patient Progress: improvingOverall Patient Progress: improving    Outcome Evaluation: slight improvement in appetite today,able to tolerate some solids

## 2024-03-26 NOTE — PROGRESS NOTES
"Brief BMT Note    BP 91/59 (BP Location: Left arm)   Pulse 79   Temp 97.8  F (36.6  C) (Axillary)   Resp 16   Ht 1.65 m (5' 4.96\")   Wt 106.5 kg (234 lb 14.4 oz)   SpO2 99%   BMI 39.14 kg/m      Patient with softer BP this afternoon following IV Lasix. Saw patient with daughter at bedside. Patient is asymptomatic -- denies lightheaded or dizziness. Still edematous on exam. Upon chart review, her BP has been in the 90's/60's in recent days. Bed alarm on and fall precautions in place. Will not give IVF at this time in an effort to continue diuresis.   "

## 2024-03-26 NOTE — PLAN OF CARE
"Vital signs:  Temp: 97.9  F (36.6  C) Temp src: Axillary BP: 102/70 Pulse: 92   Resp: 16 SpO2: 100 % O2 Device: None (Room air)   Height: 165 cm (5' 4.96\") Weight: 106.5 kg (234 lb 14.4 oz)  Estimated body mass index is 39.14 kg/m  as calculated from the following:    Height as of this encounter: 1.65 m (5' 4.96\").    Weight as of this encounter: 106.5 kg (234 lb 14.4 oz).      AVSS on room air, mildly hypotensive, no changes per provider. Pt denies pain, nausea, or SOB. Eating is improving, small amounts at a time but trying multiple times a day. 3 loose stools, on scheduled imodium. KCl and Mg replaced. 20mg lasix given, 1200ml out. EKG done, NSR with PAC. CVC heparin locked. SBA in room and cooperative of cares.      Problem: Adult Inpatient Plan of Care  Goal: Plan of Care Review  Description: The Plan of Care Review/Shift note should be completed every shift.  The Outcome Evaluation is a brief statement about your assessment that the patient is improving, declining, or no change.  This information will be displayed automatically on your shift  note.  Outcome: Not Progressing  Flowsheets (Taken 3/26/2024 1822)  Plan of Care Reviewed With:   patient   child  Overall Patient Progress: no change  Goal: Absence of Hospital-Acquired Illness or Injury  Intervention: Identify and Manage Fall Risk  Recent Flowsheet Documentation  Taken 3/26/2024 1659 by Lorna Mcgraw, RN  Safety Promotion/Fall Prevention: safety round/check completed  Taken 3/26/2024 1525 by Lorna Mcgraw, RN  Safety Promotion/Fall Prevention:   safety round/check completed   activity supervised   clutter free environment maintained   increased rounding and observation   increase visualization of patient   lighting adjusted   mobility aid in reach   nonskid shoes/slippers when out of bed   patient and family education   room organization consistent   supervised activity   treat reversible contributory factors  Taken 3/26/2024 1524 by Lorna Mcgraw, " RN  Safety Promotion/Fall Prevention: safety round/check completed  Taken 3/26/2024 1330 by Lorna Mcgraw RN  Safety Promotion/Fall Prevention: safety round/check completed  Taken 3/26/2024 1218 by Lorna Mcgraw RN  Safety Promotion/Fall Prevention: safety round/check completed  Taken 3/26/2024 1201 by Lorna Mcgraw RN  Safety Promotion/Fall Prevention:   safety round/check completed   activity supervised   clutter free environment maintained   increased rounding and observation   increase visualization of patient   lighting adjusted   mobility aid in reach   nonskid shoes/slippers when out of bed   patient and family education   room organization consistent   supervised activity   treat reversible contributory factors  Taken 3/26/2024 1145 by Lorna Mcgraw RN  Safety Promotion/Fall Prevention: safety round/check completed  Taken 3/26/2024 1014 by Lorna Mcgraw RN  Safety Promotion/Fall Prevention: safety round/check completed  Taken 3/26/2024 0813 by Lorna Mcgraw RN  Safety Promotion/Fall Prevention: safety round/check completed  Taken 3/26/2024 0800 by Lorna Mcgraw RN  Safety Promotion/Fall Prevention:   safety round/check completed   activity supervised   clutter free environment maintained   increased rounding and observation   increase visualization of patient   lighting adjusted   mobility aid in reach   nonskid shoes/slippers when out of bed   patient and family education   room organization consistent   supervised activity   treat reversible contributory factors  Intervention: Prevent Skin Injury  Recent Flowsheet Documentation  Taken 3/26/2024 1800 by Lorna Mcgraw RN  Body Position: position changed independently  Taken 3/26/2024 1600 by Lorna Mcgraw RN  Body Position: position changed independently  Taken 3/26/2024 1400 by Lorna Mcgraw RN  Body Position: position changed independently  Taken 3/26/2024 1200 by Lorna Mcgraw RN  Body Position: position changed independently  Taken 3/26/2024 1014 by  Lorna Mcgraw RN  Body Position: position changed independently  Taken 3/26/2024 0901 by Lorna Mcgraw RN  Body Position: position changed independently  Taken 3/26/2024 0800 by Lorna Mcgraw RN  Body Position: position changed independently  Skin Protection: adhesive use limited  Device Skin Pressure Protection:   tubing/devices free from skin contact   positioning supports utilized   adhesive use limited  Intervention: Prevent and Manage VTE (Venous Thromboembolism) Risk  Recent Flowsheet Documentation  Taken 3/26/2024 0800 by Lorna Mcgraw RN  VTE Prevention/Management: SCDs (sequential compression devices) off  Intervention: Prevent Infection  Recent Flowsheet Documentation  Taken 3/26/2024 1525 by Lorna Mcgraw RN  Infection Prevention:   hand hygiene promoted   rest/sleep promoted   single patient room provided   visitors restricted/screened   personal protective equipment utilized  Taken 3/26/2024 1201 by Lorna Mcgraw RN  Infection Prevention:   hand hygiene promoted   rest/sleep promoted   single patient room provided   visitors restricted/screened   personal protective equipment utilized  Taken 3/26/2024 0800 by Lorna Mcgraw RN  Infection Prevention:   hand hygiene promoted   rest/sleep promoted   single patient room provided   visitors restricted/screened   personal protective equipment utilized  Goal: Optimal Comfort and Wellbeing  Outcome: Progressing  Goal: Readiness for Transition of Care  Outcome: Progressing   Goal Outcome Evaluation:      Plan of Care Reviewed With: patient, child    Overall Patient Progress: no changeOverall Patient Progress: no change

## 2024-03-27 ENCOUNTER — APPOINTMENT (OUTPATIENT)
Dept: OCCUPATIONAL THERAPY | Facility: CLINIC | Age: 69
End: 2024-03-27
Payer: COMMERCIAL

## 2024-03-27 ENCOUNTER — APPOINTMENT (OUTPATIENT)
Dept: OCCUPATIONAL THERAPY | Facility: CLINIC | Age: 69
End: 2024-03-27
Attending: PHYSICIAN ASSISTANT
Payer: COMMERCIAL

## 2024-03-27 LAB
ALBUMIN SERPL BCG-MCNC: 2.7 G/DL (ref 3.5–5.2)
ALP SERPL-CCNC: 55 U/L (ref 40–150)
ALT SERPL W P-5'-P-CCNC: 48 U/L (ref 0–50)
ANION GAP SERPL CALCULATED.3IONS-SCNC: 11 MMOL/L (ref 7–15)
AST SERPL W P-5'-P-CCNC: 46 U/L (ref 0–45)
ATRIAL RATE - MUSE: 85 BPM
BACTERIA BLD CULT: NO GROWTH
BASOPHILS # BLD AUTO: ABNORMAL 10*3/UL
BASOPHILS # BLD MANUAL: 0 10E3/UL (ref 0–0.2)
BASOPHILS NFR BLD AUTO: ABNORMAL %
BASOPHILS NFR BLD MANUAL: 0 %
BILIRUB SERPL-MCNC: 0.4 MG/DL
BUN SERPL-MCNC: 16.4 MG/DL (ref 8–23)
C PNEUM DNA SPEC QL NAA+PROBE: NOT DETECTED
CALCIUM SERPL-MCNC: 6.9 MG/DL (ref 8.8–10.2)
CHLORIDE SERPL-SCNC: 109 MMOL/L (ref 98–107)
CREAT SERPL-MCNC: 0.52 MG/DL (ref 0.51–0.95)
DEPRECATED HCO3 PLAS-SCNC: 22 MMOL/L (ref 22–29)
DIASTOLIC BLOOD PRESSURE - MUSE: NORMAL MMHG
EGFRCR SERPLBLD CKD-EPI 2021: >90 ML/MIN/1.73M2
ELLIPTOCYTES BLD QL SMEAR: SLIGHT
EOSINOPHIL # BLD AUTO: ABNORMAL 10*3/UL
EOSINOPHIL # BLD MANUAL: 0 10E3/UL (ref 0–0.7)
EOSINOPHIL NFR BLD AUTO: ABNORMAL %
EOSINOPHIL NFR BLD MANUAL: 0 %
ERYTHROCYTE [DISTWIDTH] IN BLOOD BY AUTOMATED COUNT: 18.6 % (ref 10–15)
FLUAV H1 2009 PAND RNA SPEC QL NAA+PROBE: NOT DETECTED
FLUAV H1 RNA SPEC QL NAA+PROBE: NOT DETECTED
FLUAV H3 RNA SPEC QL NAA+PROBE: NOT DETECTED
FLUAV RNA SPEC QL NAA+PROBE: NOT DETECTED
FLUBV RNA SPEC QL NAA+PROBE: NOT DETECTED
GLUCOSE SERPL-MCNC: 104 MG/DL (ref 70–99)
HADV DNA SPEC QL NAA+PROBE: NOT DETECTED
HCOV PNL SPEC NAA+PROBE: NOT DETECTED
HCT VFR BLD AUTO: 22.3 % (ref 35–47)
HGB BLD-MCNC: 7.6 G/DL (ref 11.7–15.7)
HMPV RNA SPEC QL NAA+PROBE: NOT DETECTED
HPIV1 RNA SPEC QL NAA+PROBE: NOT DETECTED
HPIV2 RNA SPEC QL NAA+PROBE: NOT DETECTED
HPIV3 RNA SPEC QL NAA+PROBE: NOT DETECTED
HPIV4 RNA SPEC QL NAA+PROBE: NOT DETECTED
IMM GRANULOCYTES # BLD: ABNORMAL 10*3/UL
IMM GRANULOCYTES NFR BLD: ABNORMAL %
INTERPRETATION ECG - MUSE: NORMAL
LYMPHOCYTES # BLD AUTO: ABNORMAL 10*3/UL
LYMPHOCYTES # BLD MANUAL: 0 10E3/UL (ref 0.8–5.3)
LYMPHOCYTES NFR BLD AUTO: ABNORMAL %
LYMPHOCYTES NFR BLD MANUAL: 0 %
M PNEUMO DNA SPEC QL NAA+PROBE: NOT DETECTED
MAGNESIUM SERPL-MCNC: 1.8 MG/DL (ref 1.7–2.3)
MCH RBC QN AUTO: 32.5 PG (ref 26.5–33)
MCHC RBC AUTO-ENTMCNC: 34.1 G/DL (ref 31.5–36.5)
MCV RBC AUTO: 95 FL (ref 78–100)
METAMYELOCYTES # BLD MANUAL: 0.3 10E3/UL
METAMYELOCYTES NFR BLD MANUAL: 4 %
MONOCYTES # BLD AUTO: ABNORMAL 10*3/UL
MONOCYTES # BLD MANUAL: 1.3 10E3/UL (ref 0–1.3)
MONOCYTES NFR BLD AUTO: ABNORMAL %
MONOCYTES NFR BLD MANUAL: 16 %
NEUTROPHILS # BLD AUTO: ABNORMAL 10*3/UL
NEUTROPHILS # BLD MANUAL: 6.7 10E3/UL (ref 1.6–8.3)
NEUTROPHILS NFR BLD AUTO: ABNORMAL %
NEUTROPHILS NFR BLD MANUAL: 80 %
NRBC # BLD AUTO: 0 10E3/UL
NRBC BLD AUTO-RTO: 0 /100
P AXIS - MUSE: 42 DEGREES
PHOSPHATE SERPL-MCNC: 2.3 MG/DL (ref 2.5–4.5)
PLAT MORPH BLD: ABNORMAL
PLATELET # BLD AUTO: 22 10E3/UL (ref 150–450)
POTASSIUM SERPL-SCNC: 3.8 MMOL/L (ref 3.4–5.3)
PR INTERVAL - MUSE: 154 MS
PROT SERPL-MCNC: 4.4 G/DL (ref 6.4–8.3)
QRS DURATION - MUSE: 78 MS
QT - MUSE: 400 MS
QTC - MUSE: 476 MS
R AXIS - MUSE: 2 DEGREES
RBC # BLD AUTO: 2.34 10E6/UL (ref 3.8–5.2)
RBC MORPH BLD: ABNORMAL
RSV RNA SPEC QL NAA+PROBE: NOT DETECTED
RSV RNA SPEC QL NAA+PROBE: NOT DETECTED
RV+EV RNA SPEC QL NAA+PROBE: NOT DETECTED
SODIUM SERPL-SCNC: 142 MMOL/L (ref 135–145)
SYSTOLIC BLOOD PRESSURE - MUSE: NORMAL MMHG
T AXIS - MUSE: 40 DEGREES
VENTRICULAR RATE- MUSE: 85 BPM
WBC # BLD AUTO: 8.4 10E3/UL (ref 4–11)

## 2024-03-27 PROCEDURE — 258N000003 HC RX IP 258 OP 636: Performed by: INTERNAL MEDICINE

## 2024-03-27 PROCEDURE — 97140 MANUAL THERAPY 1/> REGIONS: CPT | Mod: GO

## 2024-03-27 PROCEDURE — 250N000011 HC RX IP 250 OP 636: Performed by: INTERNAL MEDICINE

## 2024-03-27 PROCEDURE — 258N000003 HC RX IP 258 OP 636

## 2024-03-27 PROCEDURE — 84100 ASSAY OF PHOSPHORUS: CPT | Performed by: INTERNAL MEDICINE

## 2024-03-27 PROCEDURE — 250N000013 HC RX MED GY IP 250 OP 250 PS 637: Performed by: INTERNAL MEDICINE

## 2024-03-27 PROCEDURE — 250N000013 HC RX MED GY IP 250 OP 250 PS 637: Performed by: PHYSICIAN ASSISTANT

## 2024-03-27 PROCEDURE — 85027 COMPLETE CBC AUTOMATED: CPT | Performed by: STUDENT IN AN ORGANIZED HEALTH CARE EDUCATION/TRAINING PROGRAM

## 2024-03-27 PROCEDURE — 250N000011 HC RX IP 250 OP 636: Mod: JZ

## 2024-03-27 PROCEDURE — 85007 BL SMEAR W/DIFF WBC COUNT: CPT | Performed by: STUDENT IN AN ORGANIZED HEALTH CARE EDUCATION/TRAINING PROGRAM

## 2024-03-27 PROCEDURE — 80053 COMPREHEN METABOLIC PANEL: CPT | Performed by: STUDENT IN AN ORGANIZED HEALTH CARE EDUCATION/TRAINING PROGRAM

## 2024-03-27 PROCEDURE — 97535 SELF CARE MNGMENT TRAINING: CPT | Mod: GO

## 2024-03-27 PROCEDURE — 206N000001 HC R&B BMT UMMC

## 2024-03-27 PROCEDURE — 99233 SBSQ HOSP IP/OBS HIGH 50: CPT | Mod: FS | Performed by: PHYSICIAN ASSISTANT

## 2024-03-27 PROCEDURE — 250N000011 HC RX IP 250 OP 636: Mod: JZ | Performed by: PHYSICIAN ASSISTANT

## 2024-03-27 PROCEDURE — 250N000013 HC RX MED GY IP 250 OP 250 PS 637

## 2024-03-27 PROCEDURE — 87633 RESP VIRUS 12-25 TARGETS: CPT | Performed by: PHYSICIAN ASSISTANT

## 2024-03-27 PROCEDURE — 250N000013 HC RX MED GY IP 250 OP 250 PS 637: Performed by: STUDENT IN AN ORGANIZED HEALTH CARE EDUCATION/TRAINING PROGRAM

## 2024-03-27 PROCEDURE — 97530 THERAPEUTIC ACTIVITIES: CPT | Mod: GO

## 2024-03-27 PROCEDURE — 250N000009 HC RX 250: Performed by: INTERNAL MEDICINE

## 2024-03-27 PROCEDURE — 83735 ASSAY OF MAGNESIUM: CPT | Performed by: INTERNAL MEDICINE

## 2024-03-27 RX ORDER — METOPROLOL TARTRATE 25 MG/1
25 TABLET, FILM COATED ORAL 2 TIMES DAILY
Status: DISCONTINUED | OUTPATIENT
Start: 2024-03-27 | End: 2024-03-31

## 2024-03-27 RX ORDER — MAGNESIUM SULFATE HEPTAHYDRATE 40 MG/ML
2 INJECTION, SOLUTION INTRAVENOUS ONCE
Status: COMPLETED | OUTPATIENT
Start: 2024-03-27 | End: 2024-03-27

## 2024-03-27 RX ORDER — POTASSIUM PHOS IN 0.9 % NACL 15MMOL/250
15 PLASTIC BAG, INJECTION (ML) INTRAVENOUS ONCE
Status: COMPLETED | OUTPATIENT
Start: 2024-03-27 | End: 2024-03-27

## 2024-03-27 RX ORDER — ERTAPENEM 1 G/1
1 INJECTION, POWDER, LYOPHILIZED, FOR SOLUTION INTRAMUSCULAR; INTRAVENOUS EVERY 24 HOURS
Qty: 90 ML | Refills: 0 | Status: DISCONTINUED | OUTPATIENT
Start: 2024-03-27 | End: 2024-03-29

## 2024-03-27 RX ORDER — PROCHLORPERAZINE MALEATE 5 MG
5 TABLET ORAL
Status: DISCONTINUED | OUTPATIENT
Start: 2024-03-27 | End: 2024-04-02 | Stop reason: HOSPADM

## 2024-03-27 RX ORDER — POTASSIUM CHLORIDE 29.8 MG/ML
20 INJECTION INTRAVENOUS ONCE
Status: COMPLETED | OUTPATIENT
Start: 2024-03-27 | End: 2024-03-27

## 2024-03-27 RX ORDER — LOPERAMIDE HCL 2 MG
4 CAPSULE ORAL 4 TIMES DAILY PRN
Status: DISCONTINUED | OUTPATIENT
Start: 2024-03-27 | End: 2024-03-29

## 2024-03-27 RX ADMIN — OXYCODONE HYDROCHLORIDE 5 MG: 5 TABLET ORAL at 04:38

## 2024-03-27 RX ADMIN — DEXTROSE MONOHYDRATE 20 ML: 50 INJECTION, SOLUTION INTRAVENOUS at 19:40

## 2024-03-27 RX ADMIN — METOPROLOL TARTRATE 25 MG: 25 TABLET, FILM COATED ORAL at 11:16

## 2024-03-27 RX ADMIN — FOLIC ACID 1000 MCG: 1 TABLET ORAL at 09:14

## 2024-03-27 RX ADMIN — PROCHLORPERAZINE MALEATE 5 MG: 5 TABLET ORAL at 16:46

## 2024-03-27 RX ADMIN — MEROPENEM 1 G: 1 INJECTION, POWDER, FOR SOLUTION INTRAVENOUS at 04:28

## 2024-03-27 RX ADMIN — DICLOFENAC SODIUM 2 G: 10 GEL TOPICAL at 16:50

## 2024-03-27 RX ADMIN — MAGNESIUM SULFATE HEPTAHYDRATE 2 G: 2 INJECTION, SOLUTION INTRAVENOUS at 06:02

## 2024-03-27 RX ADMIN — CALCIUM 500 MG: 500 TABLET ORAL at 19:34

## 2024-03-27 RX ADMIN — AMIODARONE HYDROCHLORIDE 400 MG: 200 TABLET ORAL at 22:16

## 2024-03-27 RX ADMIN — ACYCLOVIR 800 MG: 800 TABLET ORAL at 09:14

## 2024-03-27 RX ADMIN — Medication 25 MG: at 09:14

## 2024-03-27 RX ADMIN — GABAPENTIN 100 MG: 100 CAPSULE ORAL at 19:34

## 2024-03-27 RX ADMIN — DICLOFENAC SODIUM 2 G: 10 GEL TOPICAL at 07:03

## 2024-03-27 RX ADMIN — ACYCLOVIR 800 MG: 800 TABLET ORAL at 19:34

## 2024-03-27 RX ADMIN — Medication 5 ML: at 00:00

## 2024-03-27 RX ADMIN — POTASSIUM PHOSPHATE, MONOBASIC POTASSIUM PHOSPHATE, DIBASIC 15 MMOL: 224; 236 INJECTION, SOLUTION, CONCENTRATE INTRAVENOUS at 08:05

## 2024-03-27 RX ADMIN — GABAPENTIN 100 MG: 100 CAPSULE ORAL at 09:14

## 2024-03-27 RX ADMIN — Medication 5 ML: at 13:01

## 2024-03-27 RX ADMIN — PANTOPRAZOLE SODIUM 40 MG: 40 TABLET, DELAYED RELEASE ORAL at 09:14

## 2024-03-27 RX ADMIN — PROCHLORPERAZINE MALEATE 5 MG: 5 TABLET ORAL at 11:24

## 2024-03-27 RX ADMIN — DEXTROSE MONOHYDRATE 480 MCG: 50 INJECTION, SOLUTION INTRAVENOUS at 19:33

## 2024-03-27 RX ADMIN — CALCIUM 500 MG: 500 TABLET ORAL at 09:14

## 2024-03-27 RX ADMIN — ISONIAZID 300 MG: 300 TABLET ORAL at 09:14

## 2024-03-27 RX ADMIN — POTASSIUM CHLORIDE 20 MEQ: 29.8 INJECTION, SOLUTION INTRAVENOUS at 05:59

## 2024-03-27 RX ADMIN — Medication 1 LOZENGE: at 19:44

## 2024-03-27 RX ADMIN — Medication 5 ML: at 21:00

## 2024-03-27 RX ADMIN — Medication 3 MG: at 00:11

## 2024-03-27 RX ADMIN — LOPERAMIDE HYDROCHLORIDE 4 MG: 2 CAPSULE ORAL at 09:14

## 2024-03-27 RX ADMIN — DEXTROSE MONOHYDRATE 20 ML: 50 INJECTION, SOLUTION INTRAVENOUS at 19:33

## 2024-03-27 RX ADMIN — METOPROLOL TARTRATE 25 MG: 25 TABLET, FILM COATED ORAL at 19:34

## 2024-03-27 RX ADMIN — GABAPENTIN 100 MG: 100 CAPSULE ORAL at 15:46

## 2024-03-27 RX ADMIN — ERTAPENEM SODIUM 1 G: 1 INJECTION, POWDER, LYOPHILIZED, FOR SOLUTION INTRAMUSCULAR; INTRAVENOUS at 12:57

## 2024-03-27 ASSESSMENT — ACTIVITIES OF DAILY LIVING (ADL)
ADLS_ACUITY_SCORE: 30
ADLS_ACUITY_SCORE: 28
ADLS_ACUITY_SCORE: 30
ADLS_ACUITY_SCORE: 28
ADLS_ACUITY_SCORE: 30
ADLS_ACUITY_SCORE: 28
ADLS_ACUITY_SCORE: 29
ADLS_ACUITY_SCORE: 28
ADLS_ACUITY_SCORE: 30
ADLS_ACUITY_SCORE: 29
ADLS_ACUITY_SCORE: 28
ADLS_ACUITY_SCORE: 30
ADLS_ACUITY_SCORE: 30
ADLS_ACUITY_SCORE: 28
ADLS_ACUITY_SCORE: 30
ADLS_ACUITY_SCORE: 29
ADLS_ACUITY_SCORE: 28
ADLS_ACUITY_SCORE: 30

## 2024-03-27 NOTE — PROGRESS NOTES
BMT Daily Progress Note   03/27/2024    Patient ID:  Rosario Terrazas is a 68 year old female, currently day 14 s/p auto for MM readmitted 3/21 with N/F and GNB+ sepsis.    INTERVAL  HISTORY   Rosario received lasix yesterday and then became hypotensive.  She was not lightheaded.  She continues to have some intermittent lower blood pressures.  Explained to patient that giving IV fluid will contribute to her edema, and it is better for her to increase her oral fluid intake.  Her food intake is also suboptimal due to nausea after initiating eating.    Reports of stool output are confusing; notes state 3 stools; I/o flowsheet stated 6; daughter states no stool out since Monday (3/25).    Cough that started 2 days ago and seems to be increasing, per daughter.     Review of Systems: ROS negative except as noted above.  Scheduled Medications   acyclovir  800 mg Oral BID    amiodarone  400 mg Oral BID    [START ON 4/1/2024] amiodarone  400 mg Oral Daily    calcium carbonate  500 mg Oral BID w/meals    dextrose 5% water  10-20 mL Intravenous Daily at 8 pm    And    filgrastim-aafi  5 mcg/kg Intravenous Daily at 8 pm    And    dextrose 5% water  10-20 mL Intravenous Daily at 8 pm    diclofenac  2 g Topical 4x Daily    folic acid  1,000 mcg Oral Daily    gabapentin  100 mg Oral TID    heparin lock flush  5-10 mL Intracatheter Q24H    isoniazid  300 mg Oral Daily    lidocaine  1 patch Transdermal Q24h    loperamide  4 mg Oral 4x Daily    meropenem  1 g Intravenous Q8H    metoprolol tartrate  50 mg Oral or Feeding Tube BID    pantoprazole  40 mg Oral Daily    sodium phosphate  15 mmol Intravenous Once    pyridOXINE  25 mg Oral Daily    sodium chloride (PF)  3 mL Intracatheter Q8H    [Held by provider] sulfamethoxazole-trimethoprim  1 tablet Oral Daily    vitamin D2  50,000 Units Oral Q7 Days     Current Facility-Administered Medications   Medication    acetaminophen (TYLENOL) tablet 325-650 mg    acyclovir (ZOVIRAX) tablet  800 mg    amiodarone (PACERONE) tablet 400 mg    [START ON 4/1/2024] amiodarone (PACERONE) tablet 400 mg    benzocaine 20% (HURRICAINE/TOPEX) 20 % spray 0.5-1 mL    benzocaine-menthol (CHLORASEPTIC MAX) 15-10 MG lozenge 1 lozenge    calcium carbonate (TUMS) chewable tablet 1,000 mg    calcium carbonate 500 mg (elemental) (OSCAL) tablet 500 mg    dextrose 5 % flush PRE/POST medication    And    filgrastim-aafi (NIVESTYM) 480 mcg in D5W 25 mL infusion    And    dextrose 5 % flush PRE/POST medication    diclofenac (VOLTAREN) 1 % topical gel 2 g    folic acid (FOLVITE) tablet 1,000 mcg    gabapentin (NEURONTIN) capsule 100 mg    heparin lock flush 10 UNIT/ML injection 5-10 mL    heparin lock flush 10 UNIT/ML injection 5-10 mL    isoniazid (NYDRAZID) tablet 300 mg    Lidocaine (LIDOCARE) 4 % Patch 1 patch    lidocaine (LMX4) cream    lidocaine 1 % 0.1-1 mL    loperamide (IMODIUM) capsule 4 mg    LORazepam (ATIVAN) injection 0.5-1 mg    Or    LORazepam (ATIVAN) tablet 0.5-1 mg    melatonin tablet 3 mg    meropenem (MERREM) 1 g vial to attach to  mL bag    metoprolol tartrate (LOPRESSOR) tablet 50 mg    naloxone (NARCAN) injection 0.2 mg    Or    naloxone (NARCAN) injection 0.4 mg    Or    naloxone (NARCAN) injection 0.2 mg    Or    naloxone (NARCAN) injection 0.4 mg    oxyCODONE (ROXICODONE) tablet 5 mg    pantoprazole (PROTONIX) EC tablet 40 mg    Potassium Phosphate 15 mmol in NS intermittent infusion 15 mmol    prochlorperazine (COMPAZINE) injection 5 mg    Or    prochlorperazine (COMPAZINE) tablet 5 mg    pyridOXINE (VITAMIN B6) tablet 25 mg    senna-docusate (SENOKOT-S/PERICOLACE) 8.6-50 MG per tablet 1 tablet    Or    senna-docusate (SENOKOT-S/PERICOLACE) 8.6-50 MG per tablet 2 tablet    sodium chloride (PF) 0.9% PF flush 10-20 mL    sodium chloride (PF) 0.9% PF flush 10-40 mL    sodium chloride (PF) 0.9% PF flush 3 mL    sodium chloride (PF) 0.9% PF flush 3 mL    [Held by provider]  "sulfamethoxazole-trimethoprim (BACTRIM) 400-80 MG per tablet 1 tablet    vitamin D2 (ERGOCALCIFEROL) 84220 units (1250 mcg) capsule 50,000 Units       PHYSICAL EXAM     Weight In/Out     Wt Readings from Last 3 Encounters:   03/26/24 106.5 kg (234 lb 14.4 oz)   03/21/24 94.7 kg (208 lb 11.2 oz)   03/20/24 94.9 kg (209 lb 4.8 oz)      I/O last 3 completed shifts:  In: 1430 [P.O.:720; I.V.:710]  Out: 1650 [Urine:1650]       BP 98/61   Pulse 87   Temp 98.5  F (36.9  C) (Oral)   Resp 16   Ht 1.65 m (5' 4.96\")   Wt 106.5 kg (234 lb 14.4 oz)   SpO2 99%   BMI 39.14 kg/m       General: alert, interactive  Lungs: CTAB; no crackles  Cardiovascular: RRR  Abdominal/Rectal: +BS, not tender, soft   Skin: no rashes or petechaie  Lymph: 2+ peripheral edema at ankles  Neuro: A&O, moving all extremities spontaneously, PERRL  Additional Findings: Wilder site NT, no drainage.    LABS AND IMAGING - PAST 24 HOURS     Results for orders placed or performed during the hospital encounter of 03/22/24 (from the past 24 hour(s))   CBC with Platelets & Differential    Narrative    The following orders were created for panel order CBC with Platelets & Differential.  Procedure                               Abnormality         Status                     ---------                               -----------         ------                     CBC with platelets and d...[[id number]]  Abnormal            Final result               Manual Differential[[id number]]          Abnormal            Final result                 Please view results for these tests on the individual orders.   Comprehensive metabolic panel   Result Value Ref Range    Sodium 142 135 - 145 mmol/L    Potassium 3.8 3.4 - 5.3 mmol/L    Carbon Dioxide (CO2) 22 22 - 29 mmol/L    Anion Gap 11 7 - 15 mmol/L    Urea Nitrogen 16.4 8.0 - 23.0 mg/dL    Creatinine 0.52 0.51 - 0.95 mg/dL    GFR Estimate >90 >60 mL/min/1.73m2    Calcium 6.9 (L) 8.8 - 10.2 mg/dL    Chloride 109 (H) 98 - 107 " mmol/L    Glucose 104 (H) 70 - 99 mg/dL    Alkaline Phosphatase 55 40 - 150 U/L    AST 46 (H) 0 - 45 U/L    ALT 48 0 - 50 U/L    Protein Total 4.4 (L) 6.4 - 8.3 g/dL    Albumin 2.7 (L) 3.5 - 5.2 g/dL    Bilirubin Total 0.4 <=1.2 mg/dL   Magnesium   Result Value Ref Range    Magnesium 1.8 1.7 - 2.3 mg/dL   Phosphorus   Result Value Ref Range    Phosphorus 2.3 (L) 2.5 - 4.5 mg/dL   CBC with platelets and differential   Result Value Ref Range    WBC Count 8.4 4.0 - 11.0 10e3/uL    RBC Count 2.34 (L) 3.80 - 5.20 10e6/uL    Hemoglobin 7.6 (L) 11.7 - 15.7 g/dL    Hematocrit 22.3 (L) 35.0 - 47.0 %    MCV 95 78 - 100 fL    MCH 32.5 26.5 - 33.0 pg    MCHC 34.1 31.5 - 36.5 g/dL    RDW 18.6 (H) 10.0 - 15.0 %    Platelet Count 22 (LL) 150 - 450 10e3/uL    % Neutrophils      % Lymphocytes      % Monocytes      % Eosinophils      % Basophils      % Immature Granulocytes      NRBCs per 100 WBC 0 <1 /100    Absolute Neutrophils      Absolute Lymphocytes      Absolute Monocytes      Absolute Eosinophils      Absolute Basophils      Absolute Immature Granulocytes      Absolute NRBCs 0.0 10e3/uL   Manual Differential   Result Value Ref Range    % Neutrophils 80 %    % Lymphocytes 0 %    % Monocytes 16 %    % Eosinophils 0 %    % Basophils 0 %    % Metamyelocytes 4 %    Absolute Neutrophils 6.7 1.6 - 8.3 10e3/uL    Absolute Lymphocytes 0.0 (L) 0.8 - 5.3 10e3/uL    Absolute Monocytes 1.3 0.0 - 1.3 10e3/uL    Absolute Eosinophils 0.0 0.0 - 0.7 10e3/uL    Absolute Basophils 0.0 0.0 - 0.2 10e3/uL    Absolute Metamyelocytes 0.3 (H) <=0.0 10e3/uL    RBC Morphology Confirmed RBC Indices     Platelet Assessment  Automated Count Confirmed. Platelet morphology is normal.     Automated Count Confirmed. Platelet morphology is normal.    Elliptocytes Slight (A) None Seen         ASSESSMENT BY SYSTEMS     Rosario Adan Terrazas is a 68 year old female, currently day +14 s/p auto for MM readmitted 3/21 with N/F and GNB+ sepsis.     BMT/IEC PROTOCOL  for MM Auto     Transplants for multiple myeloma:  The patient has Standard risk IgG D MM, planning on 2 transplants     3/12 Day -1 Melphalan 200 mg/m2   3/13 Day 0 Transplant, cell total post wash 2.23 x 10^6 CD34 + cells/kg (pre freeze dose: 5.36 x 10 ^6 CD34+ cells/kg)  GCSF complete, last dose 3/26     ID  #N/F 3/22  #Strep mitis bacteremia (3/22) Vanco started (3/22-3/24; discontinued as it is sensitive to merrem)  #E. Coli bacteremia (3/21, 3/22)- Tobraymycin x1 (3/22); meropenem (3/22-27)  Follow up cultures 3/23-3/25: negative to date.  3/27: transition to ertapenem (covers both microbes) for a 14 day course (through 4/4/24). Discontinue meropenem.     Cough: check RVP 3/27    #Sepsis (hypotension, tachycardia, fever); resolved  #Latent TB: Continue INH/B6 through transplant    PPX:   Acyclovir  fluconazole (held 3/25 d/t prolonged Qtc and then discontinued 3/26 with engraftment)  Bactrim from D28      Heme   - pancytopenia secondary to chemotherapy and multiple myeloma  - transfuse to keep hgb >7g/dL, plt >10k  - Hold home aspirin (3/17), resume once platelets have recovered.   - INR 1.8, given vitamin K in clinic 3/21. Recheck 3/25 down to 1.5.     FEN/Renal  #Hypocalcemia: Suspect 2/2 hypoalbuminemia. 2g IV on 3/25 and added daily oral replacement 3/25.   #Vitamin D level: adequate. Keep on ergocalciferol at this time (weekly dosing) to prevent, as it is still winter in [place].  #Fluid overload related to sepsis management: diuresis resulted in hypotension, indicating she has third spaced fluids with intravascular dry overall status.    #Lymphedema consult 3/27  #Severe malnutrition; dietitian following.     CV  #Afib + SVT: paroxysmal afib with RVR and hypotension requiring transfer to MICU; also intermittent SVT.    Amiodarone 0.5mg/hour drip transitioned to PO on 3/24; held 3/27 morning due to hypotension  Metoprolol 25mg bid increased 3/24 to 50mg bid; reduce again to 25mg bid now that her  "bacteremia/sepsis are controlled.  This may help her low blood pressures as well as her fatigue and lower mood.    concern that cardiac processes were being driven by an overall inflammatory state, added decadron 4mg IV daily x 3 days (3/24-3/26).   Electrolytes to be replaced per HIGH sliding scale  Prolonged QTc; continue to hold zofran/zyprexa  Ziopatch placement prior to discharge    Please have techs placed a ziopatch prior to discharge and arrange for cardiology follow up after discharge.       GI:   #Nausea: compazine q ac, tid.  #Diarrhea: imodium prn (diarrhea has slowed)  #Mucositis: hurricaine spray prn       Endocrine  #Thyroid FNA Atypia of undetermined significance diagnosis has a 22 (13-30)% risk of malignancy. Repeat FNA (least 4-6 weeks from previous aspiration with optimal time being 12 weeks after last aspiration) , molecular testing, diagnostic lobectomy, or surveillance is recommended.    Has next appt with endocrine 4/16/24.   *TSH WNL 3/21     Neuro/Pain  #Neuropathy: continues on eleni, xanaflex. Gabapentin   #Rib pain: continue oxycodone and back brace, Tylenol on hold since 3/19       Dispo: anticipate discharge toward the end of this week if she is stable.    Clinically Significant Risk Factors              # Hypoalbuminemia: Lowest albumin = 2.5 g/dL at 3/24/2024  3:59 AM, will monitor as appropriate    # Coagulation Defect: INR = 1.50 (Ref range: 0.85 - 1.15) and/or PTT = 29 Seconds (Ref range: 22 - 38 Seconds), will monitor for bleeding  # Thrombocytopenia: Lowest platelets = 22 in last 2 days, will monitor for bleeding          # Obesity: Estimated body mass index is 39.14 kg/m  as calculated from the following:    Height as of this encounter: 1.65 m (5' 4.96\").    Weight as of this encounter: 106.5 kg (234 lb 14.4 oz).   # Severe Malnutrition: based on nutrition assessment      # Financial/Environmental Concerns: none       I spent 30 minutes in the care of this patient today, which " included time necessary for preparation for the visit, obtaining history, ordering medications/tests/procedures as medically indicated, review of pertinent medical literature, counseling of the patient, communication of recommendations to the care team, and documentation time.  Yudelka Richards PA-C

## 2024-03-27 NOTE — PLAN OF CARE
"BP 98/61   Pulse 82   Temp 98.5  F (36.9  C) (Oral)   Resp 16   Ht 1.65 m (5' 4.96\")   Wt 106.5 kg (234 lb 14.4 oz)   SpO2 99%   BMI 39.14 kg/m       Neuro: Aox4 (Daughter by bedside as )   Cardiac:  S1S2, regular,  on amiodarone for SVT   Respiratory: Clear bilaterally   GI/: Intermittent purewick  Diet/appetite: Poor, feel nauseated   Activity: bed alarm on, walker, ambulate x 1  Pain: Pain on left shoulder and both knees (apply voltaren QID & Oxycodone x2)  Skin: clean, mild moist, intact   Lines: CVC- Potassium and Mg   Replacements:  Replaced Potassium & Mg. Will need Phos    BP fluctuates on the lower end.     Problem: Adult Inpatient Plan of Care  Goal: Plan of Care Review  Description: The Plan of Care Review/Shift note should be completed every shift.  The Outcome Evaluation is a brief statement about your assessment that the patient is improving, declining, or no change.  This information will be displayed automatically on your shift  note.  Outcome: Progressing  Goal: Patient-Specific Goal (Individualized)  Description: You can add care plan individualizations to a care plan. Examples of Individualization might be:  \"Parent requests to be called daily at 9am for status\", \"I have a hard time hearing out of my right ear\", or \"Do not touch me to wake me up as it startles  me\".  Outcome: Progressing  Goal: Absence of Hospital-Acquired Illness or Injury  Outcome: Progressing  Intervention: Identify and Manage Fall Risk  Recent Flowsheet Documentation  Taken 3/27/2024 0000 by Leanne Lua, RN  Safety Promotion/Fall Prevention: safety round/check completed  Taken 3/26/2024 2151 by Leanne Lua RN  Safety Promotion/Fall Prevention: safety round/check completed  Intervention: Prevent Skin Injury  Recent Flowsheet Documentation  Taken 3/26/2024 2151 by Leanne Lua, RN  Body Position: position changed independently  Skin Protection: adhesive use limited  Device Skin Pressure Protection: " absorbent pad utilized/changed  Intervention: Prevent Infection  Recent Flowsheet Documentation  Taken 3/27/2024 0000 by Leanne Lua RN  Infection Prevention:   hand hygiene promoted   rest/sleep promoted   single patient room provided   visitors restricted/screened   personal protective equipment utilized  Taken 3/26/2024 2151 by Leanne Lua RN  Infection Prevention:   hand hygiene promoted   rest/sleep promoted   single patient room provided   visitors restricted/screened   personal protective equipment utilized  Goal: Optimal Comfort and Wellbeing  Outcome: Progressing  Intervention: Provide Person-Centered Care  Recent Flowsheet Documentation  Taken 3/26/2024 2151 by Leanne Lua RN  Trust Relationship/Rapport:   care explained   choices provided   emotional support provided   questions encouraged   questions answered   thoughts/feelings acknowledged  Goal: Readiness for Transition of Care  Outcome: Progressing     Problem: Oral Intake Inadequate  Goal: Improved Oral Intake  Outcome: Progressing     Problem: Infection  Goal: Absence of Infection Signs and Symptoms  Outcome: Progressing  Intervention: Prevent or Manage Infection  Recent Flowsheet Documentation  Taken 3/27/2024 0000 by Leanne Lua RN  Infection Management: aseptic technique maintained  Isolation Precautions:   contact precautions maintained   enteric precautions maintained  Taken 3/26/2024 2151 by Leanne Lua RN  Infection Management: aseptic technique maintained  Isolation Precautions:   contact precautions maintained   enteric precautions maintained     Problem: Fall Injury Risk  Goal: Absence of Fall and Fall-Related Injury  Outcome: Progressing  Intervention: Identify and Manage Contributors  Recent Flowsheet Documentation  Taken 3/27/2024 0000 by Leanne Lua RN  Medication Review/Management: medications reviewed  Taken 3/26/2024 2151 by Leanne Lua RN  Medication Review/Management: medications reviewed  Intervention: Promote  Injury-Free Environment  Recent Flowsheet Documentation  Taken 3/27/2024 0000 by Leanne Lua, RN  Safety Promotion/Fall Prevention: safety round/check completed  Taken 3/26/2024 2151 by Leanne Lua, RN  Safety Promotion/Fall Prevention: safety round/check completed

## 2024-03-27 NOTE — PROVIDER NOTIFICATION
"Paged provider via [facility] [employer] [facility] about low BP 84/56 (Map 66). Pt is asymptomatic and other vitals are stables.     Provider said:\" if it goes lower. We will try some bolus. Looks like far from baseline.\"   "

## 2024-03-27 NOTE — PLAN OF CARE
"Vital signs:  Temp: 98  F (36.7  C) Temp src: Oral BP: (!) 83/56 Pulse: 86   Resp: 16 SpO2: 100 % O2 Device: None (Room air)   Height: 165 cm (5' 4.96\") Weight: 106.3 kg (234 lb 6.4 oz)  Estimated body mass index is 39.05 kg/m  as calculated from the following:    Height as of this encounter: 1.65 m (5' 4.96\").    Weight as of this encounter: 106.3 kg (234 lb 6.4 oz).      AVSS on room air with exception of soft BP throughout the day. BP 83/50, 82/55, 83/56, provider aware, held amiodarone this morning, given half dose of metoprolol this morning. Pt denies pain, nausea, or SOB. Eating continues to improve, eating small amounts of food several times today. One medium soft stool today, scheduled imodium stopped. Merrem switched to ertapenem. RVP sent, negative. CVC heparin locked, dressing and caps changed. Shower and CHG wipes completed today. SBA in room and cooperative of cares. Daughter at bedside interprets for pt.      Problem: Adult Inpatient Plan of Care  Goal: Plan of Care Review  Description: The Plan of Care Review/Shift note should be completed every shift.  The Outcome Evaluation is a brief statement about your assessment that the patient is improving, declining, or no change.  This information will be displayed automatically on your shift  note.  Outcome: Progressing  Flowsheets (Taken 3/27/2024 1842)  Plan of Care Reviewed With:   patient   family  Overall Patient Progress: improving  Goal: Absence of Hospital-Acquired Illness or Injury  Intervention: Identify and Manage Fall Risk  Recent Flowsheet Documentation  Taken 3/27/2024 1636 by Lorna Mcgraw, RN  Safety Promotion/Fall Prevention: safety round/check completed  Taken 3/27/2024 1550 by Lorna Mcgraw, RN  Safety Promotion/Fall Prevention:   safety round/check completed   activity supervised   clutter free environment maintained   increased rounding and observation   increase visualization of patient   lighting adjusted   mobility aid in reach   " nonskid shoes/slippers when out of bed   patient and family education   room organization consistent   supervised activity   treat reversible contributory factors  Taken 3/27/2024 1546 by Lorna Mcgraw RN  Safety Promotion/Fall Prevention: safety round/check completed  Taken 3/27/2024 1415 by Lorna Mcgraw RN  Safety Promotion/Fall Prevention:   safety round/check completed   activity supervised   clutter free environment maintained   increased rounding and observation   increase visualization of patient   lighting adjusted   mobility aid in reach   nonskid shoes/slippers when out of bed   patient and family education   room organization consistent   supervised activity   treat reversible contributory factors  Taken 3/27/2024 1257 by Lorna Mcgraw RN  Safety Promotion/Fall Prevention:   safety round/check completed   activity supervised   clutter free environment maintained   increased rounding and observation   increase visualization of patient   lighting adjusted   mobility aid in reach   nonskid shoes/slippers when out of bed   patient and family education   room organization consistent   supervised activity   treat reversible contributory factors  Taken 3/27/2024 1143 by Lorna Mcgraw RN  Safety Promotion/Fall Prevention:   safety round/check completed   activity supervised   clutter free environment maintained   increased rounding and observation   increase visualization of patient   lighting adjusted   mobility aid in reach   nonskid shoes/slippers when out of bed   patient and family education   room organization consistent   supervised activity   treat reversible contributory factors  Taken 3/27/2024 1114 by Lorna Mcgraw RN  Safety Promotion/Fall Prevention: safety round/check completed  Taken 3/27/2024 1032 by Lorna Mcgraw RN  Safety Promotion/Fall Prevention: safety round/check completed  Taken 3/27/2024 0914 by Lorna Mcgraw RN  Safety Promotion/Fall Prevention: safety round/check completed  Taken  3/27/2024 0802 by Lorna Mcgraw RN  Safety Promotion/Fall Prevention: safety round/check completed  Taken 3/27/2024 0800 by Lorna Mcgraw RN  Safety Promotion/Fall Prevention:   safety round/check completed   activity supervised   clutter free environment maintained   increased rounding and observation   increase visualization of patient   lighting adjusted   mobility aid in reach   nonskid shoes/slippers when out of bed   patient and family education   room organization consistent   supervised activity   treat reversible contributory factors  Intervention: Prevent Skin Injury  Recent Flowsheet Documentation  Taken 3/27/2024 1550 by Lorna Mcgraw RN  Body Position: position changed independently  Taken 3/27/2024 1143 by Lorna Mcgraw RN  Body Position: position changed independently  Taken 3/27/2024 0800 by Lorna Mcgraw RN  Body Position: position changed independently  Skin Protection: adhesive use limited  Device Skin Pressure Protection:   tubing/devices free from skin contact   positioning supports utilized   adhesive use limited  Intervention: Prevent and Manage VTE (Venous Thromboembolism) Risk  Recent Flowsheet Documentation  Taken 3/27/2024 0800 by Lorna Mcgraw RN  VTE Prevention/Management: SCDs (sequential compression devices) off  Intervention: Prevent Infection  Recent Flowsheet Documentation  Taken 3/27/2024 1550 by Lorna Mcgraw RN  Infection Prevention:   hand hygiene promoted   rest/sleep promoted   single patient room provided   visitors restricted/screened   personal protective equipment utilized  Taken 3/27/2024 1143 by Lorna cMgraw RN  Infection Prevention:   hand hygiene promoted   rest/sleep promoted   single patient room provided   visitors restricted/screened   personal protective equipment utilized  Taken 3/27/2024 0800 by Lorna Mcgraw RN  Infection Prevention:   hand hygiene promoted   rest/sleep promoted   single patient room provided   visitors restricted/screened   personal  protective equipment utilized  Goal: Optimal Comfort and Wellbeing  Outcome: Progressing  Goal: Readiness for Transition of Care  Outcome: Progressing   Goal Outcome Evaluation:      Plan of Care Reviewed With: patient, family    Overall Patient Progress: improvingOverall Patient Progress: improving

## 2024-03-28 ENCOUNTER — APPOINTMENT (OUTPATIENT)
Dept: PHYSICAL THERAPY | Facility: CLINIC | Age: 69
End: 2024-03-28
Payer: COMMERCIAL

## 2024-03-28 ENCOUNTER — APPOINTMENT (OUTPATIENT)
Dept: OCCUPATIONAL THERAPY | Facility: CLINIC | Age: 69
End: 2024-03-28
Payer: COMMERCIAL

## 2024-03-28 LAB
ABO/RH(D): NORMAL
ALBUMIN SERPL BCG-MCNC: 2.8 G/DL (ref 3.5–5.2)
ALP SERPL-CCNC: 61 U/L (ref 40–150)
ALT SERPL W P-5'-P-CCNC: 62 U/L (ref 0–50)
ANION GAP SERPL CALCULATED.3IONS-SCNC: 10 MMOL/L (ref 7–15)
ANTIBODY SCREEN: NEGATIVE
AST SERPL W P-5'-P-CCNC: 60 U/L (ref 0–45)
BACTERIA BLD CULT: NO GROWTH
BACTERIA BLD CULT: NO GROWTH
BASOPHILS # BLD AUTO: ABNORMAL 10*3/UL
BASOPHILS # BLD MANUAL: 0 10E3/UL (ref 0–0.2)
BASOPHILS NFR BLD AUTO: ABNORMAL %
BASOPHILS NFR BLD MANUAL: 0 %
BILIRUB SERPL-MCNC: 0.3 MG/DL
BUN SERPL-MCNC: 11.3 MG/DL (ref 8–23)
CALCIUM SERPL-MCNC: 6.8 MG/DL (ref 8.8–10.2)
CHLORIDE SERPL-SCNC: 108 MMOL/L (ref 98–107)
CREAT SERPL-MCNC: 0.49 MG/DL (ref 0.51–0.95)
DEPRECATED HCO3 PLAS-SCNC: 22 MMOL/L (ref 22–29)
EGFRCR SERPLBLD CKD-EPI 2021: >90 ML/MIN/1.73M2
EOSINOPHIL # BLD AUTO: ABNORMAL 10*3/UL
EOSINOPHIL # BLD MANUAL: 0 10E3/UL (ref 0–0.7)
EOSINOPHIL NFR BLD AUTO: ABNORMAL %
EOSINOPHIL NFR BLD MANUAL: 0 %
ERYTHROCYTE [DISTWIDTH] IN BLOOD BY AUTOMATED COUNT: 18.6 % (ref 10–15)
GLUCOSE SERPL-MCNC: 77 MG/DL (ref 70–99)
HBV CORE AB SERPL QL IA: NONREACTIVE
HBV SURFACE AB SERPL IA-ACNC: <3.5 M[IU]/ML
HBV SURFACE AB SERPL IA-ACNC: NONREACTIVE M[IU]/ML
HBV SURFACE AG SERPL QL IA: NONREACTIVE
HCT VFR BLD AUTO: 21.9 % (ref 35–47)
HCV AB SERPL QL IA: NONREACTIVE
HGB BLD-MCNC: 7.2 G/DL (ref 11.7–15.7)
IMM GRANULOCYTES # BLD: ABNORMAL 10*3/UL
IMM GRANULOCYTES NFR BLD: ABNORMAL %
INR PPP: 1.3 (ref 0.85–1.15)
LYMPHOCYTES # BLD AUTO: ABNORMAL 10*3/UL
LYMPHOCYTES # BLD MANUAL: 0.2 10E3/UL (ref 0.8–5.3)
LYMPHOCYTES NFR BLD AUTO: ABNORMAL %
LYMPHOCYTES NFR BLD MANUAL: 2 %
MAGNESIUM SERPL-MCNC: 1.7 MG/DL (ref 1.7–2.3)
MCH RBC QN AUTO: 31.3 PG (ref 26.5–33)
MCHC RBC AUTO-ENTMCNC: 32.9 G/DL (ref 31.5–36.5)
MCV RBC AUTO: 95 FL (ref 78–100)
MONOCYTES # BLD AUTO: ABNORMAL 10*3/UL
MONOCYTES # BLD MANUAL: 0.6 10E3/UL (ref 0–1.3)
MONOCYTES NFR BLD AUTO: ABNORMAL %
MONOCYTES NFR BLD MANUAL: 5 %
NEUTROPHILS # BLD AUTO: ABNORMAL 10*3/UL
NEUTROPHILS # BLD MANUAL: 10.7 10E3/UL (ref 1.6–8.3)
NEUTROPHILS NFR BLD AUTO: ABNORMAL %
NEUTROPHILS NFR BLD MANUAL: 93 %
NRBC # BLD AUTO: 0.1 10E3/UL
NRBC BLD AUTO-RTO: 1 /100
NT-PROBNP SERPL-MCNC: 410 PG/ML (ref 0–900)
PHOSPHATE SERPL-MCNC: 2.2 MG/DL (ref 2.5–4.5)
PLAT MORPH BLD: ABNORMAL
PLATELET # BLD AUTO: 23 10E3/UL (ref 150–450)
POTASSIUM SERPL-SCNC: 3.9 MMOL/L (ref 3.4–5.3)
PROT SERPL-MCNC: 4.2 G/DL (ref 6.4–8.3)
RBC # BLD AUTO: 2.3 10E6/UL (ref 3.8–5.2)
RBC MORPH BLD: ABNORMAL
SODIUM SERPL-SCNC: 140 MMOL/L (ref 135–145)
SPECIMEN EXPIRATION DATE: NORMAL
URATE SERPL-MCNC: 3.2 MG/DL (ref 2.4–5.7)
WBC # BLD AUTO: 11.5 10E3/UL (ref 4–11)

## 2024-03-28 PROCEDURE — 250N000013 HC RX MED GY IP 250 OP 250 PS 637: Performed by: STUDENT IN AN ORGANIZED HEALTH CARE EDUCATION/TRAINING PROGRAM

## 2024-03-28 PROCEDURE — 87799 DETECT AGENT NOS DNA QUANT: CPT | Performed by: PHYSICIAN ASSISTANT

## 2024-03-28 PROCEDURE — 85049 AUTOMATED PLATELET COUNT: CPT | Performed by: STUDENT IN AN ORGANIZED HEALTH CARE EDUCATION/TRAINING PROGRAM

## 2024-03-28 PROCEDURE — 87517 HEPATITIS B DNA QUANT: CPT | Performed by: PHYSICIAN ASSISTANT

## 2024-03-28 PROCEDURE — 258N000003 HC RX IP 258 OP 636: Performed by: INTERNAL MEDICINE

## 2024-03-28 PROCEDURE — 250N000009 HC RX 250: Performed by: INTERNAL MEDICINE

## 2024-03-28 PROCEDURE — 87340 HEPATITIS B SURFACE AG IA: CPT | Performed by: PHYSICIAN ASSISTANT

## 2024-03-28 PROCEDURE — 80053 COMPREHEN METABOLIC PANEL: CPT | Performed by: STUDENT IN AN ORGANIZED HEALTH CARE EDUCATION/TRAINING PROGRAM

## 2024-03-28 PROCEDURE — 250N000011 HC RX IP 250 OP 636: Performed by: INTERNAL MEDICINE

## 2024-03-28 PROCEDURE — 85007 BL SMEAR W/DIFF WBC COUNT: CPT | Performed by: STUDENT IN AN ORGANIZED HEALTH CARE EDUCATION/TRAINING PROGRAM

## 2024-03-28 PROCEDURE — 84100 ASSAY OF PHOSPHORUS: CPT | Performed by: INTERNAL MEDICINE

## 2024-03-28 PROCEDURE — 99233 SBSQ HOSP IP/OBS HIGH 50: CPT | Mod: FS | Performed by: PHYSICIAN ASSISTANT

## 2024-03-28 PROCEDURE — 85610 PROTHROMBIN TIME: CPT | Performed by: PHYSICIAN ASSISTANT

## 2024-03-28 PROCEDURE — 86706 HEP B SURFACE ANTIBODY: CPT | Performed by: PHYSICIAN ASSISTANT

## 2024-03-28 PROCEDURE — 83735 ASSAY OF MAGNESIUM: CPT | Performed by: INTERNAL MEDICINE

## 2024-03-28 PROCEDURE — 97530 THERAPEUTIC ACTIVITIES: CPT | Mod: GP

## 2024-03-28 PROCEDURE — 86900 BLOOD TYPING SEROLOGIC ABO: CPT | Performed by: PHYSICIAN ASSISTANT

## 2024-03-28 PROCEDURE — 86923 COMPATIBILITY TEST ELECTRIC: CPT

## 2024-03-28 PROCEDURE — P9045 ALBUMIN (HUMAN), 5%, 250 ML: HCPCS | Mod: JZ | Performed by: PHYSICIAN ASSISTANT

## 2024-03-28 PROCEDURE — 87522 HEPATITIS C REVRS TRNSCRPJ: CPT | Performed by: PHYSICIAN ASSISTANT

## 2024-03-28 PROCEDURE — 258N000003 HC RX IP 258 OP 636

## 2024-03-28 PROCEDURE — 84550 ASSAY OF BLOOD/URIC ACID: CPT | Performed by: PHYSICIAN ASSISTANT

## 2024-03-28 PROCEDURE — 206N000001 HC R&B BMT UMMC

## 2024-03-28 PROCEDURE — 250N000011 HC RX IP 250 OP 636: Mod: JZ

## 2024-03-28 PROCEDURE — 97140 MANUAL THERAPY 1/> REGIONS: CPT | Mod: GO | Performed by: OCCUPATIONAL THERAPIST

## 2024-03-28 PROCEDURE — 86704 HEP B CORE ANTIBODY TOTAL: CPT | Performed by: PHYSICIAN ASSISTANT

## 2024-03-28 PROCEDURE — 83880 ASSAY OF NATRIURETIC PEPTIDE: CPT | Performed by: PHYSICIAN ASSISTANT

## 2024-03-28 PROCEDURE — 97535 SELF CARE MNGMENT TRAINING: CPT | Mod: GO

## 2024-03-28 PROCEDURE — 86803 HEPATITIS C AB TEST: CPT | Performed by: PHYSICIAN ASSISTANT

## 2024-03-28 PROCEDURE — 250N000011 HC RX IP 250 OP 636: Mod: JZ | Performed by: PHYSICIAN ASSISTANT

## 2024-03-28 PROCEDURE — 250N000013 HC RX MED GY IP 250 OP 250 PS 637: Performed by: PHYSICIAN ASSISTANT

## 2024-03-28 PROCEDURE — 97110 THERAPEUTIC EXERCISES: CPT | Mod: GO

## 2024-03-28 RX ORDER — FUROSEMIDE 10 MG/ML
20 INJECTION INTRAMUSCULAR; INTRAVENOUS ONCE
Status: COMPLETED | OUTPATIENT
Start: 2024-03-28 | End: 2024-03-28

## 2024-03-28 RX ORDER — MAGNESIUM SULFATE HEPTAHYDRATE 40 MG/ML
2 INJECTION, SOLUTION INTRAVENOUS ONCE
Status: COMPLETED | OUTPATIENT
Start: 2024-03-28 | End: 2024-03-28

## 2024-03-28 RX ORDER — CALCIUM GLUCONATE 20 MG/ML
2 INJECTION, SOLUTION INTRAVENOUS ONCE
Status: COMPLETED | OUTPATIENT
Start: 2024-03-28 | End: 2024-03-28

## 2024-03-28 RX ORDER — POTASSIUM PHOS IN 0.9 % NACL 15MMOL/250
15 PLASTIC BAG, INJECTION (ML) INTRAVENOUS ONCE
Status: COMPLETED | OUTPATIENT
Start: 2024-03-28 | End: 2024-03-28

## 2024-03-28 RX ADMIN — CALCIUM 500 MG: 500 TABLET ORAL at 20:18

## 2024-03-28 RX ADMIN — GABAPENTIN 100 MG: 100 CAPSULE ORAL at 07:46

## 2024-03-28 RX ADMIN — METOPROLOL TARTRATE 25 MG: 25 TABLET, FILM COATED ORAL at 20:18

## 2024-03-28 RX ADMIN — ISONIAZID 300 MG: 300 TABLET ORAL at 07:45

## 2024-03-28 RX ADMIN — Medication 5 ML: at 03:16

## 2024-03-28 RX ADMIN — CALCIUM 500 MG: 500 TABLET ORAL at 07:46

## 2024-03-28 RX ADMIN — PROCHLORPERAZINE MALEATE 5 MG: 5 TABLET ORAL at 18:14

## 2024-03-28 RX ADMIN — GABAPENTIN 100 MG: 100 CAPSULE ORAL at 20:18

## 2024-03-28 RX ADMIN — OXYCODONE HYDROCHLORIDE 5 MG: 5 TABLET ORAL at 20:18

## 2024-03-28 RX ADMIN — LOPERAMIDE HYDROCHLORIDE 4 MG: 2 CAPSULE ORAL at 05:37

## 2024-03-28 RX ADMIN — AMIODARONE HYDROCHLORIDE 400 MG: 200 TABLET ORAL at 08:35

## 2024-03-28 RX ADMIN — DEXTROSE MONOHYDRATE 480 MCG: 50 INJECTION, SOLUTION INTRAVENOUS at 20:31

## 2024-03-28 RX ADMIN — METOPROLOL TARTRATE 25 MG: 25 TABLET, FILM COATED ORAL at 08:35

## 2024-03-28 RX ADMIN — OXYCODONE HYDROCHLORIDE 5 MG: 5 TABLET ORAL at 07:36

## 2024-03-28 RX ADMIN — ACYCLOVIR 800 MG: 800 TABLET ORAL at 07:45

## 2024-03-28 RX ADMIN — DICLOFENAC SODIUM 2 G: 10 GEL TOPICAL at 08:35

## 2024-03-28 RX ADMIN — POTASSIUM PHOSPHATE, MONOBASIC POTASSIUM PHOSPHATE, DIBASIC 15 MMOL: 224; 236 INJECTION, SOLUTION, CONCENTRATE INTRAVENOUS at 05:10

## 2024-03-28 RX ADMIN — PROCHLORPERAZINE MALEATE 5 MG: 5 TABLET ORAL at 12:12

## 2024-03-28 RX ADMIN — GABAPENTIN 100 MG: 100 CAPSULE ORAL at 14:13

## 2024-03-28 RX ADMIN — CALCIUM GLUCONATE 2 G: 20 INJECTION, SOLUTION INTRAVENOUS at 18:14

## 2024-03-28 RX ADMIN — ALBUMIN HUMAN 25 G: 0.05 INJECTION, SOLUTION INTRAVENOUS at 15:06

## 2024-03-28 RX ADMIN — LOPERAMIDE HYDROCHLORIDE 4 MG: 2 CAPSULE ORAL at 13:21

## 2024-03-28 RX ADMIN — MAGNESIUM SULFATE HEPTAHYDRATE 2 G: 2 INJECTION, SOLUTION INTRAVENOUS at 05:11

## 2024-03-28 RX ADMIN — DEXTROSE MONOHYDRATE 20 ML: 50 INJECTION, SOLUTION INTRAVENOUS at 20:18

## 2024-03-28 RX ADMIN — FOLIC ACID 1000 MCG: 1 TABLET ORAL at 07:46

## 2024-03-28 RX ADMIN — ACYCLOVIR 800 MG: 800 TABLET ORAL at 20:18

## 2024-03-28 RX ADMIN — ERTAPENEM SODIUM 1 G: 1 INJECTION, POWDER, LYOPHILIZED, FOR SOLUTION INTRAMUSCULAR; INTRAVENOUS at 14:11

## 2024-03-28 RX ADMIN — PROCHLORPERAZINE MALEATE 5 MG: 5 TABLET ORAL at 07:45

## 2024-03-28 RX ADMIN — PANTOPRAZOLE SODIUM 40 MG: 40 TABLET, DELAYED RELEASE ORAL at 07:46

## 2024-03-28 RX ADMIN — FUROSEMIDE 20 MG: 10 INJECTION, SOLUTION INTRAMUSCULAR; INTRAVENOUS at 12:12

## 2024-03-28 RX ADMIN — Medication 25 MG: at 07:46

## 2024-03-28 RX ADMIN — Medication 5 ML: at 21:27

## 2024-03-28 RX ADMIN — DICLOFENAC SODIUM 2 G: 10 GEL TOPICAL at 20:19

## 2024-03-28 ASSESSMENT — ACTIVITIES OF DAILY LIVING (ADL)
ADLS_ACUITY_SCORE: 28

## 2024-03-28 NOTE — PROGRESS NOTES
"VS baseline, continue to have low bp, 89/45 and 97/55, reported to NP but \" it is ok to cardiac medication even low bp her baseline is low \" pt is asymptomatic, administrated lasix IV after that good respond and diarrhea every 5 min went bathroom, after administrated anti diarrhea medication slow down stool , administrated albumin and lasix per ordered  "

## 2024-03-28 NOTE — PROGRESS NOTES
BMT Daily Progress Note   03/28/2024    Patient ID:  Rosario Terrazas is a 68 year old female, currently day 15 s/p auto for MM readmitted 3/21 with N/F and GNB+ sepsis.    INTERVAL  HISTORY   Rosario is withdrawn this morning, but her daughter reports that she has been eating better, including some beef, cornmeal, millet porridge and alicja greens.  Stools are still loose, so she got one imodium last night.  She was able to shower on her own yesterday and she is taking care of her own toileting needs.  Her legs are in compression wraps.      Labs this morning reveal an unexpected rise in her LFTs.     Review of Systems: ROS negative except as noted above.  Scheduled Medications   acyclovir  800 mg Oral BID    amiodarone  400 mg Oral BID    [START ON 4/1/2024] amiodarone  400 mg Oral Daily    calcium carbonate  500 mg Oral BID w/meals    dextrose 5% water  10-20 mL Intravenous Daily at 8 pm    And    filgrastim-aafi  5 mcg/kg Intravenous Daily at 8 pm    And    dextrose 5% water  10-20 mL Intravenous Daily at 8 pm    diclofenac  2 g Topical 4x Daily    ertapenem  1 g Intravenous Q24H    folic acid  1,000 mcg Oral Daily    gabapentin  100 mg Oral TID    heparin lock flush  5-10 mL Intracatheter Q24H    isoniazid  300 mg Oral Daily    lidocaine  1 patch Transdermal Q24h    metoprolol tartrate  25 mg Oral or Feeding Tube BID    pantoprazole  40 mg Oral Daily    sodium phosphate  15 mmol Intravenous Once    prochlorperazine  5 mg Oral TID AC    pyridOXINE  25 mg Oral Daily    sodium chloride (PF)  3 mL Intracatheter Q8H    [Held by provider] sulfamethoxazole-trimethoprim  1 tablet Oral Daily    vitamin D2  50,000 Units Oral Q7 Days     Current Facility-Administered Medications   Medication    acetaminophen (TYLENOL) tablet 325-650 mg    acyclovir (ZOVIRAX) tablet 800 mg    amiodarone (PACERONE) tablet 400 mg    [START ON 4/1/2024] amiodarone (PACERONE) tablet 400 mg    benzocaine 20% (HURRICAINE/TOPEX) 20 % spray  0.5-1 mL    benzocaine-menthol (CHLORASEPTIC MAX) 15-10 MG lozenge 1 lozenge    calcium carbonate (TUMS) chewable tablet 1,000 mg    calcium carbonate 500 mg (elemental) (OSCAL) tablet 500 mg    dextrose 5 % flush PRE/POST medication    And    filgrastim-aafi (NIVESTYM) 480 mcg in D5W 25 mL infusion    And    dextrose 5 % flush PRE/POST medication    diclofenac (VOLTAREN) 1 % topical gel 2 g    ertapenem (INVanz) 1 g vial to attach to  mL bag    folic acid (FOLVITE) tablet 1,000 mcg    gabapentin (NEURONTIN) capsule 100 mg    heparin lock flush 10 UNIT/ML injection 5-10 mL    heparin lock flush 10 UNIT/ML injection 5-10 mL    isoniazid (NYDRAZID) tablet 300 mg    Lidocaine (LIDOCARE) 4 % Patch 1 patch    lidocaine (LMX4) cream    lidocaine 1 % 0.1-1 mL    loperamide (IMODIUM) capsule 4 mg    LORazepam (ATIVAN) injection 0.5-1 mg    Or    LORazepam (ATIVAN) tablet 0.5-1 mg    melatonin tablet 3 mg    metoprolol tartrate (LOPRESSOR) tablet 25 mg    naloxone (NARCAN) injection 0.2 mg    Or    naloxone (NARCAN) injection 0.4 mg    Or    naloxone (NARCAN) injection 0.2 mg    Or    naloxone (NARCAN) injection 0.4 mg    oxyCODONE (ROXICODONE) tablet 5 mg    pantoprazole (PROTONIX) EC tablet 40 mg    Potassium Phosphate 15 mmol in NS intermittent infusion 15 mmol    prochlorperazine (COMPAZINE) injection 5 mg    Or    prochlorperazine (COMPAZINE) tablet 5 mg    prochlorperazine (COMPAZINE) tablet 5 mg    pyridOXINE (VITAMIN B6) tablet 25 mg    senna-docusate (SENOKOT-S/PERICOLACE) 8.6-50 MG per tablet 1 tablet    Or    senna-docusate (SENOKOT-S/PERICOLACE) 8.6-50 MG per tablet 2 tablet    sodium chloride (PF) 0.9% PF flush 10-20 mL    sodium chloride (PF) 0.9% PF flush 10-40 mL    sodium chloride (PF) 0.9% PF flush 3 mL    sodium chloride (PF) 0.9% PF flush 3 mL    [Held by provider] sulfamethoxazole-trimethoprim (BACTRIM) 400-80 MG per tablet 1 tablet    vitamin D2 (ERGOCALCIFEROL) 09444 units (1250 mcg) capsule  "50,000 Units       PHYSICAL EXAM     Weight In/Out     Wt Readings from Last 3 Encounters:   03/27/24 106.3 kg (234 lb 6.4 oz)   03/21/24 94.7 kg (208 lb 11.2 oz)   03/20/24 94.9 kg (209 lb 4.8 oz)      I/O last 3 completed shifts:  In: 733 [P.O.:118; I.V.:565; Other:50]  Out: 1300 [Urine:1300]       BP 99/59 (BP Location: Left arm, Cuff Size: Adult Regular)   Pulse 95   Temp 99.1  F (37.3  C) (Oral)   Resp 16   Ht 1.65 m (5' 4.96\")   Wt 106.3 kg (234 lb 6.4 oz)   SpO2 96%   BMI 39.05 kg/m       General: withdrawn; lying with eyes closed, but opens eyes spontaneously.   Lungs: CTAB; no crackles  Cardiovascular: RRR  Abdominal/Rectal: +BS, soft.  No RUQ pain.  Lower abdomen feels like she is going to have more diarrhea.   Skin: no rashes or petechaie  Lymph: lower legs are in lymphedema wraps  Neuro: A&O, moving all extremities spontaneously, PERRL  Additional Findings: Wilder site NT, no drainage.    LABS AND IMAGING - PAST 24 HOURS     Results for orders placed or performed during the hospital encounter of 03/22/24 (from the past 24 hour(s))   CBC with Platelets & Differential    Narrative    The following orders were created for panel order CBC with Platelets & Differential.  Procedure                               Abnormality         Status                     ---------                               -----------         ------                     CBC with platelets and d...[[id number]]  Abnormal            Final result               Manual Differential[[id number]]          Abnormal            Final result                 Please view results for these tests on the individual orders.   ABO/Rh type and screen    Narrative    The following orders were created for panel order ABO/Rh type and screen.  Procedure                               Abnormality         Status                     ---------                               -----------         ------                     Adult Type and Screen[[id number]]           "                  Final result                 Please view results for these tests on the individual orders.   Comprehensive metabolic panel   Result Value Ref Range    Sodium 140 135 - 145 mmol/L    Potassium 3.9 3.4 - 5.3 mmol/L    Carbon Dioxide (CO2) 22 22 - 29 mmol/L    Anion Gap 10 7 - 15 mmol/L    Urea Nitrogen 11.3 8.0 - 23.0 mg/dL    Creatinine 0.49 (L) 0.51 - 0.95 mg/dL    GFR Estimate >90 >60 mL/min/1.73m2    Calcium 6.8 (L) 8.8 - 10.2 mg/dL    Chloride 108 (H) 98 - 107 mmol/L    Glucose 77 70 - 99 mg/dL    Alkaline Phosphatase 61 40 - 150 U/L    AST 60 (H) 0 - 45 U/L    ALT 62 (H) 0 - 50 U/L    Protein Total 4.2 (L) 6.4 - 8.3 g/dL    Albumin 2.8 (L) 3.5 - 5.2 g/dL    Bilirubin Total 0.3 <=1.2 mg/dL   Magnesium   Result Value Ref Range    Magnesium 1.7 1.7 - 2.3 mg/dL   Phosphorus   Result Value Ref Range    Phosphorus 2.2 (L) 2.5 - 4.5 mg/dL   CBC with platelets and differential   Result Value Ref Range    WBC Count 11.5 (H) 4.0 - 11.0 10e3/uL    RBC Count 2.30 (L) 3.80 - 5.20 10e6/uL    Hemoglobin 7.2 (L) 11.7 - 15.7 g/dL    Hematocrit 21.9 (L) 35.0 - 47.0 %    MCV 95 78 - 100 fL    MCH 31.3 26.5 - 33.0 pg    MCHC 32.9 31.5 - 36.5 g/dL    RDW 18.6 (H) 10.0 - 15.0 %    Platelet Count 23 (LL) 150 - 450 10e3/uL    % Neutrophils      % Lymphocytes      % Monocytes      % Eosinophils      % Basophils      % Immature Granulocytes      NRBCs per 100 WBC 1 (H) <1 /100    Absolute Neutrophils      Absolute Lymphocytes      Absolute Monocytes      Absolute Eosinophils      Absolute Basophils      Absolute Immature Granulocytes      Absolute NRBCs 0.1 10e3/uL   Adult Type and Screen   Result Value Ref Range    ABO/RH(D) A POS     Antibody Screen Negative Negative    SPECIMEN EXPIRATION DATE [id number]    Manual Differential   Result Value Ref Range    % Neutrophils 93 %    % Lymphocytes 2 %    % Monocytes 5 %    % Eosinophils 0 %    % Basophils 0 %    Absolute Neutrophils 10.7 (H) 1.6 - 8.3 10e3/uL    Absolute  Lymphocytes 0.2 (L) 0.8 - 5.3 10e3/uL    Absolute Monocytes 0.6 0.0 - 1.3 10e3/uL    Absolute Eosinophils 0.0 0.0 - 0.7 10e3/uL    Absolute Basophils 0.0 0.0 - 0.2 10e3/uL    RBC Morphology Confirmed RBC Indices     Platelet Assessment  Automated Count Confirmed. Platelet morphology is normal.     Automated Count Confirmed. Platelet morphology is normal.   Nt probnp inpatient   Result Value Ref Range    N terminal Pro BNP Inpatient 410 0 - 900 pg/mL   Hepatitis C antibody   Result Value Ref Range    Hepatitis C Antibody Nonreactive Nonreactive   Hepatitis B core antibody   Result Value Ref Range    Hepatitis B Core Antibody Total Nonreactive Nonreactive   Hepatitis B Surface Antibody   Result Value Ref Range    Hepatitis B Surface Antibody Nonreactive     Hepatitis B Surface Antibody Instrument Value <3.50 <8.5 m[IU]/mL   Hepatitis B surface antigen   Result Value Ref Range    Hepatitis B Surface Antigen Nonreactive Nonreactive   INR   Result Value Ref Range    INR 1.30 (H) 0.85 - 1.15   Uric acid   Result Value Ref Range    Uric Acid 3.2 2.4 - 5.7 mg/dL         ASSESSMENT BY SYSTEMS     Rosario Adan Terrazas is a 68 year old female, currently day +15 s/p auto for MM readmitted 3/21 with N/F and GNB+ sepsis.     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 Coli bacteremia (3/21, 3/22)- Tobraymycin x1 (3/22); meropenem (3/22-27)  Follow up cultures 3/23-3/25: negative to date.  3/27: transition to ertapenem (covers both microbes) for a 14 day course (through 4/4/24). Discontinued meropenem.     Cough: RVP negative;  "flu/rsv/covid negative 3/27.    #Sepsis (hypotension, tachycardia, fever); resolved  #Latent TB: Continue INH/B6 through transplant    PPX:   Acyclovir  fluconazole (held 3/25 d/t prolonged Qtc and then discontinued 3/26 with engraftment)  Bactrim from D28      Heme   - pancytopenia secondary to chemotherapy and multiple myeloma  - transfuse to keep hgb >7g/dL, plt >10k  - Hold home aspirin (3/17), resume once platelets have recovered.   - INR 1.8, given vitamin K in clinic 3/21. Recheck 3/25 down to 1.5.     FEN/Renal  #Hypocalcemia: Suspect 2/2 hypoalbuminemia. Continue oral replacement and 2g calcium gluconate IV today.   #Vitamin D level: adequate. Keep on ergocalciferol at this time (weekly dosing) to prevent, as it is still winter in [place].  #Fluid overload related to sepsis management: diuresis resulted in hypotension, indicating she has third spaced fluids with intravascular dry overall status.  Albumin + lasix 3/28 (was too hypotensive after diuresis alone).  Continue lymphedema therapy.  Increasing protein intake.  BNP okay, so unlikely related to any heart failure.    #Lymphedema consult 3/27  #Severe malnutrition; dietitian following.     CV  #Afib + SVT: paroxysmal afib with RVR and hypotension requiring transfer to MICU; also intermittent SVT.    Amiodarone 0.5mg/hour drip transitioned to PO on 3/24; discussed with cardiology on 3/28 and they advised okay to stop amiodarone today (3/28) due to rising LFTs.  Metoprolol 25mg bid   concern that cardiac processes were being driven by an overall inflammatory state, added decadron 4mg IV daily x 3 days (3/24-3/26).   Electrolytes to be replaced per HIGH sliding scale  Prolonged QTc; continue to hold zofran/zyprexa  Ziopatch (\"cardiac event monitor\" order) placement prior to discharge on the day of discharge      Please have techs placed a ziopatch prior to discharge and arrange for cardiology follow up after discharge.       GI:   #Nausea: compazine q ac, tid.  " "Prn antiemetics available as well.   #Diarrhea: imodium prn (diarrhea has slowed)  #Transaminitis: check viral studies (Hep B, Hep C, EBV, CMV, adenovirus); stop amiodarone.         Endocrine  #Thyroid FNA Atypia of undetermined significance diagnosis has a 22 (13-30)% risk of malignancy. Repeat FNA (least 4-6 weeks from previous aspiration with optimal time being 12 weeks after last aspiration) , molecular testing, diagnostic lobectomy, or surveillance is recommended.    Has next appt with endocrine 4/16/24.   *TSH WNL 3/21     Neuro/Pain  #Neuropathy: continues on eleni, xanaflex.  #Rib pain: continue oxycodone and back brace, Tylenol on hold since 3/19       Dispo: anticipate discharge toward the end of this week if she is stable.    Clinically Significant Risk Factors              # Hypoalbuminemia: Lowest albumin = 2.5 g/dL at 3/24/2024  3:59 AM, will monitor as appropriate     # Thrombocytopenia: Lowest platelets = 22 in last 2 days, will monitor for bleeding          # Obesity: Estimated body mass index is 39.05 kg/m  as calculated from the following:    Height as of this encounter: 1.65 m (5' 4.96\").    Weight as of this encounter: 106.3 kg (234 lb 6.4 oz).   # Severe Malnutrition: based on nutrition assessment      # Financial/Environmental Concerns: none       I spent 30 minutes in the care of this patient today, which included time necessary for preparation for the visit, obtaining history, ordering medications/tests/procedures as medically indicated, review of pertinent medical literature, counseling of the patient, communication of recommendations to the care team, and documentation time.  Yudelka Richards PA-C      "

## 2024-03-28 NOTE — PLAN OF CARE
"BP 99/59 (BP Location: Left arm, Cuff Size: Adult Regular)   Pulse 95   Temp 99.1  F (37.3  C) (Oral)   Resp 16   Ht 1.65 m (5' 4.96\")   Wt 106.3 kg (234 lb 6.4 oz)   SpO2 96%   BMI 39.05 kg/m       Neuro: AOx4 (daughter as the )   Cardiac:  S1S2 regular; BP tends to be fluctuated between  systolic.   Respiratory: clear bilaterally   GI/: Voided adequately, loose stool x2 - given imodium  Activity: Ambulatory x1; ambulate to the bathroom   Pain: Ribs (4/10- decline pain meds), discomfort in the throat   Skin: Ecchymotic on the right arm.   Lines: CVC- CDI  Replacements: Infusing Mg and Phos    Educated both patient and her daughter about the importance of being hydrated. Encouraged use of bathroom and walk around.     Problem: Adult Inpatient Plan of Care  Goal: Plan of Care Review  Description: The Plan of Care Review/Shift note should be completed every shift.  The Outcome Evaluation is a brief statement about your assessment that the patient is improving, declining, or no change.  This information will be displayed automatically on your shift  note.  Outcome: Progressing  Goal: Patient-Specific Goal (Individualized)  Description: You can add care plan individualizations to a care plan. Examples of Individualization might be:  \"Parent requests to be called daily at 9am for status\", \"I have a hard time hearing out of my right ear\", or \"Do not touch me to wake me up as it startles  me\".  Outcome: Progressing  Goal: Absence of Hospital-Acquired Illness or Injury  Outcome: Progressing  Intervention: Prevent Skin Injury  Recent Flowsheet Documentation  Taken 3/27/2024 1919 by Leanne Lua, RN  Body Position: position changed independently  Skin Protection: adhesive use limited  Device Skin Pressure Protection: adhesive use limited  Intervention: Prevent and Manage VTE (Venous Thromboembolism) Risk  Recent Flowsheet Documentation  Taken 3/27/2024 1919 by Leanne Lua RN  VTE " Prevention/Management: compression stockings on  Intervention: Prevent Infection  Recent Flowsheet Documentation  Taken 3/27/2024 1919 by Leanne Lua RN  Infection Prevention:   hand hygiene promoted   rest/sleep promoted   single patient room provided   visitors restricted/screened   personal protective equipment utilized  Goal: Optimal Comfort and Wellbeing  Outcome: Progressing  Intervention: Provide Person-Centered Care  Recent Flowsheet Documentation  Taken 3/27/2024 1919 by Leanne Lua RN  Trust Relationship/Rapport:   care explained   choices provided   emotional support provided   questions encouraged   questions answered   thoughts/feelings acknowledged  Goal: Readiness for Transition of Care  Outcome: Progressing     Problem: Oral Intake Inadequate  Goal: Improved Oral Intake  Outcome: Progressing     Problem: Infection  Goal: Absence of Infection Signs and Symptoms  Outcome: Progressing  Intervention: Prevent or Manage Infection  Recent Flowsheet Documentation  Taken 3/27/2024 1919 by Leanne Lua RN  Infection Management: aseptic technique maintained  Isolation Precautions:   contact precautions maintained   enteric precautions maintained     Problem: Fall Injury Risk  Goal: Absence of Fall and Fall-Related Injury  Outcome: Progressing  Intervention: Identify and Manage Contributors  Recent Flowsheet Documentation  Taken 3/27/2024 1919 by Leanne Lua RN  Medication Review/Management: medications reviewed

## 2024-03-28 NOTE — PROVIDER NOTIFICATION
[21:17] The provider was paged via [employer] about the pt's BP is at 85/55, other VS are stable and the pt is asymptomatic. The nurse is wondering if we should hold the amiodarone.     [21:27 & 21:33]- Provider replied and asked for the HR and updated BP; hold amiodarone.     [21:54] Nurse recheck BP and it was 100/61 HR 88, do we give her amiodarone?     [22:13] The provider replied that we should give her the amiodarone to prevent her from getting Afib.     Amiodarone 400 mg was given per request.

## 2024-03-29 ENCOUNTER — TRANSFERRED RECORDS (OUTPATIENT)
Dept: HEALTH INFORMATION MANAGEMENT | Facility: CLINIC | Age: 69
End: 2024-03-29
Payer: COMMERCIAL

## 2024-03-29 ENCOUNTER — APPOINTMENT (OUTPATIENT)
Dept: OCCUPATIONAL THERAPY | Facility: CLINIC | Age: 69
End: 2024-03-29
Payer: COMMERCIAL

## 2024-03-29 ENCOUNTER — APPOINTMENT (OUTPATIENT)
Dept: CT IMAGING | Facility: CLINIC | Age: 69
End: 2024-03-29
Attending: PHYSICIAN ASSISTANT
Payer: COMMERCIAL

## 2024-03-29 LAB
ALBUMIN SERPL BCG-MCNC: 2.9 G/DL (ref 3.5–5.2)
ALP SERPL-CCNC: 69 U/L (ref 40–150)
ALT SERPL W P-5'-P-CCNC: 45 U/L (ref 0–50)
ANION GAP SERPL CALCULATED.3IONS-SCNC: 10 MMOL/L (ref 7–15)
ANION GAP SERPL CALCULATED.3IONS-SCNC: 8 MMOL/L (ref 7–15)
AST SERPL W P-5'-P-CCNC: 21 U/L (ref 0–45)
BACTERIA BLD CULT: NO GROWTH
BACTERIA BLD CULT: NO GROWTH
BASOPHILS # BLD AUTO: ABNORMAL 10*3/UL
BASOPHILS # BLD MANUAL: 0 10E3/UL (ref 0–0.2)
BASOPHILS NFR BLD AUTO: ABNORMAL %
BASOPHILS NFR BLD MANUAL: 0 %
BILIRUB DIRECT SERPL-MCNC: <0.2 MG/DL (ref 0–0.3)
BILIRUB SERPL-MCNC: 0.4 MG/DL
BLD PROD TYP BPU: NORMAL
BLOOD COMPONENT TYPE: NORMAL
BUN SERPL-MCNC: 3.9 MG/DL (ref 8–23)
BUN SERPL-MCNC: 4.5 MG/DL (ref 8–23)
CALCIUM SERPL-MCNC: 7 MG/DL (ref 8.8–10.2)
CALCIUM SERPL-MCNC: 7.6 MG/DL (ref 8.8–10.2)
CHLORIDE SERPL-SCNC: 105 MMOL/L (ref 98–107)
CHLORIDE SERPL-SCNC: 106 MMOL/L (ref 98–107)
CMV DNA SPEC NAA+PROBE-ACNC: <35 IU/ML
CMV DNA SPEC NAA+PROBE-LOG#: <1.5 {LOG_COPIES}/ML
CODING SYSTEM: NORMAL
CREAT SERPL-MCNC: 0.42 MG/DL (ref 0.51–0.95)
CREAT SERPL-MCNC: 0.46 MG/DL (ref 0.51–0.95)
CROSSMATCH: NORMAL
DEPRECATED HCO3 PLAS-SCNC: 25 MMOL/L (ref 22–29)
DEPRECATED HCO3 PLAS-SCNC: 26 MMOL/L (ref 22–29)
EBV DNA # SPEC NAA+PROBE: NOT DETECTED COPIES/ML
EGFRCR SERPLBLD CKD-EPI 2021: >90 ML/MIN/1.73M2
EGFRCR SERPLBLD CKD-EPI 2021: >90 ML/MIN/1.73M2
EOSINOPHIL # BLD AUTO: ABNORMAL 10*3/UL
EOSINOPHIL # BLD MANUAL: 0 10E3/UL (ref 0–0.7)
EOSINOPHIL NFR BLD AUTO: ABNORMAL %
EOSINOPHIL NFR BLD MANUAL: 0 %
ERYTHROCYTE [DISTWIDTH] IN BLOOD BY AUTOMATED COUNT: 18.2 % (ref 10–15)
GLUCOSE SERPL-MCNC: 84 MG/DL (ref 70–99)
GLUCOSE SERPL-MCNC: 92 MG/DL (ref 70–99)
HCT VFR BLD AUTO: 20.2 % (ref 35–47)
HGB BLD-MCNC: 6.9 G/DL (ref 11.7–15.7)
IMM GRANULOCYTES # BLD: ABNORMAL 10*3/UL
IMM GRANULOCYTES NFR BLD: ABNORMAL %
ISSUE DATE AND TIME: NORMAL
LYMPHOCYTES # BLD AUTO: ABNORMAL 10*3/UL
LYMPHOCYTES # BLD MANUAL: 0.1 10E3/UL (ref 0.8–5.3)
LYMPHOCYTES NFR BLD AUTO: ABNORMAL %
LYMPHOCYTES NFR BLD MANUAL: 1 %
MAGNESIUM SERPL-MCNC: 1.5 MG/DL (ref 1.7–2.3)
MAGNESIUM SERPL-MCNC: 2 MG/DL (ref 1.7–2.3)
MCH RBC QN AUTO: 32.4 PG (ref 26.5–33)
MCHC RBC AUTO-ENTMCNC: 34.2 G/DL (ref 31.5–36.5)
MCV RBC AUTO: 95 FL (ref 78–100)
MONOCYTES # BLD AUTO: ABNORMAL 10*3/UL
MONOCYTES # BLD MANUAL: 0.4 10E3/UL (ref 0–1.3)
MONOCYTES NFR BLD AUTO: ABNORMAL %
MONOCYTES NFR BLD MANUAL: 3 %
NEUTROPHILS # BLD AUTO: ABNORMAL 10*3/UL
NEUTROPHILS # BLD MANUAL: 13.5 10E3/UL (ref 1.6–8.3)
NEUTROPHILS NFR BLD AUTO: ABNORMAL %
NEUTROPHILS NFR BLD MANUAL: 94 %
NRBC # BLD AUTO: 0.1 10E3/UL
NRBC BLD AUTO-RTO: 0 /100
PHOSPHATE SERPL-MCNC: 3 MG/DL (ref 2.5–4.5)
PLAT MORPH BLD: ABNORMAL
PLATELET # BLD AUTO: 32 10E3/UL (ref 150–450)
POTASSIUM SERPL-SCNC: 3.3 MMOL/L (ref 3.4–5.3)
POTASSIUM SERPL-SCNC: 3.8 MMOL/L (ref 3.4–5.3)
POTASSIUM SERPL-SCNC: 3.8 MMOL/L (ref 3.4–5.3)
PROMYELOCYTES # BLD MANUAL: 0.3 10E3/UL
PROMYELOCYTES NFR BLD MANUAL: 2 %
PROT SERPL-MCNC: 4.2 G/DL (ref 6.4–8.3)
RBC # BLD AUTO: 2.13 10E6/UL (ref 3.8–5.2)
RBC MORPH BLD: ABNORMAL
SODIUM SERPL-SCNC: 139 MMOL/L (ref 135–145)
SODIUM SERPL-SCNC: 141 MMOL/L (ref 135–145)
TOXIC GRANULES BLD QL SMEAR: PRESENT
UNIT ABO/RH: NORMAL
UNIT NUMBER: NORMAL
UNIT STATUS: NORMAL
UNIT TYPE ISBT: 6200
WBC # BLD AUTO: 14.4 10E3/UL (ref 4–11)

## 2024-03-29 PROCEDURE — 250N000013 HC RX MED GY IP 250 OP 250 PS 637: Performed by: STUDENT IN AN ORGANIZED HEALTH CARE EDUCATION/TRAINING PROGRAM

## 2024-03-29 PROCEDURE — 206N000001 HC R&B BMT UMMC

## 2024-03-29 PROCEDURE — 74177 CT ABD & PELVIS W/CONTRAST: CPT

## 2024-03-29 PROCEDURE — 97140 MANUAL THERAPY 1/> REGIONS: CPT | Mod: GO

## 2024-03-29 PROCEDURE — 99233 SBSQ HOSP IP/OBS HIGH 50: CPT | Mod: FS | Performed by: PHYSICIAN ASSISTANT

## 2024-03-29 PROCEDURE — 83735 ASSAY OF MAGNESIUM: CPT | Performed by: INTERNAL MEDICINE

## 2024-03-29 PROCEDURE — 80053 COMPREHEN METABOLIC PANEL: CPT | Performed by: STUDENT IN AN ORGANIZED HEALTH CARE EDUCATION/TRAINING PROGRAM

## 2024-03-29 PROCEDURE — 74177 CT ABD & PELVIS W/CONTRAST: CPT | Mod: 26 | Performed by: RADIOLOGY

## 2024-03-29 PROCEDURE — 82248 BILIRUBIN DIRECT: CPT | Performed by: PHYSICIAN ASSISTANT

## 2024-03-29 PROCEDURE — 250N000011 HC RX IP 250 OP 636: Mod: JZ | Performed by: STUDENT IN AN ORGANIZED HEALTH CARE EDUCATION/TRAINING PROGRAM

## 2024-03-29 PROCEDURE — 250N000011 HC RX IP 250 OP 636: Performed by: STUDENT IN AN ORGANIZED HEALTH CARE EDUCATION/TRAINING PROGRAM

## 2024-03-29 PROCEDURE — 85027 COMPLETE CBC AUTOMATED: CPT | Performed by: STUDENT IN AN ORGANIZED HEALTH CARE EDUCATION/TRAINING PROGRAM

## 2024-03-29 PROCEDURE — 83735 ASSAY OF MAGNESIUM: CPT | Performed by: STUDENT IN AN ORGANIZED HEALTH CARE EDUCATION/TRAINING PROGRAM

## 2024-03-29 PROCEDURE — 86682 HELMINTH ANTIBODY: CPT | Performed by: PHYSICIAN ASSISTANT

## 2024-03-29 PROCEDURE — 84132 ASSAY OF SERUM POTASSIUM: CPT | Performed by: STUDENT IN AN ORGANIZED HEALTH CARE EDUCATION/TRAINING PROGRAM

## 2024-03-29 PROCEDURE — 250N000013 HC RX MED GY IP 250 OP 250 PS 637: Performed by: PHYSICIAN ASSISTANT

## 2024-03-29 PROCEDURE — P9040 RBC LEUKOREDUCED IRRADIATED: HCPCS

## 2024-03-29 PROCEDURE — 250N000011 HC RX IP 250 OP 636: Mod: JZ | Performed by: PHYSICIAN ASSISTANT

## 2024-03-29 PROCEDURE — P9045 ALBUMIN (HUMAN), 5%, 250 ML: HCPCS | Mod: JZ | Performed by: PHYSICIAN ASSISTANT

## 2024-03-29 PROCEDURE — 85007 BL SMEAR W/DIFF WBC COUNT: CPT | Performed by: STUDENT IN AN ORGANIZED HEALTH CARE EDUCATION/TRAINING PROGRAM

## 2024-03-29 PROCEDURE — 250N000011 HC RX IP 250 OP 636: Performed by: INTERNAL MEDICINE

## 2024-03-29 PROCEDURE — 82374 ASSAY BLOOD CARBON DIOXIDE: CPT | Performed by: PHYSICIAN ASSISTANT

## 2024-03-29 PROCEDURE — 84100 ASSAY OF PHOSPHORUS: CPT | Performed by: INTERNAL MEDICINE

## 2024-03-29 RX ORDER — MEROPENEM 1 G/1
1 INJECTION, POWDER, FOR SOLUTION INTRAVENOUS EVERY 8 HOURS
Status: DISCONTINUED | OUTPATIENT
Start: 2024-03-29 | End: 2024-04-02

## 2024-03-29 RX ORDER — MAGNESIUM SULFATE HEPTAHYDRATE 40 MG/ML
2 INJECTION, SOLUTION INTRAVENOUS ONCE
Status: COMPLETED | OUTPATIENT
Start: 2024-03-29 | End: 2024-03-29

## 2024-03-29 RX ORDER — LOPERAMIDE HCL 2 MG
4 CAPSULE ORAL 4 TIMES DAILY
Status: DISCONTINUED | OUTPATIENT
Start: 2024-03-29 | End: 2024-03-31

## 2024-03-29 RX ORDER — CALCIUM GLUCONATE 20 MG/ML
2 INJECTION, SOLUTION INTRAVENOUS ONCE
Status: COMPLETED | OUTPATIENT
Start: 2024-03-29 | End: 2024-03-29

## 2024-03-29 RX ORDER — POTASSIUM CHLORIDE 750 MG/1
40 TABLET, EXTENDED RELEASE ORAL ONCE
Status: DISCONTINUED | OUTPATIENT
Start: 2024-03-29 | End: 2024-03-30

## 2024-03-29 RX ORDER — IOPAMIDOL 755 MG/ML
135 INJECTION, SOLUTION INTRAVASCULAR ONCE
Status: COMPLETED | OUTPATIENT
Start: 2024-03-29 | End: 2024-03-29

## 2024-03-29 RX ORDER — POTASSIUM CHLORIDE 29.8 MG/ML
20 INJECTION INTRAVENOUS ONCE
Status: COMPLETED | OUTPATIENT
Start: 2024-03-29 | End: 2024-03-29

## 2024-03-29 RX ORDER — MAGNESIUM SULFATE HEPTAHYDRATE 40 MG/ML
4 INJECTION, SOLUTION INTRAVENOUS ONCE
Status: COMPLETED | OUTPATIENT
Start: 2024-03-29 | End: 2024-03-29

## 2024-03-29 RX ORDER — FUROSEMIDE 10 MG/ML
40 INJECTION INTRAMUSCULAR; INTRAVENOUS ONCE
Status: COMPLETED | OUTPATIENT
Start: 2024-03-29 | End: 2024-03-29

## 2024-03-29 RX ORDER — POTASSIUM CHLORIDE 29.8 MG/ML
20 INJECTION INTRAVENOUS
Status: COMPLETED | OUTPATIENT
Start: 2024-03-29 | End: 2024-03-29

## 2024-03-29 RX ORDER — PIPERACILLIN SODIUM, TAZOBACTAM SODIUM 3; .375 G/15ML; G/15ML
3.38 INJECTION, POWDER, LYOPHILIZED, FOR SOLUTION INTRAVENOUS EVERY 6 HOURS
Status: DISCONTINUED | OUTPATIENT
Start: 2024-03-29 | End: 2024-03-29

## 2024-03-29 RX ADMIN — MAGNESIUM SULFATE HEPTAHYDRATE 2 G: 2 INJECTION, SOLUTION INTRAVENOUS at 18:36

## 2024-03-29 RX ADMIN — FUROSEMIDE 40 MG: 10 INJECTION, SOLUTION INTRAVENOUS at 19:55

## 2024-03-29 RX ADMIN — Medication 25 MG: at 08:06

## 2024-03-29 RX ADMIN — Medication 5 ML: at 09:04

## 2024-03-29 RX ADMIN — LOPERAMIDE HYDROCHLORIDE 4 MG: 2 CAPSULE ORAL at 19:51

## 2024-03-29 RX ADMIN — FUROSEMIDE 40 MG: 10 INJECTION, SOLUTION INTRAVENOUS at 15:04

## 2024-03-29 RX ADMIN — GABAPENTIN 100 MG: 100 CAPSULE ORAL at 19:50

## 2024-03-29 RX ADMIN — ACYCLOVIR 800 MG: 800 TABLET ORAL at 19:50

## 2024-03-29 RX ADMIN — OXYCODONE HYDROCHLORIDE 5 MG: 5 TABLET ORAL at 08:05

## 2024-03-29 RX ADMIN — METOPROLOL TARTRATE 25 MG: 25 TABLET, FILM COATED ORAL at 08:07

## 2024-03-29 RX ADMIN — CALCIUM 500 MG: 500 TABLET ORAL at 08:07

## 2024-03-29 RX ADMIN — IOPAMIDOL 135 ML: 755 INJECTION, SOLUTION INTRAVENOUS at 11:22

## 2024-03-29 RX ADMIN — ERTAPENEM SODIUM 1 G: 1 INJECTION, POWDER, LYOPHILIZED, FOR SOLUTION INTRAMUSCULAR; INTRAVENOUS at 14:11

## 2024-03-29 RX ADMIN — DICLOFENAC SODIUM 2 G: 10 GEL TOPICAL at 14:13

## 2024-03-29 RX ADMIN — ISONIAZID 300 MG: 300 TABLET ORAL at 08:07

## 2024-03-29 RX ADMIN — DICLOFENAC SODIUM 2 G: 10 GEL TOPICAL at 20:01

## 2024-03-29 RX ADMIN — LOPERAMIDE HYDROCHLORIDE 4 MG: 2 CAPSULE ORAL at 06:42

## 2024-03-29 RX ADMIN — LOPERAMIDE HYDROCHLORIDE 4 MG: 2 CAPSULE ORAL at 11:34

## 2024-03-29 RX ADMIN — POTASSIUM CHLORIDE 20 MEQ: 29.8 INJECTION, SOLUTION INTRAVENOUS at 09:39

## 2024-03-29 RX ADMIN — PANTOPRAZOLE SODIUM 40 MG: 40 TABLET, DELAYED RELEASE ORAL at 08:07

## 2024-03-29 RX ADMIN — MAGNESIUM SULFATE IN WATER 4 G: 40 INJECTION, SOLUTION INTRAVENOUS at 06:35

## 2024-03-29 RX ADMIN — DICLOFENAC SODIUM 2 G: 10 GEL TOPICAL at 08:09

## 2024-03-29 RX ADMIN — CALCIUM 500 MG: 500 TABLET ORAL at 19:50

## 2024-03-29 RX ADMIN — CALCIUM GLUCONATE 2 G: 20 INJECTION, SOLUTION INTRAVENOUS at 11:51

## 2024-03-29 RX ADMIN — ACYCLOVIR 800 MG: 800 TABLET ORAL at 08:07

## 2024-03-29 RX ADMIN — ALBUMIN HUMAN 25 G: 0.05 INJECTION, SOLUTION INTRAVENOUS at 11:35

## 2024-03-29 RX ADMIN — POTASSIUM CHLORIDE 20 MEQ: 29.8 INJECTION, SOLUTION INTRAVENOUS at 06:40

## 2024-03-29 RX ADMIN — MEROPENEM 1 G: 1 INJECTION, POWDER, FOR SOLUTION INTRAVENOUS at 19:53

## 2024-03-29 RX ADMIN — PROCHLORPERAZINE MALEATE 5 MG: 5 TABLET ORAL at 11:34

## 2024-03-29 RX ADMIN — PROCHLORPERAZINE MALEATE 5 MG: 5 TABLET ORAL at 08:07

## 2024-03-29 RX ADMIN — FOLIC ACID 1000 MCG: 1 TABLET ORAL at 08:07

## 2024-03-29 RX ADMIN — POTASSIUM CHLORIDE 20 MEQ: 29.8 INJECTION, SOLUTION INTRAVENOUS at 17:27

## 2024-03-29 RX ADMIN — GABAPENTIN 100 MG: 100 CAPSULE ORAL at 08:07

## 2024-03-29 RX ADMIN — PROCHLORPERAZINE MALEATE 5 MG: 5 TABLET ORAL at 17:26

## 2024-03-29 RX ADMIN — Medication 5 ML: at 14:11

## 2024-03-29 RX ADMIN — METOPROLOL TARTRATE 25 MG: 25 TABLET, FILM COATED ORAL at 19:51

## 2024-03-29 RX ADMIN — GABAPENTIN 100 MG: 100 CAPSULE ORAL at 14:11

## 2024-03-29 ASSESSMENT — ACTIVITIES OF DAILY LIVING (ADL)
ADLS_ACUITY_SCORE: 28
ADLS_ACUITY_SCORE: 28
ADLS_ACUITY_SCORE: 30
ADLS_ACUITY_SCORE: 28
ADLS_ACUITY_SCORE: 30
ADLS_ACUITY_SCORE: 28
ADLS_ACUITY_SCORE: 30
ADLS_ACUITY_SCORE: 28
ADLS_ACUITY_SCORE: 28
ADLS_ACUITY_SCORE: 30
ADLS_ACUITY_SCORE: 28

## 2024-03-29 NOTE — PROGRESS NOTES
BMT CLINICAL SOCIAL WORK NOTE-MISSED VISIT:    Focus: Supportive Counseling/Resources/Discharge Planning    Data: Rosario Terrazas is a 68 year old female, currently day +16 s/p auto for MM readmitted 3/21 with N/F and GNB+ sepsis.     Interventions: Clinical  (CSW) attempted to meet with Pt to assess coping, provide supportive counseling and assist with resources as needed. Pt not present in room at time of visit.     Assessment: Pt continues to be supported by Jules Pandey.     Plan: CSW will continue to provide supportive counseling and assistance with resources as needed. CSW will continue to collaborate with multidisciplinary team regarding Pt's plan of care.     JAYDE Pritchard, [facility]  Adult Blood & Marrow Transplant   Phone: [phone]  Pager: [phone]

## 2024-03-29 NOTE — PROGRESS NOTES
"BMT Daily Progress Note   03/29/2024    Patient ID:  Rosario Terrazas is a 68 year old female, currently day 16 s/p auto for MM readmitted 3/21 with N/F and GNB+ sepsis.    INTERVAL  HISTORY   Rosario is had abdominal pain this morning, significant enough to need some oxycodone.  She also continues to have diarrhea, with a large, incontinent stool this morning.  Appetite is poor, but she continues to eat small amounts of the food her daughter is bringing in.  Edema seems much improved with diuresis and compression wraps.      Review of Systems: ROS negative except as noted above.  Scheduled Medications   acyclovir  800 mg Oral BID    calcium carbonate  500 mg Oral BID w/meals    diclofenac  2 g Topical 4x Daily    ertapenem  1 g Intravenous Q24H    folic acid  1,000 mcg Oral Daily    gabapentin  100 mg Oral TID    heparin lock flush  5-10 mL Intracatheter Q24H    isoniazid  300 mg Oral Daily    lidocaine  1 patch Transdermal Q24h    magnesium sulfate  4 g Intravenous Once    metoprolol tartrate  25 mg Oral or Feeding Tube BID    pantoprazole  40 mg Oral Daily    potassium chloride  20 mEq Intravenous Q1H    potassium chloride  40 mEq Oral Once    prochlorperazine  5 mg Oral TID AC    pyridOXINE  25 mg Oral Daily    sodium chloride (PF)  3 mL Intracatheter Q8H    [Held by provider] sulfamethoxazole-trimethoprim  1 tablet Oral Daily    vitamin D2  50,000 Units Oral Q7 Days     PHYSICAL EXAM     Weight In/Out     Wt Readings from Last 3 Encounters:   03/29/24 104.1 kg (229 lb 8 oz)   03/21/24 94.7 kg (208 lb 11.2 oz)   03/20/24 94.9 kg (209 lb 4.8 oz)      I/O last 3 completed shifts:  In: 820 [P.O.:320; I.V.:500]  Out: 1930 [Urine:1180; Other:550; Stool:200]       /60   Pulse 102   Temp 98.6  F (37  C) (Oral)   Resp 16   Ht 1.65 m (5' 4.96\")   Wt 106.9 kg (235 lb 9.6 oz)   SpO2 98%   BMI 39.25 kg/m       General: withdrawn; lying with eyes closed, but opens eyes spontaneously.   Lungs: CTAB; no " crackles  Cardiovascular: RRR  Abdominal/Rectal: +BS, soft and not tender (note: recent oxycodone); she was having pain below level of the umbilicus prior to this  Skin: no rashes or petechaie  Lymph: lower leg edema improving; wraps off secondary to incontinent stool soiling this morning  Neuro: A&O, moving all extremities spontaneously, PERRL  Additional Findings: Wilder site NT, no drainage.    LABS AND IMAGING - PAST 24 HOURS     Results for orders placed or performed during the hospital encounter of 03/22/24 (from the past 24 hour(s))   Nt probnp inpatient   Result Value Ref Range    N terminal Pro BNP Inpatient 410 0 - 900 pg/mL   Hepatitis C antibody   Result Value Ref Range    Hepatitis C Antibody Nonreactive Nonreactive   Hepatitis B core antibody   Result Value Ref Range    Hepatitis B Core Antibody Total Nonreactive Nonreactive   Hepatitis B Surface Antibody   Result Value Ref Range    Hepatitis B Surface Antibody Nonreactive     Hepatitis B Surface Antibody Instrument Value <3.50 <8.5 m[IU]/mL   Hepatitis B surface antigen   Result Value Ref Range    Hepatitis B Surface Antigen Nonreactive Nonreactive   INR   Result Value Ref Range    INR 1.30 (H) 0.85 - 1.15   Uric acid   Result Value Ref Range    Uric Acid 3.2 2.4 - 5.7 mg/dL   CBC with Platelets & Differential    Narrative    The following orders were created for panel order CBC with Platelets & Differential.  Procedure                               Abnormality         Status                     ---------                               -----------         ------                     CBC with platelets and d...[[id number]]  Abnormal            Final result               Manual Differential[[id number]]          Abnormal            Final result                 Please view results for these tests on the individual orders.   Comprehensive metabolic panel   Result Value Ref Range    Sodium 139 135 - 145 mmol/L    Potassium 3.3 (L) 3.4 - 5.3 mmol/L    Carbon  Dioxide (CO2) 26 22 - 29 mmol/L    Anion Gap 8 7 - 15 mmol/L    Urea Nitrogen 4.5 (L) 8.0 - 23.0 mg/dL    Creatinine 0.42 (L) 0.51 - 0.95 mg/dL    GFR Estimate >90 >60 mL/min/1.73m2    Calcium 7.0 (L) 8.8 - 10.2 mg/dL    Chloride 105 98 - 107 mmol/L    Glucose 84 70 - 99 mg/dL    Alkaline Phosphatase 69 40 - 150 U/L    AST 21 0 - 45 U/L    ALT 45 0 - 50 U/L    Protein Total 4.2 (L) 6.4 - 8.3 g/dL    Albumin 2.9 (L) 3.5 - 5.2 g/dL    Bilirubin Total 0.4 <=1.2 mg/dL   Magnesium   Result Value Ref Range    Magnesium 1.5 (L) 1.7 - 2.3 mg/dL   Phosphorus   Result Value Ref Range    Phosphorus 3.0 2.5 - 4.5 mg/dL   Bilirubin direct   Result Value Ref Range    Bilirubin Direct <0.20 0.00 - 0.30 mg/dL   CBC with platelets and differential   Result Value Ref Range    WBC Count 14.4 (H) 4.0 - 11.0 10e3/uL    RBC Count 2.13 (L) 3.80 - 5.20 10e6/uL    Hemoglobin 6.9 (LL) 11.7 - 15.7 g/dL    Hematocrit 20.2 (L) 35.0 - 47.0 %    MCV 95 78 - 100 fL    MCH 32.4 26.5 - 33.0 pg    MCHC 34.2 31.5 - 36.5 g/dL    RDW 18.2 (H) 10.0 - 15.0 %    Platelet Count 32 (LL) 150 - 450 10e3/uL    % Neutrophils      % Lymphocytes      % Monocytes      % Eosinophils      % Basophils      % Immature Granulocytes      NRBCs per 100 WBC 0 <1 /100    Absolute Neutrophils      Absolute Lymphocytes      Absolute Monocytes      Absolute Eosinophils      Absolute Basophils      Absolute Immature Granulocytes      Absolute NRBCs 0.1 10e3/uL   Manual Differential   Result Value Ref Range    % Neutrophils 94 %    % Lymphocytes 1 %    % Monocytes 3 %    % Eosinophils 0 %    % Basophils 0 %    % Promyelocytes 2 %    Absolute Neutrophils 13.5 (H) 1.6 - 8.3 10e3/uL    Absolute Lymphocytes 0.1 (L) 0.8 - 5.3 10e3/uL    Absolute Monocytes 0.4 0.0 - 1.3 10e3/uL    Absolute Eosinophils 0.0 0.0 - 0.7 10e3/uL    Absolute Basophils 0.0 0.0 - 0.2 10e3/uL    Absolute Promyelocytes 0.3 (H) <=0.0 10e3/uL    RBC Morphology Confirmed RBC Indices     Platelet Assessment   Automated Count Confirmed. Platelet morphology is normal.     Automated Count Confirmed. Platelet morphology is normal.    Toxic Neutrophils Present (A) None Seen   CONDITIONAL Prepare red blood cells (unit)   Result Value Ref Range    Blood Component Type Red Blood Cells     Product Code [id number]     Unit Status Transfused     Unit Number [id number]     CROSSMATCH Compatible     CODING SYSTEM [id number]     ISSUE DATE AND TIME [id number]     UNIT ABO/RH A+     UNIT TYPE ISBT [id number]          ASSESSMENT BY SYSTEMS     Rosario Adan Terrazas is a 68 year old female, currently day +16 s/p auto for MM readmitted 3/21 with N/F and GNB+ sepsis.     BMT/IEC PROTOCOL for MM Auto     Transplants for multiple myeloma:  The patient has Standard risk IgG D MM, planning on 2 transplants   3/12 Day -1 Melphalan 200 mg/m2   3/13 Day 0 Transplant, cell total post wash 2.23 x 10^6 CD34 + cells/kg (pre freeze dose: 5.36 x 10 ^6 CD34+ cells/kg)    ID  N/F and sepsis 3/22/24.  See below.    Strep mitis bacteremia (3/22). Follow up cultures 3/23-3/25: negative.  Vanco (3/22-3/24)   Meropenem 3/22-3/27)  Ertapenem 3/27; treat through 4/4/2024.    E. Coli bacteremia (3/21, 3/22). Follow up cultures 3/23-3/25: negative.  Tobraymycin x1 (3/22)  meropenem (3/22-27)  Ertapenem 3/27; treat through 4/4/2024.    Cough: RVP negative; flu/rsv/covid negative 3/27.    Latent TB: Continue INH/B6 through transplant    PPX:   Acyclovir  fluconazole (held 3/25 d/t prolonged Qtc and then discontinued 3/26 with engraftment)  Bactrim from D28 (note: please do not sub out for another agent, as patient has positive toxoplasmosis serology)     Heme   - pancytopenia secondary to chemotherapy and multiple myeloma  - transfuse to keep hgb >7g/dL, plt >10k  - Hold home aspirin (3/17), resume once platelets have recovered.   - INR 1.8, given vitamin K in clinic 3/21. Recheck 3/28 down to 1.3.     FEN/Renal  #Hypocalcemia: Continue oral replacement and 2g calcium  "gluconate IV again on 3/29. Corrected calcium is 7.9.   #Vitamin D level: adequate. Keep on ergocalciferol at this time (weekly dosing) to prevent, as it is still winter in [place].  #Fluid overload related to sepsis management and hypoalbuminemia: diuresis resulted in hypotension, indicating she has third spaced fluids with intravascular dry overall status.  Continue lymphedema therapy.  Increasing protein intake.  BNP okay, so unlikely related to any heart failure.  Repeat albumin today with lasix bid today (and q 8 hour BMP checks).   #Lymphedema consult 3/27  #Severe malnutrition; dietitian following.     CV  #Afib + SVT: paroxysmal afib with RVR and hypotension requiring transfer to MICU; also intermittent SVT.    Amiodarone 0.5mg/hour drip transitioned to PO on 3/24; discussed with cardiology on 3/28 and they advised okay to stop amiodarone today (3/28) due to rising LFTs.  LFTs now improved after discontinuing amiodarone.   Metoprolol 25mg bid   concern that cardiac processes were being driven by an overall inflammatory state, added decadron 4mg IV daily x 3 days (3/24-3/26).   Electrolytes to be replaced per HIGH sliding scale  Prolonged QTc; continue to hold zofran/zyprexa  Ziopatch (\"cardiac event monitor\" order) placement prior to discharge on the day of discharge      Please have techs placed a ziopatch (\"cardiac event monitor\" order) on the day of discharge and arrange for cardiology follow up after discharge.       GI:   #Nausea: compazine q ac, tid.  Prn antiemetics available as well.   #Diarrhea: escalation of diarrhea and now also having worsening abdominal pain, requiring narcotic.  CT abd/pelvis and will send testing for schistosomes, microsporidia, and parasites.  Imodium scheduled qid.   #Transaminitis: check viral studies (Hep B, Hep C, EBV, CMV, adenovirus)  Hepatitis b and c testing not reactive  CMV, EBV, adenovirus pending  LFT elevations resolved with stopping amiodarone     " "  Endocrine  #Thyroid FNA Atypia of undetermined significance diagnosis has a 22 (13-30)% risk of malignancy. Repeat FNA (least 4-6 weeks from previous aspiration with optimal time being 12 weeks after last aspiration) , molecular testing, diagnostic lobectomy, or surveillance is recommended.    Has next appt with endocrine 4/16/24.   *TSH WNL 3/21     Neuro/Pain  #Neuropathy: continues on eleni, xanaflex.  #Rib pain: continue oxycodone and back brace, Tylenol on hold since 3/19       Dispo: pending work-up of abdominal pain and ongoing diarrhea    Clinically Significant Risk Factors        # Hypokalemia: Lowest K = 3.3 mmol/L in last 2 days, will replace as needed     # Hypomagnesemia: Lowest Mg = 1.5 mg/dL in last 2 days, will replace as needed   # Hypoalbuminemia: Lowest albumin = 2.5 g/dL at 3/24/2024  3:59 AM, will monitor as appropriate    # Coagulation Defect: INR = 1.30 (Ref range: 0.85 - 1.15) and/or PTT = 29 Seconds (Ref range: 22 - 38 Seconds), will monitor for bleeding  # Thrombocytopenia: Lowest platelets = 23 in last 2 days, will monitor for bleeding          # Obesity: Estimated body mass index is 39.25 kg/m  as calculated from the following:    Height as of this encounter: 1.65 m (5' 4.96\").    Weight as of this encounter: 106.9 kg (235 lb 9.6 oz).   # Severe Malnutrition: based on nutrition assessment      # Financial/Environmental Concerns: none       I spent 40 minutes in the care of this patient today, which included time necessary for preparation for the visit, obtaining history, ordering medications/tests/procedures as medically indicated, review of pertinent medical literature, counseling of the patient, communication of recommendations to the care team, and documentation time.  Yudelka Richards PA-C      "

## 2024-03-29 NOTE — PLAN OF CARE
"/60   Pulse 102   Temp 98.6  F (37  C) (Oral)   Resp 16   Ht 1.65 m (5' 4.96\")   Wt 106.9 kg (235 lb 9.6 oz)   SpO2 98%   BMI 39.25 kg/m      Pt slept comfortably throughout the night. VSS on room air, afebrile. Pt endorses back/shoulder and knee pain, denies nausea. PRN oxycodone given x1 with adequate relief per pt. Hemoglobin, potassium, and magnesium levels below goal this morning, replacements currently infusing. Pt's daughter is staying in room to translate, pt does not speak English. Pt has bed alarm on for safety. Assist of 1 w/walker to the bathroom. Pt had one episode of incontinent diarrhea this morning. PRN immodium given. Edema wraps removed as they were soiled. Pt also indicated they were starting to cause her discomfort. Continue with current POC.     Goal Outcome Evaluation:    Problem: Adult Inpatient Plan of Care  Goal: Absence of Hospital-Acquired Illness or Injury  Outcome: Progressing  Goal: Optimal Comfort and Wellbeing  Outcome: Not Progressing     Problem: Fall Injury Risk  Goal: Absence of Fall and Fall-Related Injury  Outcome: Not Progressing       "

## 2024-03-30 LAB
ALBUMIN SERPL BCG-MCNC: 3.2 G/DL (ref 3.5–5.2)
ALBUMIN UR-MCNC: NEGATIVE MG/DL
ALP SERPL-CCNC: 73 U/L (ref 40–150)
ALT SERPL W P-5'-P-CCNC: 35 U/L (ref 0–50)
ANION GAP SERPL CALCULATED.3IONS-SCNC: 10 MMOL/L (ref 7–15)
APPEARANCE UR: CLEAR
AST SERPL W P-5'-P-CCNC: 16 U/L (ref 0–45)
BACTERIA #/AREA URNS HPF: ABNORMAL /HPF
BACTERIA BLD CULT: NO GROWTH
BACTERIA BLD CULT: NO GROWTH
BASOPHILS # BLD AUTO: ABNORMAL 10*3/UL
BASOPHILS # BLD MANUAL: 0 10E3/UL (ref 0–0.2)
BASOPHILS NFR BLD AUTO: ABNORMAL %
BASOPHILS NFR BLD MANUAL: 0 %
BILIRUB DIRECT SERPL-MCNC: <0.2 MG/DL (ref 0–0.3)
BILIRUB SERPL-MCNC: 0.3 MG/DL
BILIRUB UR QL STRIP: NEGATIVE
BUN SERPL-MCNC: 3.8 MG/DL (ref 8–23)
CALCIUM SERPL-MCNC: 7.6 MG/DL (ref 8.8–10.2)
CHLORIDE SERPL-SCNC: 102 MMOL/L (ref 98–107)
COLOR UR AUTO: ABNORMAL
CREAT SERPL-MCNC: 0.48 MG/DL (ref 0.51–0.95)
DEPRECATED HCO3 PLAS-SCNC: 29 MMOL/L (ref 22–29)
EGFRCR SERPLBLD CKD-EPI 2021: >90 ML/MIN/1.73M2
EOSINOPHIL # BLD AUTO: ABNORMAL 10*3/UL
EOSINOPHIL # BLD MANUAL: 0.1 10E3/UL (ref 0–0.7)
EOSINOPHIL NFR BLD AUTO: ABNORMAL %
EOSINOPHIL NFR BLD MANUAL: 1 %
ERYTHROCYTE [DISTWIDTH] IN BLOOD BY AUTOMATED COUNT: 18.7 % (ref 10–15)
GLUCOSE SERPL-MCNC: 88 MG/DL (ref 70–99)
GLUCOSE UR STRIP-MCNC: NEGATIVE MG/DL
HBV DNA SERPL NAA+PROBE-ACNC: NOT DETECTED IU/ML
HCT VFR BLD AUTO: 23.3 % (ref 35–47)
HCV RNA SERPL NAA+PROBE-ACNC: NOT DETECTED IU/ML
HGB BLD-MCNC: 7.9 G/DL (ref 11.7–15.7)
HGB UR QL STRIP: NEGATIVE
IMM GRANULOCYTES # BLD: ABNORMAL 10*3/UL
IMM GRANULOCYTES NFR BLD: ABNORMAL %
KETONES UR STRIP-MCNC: NEGATIVE MG/DL
LEUKOCYTE ESTERASE UR QL STRIP: NEGATIVE
LYMPHOCYTES # BLD AUTO: ABNORMAL 10*3/UL
LYMPHOCYTES # BLD MANUAL: 0.2 10E3/UL (ref 0.8–5.3)
LYMPHOCYTES NFR BLD AUTO: ABNORMAL %
LYMPHOCYTES NFR BLD MANUAL: 2 %
MAGNESIUM SERPL-MCNC: 1.9 MG/DL (ref 1.7–2.3)
MCH RBC QN AUTO: 31.1 PG (ref 26.5–33)
MCHC RBC AUTO-ENTMCNC: 33.9 G/DL (ref 31.5–36.5)
MCV RBC AUTO: 92 FL (ref 78–100)
MONOCYTES # BLD AUTO: ABNORMAL 10*3/UL
MONOCYTES # BLD MANUAL: 0.5 10E3/UL (ref 0–1.3)
MONOCYTES NFR BLD AUTO: ABNORMAL %
MONOCYTES NFR BLD MANUAL: 6 %
NEUTROPHILS # BLD AUTO: ABNORMAL 10*3/UL
NEUTROPHILS # BLD MANUAL: 7.9 10E3/UL (ref 1.6–8.3)
NEUTROPHILS NFR BLD AUTO: ABNORMAL %
NEUTROPHILS NFR BLD MANUAL: 91 %
NITRATE UR QL: NEGATIVE
NRBC # BLD AUTO: 0 10E3/UL
NRBC BLD AUTO-RTO: 0 /100
PH UR STRIP: 7.5 [PH] (ref 5–7)
PHOSPHATE SERPL-MCNC: 3.8 MG/DL (ref 2.5–4.5)
PLAT MORPH BLD: ABNORMAL
PLATELET # BLD AUTO: 52 10E3/UL (ref 150–450)
POTASSIUM SERPL-SCNC: 3.5 MMOL/L (ref 3.4–5.3)
PROT SERPL-MCNC: 4.8 G/DL (ref 6.4–8.3)
RBC # BLD AUTO: 2.54 10E6/UL (ref 3.8–5.2)
RBC MORPH BLD: ABNORMAL
RBC URINE: <1 /HPF
SODIUM SERPL-SCNC: 141 MMOL/L (ref 135–145)
SP GR UR STRIP: 1.01 (ref 1–1.03)
TOXIC GRANULES BLD QL SMEAR: PRESENT
UROBILINOGEN UR STRIP-MCNC: NORMAL MG/DL
WBC # BLD AUTO: 8.7 10E3/UL (ref 4–11)
WBC URINE: <1 /HPF

## 2024-03-30 PROCEDURE — 206N000001 HC R&B BMT UMMC

## 2024-03-30 PROCEDURE — 250N000011 HC RX IP 250 OP 636: Performed by: STUDENT IN AN ORGANIZED HEALTH CARE EDUCATION/TRAINING PROGRAM

## 2024-03-30 PROCEDURE — 83735 ASSAY OF MAGNESIUM: CPT | Performed by: STUDENT IN AN ORGANIZED HEALTH CARE EDUCATION/TRAINING PROGRAM

## 2024-03-30 PROCEDURE — 250N000013 HC RX MED GY IP 250 OP 250 PS 637: Performed by: STUDENT IN AN ORGANIZED HEALTH CARE EDUCATION/TRAINING PROGRAM

## 2024-03-30 PROCEDURE — 85041 AUTOMATED RBC COUNT: CPT | Performed by: PHYSICIAN ASSISTANT

## 2024-03-30 PROCEDURE — 84100 ASSAY OF PHOSPHORUS: CPT | Performed by: STUDENT IN AN ORGANIZED HEALTH CARE EDUCATION/TRAINING PROGRAM

## 2024-03-30 PROCEDURE — 99233 SBSQ HOSP IP/OBS HIGH 50: CPT | Mod: FS | Performed by: STUDENT IN AN ORGANIZED HEALTH CARE EDUCATION/TRAINING PROGRAM

## 2024-03-30 PROCEDURE — 250N000011 HC RX IP 250 OP 636: Mod: JZ | Performed by: STUDENT IN AN ORGANIZED HEALTH CARE EDUCATION/TRAINING PROGRAM

## 2024-03-30 PROCEDURE — 250N000013 HC RX MED GY IP 250 OP 250 PS 637: Performed by: INTERNAL MEDICINE

## 2024-03-30 PROCEDURE — 80053 COMPREHEN METABOLIC PANEL: CPT | Performed by: PHYSICIAN ASSISTANT

## 2024-03-30 PROCEDURE — 81001 URINALYSIS AUTO W/SCOPE: CPT | Performed by: STUDENT IN AN ORGANIZED HEALTH CARE EDUCATION/TRAINING PROGRAM

## 2024-03-30 PROCEDURE — 250N000013 HC RX MED GY IP 250 OP 250 PS 637: Performed by: PHYSICIAN ASSISTANT

## 2024-03-30 PROCEDURE — 85007 BL SMEAR W/DIFF WBC COUNT: CPT | Performed by: PHYSICIAN ASSISTANT

## 2024-03-30 RX ORDER — MAGNESIUM SULFATE HEPTAHYDRATE 40 MG/ML
2 INJECTION, SOLUTION INTRAVENOUS ONCE
Qty: 50 ML | Refills: 0 | Status: COMPLETED | OUTPATIENT
Start: 2024-03-30 | End: 2024-03-30

## 2024-03-30 RX ORDER — POTASSIUM CHLORIDE 29.8 MG/ML
20 INJECTION INTRAVENOUS ONCE
Status: COMPLETED | OUTPATIENT
Start: 2024-03-30 | End: 2024-03-30

## 2024-03-30 RX ORDER — FUROSEMIDE 10 MG/ML
40 INJECTION INTRAMUSCULAR; INTRAVENOUS ONCE
Status: COMPLETED | OUTPATIENT
Start: 2024-03-30 | End: 2024-03-30

## 2024-03-30 RX ADMIN — FOLIC ACID 1000 MCG: 1 TABLET ORAL at 07:56

## 2024-03-30 RX ADMIN — Medication 25 MG: at 07:56

## 2024-03-30 RX ADMIN — MAGNESIUM SULFATE HEPTAHYDRATE 2 G: 2 INJECTION, SOLUTION INTRAVENOUS at 08:01

## 2024-03-30 RX ADMIN — ACYCLOVIR 800 MG: 800 TABLET ORAL at 19:47

## 2024-03-30 RX ADMIN — PANTOPRAZOLE SODIUM 40 MG: 40 TABLET, DELAYED RELEASE ORAL at 07:56

## 2024-03-30 RX ADMIN — OXYCODONE HYDROCHLORIDE 5 MG: 5 TABLET ORAL at 22:06

## 2024-03-30 RX ADMIN — METOPROLOL TARTRATE 25 MG: 25 TABLET, FILM COATED ORAL at 19:51

## 2024-03-30 RX ADMIN — GABAPENTIN 100 MG: 100 CAPSULE ORAL at 19:47

## 2024-03-30 RX ADMIN — PROCHLORPERAZINE MALEATE 5 MG: 5 TABLET ORAL at 12:09

## 2024-03-30 RX ADMIN — GABAPENTIN 100 MG: 100 CAPSULE ORAL at 14:27

## 2024-03-30 RX ADMIN — ISONIAZID 300 MG: 300 TABLET ORAL at 07:57

## 2024-03-30 RX ADMIN — MEROPENEM 1 G: 1 INJECTION, POWDER, FOR SOLUTION INTRAVENOUS at 19:49

## 2024-03-30 RX ADMIN — PROCHLORPERAZINE MALEATE 5 MG: 5 TABLET ORAL at 17:03

## 2024-03-30 RX ADMIN — GABAPENTIN 100 MG: 100 CAPSULE ORAL at 07:56

## 2024-03-30 RX ADMIN — MEROPENEM 1 G: 1 INJECTION, POWDER, FOR SOLUTION INTRAVENOUS at 03:55

## 2024-03-30 RX ADMIN — PROCHLORPERAZINE MALEATE 5 MG: 5 TABLET ORAL at 07:56

## 2024-03-30 RX ADMIN — POTASSIUM CHLORIDE 20 MEQ: 29.8 INJECTION, SOLUTION INTRAVENOUS at 07:04

## 2024-03-30 RX ADMIN — DICLOFENAC SODIUM 2 G: 10 GEL TOPICAL at 12:11

## 2024-03-30 RX ADMIN — DICLOFENAC SODIUM 2 G: 10 GEL TOPICAL at 22:08

## 2024-03-30 RX ADMIN — MEROPENEM 1 G: 1 INJECTION, POWDER, FOR SOLUTION INTRAVENOUS at 12:02

## 2024-03-30 RX ADMIN — Medication 3 MG: at 22:06

## 2024-03-30 RX ADMIN — CALCIUM 500 MG: 500 TABLET ORAL at 19:47

## 2024-03-30 RX ADMIN — ACYCLOVIR 800 MG: 800 TABLET ORAL at 07:57

## 2024-03-30 RX ADMIN — METOPROLOL TARTRATE 25 MG: 25 TABLET, FILM COATED ORAL at 07:56

## 2024-03-30 RX ADMIN — CALCIUM 500 MG: 500 TABLET ORAL at 07:57

## 2024-03-30 ASSESSMENT — ACTIVITIES OF DAILY LIVING (ADL)
ADLS_ACUITY_SCORE: 28
ADLS_ACUITY_SCORE: 28
ADLS_ACUITY_SCORE: 31
ADLS_ACUITY_SCORE: 31
ADLS_ACUITY_SCORE: 30
ADLS_ACUITY_SCORE: 31
ADLS_ACUITY_SCORE: 28
ADLS_ACUITY_SCORE: 28
ADLS_ACUITY_SCORE: 31
ADLS_ACUITY_SCORE: 28
ADLS_ACUITY_SCORE: 28
ADLS_ACUITY_SCORE: 31
ADLS_ACUITY_SCORE: 28
ADLS_ACUITY_SCORE: 28
ADLS_ACUITY_SCORE: 31
ADLS_ACUITY_SCORE: 28

## 2024-03-30 NOTE — PROGRESS NOTES
"BMT Daily Progress Note   03/30/2024    Patient ID:  Rosario Terrazas is a 68 year old female, currently day 17 s/p auto for MM readmitted 3/21 with N/F and GNB+ sepsis.    INTERVAL  HISTORY   Rosario is seen with her daughter who is translating. She states she had a relatively smooth night. Less abdomen discomfort on oxy. She had a stool this morning, daughter is not sure if it was loose or formed. She is hungry this morning which is an improvement from poor appetite yesterday. She continues to prefer food her daughter is bringing in which is protein rich.  Edema seems much improved with diuresis and compression wraps. Notes irritation with left IV (for CT contrast yesterday). Also irritation at tape area where purewick was secured. Discussed these with nursing.    Review of Systems: ROS negative except as noted above.  Scheduled Medications   acyclovir  800 mg Oral BID    calcium carbonate  500 mg Oral BID w/meals    diclofenac  2 g Topical 4x Daily    folic acid  1,000 mcg Oral Daily    gabapentin  100 mg Oral TID    heparin lock flush  5-10 mL Intracatheter Q24H    isoniazid  300 mg Oral Daily    lidocaine  1 patch Transdermal Q24h    loperamide  4 mg Oral 4x Daily    meropenem  1 g Intravenous Q8H    metoprolol tartrate  25 mg Oral or Feeding Tube BID    pantoprazole  40 mg Oral Daily    prochlorperazine  5 mg Oral TID AC    pyridOXINE  25 mg Oral Daily    sodium chloride (PF)  3 mL Intracatheter Q8H    [Held by provider] sulfamethoxazole-trimethoprim  1 tablet Oral Daily    vitamin D2  50,000 Units Oral Q7 Days     PHYSICAL EXAM     Weight In/Out     Wt Readings from Last 3 Encounters:   03/30/24 96.6 kg (213 lb)   03/21/24 94.7 kg (208 lb 11.2 oz)   03/20/24 94.9 kg (209 lb 4.8 oz)      I/O last 3 completed shifts:  In: 1220 [P.O.:120; I.V.:750; Other:50]  Out: 4800 [Urine:4200; Other:600]       /58   Pulse 97   Temp 98.8  F (37.1  C) (Oral)   Resp 16   Ht 1.65 m (5' 4.96\")   Wt 96.6 kg (213 " lb)   SpO2 95%   BMI 35.49 kg/m       General: withdrawn; lying with eyes closed, but opens eyes spontaneously.   Lungs: CTAB; no crackles  Cardiovascular: RRR  Abdominal/Rectal: +BS, soft and mild tenderness to deep palpation   Skin: no rashes or petechaie  Lymph: lower leg edema improving; wraps off secondary to incontinent urine this morning  Neuro: A&O, moving all extremities spontaneously, PERRL  Additional Findings: Wilder site NT, no drainage.    LABS AND IMAGING - PAST 24 HOURS     Results for orders placed or performed during the hospital encounter of 03/22/24 (from the past 24 hour(s))   CT Abdomen Pelvis w Contrast    Narrative    EXAM: CT ABDOMEN PELVIS W CONTRAST 3/29/2024 11:38 AM    HISTORY: 68 years Female worsening abdominal pain and ongoing diarrhea    COMPARISON: PET CT 2/22/2024.    TECHNIQUE: Axial CT images from the lung bases through the lower  abdomen were obtained with IV contrast. Coronal and sagittal reformats  provided. Contrast dose: iopamidol (ISOVUE-370) solution 135 mL.    FINDINGS:    LINES/TUBES: Partially visualized central venous catheter adjacent to  the superior cavoatrial junction.    HEPATIC: No suspicious focal hepatic masses or lesions.    BILIARY: No calcified gallstones. No intra or extrahepatic biliary  dilatation.    SPLEEN: Normal size. No focal lesions.    PANCREAS: No mass or peripancreatic fluid.    ADRENALS: Unremarkable.    RENAL: The kidneys enhance symmetrically. Simple-appearing right renal  cyst. No hydronephrosis, calculi, or suspicious renal mass.    Bladder: Circumferential bladder wall thickening with minimal bladder  distention. There is additionally questionable perivesicular  inflammatory changes along the anterior bladder wall.    Reproductive: Within normal limits.    GASTROINTESTINAL: No evidence of bowel obstruction. Circumferential  thickening and submucosal edema involving the ascending colon and  cecum. No pneumatosis or portal venous gas. No  definite signs of  appendicitis.    MESENTERY/PERITONEUM: Trace amount of simple pelvic ascites. No  pneumoperitoneum.    LYMPH NODES: No lymphadenopathy.    VASCULAR: Visualized major abdominal vessels appear patent. Mild  atherosclerotic vascular calcifications.    LUNG BASES: Small bilateral pleural effusions with bibasilar  compressive atelectasis.    MUSCULOSKELETAL: Multilevel degenerative changes of the spine. Mild  compression deformity of L4 is unchanged. Few chronic-appearing right  lower anterior rib fractures. Numerous lucent foci throughout the  visualized axial and proximal appendicular skeleton in keeping with  known multiple myeloma. Body wall anasarca. Small fat-containing  umbilical hernia.      Impression    IMPRESSION:   1.  Mural thickening and submucosal edema involving the ascending  colon and cecum is concerning for colitis/typhlitis.  2.  Circumferential bladder wall thickening, may be related to  underdistention versus sequelae of cystitis. Recommend correlation  with urinalysis.  3.  Trace ascites, body wall anasarca, and small bilateral pleural  effusions, consistent with fluid overload.    I have personally reviewed the examination and initial interpretation  and I agree with the findings.    WILLIAMS HEAD MD         SYSTEM ID:  [id number]   Magnesium   Result Value Ref Range    Magnesium 2.0 1.7 - 2.3 mg/dL   Potassium   Result Value Ref Range    Potassium 3.8 3.4 - 5.3 mmol/L   Basic metabolic panel   Result Value Ref Range    Sodium 141 135 - 145 mmol/L    Potassium 3.8 3.4 - 5.3 mmol/L    Chloride 106 98 - 107 mmol/L    Carbon Dioxide (CO2) 25 22 - 29 mmol/L    Anion Gap 10 7 - 15 mmol/L    Urea Nitrogen 3.9 (L) 8.0 - 23.0 mg/dL    Creatinine 0.46 (L) 0.51 - 0.95 mg/dL    GFR Estimate >90 >60 mL/min/1.73m2    Calcium 7.6 (L) 8.8 - 10.2 mg/dL    Glucose 92 70 - 99 mg/dL   CBC with platelets differential    Narrative    The following orders were created for panel order CBC with  platelets differential.  Procedure                               Abnormality         Status                     ---------                               -----------         ------                     CBC with platelets and d...[[id number]]  Abnormal            Final result               Manual Differential[[id number]]          Abnormal            Final result                 Please view results for these tests on the individual orders.   Basic metabolic panel   Result Value Ref Range    Sodium 141 135 - 145 mmol/L    Potassium 3.5 3.4 - 5.3 mmol/L    Chloride 102 98 - 107 mmol/L    Carbon Dioxide (CO2) 29 22 - 29 mmol/L    Anion Gap 10 7 - 15 mmol/L    Urea Nitrogen 3.8 (L) 8.0 - 23.0 mg/dL    Creatinine 0.48 (L) 0.51 - 0.95 mg/dL    GFR Estimate >90 >60 mL/min/1.73m2    Calcium 7.6 (L) 8.8 - 10.2 mg/dL    Glucose 88 70 - 99 mg/dL   Hepatic panel   Result Value Ref Range    Protein Total 4.8 (L) 6.4 - 8.3 g/dL    Albumin 3.2 (L) 3.5 - 5.2 g/dL    Bilirubin Total 0.3 <=1.2 mg/dL    Alkaline Phosphatase 73 40 - 150 U/L    AST 16 0 - 45 U/L    ALT 35 0 - 50 U/L    Bilirubin Direct <0.20 0.00 - 0.30 mg/dL   Magnesium   Result Value Ref Range    Magnesium 1.9 1.7 - 2.3 mg/dL   CBC with platelets and differential   Result Value Ref Range    WBC Count 8.7 4.0 - 11.0 10e3/uL    RBC Count 2.54 (L) 3.80 - 5.20 10e6/uL    Hemoglobin 7.9 (L) 11.7 - 15.7 g/dL    Hematocrit 23.3 (L) 35.0 - 47.0 %    MCV 92 78 - 100 fL    MCH 31.1 26.5 - 33.0 pg    MCHC 33.9 31.5 - 36.5 g/dL    RDW 18.7 (H) 10.0 - 15.0 %    Platelet Count 52 (L) 150 - 450 10e3/uL    % Neutrophils      % Lymphocytes      % Monocytes      % Eosinophils      % Basophils      % Immature Granulocytes      NRBCs per 100 WBC 0 <1 /100    Absolute Neutrophils      Absolute Lymphocytes      Absolute Monocytes      Absolute Eosinophils      Absolute Basophils      Absolute Immature Granulocytes      Absolute NRBCs 0.0 10e3/uL   Manual Differential   Result Value Ref Range    %  Neutrophils 91 %    % Lymphocytes 2 %    % Monocytes 6 %    % Eosinophils 1 %    % Basophils 0 %    Absolute Neutrophils 7.9 1.6 - 8.3 10e3/uL    Absolute Lymphocytes 0.2 (L) 0.8 - 5.3 10e3/uL    Absolute Monocytes 0.5 0.0 - 1.3 10e3/uL    Absolute Eosinophils 0.1 0.0 - 0.7 10e3/uL    Absolute Basophils 0.0 0.0 - 0.2 10e3/uL    RBC Morphology Confirmed RBC Indices     Platelet Assessment  Automated Count Confirmed. Platelet morphology is normal.     Automated Count Confirmed. Platelet morphology is normal.    Toxic Neutrophils Present (A) None Seen   Phosphorus   Result Value Ref Range    Phosphorus 3.8 2.5 - 4.5 mg/dL   UA with Microscopic   Result Value Ref Range    Color Urine Straw Colorless, Straw, Light Yellow, Yellow    Appearance Urine Clear Clear    Glucose Urine Negative Negative mg/dL    Bilirubin Urine Negative Negative    Ketones Urine Negative Negative mg/dL    Specific Gravity Urine 1.007 1.003 - 1.035    Blood Urine Negative Negative    pH Urine 7.5 (H) 5.0 - 7.0    Protein Albumin Urine Negative Negative mg/dL    Urobilinogen Urine Normal Normal, 2.0 mg/dL    Nitrite Urine Negative Negative    Leukocyte Esterase Urine Negative Negative    Bacteria Urine Few (A) None Seen /HPF    RBC Urine <1 <=2 /HPF    WBC Urine <1 <=5 /HPF         ASSESSMENT BY SYSTEMS     Rosario Adan Terrazas is a 68 year old female, currently day +17 s/p auto for MM readmitted 3/21 with N/F and GNB+ sepsis.     BMT/IEC PROTOCOL for MM Auto     Transplants for multiple myeloma:  The patient has Standard risk IgG D MM, planning on 2 transplants   3/12 Day -1 Melphalan 200 mg/m2   3/13 Day 0 Transplant, cell total post wash 2.23 x 10^6 CD34 + cells/kg (pre freeze dose: 5.36 x 10 ^6 CD34+ cells/kg)    ID  N/F and sepsis 3/22/24.  See below.    Strep mitis bacteremia (3/22). Follow up cultures 3/23-3/25: negative.  Vanco (3/22-3/24)   Meropenem 3/22-3/27)  Ertapenem 3/27; treat through 4/4/2024.    E. Coli bacteremia (3/21, 3/22).  Follow up cultures 3/23-3/25: negative.  Tobraymycin x1 (3/22)  meropenem (3/22-27)  Ertapenem 3/27-3/29; moved back to meropenem as colitis started when this was stopped. treat through 4/4/2024.    Colitis seen on CT 3/29. As erta does not cover actinobacter, pseudomonas and enterococci will return to meropenem through 4/4    Cough: RVP negative; flu/rsv/covid negative 3/27.    Latent TB: Continue INH/B6 through transplant    PPX:   Acyclovir  fluconazole (held 3/25 d/t prolonged Qtc and then discontinued 3/26 with engraftment)  Bactrim from D28 (note: please do not sub out for another agent, as patient has positive toxoplasmosis serology)     Heme   - pancytopenia secondary to chemotherapy and multiple myeloma  - transfuse to keep hgb >7g/dL, plt >10k  - Hold home aspirin (3/17), resume once platelets have recovered.   - INR 1.8, given vitamin K in clinic 3/21. Recheck 3/28 down to 1.3.     FEN/Renal  #Hypocalcemia: Continue oral replacement and 2g calcium gluconate IV again on 3/29. Corrected Ca 8.2  #Vitamin D level: adequate. Keep on ergocalciferol at this time (weekly dosing) to prevent, as it is still winter in [place].  #Fluid overload related to sepsis management and hypoalbuminemia: diuresis resulted in hypotension, indicating she has third spaced fluids with intravascular dry overall status.  Continue lymphedema therapy.  Increasing protein intake.  BNP okay, so unlikely related to any heart failure.  Albumin with lasix 3/29, albumin slowly improving. Lasix alone  3/30  #Lymphedema consult 3/27  #Severe malnutrition; dietitian following.     CV  #Afib + SVT: paroxysmal afib with RVR and hypotension requiring transfer to MICU; also intermittent SVT.    Amiodarone 0.5mg/hour drip transitioned to PO on 3/24; discussed with cardiology on 3/28 and they advised okay to stop amiodarone today (3/28) due to rising LFTs.  LFTs now improved after discontinuing amiodarone.   Metoprolol 25mg bid   concern that cardiac  "processes were being driven by an overall inflammatory state, added decadron 4mg IV daily x 3 days (3/24-3/26).   Electrolytes to be replaced per HIGH sliding scale  Prolonged QTc; continue to hold zofran/zyprexa  Ziopatch (\"cardiac event monitor\" order) placement prior to discharge on the day of discharge    Please have techs placed a ziopatch (\"cardiac event monitor\" order) on the day of discharge and arrange for cardiology follow up after discharge.       GI:   #Nausea: compazine q ac, tid.  Prn antiemetics available as well.   #Diarrhea: escalation of diarrhea and abdominal pain, requiring narcotic. CT abd/pelvis showed colitis 3/29, meropenem resumed. Awaiting results of schistosomes, microsporidia, and parasites.  Imodium scheduled qid. Imodium/joleen showing improvement 3/30  #Transaminitis: check viral studies (Hep B, Hep C, EBV, CMV, adenovirus)  Hepatitis b and c testing not reactive  CMV, EBV, adenovirus pending  LFT elevations resolved with stopping amiodarone       Endocrine  #Thyroid FNA Atypia of undetermined significance diagnosis has a 22 (13-30)% risk of malignancy. Repeat FNA (least 4-6 weeks from previous aspiration with optimal time being 12 weeks after last aspiration) , molecular testing, diagnostic lobectomy, or surveillance is recommended.    Has next appt with endocrine 4/16/24.   *TSH WNL 3/21     Neuro/Pain  #Neuropathy: continues on eleni, xanaflex.  #Rib pain: continue oxycodone and back brace, Tylenol on hold since 3/19       Dispo: pending work-up of abdominal pain and ongoing diarrhea    Clinically Significant Risk Factors        # Hypokalemia: Lowest K = 3.3 mmol/L in last 2 days, will replace as needed     # Hypomagnesemia: Lowest Mg = 1.5 mg/dL in last 2 days, will replace as needed   # Hypoalbuminemia: Lowest albumin = 2.5 g/dL at 3/24/2024  3:59 AM, will monitor as appropriate    # Coagulation Defect: INR = 1.30 (Ref range: 0.85 - 1.15) and/or PTT = 29 Seconds (Ref range: 22 - 38 " "Seconds), will monitor for bleeding  # Thrombocytopenia: Lowest platelets = 32 in last 2 days, will monitor for bleeding          # Obesity: Estimated body mass index is 35.49 kg/m  as calculated from the following:    Height as of this encounter: 1.65 m (5' 4.96\").    Weight as of this encounter: 96.6 kg (213 lb).   # Severe Malnutrition: based on nutrition assessment      # Financial/Environmental Concerns: none       I spent 40 minutes in the care of this patient today, which included time necessary for preparation for the visit, obtaining history, ordering medications/tests/procedures as medically indicated, review of pertinent medical literature, counseling of the patient, communication of recommendations to the care team, and documentation time.  NEHA Wiggins-C  [phone]    "

## 2024-03-30 NOTE — PROGRESS NOTES
Notified by RN that patient blood pressure 89/49, she is asymptomatic. She has been receiving diuresis due to third spacing. Overall blood pressures have been softer. Denies any dizzyness or feeling faint.     Plan  - Recheck blood pressure in 1 hour  - Hold this dose of Lasix for now in setting of hypotension and not being significantly fluid up.   - Will not give additional fluid in concern for third spacing  - Obtain orthostatic blood pressures this afternoon     Susie Solano PA-C  [id number]

## 2024-03-30 NOTE — PLAN OF CARE
"Asymptomatically hypotensive this afternoon to 89/49, recheck 96/59. OVSS on RA. PA notified. Orthostatics negative. C/o some cramping abdominal pain this morning that resolved after having a bowel movement. Declined scheduled imodium. Mg replaced. Lasix was ordered but held d/t low BP. Family in room. Up SBA with walker. Bed alarm when family not in room. Resting between cares.     Problem: Adult Inpatient Plan of Care  Goal: Plan of Care Review  Description: The Plan of Care Review/Shift note should be completed every shift.  The Outcome Evaluation is a brief statement about your assessment that the patient is improving, declining, or no change.  This information will be displayed automatically on your shift  note.  Outcome: Progressing  Goal: Patient-Specific Goal (Individualized)  Description: You can add care plan individualizations to a care plan. Examples of Individualization might be:  \"Parent requests to be called daily at 9am for status\", \"I have a hard time hearing out of my right ear\", or \"Do not touch me to wake me up as it startles  me\".  Outcome: Progressing  Goal: Absence of Hospital-Acquired Illness or Injury  Outcome: Progressing  Intervention: Identify and Manage Fall Risk  Recent Flowsheet Documentation  Taken 3/30/2024 1500 by Lazara Blackburn, RN  Safety Promotion/Fall Prevention: safety round/check completed  Taken 3/30/2024 1400 by Lazara Blackburn, RN  Safety Promotion/Fall Prevention: safety round/check completed  Taken 3/30/2024 1300 by Lazara Blackburn, RN  Safety Promotion/Fall Prevention: safety round/check completed  Taken 3/30/2024 1200 by Lazara Blackburn, RN  Safety Promotion/Fall Prevention:   room near nurse's station   safety round/check completed   activity supervised   assistive device/personal items within reach   clutter free environment maintained   increased rounding and observation   increase visualization of patient   mobility aid in reach   nonskid shoes/slippers when out of bed   " patient and family education   room organization consistent   supervised activity  Taken 3/30/2024 1100 by Lazara Blackburn RN  Safety Promotion/Fall Prevention: safety round/check completed  Taken 3/30/2024 1000 by Lazara Blackburn RN  Safety Promotion/Fall Prevention: safety round/check completed  Taken 3/30/2024 0900 by Lazara Blackburn RN  Safety Promotion/Fall Prevention: safety round/check completed  Taken 3/30/2024 0800 by Lazara Blackburn RN  Safety Promotion/Fall Prevention: safety round/check completed  Taken 3/30/2024 0752 by Lazara Blackburn RN  Safety Promotion/Fall Prevention:   room near nurse's station   safety round/check completed   activity supervised   assistive device/personal items within reach   clutter free environment maintained   increased rounding and observation   increase visualization of patient   mobility aid in reach   nonskid shoes/slippers when out of bed   patient and family education   room organization consistent   supervised activity  Intervention: Prevent Skin Injury  Recent Flowsheet Documentation  Taken 3/30/2024 0752 by Lazara Blackburn RN  Body Position:   position changed independently   side-lying 30 degrees  Skin Protection:   incontinence pads utilized   adhesive use limited  Device Skin Pressure Protection:   adhesive use limited   absorbent pad utilized/changed  Intervention: Prevent Infection  Recent Flowsheet Documentation  Taken 3/30/2024 1200 by Lazara Blackburn RN  Infection Prevention:   hand hygiene promoted   environmental surveillance performed   equipment surfaces disinfected   personal protective equipment utilized   rest/sleep promoted   single patient room provided   visitors restricted/screened  Taken 3/30/2024 0752 by Lazara Blackburn RN  Infection Prevention:   hand hygiene promoted   environmental surveillance performed   equipment surfaces disinfected   personal protective equipment utilized   rest/sleep promoted   single patient room provided   visitors  restricted/screened  Goal: Optimal Comfort and Wellbeing  Outcome: Progressing  Goal: Readiness for Transition of Care  Outcome: Progressing     Problem: Oral Intake Inadequate  Goal: Improved Oral Intake  Outcome: Progressing     Problem: Infection  Goal: Absence of Infection Signs and Symptoms  Outcome: Progressing  Intervention: Prevent or Manage Infection  Recent Flowsheet Documentation  Taken 3/30/2024 1200 by Lazara Blackburn RN  Isolation Precautions:   contact precautions maintained   protective environment maintained  Taken 3/30/2024 0752 by Lazara Blackburn RN  Isolation Precautions:   contact precautions maintained   protective environment maintained     Problem: Fall Injury Risk  Goal: Absence of Fall and Fall-Related Injury  Outcome: Progressing  Intervention: Identify and Manage Contributors  Recent Flowsheet Documentation  Taken 3/30/2024 1200 by Lazara Blackburn RN  Medication Review/Management:   medications reviewed   high-risk medications identified  Taken 3/30/2024 0752 by Lazara Blackburn RN  Medication Review/Management:   medications reviewed   high-risk medications identified  Intervention: Promote Injury-Free Environment  Recent Flowsheet Documentation  Taken 3/30/2024 1500 by Lazara Blackburn RN  Safety Promotion/Fall Prevention: safety round/check completed  Taken 3/30/2024 1400 by Lazara Blackburn RN  Safety Promotion/Fall Prevention: safety round/check completed  Taken 3/30/2024 1300 by Lazara Blackburn RN  Safety Promotion/Fall Prevention: safety round/check completed  Taken 3/30/2024 1200 by Lazara Blackburn RN  Safety Promotion/Fall Prevention:   room near nurse's station   safety round/check completed   activity supervised   assistive device/personal items within reach   clutter free environment maintained   increased rounding and observation   increase visualization of patient   mobility aid in reach   nonskid shoes/slippers when out of bed   patient and family education   room organization  consistent   supervised activity  Taken 3/30/2024 1100 by Lazara Blackburn RN  Safety Promotion/Fall Prevention: safety round/check completed  Taken 3/30/2024 1000 by Lazara Blackburn RN  Safety Promotion/Fall Prevention: safety round/check completed  Taken 3/30/2024 0900 by Lazara Blackburn RN  Safety Promotion/Fall Prevention: safety round/check completed  Taken 3/30/2024 0800 by Lazara Blackburn RN  Safety Promotion/Fall Prevention: safety round/check completed  Taken 3/30/2024 0752 by Lazara Blackburn RN  Safety Promotion/Fall Prevention:   room near nurse's station   safety round/check completed   activity supervised   assistive device/personal items within reach   clutter free environment maintained   increased rounding and observation   increase visualization of patient   mobility aid in reach   nonskid shoes/slippers when out of bed   patient and family education   room organization consistent   supervised activity

## 2024-03-30 NOTE — PLAN OF CARE
"Assumed care: 0700 - 1930    /65 (BP Location: Left arm)   Pulse 87   Temp 98.4  F (36.9  C) (Oral)   Resp 18   Ht 1.65 m (5' 4.96\")   Wt 104.1 kg (229 lb 8 oz)   SpO2 97%   BMI 38.24 kg/m      BMT Date: 3/13/2024   Day post-transplant: 16     Shift summary:  Rosario Terrazas is alert and oriented.  Daughter at bedside.  Vital signs stable, on room air, and afebrile.  Patient denies pain.  No nausea, vomiting.  Diarrhea with abdominal pain, imodium scheduled, and CT Abd/pelvis completed.  Albumin given and lasix given once, second dose still needs to be given.  Electrolytes replaced.  Pt showered and lymph wraps applied on BLE, however pt again soiled lymph wraps this evening and wraps were again removed.  CMV PCR was positive, reported to NEHA Richards, no new orders.  SBA to bathroom, steady with walker.         Recent Labs   Lab 03/29/24  1417 03/29/24  0435   GLC 92 84      Hemoglobin   Date Value Ref Range Status   03/29/2024 6.9 (LL) 11.7 - 15.7 g/dL Final     Platelet Count   Date Value Ref Range Status   03/29/2024 32 (LL) 150 - 450 10e3/uL Final     WBC Count   Date Value Ref Range Status   03/29/2024 14.4 (H) 4.0 - 11.0 10e3/uL Final     Potassium   Date Value Ref Range Status   03/29/2024 3.8 3.4 - 5.3 mmol/L Final   03/29/2024 3.8 3.4 - 5.3 mmol/L Final      Magnesium   Date Value Ref Range Status   03/29/2024 2.0 1.7 - 2.3 mg/dL Final      Phosphorus   Date Value Ref Range Status   03/29/2024 3.0 2.5 - 4.5 mg/dL Final       Goal Outcome Evaluation:    Problem: Adult Inpatient Plan of Care  Goal: Plan of Care Review  Description: The Plan of Care Review/Shift note should be completed every shift.  The Outcome Evaluation is a brief statement about your assessment that the patient is improving, declining, or no change.  This information will be displayed automatically on your shift  note.  Outcome: Progressing  Goal: Patient-Specific Goal (Individualized)  Description: You can add " "care plan individualizations to a care plan. Examples of Individualization might be:  \"Parent requests to be called daily at 9am for status\", \"I have a hard time hearing out of my right ear\", or \"Do not touch me to wake me up as it startles  me\".  Outcome: Progressing  Goal: Absence of Hospital-Acquired Illness or Injury  Outcome: Progressing  Intervention: Identify and Manage Fall Risk  Recent Flowsheet Documentation  Taken 3/29/2024 0900 by Warren Ingram RN  Safety Promotion/Fall Prevention: safety round/check completed  Intervention: Prevent Skin Injury  Recent Flowsheet Documentation  Taken 3/29/2024 0900 by Warren Ingram RN  Body Position: position changed independently  Device Skin Pressure Protection: adhesive use limited  Intervention: Prevent and Manage VTE (Venous Thromboembolism) Risk  Recent Flowsheet Documentation  Taken 3/29/2024 1600 by Warren Ingram RN  VTE Prevention/Management: compression stockings on  Taken 3/29/2024 0900 by Warren Ingram RN  VTE Prevention/Management: compression stockings off  Goal: Optimal Comfort and Wellbeing  Outcome: Progressing  Intervention: Provide Person-Centered Care  Recent Flowsheet Documentation  Taken 3/29/2024 0900 by Warren Ingram RN  Trust Relationship/Rapport:   care explained   choices provided   emotional support provided   questions encouraged   questions answered   thoughts/feelings acknowledged  Goal: Readiness for Transition of Care  Outcome: Progressing     Problem: Oral Intake Inadequate  Goal: Improved Oral Intake  Outcome: Progressing     Problem: Infection  Goal: Absence of Infection Signs and Symptoms  Outcome: Progressing     Problem: Fall Injury Risk  Goal: Absence of Fall and Fall-Related Injury  Outcome: Progressing  Intervention: Promote Injury-Free Environment  Recent Flowsheet Documentation  Taken 3/29/2024 0900 by Warren Ingram RN  Safety Promotion/Fall Prevention: safety round/check " completed

## 2024-03-31 ENCOUNTER — APPOINTMENT (OUTPATIENT)
Dept: OCCUPATIONAL THERAPY | Facility: CLINIC | Age: 69
End: 2024-03-31
Payer: COMMERCIAL

## 2024-03-31 LAB
ABO/RH(D): NORMAL
ALBUMIN SERPL BCG-MCNC: 3.1 G/DL (ref 3.5–5.2)
ALP SERPL-CCNC: 67 U/L (ref 40–150)
ALT SERPL W P-5'-P-CCNC: 27 U/L (ref 0–50)
ANION GAP SERPL CALCULATED.3IONS-SCNC: 8 MMOL/L (ref 7–15)
ANTIBODY SCREEN: NEGATIVE
AST SERPL W P-5'-P-CCNC: 20 U/L (ref 0–45)
BASOPHILS # BLD AUTO: ABNORMAL 10*3/UL
BASOPHILS # BLD MANUAL: 0 10E3/UL (ref 0–0.2)
BASOPHILS NFR BLD AUTO: ABNORMAL %
BASOPHILS NFR BLD MANUAL: 0 %
BILIRUB DIRECT SERPL-MCNC: <0.2 MG/DL (ref 0–0.3)
BILIRUB SERPL-MCNC: 0.3 MG/DL
BUN SERPL-MCNC: 3.5 MG/DL (ref 8–23)
CALCIUM SERPL-MCNC: 7.2 MG/DL (ref 8.8–10.2)
CHLORIDE SERPL-SCNC: 106 MMOL/L (ref 98–107)
CREAT SERPL-MCNC: 0.45 MG/DL (ref 0.51–0.95)
DEPRECATED HCO3 PLAS-SCNC: 27 MMOL/L (ref 22–29)
EGFRCR SERPLBLD CKD-EPI 2021: >90 ML/MIN/1.73M2
EOSINOPHIL # BLD AUTO: ABNORMAL 10*3/UL
EOSINOPHIL # BLD MANUAL: 0 10E3/UL (ref 0–0.7)
EOSINOPHIL NFR BLD AUTO: ABNORMAL %
EOSINOPHIL NFR BLD MANUAL: 0 %
ERYTHROCYTE [DISTWIDTH] IN BLOOD BY AUTOMATED COUNT: 18 % (ref 10–15)
GLUCOSE SERPL-MCNC: 93 MG/DL (ref 70–99)
HCT VFR BLD AUTO: 22.6 % (ref 35–47)
HGB BLD-MCNC: 7.3 G/DL (ref 11.7–15.7)
IMM GRANULOCYTES # BLD: ABNORMAL 10*3/UL
IMM GRANULOCYTES NFR BLD: ABNORMAL %
LYMPHOCYTES # BLD AUTO: ABNORMAL 10*3/UL
LYMPHOCYTES # BLD MANUAL: 0.3 10E3/UL (ref 0.8–5.3)
LYMPHOCYTES NFR BLD AUTO: ABNORMAL %
LYMPHOCYTES NFR BLD MANUAL: 4 %
MAGNESIUM SERPL-MCNC: 1.8 MG/DL (ref 1.7–2.3)
MCH RBC QN AUTO: 30.5 PG (ref 26.5–33)
MCHC RBC AUTO-ENTMCNC: 32.3 G/DL (ref 31.5–36.5)
MCV RBC AUTO: 95 FL (ref 78–100)
MONOCYTES # BLD AUTO: ABNORMAL 10*3/UL
MONOCYTES # BLD MANUAL: 0.3 10E3/UL (ref 0–1.3)
MONOCYTES NFR BLD AUTO: ABNORMAL %
MONOCYTES NFR BLD MANUAL: 4 %
MYELOCYTES # BLD MANUAL: 0.3 10E3/UL
MYELOCYTES NFR BLD MANUAL: 4 %
NEUTROPHILS # BLD AUTO: ABNORMAL 10*3/UL
NEUTROPHILS # BLD MANUAL: 5.5 10E3/UL (ref 1.6–8.3)
NEUTROPHILS NFR BLD AUTO: ABNORMAL %
NEUTROPHILS NFR BLD MANUAL: 88 %
NRBC # BLD AUTO: 0 10E3/UL
NRBC BLD AUTO-RTO: 0 /100
PHOSPHATE SERPL-MCNC: 2.7 MG/DL (ref 2.5–4.5)
PLAT MORPH BLD: ABNORMAL
PLATELET # BLD AUTO: 66 10E3/UL (ref 150–450)
POLYCHROMASIA BLD QL SMEAR: SLIGHT
POTASSIUM SERPL-SCNC: 3.6 MMOL/L (ref 3.4–5.3)
PROT SERPL-MCNC: 4.6 G/DL (ref 6.4–8.3)
RBC # BLD AUTO: 2.39 10E6/UL (ref 3.8–5.2)
RBC MORPH BLD: ABNORMAL
SODIUM SERPL-SCNC: 141 MMOL/L (ref 135–145)
SPECIMEN EXPIRATION DATE: NORMAL
VIT B12 SERPL-MCNC: 1978 PG/ML (ref 232–1245)
WBC # BLD AUTO: 6.3 10E3/UL (ref 4–11)

## 2024-03-31 PROCEDURE — 250N000013 HC RX MED GY IP 250 OP 250 PS 637: Performed by: INTERNAL MEDICINE

## 2024-03-31 PROCEDURE — 250N000013 HC RX MED GY IP 250 OP 250 PS 637: Performed by: STUDENT IN AN ORGANIZED HEALTH CARE EDUCATION/TRAINING PROGRAM

## 2024-03-31 PROCEDURE — 99233 SBSQ HOSP IP/OBS HIGH 50: CPT | Mod: FS | Performed by: STUDENT IN AN ORGANIZED HEALTH CARE EDUCATION/TRAINING PROGRAM

## 2024-03-31 PROCEDURE — 97530 THERAPEUTIC ACTIVITIES: CPT | Mod: GO

## 2024-03-31 PROCEDURE — 85007 BL SMEAR W/DIFF WBC COUNT: CPT | Performed by: PHYSICIAN ASSISTANT

## 2024-03-31 PROCEDURE — 250N000013 HC RX MED GY IP 250 OP 250 PS 637: Performed by: PHYSICIAN ASSISTANT

## 2024-03-31 PROCEDURE — 82607 VITAMIN B-12: CPT | Performed by: STUDENT IN AN ORGANIZED HEALTH CARE EDUCATION/TRAINING PROGRAM

## 2024-03-31 PROCEDURE — 258N000003 HC RX IP 258 OP 636: Performed by: STUDENT IN AN ORGANIZED HEALTH CARE EDUCATION/TRAINING PROGRAM

## 2024-03-31 PROCEDURE — 80053 COMPREHEN METABOLIC PANEL: CPT | Performed by: PHYSICIAN ASSISTANT

## 2024-03-31 PROCEDURE — 87207 SMEAR SPECIAL STAIN: CPT | Performed by: PHYSICIAN ASSISTANT

## 2024-03-31 PROCEDURE — 84100 ASSAY OF PHOSPHORUS: CPT | Performed by: STUDENT IN AN ORGANIZED HEALTH CARE EDUCATION/TRAINING PROGRAM

## 2024-03-31 PROCEDURE — 85027 COMPLETE CBC AUTOMATED: CPT | Performed by: PHYSICIAN ASSISTANT

## 2024-03-31 PROCEDURE — 250N000011 HC RX IP 250 OP 636: Performed by: STUDENT IN AN ORGANIZED HEALTH CARE EDUCATION/TRAINING PROGRAM

## 2024-03-31 PROCEDURE — 206N000001 HC R&B BMT UMMC

## 2024-03-31 PROCEDURE — 86923 COMPATIBILITY TEST ELECTRIC: CPT | Performed by: PHYSICIAN ASSISTANT

## 2024-03-31 PROCEDURE — 87209 SMEAR COMPLEX STAIN: CPT | Performed by: PHYSICIAN ASSISTANT

## 2024-03-31 PROCEDURE — 97140 MANUAL THERAPY 1/> REGIONS: CPT | Mod: GO | Performed by: OCCUPATIONAL THERAPIST

## 2024-03-31 PROCEDURE — 86900 BLOOD TYPING SEROLOGIC ABO: CPT | Performed by: PHYSICIAN ASSISTANT

## 2024-03-31 PROCEDURE — 250N000009 HC RX 250: Performed by: STUDENT IN AN ORGANIZED HEALTH CARE EDUCATION/TRAINING PROGRAM

## 2024-03-31 PROCEDURE — 83735 ASSAY OF MAGNESIUM: CPT | Performed by: STUDENT IN AN ORGANIZED HEALTH CARE EDUCATION/TRAINING PROGRAM

## 2024-03-31 RX ORDER — OXYCODONE HYDROCHLORIDE 5 MG/1
5-10 TABLET ORAL EVERY 4 HOURS PRN
Status: DISCONTINUED | OUTPATIENT
Start: 2024-03-31 | End: 2024-04-02 | Stop reason: HOSPADM

## 2024-03-31 RX ORDER — SIMETHICONE 80 MG
80 TABLET,CHEWABLE ORAL EVERY 6 HOURS PRN
Status: DISCONTINUED | OUTPATIENT
Start: 2024-03-31 | End: 2024-04-02 | Stop reason: HOSPADM

## 2024-03-31 RX ORDER — LOPERAMIDE HCL 2 MG
4 CAPSULE ORAL 4 TIMES DAILY PRN
Status: DISCONTINUED | OUTPATIENT
Start: 2024-03-31 | End: 2024-04-02 | Stop reason: HOSPADM

## 2024-03-31 RX ORDER — CALCIUM GLUCONATE 20 MG/ML
2 INJECTION, SOLUTION INTRAVENOUS ONCE
Qty: 100 ML | Refills: 0 | Status: COMPLETED | OUTPATIENT
Start: 2024-03-31 | End: 2024-03-31

## 2024-03-31 RX ORDER — POTASSIUM CHLORIDE 750 MG/1
20 TABLET, EXTENDED RELEASE ORAL ONCE
Status: COMPLETED | OUTPATIENT
Start: 2024-03-31 | End: 2024-03-31

## 2024-03-31 RX ORDER — METOPROLOL TARTRATE 25 MG/1
25 TABLET, FILM COATED ORAL 2 TIMES DAILY
Status: DISCONTINUED | OUTPATIENT
Start: 2024-04-01 | End: 2024-04-02 | Stop reason: HOSPADM

## 2024-03-31 RX ORDER — MAGNESIUM SULFATE HEPTAHYDRATE 40 MG/ML
2 INJECTION, SOLUTION INTRAVENOUS ONCE
Status: COMPLETED | OUTPATIENT
Start: 2024-03-31 | End: 2024-03-31

## 2024-03-31 RX ADMIN — Medication 25 MG: at 09:12

## 2024-03-31 RX ADMIN — PROCHLORPERAZINE MALEATE 5 MG: 5 TABLET ORAL at 09:23

## 2024-03-31 RX ADMIN — GABAPENTIN 100 MG: 100 CAPSULE ORAL at 19:36

## 2024-03-31 RX ADMIN — ISONIAZID 300 MG: 300 TABLET ORAL at 09:12

## 2024-03-31 RX ADMIN — CALCIUM GLUCONATE 2 G: 20 INJECTION, SOLUTION INTRAVENOUS at 12:27

## 2024-03-31 RX ADMIN — CALCIUM 500 MG: 500 TABLET ORAL at 09:12

## 2024-03-31 RX ADMIN — POTASSIUM CHLORIDE 20 MEQ: 750 TABLET, EXTENDED RELEASE ORAL at 05:39

## 2024-03-31 RX ADMIN — OXYCODONE HYDROCHLORIDE 5 MG: 5 TABLET ORAL at 13:53

## 2024-03-31 RX ADMIN — SIMETHICONE 80 MG: 80 TABLET, CHEWABLE ORAL at 02:12

## 2024-03-31 RX ADMIN — FOLIC ACID 1000 MCG: 1 TABLET ORAL at 09:12

## 2024-03-31 RX ADMIN — GABAPENTIN 100 MG: 100 CAPSULE ORAL at 13:46

## 2024-03-31 RX ADMIN — LOPERAMIDE HYDROCHLORIDE 4 MG: 2 CAPSULE ORAL at 13:46

## 2024-03-31 RX ADMIN — METOPROLOL TARTRATE 25 MG: 25 TABLET, FILM COATED ORAL at 09:12

## 2024-03-31 RX ADMIN — DICLOFENAC SODIUM 2 G: 10 GEL TOPICAL at 09:23

## 2024-03-31 RX ADMIN — DICLOFENAC SODIUM 2 G: 10 GEL TOPICAL at 19:43

## 2024-03-31 RX ADMIN — PANTOPRAZOLE SODIUM 40 MG: 40 TABLET, DELAYED RELEASE ORAL at 09:12

## 2024-03-31 RX ADMIN — MEROPENEM 1 G: 1 INJECTION, POWDER, FOR SOLUTION INTRAVENOUS at 19:37

## 2024-03-31 RX ADMIN — CALCIUM 500 MG: 500 TABLET ORAL at 19:36

## 2024-03-31 RX ADMIN — PROCHLORPERAZINE MALEATE 5 MG: 5 TABLET ORAL at 12:13

## 2024-03-31 RX ADMIN — MAGNESIUM SULFATE HEPTAHYDRATE 2 G: 2 INJECTION, SOLUTION INTRAVENOUS at 05:39

## 2024-03-31 RX ADMIN — MEROPENEM 1 G: 1 INJECTION, POWDER, FOR SOLUTION INTRAVENOUS at 12:13

## 2024-03-31 RX ADMIN — OXYCODONE HYDROCHLORIDE 5 MG: 5 TABLET ORAL at 04:59

## 2024-03-31 RX ADMIN — MEROPENEM 1 G: 1 INJECTION, POWDER, FOR SOLUTION INTRAVENOUS at 04:11

## 2024-03-31 RX ADMIN — POTASSIUM PHOSPHATE, MONOBASIC POTASSIUM PHOSPHATE, DIBASIC 9 MMOL: 224; 236 INJECTION, SOLUTION, CONCENTRATE INTRAVENOUS at 07:23

## 2024-03-31 RX ADMIN — GABAPENTIN 100 MG: 100 CAPSULE ORAL at 09:12

## 2024-03-31 RX ADMIN — ACYCLOVIR 800 MG: 800 TABLET ORAL at 09:12

## 2024-03-31 RX ADMIN — ACYCLOVIR 800 MG: 800 TABLET ORAL at 19:36

## 2024-03-31 RX ADMIN — PROCHLORPERAZINE MALEATE 5 MG: 5 TABLET ORAL at 17:35

## 2024-03-31 ASSESSMENT — ACTIVITIES OF DAILY LIVING (ADL)
ADLS_ACUITY_SCORE: 29
ADLS_ACUITY_SCORE: 30
ADLS_ACUITY_SCORE: 29
ADLS_ACUITY_SCORE: 30
ADLS_ACUITY_SCORE: 29
ADLS_ACUITY_SCORE: 31
ADLS_ACUITY_SCORE: 29
ADLS_ACUITY_SCORE: 30
ADLS_ACUITY_SCORE: 31
ADLS_ACUITY_SCORE: 30
ADLS_ACUITY_SCORE: 29
ADLS_ACUITY_SCORE: 31
ADLS_ACUITY_SCORE: 31
ADLS_ACUITY_SCORE: 30
ADLS_ACUITY_SCORE: 29
ADLS_ACUITY_SCORE: 29
ADLS_ACUITY_SCORE: 30
ADLS_ACUITY_SCORE: 31
ADLS_ACUITY_SCORE: 29

## 2024-03-31 NOTE — PROGRESS NOTES
"BMT Daily Progress Note   03/31/2024    Patient ID:  Rosario Terrazas is a 68 year old female, currently day 18 s/p auto for MM readmitted 3/21 with N/F and GNB+ sepsis.    INTERVAL  HISTORY   Rosario is seen with her daughter who is translating.   Last night was more difficult with belly pain and cramping. Pain worsens before a stool, then is relieved after she has a stool. Less appetite yesterday evening. Difficult to sleep. Tried simethicone with little improvement. She denies light headedness. No blood in stool.    Review of Systems: ROS negative except as noted above.  Scheduled Medications   acyclovir  800 mg Oral BID    calcium carbonate  500 mg Oral BID w/meals    diclofenac  2 g Topical 4x Daily    folic acid  1,000 mcg Oral Daily    gabapentin  100 mg Oral TID    heparin lock flush  5-10 mL Intracatheter Q24H    isoniazid  300 mg Oral Daily    lidocaine  1 patch Transdermal Q24h    loperamide  4 mg Oral 4x Daily    meropenem  1 g Intravenous Q8H    metoprolol tartrate  25 mg Oral or Feeding Tube BID    pantoprazole  40 mg Oral Daily    sodium phosphate  9 mmol Intravenous Once    prochlorperazine  5 mg Oral TID AC    pyridOXINE  25 mg Oral Daily    [Held by provider] sulfamethoxazole-trimethoprim  1 tablet Oral Daily    vitamin D2  50,000 Units Oral Q7 Days     PHYSICAL EXAM     Weight In/Out     Wt Readings from Last 3 Encounters:   03/30/24 96.6 kg (213 lb)   03/21/24 94.7 kg (208 lb 11.2 oz)   03/20/24 94.9 kg (209 lb 4.8 oz)      I/O last 3 completed shifts:  In: 750 [P.O.:360; I.V.:390]  Out: 1300 [Urine:1300]       /80 (BP Location: Left arm)   Pulse 80   Temp 98.6  F (37  C) (Oral)   Resp 18   Ht 1.65 m (5' 4.96\")   Wt 96.6 kg (213 lb)   SpO2 98%   BMI 35.49 kg/m       General: withdrawn; lying with eyes closed, but opens eyes spontaneously.   Lungs: CTAB; no crackles  Cardiovascular: RRR  Abdominal/Rectal: +BS, soft and mild tenderness to deep palpation   Skin: no rashes or " petechaie  Lymph: trace lower leg edema   Neuro: A&O, moving all extremities spontaneously, PERRL  Additional Findings: Wilder site NT, no drainage.    LABS AND IMAGING - PAST 24 HOURS     Results for orders placed or performed during the hospital encounter of 03/22/24 (from the past 24 hour(s))   CBC with platelets differential    Narrative    The following orders were created for panel order CBC with platelets differential.  Procedure                               Abnormality         Status                     ---------                               -----------         ------                     CBC with platelets and d...[[id number]]  Abnormal            Final result               Manual Differential[[id number]]          Abnormal            Final result                 Please view results for these tests on the individual orders.   ABO/Rh type and screen    Narrative    The following orders were created for panel order ABO/Rh type and screen.  Procedure                               Abnormality         Status                     ---------                               -----------         ------                     Adult Type and Screen[[id number]]                            Final result                 Please view results for these tests on the individual orders.   Basic metabolic panel   Result Value Ref Range    Sodium 141 135 - 145 mmol/L    Potassium 3.6 3.4 - 5.3 mmol/L    Chloride 106 98 - 107 mmol/L    Carbon Dioxide (CO2) 27 22 - 29 mmol/L    Anion Gap 8 7 - 15 mmol/L    Urea Nitrogen 3.5 (L) 8.0 - 23.0 mg/dL    Creatinine 0.45 (L) 0.51 - 0.95 mg/dL    GFR Estimate >90 >60 mL/min/1.73m2    Calcium 7.2 (L) 8.8 - 10.2 mg/dL    Glucose 93 70 - 99 mg/dL   Hepatic panel   Result Value Ref Range    Protein Total 4.6 (L) 6.4 - 8.3 g/dL    Albumin 3.1 (L) 3.5 - 5.2 g/dL    Bilirubin Total 0.3 <=1.2 mg/dL    Alkaline Phosphatase 67 40 - 150 U/L    AST 20 0 - 45 U/L    ALT 27 0 - 50 U/L    Bilirubin Direct <0.20 0.00  - 0.30 mg/dL   Magnesium   Result Value Ref Range    Magnesium 1.8 1.7 - 2.3 mg/dL   Phosphorus   Result Value Ref Range    Phosphorus 2.7 2.5 - 4.5 mg/dL   CBC with platelets and differential   Result Value Ref Range    WBC Count 6.3 4.0 - 11.0 10e3/uL    RBC Count 2.39 (L) 3.80 - 5.20 10e6/uL    Hemoglobin 7.3 (L) 11.7 - 15.7 g/dL    Hematocrit 22.6 (L) 35.0 - 47.0 %    MCV 95 78 - 100 fL    MCH 30.5 26.5 - 33.0 pg    MCHC 32.3 31.5 - 36.5 g/dL    RDW 18.0 (H) 10.0 - 15.0 %    Platelet Count 66 (L) 150 - 450 10e3/uL    % Neutrophils      % Lymphocytes      % Monocytes      % Eosinophils      % Basophils      % Immature Granulocytes      NRBCs per 100 WBC 0 <1 /100    Absolute Neutrophils      Absolute Lymphocytes      Absolute Monocytes      Absolute Eosinophils      Absolute Basophils      Absolute Immature Granulocytes      Absolute NRBCs 0.0 10e3/uL   Adult Type and Screen   Result Value Ref Range    ABO/RH(D) A POS     Antibody Screen Negative Negative    SPECIMEN EXPIRATION DATE [id number]    Manual Differential   Result Value Ref Range    % Neutrophils 88 %    % Lymphocytes 4 %    % Monocytes 4 %    % Eosinophils 0 %    % Basophils 0 %    % Myelocytes 4 %    Absolute Neutrophils 5.5 1.6 - 8.3 10e3/uL    Absolute Lymphocytes 0.3 (L) 0.8 - 5.3 10e3/uL    Absolute Monocytes 0.3 0.0 - 1.3 10e3/uL    Absolute Eosinophils 0.0 0.0 - 0.7 10e3/uL    Absolute Basophils 0.0 0.0 - 0.2 10e3/uL    Absolute Myelocytes 0.3 (H) <=0.0 10e3/uL    RBC Morphology Confirmed RBC Indices     Platelet Assessment  Automated Count Confirmed. Platelet morphology is normal.     Automated Count Confirmed. Platelet morphology is normal.    Polychromasia Slight (A) None Seen         ASSESSMENT BY SYSTEMS     Rosario Vaniasanujanaabrahan Terrazas is a 68 year old female, currently day +18 s/p auto for MM readmitted 3/21 with N/F and GNB+ sepsis.     BMT/IEC PROTOCOL for MM Auto     Transplants for multiple myeloma:  The patient has Standard risk IgG D  MM, planning on 2 transplants   3/12 Day -1 Melphalan 200 mg/m2   3/13 Day 0 Transplant, cell total post wash 2.23 x 10^6 CD34 + cells/kg (pre freeze dose: 5.36 x 10 ^6 CD34+ cells/kg)    ID  N/F and sepsis 3/22/24.  See below.    Strep mitis bacteremia (3/22). Follow up cultures 3/23-3/25: negative.  Vanco (3/22-3/24)   Meropenem 3/22-3/27)  Ertapenem 3/27; treat through 4/4/2024.    E. Coli bacteremia (3/21, 3/22). Follow up cultures 3/23-3/25: negative.  Tobraymycin x1 (3/22)  meropenem (3/22-27)  Ertapenem 3/27-3/29; moved back to meropenem as colitis started when this was stopped. treat through 4/4/2024.    Colitis seen on CT 3/29. As erta does not cover actinobacter, pseudomonas and enterococci will return to meropenem through 4/4    Cough: RVP negative; flu/rsv/covid negative 3/27.    Latent TB: Continue INH/B6 through transplant    CMV detectible <35 3/30, monitoring    PPX:   Acyclovir  fluconazole (held 3/25 d/t prolonged Qtc and then discontinued 3/26 with engraftment)  Bactrim from D28 (note: please do not sub out for another agent, as patient has positive toxoplasmosis serology)     Heme   - Thrombocytopenia, anemia secondary to chemotherapy and multiple myeloma. Check B12 with trailing hgb recovery  - transfuse to keep hgb >7g/dL, plt >10k.   - Hold home aspirin (3/17), resume once platelets have recovered.   - INR 1.8, given vitamin K in clinic 3/21. Recheck 3/28 down to 1.3.     FEN/Renal  #Hypocalcemia: Continue oral replacement and 2g calcium gluconate IV again on 3/29. Corrected Ca 8.2  #Vitamin D level: adequate. Keep on ergocalciferol at this time (weekly dosing) to prevent, as it is still winter in [place].  #Fluid overload related to sepsis management and hypoalbuminemia: diuresis resulted in hypotension, indicating she has third spaced fluids with intravascular dry overall status.  Continue lymphedema therapy.  Increasing protein intake.  BNP okay, so unlikely related to any heart failure.   "Albumin with lasix 3/29, albumin slowly improving. Autodiuresis 3/30 without lasix  #Lymphedema consult 3/27, wraps prn  #Severe malnutrition; dietitian following.     CV  #Afib + SVT: paroxysmal afib with RVR and hypotension requiring transfer to MICU; also intermittent SVT.    Amiodarone 0.5mg/hour drip transitioned to PO on 3/24; discussed with cardiology on 3/28 and they advised okay to stop amiodarone today (3/28) due to rising LFTs.  LFTs now improved after discontinuing amiodarone.   Metoprolol 25mg bid   concern that cardiac processes were being driven by an overall inflammatory state, added decadron 4mg IV daily x 3 days (3/24-3/26).   Electrolytes to be replaced per HIGH sliding scale  Prolonged QTc; continue to hold zofran/zyprexa  Ziopatch (\"cardiac event monitor\" order) placement prior to discharge on the day of discharge    Please have techs placed a ziopatch (\"cardiac event monitor\" order) on the day of discharge and arrange for cardiology follow up after discharge.       GI: meds to oral as able  #Nausea: compazine q ac, tid.  Prn antiemetics available as well.   #Diarrhea: escalation of diarrhea and abdominal pain, requiring narcotic. CT abd/pelvis showed colitis 3/29, meropenem resumed. Awaiting results of schistosomes, microsporidia, and parasites.  Imodium scheduled qid. Imodium/joleen/oxy seemed to help belly pain 3/30 Try increase in oxy available  3/31 as pain still waxing/waning  #Transaminitis: check viral studies (Hep B, Hep C, EBV, CMV, adenovirus)  Hepatitis b and c testing not reactive  CMV, EBV, adenovirus pending  LFT elevations resolved with stopping amiodarone       Endocrine  #Thyroid FNA Atypia of undetermined significance diagnosis has a 22 (13-30)% risk of malignancy. Repeat FNA (least 4-6 weeks from previous aspiration with optimal time being 12 weeks after last aspiration) , molecular testing, diagnostic lobectomy, or surveillance is recommended.    Has next appt with endocrine " "4/16/24.   *TSH WNL 3/21     Neuro/Pain  #Neuropathy: continues on eleni, xanaflex.  #Rib pain: continue oxycodone and back brace, Tylenol on hold since 3/19   Consider palliative consult tomorrow to help address pain.    MSK: significant deconditioning  PT/OT evals 4/1 will be appreciated to help determine TCU possibility discharge    Dispo: pending work-up of abdominal pain and ongoing diarrhea    Clinically Significant Risk Factors              # Hypoalbuminemia: Lowest albumin = 2.5 g/dL at 3/24/2024  3:59 AM, will monitor as appropriate     # Thrombocytopenia: Lowest platelets = 52 in last 2 days, will monitor for bleeding          # Obesity: Estimated body mass index is 35.49 kg/m  as calculated from the following:    Height as of this encounter: 1.65 m (5' 4.96\").    Weight as of this encounter: 96.6 kg (213 lb).   # Severe Malnutrition: based on nutrition assessment      # Financial/Environmental Concerns: none       I spent 40 minutes in the care of this patient today, which included time necessary for preparation for the visit, obtaining history, ordering medications/tests/procedures as medically indicated, review of pertinent medical literature, counseling of the patient, communication of recommendations to the care team, and documentation time.  ROYAL WigginsC  [phone]    "

## 2024-03-31 NOTE — PROVIDER NOTIFICATION
Provider Millie Villarreal notified via [facility]; Pt. C/O persistent stomach cramps. Already administered oxy for back pain. Could we try simethicone?    Response: ordered.

## 2024-03-31 NOTE — PLAN OF CARE
"A&OX4, VSS on RA. C/o occasional abdominal pain managed with heating pad, oxycodone x1. Simethicone offered but declined. Loose stools increased in frequency this afternoon, received imodium x1. Purewick in place. AUOP. Showered, CHG done, linens changed. Lymph wraps in place. Appetite improved today vs yesterday, still eating low amounts, however. CVC TKO. Up SBA with walker. Family in room. Resting between cares.     Problem: Adult Inpatient Plan of Care  Goal: Plan of Care Review  Description: The Plan of Care Review/Shift note should be completed every shift.  The Outcome Evaluation is a brief statement about your assessment that the patient is improving, declining, or no change.  This information will be displayed automatically on your shift  note.  Outcome: Progressing  Goal: Patient-Specific Goal (Individualized)  Description: You can add care plan individualizations to a care plan. Examples of Individualization might be:  \"Parent requests to be called daily at 9am for status\", \"I have a hard time hearing out of my right ear\", or \"Do not touch me to wake me up as it startles  me\".  Outcome: Progressing  Goal: Absence of Hospital-Acquired Illness or Injury  Outcome: Progressing  Intervention: Prevent Skin Injury  Recent Flowsheet Documentation  Taken 3/31/2024 0835 by Lazara Blackburn, RN  Body Position: position changed independently  Goal: Optimal Comfort and Wellbeing  Outcome: Progressing  Intervention: Monitor Pain and Promote Comfort  Recent Flowsheet Documentation  Taken 3/31/2024 0830 by Lazara Blackburn, RN  Pain Management Interventions:   medication (see MAR)   back rub   shower  Goal: Readiness for Transition of Care  Outcome: Progressing     Problem: Oral Intake Inadequate  Goal: Improved Oral Intake  Outcome: Progressing     Problem: Infection  Goal: Absence of Infection Signs and Symptoms  Outcome: Progressing     Problem: Fall Injury Risk  Goal: Absence of Fall and Fall-Related Injury  Outcome: " Progressing

## 2024-03-31 NOTE — PLAN OF CARE
"[id number]    /80 (BP Location: Left arm)   Pulse 80   Temp 98.6  F (37  C) (Oral)   Resp 18   Ht 1.65 m (5' 4.96\")   Wt 96.6 kg (213 lb)   SpO2 98%   BMI 35.49 kg/m       AVSS on room air. Alert & oriented x 4. Denies nausea, numbness or tingling. Oxycodone administered x 2  for back pain. Patient endorsed improvement upon reassessment. Pt. C/O persistent stomach cramps, simethicone administered x 1 along with use of heating pad. Melatonin administered x 1 for sleep.  SBA to the bathroom. One loose BM overnight. Pure wick in place with adequate outputs. K+ 3.6, Oral k+ administered.  Mag 1.8, one dose Mag administered. Phos 2.7, IV phos running. No other replacements needed. Daughter at bedside. Continue plan of care.    Problem: Adult Inpatient Plan of Care  Goal: Patient-Specific Goal (Individualized)  Outcome: Progressing     Problem: Fall Injury Risk  Goal: Absence of Fall and Fall-Related Injury  Outcome: Progressing  Intervention: Identify and Manage Contributors  Recent Flowsheet Documentation  Taken 3/31/2024 0000 by Maira Richardson RN  Medication Review/Management:   medications reviewed   high-risk medications identified  Taken 3/30/2024 2000 by Maira Richardson RN  Self-Care Promotion: independence encouraged  Medication Review/Management:   medications reviewed   high-risk medications identified  Intervention: Promote Injury-Free Environment  Recent Flowsheet Documentation  Taken 3/31/2024 0000 by Maira Richardson RN  Safety Promotion/Fall Prevention:   safety round/check completed   mobility aid in reach   nonskid shoes/slippers when out of bed   lighting adjusted   clutter free environment maintained   room near nurse's station  Taken 3/30/2024 2200 by Maira Richardson RN  Safety Promotion/Fall Prevention: safety round/check completed  Taken 3/30/2024 2000 by Maira Richardson RN  Safety Promotion/Fall Prevention:   safety round/check completed   mobility aid in reach   " nonskid shoes/slippers when out of bed   lighting adjusted   clutter free environment maintained   room near nurse's station     Problem: Infection  Goal: Absence of Infection Signs and Symptoms  Outcome: Progressing  Intervention: Prevent or Manage Infection  Recent Flowsheet Documentation  Taken 3/31/2024 0000 by Maira Richardson RN  Infection Management: aseptic technique maintained  Isolation Precautions:   contact precautions maintained   protective environment maintained  Taken 3/30/2024 2000 by Maira Richardson RN  Infection Management: aseptic technique maintained  Isolation Precautions:   contact precautions maintained   protective environment maintained

## 2024-04-01 ENCOUNTER — APPOINTMENT (OUTPATIENT)
Dept: OCCUPATIONAL THERAPY | Facility: CLINIC | Age: 69
End: 2024-04-01
Payer: COMMERCIAL

## 2024-04-01 ENCOUNTER — APPOINTMENT (OUTPATIENT)
Dept: PHYSICAL THERAPY | Facility: CLINIC | Age: 69
End: 2024-04-01
Payer: COMMERCIAL

## 2024-04-01 LAB
ALBUMIN SERPL BCG-MCNC: 3.1 G/DL (ref 3.5–5.2)
ALP SERPL-CCNC: 65 U/L (ref 40–150)
ALT SERPL W P-5'-P-CCNC: 25 U/L (ref 0–50)
ANION GAP SERPL CALCULATED.3IONS-SCNC: 7 MMOL/L (ref 7–15)
AST SERPL W P-5'-P-CCNC: 16 U/L (ref 0–45)
BASOPHILS # BLD AUTO: ABNORMAL 10*3/UL
BASOPHILS # BLD MANUAL: 0 10E3/UL (ref 0–0.2)
BASOPHILS NFR BLD AUTO: ABNORMAL %
BASOPHILS NFR BLD MANUAL: 0 %
BILIRUB DIRECT SERPL-MCNC: <0.2 MG/DL (ref 0–0.3)
BILIRUB SERPL-MCNC: 0.3 MG/DL
BUN SERPL-MCNC: 4.1 MG/DL (ref 8–23)
CALCIUM SERPL-MCNC: 7.8 MG/DL (ref 8.8–10.2)
CHLORIDE SERPL-SCNC: 106 MMOL/L (ref 98–107)
CREAT SERPL-MCNC: 0.47 MG/DL (ref 0.51–0.95)
DEPRECATED HCO3 PLAS-SCNC: 26 MMOL/L (ref 22–29)
EGFRCR SERPLBLD CKD-EPI 2021: >90 ML/MIN/1.73M2
EOSINOPHIL # BLD AUTO: ABNORMAL 10*3/UL
EOSINOPHIL # BLD MANUAL: 0.1 10E3/UL (ref 0–0.7)
EOSINOPHIL NFR BLD AUTO: ABNORMAL %
EOSINOPHIL NFR BLD MANUAL: 2 %
ERYTHROCYTE [DISTWIDTH] IN BLOOD BY AUTOMATED COUNT: 17.6 % (ref 10–15)
GLUCOSE SERPL-MCNC: 97 MG/DL (ref 70–99)
HCT VFR BLD AUTO: 23.3 % (ref 35–47)
HGB BLD-MCNC: 7.7 G/DL (ref 11.7–15.7)
IMM GRANULOCYTES # BLD: ABNORMAL 10*3/UL
IMM GRANULOCYTES NFR BLD: ABNORMAL %
INR PPP: 1.34 (ref 0.85–1.15)
LYMPHOCYTES # BLD AUTO: ABNORMAL 10*3/UL
LYMPHOCYTES # BLD MANUAL: 0.3 10E3/UL (ref 0.8–5.3)
LYMPHOCYTES NFR BLD AUTO: ABNORMAL %
LYMPHOCYTES NFR BLD MANUAL: 5 %
MAGNESIUM SERPL-MCNC: 1.7 MG/DL (ref 1.7–2.3)
MCH RBC QN AUTO: 31.3 PG (ref 26.5–33)
MCHC RBC AUTO-ENTMCNC: 33 G/DL (ref 31.5–36.5)
MCV RBC AUTO: 95 FL (ref 78–100)
METAMYELOCYTES # BLD MANUAL: 0.2 10E3/UL
METAMYELOCYTES NFR BLD MANUAL: 3 %
MICROSPORID STL MOD TRI STAIN: NEGATIVE
MONOCYTES # BLD AUTO: ABNORMAL 10*3/UL
MONOCYTES # BLD MANUAL: 1.2 10E3/UL (ref 0–1.3)
MONOCYTES NFR BLD AUTO: ABNORMAL %
MONOCYTES NFR BLD MANUAL: 22 %
MYELOCYTES # BLD MANUAL: 0.1 10E3/UL
MYELOCYTES NFR BLD MANUAL: 2 %
NEUTROPHILS # BLD AUTO: ABNORMAL 10*3/UL
NEUTROPHILS # BLD MANUAL: 3.6 10E3/UL (ref 1.6–8.3)
NEUTROPHILS NFR BLD AUTO: ABNORMAL %
NEUTROPHILS NFR BLD MANUAL: 66 %
NRBC # BLD AUTO: 0 10E3/UL
NRBC BLD AUTO-RTO: 0 /100
O+P STL MICRO: ABNORMAL
PHOSPHATE SERPL-MCNC: 2.7 MG/DL (ref 2.5–4.5)
PLAT MORPH BLD: ABNORMAL
PLATELET # BLD AUTO: 89 10E3/UL (ref 150–450)
POTASSIUM SERPL-SCNC: 3.8 MMOL/L (ref 3.4–5.3)
PROT SERPL-MCNC: 4.7 G/DL (ref 6.4–8.3)
RBC # BLD AUTO: 2.46 10E6/UL (ref 3.8–5.2)
RBC MORPH BLD: ABNORMAL
SODIUM SERPL-SCNC: 139 MMOL/L (ref 135–145)
WBC # BLD AUTO: 5.5 10E3/UL (ref 4–11)

## 2024-04-01 PROCEDURE — 80048 BASIC METABOLIC PNL TOTAL CA: CPT | Performed by: PHYSICIAN ASSISTANT

## 2024-04-01 PROCEDURE — 99233 SBSQ HOSP IP/OBS HIGH 50: CPT | Mod: FS | Performed by: PHYSICIAN ASSISTANT

## 2024-04-01 PROCEDURE — 250N000013 HC RX MED GY IP 250 OP 250 PS 637: Performed by: PHYSICIAN ASSISTANT

## 2024-04-01 PROCEDURE — 83735 ASSAY OF MAGNESIUM: CPT | Performed by: PHYSICIAN ASSISTANT

## 2024-04-01 PROCEDURE — 99254 IP/OBS CNSLTJ NEW/EST MOD 60: CPT | Performed by: CLINICAL NURSE SPECIALIST

## 2024-04-01 PROCEDURE — 82248 BILIRUBIN DIRECT: CPT | Performed by: PHYSICIAN ASSISTANT

## 2024-04-01 PROCEDURE — 97530 THERAPEUTIC ACTIVITIES: CPT | Mod: GP

## 2024-04-01 PROCEDURE — 250N000009 HC RX 250: Performed by: STUDENT IN AN ORGANIZED HEALTH CARE EDUCATION/TRAINING PROGRAM

## 2024-04-01 PROCEDURE — 250N000013 HC RX MED GY IP 250 OP 250 PS 637: Performed by: STUDENT IN AN ORGANIZED HEALTH CARE EDUCATION/TRAINING PROGRAM

## 2024-04-01 PROCEDURE — 97530 THERAPEUTIC ACTIVITIES: CPT | Mod: GO

## 2024-04-01 PROCEDURE — 258N000003 HC RX IP 258 OP 636: Performed by: STUDENT IN AN ORGANIZED HEALTH CARE EDUCATION/TRAINING PROGRAM

## 2024-04-01 PROCEDURE — 84100 ASSAY OF PHOSPHORUS: CPT | Performed by: PHYSICIAN ASSISTANT

## 2024-04-01 PROCEDURE — 97116 GAIT TRAINING THERAPY: CPT | Mod: GP

## 2024-04-01 PROCEDURE — 85027 COMPLETE CBC AUTOMATED: CPT | Performed by: PHYSICIAN ASSISTANT

## 2024-04-01 PROCEDURE — 206N000001 HC R&B BMT UMMC

## 2024-04-01 PROCEDURE — 250N000011 HC RX IP 250 OP 636: Mod: JZ | Performed by: STUDENT IN AN ORGANIZED HEALTH CARE EDUCATION/TRAINING PROGRAM

## 2024-04-01 PROCEDURE — 97110 THERAPEUTIC EXERCISES: CPT | Mod: GO

## 2024-04-01 PROCEDURE — 85610 PROTHROMBIN TIME: CPT | Performed by: PHYSICIAN ASSISTANT

## 2024-04-01 PROCEDURE — 85007 BL SMEAR W/DIFF WBC COUNT: CPT | Performed by: PHYSICIAN ASSISTANT

## 2024-04-01 RX ORDER — METRONIDAZOLE 500 MG/1
500 TABLET ORAL 4 TIMES DAILY
Status: DISCONTINUED | OUTPATIENT
Start: 2024-04-01 | End: 2024-04-02 | Stop reason: HOSPADM

## 2024-04-01 RX ORDER — POTASSIUM CHLORIDE 29.8 MG/ML
20 INJECTION INTRAVENOUS ONCE
Status: COMPLETED | OUTPATIENT
Start: 2024-04-01 | End: 2024-04-01

## 2024-04-01 RX ORDER — MAGNESIUM SULFATE HEPTAHYDRATE 40 MG/ML
2 INJECTION, SOLUTION INTRAVENOUS ONCE
Status: COMPLETED | OUTPATIENT
Start: 2024-04-01 | End: 2024-04-01

## 2024-04-01 RX ADMIN — OXYCODONE HYDROCHLORIDE 5 MG: 5 TABLET ORAL at 08:51

## 2024-04-01 RX ADMIN — ERGOCALCIFEROL 50000 UNITS: 1.25 CAPSULE, LIQUID FILLED ORAL at 10:58

## 2024-04-01 RX ADMIN — CALCIUM 500 MG: 500 TABLET ORAL at 08:25

## 2024-04-01 RX ADMIN — LOPERAMIDE HYDROCHLORIDE 4 MG: 2 CAPSULE ORAL at 13:46

## 2024-04-01 RX ADMIN — Medication 25 MG: at 08:25

## 2024-04-01 RX ADMIN — METRONIDAZOLE 500 MG: 500 TABLET ORAL at 20:40

## 2024-04-01 RX ADMIN — POTASSIUM PHOSPHATE, MONOBASIC POTASSIUM PHOSPHATE, DIBASIC 9 MMOL: 224; 236 INJECTION, SOLUTION, CONCENTRATE INTRAVENOUS at 06:21

## 2024-04-01 RX ADMIN — PROCHLORPERAZINE MALEATE 5 MG: 5 TABLET ORAL at 08:25

## 2024-04-01 RX ADMIN — PROCHLORPERAZINE MALEATE 5 MG: 5 TABLET ORAL at 13:46

## 2024-04-01 RX ADMIN — MEROPENEM 1 G: 1 INJECTION, POWDER, FOR SOLUTION INTRAVENOUS at 11:00

## 2024-04-01 RX ADMIN — DICLOFENAC SODIUM 2 G: 10 GEL TOPICAL at 20:41

## 2024-04-01 RX ADMIN — POTASSIUM CHLORIDE 20 MEQ: 29.8 INJECTION, SOLUTION INTRAVENOUS at 05:21

## 2024-04-01 RX ADMIN — PROCHLORPERAZINE MALEATE 5 MG: 5 TABLET ORAL at 17:08

## 2024-04-01 RX ADMIN — METRONIDAZOLE 500 MG: 500 TABLET ORAL at 17:08

## 2024-04-01 RX ADMIN — ACYCLOVIR 800 MG: 800 TABLET ORAL at 20:40

## 2024-04-01 RX ADMIN — METOPROLOL TARTRATE 25 MG: 25 TABLET, FILM COATED ORAL at 20:40

## 2024-04-01 RX ADMIN — GABAPENTIN 100 MG: 100 CAPSULE ORAL at 13:46

## 2024-04-01 RX ADMIN — GABAPENTIN 100 MG: 100 CAPSULE ORAL at 20:40

## 2024-04-01 RX ADMIN — ISONIAZID 300 MG: 300 TABLET ORAL at 08:25

## 2024-04-01 RX ADMIN — OXYCODONE HYDROCHLORIDE 5 MG: 5 TABLET ORAL at 03:25

## 2024-04-01 RX ADMIN — FOLIC ACID 1000 MCG: 1 TABLET ORAL at 08:25

## 2024-04-01 RX ADMIN — MAGNESIUM SULFATE HEPTAHYDRATE 2 G: 2 INJECTION, SOLUTION INTRAVENOUS at 05:20

## 2024-04-01 RX ADMIN — MEROPENEM 1 G: 1 INJECTION, POWDER, FOR SOLUTION INTRAVENOUS at 21:15

## 2024-04-01 RX ADMIN — GABAPENTIN 100 MG: 100 CAPSULE ORAL at 08:25

## 2024-04-01 RX ADMIN — MEROPENEM 1 G: 1 INJECTION, POWDER, FOR SOLUTION INTRAVENOUS at 03:25

## 2024-04-01 RX ADMIN — DICLOFENAC SODIUM 2 G: 10 GEL TOPICAL at 13:47

## 2024-04-01 RX ADMIN — DICLOFENAC SODIUM 2 G: 10 GEL TOPICAL at 08:26

## 2024-04-01 RX ADMIN — CALCIUM 500 MG: 500 TABLET ORAL at 20:40

## 2024-04-01 RX ADMIN — OXYCODONE HYDROCHLORIDE 5 MG: 5 TABLET ORAL at 21:01

## 2024-04-01 RX ADMIN — ACYCLOVIR 800 MG: 800 TABLET ORAL at 08:25

## 2024-04-01 RX ADMIN — PANTOPRAZOLE SODIUM 40 MG: 40 TABLET, DELAYED RELEASE ORAL at 08:25

## 2024-04-01 ASSESSMENT — ACTIVITIES OF DAILY LIVING (ADL)
ADLS_ACUITY_SCORE: 31
ADLS_ACUITY_SCORE: 29
ADLS_ACUITY_SCORE: 29
ADLS_ACUITY_SCORE: 31
ADLS_ACUITY_SCORE: 31
ADLS_ACUITY_SCORE: 29
ADLS_ACUITY_SCORE: 31
ADLS_ACUITY_SCORE: 29
ADLS_ACUITY_SCORE: 31
ADLS_ACUITY_SCORE: 29
ADLS_ACUITY_SCORE: 31
ADLS_ACUITY_SCORE: 30
ADLS_ACUITY_SCORE: 31
ADLS_ACUITY_SCORE: 29
ADLS_ACUITY_SCORE: 29

## 2024-04-01 NOTE — PLAN OF CARE
Goal Outcome Evaluation:      Plan of Care Reviewed With: patient, child    Overall Patient Progress: improvingOverall Patient Progress: improving    Day +18 auto transplant for Multiple Myeloma. Afebrile. Up with assistance to bathroom when needed. Using a walker for balance. No diarrhea stool this evening. External catheter in place. Draining clear yellow urine. Reports pain in shoulders, knees, and mid-back. Voltaren gel applied. Declined lidocaine patch. Kisii speaking-daughter in room to interpret. Continue plan of care.

## 2024-04-01 NOTE — PLAN OF CARE
"/66 (BP Location: Left arm)   Pulse 93   Temp 98.3  F (36.8  C) (Oral)   Resp 16   Ht 1.65 m (5' 4.96\")   Wt 94.4 kg (208 lb 1.6 oz)   SpO2 97%   BMI 34.67 kg/m      Pt is AOX4 and needing assist of 1 with cares. She complained of back and abdomen pain and took oxycodone x1 with relief. She is eating and drinking adequately. She is using the pur wick catheter and  had 2 BM. Her daughter is at the bedside and helps aid in the her communication.Will continue to follow the plan of care.        Goal Outcome Evaluation:      Plan of Care Reviewed With: patient, family    Overall Patient Progress: no change      Problem: Adult Inpatient Plan of Care  Goal: Plan of Care Review  Description: The Plan of Care Review/Shift note should be completed every shift.  The Outcome Evaluation is a brief statement about your assessment that the patient is improving, declining, or no change.  This information will be displayed automatically on your shift  note.  Outcome: Progressing  Flowsheets (Taken 4/1/2024 1333)  Plan of Care Reviewed With:   patient   family  Overall Patient Progress: no change  Goal: Patient-Specific Goal (Individualized)  Description: You can add care plan individualizations to a care plan. Examples of Individualization might be:  \"Parent requests to be called daily at 9am for status\", \"I have a hard time hearing out of my right ear\", or \"Do not touch me to wake me up as it startles  me\".  Outcome: Progressing  Goal: Absence of Hospital-Acquired Illness or Injury  Outcome: Progressing  Intervention: Identify and Manage Fall Risk  Recent Flowsheet Documentation  Taken 4/1/2024 1200 by Ramila Kovacs, RN  Safety Promotion/Fall Prevention: safety round/check completed  Taken 4/1/2024 1102 by Ramila Kovacs, RN  Safety Promotion/Fall Prevention: safety round/check completed  Taken 4/1/2024 0800 by Ramila Kovacs, RN  Safety Promotion/Fall Prevention:   activity supervised   assistive device/personal " items within reach   clutter free environment maintained   lighting adjusted   mobility aid in reach   nonskid shoes/slippers when out of bed   patient and family education   room near nurse's station   room organization consistent   safety round/check completed  Intervention: Prevent Skin Injury  Recent Flowsheet Documentation  Taken 4/1/2024 0800 by Ramila Kovacs RN  Body Position: position changed independently  Skin Protection: adhesive use limited  Device Skin Pressure Protection: absorbent pad utilized/changed  Intervention: Prevent and Manage VTE (Venous Thromboembolism) Risk  Recent Flowsheet Documentation  Taken 4/1/2024 0800 by Ramila Kovacs RN  VTE Prevention/Management: compression stockings on  Intervention: Prevent Infection  Recent Flowsheet Documentation  Taken 4/1/2024 1200 by Ramila Kovacs RN  Infection Prevention:   environmental surveillance performed   equipment surfaces disinfected   hand hygiene promoted   personal protective equipment utilized   rest/sleep promoted   single patient room provided   visitors restricted/screened   cohorting utilized  Taken 4/1/2024 0800 by Ramila Kovacs RN  Infection Prevention:   environmental surveillance performed   equipment surfaces disinfected   hand hygiene promoted   personal protective equipment utilized   rest/sleep promoted   single patient room provided   visitors restricted/screened   cohorting utilized  Goal: Optimal Comfort and Wellbeing  Outcome: Progressing  Intervention: Monitor Pain and Promote Comfort  Recent Flowsheet Documentation  Taken 4/1/2024 0851 by Ramila Kovacs RN  Pain Management Interventions: medication (see MAR)  Intervention: Provide Person-Centered Care  Recent Flowsheet Documentation  Taken 4/1/2024 0800 by Ramila Kovacs RN  Trust Relationship/Rapport:   care explained   questions answered   questions encouraged  Goal: Readiness for Transition of Care  Outcome: Progressing     Problem: Oral Intake  Inadequate  Goal: Improved Oral Intake  Outcome: Progressing     Problem: Infection  Goal: Absence of Infection Signs and Symptoms  Outcome: Progressing  Intervention: Prevent or Manage Infection  Recent Flowsheet Documentation  Taken 4/1/2024 1200 by Ramila Kovacs RN  Isolation Precautions:   cytotoxic precautions maintained   protective environment maintained  Taken 4/1/2024 0800 by Ramila Kovacs RN  Infection Management: aseptic technique maintained  Isolation Precautions:   cytotoxic precautions maintained   protective environment maintained     Problem: Fall Injury Risk  Goal: Absence of Fall and Fall-Related Injury  Outcome: Progressing  Intervention: Identify and Manage Contributors  Recent Flowsheet Documentation  Taken 4/1/2024 0800 by Ramila Kovacs RN  Medication Review/Management: medications reviewed  Intervention: Promote Injury-Free Environment  Recent Flowsheet Documentation  Taken 4/1/2024 1200 by Ramila Kovacs RN  Safety Promotion/Fall Prevention: safety round/check completed  Taken 4/1/2024 1102 by Ramila Kovacs RN  Safety Promotion/Fall Prevention: safety round/check completed  Taken 4/1/2024 0800 by Ramila Kovacs RN  Safety Promotion/Fall Prevention:   activity supervised   assistive device/personal items within reach   clutter free environment maintained   lighting adjusted   mobility aid in reach   nonskid shoes/slippers when out of bed   patient and family education   room near nurse's station   room organization consistent   safety round/check completed

## 2024-04-01 NOTE — PROGRESS NOTES
"BMT Daily Progress Note   04/01/2024    Patient ID:  Rosario Terrazas is a 68 year old female, currently day 19 s/p auto for MM readmitted 3/21 with N/F and GNB+ sepsis.    INTERVAL  HISTORY   Rosario is seen with her daughter who is translating.   Rosario continues with abdominal pain, which is worse after eating.  She felt hungry this morning and tried some grapes, but they made her abdominal pain worse.  Her edema is much improved.  She had 6 stools recorded in the last 24 hours. She took an imodium ysterday afternoon to slow things down.   Appetite is improving and dinner went well last night, per her daughter.  Intake overall still quite low.     Review of Systems: ROS negative except as noted above.  Scheduled Medications   acyclovir  800 mg Oral BID    calcium carbonate  500 mg Oral BID w/meals    diclofenac  2 g Topical 4x Daily    folic acid  1,000 mcg Oral Daily    gabapentin  100 mg Oral TID    heparin lock flush  5-10 mL Intracatheter Q24H    isoniazid  300 mg Oral Daily    lidocaine  1 patch Transdermal Q24h    meropenem  1 g Intravenous Q8H    metoprolol tartrate  25 mg Oral BID    pantoprazole  40 mg Oral Daily    prochlorperazine  5 mg Oral TID AC    pyridOXINE  25 mg Oral Daily    [Held by provider] sulfamethoxazole-trimethoprim  1 tablet Oral Daily    vitamin D2  50,000 Units Oral Q7 Days     PHYSICAL EXAM     Weight In/Out     Wt Readings from Last 3 Encounters:   04/01/24 94.4 kg (208 lb 1.6 oz)   03/21/24 94.7 kg (208 lb 11.2 oz)   03/20/24 94.9 kg (209 lb 4.8 oz)      I/O last 3 completed shifts:  In: 1752.5 [P.O.:960; I.V.:792.5]  Out: 3250 [Urine:3250]       /66 (BP Location: Left arm)   Pulse 92   Temp 98  F (36.7  C) (Oral)   Resp 16   Ht 1.65 m (5' 4.96\")   Wt 94.4 kg (208 lb 1.6 oz)   SpO2 99%   BMI 34.67 kg/m       General: withdrawn; sitting in chair.   Lungs: CTAB; no crackles  Cardiovascular: RRR  Abdominal/Rectal: +BS, soft; tender throughout but most notably near " lower ribs and at LLQ  Skin: no rashes or petechaie  Lymph: Lymphedema wraps in place  Neuro: A&O, moving all extremities spontaneously, PERRL  Additional Findings: Wilder site NT, no drainage. Purewick catheter draining clear, yellow urine.     LABS AND IMAGING - PAST 24 HOURS     Results for orders placed or performed during the hospital encounter of 03/22/24 (from the past 24 hour(s))   CBC with platelets differential    Narrative    The following orders were created for panel order CBC with platelets differential.  Procedure                               Abnormality         Status                     ---------                               -----------         ------                     CBC with platelets and d...[[id number]]  Abnormal            Final result               Manual Differential[[id number]]          Abnormal            Final result                 Please view results for these tests on the individual orders.   Basic metabolic panel   Result Value Ref Range    Sodium 139 135 - 145 mmol/L    Potassium 3.8 3.4 - 5.3 mmol/L    Chloride 106 98 - 107 mmol/L    Carbon Dioxide (CO2) 26 22 - 29 mmol/L    Anion Gap 7 7 - 15 mmol/L    Urea Nitrogen 4.1 (L) 8.0 - 23.0 mg/dL    Creatinine 0.47 (L) 0.51 - 0.95 mg/dL    GFR Estimate >90 >60 mL/min/1.73m2    Calcium 7.8 (L) 8.8 - 10.2 mg/dL    Glucose 97 70 - 99 mg/dL   INR   Result Value Ref Range    INR 1.34 (H) 0.85 - 1.15   Magnesium   Result Value Ref Range    Magnesium 1.7 1.7 - 2.3 mg/dL   Phosphorus   Result Value Ref Range    Phosphorus 2.7 2.5 - 4.5 mg/dL   Hepatic panel   Result Value Ref Range    Protein Total 4.7 (L) 6.4 - 8.3 g/dL    Albumin 3.1 (L) 3.5 - 5.2 g/dL    Bilirubin Total 0.3 <=1.2 mg/dL    Alkaline Phosphatase 65 40 - 150 U/L    AST 16 0 - 45 U/L    ALT 25 0 - 50 U/L    Bilirubin Direct <0.20 0.00 - 0.30 mg/dL   CBC with platelets and differential   Result Value Ref Range    WBC Count 5.5 4.0 - 11.0 10e3/uL    RBC Count 2.46 (L) 3.80 - 5.20  10e6/uL    Hemoglobin 7.7 (L) 11.7 - 15.7 g/dL    Hematocrit 23.3 (L) 35.0 - 47.0 %    MCV 95 78 - 100 fL    MCH 31.3 26.5 - 33.0 pg    MCHC 33.0 31.5 - 36.5 g/dL    RDW 17.6 (H) 10.0 - 15.0 %    Platelet Count 89 (L) 150 - 450 10e3/uL    % Neutrophils      % Lymphocytes      % Monocytes      % Eosinophils      % Basophils      % Immature Granulocytes      NRBCs per 100 WBC 0 <1 /100    Absolute Neutrophils      Absolute Lymphocytes      Absolute Monocytes      Absolute Eosinophils      Absolute Basophils      Absolute Immature Granulocytes      Absolute NRBCs 0.0 10e3/uL   Manual Differential   Result Value Ref Range    % Neutrophils 66 %    % Lymphocytes 5 %    % Monocytes 22 %    % Eosinophils 2 %    % Basophils 0 %    % Metamyelocytes 3 %    % Myelocytes 2 %    Absolute Neutrophils 3.6 1.6 - 8.3 10e3/uL    Absolute Lymphocytes 0.3 (L) 0.8 - 5.3 10e3/uL    Absolute Monocytes 1.2 0.0 - 1.3 10e3/uL    Absolute Eosinophils 0.1 0.0 - 0.7 10e3/uL    Absolute Basophils 0.0 0.0 - 0.2 10e3/uL    Absolute Metamyelocytes 0.2 (H) <=0.0 10e3/uL    Absolute Myelocytes 0.1 (H) <=0.0 10e3/uL    RBC Morphology Confirmed RBC Indices     Platelet Assessment  Automated Count Confirmed. Platelet morphology is normal.     Automated Count Confirmed. Platelet morphology is normal.         ASSESSMENT BY SYSTEMS     Rosario Adan Terrazas is a 68 year old female, currently day +19 s/p auto for MM readmitted 3/21 with N/F and GNB+ sepsis.     BMT/IEC PROTOCOL for MM Auto     Transplants for multiple myeloma:  The patient has Standard risk IgG D MM, planning on 2 transplants   3/12 Day -1 Melphalan 200 mg/m2   3/13 Day 0 Transplant, cell total post wash 2.23 x 10^6 CD34 + cells/kg (pre freeze dose: 5.36 x 10 ^6 CD34+ cells/kg)    ID  N/F and sepsis 3/22/24.  See below.    Strep mitis bacteremia (3/22). Follow up cultures 3/23-3/25: negative.  Vanco (3/22-3/24)   Meropenem 3/22-3/27)  Ertapenem 3/27; treat through 4/4/2024.    E. Coli  bacteremia (3/21, 3/22). Follow up cultures 3/23-3/25: negative.  Tobraymycin x1 (3/22)  meropenem (3/22-27)  Ertapenem 3/27-3/29; moved back to meropenem as colitis started when this was stopped. Treat through 4/4/2024.    Colitis seen on CT 3/29. As erta does not cover actinobacter, pseudomonas and enterococci will return to meropenem through 4/4    Cough: RVP negative; flu/rsv/covid negative 3/27.    Latent TB: Continue INH/B6 through transplant    CMV detectible <35 3/30, monitoring    PPX:   Acyclovir  fluconazole (held 3/25 d/t prolonged Qtc and then discontinued 3/26 with engraftment)  Bactrim from D28 (note: please do not sub out for another agent, as patient has positive toxoplasmosis serology)     Heme   - Thrombocytopenia, anemia secondary to chemotherapy and multiple myeloma. Check B12 with trailing hgb recovery  - transfuse to keep hgb >7g/dL, plt >10k.   - Hold home aspirin (3/17), resume once platelets have recovered.   - INR 1.8, given vitamin K in clinic 3/21. Recheck 3/28 down to 1.3.     FEN/Renal  #Hypocalcemia: Continue oral replacement and 2g calcium gluconate IV again on 3/29. Corrected Ca 8.2  #Vitamin D level: adequate. Keep on ergocalciferol at this time (weekly dosing) to prevent, as it is still winter in [place].  #Fluid overload related to sepsis management and hypoalbuminemia: diuresis resulted in hypotension, indicating she has third spaced fluids with intravascular dry overall status.  Continue lymphedema therapy.  Increasing protein intake.  BNP okay, so unlikely related to any heart failure.  Albumin with lasix 3/29, albumin slowly improving. Autodiuresis 3/30 without lasix  #Lymphedema consult 3/27, wraps prn  #Severe malnutrition; dietitian following.     CV  #Afib + SVT: paroxysmal afib with RVR and hypotension requiring transfer to MICU; also intermittent SVT.    Amiodarone 0.5mg/hour drip transitioned to PO on 3/24; discussed with cardiology on 3/28 and they advised okay to stop  "amiodarone today (3/28) due to rising LFTs.  LFTs now improved after discontinuing amiodarone.   Metoprolol 25mg bid   concern that cardiac processes were being driven by an overall inflammatory state, added decadron 4mg IV daily x 3 days (3/24-3/26).   Electrolytes to be replaced per HIGH sliding scale  Prolonged QTc; continue to hold zofran/zyprexa  Ziopatch (\"cardiac event monitor\" order) placement prior to discharge on the day of discharge    Please have techs placed a ziopatch (\"cardiac event monitor\" order) on the day of discharge and arrange for cardiology follow up after discharge.       GI:   #Nausea: compazine q ac, tid.  Prn antiemetics available as well.   #Colitis: noted on 3/29 CT obtained for abdominal pain  Prn imodium  Prn pain control  Merrem as above  Follow up studies of schistosomes, microsporidia and O/P.     #Transaminitis: check viral studies (Hep B, Hep C, EBV, CMV, adenovirus)  Hepatitis b and c testing not reactive  CMV < 35; adenovirus pending; EBV negative (3/28)  LFT elevations resolved with stopping amiodarone    Endocrine  #Thyroid FNA Atypia of undetermined significance diagnosis has a 22 (13-30)% risk of malignancy. Repeat FNA (least 4-6 weeks from previous aspiration with optimal time being 12 weeks after last aspiration) , molecular testing, diagnostic lobectomy, or surveillance is recommended.    Has next appt with endocrine 4/16/24.   *TSH WNL 3/21     Neuro/Pain  #Neuropathy: continues on eleni, xanaflex.  #Rib pain: continue oxycodone and back brace, Tylenol prn.  Palliative Care consult placed 4/1.     MSK: significant deconditioning  PT/OT recommend outpatient therapy on discharge.     Dispo: pending work-up of abdominal pain and ongoing diarrhea    Clinically Significant Risk Factors              # Hypoalbuminemia: Lowest albumin = 2.5 g/dL at 3/24/2024  3:59 AM, will monitor as appropriate    # Coagulation Defect: INR = 1.34 (Ref range: 0.85 - 1.15) and/or PTT = 29 Seconds " "(Ref range: 22 - 38 Seconds), will monitor for bleeding  # Thrombocytopenia: Lowest platelets = 66 in last 2 days, will monitor for bleeding          # Obesity: Estimated body mass index is 34.67 kg/m  as calculated from the following:    Height as of this encounter: 1.65 m (5' 4.96\").    Weight as of this encounter: 94.4 kg (208 lb 1.6 oz).   # Severe Malnutrition: based on nutrition assessment      # Financial/Environmental Concerns: none       I spent 30 minutes in the care of this patient today, which included time necessary for preparation for the visit, obtaining history, ordering medications/tests/procedures as medically indicated, review of pertinent medical literature, counseling of the patient, communication of recommendations to the care team, and documentation time.  Yudelka Richards PA-C      "

## 2024-04-01 NOTE — CONSULTS
[facility]  Transplant & Immunocompromised Infectious Disease Re-Consult Note/ Progress Note   Patient: Rosario Terrazas, Date of birth 1955, Medical record number [id number].      Recommendations:     Recommendations  - Ertapenem or Meropenem to continue through 4/5/24 for a 14 day course  - Regarding blastocystis:   - Continue Metronidazole 500 mg QID x 14 days to see if it helps symtpoms   - Ongoing symtpoms at 14 days should prompt consideration for further GI evaluation for alternative cause (such as CMV) and trial of paromomycin   - Recommended paromomycin 500mg TID for 10 days if fails   - Weekly CMV DNA PCR to ensure not up trending, even if low or negative it could be confounder for symptoms and blastocystis could be non   - Would extend latent TB therapy by additional 3 months (though June 2024) with  mg daily + Vitamin B6 for total 9 month course given immunocompromised latent TB    Will request ID clinic follow up in 2-3 weeks if she can be fit in to assist with failed treatment considerations if able, otherwise will follow longer term for the latent TB.    As patient is being discharged today ID Will sign off, please page with questions or if situation arises where we may be of assistance.  Case discussed with Transplant ID Staff.    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 Now diarrhea +ve for blastocystis      ID Problem List and Discussion:     # Colitis  # Blastocystis Hominis on Testing  No chronic diarrhea no animal exposure in the several months leading up to hospital admission.  Had 3 episodes of diarrhea  recently.  First round was during induction chemotherapy resolved as it was thought to be a drug side effect.  Second round was during the period of neutropenic fever and resolved with carbapenem therapy and was thought to be due to neutropenic colitis.  Third episode occurs circa 3/29 and is associated with new cramping type pain.  4+ Blastocystis hominis on testing.  CMV DNA PCR positive but not quantifiable.  Afebrile.    Symptoms are typical for Blastocystis hominis.  This is not a typically very pathogenic organism and is often a contaminant and confounders for other causes of diarrhea.  The simplest option is to treat the Blastocystis with a course of metronidazole and monitor.  Ideally she will respond to first-line therapy.    Lack of response to first-line therapy should prompt consideration for second line therapy with paromomycin and consideration for alternative causes of the diarrhea.  In this case we will need to keep a close watch to make sure that she does not develop florid CMV viremia.  Even if CMV testing is negative she could have localized GI disease.    See recommendations above.    # Latent TB:  Was started on treatment in October through [facility], completing 6 months of therapy now.  Given she is immunocompromised we should treat her for a total of 9 months through June 2024.    # Neutropenic Fevers    # ESBL E Coli Bacteremia  3/21 blood culture: Ecoli (ESBL)   3/22 blood culture: Ecoli (ESBL),  Strep Mitis  3/22 blood culture: Ecoli (ESBL) , Strep Mitis  3/23 blood culture: Negative and subsequent negative     Source - likely GI + Oral given neutropenic fevers, abdominal cramping, and tenderness to palpation with rebound tenderness.   At the time of exam, abdomen was soft and non-distended.      Does have a CVC, however, given the organisms isolated, less likely to the be source.   She had no respiratory symptoms and CXR did nto show any evidence of infiltrate or  consolidation.     Treating for 14 days through 4/5/24        Other ID considerations:  - QTc interval: 472 msec 3/22/24  - Bacterial prophylaxis: Levofloxacin PTA  - Pneumocystis prophylaxis: Bactrim at day 28  - Viral serostatus & prophylaxis: CMV+, EBV+, HSV1+/HSV2- Acyclovir   - HIV non reactive, HTLV1 NR, HepB/C non-reactive   - 8/28/23 strongyloides Ab neg   - Fungal prophylaxis: Fluconazole  - Immunoglobulins:  on 3/20       Interval History and Subjective:     Meeting her for the first time today.  She speaks [place].  She is accompanied by her daughter.  They specifically refuse a phone . No chronic diarrhea no animal exposure in the several months leading up to hospital admission.  Had 3 episodes of diarrhea recently.  First round was during induction chemotherapy resolved as it was thought to be a drug side effect.  Second round was during the period of neutropenic fever and resolved with carbapenem therapy and was thought to be due to neutropenic colitis.  Third episode occurs circa 3/29 and is associated with new cramping type pain.  The pain is postprandial and relieved by having a bowel movement.  She does not have baseline resting type pain.  She has not had fevers.  She overall feels much improved.    Aside from the GI symptoms and generalized weakness she is otherwise comfortable and has no other complaints.    Moved directly [place] [facility] 5 years ago.  No current farm animal exposure.    Review of Symptoms:  A comprehensive 14 system review of symptoms was conducted and was otherwise negative (unless mentioned above)       Physical Exam:     Temp: 99.2  F (37.3  C) Temp src: Axillary BP: 97/56 Pulse: 91   Resp: 16 SpO2: 100 % O2 Device: None (Room air)       GENERAL APPEARANCE: Not in acute distress    PHYSICAL EXAM:  Eyes:     Extraocular eye movements grossly intact, no ptosis, no discharge, no scleral icterus.  Mouth, Throat:     Mucous membranes  "moist, pharynx normal without lesions.  Cardiovascular:    Inspection: No cyanosis, JVD not elevated.   Auscultation:  S1, S2 normal, regular rate and rhythm.  Respiratory:     Inspection: Not in respiratory distress, Chest expansion symmetrical.   Auscultation: 4 point auscultation done clear to auscultation bilaterally, no wheezes, no rales, and no rhonchi.  Gastrointestinal:  Soft, non-tender; bowel sounds normal; no masses, no organomegaly.  Musculoskeletal:     No elbow, wrist, knee or ankle tenderness, deformity or swelling. No quadriceps, calf or upper arm muscular tenderness noted.    Neurologic:     Higher Mental Function: Conversant, AOx4   Motor: Moving all 4 limbs in bed   Sensory: Crude touch intact in all 4 limbs  Psychiatric: Appropriate  Skin:   Anicteric       Other Data:     MEDICATIONS   acyclovir  800 mg Oral BID    calcium carbonate  500 mg Oral BID w/meals    diclofenac  2 g Topical 4x Daily    folic acid  1,000 mcg Oral Daily    gabapentin  100 mg Oral TID    heparin lock flush  5-10 mL Intracatheter Q24H    isoniazid  300 mg Oral Daily    meropenem  1 g Intravenous Q8H    metoprolol tartrate  25 mg Oral BID    metroNIDAZOLE  500 mg Oral 4x Daily    pantoprazole  40 mg Oral Daily    prochlorperazine  5 mg Oral TID AC    pyridOXINE  25 mg Oral Daily    sodium phosphate  9 mmol Intravenous Once    [Held by provider] sulfamethoxazole-trimethoprim  1 tablet Oral Daily    vitamin D2  50,000 Units Oral Q7 Days       IMMUNOLOGY LABS  CD-4 Counts No results found for: \"ACD4\"  Inflammatory Markers    Recent Labs   Lab Test 02/20/24  1212   [id number] 2.7*     Immune Globulin Studies     Recent Labs   Lab Test 02/20/24  1212   *   IGM <10*   IGE <2   IGA 9*       GENERAL LABS  Metabolic Studies       Recent Labs   Lab Test 04/02/24  0345 04/01/24  0309 03/22/24  1220 03/22/24  0918 03/22/24  0333 03/22/24  0231    139   < >  --   --  142  142   POTASSIUM 3.9 3.8   < >  --   --  3.2*  3.2* "   CHLORIDE 107 106   < >  --   --  110*  110*   CO2 25 26   < >  --   --  17*  17*   ANIONGAP 7 7   < >  --   --  15  15   BUN 5.6* 4.1*   < >  --   --  9.3  9.3   CR 0.41* 0.47*   < >  --   --  0.58  0.58   GFRESTIMATED >90 >90   < >  --   --  >90  >90   GLC 86 97   < >  --   --  88  88   ARMIREZ 7.8* 7.8*   < >  --   --  7.2*  7.2*   PHOS 2.6 2.7   < >  --    < >  --    MAG 1.7 1.7   < >  --    < >  --    LACT  --   --   --  0.5*  --  0.6*   PCAL  --   --   --   --   --  2.68*    < > = values in this interval not displayed.     Hepatic Studies    Recent Labs   Lab Test 04/02/24 0345 04/01/24 0309 03/31/24  0409 03/12/24  1025 02/20/24  1212   BILITOTAL 0.3 0.3 0.3   < > 0.2   DBIL <0.20 <0.20 <0.20   < >  --    ALKPHOS 59 65 67   < > 58   PROTTOTAL 4.8* 4.7* 4.6*   < > 6.2*   ALBUMIN 3.2* 3.1* 3.1*   < > 3.9   AST 15 16 20   < > 17   ALT 21 25 27   < > 9   LDH  --   --   --   --  233    < > = values in this interval not displayed.     Pancreatitis testing    Recent Labs   Lab Test 03/23/24  0352   LIPASE 5*     Hematology Studies   Recent Labs   Lab Test 04/02/24 0345 04/01/24 0309 03/31/24  0409 03/30/24  0359 03/07/24  0756 03/06/24  0802 02/21/24  1113   WBC 5.8 5.5 6.3 8.7   < > 26.4* 4.3   ANEU 3.0 3.6 5.5 7.9   < >  --   --    ANEUTAUTO  --   --   --   --   --  20.4* 2.5   ALYM 2.1 0.3* 0.3* 0.2*   < >  --   --    ALYMPAUTO  --   --   --   --   --  1.4 1.0   RAGINI 0.6 1.2 0.3 0.5   < >  --   --    AMONOAUTO  --   --   --   --   --  3.3* 0.6   AEOS 0.1 0.1 0.0 0.1   < >  --   --    AEOSAUTO  --   --   --   --   --  0.2 0.1   ABSBASO  --   --   --   --   --  0.2 0.1   HGB 7.6* 7.7* 7.3* 7.9*   < > 10.4* 10.6*   HCT 22.9* 23.3* 22.6* 23.3*   < > 32.3* 33.4*   PLT 95* 89* 66* 52*   < > 199 222    < > = values in this interval not displayed.     Urine Studies     Recent Labs   Lab Test 03/30/24  0444 03/22/24  0548 02/20/24  1212   URINEPH 7.5* 6.0 6.5   NITRITE Negative Negative Negative   LEUKEST  "Negative Negative Negative   WBCU <1 3 1     CSF testing   No lab results found.    Invalid input(s): \"CADAM\", \"EVPCR\", \"ENTPCR\", \"ENTEROVIRUS\"  Medication levels    Recent Labs   Lab Test 03/22/24  1220   TOBRA 8.5     Body fluid stats  No lab results found.    MICROBIOLOGY  Fungal testing:  No lab results found.    Invalid input(s): \"HIFUN\", \"FUNGL\"  Last Culture results   Culture   Date Value Ref Range Status   03/25/2024 No Growth  Final   03/25/2024 No Growth  Final   03/24/2024 No Growth  Final   03/24/2024 No Growth  Final   03/23/2024 No Growth  Final   03/23/2024 No Growth  Final   03/22/2024   Final    No Vancomycin Resistant Enterococcus species isolated   03/22/2024 No Growth  Final   03/22/2024 No Growth  Final   03/22/2024 Positive on the 1st day of incubation (A)  Final   03/22/2024 Escherichia coli (AA)  Final     Comment:     1 of 2 bottles  Susceptibilities done on previous cultures   03/22/2024 Streptococcus mitis/oralis (AA)  Final     Comment:     2 of 2 bottles   03/22/2024 Positive on the 1st day of incubation (A)  Final   03/22/2024 Escherichia coli (AA)  Final     Comment:     2 of 2 bottles  Susceptibilities done on previous cultures   03/22/2024 Streptococcus mitis/oralis (AA)  Final     Comment:     1 of 2 bottles  Susceptibilities done on previous cultures   03/21/2024 Positive on the 1st day of incubation (A)  Final   03/21/2024 Escherichia coli ESBL (AA)  Final     Comment:     1 of 2 bottles   03/21/2024 No Growth  Final     Escherichia coli   Date Value Ref Range Status   03/21/2024 Detected (A) Not Detected Final     Comment:     Positive for Escherichia coli by Verigene multiplex nucleic acid test. Final identification and antimicrobial susceptibility testing will be verified by standard methods. Verigene test will not distinguish E. coli from Shigella species including Shigella dysenteriae, Shigella flexneri, Shigella boydii, and Shigella sonnei. Specimens containing Shigella " "species or E. coli will be reported as positive for E. coli.       Last checks of Clostridioides difficile testing  Recent Labs   Lab Test 03/23/24  0702   CDBPCT Negative     Infection Studies to assess Diarrhea   Recent Labs   Lab Test 03/23/24  0702   BIEPEC Negative   BISTEC Negative   BISHEI Negative   BIEAEC Negative   BIETEC Negative   [id number] NA   BIADE Negative   BICAM Negative   BISAL Negative   BIVIBS Negative   BIVIBC Negative   BIYER Negative   BIGIA Negative   BINOR Negative   BIROT Negative   BIPLE Negative   BIENT Negative   BIAST Negative   BISAP Negative     Virology:  Coronavirus-19 testing    Recent Labs   Lab Test 03/22/24  0615 03/22/24  0413 03/12/24  1528 08/14/23  1252   [id number] Negative Negative Negative  --    COVIDPCREXT  --   --   --  Not Detected     Respiratory virus (non-coronavirus-19) testing    Recent Labs   Lab Test 03/27/24  1115 03/22/24  0615 03/22/24  0413   IFLUA Not Detected   < >  --    INFZA  --   --  Negative   FLUAH1 Not Detected   < >  --    [id number] Not Detected   < >  --    FLUAH3 Not Detected   < >  --    IFLUB Not Detected   < >  --    INFZB  --   --  Negative   PIV1 Not Detected   < >  --    PIV2 Not Detected   < >  --    PIV3 Not Detected   < >  --    PIV4 Not Detected   < >  --    IRSV  --   --  Negative   RSVA Not Detected   < >  --    RSVB Not Detected   < >  --    HMPV Not Detected   < >  --    RHINEV Not Detected   < >  --    ADENOV Not Detected   < >  --    CORONA Not Detected   < >  --     < > = values in this interval not displayed.     CMV viral loads    Recent Labs   Lab Test 03/28/24  1207   CMVQNT <35*   CMVLOG <1.5     CMV resistance testing:  No lab results found.  No results found for: \"H6RES\"  EBV DNA Copies/mL   Date Value Ref Range Status   03/28/2024 Not Detected Not Detected copies/mL Final     BK Virus Testing   No lab results found.  Parvovirus Testing  No lab results found.    Invalid input(s): \"PRVRES\"  Adenovirus Testing    Recent Labs "   Lab Test 03/28/24  1207   ADRES Not Detected       IMAGING  No results found for this or any previous visit (from the past 48 hour(s)).    ATTESTATION  I have reviewed labs/blood-work that are part of this patients present encounter in addition to historical and baseline values. The specific values are recorded in Epic and I have incorporated them and my interpretation as relevant into my assessment and plan as recorded.  I have reviewed radiology reports/EKGs/and other diagnostic studies that are part of this patients present encounter, in addition to historical and baseline results.  The specific reports are available in Epic and I have incorporated them into my assessment and plan as described above. Independently interpreted diagnostic study results are described above.  This dictation was prepared in part using Dragon recognition software.  As a result errors may occur. When identified these transcription errors have been corrected.  While every attempt is made to correct errors during dictation, errors may still exist.      Signature:     Luis Miguel Dickerson MD   PGY-6 Transplant Infectious Disease Fellow

## 2024-04-01 NOTE — PROGRESS NOTES
Care Coordination - Discharge Planning    Met with Katherin and Rosario at bedside following multidisciplinary rounds. Rosario was napping at this time, but I spoke with Katherin regarding discharge planning. I informed her that IV abx plan has changed from q 24 hr Ertapenem to q 8 hr Merrem. I explained this change to Katherin and she verbalized that this is something she is willing to do at home. I informed her that [facility] can provide education and provider supplies. She has no further questions at this time. I spoke with [facility] hospital liaisons and they will reach out to Katherin for setting up education time tomorrow.     Patient needs sedated marrows for BANS: no    ClonoSEQ testing needed? Yes    Line Company:  [employer].    IV Merrem is covered through [facility].     Pharmacy concerns:  No    D/c location:  Home    Caregiver:  Katherin (daughter)    Long-term BMT MD:  Josefina  Long-term BMT NC:  Romina  BMT :  Niesha Briceno  BMT Nurse Coordinator  Phone: [phone]  Pager: [phone]    Addendum: patient positive for Blastocystis hominis. IV abx plan may change. Will continue to follow patient for discharge planning.

## 2024-04-01 NOTE — PLAN OF CARE
"Goal Outcome Evaluation:         Assumed cares from 23:00 to 07:30    Blood pressure 114/68, pulse 89, temperature 99.3  F (37.4  C), temperature source Axillary, resp. rate 16, height 1.65 m (5' 4.96\"), weight 96 kg (211 lb 11.2 oz), SpO2 98%.    Temp max 99.3 ax OVSS, A/. On room air with 02 saturations in 99 to 100%. Pt in independent turning self in bed but needs SBA with 1 with walker when out of bed. Bed alarm on. Voiding well via [employer] and it was changed this morning. Complained of back pain and rated pain 4/10. 5 mg of Oxycodone given x 1 with good relief. No stool this shift. Has loose compression stockings on. Low Potassium , 3.8 and got 20 mEq of IV x 1 bag. Magnesium was 1.7 and 2 g of Magnesium sulfate given. She is getting 9 MMOL of IV Potassium Phosphate over 4 hour. Daughter at the bedside sleeping. CVC dressing C/D/I. Continue to monitor pt and follow plan of care.        Problem: Adult Inpatient Plan of Care  Goal: Plan of Care Review  Description: The Plan of Care Review/Shift note should be completed every shift.  The Outcome Evaluation is a brief statement about your assessment that the patient is improving, declining, or no change.  This information will be displayed automatically on your shift  note.  Outcome: Progressing  Goal: Patient-Specific Goal (Individualized)  Description: You can add care plan individualizations to a care plan. Examples of Individualization might be:  \"Parent requests to be called daily at 9am for status\", \"I have a hard time hearing out of my right ear\", or \"Do not touch me to wake me up as it startles  me\".  Outcome: Progressing  Goal: Absence of Hospital-Acquired Illness or Injury  Outcome: Progressing  Intervention: Identify and Manage Fall Risk  Recent Flowsheet Documentation  Taken 4/1/2024 0400 by Olga Maurice RN  Safety Promotion/Fall Prevention:   activity supervised   clutter free environment maintained   increase visualization of patient   " lighting adjusted   nonskid shoes/slippers when out of bed   mobility aid in reach   patient and family education   room near nurse's station   room organization consistent   safety round/check completed   supervised activity  Taken 4/1/2024 0000 by Olga Maurice RN  Safety Promotion/Fall Prevention:   activity supervised   clutter free environment maintained   increase visualization of patient   lighting adjusted   nonskid shoes/slippers when out of bed   mobility aid in reach   patient and family education   room near nurse's station   room organization consistent   safety round/check completed   supervised activity  Intervention: Prevent Skin Injury  Recent Flowsheet Documentation  Taken 4/1/2024 0400 by Olga Maurice RN  Body Position: position changed independently  Skin Protection: adhesive use limited  Device Skin Pressure Protection: adhesive use limited  Taken 4/1/2024 0000 by Olga Maurice RN  Body Position: position changed independently  Skin Protection: adhesive use limited  Device Skin Pressure Protection: adhesive use limited  Intervention: Prevent and Manage VTE (Venous Thromboembolism) Risk  Recent Flowsheet Documentation  Taken 4/1/2024 0400 by Olga Maurice RN  VTE Prevention/Management: compression stockings on  Taken 4/1/2024 0000 by Olga Maurice RN  VTE Prevention/Management: compression stockings on  Intervention: Prevent Infection  Recent Flowsheet Documentation  Taken 4/1/2024 0400 by Olga Maurice RN  Infection Prevention:   hand hygiene promoted   single patient room provided   rest/sleep promoted   personal protective equipment utilized   visitors restricted/screened   environmental surveillance performed   equipment surfaces disinfected  Taken 4/1/2024 0000 by Olga Maurice RN  Infection Prevention:   hand hygiene promoted   single patient room provided   rest/sleep promoted   personal protective equipment utilized   visitors restricted/screened    environmental surveillance performed   equipment surfaces disinfected  Goal: Optimal Comfort and Wellbeing  Outcome: Progressing  Intervention: Monitor Pain and Promote Comfort  Recent Flowsheet Documentation  Taken 4/1/2024 0325 by Olga Maurice RN  Pain Management Interventions: medication (see MAR)  Intervention: Provide Person-Centered Care  Recent Flowsheet Documentation  Taken 4/1/2024 0400 by Olga Maurice RN  Trust Relationship/Rapport:   care explained   questions answered   questions encouraged  Taken 4/1/2024 0000 by Olga Maurice RN  Trust Relationship/Rapport:   care explained   questions answered   questions encouraged     Problem: Oral Intake Inadequate  Goal: Improved Oral Intake  Outcome: Progressing  Intervention: Promote and Optimize Oral Intake  Recent Flowsheet Documentation  Taken 4/1/2024 0400 by Olga Maurice RN  Oral Nutrition Promotion: rest periods promoted  Taken 4/1/2024 0000 by Olga Maurice RN  Oral Nutrition Promotion: rest periods promoted     Problem: Infection  Goal: Absence of Infection Signs and Symptoms  Outcome: Progressing  Intervention: Prevent or Manage Infection  Recent Flowsheet Documentation  Taken 4/1/2024 0400 by Olga Maurice RN  Infection Management: aseptic technique maintained  Isolation Precautions:   cytotoxic precautions maintained   protective environment maintained  Taken 4/1/2024 0000 by Olga Maurice RN  Infection Management: aseptic technique maintained  Isolation Precautions:   cytotoxic precautions maintained   protective environment maintained     Problem: Fall Injury Risk  Goal: Absence of Fall and Fall-Related Injury  Outcome: Progressing  Intervention: Identify and Manage Contributors  Recent Flowsheet Documentation  Taken 4/1/2024 0400 by Olga Maurice RN  Self-Care Promotion: independence encouraged  Medication Review/Management: medications reviewed  Taken 4/1/2024 0000 by Olga Maurice RN  Self-Care  Promotion: independence encouraged  Medication Review/Management: medications reviewed  Intervention: Promote Injury-Free Environment  Recent Flowsheet Documentation  Taken 4/1/2024 0400 by Olga Maurice, RN  Safety Promotion/Fall Prevention:   activity supervised   clutter free environment maintained   increase visualization of patient   lighting adjusted   nonskid shoes/slippers when out of bed   mobility aid in reach   patient and family education   room near nurse's station   room organization consistent   safety round/check completed   supervised activity  Taken 4/1/2024 0000 by Olga Maurice, RN  Safety Promotion/Fall Prevention:   activity supervised   clutter free environment maintained   increase visualization of patient   lighting adjusted   nonskid shoes/slippers when out of bed   mobility aid in reach   patient and family education   room near nurse's station   room organization consistent   safety round/check completed   supervised activity

## 2024-04-01 NOTE — CONSULTS
Palliative Care Consultation Note  [facility]      Patient: Rosario Terrazas  Date of Admission:  3/22/2024    Requesting Clinician / Team: Yudelka Richards PA-C   Reason for consult: Pain management       Recommendations & Counseling     Information for this visit was collected from the daughter and patient did not engage much in discussion.    MEDICAL MANAGEMENT:   #Pain,acute abdominal pain  Currently requiring increased opioids for abdominal pain, and greatly appreciate the primary team addressing her medical underlying causes for this pain  Would expect pain to improve with treatment, likely will have low opioid needs.    #Pain, chronic back, shoulder, knee, and rib pain, pain is no worse then normal, worked with therapy well today and did not increase pain. Pain only gets worse in her ribs when she wears her TSLO brace.  She is not appropriate for a long acting opioid. She is opioid native and doesn't tolerate opioids that well with side effects of sleepiness. OP for her chronic pain was not taking oxycodone vary sparingly and would go days without needing it. Her pain was manageable most of the time with scheduled tylenol and her Voltaren gel.  Would optimize non-opioid and non-pharm options  Agree with lidocaine patches, can continue use of volteran gel but would monitor for and side effects as this is an NSAID  Consider scheduled tylenol once safely able to from a infection monitoring standpoint.  If having more muscle spasms pain when she wears her TSLO can use methocarbamol 500 mg 4 times a day PRN  Would resume her care through her current Oncology provider with prescriptions    Palliative Care will sign off. Thank you for the consult and allowing us to aid in the care of Rosario Terrazas.    These recommendations have been discussed with primary team.    KIKO Gomes CNS  Securely message with [employer] (more info)  Text page via  [facility] Paging/Directory       Assessment      Rosario Terrazas is a 68 year old female with a past medical history of multiple myeloma s/p auto BMT who presented on 3/21 with neutropenic fevers with GNB+ sepsis.      Today, the patient was seen for:  Back pain  Compression fractures to T7 and T8  Rib fracture possibly pathologic    Palliative Care Summary:   Met with patient and daughter.   Introduced the role of palliative care as an interdisciplinary team that cares for patients with serious illness to help support symptom management, communication, coping for patients and their families as well as support with medical decision making.            Coping, Meaning, & Spirituality:   Mood, coping, and/or meaning in the context of serious illness were addressed today: Yes daughter seems to be coping appropriately. No concerns shared from patient.    Social:   Important relationships/caregivers:daughter- Katherin    Medications:  I have reviewed this patient's medication profile and medications from this hospitalization. Notable medications:     ROS:  Comprehensive ROS is reviewed and is negative except as here & per HPI:     Physical Exam   Vital Signs with Ranges  Temp:  [98  F (36.7  C)-99.3  F (37.4  C)] 98  F (36.7  C)  Pulse:  [71-92] 92  Resp:  [16-18] 16  BP: (104-118)/(63-78) 105/66  SpO2:  [98 %-100 %] 99 %  208 lbs 1.6 oz    PHYSICAL EXAM:  Constitutional: Awake, alert, cooperative, no apparent distress  Lungs: No increased work of breathing, good air exchange  Skin: No rashes, erythema      Data reviewed:  Results for orders placed or performed during the hospital encounter of 03/22/24 (from the past 24 hour(s))   CBC with platelets differential    Narrative    The following orders were created for panel order CBC with platelets differential.  Procedure                               Abnormality         Status                     ---------                               -----------         ------                      CBC with platelets and d...[[id number]]  Abnormal            Final result               Manual Differential[[id number]]          Abnormal            Final result                 Please view results for these tests on the individual orders.   Basic metabolic panel   Result Value Ref Range    Sodium 139 135 - 145 mmol/L    Potassium 3.8 3.4 - 5.3 mmol/L    Chloride 106 98 - 107 mmol/L    Carbon Dioxide (CO2) 26 22 - 29 mmol/L    Anion Gap 7 7 - 15 mmol/L    Urea Nitrogen 4.1 (L) 8.0 - 23.0 mg/dL    Creatinine 0.47 (L) 0.51 - 0.95 mg/dL    GFR Estimate >90 >60 mL/min/1.73m2    Calcium 7.8 (L) 8.8 - 10.2 mg/dL    Glucose 97 70 - 99 mg/dL   INR   Result Value Ref Range    INR 1.34 (H) 0.85 - 1.15   Magnesium   Result Value Ref Range    Magnesium 1.7 1.7 - 2.3 mg/dL   Phosphorus   Result Value Ref Range    Phosphorus 2.7 2.5 - 4.5 mg/dL   Hepatic panel   Result Value Ref Range    Protein Total 4.7 (L) 6.4 - 8.3 g/dL    Albumin 3.1 (L) 3.5 - 5.2 g/dL    Bilirubin Total 0.3 <=1.2 mg/dL    Alkaline Phosphatase 65 40 - 150 U/L    AST 16 0 - 45 U/L    ALT 25 0 - 50 U/L    Bilirubin Direct <0.20 0.00 - 0.30 mg/dL   CBC with platelets and differential   Result Value Ref Range    WBC Count 5.5 4.0 - 11.0 10e3/uL    RBC Count 2.46 (L) 3.80 - 5.20 10e6/uL    Hemoglobin 7.7 (L) 11.7 - 15.7 g/dL    Hematocrit 23.3 (L) 35.0 - 47.0 %    MCV 95 78 - 100 fL    MCH 31.3 26.5 - 33.0 pg    MCHC 33.0 31.5 - 36.5 g/dL    RDW 17.6 (H) 10.0 - 15.0 %    Platelet Count 89 (L) 150 - 450 10e3/uL    % Neutrophils      % Lymphocytes      % Monocytes      % Eosinophils      % Basophils      % Immature Granulocytes      NRBCs per 100 WBC 0 <1 /100    Absolute Neutrophils      Absolute Lymphocytes      Absolute Monocytes      Absolute Eosinophils      Absolute Basophils      Absolute Immature Granulocytes      Absolute NRBCs 0.0 10e3/uL   Manual Differential   Result Value Ref Range    % Neutrophils 66 %    % Lymphocytes 5 %    % Monocytes 22 %     % Eosinophils 2 %    % Basophils 0 %    % Metamyelocytes 3 %    % Myelocytes 2 %    Absolute Neutrophils 3.6 1.6 - 8.3 10e3/uL    Absolute Lymphocytes 0.3 (L) 0.8 - 5.3 10e3/uL    Absolute Monocytes 1.2 0.0 - 1.3 10e3/uL    Absolute Eosinophils 0.1 0.0 - 0.7 10e3/uL    Absolute Basophils 0.0 0.0 - 0.2 10e3/uL    Absolute Metamyelocytes 0.2 (H) <=0.0 10e3/uL    Absolute Myelocytes 0.1 (H) <=0.0 10e3/uL    RBC Morphology Confirmed RBC Indices     Platelet Assessment  Automated Count Confirmed. Platelet morphology is normal.     Automated Count Confirmed. Platelet morphology is normal.     Recent Labs   Lab 04/01/24  0309 03/31/24  0409 03/30/24  0359 03/29/24  0435 03/28/24  1207   WBC 5.5 6.3 8.7   < >  --    HGB 7.7* 7.3* 7.9*   < >  --    MCV 95 95 92   < >  --    PLT 89* 66* 52*   < >  --    INR 1.34*  --   --   --  1.30*    141 141   < >  --    POTASSIUM 3.8 3.6 3.5   < >  --    CHLORIDE 106 106 102   < >  --    CO2 26 27 29   < >  --    BUN 4.1* 3.5* 3.8*   < >  --    CR 0.47* 0.45* 0.48*   < >  --    ANIONGAP 7 8 10   < >  --    RAMIREZ 7.8* 7.2* 7.6*   < >  --    GLC 97 93 88   < >  --    ALBUMIN 3.1* 3.1* 3.2*   < >  --    PROTTOTAL 4.7* 4.6* 4.8*   < >  --    BILITOTAL 0.3 0.3 0.3   < >  --    ALKPHOS 65 67 73   < >  --    ALT 25 27 35   < >  --    AST 16 20 16   < >  --     < > = values in this interval not displayed.       No results found for this or any previous visit (from the past 24 hour(s)).    Medical Decision Making       MANAGEMENT DISCUSSED with the following over the past 24 hours: BMT. RNCC   NOTE(S)/MEDICAL RECORDS REVIEWED over the past 24 hours: BMT, ID, nursing, RNCC  Tests REVIEWED in the past 24 hours:  - See lab/imaging results included in the data section of the note  SUPPLEMENTAL HISTORY, in addition to the patient's history, over the past 24 hours obtained from:   - Daughter

## 2024-04-02 ENCOUNTER — ORDERS ONLY (AUTO-RELEASED) (OUTPATIENT)
Dept: ONCOLOGY | Facility: CLINIC | Age: 69
End: 2024-04-02
Payer: COMMERCIAL

## 2024-04-02 ENCOUNTER — APPOINTMENT (OUTPATIENT)
Dept: PHYSICAL THERAPY | Facility: CLINIC | Age: 69
End: 2024-04-02
Payer: COMMERCIAL

## 2024-04-02 VITALS
OXYGEN SATURATION: 99 % | BODY MASS INDEX: 34.19 KG/M2 | WEIGHT: 205.2 LBS | SYSTOLIC BLOOD PRESSURE: 93 MMHG | HEIGHT: 65 IN | TEMPERATURE: 97.6 F | RESPIRATION RATE: 16 BRPM | HEART RATE: 81 BPM | DIASTOLIC BLOOD PRESSURE: 70 MMHG

## 2024-04-02 DIAGNOSIS — I48.0 PAROXYSMAL ATRIAL FIBRILLATION (H): ICD-10-CM

## 2024-04-02 PROBLEM — Z94.84 H/O AUTOLOGOUS STEM CELL TRANSPLANT (H): Status: ACTIVE | Noted: 2024-03-13

## 2024-04-02 PROBLEM — A49.1 INFECTION DUE TO STREPTOCOCCUS MITIS GROUP: Status: ACTIVE | Noted: 2024-04-02

## 2024-04-02 PROBLEM — Z22.7 LATENT TUBERCULOSIS BY BLOOD TEST: Status: ACTIVE | Noted: 2024-04-02

## 2024-04-02 PROBLEM — A49.8 INFECTION DUE TO EXTENDED-SPECTRUM BETA-LACTAMASE-PRODUCING ESCHERICHIA COLI: Status: ACTIVE | Noted: 2024-03-22

## 2024-04-02 PROBLEM — Z22.7 LATENT TUBERCULOSIS BY BLOOD TEST: Status: ACTIVE | Noted: 2023-08-28

## 2024-04-02 PROBLEM — Z16.12 INFECTION DUE TO EXTENDED-SPECTRUM BETA-LACTAMASE-PRODUCING ESCHERICHIA COLI: Status: ACTIVE | Noted: 2024-03-22

## 2024-04-02 PROBLEM — A07.8 BLASTOCYSTIS HOMINIS INFECTION: Status: ACTIVE | Noted: 2024-04-02

## 2024-04-02 PROBLEM — Z79.2 ADMINISTRATION OF LONG-TERM PROPHYLACTIC ANTIBIOTICS: Status: ACTIVE | Noted: 2024-04-02

## 2024-04-02 LAB
ALBUMIN SERPL BCG-MCNC: 3.2 G/DL (ref 3.5–5.2)
ALP SERPL-CCNC: 59 U/L (ref 40–150)
ALT SERPL W P-5'-P-CCNC: 21 U/L (ref 0–50)
ANION GAP SERPL CALCULATED.3IONS-SCNC: 7 MMOL/L (ref 7–15)
AST SERPL W P-5'-P-CCNC: 15 U/L (ref 0–45)
BASOPHILS # BLD AUTO: ABNORMAL 10*3/UL
BASOPHILS # BLD MANUAL: 0 10E3/UL (ref 0–0.2)
BASOPHILS NFR BLD AUTO: ABNORMAL %
BASOPHILS NFR BLD MANUAL: 0 %
BILIRUB DIRECT SERPL-MCNC: <0.2 MG/DL (ref 0–0.3)
BILIRUB SERPL-MCNC: 0.3 MG/DL
BLD PROD TYP BPU: NORMAL
BLOOD COMPONENT TYPE: NORMAL
BUN SERPL-MCNC: 5.6 MG/DL (ref 8–23)
CALCIUM SERPL-MCNC: 7.8 MG/DL (ref 8.8–10.2)
CHLORIDE SERPL-SCNC: 107 MMOL/L (ref 98–107)
CODING SYSTEM: NORMAL
CREAT SERPL-MCNC: 0.41 MG/DL (ref 0.51–0.95)
CROSSMATCH: NORMAL
DEPRECATED HCO3 PLAS-SCNC: 25 MMOL/L (ref 22–29)
EGFRCR SERPLBLD CKD-EPI 2021: >90 ML/MIN/1.73M2
EOSINOPHIL # BLD AUTO: ABNORMAL 10*3/UL
EOSINOPHIL # BLD MANUAL: 0.1 10E3/UL (ref 0–0.7)
EOSINOPHIL NFR BLD AUTO: ABNORMAL %
EOSINOPHIL NFR BLD MANUAL: 1 %
ERYTHROCYTE [DISTWIDTH] IN BLOOD BY AUTOMATED COUNT: 17.6 % (ref 10–15)
GLUCOSE SERPL-MCNC: 86 MG/DL (ref 70–99)
HADV DNA # SPEC NAA+PROBE: NOT DETECTED COPIES/ML
HCT VFR BLD AUTO: 22.9 % (ref 35–47)
HGB BLD-MCNC: 7.6 G/DL (ref 11.7–15.7)
IMM GRANULOCYTES # BLD: ABNORMAL 10*3/UL
IMM GRANULOCYTES NFR BLD: ABNORMAL %
LYMPHOCYTES # BLD AUTO: ABNORMAL 10*3/UL
LYMPHOCYTES # BLD MANUAL: 2.1 10E3/UL (ref 0.8–5.3)
LYMPHOCYTES NFR BLD AUTO: ABNORMAL %
LYMPHOCYTES NFR BLD MANUAL: 37 %
MAGNESIUM SERPL-MCNC: 1.7 MG/DL (ref 1.7–2.3)
MCH RBC QN AUTO: 31.7 PG (ref 26.5–33)
MCHC RBC AUTO-ENTMCNC: 33.2 G/DL (ref 31.5–36.5)
MCV RBC AUTO: 95 FL (ref 78–100)
METAMYELOCYTES # BLD MANUAL: 0.1 10E3/UL
METAMYELOCYTES NFR BLD MANUAL: 1 %
MONOCYTES # BLD AUTO: ABNORMAL 10*3/UL
MONOCYTES # BLD MANUAL: 0.6 10E3/UL (ref 0–1.3)
MONOCYTES NFR BLD AUTO: ABNORMAL %
MONOCYTES NFR BLD MANUAL: 10 %
NEUTROPHILS # BLD AUTO: ABNORMAL 10*3/UL
NEUTROPHILS # BLD MANUAL: 3 10E3/UL (ref 1.6–8.3)
NEUTROPHILS NFR BLD AUTO: ABNORMAL %
NEUTROPHILS NFR BLD MANUAL: 51 %
NRBC # BLD AUTO: 0 10E3/UL
NRBC BLD AUTO-RTO: 0 /100
PHOSPHATE SERPL-MCNC: 2.6 MG/DL (ref 2.5–4.5)
PLAT MORPH BLD: ABNORMAL
PLATELET # BLD AUTO: 95 10E3/UL (ref 150–450)
POLYCHROMASIA BLD QL SMEAR: SLIGHT
POTASSIUM SERPL-SCNC: 3.9 MMOL/L (ref 3.4–5.3)
PROT SERPL-MCNC: 4.8 G/DL (ref 6.4–8.3)
RBC # BLD AUTO: 2.4 10E6/UL (ref 3.8–5.2)
RBC MORPH BLD: ABNORMAL
SCHISTOSOMA IGG SER IA-ACNC: 2 U
SODIUM SERPL-SCNC: 139 MMOL/L (ref 135–145)
UNIT ABO/RH: NORMAL
UNIT NUMBER: NORMAL
UNIT STATUS: NORMAL
UNIT TYPE ISBT: 6200
WBC # BLD AUTO: 5.8 10E3/UL (ref 4–11)

## 2024-04-02 PROCEDURE — 250N000011 HC RX IP 250 OP 636: Mod: JZ | Performed by: STUDENT IN AN ORGANIZED HEALTH CARE EDUCATION/TRAINING PROGRAM

## 2024-04-02 PROCEDURE — 250N000013 HC RX MED GY IP 250 OP 250 PS 637: Performed by: PHYSICIAN ASSISTANT

## 2024-04-02 PROCEDURE — 85027 COMPLETE CBC AUTOMATED: CPT | Performed by: PHYSICIAN ASSISTANT

## 2024-04-02 PROCEDURE — 84100 ASSAY OF PHOSPHORUS: CPT | Performed by: STUDENT IN AN ORGANIZED HEALTH CARE EDUCATION/TRAINING PROGRAM

## 2024-04-02 PROCEDURE — 250N000013 HC RX MED GY IP 250 OP 250 PS 637: Performed by: STUDENT IN AN ORGANIZED HEALTH CARE EDUCATION/TRAINING PROGRAM

## 2024-04-02 PROCEDURE — 97530 THERAPEUTIC ACTIVITIES: CPT | Mod: GP

## 2024-04-02 PROCEDURE — 99239 HOSP IP/OBS DSCHRG MGMT >30: CPT | Mod: FS | Performed by: PHYSICIAN ASSISTANT

## 2024-04-02 PROCEDURE — 85007 BL SMEAR W/DIFF WBC COUNT: CPT | Performed by: PHYSICIAN ASSISTANT

## 2024-04-02 PROCEDURE — 258N000003 HC RX IP 258 OP 636: Performed by: STUDENT IN AN ORGANIZED HEALTH CARE EDUCATION/TRAINING PROGRAM

## 2024-04-02 PROCEDURE — 250N000011 HC RX IP 250 OP 636: Mod: JZ | Performed by: PHYSICIAN ASSISTANT

## 2024-04-02 PROCEDURE — 83735 ASSAY OF MAGNESIUM: CPT | Performed by: STUDENT IN AN ORGANIZED HEALTH CARE EDUCATION/TRAINING PROGRAM

## 2024-04-02 PROCEDURE — 82247 BILIRUBIN TOTAL: CPT | Performed by: PHYSICIAN ASSISTANT

## 2024-04-02 PROCEDURE — 99233 SBSQ HOSP IP/OBS HIGH 50: CPT | Mod: GC | Performed by: INTERNAL MEDICINE

## 2024-04-02 PROCEDURE — 250N000009 HC RX 250: Performed by: STUDENT IN AN ORGANIZED HEALTH CARE EDUCATION/TRAINING PROGRAM

## 2024-04-02 PROCEDURE — 250N000011 HC RX IP 250 OP 636: Performed by: INTERNAL MEDICINE

## 2024-04-02 PROCEDURE — 97116 GAIT TRAINING THERAPY: CPT | Mod: GP

## 2024-04-02 RX ORDER — PANTOPRAZOLE SODIUM 40 MG/1
40 TABLET, DELAYED RELEASE ORAL DAILY
Qty: 30 TABLET | Refills: 0 | Status: SHIPPED | OUTPATIENT
Start: 2024-04-02 | End: 2024-06-12

## 2024-04-02 RX ORDER — METOPROLOL TARTRATE 25 MG/1
25 TABLET, FILM COATED ORAL 2 TIMES DAILY
Qty: 60 TABLET | Refills: 0 | Status: SHIPPED | OUTPATIENT
Start: 2024-04-02 | End: 2024-05-10

## 2024-04-02 RX ORDER — ERTAPENEM 1 G/1
1 INJECTION, POWDER, LYOPHILIZED, FOR SOLUTION INTRAMUSCULAR; INTRAVENOUS EVERY 24 HOURS
Status: CANCELLED
Start: 2024-04-03

## 2024-04-02 RX ORDER — ERTAPENEM 1 G/1
1 INJECTION, POWDER, LYOPHILIZED, FOR SOLUTION INTRAMUSCULAR; INTRAVENOUS EVERY 24 HOURS
Status: DISCONTINUED | OUTPATIENT
Start: 2024-04-02 | End: 2024-04-02 | Stop reason: HOSPADM

## 2024-04-02 RX ORDER — PYRIDOXINE HCL (VITAMIN B6) 25 MG
25 TABLET ORAL DAILY
Qty: 30 TABLET | Refills: 0 | Status: SHIPPED | OUTPATIENT
Start: 2024-04-02 | End: 2024-04-23

## 2024-04-02 RX ORDER — ASPIRIN 81 MG/1
81 TABLET ORAL DAILY
Qty: 30 TABLET | Refills: 0 | Status: SHIPPED | OUTPATIENT
Start: 2024-04-02 | End: 2024-07-05

## 2024-04-02 RX ORDER — PROCHLORPERAZINE MALEATE 5 MG
5 TABLET ORAL EVERY 6 HOURS PRN
Qty: 40 TABLET | Refills: 0 | Status: SHIPPED | OUTPATIENT
Start: 2024-04-02

## 2024-04-02 RX ORDER — ACYCLOVIR 800 MG/1
800 TABLET ORAL 2 TIMES DAILY
Qty: 60 TABLET | Refills: 11 | Status: SHIPPED | OUTPATIENT
Start: 2024-04-02 | End: 2024-04-19

## 2024-04-02 RX ORDER — METRONIDAZOLE 500 MG/1
500 TABLET ORAL 4 TIMES DAILY
Qty: 56 TABLET | Refills: 0 | Status: SHIPPED | OUTPATIENT
Start: 2024-04-02 | End: 2024-06-12

## 2024-04-02 RX ORDER — FOLIC ACID 1 MG/1
1000 TABLET ORAL DAILY
Qty: 30 TABLET | Refills: 0 | Status: SHIPPED | OUTPATIENT
Start: 2024-04-02

## 2024-04-02 RX ORDER — HEPARIN SODIUM,PORCINE 10 UNIT/ML
5 VIAL (ML) INTRAVENOUS DAILY
COMMUNITY
Start: 2024-04-02

## 2024-04-02 RX ORDER — EPINEPHRINE 1 MG/ML
0.3 INJECTION, SOLUTION INTRAMUSCULAR; SUBCUTANEOUS EVERY 5 MIN PRN
OUTPATIENT
Start: 2024-04-02

## 2024-04-02 RX ORDER — METHOCARBAMOL 500 MG/1
500 TABLET, FILM COATED ORAL 4 TIMES DAILY PRN
Status: DISCONTINUED | OUTPATIENT
Start: 2024-04-02 | End: 2024-04-02 | Stop reason: HOSPADM

## 2024-04-02 RX ORDER — GABAPENTIN 100 MG/1
100 CAPSULE ORAL 3 TIMES DAILY
Qty: 90 CAPSULE | Refills: 0 | Status: SHIPPED | OUTPATIENT
Start: 2024-04-02 | End: 2024-04-05

## 2024-04-02 RX ORDER — ERTAPENEM 1 G/1
1 INJECTION, POWDER, LYOPHILIZED, FOR SOLUTION INTRAMUSCULAR; INTRAVENOUS EVERY 24 HOURS
Status: CANCELLED
Start: 2024-04-02

## 2024-04-02 RX ORDER — LOPERAMIDE HCL 2 MG
2 CAPSULE ORAL 4 TIMES DAILY PRN
Qty: 120 CAPSULE | Refills: 0 | Status: SHIPPED | OUTPATIENT
Start: 2024-04-02

## 2024-04-02 RX ORDER — MAGNESIUM SULFATE HEPTAHYDRATE 40 MG/ML
2 INJECTION, SOLUTION INTRAVENOUS ONCE
Status: COMPLETED | OUTPATIENT
Start: 2024-04-02 | End: 2024-04-02

## 2024-04-02 RX ORDER — ACETAMINOPHEN 325 MG/1
650 TABLET ORAL EVERY 4 HOURS PRN
Qty: 120 TABLET | Refills: 1 | Status: SHIPPED | OUTPATIENT
Start: 2024-04-02 | End: 2024-04-19

## 2024-04-02 RX ORDER — HEPARIN SODIUM (PORCINE) LOCK FLUSH IV SOLN 100 UNIT/ML 100 UNIT/ML
5 SOLUTION INTRAVENOUS
OUTPATIENT
Start: 2024-04-02

## 2024-04-02 RX ORDER — DIPHENHYDRAMINE HYDROCHLORIDE 50 MG/ML
50 INJECTION INTRAMUSCULAR; INTRAVENOUS
Start: 2024-04-02

## 2024-04-02 RX ORDER — VITAMIN B COMPLEX
2 TABLET ORAL DAILY
Qty: 30 TABLET | Refills: 0 | Status: SHIPPED | OUTPATIENT
Start: 2024-04-02 | End: 2024-05-10

## 2024-04-02 RX ORDER — PROCHLORPERAZINE MALEATE 5 MG
5 TABLET ORAL EVERY 6 HOURS PRN
COMMUNITY
Start: 2024-04-02 | End: 2024-04-02

## 2024-04-02 RX ORDER — ERTAPENEM 1 G/1
1 INJECTION, POWDER, LYOPHILIZED, FOR SOLUTION INTRAMUSCULAR; INTRAVENOUS EVERY 24 HOURS
Status: ACTIVE | COMMUNITY
Start: 2024-04-02 | End: 2024-04-08

## 2024-04-02 RX ORDER — HEPARIN SODIUM,PORCINE 10 UNIT/ML
5-20 VIAL (ML) INTRAVENOUS DAILY PRN
OUTPATIENT
Start: 2024-04-02

## 2024-04-02 RX ORDER — ISONIAZID 300 MG/1
300 TABLET ORAL DAILY
Qty: 30 TABLET | Refills: 0 | Status: SHIPPED | OUTPATIENT
Start: 2024-04-02 | End: 2024-04-23

## 2024-04-02 RX ORDER — OXYCODONE HYDROCHLORIDE 5 MG/1
5 TABLET ORAL EVERY 6 HOURS PRN
Qty: 30 TABLET | Refills: 0 | Status: SHIPPED | OUTPATIENT
Start: 2024-04-02 | End: 2024-04-19

## 2024-04-02 RX ORDER — METHOCARBAMOL 500 MG/1
500 TABLET, FILM COATED ORAL 4 TIMES DAILY PRN
Qty: 60 TABLET | Refills: 0 | Status: SHIPPED | OUTPATIENT
Start: 2024-04-02 | End: 2024-05-10

## 2024-04-02 RX ADMIN — METOPROLOL TARTRATE 25 MG: 25 TABLET, FILM COATED ORAL at 07:39

## 2024-04-02 RX ADMIN — PANTOPRAZOLE SODIUM 40 MG: 40 TABLET, DELAYED RELEASE ORAL at 07:38

## 2024-04-02 RX ADMIN — Medication 5 ML: at 12:47

## 2024-04-02 RX ADMIN — MAGNESIUM SULFATE HEPTAHYDRATE 2 G: 2 INJECTION, SOLUTION INTRAVENOUS at 06:15

## 2024-04-02 RX ADMIN — SODIUM PHOSPHATE, MONOBASIC, MONOHYDRATE AND SODIUM PHOSPHATE, DIBASIC, ANHYDROUS 9 MMOL: 142; 276 INJECTION, SOLUTION INTRAVENOUS at 07:37

## 2024-04-02 RX ADMIN — OXYCODONE HYDROCHLORIDE 5 MG: 5 TABLET ORAL at 06:22

## 2024-04-02 RX ADMIN — Medication 5 ML: at 03:39

## 2024-04-02 RX ADMIN — PROCHLORPERAZINE MALEATE 5 MG: 5 TABLET ORAL at 07:38

## 2024-04-02 RX ADMIN — Medication 25 MG: at 07:38

## 2024-04-02 RX ADMIN — DICLOFENAC SODIUM 2 G: 10 GEL TOPICAL at 11:31

## 2024-04-02 RX ADMIN — DICLOFENAC SODIUM 2 G: 10 GEL TOPICAL at 07:39

## 2024-04-02 RX ADMIN — ACYCLOVIR 800 MG: 800 TABLET ORAL at 07:38

## 2024-04-02 RX ADMIN — PROCHLORPERAZINE MALEATE 5 MG: 5 TABLET ORAL at 11:28

## 2024-04-02 RX ADMIN — FOLIC ACID 1000 MCG: 1 TABLET ORAL at 07:38

## 2024-04-02 RX ADMIN — CALCIUM 500 MG: 500 TABLET ORAL at 07:38

## 2024-04-02 RX ADMIN — MEROPENEM 1 G: 1 INJECTION, POWDER, FOR SOLUTION INTRAVENOUS at 03:39

## 2024-04-02 RX ADMIN — METRONIDAZOLE 500 MG: 500 TABLET ORAL at 11:28

## 2024-04-02 RX ADMIN — ERTAPENEM SODIUM 1 G: 1 INJECTION, POWDER, LYOPHILIZED, FOR SOLUTION INTRAMUSCULAR; INTRAVENOUS at 11:28

## 2024-04-02 RX ADMIN — ISONIAZID 300 MG: 300 TABLET ORAL at 07:39

## 2024-04-02 RX ADMIN — OXYCODONE HYDROCHLORIDE 5 MG: 5 TABLET ORAL at 11:28

## 2024-04-02 RX ADMIN — GABAPENTIN 100 MG: 100 CAPSULE ORAL at 07:38

## 2024-04-02 RX ADMIN — METRONIDAZOLE 500 MG: 500 TABLET ORAL at 07:38

## 2024-04-02 ASSESSMENT — ACTIVITIES OF DAILY LIVING (ADL)
ADLS_ACUITY_SCORE: 31

## 2024-04-02 NOTE — PLAN OF CARE
Pt A/O x4. VSS on RA. Afebrile. No BM. Pt managing pain with scheduled Voltaren cream and PRN 5 mg Oxy x1. Purewick patent, in place, with good UOP. 2 g Magnesium infusing and Pt stiill needs 9 mmol Phos  for Electrolyte Replenishment. Daughter at bedside.  called and needs time for teaching to schedule a  for today. Enteric and contact precautions maintained.    Problem: Adult Inpatient Plan of Care  Goal: Plan of Care Review  Outcome: Progressing  Goal: Absence of Hospital-Acquired Illness or Injury  Outcome: Progressing  Intervention: Identify and Manage Fall Risk  Recent Flowsheet Documentation  Taken 4/2/2024 0026 by Pamela Parnell RN  Safety Promotion/Fall Prevention:   assistive device/personal items within reach   patient and family education   nonskid shoes/slippers when out of bed   mobility aid in reach   lighting adjusted   room organization consistent   safety round/check completed  Intervention: Prevent Skin Injury  Recent Flowsheet Documentation  Taken 4/2/2024 0341 by Pamela Parnell RN  Body Position: position changed independently  Taken 4/2/2024 0026 by Pamela Parnell RN  Body Position: position changed independently  Device Skin Pressure Protection:   absorbent pad utilized/changed   adhesive use limited   tubing/devices free from skin contact  Intervention: Prevent and Manage VTE (Venous Thromboembolism) Risk  Recent Flowsheet Documentation  Taken 4/2/2024 0026 by Pamela Parnell RN  VTE Prevention/Management: compression stockings off  Intervention: Prevent Infection  Recent Flowsheet Documentation  Taken 4/2/2024 0026 by Pamela Parnell RN  Infection Prevention:   environmental surveillance performed   equipment surfaces disinfected   hand hygiene promoted   personal protective equipment utilized   rest/sleep promoted   single patient room provided   visitors restricted/screened  Goal: Optimal Comfort and Wellbeing  Outcome: Progressing  Intervention:  Monitor Pain and Promote Comfort  Recent Flowsheet Documentation  Taken 4/2/2024 0341 by Pamela Parnell RN  Pain Management Interventions:   rest   declines   medication (see MAR)  Taken 4/2/2024 0026 by Pamela Parnell RN  Pain Management Interventions:   rest   declines   medication (see MAR)  Intervention: Provide Person-Centered Care  Recent Flowsheet Documentation  Taken 4/2/2024 0026 by Pamela Parnell RN  Trust Relationship/Rapport: (dsughter at bedside, Pt non-english speaking,  used)   care explained   choices provided   emotional support provided   empathic listening provided   questions answered   questions encouraged   reassurance provided   thoughts/feelings acknowledged   other (see comments)  Goal: Readiness for Transition of Care  Outcome: Progressing     Problem: Oral Intake Inadequate  Goal: Improved Oral Intake  Outcome: Progressing  Intervention: Promote and Optimize Oral Intake  Recent Flowsheet Documentation  Taken 4/2/2024 0026 by Pamela Parnell RN  Oral Nutrition Promotion:   rest periods promoted   physical activity promoted     Problem: Infection  Goal: Absence of Infection Signs and Symptoms  Outcome: Progressing  Intervention: Prevent or Manage Infection  Recent Flowsheet Documentation  Taken 4/2/2024 0026 by Pamela Parnell RN  Infection Management: aseptic technique maintained  Isolation Precautions:   enteric precautions maintained   contact precautions maintained     Problem: Fall Injury Risk  Goal: Absence of Fall and Fall-Related Injury  Outcome: Progressing  Intervention: Identify and Manage Contributors  Recent Flowsheet Documentation  Taken 4/2/2024 0026 by Pamela Parnell RN  Medication Review/Management: (family at bedside)   medications reviewed   high-risk medications identified   other (see comments)  Intervention: Promote Injury-Free Environment  Recent Flowsheet Documentation  Taken 4/2/2024 0026 by Pamela Parnell RN  Safety Promotion/Fall  Prevention:   assistive device/personal items within reach   patient and family education   nonskid shoes/slippers when out of bed   mobility aid in reach   lighting adjusted   room organization consistent   safety round/check completed

## 2024-04-02 NOTE — PROGRESS NOTES
Care Management Discharge Note    Discharge Date: 04/02/2024     Discharge Disposition: Home    Discharge Services: None    Discharge DME: None    Discharge Transportation: family or friend will provide    Private pay costs discussed: Not applicable    Does the patient's insurance plan have a 3 day qualifying hospital stay waiver?  No    PAS Confirmation Code: N/A    Patient/family educated on Medicare website which has current facility and service quality ratings: No    Education Provided on the Discharge Plan: Yes    Persons Notified of Discharge Plans: Pt, Pts Daughter Katherin, treatment team, nursing staff, and     Patient/Family in Agreement with the Plan: yes    Handoff Referral Completed: No    Additional Information:  Rosario Terrazas is a 68 year old female, currently day +20 s/p auto for MM readmitted 3/21 with N/F and GNB+ sepsis.      Per Med Team, pt is anticipated to be medically stable for discharge 4/2/2024. SW met with pt and pts daughter Katherin to assess coping, provide psychosocial support and check in. Katherin provided interpretation when speaking with patient. SW previously provided a discharge packet with psychosocial post-transplant resources for patient and caregiver. Pts daughter shared that pts appetite has slowly been improving and is hopeful that being home will provide an opportunity for pt to try a variety of foods as the hospital food can be limiting.  Katherin/pt confirmed discharge plans for today and shared that they are waiting for the Cardiology team to arrive to place the Zio patch on the patient. Katherin shared that pt was walking with therapy team last week when this writer attempted to provide a supportive SW visit. Katherin shared that she/pt are looking forward to going home and are hopeful that being in a home environment will help pt recover. Katherin confirmed having this writer's contact information and will reach out with any questions/concerns. Pt/Katherin denied any  further questions/concerns at this current time. SW provided empathetic listening, validation of concerns, and encouragement. SW encouraged pt to contact SW for support, questions and/or resources.    Assessment: Pt seated in recliner at time of visit. Pt continues to be supported by her daughter Katherin.     Discharge Plan: Pt to discharge home in [place], [place] with Daughter Katherin as primary caregiver.     JAYDE Pritchard, NELLIE  [facility]  Adult Blood & Marrow Transplant   Phone: [phone]  Pager: [phone]

## 2024-04-02 NOTE — PROGRESS NOTES
Care Coordination - Discharge Planning    Patient needs sedated marrows for BANS: no    ClonoSEQ testing needed? Yes    Line Company:  [employer]. IV Ertapenem to be administered in clinic until Friday.     Pharmacy concerns:  No    D/c location:  Home    Caregiver:  Katherin (daughter)    Long-term BMT MD:  Josefina  Long-term BMT NC:  Romina  BMT :  Niesha     I spoke with Katherin and Rosario at bedside regarding discharge today with Katherin providing interpreting.They are aware of the discharge plan with follow up on 04/03 for labs, YAO visit, and ertapenem infusion. They endorsed feeling comfortable with line cares and having enough supplies at home. The original plan was to have Rosario discharge with home IV abx, Katherin was aware of the plan that Rosario will have IV abx administration in clinic until Friday. [facility] liaisons updated on the plan and that IV abx education for home no longer needed. Katherin verbalized understanding of this plan.     I educated them on the process outlined in the When to Call For Help page. They endorsed no other questions at this time.     Marva Briceno  BMT Nurse Coordinator  Phone: [phone]  Pager: [phone]

## 2024-04-02 NOTE — DISCHARGE SUMMARY
[facility] Discharge Summary   Rosario Terrazas MRN# [id number]   Age: 68 year old  YOB: 1955   Date of Admission: 3/22/2024  Date of Discharge:  4/2/2024   Admitting Physician: Rishi Stuart MD  Discharge Physician:  Alexandro Najera MD  Discharge Diagnoses:    Multiple Myeloma, s/p melphalan therapy followed by autologous peripheral blood stem cell transplant rescue  Blastocystis hominis parasitic infection  E Coli bacteremia  Strep mitis bacteremia  Hypomagnesemia  Hypokalemia  Hypocalcemia  Hypoalbuminemia  Paroxysmal atrial fibrillation  Supraventricular tachycardia  Sepsis  Neutropenic fevers  Colitis with diarrhea secondary to chemotherapy and parasitic infection  Latent tuberculosis  Pancytopenia secondary to chemotherapy and multiple myeloma  Fluid volume overload  Lymphedema  Severe malnutrition  Prolonged QT interval  Transaminitis   Thyroid nodule atypia of undetermined significance  Peripheral neuropathy  Cancer pain in ribs and back  Arthritis  Arthritic pain bilateral shoulders  Melphalan associated mucositis (resolved)    Discharge Medications:         Medication List        Started      aspirin 81 MG EC tablet     ertapenem 1 GM vial  Commonly known as: INVanz     metoprolol tartrate 25 MG tablet  Commonly known as: LOPRESSOR  25 mg, Oral, 2 TIMES DAILY     metroNIDAZOLE 500 MG tablet  Commonly known as: FLAGYL  500 mg, Oral, 4 TIMES DAILY            Modified      diclofenac 1 % topical gel  Commonly known as: VOLTAREN  2 g, Topical, 4 TIMES DAILY  What changed: See the new instructions.     prochlorperazine 5 MG tablet  Commonly known as: COMPAZINE  What changed: Another medication with the same name was removed. Continue taking this medication, and follow the directions you see here.            Discontinued      allopurinol 300 MG tablet  Commonly known as: ZYLOPRIM     fluconazole 200 MG tablet  Commonly known as: DIFLUCAN     levofloxacin 250 MG tablet  Commonly  "known as: Levaquin     lidocaine 5 % external ointment  Commonly known as: XYLOCAINE     nystatin 180019 UNIT/GM external cream  Commonly known as: MYCOSTATIN     OLANZapine 2.5 MG tablet  Commonly known as: zyPREXA     ondansetron 8 MG tablet  Commonly known as: ZOFRAN     phenol 1.4 % spray  Commonly known as: CHLORASEPTIC     senna-docusate 8.6-50 MG tablet  Commonly known as: SENOKOT-S/PERICOLACE     triamcinolone 0.5 % external cream  Commonly known as: ARISTOCORT HP            Brief History of Illness:    **Adopted from H&P  69 Y/O F with MM s/p HD melphan and auto transplant HSCT +9 admitted for neutropenic fever was found to have GNB with likely ESBL E coli. Was seen in BMT clinic 1 day prior to presentation. Was not feeling well then. Difficulty tolerating PO and had some diarrhea and cramping. Also had a fever and blood Cx was taken and sent home. Now coming back due to Blood Cx positive for ESBL E coli and presented to ED for further eval    Upon interview: Daughter Katherin was at bedside and was used for interpretation. Pt doesn't have Sob but does have some cough, Also complains of abdominal pain and diarrhea. No dysuria. Pt is also complaining of sore throat.  Physical Exam:    BP 97/56   Pulse 91   Temp 99.2  F (37.3  C) (Axillary)   Resp 16   Ht 1.65 m (5' 4.96\")   Wt 93.1 kg (205 lb 3.2 oz)   SpO2 100%   BMI 34.19 kg/m    # Discharge Pain Plan:    - During her hospitalization, Rosario experienced pain due to cancer pain and arthritis pain.  The pain plan for discharge was discussed with Rosario and her caregiver and the plan was created in a collaborative fashion.    - Chronic/continued opioids: oxycodone    General Appearance: sleepy but wakes readily and answers questions  HEENT: sclera anicteric, EOMI. Moist mucus membranes, no ulcerations or thrush.   CV: RRR, no murmur or rub.   RESP: CTA bilaterally; no rales or wheezes.  GI: +BS, soft, nontender, no masses or hepatosplenomegaly.  EXT: no " edema raciel LE's  SKIN:  No rash or lesions on exposed areas.  NEURO: A&O x3; CN II-XII grossly intact.  PSYCH: withdrawn  VASCULAR ACCESS: double lumen left sided tunneled catheter        Lab Values     Lab Results   Component Value Date    WBC 5.8 04/02/2024    ANEU 3.0 04/02/2024    HGB 7.6 (L) 04/02/2024    HCT 22.9 (L) 04/02/2024    PLT 95 (L) 04/02/2024     04/02/2024    POTASSIUM 3.9 04/02/2024    CHLORIDE 107 04/02/2024    CO2 25 04/02/2024    GLC 86 04/02/2024    BUN 5.6 (L) 04/02/2024    CR 0.41 (L) 04/02/2024    MAG 1.7 04/02/2024    INR 1.34 (H) 04/01/2024    BILITOTAL 0.3 04/02/2024    AST 15 04/02/2024    ALT 21 04/02/2024    ALKPHOS 59 04/02/2024    PROTTOTAL 4.8 (L) 04/02/2024    ALBUMIN 3.2 (L) 04/02/2024       I have assessed all abnormal lab values for their clinical significance and any values considered clinically significant have been addressed in the assessment and plan.     Hospital Course:    Rosario Terrazas is a 68 year old female, currently day +20 s/p auto for MM readmitted 3/21 with N/F and GNB+ sepsis.  BMT/IEC PROTOCOL for standard risk MM Auto PBSCT   3/12 Day -1 Melphalan 200 mg/m2   3/13 Day 0 Transplant, cell total post wash 2.23 x 10^6 CD34 + cells/kg (pre freeze dose: 5.36 x 10 ^6 CD34+ cells/kg)  ID  N/F and sepsis 3/22/24.  See below.  Strep mitis bacteremia (3/22). Follow up cultures 3/23-3/25: negative.  Vanco (3/22-3/24)   Meropenem 3/22-3/27)  Ertapenem 3/27-3/29-->colitis developed on this, so changed back to merrem  Meropenem 3/29-4/2--> discovered blastocystic hominis parasite in stool, which can account for the colitis.  Thus, changed back to ertapenem  Ertapenem 4/2-4/5.  EOT is 4/5/24. Infusion appointments ordered daily for ertapenem 4/3-4/5.  E. Coli bacteremia (3/21, 3/22). Follow up cultures 3/23-3/25: negative.  Tobraymycin x1 (3/22)  Meropenem/ertapenem as detailed above.  Treat through 4/5/2024.   Colitis seen on CT 3/29; likely relates to parasitic  infection  Blastocystis hominis:  this is a parasite that is endemic in certain areas.  She had 4+ growth on the O&P test, as well as clinical symptoms and colitis on CT.  Consulted transplant ID, who have recommended 2 weeks of oral flagyl (dosed at 500mg qid).  The transplant ID fellow is arranging for follow up in 2-3 weeks from discharge.  If at the 2 week milton, she is still having symptoms, the second treatment option is a medication called paromomycin (please see ID note from 4/2 for details of dosing, etc).    Cough: RVP negative; flu/rsv/covid negative 3/27.  Latent TB: Continue INH/B6 through 3 months post transplant, per ID.   CMV detectible <35 3/30; per ID, follow in clinic, in case it is a contributor to her GI issues.    PPX: acyclovir.  Bactrim to start at day +28.  Note that fluconazole was discontinued upon engraftment, as patient has a prolonged QT interval.   Heme   - Pancytopenia secondary to chemotherapy and multiple myeloma. B12 was checked due to slow rbc recovery.  It is adequate; she is on supplementation. Growth factor is complete.   - transfuse to keep hgb >7g/dL, plt >10k.   - Resume home aspirin upon discharge as thrombocytopenia has resolved.  - INR 1.8, given vitamin K in clinic 3/21. Recheck 3/28 down to 1.3.  FEN/Renal  #Hypocalcemia: Continue oral replacement  #Vitamin D level: adequate. Briefly received high dose ergocalciferol this admission, prior to learning her vitamin D level is adequate.  Discontinued ergocalciferol on discharge.   #Fluid overload related to sepsis management and hypoalbuminemia: resolved with albumin + lasix.   #Lymphedema consult 3/27, wraps placed.  No longer needing them as edema has mostly resolved.   #Severe malnutrition; dietitian following.  CV  #Afib + SVT: paroxysmal afib with RVR and hypotension requiring transfer to MICU; also intermittent SVT.    Amiodarone 0.5mg/hour drip transitioned to PO on 3/24; discussed with cardiology on 3/28 and they  advised okay to stop amiodarone today (3/28) due to rising LFTs.  LFTs now improved after discontinuing amiodarone.   Metoprolol 25mg bid   concern that cardiac processes were being driven by an overall inflammatory state, added decadron 4mg IV daily x 3 days (3/24-3/26).   Electrolytes to be replaced per HIGH sliding scale  Prolonged QTc; continue to hold zofran/zyprexa/fluconazole  Ziopatch placement needed for 7 day ziopatch monitoring.   Per discussion with the cardiology tech department (lead is Connie Morales at [phone]), their process has changed as of January, and BMT providers do not appear to have access to these orders. Requesting that cardiology place this order.  Order for follow up with cardiology placed on discharge.   GI:   #Nausea: compazine tid prn;  she had been on this scheduled this admission, but nausea has resolved.  If it recurs, consider scheduling before meals.    #Colitis: noted on 3/29 CT obtained for abdominal pain; see ID plan.    #Transaminitis: check viral studies (Hep B, Hep C, EBV, CMV, adenovirus)  Hepatitis b and c testing not reactive  CMV < 35; adenovirus negative; EBV negative (3/28)  LFT elevations resolved with stopping amiodarone  Endocrine  #Thyroid FNA Atypia of undetermined significance diagnosis has a 22 (13-30)% risk of malignancy. Repeat FNA (least 4-6 weeks from previous aspiration with optimal time being 12 weeks after last aspiration) , molecular testing, diagnostic lobectomy, or surveillance is recommended.    Has next appt with endocrine 4/16/24.               *TSH WNL 3/21  Neuro/Pain  #Neuropathy: continues on eleni, xanaflex.  #Rib pain: continue oxycodone and back brace, Tylenol prn. Robaxin prn.   Palliative Care consult placed 4/1; they do not believe she is a candidate for long acting pain medication.  Continue prn oxycodone and start prn robaxin.  Okay to use tylenol and can schedule if it is helpful.    MSK: significant deconditioning.  Outpatient PT/OT  "at [facility] ordered upon discharge.   Clinically Significant Risk Factors              # Hypoalbuminemia: Lowest albumin = 2.5 g/dL at 3/24/2024  3:59 AM, will monitor as appropriate  # Coagulation Defect: INR = 1.34 (Ref range: 0.85 - 1.15) and/or PTT = 29 Seconds (Ref range: 22 - 38 Seconds), will monitor for bleeding  # Thrombocytopenia: Lowest platelets = 89 in last 2 days, will monitor for bleeding          # Obesity: Estimated body mass index is 34.19 kg/m  as calculated from the following:    Height as of this encounter: 1.65 m (5' 4.96\").    Weight as of this encounter: 93.1 kg (205 lb 3.2 oz).   # Severe Malnutrition: based on nutrition assessment    # Financial/Environmental Concerns: none       CODE STATUS: FULL CODE  Discharge Instructions and Follow-Up:    Discharge diet: Regular diet as tolerated  Discharge activity: Activity as tolerated   Discharge follow-up: Follow up with BMT Clinic as scheduled 4/3-4/5, then as determined by clinic providers.     Discharge Disposition:    Discharged to home.    Yudelka Richards PA-C  4/2/2024  "

## 2024-04-02 NOTE — PHARMACY
[facility], [facility]  Parenteral ANtibiotic Review at Departure from Acute Care Collaborative Note     Patient: Rosario Terrazas  MRN: [id number]  Allergies: Patient has no known allergies.    Current Location: [facility]  OPAT to be provided by: [facility]       Line Type: Other (double lumen CVC)    Diagnosis/Indications: Polymicrobial bacteremia likley due to gut translocation  Organism(s): ESBL E.coli, Streptococcus mitis/oralis  MRDO? Yes - non-carbapenem-resistant ESBL  Pending Cultures/Microbiological Tests: no      Inpatient ID involved in developing OPAT plan: Yes - discharge OPAT plan has no changes from ID provider, Dr. Luis Miguel Dickerson, OPAT plan charted on 4/2/2024    Outpatient ID Follow-up: Referred to another provider for follow-up  Designated Provider: [facility] BMT team    Antimicrobial Regimen / Route Anticipated  Duration Start Date Stop /  Reassess Date   Ertapenem 1 g every 24 hours/IV 14 days 3/23/2024 (date of first negative blood culture) 4/5/2024     Laboratory Tests and Monitoring Frequency:  (No outpatient laboratory monitoring is warranted strictly due to ertapenem therapy with intended outpatient course < 5 days; will defer laboratory monitoring to BMT team)      Imaging/Miscellaneous Monitoring: None    ID Pharmacist Interventions: None                          Lidia Mehta, PharmD, BCIDP  Pager: [phone]

## 2024-04-02 NOTE — PLAN OF CARE
Goal Outcome Evaluation:      Plan of Care Reviewed With: patient, child    Overall Patient Progress: improvingOverall Patient Progress: improving      Day +19. Alert and oriented. Up with assistance of one and walker. Fair appetite. Nausea controlled with scheduled meds. Reports some abdominal discomfort this evening. No diarrhea. Oxycodone x1 with relief of discomfort. Patient tested positive for Blastocystis Hominis via stool. Placed in enteric precautions. Infectious disease consult ordered.   Continue plan of care.

## 2024-04-02 NOTE — PROGRESS NOTES
"Discharge SBAR:    Situation:  Rosario Terrazas is a 68 year old being discharged to: Home  Admission reason: Scheduled Admission  Is this a readmission? No  Discharge time: 1510  Discharge barrier if discharged after 11AM: medications/cardiology     Background:  Primary diagnosis: MM  BP 93/70 (BP Location: Left arm)   Pulse 81   Temp 97.6  F (36.4  C) (Oral)   Resp 16   Ht 1.65 m (5' 4.96\")   Wt 93.1 kg (205 lb 3.2 oz)   SpO2 99%   BMI 34.19 kg/m    Type of donor: Autologous  Type of stem cells: PBSC  Relapsed? No  Falls Precautions? Yes  Isolation? Yes  DNR? No  DNI? No  Confidential Patient? No  Positive blood cultures? No    Assessment:  Discharge teaching  Review discharge medications and schedule: Yes  Set up pill box: Yes  Cap and Line flushed with caregiver:  Yes-daughter already knows how to do this  Central line teach back and questions complete:  No-has QR codes and dressing kit at home. Patient will be in clinic daily for infusion.  Discharge instructions reviewed: Yes  Special considerations (think about previous reactions, issues with flushing CVC, premedication needs, etc): No    Patient Concerns: No    Recommendations:  Anticipated needs:  Daily infusions: Yes: Ertapenem  Daily transfusions: No  G-CSF: No  Other: Not Applicable  Verbal report called to clinic: No      "

## 2024-04-02 NOTE — PROGRESS NOTES
CLINICAL NUTRITION SERVICES - REASSESSMENT NOTE     Nutrition Prescription    RECOMMENDATIONS FOR MDs/PROVIDERS TO ORDER:  Encourage oral intake     Malnutrition Status:    Moderate malnutrition in the context of acute illness- improved     Recommendations already ordered by Registered Dietitian (RD):  Continue supplement order until discharge     Future/Additional Recommendations:  Continue to monitor appetite, oral intake, use of snacks/supplements   Monitor weight      EVALUATION OF THE PROGRESS TOWARD GOALS   Diet: High Kcal/High Protein + ensure clear + ensure enlive + snacks/supplements PRN   Intake: 0-100%        NEW FINDINGS   Day +20, intake improving per daughter. Plan to discharge today.     Weight Trends:   Date/Time Weight Weight Method   04/02/24 0853 93.1 kg (205 lb 3.2 oz) Standing scale   03/30/24 0752 96.6 kg (213 lb) Standing scale   03/29/24 0939 104.1 kg (229 lb 8 oz) Standing scale   03/26/24 0901 106.5 kg (234 lb 14.4 oz) Standing scale   03/25/24 0833 106 kg (233 lb 9.6 oz) Standing scale   03/22/24 0808 97.1 kg (214 lb 1.6 oz) Standing scale   03/22/24 0525 95.4 kg (210 lb 6.4 oz) Standing scale   Edema much improved- noted down trend in weight from highest weights fluid related     GI: Positive Blastocystis Hominis. Last bowel movement  stools documented.   Skin: Lars 19, skin barrier.     Labs:   Na 139, K+ 3.9, Mg 1.7, Po4 2.6 (WNL)  Glucose 86 (WNL)  WBC 5.8 (WNL)    Medications:   Acyclovir  Calcium carbonate BID  Folic acid  Magnesium sulfate  Protonix  Compazine  Pyrdoxine  Ergocalciferol  +CMV/ Blastocystis     MALNUTRITION  % Intake: < 75% for > 7 days (moderate)  % Weight Loss: Weight loss does not meet criteria  Subcutaneous Fat Loss: Unable to assess  Muscle Loss: Unable to assess  Fluid Accumulation/Edema: Mild  Malnutrition Diagnosis: Moderate malnutrition in the context of acute illness- improved     Previous Goals   Patient to consume % of nutritionally adequate meal  trays TID, or the equivalent with supplements/snacks.  Evaluation: Not met     Achieve dry weight   Evaluation: Met -now below     Previous Nutrition Diagnosis  Inadequate oral intake related to decreased appetite and increased metabolic demand 2/2 transplant as evidenced by minimal intakes x5 days      Evaluation: Improving    CURRENT NUTRITION DIAGNOSIS  Predicted inadequate nutrient intake energy/protein related to abdominal pain/variable appetite as evidenced by intakes 0-100%      INTERVENTIONS  Implementation  Collaboration with other providers  Medical food supplement     Goals  Patient to consume % of nutritionally adequate meal trays TID, or the equivalent with supplements/snacks.    Monitoring/Evaluation  Progress toward goals will be monitored and evaluated per protocol.    Rachel Herrera RD, LD  Available on [employer]:   5c clinical Dietitian (Monday-Friday)  Weekend Clinical dietitian (Saturday-Sunday)    Clinical Nutrition will no longer be available via paging system.

## 2024-04-03 ENCOUNTER — INFUSION THERAPY VISIT (OUTPATIENT)
Dept: TRANSPLANT | Facility: CLINIC | Age: 69
End: 2024-04-03
Attending: INTERNAL MEDICINE
Payer: COMMERCIAL

## 2024-04-03 ENCOUNTER — APPOINTMENT (OUTPATIENT)
Dept: LAB | Facility: CLINIC | Age: 69
End: 2024-04-03
Attending: INTERNAL MEDICINE
Payer: COMMERCIAL

## 2024-04-03 VITALS
TEMPERATURE: 97.9 F | OXYGEN SATURATION: 100 % | BODY MASS INDEX: 34.72 KG/M2 | WEIGHT: 208.4 LBS | SYSTOLIC BLOOD PRESSURE: 100 MMHG | HEART RATE: 98 BPM | RESPIRATION RATE: 18 BRPM | DIASTOLIC BLOOD PRESSURE: 64 MMHG

## 2024-04-03 DIAGNOSIS — C90.01 MULTIPLE MYELOMA IN REMISSION (H): Primary | ICD-10-CM

## 2024-04-03 LAB
ANION GAP SERPL CALCULATED.3IONS-SCNC: 10 MMOL/L (ref 7–15)
BASOPHILS # BLD AUTO: 0 10E3/UL (ref 0–0.2)
BASOPHILS NFR BLD AUTO: 1 %
BUN SERPL-MCNC: 7.5 MG/DL (ref 8–23)
CALCIUM SERPL-MCNC: 8.1 MG/DL (ref 8.8–10.2)
CHLORIDE SERPL-SCNC: 106 MMOL/L (ref 98–107)
CMV DNA SPEC NAA+PROBE-ACNC: <35 IU/ML
CMV DNA SPEC NAA+PROBE-LOG#: <1.5 {LOG_COPIES}/ML
CREAT SERPL-MCNC: 0.4 MG/DL (ref 0.51–0.95)
DEPRECATED HCO3 PLAS-SCNC: 23 MMOL/L (ref 22–29)
EGFRCR SERPLBLD CKD-EPI 2021: >90 ML/MIN/1.73M2
EOSINOPHIL # BLD AUTO: 0 10E3/UL (ref 0–0.7)
EOSINOPHIL NFR BLD AUTO: 0 %
ERYTHROCYTE [DISTWIDTH] IN BLOOD BY AUTOMATED COUNT: 18.1 % (ref 10–15)
GLUCOSE SERPL-MCNC: 103 MG/DL (ref 70–99)
HCT VFR BLD AUTO: 26.4 % (ref 35–47)
HGB BLD-MCNC: 8.7 G/DL (ref 11.7–15.7)
IMM GRANULOCYTES # BLD: 0.2 10E3/UL
IMM GRANULOCYTES NFR BLD: 3 %
LYMPHOCYTES # BLD AUTO: 1.4 10E3/UL (ref 0.8–5.3)
LYMPHOCYTES NFR BLD AUTO: 26 %
MAGNESIUM SERPL-MCNC: 1.9 MG/DL (ref 1.7–2.3)
MCH RBC QN AUTO: 31.5 PG (ref 26.5–33)
MCHC RBC AUTO-ENTMCNC: 33 G/DL (ref 31.5–36.5)
MCV RBC AUTO: 96 FL (ref 78–100)
MONOCYTES # BLD AUTO: 1.1 10E3/UL (ref 0–1.3)
MONOCYTES NFR BLD AUTO: 20 %
NEUTROPHILS # BLD AUTO: 2.6 10E3/UL (ref 1.6–8.3)
NEUTROPHILS NFR BLD AUTO: 50 %
NRBC # BLD AUTO: 0 10E3/UL
NRBC BLD AUTO-RTO: 0 /100
PLATELET # BLD AUTO: 142 10E3/UL (ref 150–450)
POTASSIUM SERPL-SCNC: 3.7 MMOL/L (ref 3.4–5.3)
RBC # BLD AUTO: 2.76 10E6/UL (ref 3.8–5.2)
SODIUM SERPL-SCNC: 139 MMOL/L (ref 135–145)
WBC # BLD AUTO: 5.2 10E3/UL (ref 4–11)

## 2024-04-03 PROCEDURE — 83735 ASSAY OF MAGNESIUM: CPT

## 2024-04-03 PROCEDURE — 36592 COLLECT BLOOD FROM PICC: CPT

## 2024-04-03 PROCEDURE — 99214 OFFICE O/P EST MOD 30 MIN: CPT

## 2024-04-03 PROCEDURE — 250N000011 HC RX IP 250 OP 636: Mod: JZ | Performed by: PHYSICIAN ASSISTANT

## 2024-04-03 PROCEDURE — 96374 THER/PROPH/DIAG INJ IV PUSH: CPT

## 2024-04-03 PROCEDURE — 80048 BASIC METABOLIC PNL TOTAL CA: CPT

## 2024-04-03 PROCEDURE — 258N000003 HC RX IP 258 OP 636: Performed by: STUDENT IN AN ORGANIZED HEALTH CARE EDUCATION/TRAINING PROGRAM

## 2024-04-03 PROCEDURE — 85025 COMPLETE CBC W/AUTO DIFF WBC: CPT

## 2024-04-03 PROCEDURE — 96361 HYDRATE IV INFUSION ADD-ON: CPT

## 2024-04-03 PROCEDURE — G0463 HOSPITAL OUTPT CLINIC VISIT: HCPCS | Mod: 25

## 2024-04-03 PROCEDURE — 250N000011 HC RX IP 250 OP 636: Performed by: INTERNAL MEDICINE

## 2024-04-03 PROCEDURE — 250N000009 HC RX 250: Performed by: PHYSICIAN ASSISTANT

## 2024-04-03 RX ORDER — HEPARIN SODIUM,PORCINE 10 UNIT/ML
5-20 VIAL (ML) INTRAVENOUS DAILY PRN
Status: CANCELLED | OUTPATIENT
Start: 2024-04-04

## 2024-04-03 RX ORDER — HEPARIN SODIUM,PORCINE 10 UNIT/ML
5 VIAL (ML) INTRAVENOUS
Status: DISCONTINUED | OUTPATIENT
Start: 2024-04-03 | End: 2024-04-03 | Stop reason: HOSPADM

## 2024-04-03 RX ORDER — HEPARIN SODIUM,PORCINE 10 UNIT/ML
5-20 VIAL (ML) INTRAVENOUS DAILY PRN
Status: DISCONTINUED | OUTPATIENT
Start: 2024-04-03 | End: 2024-04-03 | Stop reason: HOSPADM

## 2024-04-03 RX ORDER — HEPARIN SODIUM (PORCINE) LOCK FLUSH IV SOLN 100 UNIT/ML 100 UNIT/ML
5 SOLUTION INTRAVENOUS
Status: CANCELLED | OUTPATIENT
Start: 2024-04-04

## 2024-04-03 RX ADMIN — ERTAPENEM SODIUM 1 G: 1 INJECTION, POWDER, LYOPHILIZED, FOR SOLUTION INTRAMUSCULAR; INTRAVENOUS at 07:45

## 2024-04-03 RX ADMIN — Medication 5 ML: at 06:54

## 2024-04-03 RX ADMIN — Medication 5 ML: at 08:17

## 2024-04-03 RX ADMIN — Medication 5 ML: at 06:55

## 2024-04-03 RX ADMIN — SODIUM CHLORIDE 500 ML: 9 INJECTION, SOLUTION INTRAVENOUS at 07:45

## 2024-04-03 ASSESSMENT — PAIN SCALES - GENERAL: PAINLEVEL: SEVERE PAIN (6)

## 2024-04-03 NOTE — NURSING NOTE
"Oncology Rooming Note    April 3, 2024 7:26 AM   Rosario Terrazas is a 68 year old female who presents for:    Chief Complaint   Patient presents with    Blood Draw     Labs drawn via CVC by RN. Flushed with saline and heparin. Pt tolerated well. Vitals taken. Patient checked into next appointment.      Oncology Clinic Visit     Multiple Myeloma     Initial Vitals: /64 (BP Location: Right arm, Patient Position: Sitting, Cuff Size: Adult Regular)   Pulse 98   Temp 97.9  F (36.6  C) (Oral)   Resp 18   Wt 94.5 kg (208 lb 6.4 oz)   SpO2 100%   BMI 34.72 kg/m   Estimated body mass index is 34.72 kg/m  as calculated from the following:    Height as of 3/22/24: 1.65 m (5' 4.96\").    Weight as of this encounter: 94.5 kg (208 lb 6.4 oz). Body surface area is 2.08 meters squared.  Severe Pain (6) Comment: Data Unavailable   No LMP recorded. Patient is postmenopausal.  Allergies reviewed: Yes  Medications reviewed: Yes    Medications: Medication refills not needed today.  Pharmacy name entered into [facility]: [facility] [place], [place] - [address] [place] [address] [phone]    Frailty Screening:   Is the patient here for a new oncology consult visit in cancer care? 2. No      Clinical concerns: Abdominal Pain as well as \"stomach pain.\"       Julieta Lewis LPN  4/3/2024              "

## 2024-04-03 NOTE — PROGRESS NOTES
BMT Clinic Progress Note   April 3, 2024       Patient ID:  Rosario Terrazas is a 68 year old female, currently day 21 s/p auto for MM readmitted 3/21 with N/F and GNB+ sepsis.     INTERVAL  HISTORY   Rosario is seen with her daughter who is translating.   Rosario was discharged yesterday and presents to clinic. She is feeling about he same, with continued occasional belly cramping and pain. Taking oxy prn, not yet scheduling tylenol, discussed it is ok to use tylenol scheduled now that her ANC is well recovered. Her pain is not worse than in the hospital.   She took an imodium yesterday after a loose stool, no stool since. Trying some ugi (porridge) ugali (corn meal) and chicken, small intake. No pretty n/v. No fever. They are monitoring her stools, non bloody so far.   Her edema is resolved.      Review of Systems: ROS negative except as noted above.     PHYSICAL EXAM      Wt Readings from Last 4 Encounters:   04/03/24 94.5 kg (208 lb 6.4 oz)   04/02/24 93.1 kg (205 lb 3.2 oz)   03/21/24 94.7 kg (208 lb 11.2 oz)   03/20/24 94.9 kg (209 lb 4.8 oz)     /64 (BP Location: Right arm, Patient Position: Sitting, Cuff Size: Adult Regular)   Pulse 98   Temp 97.9  F (36.6  C) (Oral)   Resp 18   Wt 94.5 kg (208 lb 6.4 oz)   SpO2 100%   BMI 34.72 kg/m      General: NAD; sitting in chair.   Lungs: CTAB; no crackles  Cardiovascular: RRR  Abdominal/Rectal: +BS, soft; non tender, she has pain over lower ribs   Skin: no rashes or petechaie  Lymph: no edema  Neuro: A&O, moving all extremities spontaneously, PERRL  Additional Findings: Wilder site NT, no drainage.      LABS AND IMAGING - PAST 24 HOURS     Lab Results   Component Value Date    WBC 5.2 04/03/2024    ANEU 3.0 04/02/2024    HGB 8.7 (L) 04/03/2024    HCT 26.4 (L) 04/03/2024     (L) 04/03/2024     04/03/2024    POTASSIUM 3.7 04/03/2024    CHLORIDE 106 04/03/2024    CO2 23 04/03/2024     (H) 04/03/2024    BUN 7.5 (L) 04/03/2024    CR  0.40 (L) 04/03/2024    MAG 1.9 04/03/2024    INR 1.34 (H) 04/01/2024         Rosario Terrazas is a 68 year old female, currently day +21 s/p auto for MM readmitted 3/21 with N/F and GNB+ sepsis.    BMT/IEC PROTOCOL for standard risk MM Auto PBSCT   3/12 Day -1 Melphalan 200 mg/m2   3/13 Day 0 Transplant, cell total post wash 2.23 x 10^6 CD34 + cells/kg (pre freeze dose: 5.36 x 10 ^6 CD34+ cells/kg)    ID  N/F and sepsis 3/22/24.  See below.  Strep mitis bacteremia (3/22). Follow up cultures 3/23-3/25: negative.  Vanco (3/22-3/24)   Meropenem 3/22-3/27)  Ertapenem 3/27-3/29-->colitis developed on this, so changed back to merrem  Meropenem 3/29-4/2--> discovered blastocystic hominis parasite in stool, which can account for the colitis.  Thus, changed back to ertapenem  Ertapenem 4/2-4/5.  EOT is 4/5/24. Infusion appointments ordered daily for ertapenem 4/3-4/5.  E. Coli bacteremia (3/21, 3/22). Follow up cultures 3/23-3/25: negative.  Tobraymycin x1 (3/22)  Meropenem/ertapenem as detailed above.  In clinic giving ertapenem qday 4/3 through 4/5/2024.   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 She had 4+ growth on the O&P test, as well as clinical symptoms and colitis on CT. Consulted transplant ID, who have recommended 2 weeks of oral flagyl (dosed at 500mg qid). ID will follow up 4/23, however if at the 2 week milton, she is still having symptoms, the second treatment option is a medication called paromomycin (please see ID note from 4/2 for details of dosing, etc).    Cough: RVP negative; flu/rsv/covid negative 3/27.  Latent TB: Continue INH/B6 through 3 months post transplant, per ID.   CMV detectible <35 3/30; per ID, follow in clinic, in case it is a contributor to her GI issues.    PPX: acyclovir.  Bactrim to start at day +28.  Note that fluconazole was discontinued upon engraftment, as patient has a prolonged  QT interval.     Heme   - Anemia, thrombocytopenia secondary to chemotherapy and multiple myeloma. All counts improving nicely  - transfuse to keep hgb >7g/dL, plt >10k.   - Resume home aspirin upon discharge as thrombocytopenia has resolved.    FEN/Renal  1/2 L NS for poor intake, cautiously watching fluid levels  #Hypocalcemia: Continue oral replacement  #Vitamin D level: adequate. Briefly received high dose ergocalciferol this admission, prior to learning her vitamin D level was adequate. Discontinued ergocalciferol on discharge.   #Fluid overload related to sepsis management and hypoalbuminemia: resolved with albumin + lasix.   #Lymphedema consult 3/27, wraps placed.  No longer needing them as edema has mostly resolved.   #Severe malnutrition    CV  #Afib + SVT: paroxysmal afib with RVR and hypotension requiring transfer to MICU; also intermittent SVT.    Amiodarone 0.5mg/hour drip transitioned to PO on 3/24; discussed with cardiology on 3/28 and they advised okay to stop amiodarone today (3/28) due to rising LFTs. LFTs now improved after discontinuing amiodarone.   Metoprolol 25mg bid   concern that cardiac processes were being driven by an overall inflammatory state, added decadron 4mg IV daily x 3 days (3/24-3/26).   Electrolytes to be replaced per HIGH sliding scale  Prolonged QTc; continue to hold zofran/zyprexa/fluconazole  Ziopatch placement needed for 7 day ziopatch monitoring. Per discussion with the cardiology tech department (lead is Connie Morales at [phone]), their process has changed as of January, and BMT providers do not appear to have access to these orders. Requesting that cardiology place this order. 6/20 appointment    GI:   #Colitis: noted on 3/29 CT obtained for abdominal pain; see ID plan.  Imodium prn for diarrhea  #Transaminitis: check viral studies (Hep B, Hep C, EBV, CMV, adenovirus)  Hepatitis b and c testing not reactive  CMV < 35; adenovirus negative; EBV negative (3/28)  LFT  elevations resolved with stopping amiodarone    Endocrine  #Thyroid FNA Atypia of undetermined significance diagnosis has a 22 (13-30)% risk of malignancy. Repeat FNA (least 4-6 weeks from previous aspiration with optimal time being 12 weeks after last aspiration) , molecular testing, diagnostic lobectomy, or surveillance is recommended.    Has next appt with endocrine 4/16/24.               *TSH WNL 3/21    Neuro/Pain  #Neuropathy: continues on eleni, xanaflex.  #Rib pain: continue oxycodone and back brace, Tylenol prn. Robaxin prn.   Palliative Care consult placed 4/1; they do not believe she is a candidate for long acting pain medication. Continue prn oxycodone and start prn robaxin. Okay to use tylenol and can schedule if it is helpful.      MSK: significant deconditioning.      Plan:  1/2 L NS for poor intake  Ertapenem infusion    RTC daily through 4/5 (ertapenem) then to be determined for GI concerns      I spent 30 minutes in the care of this patient today, which included time necessary for preparation for the visit, obtaining history, ordering medications/tests/procedures as medically indicated, review of pertinent medical literature, counseling of the patient, communication of recommendations to the care team, and documentation time.     Smita Hargrove PAC  [phone]

## 2024-04-03 NOTE — PROGRESS NOTES
Infusion Nursing Note:  Rosario Terrazas presents today for ertapenem and IV fluids.    Patient seen by provider today: Yes: Smita Hargrove YAO   present during visit today: Not Applicable.    Note:   Pt received 500 mL 0.9NS over 30 minutes.    Pt received ertapenem 1 gram IV push over 5 minutes.      Intravenous Access:  Wilder.    Treatment Conditions:  Lab Results   Component Value Date    HGB 8.7 (L) 04/03/2024    WBC 5.2 04/03/2024    ANEU 3.0 04/02/2024    ANEUTAUTO 2.6 04/03/2024     (L) 04/03/2024        Lab Results   Component Value Date     04/03/2024    POTASSIUM 3.7 04/03/2024    MAG 1.9 04/03/2024    CR 0.40 (L) 04/03/2024    RAMIREZ 8.1 (L) 04/03/2024    BILITOTAL 0.3 04/02/2024    ALBUMIN 3.2 (L) 04/02/2024    ALT 21 04/02/2024    AST 15 04/02/2024       Labs were monitored.      Post Infusion Assessment:  Patient tolerated infusion without incident.  Blood return noted pre and post infusion.  Site patent and intact, free from redness, edema or discomfort.  No evidence of extravasations.  Access discontinued per protocol.       Discharge Plan:   Discharge instructions reviewed with: Patient.  Patient and/or family verbalized understanding of discharge instructions and all questions answered.  Patient discharged in stable condition accompanied by: daughter.  Departure Mode: Ambulatory.      Bree Palumbo RN

## 2024-04-03 NOTE — NURSING NOTE
Chief Complaint   Patient presents with    Blood Draw     Labs drawn via CVC by RN. Flushed with saline and heparin. Pt tolerated well. Vitals taken. Patient checked into next appointment.       Pamela Le RN

## 2024-04-03 NOTE — PROGRESS NOTES
Physical Therapy Discharge Summary    Reason for therapy discharge:    Discharged to home.    Progress towards therapy goal(s). See goals on Care Plan in [facility] electronic health record for goal details.  Goals not met.  Barriers to achieving goals:   discharge from facility.    Therapy recommendation(s):    Continued therapy is recommended.  Rationale/Recommendations:  Patient presents with variable heart rate response to activity going up to the 150s with just standing. Patient is moving with CGA to min A for balance and demonstrates some weakness. Would likely benefit from OP PT to address deconditioning following cancer dx and treatment..

## 2024-04-03 NOTE — LETTER
4/3/2024         RE: Rosario Terrazas  [address] [place] [address]        Dear Colleague,    Thank you for referring your patient, Rosario Terrazas, to the [facility] [place]. Please see a copy of my visit note below.    BMT Clinic Progress Note   April 3, 2024       Patient ID:  Rosario Terrazas is a 68 year old female, currently day 21 s/p auto for MM readmitted 3/21 with N/F and GNB+ sepsis.     INTERVAL  HISTORY   Rosario is seen with her daughter who is translating.   Rosario was discharged yesterday and presents to clinic. She is feeling about he same, with continued occasional belly cramping and pain. Taking oxy prn, not yet scheduling tylenol, discussed it is ok to use tylenol scheduled now that her ANC is well recovered. Her pain is not worse than in the hospital.   She took an imodium yesterday after a loose stool, no stool since. Trying some ugi (porridge) ugali (corn meal) and chicken, small intake. No pretty n/v. No fever. They are monitoring her stools, non bloody so far.   Her edema is resolved.      Review of Systems: ROS negative except as noted above.     PHYSICAL EXAM      Wt Readings from Last 4 Encounters:   04/03/24 94.5 kg (208 lb 6.4 oz)   04/02/24 93.1 kg (205 lb 3.2 oz)   03/21/24 94.7 kg (208 lb 11.2 oz)   03/20/24 94.9 kg (209 lb 4.8 oz)     /64 (BP Location: Right arm, Patient Position: Sitting, Cuff Size: Adult Regular)   Pulse 98   Temp 97.9  F (36.6  C) (Oral)   Resp 18   Wt 94.5 kg (208 lb 6.4 oz)   SpO2 100%   BMI 34.72 kg/m      General: NAD; sitting in chair.   Lungs: CTAB; no crackles  Cardiovascular: RRR  Abdominal/Rectal: +BS, soft; non tender, she has pain over lower ribs   Skin: no rashes or petechaie  Lymph: no edema  Neuro: A&O, moving all extremities spontaneously, PERRL  Additional Findings: Wilder site NT, no drainage.      LABS AND IMAGING - PAST 24 HOURS     Lab Results    Component Value Date    WBC 5.2 04/03/2024    ANEU 3.0 04/02/2024    HGB 8.7 (L) 04/03/2024    HCT 26.4 (L) 04/03/2024     (L) 04/03/2024     04/03/2024    POTASSIUM 3.7 04/03/2024    CHLORIDE 106 04/03/2024    CO2 23 04/03/2024     (H) 04/03/2024    BUN 7.5 (L) 04/03/2024    CR 0.40 (L) 04/03/2024    MAG 1.9 04/03/2024    INR 1.34 (H) 04/01/2024         Rosario Terrazas is a 68 year old female, currently day +21 s/p auto for MM readmitted 3/21 with N/F and GNB+ sepsis.    BMT/IEC PROTOCOL for standard risk MM Auto PBSCT   3/12 Day -1 Melphalan 200 mg/m2   3/13 Day 0 Transplant, cell total post wash 2.23 x 10^6 CD34 + cells/kg (pre freeze dose: 5.36 x 10 ^6 CD34+ cells/kg)    ID  N/F and sepsis 3/22/24.  See below.  Strep mitis bacteremia (3/22). Follow up cultures 3/23-3/25: negative.  Vanco (3/22-3/24)   Meropenem 3/22-3/27)  Ertapenem 3/27-3/29-->colitis developed on this, so changed back to merrem  Meropenem 3/29-4/2--> discovered blastocystic hominis parasite in stool, which can account for the colitis.  Thus, changed back to ertapenem  Ertapenem 4/2-4/5.  EOT is 4/5/24. Infusion appointments ordered daily for ertapenem 4/3-4/5.  E. Coli bacteremia (3/21, 3/22). Follow up cultures 3/23-3/25: negative.  Tobraymycin x1 (3/22)  Meropenem/ertapenem as detailed above.  In clinic giving ertapenem qday 4/3 through 4/5/2024.   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 She had 4+ growth on the O&P test, as well as clinical symptoms and colitis on CT. Consulted transplant ID, who have recommended 2 weeks of oral flagyl (dosed at 500mg qid). ID will follow up 4/23, however if at the 2 week milton, she is still having symptoms, the second treatment option is a medication called paromomycin (please see ID note from 4/2 for details of dosing, etc).    Cough: RVP negative; flu/rsv/covid negative  3/27.  Latent TB: Continue INH/B6 through 3 months post transplant, per ID.   CMV detectible <35 3/30; per ID, follow in clinic, in case it is a contributor to her GI issues.    PPX: acyclovir.  Bactrim to start at day +28.  Note that fluconazole was discontinued upon engraftment, as patient has a prolonged QT interval.     Heme   - Anemia, thrombocytopenia secondary to chemotherapy and multiple myeloma. All counts improving nicely  - transfuse to keep hgb >7g/dL, plt >10k.   - Resume home aspirin upon discharge as thrombocytopenia has resolved.    FEN/Renal  1/2 L NS for poor intake, cautiously watching fluid levels  #Hypocalcemia: Continue oral replacement  #Vitamin D level: adequate. Briefly received high dose ergocalciferol this admission, prior to learning her vitamin D level was adequate. Discontinued ergocalciferol on discharge.   #Fluid overload related to sepsis management and hypoalbuminemia: resolved with albumin + lasix.   #Lymphedema consult 3/27, wraps placed.  No longer needing them as edema has mostly resolved.   #Severe malnutrition    CV  #Afib + SVT: paroxysmal afib with RVR and hypotension requiring transfer to MICU; also intermittent SVT.    Amiodarone 0.5mg/hour drip transitioned to PO on 3/24; discussed with cardiology on 3/28 and they advised okay to stop amiodarone today (3/28) due to rising LFTs. LFTs now improved after discontinuing amiodarone.   Metoprolol 25mg bid   concern that cardiac processes were being driven by an overall inflammatory state, added decadron 4mg IV daily x 3 days (3/24-3/26).   Electrolytes to be replaced per HIGH sliding scale  Prolonged QTc; continue to hold zofran/zyprexa/fluconazole  Ziopatch placement needed for 7 day ziopatch monitoring. Per discussion with the cardiology tech department (lead is Connie Morales at [phone]), their process has changed as of January, and BMT providers do not appear to have access to these orders. Requesting that cardiology place  this order. 6/20 appointment    GI:   #Colitis: noted on 3/29 CT obtained for abdominal pain; see ID plan.  Imodium prn for diarrhea  #Transaminitis: check viral studies (Hep B, Hep C, EBV, CMV, adenovirus)  Hepatitis b and c testing not reactive  CMV < 35; adenovirus negative; EBV negative (3/28)  LFT elevations resolved with stopping amiodarone    Endocrine  #Thyroid FNA Atypia of undetermined significance diagnosis has a 22 (13-30)% risk of malignancy. Repeat FNA (least 4-6 weeks from previous aspiration with optimal time being 12 weeks after last aspiration) , molecular testing, diagnostic lobectomy, or surveillance is recommended.    Has next appt with endocrine 4/16/24.               *TSH WNL 3/21    Neuro/Pain  #Neuropathy: continues on eleni, xanaflex.  #Rib pain: continue oxycodone and back brace, Tylenol prn. Robaxin prn.   Palliative Care consult placed 4/1; they do not believe she is a candidate for long acting pain medication. Continue prn oxycodone and start prn robaxin. Okay to use tylenol and can schedule if it is helpful.      MSK: significant deconditioning.      Plan:  1/2 L NS for poor intake  Ertapenem infusion    RTC daily through 4/5 (ertapenem) then to be determined for GI concerns      I spent 30 minutes in the care of this patient today, which included time necessary for preparation for the visit, obtaining history, ordering medications/tests/procedures as medically indicated, review of pertinent medical literature, counseling of the patient, communication of recommendations to the care team, and documentation time.     Smita Hargrove [facility]  [phone]

## 2024-04-03 NOTE — NURSING NOTE
Chief Complaint   Patient presents with    Infusion     Scheduled ertapenem infusion, add on IV fluids. History of multiple myeloma.     Rachelle Palumbo RN

## 2024-04-03 NOTE — PLAN OF CARE
Occupational Therapy Discharge Summary    Reason for therapy discharge:    Discharged to home.    Progress towards therapy goal(s). See goals on Care Plan in [facility] electronic health record for goal details.  Goals partially met.  Barriers to achieving goals:   discharge from facility.    Therapy recommendation(s):    Continued therapy is recommended.  Rationale/Recommendations:  outpatient OT to progress ADL and IADL independence.

## 2024-04-04 ENCOUNTER — TELEPHONE (OUTPATIENT)
Dept: TRANSPLANT | Facility: CLINIC | Age: 69
End: 2024-04-04

## 2024-04-04 ENCOUNTER — INFUSION THERAPY VISIT (OUTPATIENT)
Dept: TRANSPLANT | Facility: CLINIC | Age: 69
End: 2024-04-04
Attending: INTERNAL MEDICINE
Payer: COMMERCIAL

## 2024-04-04 ENCOUNTER — APPOINTMENT (OUTPATIENT)
Dept: LAB | Facility: CLINIC | Age: 69
End: 2024-04-04
Attending: INTERNAL MEDICINE
Payer: COMMERCIAL

## 2024-04-04 VITALS
TEMPERATURE: 98 F | OXYGEN SATURATION: 98 % | BODY MASS INDEX: 34.82 KG/M2 | WEIGHT: 209 LBS | RESPIRATION RATE: 16 BRPM | SYSTOLIC BLOOD PRESSURE: 107 MMHG | DIASTOLIC BLOOD PRESSURE: 72 MMHG | HEART RATE: 64 BPM

## 2024-04-04 DIAGNOSIS — C90.01 MULTIPLE MYELOMA IN REMISSION (H): Primary | ICD-10-CM

## 2024-04-04 LAB
ANION GAP SERPL CALCULATED.3IONS-SCNC: 9 MMOL/L (ref 7–15)
BASOPHILS # BLD AUTO: 0 10E3/UL (ref 0–0.2)
BASOPHILS NFR BLD AUTO: 1 %
BUN SERPL-MCNC: 6.7 MG/DL (ref 8–23)
CALCIUM SERPL-MCNC: 8.6 MG/DL (ref 8.8–10.2)
CHLORIDE SERPL-SCNC: 106 MMOL/L (ref 98–107)
CREAT SERPL-MCNC: 0.42 MG/DL (ref 0.51–0.95)
DEPRECATED HCO3 PLAS-SCNC: 24 MMOL/L (ref 22–29)
EGFRCR SERPLBLD CKD-EPI 2021: >90 ML/MIN/1.73M2
EOSINOPHIL # BLD AUTO: 0 10E3/UL (ref 0–0.7)
EOSINOPHIL NFR BLD AUTO: 1 %
ERYTHROCYTE [DISTWIDTH] IN BLOOD BY AUTOMATED COUNT: 18.6 % (ref 10–15)
GLUCOSE SERPL-MCNC: 101 MG/DL (ref 70–99)
HCT VFR BLD AUTO: 26.4 % (ref 35–47)
HGB BLD-MCNC: 8.6 G/DL (ref 11.7–15.7)
IMM GRANULOCYTES # BLD: 0.1 10E3/UL
IMM GRANULOCYTES NFR BLD: 2 %
LYMPHOCYTES # BLD AUTO: 1.5 10E3/UL (ref 0.8–5.3)
LYMPHOCYTES NFR BLD AUTO: 25 %
MCH RBC QN AUTO: 31.5 PG (ref 26.5–33)
MCHC RBC AUTO-ENTMCNC: 32.6 G/DL (ref 31.5–36.5)
MCV RBC AUTO: 97 FL (ref 78–100)
MONOCYTES # BLD AUTO: 1 10E3/UL (ref 0–1.3)
MONOCYTES NFR BLD AUTO: 17 %
NEUTROPHILS # BLD AUTO: 3.2 10E3/UL (ref 1.6–8.3)
NEUTROPHILS NFR BLD AUTO: 54 %
NRBC # BLD AUTO: 0 10E3/UL
NRBC BLD AUTO-RTO: 0 /100
PLATELET # BLD AUTO: 160 10E3/UL (ref 150–450)
POTASSIUM SERPL-SCNC: 3.6 MMOL/L (ref 3.4–5.3)
RBC # BLD AUTO: 2.73 10E6/UL (ref 3.8–5.2)
SODIUM SERPL-SCNC: 139 MMOL/L (ref 135–145)
WBC # BLD AUTO: 5.8 10E3/UL (ref 4–11)

## 2024-04-04 PROCEDURE — 85025 COMPLETE CBC W/AUTO DIFF WBC: CPT

## 2024-04-04 PROCEDURE — 250N000009 HC RX 250: Performed by: PHYSICIAN ASSISTANT

## 2024-04-04 PROCEDURE — 250N000011 HC RX IP 250 OP 636: Mod: JZ | Performed by: PHYSICIAN ASSISTANT

## 2024-04-04 PROCEDURE — 96374 THER/PROPH/DIAG INJ IV PUSH: CPT

## 2024-04-04 PROCEDURE — 250N000011 HC RX IP 250 OP 636: Performed by: INTERNAL MEDICINE

## 2024-04-04 PROCEDURE — 80048 BASIC METABOLIC PNL TOTAL CA: CPT

## 2024-04-04 PROCEDURE — 36592 COLLECT BLOOD FROM PICC: CPT

## 2024-04-04 RX ORDER — HEPARIN SODIUM,PORCINE 10 UNIT/ML
5-20 VIAL (ML) INTRAVENOUS DAILY PRN
Status: CANCELLED | OUTPATIENT
Start: 2024-04-05

## 2024-04-04 RX ORDER — HEPARIN SODIUM,PORCINE 10 UNIT/ML
5 VIAL (ML) INTRAVENOUS ONCE
Status: COMPLETED | OUTPATIENT
Start: 2024-04-04 | End: 2024-04-04

## 2024-04-04 RX ORDER — HEPARIN SODIUM (PORCINE) LOCK FLUSH IV SOLN 100 UNIT/ML 100 UNIT/ML
5 SOLUTION INTRAVENOUS
OUTPATIENT
Start: 2024-04-05

## 2024-04-04 RX ORDER — HEPARIN SODIUM,PORCINE 10 UNIT/ML
5-20 VIAL (ML) INTRAVENOUS DAILY PRN
Status: DISCONTINUED | OUTPATIENT
Start: 2024-04-04 | End: 2024-04-04 | Stop reason: HOSPADM

## 2024-04-04 RX ADMIN — ERTAPENEM SODIUM 1 G: 1 INJECTION, POWDER, LYOPHILIZED, FOR SOLUTION INTRAMUSCULAR; INTRAVENOUS at 11:44

## 2024-04-04 RX ADMIN — Medication 5 ML: at 11:52

## 2024-04-04 RX ADMIN — Medication 5 ML: at 11:31

## 2024-04-04 RX ADMIN — Medication 5 ML: at 11:32

## 2024-04-04 ASSESSMENT — PAIN SCALES - GENERAL: PAINLEVEL: MODERATE PAIN (4)

## 2024-04-04 NOTE — TELEPHONE ENCOUNTER
----- Message from Romina Santiago RN sent at 3/13/2024 12:44 PM CDT -----  Regarding: EO DEVON Ponce received their outpatient auto BMT in clinic on 3/13/24 (date) and is ready to schedule for BANs.    Patient needs sedated marrows for BANS: No    ClonoSEQ testing needed? Yes    Line Company:  [employer]     Pharmacy concerns:  No    D/c location:  Home    Other info/concerns:  none    Caregiver:  Daughter Katherin      Long-term BMT MD:  Josefina  Long-term BMT NC:  Romina [place] :  Niesha       4/3-Made most of appointments through day 180, added notes to day 21 appts    4/4-Made letter, called daughter

## 2024-04-04 NOTE — NURSING NOTE
Chief Complaint   Patient presents with    Blood Draw     Labs collected from CVC by RN, line flushed with saline and heparin.  Vitals taken. Pt checked in for appointment(s).       Labs collected from CVC by RN, line flushed with saline and heparin.  Vitals taken. Pt checked in for appointment(s).     Heaven Correia RN

## 2024-04-04 NOTE — PROGRESS NOTES
Infusion Nursing Note:  Rosario Terrazas presents today for Ertapenem.    Patient seen by provider today: No   present during visit today: Yes, Language: Kisii.     Note: Lab results monitored. Pt received ertopenem infusion over 5 minutes, tolerated well. Pt reported feeling well overall. Pt discharged with no further concerns at this time.       Intravenous Access:  Wilder.    Treatment Conditions:  Lab Results   Component Value Date    HGB 8.6 (L) 04/04/2024    WBC 5.8 04/04/2024    ANEU 3.0 04/02/2024    ANEUTAUTO 3.2 04/04/2024     04/04/2024        Lab Results   Component Value Date     04/04/2024    POTASSIUM 3.6 04/04/2024    MAG 1.9 04/03/2024    CR 0.42 (L) 04/04/2024    RAMIREZ 8.6 (L) 04/04/2024    BILITOTAL 0.3 04/02/2024    ALBUMIN 3.2 (L) 04/02/2024    ALT 21 04/02/2024    AST 15 04/02/2024       Results reviewed, labs MET treatment parameters, ok to proceed with treatment.      Post Infusion Assessment:  Patient tolerated infusion without incident.  Blood return noted pre and post infusion.       Discharge Plan:   Patient and/or family verbalized understanding of discharge instructions and all questions answered.  Patient discharged in stable condition accompanied by: daughter.      Elizabeth Obregon RN

## 2024-04-05 ENCOUNTER — APPOINTMENT (OUTPATIENT)
Dept: LAB | Facility: CLINIC | Age: 69
End: 2024-04-05
Attending: INTERNAL MEDICINE
Payer: COMMERCIAL

## 2024-04-05 ENCOUNTER — ONCOLOGY VISIT (OUTPATIENT)
Dept: TRANSPLANT | Facility: CLINIC | Age: 69
End: 2024-04-05
Attending: INTERNAL MEDICINE
Payer: COMMERCIAL

## 2024-04-05 VITALS
SYSTOLIC BLOOD PRESSURE: 91 MMHG | OXYGEN SATURATION: 99 % | BODY MASS INDEX: 34.82 KG/M2 | RESPIRATION RATE: 18 BRPM | HEART RATE: 79 BPM | DIASTOLIC BLOOD PRESSURE: 61 MMHG | WEIGHT: 209 LBS | TEMPERATURE: 98.2 F

## 2024-04-05 DIAGNOSIS — C90.01 MULTIPLE MYELOMA IN REMISSION (H): ICD-10-CM

## 2024-04-05 DIAGNOSIS — C90.01 MULTIPLE MYELOMA IN REMISSION (H): Primary | ICD-10-CM

## 2024-04-05 DIAGNOSIS — C90.00 MULTIPLE MYELOMA, REMISSION STATUS UNSPECIFIED (H): ICD-10-CM

## 2024-04-05 LAB
ANION GAP SERPL CALCULATED.3IONS-SCNC: 11 MMOL/L (ref 7–15)
BASOPHILS # BLD AUTO: 0 10E3/UL (ref 0–0.2)
BASOPHILS NFR BLD AUTO: 1 %
BUN SERPL-MCNC: 7.8 MG/DL (ref 8–23)
CALCIUM SERPL-MCNC: 9 MG/DL (ref 8.8–10.2)
CHLORIDE SERPL-SCNC: 105 MMOL/L (ref 98–107)
CREAT SERPL-MCNC: 0.51 MG/DL (ref 0.51–0.95)
DEPRECATED HCO3 PLAS-SCNC: 22 MMOL/L (ref 22–29)
EGFRCR SERPLBLD CKD-EPI 2021: >90 ML/MIN/1.73M2
EOSINOPHIL # BLD AUTO: 0 10E3/UL (ref 0–0.7)
EOSINOPHIL NFR BLD AUTO: 1 %
ERYTHROCYTE [DISTWIDTH] IN BLOOD BY AUTOMATED COUNT: 18.8 % (ref 10–15)
GLUCOSE SERPL-MCNC: 131 MG/DL (ref 70–99)
HCT VFR BLD AUTO: 26.4 % (ref 35–47)
HGB BLD-MCNC: 8.6 G/DL (ref 11.7–15.7)
IMM GRANULOCYTES # BLD: 0.1 10E3/UL
IMM GRANULOCYTES NFR BLD: 1 %
INR PPP: 1.42 (ref 0.85–1.15)
LYMPHOCYTES # BLD AUTO: 0.8 10E3/UL (ref 0.8–5.3)
LYMPHOCYTES NFR BLD AUTO: 18 %
MCH RBC QN AUTO: 31.6 PG (ref 26.5–33)
MCHC RBC AUTO-ENTMCNC: 32.6 G/DL (ref 31.5–36.5)
MCV RBC AUTO: 97 FL (ref 78–100)
MONOCYTES # BLD AUTO: 0.9 10E3/UL (ref 0–1.3)
MONOCYTES NFR BLD AUTO: 20 %
NEUTROPHILS # BLD AUTO: 2.6 10E3/UL (ref 1.6–8.3)
NEUTROPHILS NFR BLD AUTO: 59 %
NRBC # BLD AUTO: 0 10E3/UL
NRBC BLD AUTO-RTO: 0 /100
PLATELET # BLD AUTO: 190 10E3/UL (ref 150–450)
POTASSIUM SERPL-SCNC: 3.2 MMOL/L (ref 3.4–5.3)
RBC # BLD AUTO: 2.72 10E6/UL (ref 3.8–5.2)
SODIUM SERPL-SCNC: 138 MMOL/L (ref 135–145)
URATE SERPL-MCNC: 4.6 MG/DL (ref 2.4–5.7)
WBC # BLD AUTO: 4.5 10E3/UL (ref 4–11)

## 2024-04-05 PROCEDURE — 84550 ASSAY OF BLOOD/URIC ACID: CPT | Performed by: INTERNAL MEDICINE

## 2024-04-05 PROCEDURE — 99215 OFFICE O/P EST HI 40 MIN: CPT

## 2024-04-05 PROCEDURE — 250N000009 HC RX 250: Performed by: PHYSICIAN ASSISTANT

## 2024-04-05 PROCEDURE — 36592 COLLECT BLOOD FROM PICC: CPT

## 2024-04-05 PROCEDURE — 85610 PROTHROMBIN TIME: CPT | Performed by: INTERNAL MEDICINE

## 2024-04-05 PROCEDURE — 80048 BASIC METABOLIC PNL TOTAL CA: CPT

## 2024-04-05 PROCEDURE — 96374 THER/PROPH/DIAG INJ IV PUSH: CPT

## 2024-04-05 PROCEDURE — 250N000011 HC RX IP 250 OP 636: Performed by: PHYSICIAN ASSISTANT

## 2024-04-05 PROCEDURE — 250N000011 HC RX IP 250 OP 636: Performed by: INTERNAL MEDICINE

## 2024-04-05 PROCEDURE — G0463 HOSPITAL OUTPT CLINIC VISIT: HCPCS

## 2024-04-05 PROCEDURE — 85025 COMPLETE CBC W/AUTO DIFF WBC: CPT | Performed by: INTERNAL MEDICINE

## 2024-04-05 RX ORDER — HEPARIN SODIUM,PORCINE 10 UNIT/ML
5 VIAL (ML) INTRAVENOUS
Status: DISCONTINUED | OUTPATIENT
Start: 2024-04-05 | End: 2024-04-05 | Stop reason: HOSPADM

## 2024-04-05 RX ORDER — HEPARIN SODIUM (PORCINE) LOCK FLUSH IV SOLN 100 UNIT/ML 100 UNIT/ML
5 SOLUTION INTRAVENOUS
OUTPATIENT
Start: 2024-04-05

## 2024-04-05 RX ORDER — HEPARIN SODIUM,PORCINE 10 UNIT/ML
5-20 VIAL (ML) INTRAVENOUS DAILY PRN
Status: DISCONTINUED | OUTPATIENT
Start: 2024-04-05 | End: 2024-04-05 | Stop reason: HOSPADM

## 2024-04-05 RX ORDER — GABAPENTIN 100 MG/1
100 CAPSULE ORAL 3 TIMES DAILY
Qty: 90 CAPSULE | Refills: 0 | Status: SHIPPED | OUTPATIENT
Start: 2024-04-05 | End: 2024-04-27

## 2024-04-05 RX ORDER — POTASSIUM CHLORIDE 1500 MG/1
20 TABLET, EXTENDED RELEASE ORAL 2 TIMES DAILY
Qty: 30 TABLET | Refills: 0 | Status: SHIPPED | OUTPATIENT
Start: 2024-04-05 | End: 2024-04-08

## 2024-04-05 RX ORDER — HEPARIN SODIUM,PORCINE 10 UNIT/ML
5-20 VIAL (ML) INTRAVENOUS DAILY PRN
OUTPATIENT
Start: 2024-04-05

## 2024-04-05 RX ORDER — TRIAMCINOLONE ACETONIDE 1 MG/G
CREAM TOPICAL 2 TIMES DAILY
Qty: 80 G | Refills: 0 | Status: SHIPPED | OUTPATIENT
Start: 2024-04-05

## 2024-04-05 RX ADMIN — ERTAPENEM SODIUM 1 G: 1 INJECTION, POWDER, LYOPHILIZED, FOR SOLUTION INTRAMUSCULAR; INTRAVENOUS at 08:21

## 2024-04-05 RX ADMIN — Medication 5 ML: at 09:00

## 2024-04-05 RX ADMIN — Medication 5 ML: at 07:59

## 2024-04-05 ASSESSMENT — PAIN SCALES - GENERAL: PAINLEVEL: MODERATE PAIN (4)

## 2024-04-05 NOTE — PROGRESS NOTES
Infusion Nursing Note:  Rosario Terrazas presents today for Ertapenem infusion.    Patient seen by provider today: Yes: mSita Hargrove, YAO   present during visit today: Yes, Language: KIsii.     Note: Pt received Ertapenem infusion over 5 minutes, tolerated well. Allergies and home medications reviewed. Potassium replacement needed, pharmacy given pt oral dose.       Intravenous Access:  Wilder.    Treatment Conditions:  Lab Results   Component Value Date    HGB 8.6 (L) 04/05/2024    WBC 4.5 04/05/2024    ANEU 3.0 04/02/2024    ANEUTAUTO 2.6 04/05/2024     04/05/2024        Lab Results   Component Value Date     04/05/2024    POTASSIUM 3.2 (L) 04/05/2024    MAG 1.9 04/03/2024    CR 0.51 04/05/2024    RAMIREZ 9.0 04/05/2024    BILITOTAL 0.3 04/02/2024    ALBUMIN 3.2 (L) 04/02/2024    ALT 21 04/02/2024    AST 15 04/02/2024       Results reviewed, labs MET treatment parameters, ok to proceed with treatment.      Post Infusion Assessment:  Patient tolerated infusion without incident.  Blood return noted pre and post infusion.       Discharge Plan:   Patient and/or family verbalized understanding of discharge instructions and all questions answered.  Patient discharged in stable condition accompanied by: daughter.      Elizabeth Obregon RN

## 2024-04-05 NOTE — NURSING NOTE
"Oncology Rooming Note    April 5, 2024 3:40 PM   Rosario Terrazas is a 68 year old female who presents for:    Chief Complaint   Patient presents with    Blood Draw     Labs drawn via CVC by RN in lab. VS taken.     Oncology Clinic Visit     BMT YAO visit, s/p BMT hx MM     Initial Vitals: BP 91/61   Pulse 79   Temp 98.2  F (36.8  C) (Oral)   Resp 18   Wt 94.8 kg (209 lb)   SpO2 99%   BMI 34.82 kg/m   Estimated body mass index is 34.82 kg/m  as calculated from the following:    Height as of 3/22/24: 1.65 m (5' 4.96\").    Weight as of this encounter: 94.8 kg (209 lb). Body surface area is 2.08 meters squared.  Moderate Pain (4) Comment: Data Unavailable   No LMP recorded. Patient is postmenopausal.  Allergies reviewed: Yes  Medications reviewed: Yes    Medications: MEDICATION REFILLS NEEDED TODAY. Provider was notified.  Pharmacy name entered into [facility]: [place], [place] - [address] [place] [address] [phone]    Frailty Screening:   Is the patient here for a new oncology consult visit in cancer care? 2. No      Clinical concerns: mireya Obregon RN              "

## 2024-04-05 NOTE — LETTER
4/5/2024         RE: Rosario Terrazas  [address] [place] [address]        Dear Colleague,    Thank you for referring your patient, Rosario Terrazas, to the [facility] [place]. Please see a copy of my visit note below.    BMT Clinic Progress Note   April 5, 2024       Patient ID:  Rosario Terrazas is a 68 year old female, currently day 23 s/p auto for MM readmitted 3/21 with N/F and GNB+ sepsis.     INTERVAL  HISTORY   Rosario is seen with her daughter who is translating.   Rosario has very modestly improved belly cramping and pain. Not worse. Taking oxycodone 2-3x/day and tylenol 3x/day.   Not taking imodium, having 1-3 small stools/day. Daughter is monitoring her stools, non bloody.   Notes rash over her left elbow, new yesterday. Not itching, not painful. No SOB or swelling anywhere.  Continuing to eat small amounts of ugi (porridge) ugali (corn meal) and chicken. Able to push fluids. No pretty n/v.   No fever.   Her edema is resolved.   Walking up and down her stairs throughout the day without dizziness or light headedness.     Review of Systems: ROS negative except as noted above.     PHYSICAL EXAM      Wt Readings from Last 4 Encounters:   04/05/24 94.8 kg (209 lb)   04/04/24 94.8 kg (209 lb)   04/03/24 94.5 kg (208 lb 6.4 oz)   04/02/24 93.1 kg (205 lb 3.2 oz)     BP 91/61   Pulse 79   Temp 98.2  F (36.8  C) (Oral)   Resp 18   Wt 94.8 kg (209 lb)   SpO2 99%   BMI 34.82 kg/m      General: NAD; sitting in chair.   Lungs: CTAB; no crackles  Cardiovascular: RRR  Abdominal/Rectal: +BS, soft; non tender, she has chronic pain over lower ribs   Skin: sandpaper rough, fine papular rash hyperpigmented on left elbow, a few spots on right. No edema, no induration.  Lymph: no edema  Neuro: A&O, moving all extremities spontaneously, PERRL  Additional Findings: Wilder site NT, no drainage.      LABS AND IMAGING - PAST 24 HOURS     Lab  Results   Component Value Date    WBC 4.5 04/05/2024    ANEU 3.0 04/02/2024    HGB 8.6 (L) 04/05/2024    HCT 26.4 (L) 04/05/2024     04/05/2024     04/05/2024    POTASSIUM 3.2 (L) 04/05/2024    CHLORIDE 105 04/05/2024    CO2 22 04/05/2024     (H) 04/05/2024    BUN 7.8 (L) 04/05/2024    CR 0.51 04/05/2024    MAG 1.9 04/03/2024    INR 1.42 (H) 04/05/2024         Rosario Terrazas is a 68 year old female, currently day +23 s/p auto for MM readmitted 3/21 with N/F and GNB+ sepsis.    BMT/IEC PROTOCOL for standard risk MM Auto PBSCT   3/12 Day -1 Melphalan 200 mg/m2   3/13 Day 0 Transplant, cell total post wash 2.23 x 10^6 CD34 + cells/kg (pre freeze dose: 5.36 x 10 ^6 CD34+ cells/kg)    ID  N/F and sepsis 3/22/24.  See below.  Strep mitis bacteremia (3/22). Follow up cultures 3/23-3/25: negative.  Vanco (3/22-3/24)   Meropenem 3/22-3/27)  Ertapenem 3/27-3/29-->colitis developed on this, so changed back to merrem  Meropenem 3/29-4/2--> discovered blastocystic hominis parasite in stool, which can account for the colitis.  Thus, changed back to ertapenem  Ertapenem 4/2-4/5.  EOT is 4/5/24. Infusion appointments ordered daily for ertapenem 4/3-4/5. Now complete  E. Coli bacteremia (3/21, 3/22). Follow up cultures 3/23-3/25: negative.  Tobraymycin x1 (3/22)  Meropenem/ertapenem as detailed above.  In clinic giving ertapenem qday 4/3 through 4/5/2024, last dose given.  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 She had 4+ growth on the O&P test, as well as clinical symptoms and colitis on CT. Consulted transplant ID, who have recommended 2 weeks of oral flagyl (dosed at 500mg qid 4/2-4/15). ID will follow up 4/23, however if at the 2 week milton, she is still having symptoms, the second treatment option is a medication called paromomycin (please see ID note from 4/2 for details of dosing, etc).    Cough: RVP  negative; flu/rsv/covid negative 3/27.  Latent TB: Continue INH/B6 through 3 months post transplant, per ID.   CMV detectible <35 3/30; per ID, follow in clinic, in case it is a contributor to her GI issues.    PPX: acyclovir. Bactrim to start at day +28.  Note that fluconazole was discontinued upon engraftment, as patient has a prolonged QT interval.     Heme   - Anemia, thrombocytopenia secondary to chemotherapy and multiple myeloma. All counts improving nicely  - transfuse to keep hgb >7g/dL, plt >10k.   - Resume home aspirin upon discharge as thrombocytopenia has resolved.    FEN/Renal  #Hypocalcemia: Continue oral replacement  #Vitamin D level: adequate. Briefly received high dose ergocalciferol this admission, prior to learning her vitamin D level was adequate. Discontinued ergocalciferol on discharge.   #Lymphedema consult 3/27, wraps placed.  No longer needing them as edema has mostly resolved.   #Severe malnutrition    CV  #Afib + SVT: paroxysmal afib with RVR and hypotension requiring transfer to MICU; also intermittent SVT.    Amiodarone 0.5mg/hour drip transitioned to PO on 3/24; discussed with cardiology on 3/28 and they advised okay to stop amiodarone today (3/28) due to rising LFTs. LFTs now improved after discontinuing amiodarone.   Metoprolol 25mg bid   concern that cardiac processes were being driven by an overall inflammatory state, added decadron 4mg IV daily x 3 days (3/24-3/26).   Electrolytes to be replaced per HIGH sliding scale.   HypoK 60 meq potassium today, 40meq daily BID  Prolonged QTc; continue to hold zofran/zyprexa/fluconazole  Ziopatch placement needed for 7 day ziopatch monitoring. Per discussion with the cardiology tech department (lead is Connie Morales at [phone]), their process has changed as of January, and BMT providers do not appear to have access to these orders. Requesting that cardiology place this order. 6/20 appointment    GI:   #Colitis: noted on 3/29 CT obtained for  abdominal pain; see ID plan.  Imodium prn for diarrhea  #Transaminitis: check viral studies (Hep B, Hep C, EBV, CMV, adenovirus)  Hepatitis b and c testing not reactive  CMV < 35; adenovirus negative; EBV negative (3/28)  LFT elevations resolved with stopping amiodarone    Endocrine  #Thyroid FNA Atypia of undetermined significance diagnosis has a 22 (13-30)% risk of malignancy. Repeat FNA (least 4-6 weeks from previous aspiration with optimal time being 12 weeks after last aspiration) , molecular testing, diagnostic lobectomy, or surveillance is recommended.    Has next appt with endocrine 4/16/24.               *TSH WNL 3/21    Neuro/Pain  #Neuropathy: continues on eleni, xanaflex.  #Rib pain: continue oxycodone and back brace, Tylenol prn. Robaxin prn.   #Abdominal pain: Palliative Care consult placed 4/1; they do not believe she is a candidate for long acting pain medication. Continue prn oxycodone and start prn robaxin. Okay to use tylenol and can schedule if it is helpful.      MSK: significant deconditioning.    SKIN: new rash, possibly related to flagyl? She will try topical tmc and call if worsening      Plan:  60meq potassium today (spread out over day), then 20meq BID over weekend  Ertapenem infusion, last dose today  TCM cream to elbows   Continue flagyl  Call with any new or worsening sx    RTC Monday for lyte check, sx check off ertapenem  Line removal requested    I spent 40 minutes in the care of this patient today, which included time necessary for preparation for the visit, obtaining history, ordering medications/tests/procedures as medically indicated, review of pertinent medical literature, counseling of the patient, communication of recommendations to the care team, and documentation time.     Smita Hargrove PAC  [phone]

## 2024-04-05 NOTE — PROGRESS NOTES
BMT Clinic Progress Note   April 5, 2024       Patient ID:  Rosario Terrazas is a 68 year old female, currently day 23 s/p auto for MM readmitted 3/21 with N/F and GNB+ sepsis.     INTERVAL  HISTORY   Rosario is seen with her daughter who is translating.   Rosario has very modestly improved belly cramping and pain. Not worse. Taking oxycodone 2-3x/day and tylenol 3x/day.   Not taking imodium, having 1-3 small stools/day. Daughter is monitoring her stools, non bloody.   Notes rash over her left elbow, new yesterday. Not itching, not painful. No SOB or swelling anywhere.  Continuing to eat small amounts of ugi (porridge) ugali (corn meal) and chicken. Able to push fluids. No pretty n/v.   No fever.   Her edema is resolved.   Walking up and down her stairs throughout the day without dizziness or light headedness.     Review of Systems: ROS negative except as noted above.     PHYSICAL EXAM      Wt Readings from Last 4 Encounters:   04/05/24 94.8 kg (209 lb)   04/04/24 94.8 kg (209 lb)   04/03/24 94.5 kg (208 lb 6.4 oz)   04/02/24 93.1 kg (205 lb 3.2 oz)     BP 91/61   Pulse 79   Temp 98.2  F (36.8  C) (Oral)   Resp 18   Wt 94.8 kg (209 lb)   SpO2 99%   BMI 34.82 kg/m      General: NAD; sitting in chair.   Lungs: CTAB; no crackles  Cardiovascular: RRR  Abdominal/Rectal: +BS, soft; non tender, she has chronic pain over lower ribs   Skin: sandpaper rough, fine papular rash hyperpigmented on left elbow, a few spots on right. No edema, no induration.  Lymph: no edema  Neuro: A&O, moving all extremities spontaneously, PERRL  Additional Findings: Wilder site NT, no drainage.      LABS AND IMAGING - PAST 24 HOURS     Lab Results   Component Value Date    WBC 4.5 04/05/2024    ANEU 3.0 04/02/2024    HGB 8.6 (L) 04/05/2024    HCT 26.4 (L) 04/05/2024     04/05/2024     04/05/2024    POTASSIUM 3.2 (L) 04/05/2024    CHLORIDE 105 04/05/2024    CO2 22 04/05/2024     (H) 04/05/2024    BUN 7.8 (L)  04/05/2024    CR 0.51 04/05/2024    MAG 1.9 04/03/2024    INR 1.42 (H) 04/05/2024         Rosario Terrazas is a 68 year old female, currently day +23 s/p auto for MM readmitted 3/21 with N/F and GNB+ sepsis.    BMT/IEC PROTOCOL for standard risk MM Auto PBSCT   3/12 Day -1 Melphalan 200 mg/m2   3/13 Day 0 Transplant, cell total post wash 2.23 x 10^6 CD34 + cells/kg (pre freeze dose: 5.36 x 10 ^6 CD34+ cells/kg)    ID  N/F and sepsis 3/22/24.  See below.  Strep mitis bacteremia (3/22). Follow up cultures 3/23-3/25: negative.  Vanco (3/22-3/24)   Meropenem 3/22-3/27)  Ertapenem 3/27-3/29-->colitis developed on this, so changed back to merrem  Meropenem 3/29-4/2--> discovered blastocystic hominis parasite in stool, which can account for the colitis.  Thus, changed back to ertapenem  Ertapenem 4/2-4/5.  EOT is 4/5/24. Infusion appointments ordered daily for ertapenem 4/3-4/5. Now complete  E. Coli bacteremia (3/21, 3/22). Follow up cultures 3/23-3/25: negative.  Tobraymycin x1 (3/22)  Meropenem/ertapenem as detailed above.  In clinic giving ertapenem qday 4/3 through 4/5/2024, last dose given.  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 She had 4+ growth on the O&P test, as well as clinical symptoms and colitis on CT. Consulted transplant ID, who have recommended 2 weeks of oral flagyl (dosed at 500mg qid 4/2-4/15). ID will follow up 4/23, however if at the 2 week milton, she is still having symptoms, the second treatment option is a medication called paromomycin (please see ID note from 4/2 for details of dosing, etc).    Cough: RVP negative; flu/rsv/covid negative 3/27.  Latent TB: Continue INH/B6 through 3 months post transplant, per ID.   CMV detectible <35 3/30; per ID, follow in clinic, in case it is a contributor to her GI issues.    PPX: acyclovir. Bactrim to start at day +28.  Note that fluconazole was  discontinued upon engraftment, as patient has a prolonged QT interval.     Heme   - Anemia, thrombocytopenia secondary to chemotherapy and multiple myeloma. All counts improving nicely  - transfuse to keep hgb >7g/dL, plt >10k.   - Resume home aspirin upon discharge as thrombocytopenia has resolved.    FEN/Renal  #Hypocalcemia: Continue oral replacement  #Vitamin D level: adequate. Briefly received high dose ergocalciferol this admission, prior to learning her vitamin D level was adequate. Discontinued ergocalciferol on discharge.   #Lymphedema consult 3/27, wraps placed.  No longer needing them as edema has mostly resolved.   #Severe malnutrition    CV  #Afib + SVT: paroxysmal afib with RVR and hypotension requiring transfer to MICU; also intermittent SVT.    Amiodarone 0.5mg/hour drip transitioned to PO on 3/24; discussed with cardiology on 3/28 and they advised okay to stop amiodarone today (3/28) due to rising LFTs. LFTs now improved after discontinuing amiodarone.   Metoprolol 25mg bid   concern that cardiac processes were being driven by an overall inflammatory state, added decadron 4mg IV daily x 3 days (3/24-3/26).   Electrolytes to be replaced per HIGH sliding scale.   HypoK 60 meq potassium today, 40meq daily BID  Prolonged QTc; continue to hold zofran/zyprexa/fluconazole  Ziopatch placement needed for 7 day ziopatch monitoring. Per discussion with the cardiology tech department (lead is Connie Morales at [phone]), their process has changed as of January, and BMT providers do not appear to have access to these orders. Requesting that cardiology place this order. 6/20 appointment    GI:   #Colitis: noted on 3/29 CT obtained for abdominal pain; see ID plan.  Imodium prn for diarrhea  #Transaminitis: check viral studies (Hep B, Hep C, EBV, CMV, adenovirus)  Hepatitis b and c testing not reactive  CMV < 35; adenovirus negative; EBV negative (3/28)  LFT elevations resolved with stopping  amiodarone    Endocrine  #Thyroid FNA Atypia of undetermined significance diagnosis has a 22 (13-30)% risk of malignancy. Repeat FNA (least 4-6 weeks from previous aspiration with optimal time being 12 weeks after last aspiration) , molecular testing, diagnostic lobectomy, or surveillance is recommended.    Has next appt with endocrine 4/16/24.               *TSH WNL 3/21    Neuro/Pain  #Neuropathy: continues on eleni, xanaflex.  #Rib pain: continue oxycodone and back brace, Tylenol prn. Robaxin prn.   #Abdominal pain: Palliative Care consult placed 4/1; they do not believe she is a candidate for long acting pain medication. Continue prn oxycodone and start prn robaxin. Okay to use tylenol and can schedule if it is helpful.      MSK: significant deconditioning.    SKIN: new rash, possibly related to flagyl? She will try topical tmc and call if worsening      Plan:  60meq potassium today (spread out over day), then 20meq BID over weekend  Ertapenem infusion, last dose today  TCM cream to elbows   Continue flagyl  Call with any new or worsening sx    RTC Monday for lyte check, sx check off ertapenem  Line removal requested    I spent 40 minutes in the care of this patient today, which included time necessary for preparation for the visit, obtaining history, ordering medications/tests/procedures as medically indicated, review of pertinent medical literature, counseling of the patient, communication of recommendations to the care team, and documentation time.     Smita Hargrove PAC  [phone]

## 2024-04-08 ENCOUNTER — APPOINTMENT (OUTPATIENT)
Dept: LAB | Facility: CLINIC | Age: 69
End: 2024-04-08
Attending: PHYSICIAN ASSISTANT
Payer: COMMERCIAL

## 2024-04-08 ENCOUNTER — ONCOLOGY VISIT (OUTPATIENT)
Dept: TRANSPLANT | Facility: CLINIC | Age: 69
End: 2024-04-08
Payer: COMMERCIAL

## 2024-04-08 VITALS
DIASTOLIC BLOOD PRESSURE: 58 MMHG | OXYGEN SATURATION: 100 % | TEMPERATURE: 97.9 F | RESPIRATION RATE: 18 BRPM | SYSTOLIC BLOOD PRESSURE: 91 MMHG | HEART RATE: 72 BPM | WEIGHT: 207.8 LBS | BODY MASS INDEX: 34.62 KG/M2

## 2024-04-08 DIAGNOSIS — C90.00 MULTIPLE MYELOMA, REMISSION STATUS UNSPECIFIED (H): ICD-10-CM

## 2024-04-08 DIAGNOSIS — C90.01 MULTIPLE MYELOMA IN REMISSION (H): ICD-10-CM

## 2024-04-08 LAB
ACANTHOCYTES BLD QL SMEAR: NORMAL
ALBUMIN SERPL BCG-MCNC: 3.5 G/DL (ref 3.5–5.2)
ALP SERPL-CCNC: 48 U/L (ref 40–150)
ALT SERPL W P-5'-P-CCNC: 18 U/L (ref 0–50)
ANION GAP SERPL CALCULATED.3IONS-SCNC: 9 MMOL/L (ref 7–15)
AST SERPL W P-5'-P-CCNC: 19 U/L (ref 0–45)
AUER BODIES BLD QL SMEAR: NORMAL
BASO STIPL BLD QL SMEAR: NORMAL
BASOPHILS # BLD AUTO: 0 10E3/UL (ref 0–0.2)
BASOPHILS NFR BLD AUTO: 1 %
BILIRUB SERPL-MCNC: 0.3 MG/DL
BITE CELLS BLD QL SMEAR: NORMAL
BLISTER CELLS BLD QL SMEAR: NORMAL
BUN SERPL-MCNC: 8.2 MG/DL (ref 8–23)
BURR CELLS BLD QL SMEAR: NORMAL
CALCIUM SERPL-MCNC: 9.2 MG/DL (ref 8.8–10.2)
CHLORIDE SERPL-SCNC: 106 MMOL/L (ref 98–107)
CREAT SERPL-MCNC: 0.52 MG/DL (ref 0.51–0.95)
DACRYOCYTES BLD QL SMEAR: NORMAL
DEPRECATED HCO3 PLAS-SCNC: 22 MMOL/L (ref 22–29)
EGFRCR SERPLBLD CKD-EPI 2021: >90 ML/MIN/1.73M2
ELLIPTOCYTES BLD QL SMEAR: NORMAL
EOSINOPHIL # BLD AUTO: 0.1 10E3/UL (ref 0–0.7)
EOSINOPHIL NFR BLD AUTO: 2 %
ERYTHROCYTE [DISTWIDTH] IN BLOOD BY AUTOMATED COUNT: 20.1 % (ref 10–15)
FRAGMENTS BLD QL SMEAR: NORMAL
GLUCOSE SERPL-MCNC: 99 MG/DL (ref 70–99)
HCT VFR BLD AUTO: 26.5 % (ref 35–47)
HGB BLD-MCNC: 8.6 G/DL (ref 11.7–15.7)
HGB C CRYSTALS: NORMAL
HOWELL-JOLLY BOD BLD QL SMEAR: NORMAL
IMM GRANULOCYTES # BLD: 0 10E3/UL
IMM GRANULOCYTES NFR BLD: 1 %
LYMPHOCYTES # BLD AUTO: 1 10E3/UL (ref 0.8–5.3)
LYMPHOCYTES NFR BLD AUTO: 23 %
MCH RBC QN AUTO: 32.3 PG (ref 26.5–33)
MCHC RBC AUTO-ENTMCNC: 32.5 G/DL (ref 31.5–36.5)
MCV RBC AUTO: 100 FL (ref 78–100)
MONOCYTES # BLD AUTO: 1 10E3/UL (ref 0–1.3)
MONOCYTES NFR BLD AUTO: 23 %
NEUTROPHILS # BLD AUTO: 2.2 10E3/UL (ref 1.6–8.3)
NEUTROPHILS NFR BLD AUTO: 50 %
NEUTS HYPERSEG BLD QL SMEAR: NORMAL
NRBC # BLD AUTO: 0 10E3/UL
NRBC BLD AUTO-RTO: 0 /100
PLAT MORPH BLD: NORMAL
PLATELET # BLD AUTO: 183 10E3/UL (ref 150–450)
POLYCHROMASIA BLD QL SMEAR: NORMAL
POTASSIUM SERPL-SCNC: 4.5 MMOL/L (ref 3.4–5.3)
PROT SERPL-MCNC: 5.4 G/DL (ref 6.4–8.3)
RBC # BLD AUTO: 2.66 10E6/UL (ref 3.8–5.2)
RBC AGGLUT BLD QL: NORMAL
RBC MORPH BLD: NORMAL
ROULEAUX BLD QL SMEAR: NORMAL
SICKLE CELLS BLD QL SMEAR: NORMAL
SMUDGE CELLS BLD QL SMEAR: NORMAL
SODIUM SERPL-SCNC: 137 MMOL/L (ref 135–145)
SPHEROCYTES BLD QL SMEAR: NORMAL
STOMATOCYTES BLD QL SMEAR: NORMAL
TARGETS BLD QL SMEAR: NORMAL
TOXIC GRANULES BLD QL SMEAR: NORMAL
VARIANT LYMPHS BLD QL SMEAR: NORMAL
WBC # BLD AUTO: 4.4 10E3/UL (ref 4–11)

## 2024-04-08 PROCEDURE — 250N000011 HC RX IP 250 OP 636

## 2024-04-08 PROCEDURE — 84075 ASSAY ALKALINE PHOSPHATASE: CPT

## 2024-04-08 PROCEDURE — 36592 COLLECT BLOOD FROM PICC: CPT

## 2024-04-08 PROCEDURE — G0463 HOSPITAL OUTPT CLINIC VISIT: HCPCS

## 2024-04-08 PROCEDURE — 85025 COMPLETE CBC W/AUTO DIFF WBC: CPT

## 2024-04-08 PROCEDURE — 82374 ASSAY BLOOD CARBON DIOXIDE: CPT

## 2024-04-08 PROCEDURE — 99215 OFFICE O/P EST HI 40 MIN: CPT

## 2024-04-08 RX ORDER — SULFAMETHOXAZOLE/TRIMETHOPRIM 800-160 MG
1 TABLET ORAL
COMMUNITY
Start: 2024-04-08 | End: 2024-04-19

## 2024-04-08 RX ORDER — HEPARIN SODIUM,PORCINE 10 UNIT/ML
5 VIAL (ML) INTRAVENOUS
Status: COMPLETED | OUTPATIENT
Start: 2024-04-08 | End: 2024-04-08

## 2024-04-08 RX ADMIN — Medication 5 ML: at 10:56

## 2024-04-08 RX ADMIN — Medication 5 ML: at 10:57

## 2024-04-08 ASSESSMENT — PAIN SCALES - GENERAL: PAINLEVEL: SEVERE PAIN (6)

## 2024-04-08 NOTE — PROGRESS NOTES
BMT Clinic Progress Note   04/08/2024        Patient ID:  Rosario Terrazas is a 68 year old female, currently day 26 s/p auto for MM readmitted 3/21 with N/F and GNB+ sepsis.     INTERVAL  HISTORY   Rosario is seen with her daughter who is translating.     Rosario is not feeling as well today but this is all related to her upper back pain as well as some ongoing joint pain in her shoulders and knees. Her daughter notes that she is due for her next dose of her pain meds which is likely why this pain is worse right now. She otherwise has been trying to eat more throughout the day and is drinking fluids about 40-60 mL per day. She denies any lightheadedness or dizziness. No headaches or chest pain. Her stools are still loose but not worse. Her rash on her arms is improving. No nausea or vomiting. No fevers.     Review of Systems: ROS negative except as noted above.     PHYSICAL EXAM      Wt Readings from Last 4 Encounters:   04/08/24 94.3 kg (207 lb 12.8 oz)   04/05/24 94.8 kg (209 lb)   04/04/24 94.8 kg (209 lb)   04/03/24 94.5 kg (208 lb 6.4 oz)     BP 91/58   Pulse 72   Temp 97.9  F (36.6  C)   Resp 18   Wt 94.3 kg (207 lb 12.8 oz)   SpO2 100%   BMI 34.62 kg/m      General: NAD; sitting in chair.   Lungs: CTAB; no crackles  Cardiovascular: RRR  Abdominal/Rectal: +BS, soft; non tender. ND  Skin: sandpaper rough, fine papular rash hyperpigmented on left elbow, a few spots on right - (4/8) improving per patient.  Lymph: no LE edema  Neuro: A&O, moving all extremities spontaneously  Additional Findings: Wilder site NT, no drainage.      LABS AND IMAGING - PAST 24 HOURS     Lab Results   Component Value Date    WBC 4.4 04/08/2024    ANEU 3.0 04/02/2024    HGB 8.6 (L) 04/08/2024    HCT 26.5 (L) 04/08/2024     04/08/2024     04/08/2024    POTASSIUM 4.5 04/08/2024    CHLORIDE 106 04/08/2024    CO2 22 04/08/2024    GLC 99 04/08/2024    BUN 8.2 04/08/2024    CR 0.52 04/08/2024    MAG 1.9 04/03/2024     INR 1.42 (H) 04/05/2024     ASSESSMENT/PLAN   Rosario Terrazas is a 68 year old female, currently day +26 s/p auto for MM readmitted 3/21 with N/F and GNB+ sepsis.    BMT/IEC PROTOCOL for standard risk MM Auto PBSCT   3/12 Day -1 Melphalan 200 mg/m2   3/13 Day 0 Transplant, cell total post wash 2.23 x 10^6 CD34 + cells/kg (pre freeze dose: 5.36 x 10 ^6 CD34+ cells/kg)    ID  N/F and sepsis 3/22/24.  See below.  Strep mitis bacteremia (3/22). Follow up cultures 3/23-3/25: negative.  Vanco (3/22-3/24)   Meropenem 3/22-3/27)  Ertapenem 3/27-3/29-->colitis developed on this, so changed back to merrem  Meropenem 3/29-4/2--> discovered blastocystic hominis parasite in stool, which can account for the colitis.  Thus, changed back to ertapenem  Ertapenem 4/2-4/5.  EOT is 4/5/24. Infusion appointments ordered daily for ertapenem 4/3-4/5. Now complete  E. Coli bacteremia (3/21, 3/22). Follow up cultures 3/23-3/25: negative.  Tobraymycin x1 (3/22)  Meropenem/ertapenem as detailed above.  In clinic giving ertapenem qday 4/3 through 4/5/2024, last dose given.  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 She had 4+ growth on the O&P test, as well as clinical symptoms and colitis on CT. Consulted transplant ID, who have recommended 2 weeks of oral flagyl (dosed at 500mg qid 4/2-4/15).   - ID will follow up 4/23, however if at the 2 week milton, she is still having symptoms, the second treatment option is a medication called paromomycin (please see ID note from 4/2 for details of dosing, etc).    Cough: RVP negative; flu/rsv/covid negative 3/27.  Latent TB: Continue INH/B6 through 3 months post transplant, per ID.   CMV detectible <35 3/30; per ID, follow in clinic, in case it is a contributor to her GI issues.    PPX: acyclovir. Bactrim to start at day +28 -- aware to start on 4/15.  Note that fluconazole was discontinued upon  engraftment, as patient has a prolonged QT interval.     Heme   - Anemia, thrombocytopenia secondary to chemotherapy and multiple myeloma. All counts improving nicely  - transfuse to keep hgb >7g/dL, plt >10k.   - Resume home aspirin upon discharge as thrombocytopenia has resolved.    FEN/Renal  #Hypocalcemia: Continue oral replacement  #Vitamin D level: adequate. Briefly received high dose ergocalciferol this admission, prior to learning her vitamin D level was adequate. Discontinued ergocalciferol on discharge.   #Lymphedema consult 3/27, wraps placed.  No longer needing them as edema has mostly resolved.   #Severe malnutrition    CV  #Afib + SVT: paroxysmal afib with RVR and hypotension requiring transfer to MICU; also intermittent SVT.    Amiodarone 0.5mg/hour drip transitioned to PO on 3/24; discussed with cardiology on 3/28 and they advised okay to stop amiodarone today (3/28) due to rising LFTs. LFTs now improved after discontinuing amiodarone.   Metoprolol 25mg bid   concern that cardiac processes were being driven by an overall inflammatory state, added decadron 4mg IV daily x 3 days (3/24-3/26).   Electrolytes to be replaced per HIGH sliding scale.   HypoK 60 meq potassium today, 40meq daily BID  (4/8) K+ 4.5 -- discontinue oral K+. Will consider need to restart maybe daily dosing the future.  Prolonged QTc; continue to hold zofran/zyprexa/fluconazole  Ziopatch placement needed for 7 day ziopatch monitoring. Per discussion with the cardiology tech department (lead is Connie Morales at [phone]), their process has changed as of January, and BMT providers do not appear to have access to these orders. Requesting that cardiology place this order. 6/20 appointment    GI:   #Colitis: noted on 3/29 CT obtained for abdominal pain; see ID plan.  Imodium prn for diarrhea  #Transaminitis: check viral studies (Hep B, Hep C, EBV, CMV, adenovirus)  Hepatitis b and c testing not reactive  CMV < 35; adenovirus negative;  EBV negative (3/28)  LFT elevations resolved with stopping amiodarone    Endocrine  #Thyroid FNA Atypia of undetermined significance diagnosis has a 22 (13-30)% risk of malignancy. Repeat FNA (least 4-6 weeks from previous aspiration with optimal time being 12 weeks after last aspiration) , molecular testing, diagnostic lobectomy, or surveillance is recommended.    Has next appt with endocrine 4/16/24.               *TSH WNL 3/21    Neuro/Pain  #Neuropathy: continues on eleni, xanaflex.  #Rib pain: continue oxycodone and back brace, Tylenol prn. Robaxin prn.   #Abdominal pain: Palliative Care consult placed 4/1; they do not believe she is a candidate for long acting pain medication. Continue prn oxycodone and start prn robaxin. Okay to use tylenol and can schedule if it is helpful.      MSK: significant deconditioning.    SKIN: new rash, possibly related to flagyl? She will try topical tmc and call if worsening    Plan:    Discontinued oral K+ -- consider restarting daily dosing at the end of the week if indicated with her cardiac hx  Requested cards follow up be scheduled in the next 2-3 weeks rather than [place]  Complete course of flagyl and follow up with ID  Call with any new or worsening sx    RTC   Line removal requested -- called and requested that this get done in the next 1-2 days  (4/10) Day 28 Labs   (4/12) Day 28 Review with MD JOHN spent 40 minutes in the care of this patient today, which included time necessary for preparation for the visit, obtaining history, ordering medications/tests/procedures as medically indicated, review of pertinent medical literature, counseling of the patient, communication of recommendations to the care team, and documentation time.     Yasmani Dumont PA-C   *[phone]

## 2024-04-08 NOTE — LETTER
4/8/2024         RE: Rosario Terrazas  [address] [place] [address]        Dear Colleague,    Thank you for referring your patient, Rosario Terrazas, to the [facility] BLOOD AND MARROW TRANSPLANT PROGRAM [place]. Please see a copy of my visit note below.    BMT Clinic Progress Note   04/08/2024        Patient ID:  Rosario Terrazas is a 68 year old female, currently day 26 s/p auto for MM readmitted 3/21 with N/F and GNB+ sepsis.     INTERVAL  HISTORY   Rosario is seen with her daughter who is translating.     Rosario is not feeling as well today but this is all related to her upper back pain as well as some ongoing joint pain in her shoulders and knees. Her daughter notes that she is due for her next dose of her pain meds which is likely why this pain is worse right now. She otherwise has been trying to eat more throughout the day and is drinking fluids about 40-60 mL per day. She denies any lightheadedness or dizziness. No headaches or chest pain. Her stools are still loose but not worse. Her rash on her arms is improving. No nausea or vomiting. No fevers.     Review of Systems: ROS negative except as noted above.     PHYSICAL EXAM      Wt Readings from Last 4 Encounters:   04/08/24 94.3 kg (207 lb 12.8 oz)   04/05/24 94.8 kg (209 lb)   04/04/24 94.8 kg (209 lb)   04/03/24 94.5 kg (208 lb 6.4 oz)     BP 91/58   Pulse 72   Temp 97.9  F (36.6  C)   Resp 18   Wt 94.3 kg (207 lb 12.8 oz)   SpO2 100%   BMI 34.62 kg/m      General: NAD; sitting in chair.   Lungs: CTAB; no crackles  Cardiovascular: RRR  Abdominal/Rectal: +BS, soft; non tender. ND  Skin: sandpaper rough, fine papular rash hyperpigmented on left elbow, a few spots on right - (4/8) improving per patient.  Lymph: no LE edema  Neuro: A&O, moving all extremities spontaneously  Additional Findings: Wilder site NT, no drainage.      LABS AND IMAGING - PAST 24 HOURS     Lab Results   Component Value Date    WBC  4.4 04/08/2024    ANEU 3.0 04/02/2024    HGB 8.6 (L) 04/08/2024    HCT 26.5 (L) 04/08/2024     04/08/2024     04/08/2024    POTASSIUM 4.5 04/08/2024    CHLORIDE 106 04/08/2024    CO2 22 04/08/2024    GLC 99 04/08/2024    BUN 8.2 04/08/2024    CR 0.52 04/08/2024    MAG 1.9 04/03/2024    INR 1.42 (H) 04/05/2024     ASSESSMENT/PLAN   Rosario Terrazas is a 68 year old female, currently day +26 s/p auto for MM readmitted 3/21 with N/F and GNB+ sepsis.    BMT/IEC PROTOCOL for standard risk MM Auto PBSCT   3/12 Day -1 Melphalan 200 mg/m2   3/13 Day 0 Transplant, cell total post wash 2.23 x 10^6 CD34 + cells/kg (pre freeze dose: 5.36 x 10 ^6 CD34+ cells/kg)    ID  N/F and sepsis 3/22/24.  See below.  Strep mitis bacteremia (3/22). Follow up cultures 3/23-3/25: negative.  Vanco (3/22-3/24)   Meropenem 3/22-3/27)  Ertapenem 3/27-3/29-->colitis developed on this, so changed back to merrem  Meropenem 3/29-4/2--> discovered blastocystic hominis parasite in stool, which can account for the colitis.  Thus, changed back to ertapenem  Ertapenem 4/2-4/5.  EOT is 4/5/24. Infusion appointments ordered daily for ertapenem 4/3-4/5. Now complete  E. Coli bacteremia (3/21, 3/22). Follow up cultures 3/23-3/25: negative.  Tobraymycin x1 (3/22)  Meropenem/ertapenem as detailed above.  In clinic giving ertapenem qday 4/3 through 4/5/2024, last dose given.  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 She had 4+ growth on the O&P test, as well as clinical symptoms and colitis on CT. Consulted transplant ID, who have recommended 2 weeks of oral flagyl (dosed at 500mg qid 4/2-4/15).   - ID will follow up 4/23, however if at the 2 week milton, she is still having symptoms, the second treatment option is a medication called paromomycin (please see ID note from 4/2 for details of dosing, etc).    Cough: RVP negative; flu/rsv/covid negative  3/27.  Latent TB: Continue INH/B6 through 3 months post transplant, per ID.   CMV detectible <35 3/30; per ID, follow in clinic, in case it is a contributor to her GI issues.    PPX: acyclovir. Bactrim to start at day +28 -- aware to start on 4/15.  Note that fluconazole was discontinued upon engraftment, as patient has a prolonged QT interval.     Heme   - Anemia, thrombocytopenia secondary to chemotherapy and multiple myeloma. All counts improving nicely  - transfuse to keep hgb >7g/dL, plt >10k.   - Resume home aspirin upon discharge as thrombocytopenia has resolved.    FEN/Renal  #Hypocalcemia: Continue oral replacement  #Vitamin D level: adequate. Briefly received high dose ergocalciferol this admission, prior to learning her vitamin D level was adequate. Discontinued ergocalciferol on discharge.   #Lymphedema consult 3/27, wraps placed.  No longer needing them as edema has mostly resolved.   #Severe malnutrition    CV  #Afib + SVT: paroxysmal afib with RVR and hypotension requiring transfer to MICU; also intermittent SVT.    Amiodarone 0.5mg/hour drip transitioned to PO on 3/24; discussed with cardiology on 3/28 and they advised okay to stop amiodarone today (3/28) due to rising LFTs. LFTs now improved after discontinuing amiodarone.   Metoprolol 25mg bid   concern that cardiac processes were being driven by an overall inflammatory state, added decadron 4mg IV daily x 3 days (3/24-3/26).   Electrolytes to be replaced per HIGH sliding scale.   HypoK 60 meq potassium today, 40meq daily BID  (4/8) K+ 4.5 -- discontinue oral K+. Will consider need to restart maybe daily dosing the future.  Prolonged QTc; continue to hold zofran/zyprexa/fluconazole  Ziopatch placement needed for 7 day ziopatch monitoring. Per discussion with the cardiology tech department (lead is Connie Morales at [phone]), their process has changed as of January, and BMT providers do not appear to have access to these orders. Requesting that  cardiology place this order. 6/20 appointment    GI:   #Colitis: noted on 3/29 CT obtained for abdominal pain; see ID plan.  Imodium prn for diarrhea  #Transaminitis: check viral studies (Hep B, Hep C, EBV, CMV, adenovirus)  Hepatitis b and c testing not reactive  CMV < 35; adenovirus negative; EBV negative (3/28)  LFT elevations resolved with stopping amiodarone    Endocrine  #Thyroid FNA Atypia of undetermined significance diagnosis has a 22 (13-30)% risk of malignancy. Repeat FNA (least 4-6 weeks from previous aspiration with optimal time being 12 weeks after last aspiration) , molecular testing, diagnostic lobectomy, or surveillance is recommended.    Has next appt with endocrine 4/16/24.               *TSH WNL 3/21    Neuro/Pain  #Neuropathy: continues on eleni, xanaflex.  #Rib pain: continue oxycodone and back brace, Tylenol prn. Robaxin prn.   #Abdominal pain: Palliative Care consult placed 4/1; they do not believe she is a candidate for long acting pain medication. Continue prn oxycodone and start prn robaxin. Okay to use tylenol and can schedule if it is helpful.      MSK: significant deconditioning.    SKIN: new rash, possibly related to flagyl? She will try topical tmc and call if worsening    Plan:    Discontinued oral K+ -- consider restarting daily dosing at the end of the week if indicated with her cardiac hx  Requested cards follow up be scheduled in the next 2-3 weeks rather than [place]  Complete course of flagyl and follow up with ID  Call with any new or worsening sx    RTC   Line removal requested -- called and requested that this get done in the next 1-2 days  (4/10) Day 28 Labs   (4/12) Day 28 Review with MD JOHN spent 40 minutes in the care of this patient today, which included time necessary for preparation for the visit, obtaining history, ordering medications/tests/procedures as medically indicated, review of pertinent medical literature, counseling of the patient, communication of  recommendations to the care team, and documentation time.     Yasmani Dumont PA-C   *[phone]

## 2024-04-08 NOTE — NURSING NOTE
Chief Complaint   Patient presents with    Labs Only     CVC, heparin locked, vitals checked, caps changed     Neeta Urena RN on 4/8/2024 at 11:00 AM

## 2024-04-08 NOTE — NURSING NOTE
Dressing change note:  Central line dsg due to be changed.  Site cleaned with chlorhexidine.    Occlusive dressing applied.  See flowsheet for additional detail...     Stephen Peterson LPN

## 2024-04-08 NOTE — NURSING NOTE
"Oncology Rooming Note    April 8, 2024 11:06 AM   Rosario Terrazas is a 68 year old female who presents for:    Chief Complaint   Patient presents with    Labs Only     CVC, heparin locked, vitals checked, caps changed    Oncology Clinic Visit     RTN for MM      Initial Vitals: BP 91/58   Pulse 72   Temp 97.9  F (36.6  C)   Resp 18   Wt 94.3 kg (207 lb 12.8 oz)   SpO2 100%   BMI 34.62 kg/m   Estimated body mass index is 34.62 kg/m  as calculated from the following:    Height as of 3/22/24: 1.65 m (5' 4.96\").    Weight as of this encounter: 94.3 kg (207 lb 12.8 oz). Body surface area is 2.08 meters squared.  Severe Pain (6) Comment: Data Unavailable   No LMP recorded. Patient is postmenopausal.  Allergies reviewed: Yes  Medications reviewed: Yes    Medications: Medication refills not needed today.  Pharmacy name entered into [employer]: [place], [place] - [address] [place] [address] [phone]    Frailty Screening:   Is the patient here for a new oncology consult visit in cancer care? 2. No      Clinical concerns: none       Augusta Ch MA             "

## 2024-04-09 ENCOUNTER — TELEPHONE (OUTPATIENT)
Dept: CARDIOLOGY | Facility: CLINIC | Age: 69
End: 2024-04-09
Payer: COMMERCIAL

## 2024-04-09 NOTE — TELEPHONE ENCOUNTER
"----- Message from Niesha Love RN sent at 4/8/2024  4:36 PM CDT -----  Regarding: RE: Cards follow up  Maybe move pt to Fk?  ----- Message -----  From: Radha Webster  Sent: 4/8/2024   4:25 PM CDT  To: Cardiology General   Subject: FW: Cards follow up                              Hi,    I checked [facility] and the appt pt is scheduled for is sooner than next available at [facility]. Also checked [facility] and [facility]. [facility] had appts in mid-May, but that is closer to 5 weeks out. Is there anyway pt can be seen sooner?    Radha Webster    ----- Message -----  From: Alma Preston  Sent: 4/8/2024   1:05 PM CDT  To: Clinic Coordinators[facility]  Subject: FW: Cards follow up                                ----- Message -----  From: Yasmani Dumont PA-C  Sent: 4/8/2024  12:33 PM CDT  To: [facility]  Subject: Cards follow up                                  Can we follow up with Cards - her appt is in June for this \"new consultation for Cards\".    This patient should be seen sooner in the next 2-3 weeks - she has a zio patch on monitoring her heart which will be off in about 2 weeks and was seen by Cardiology inpatient who is driving this follow up.    Thanks,    Yasmani Dumont PA-C           "

## 2024-04-09 NOTE — TELEPHONE ENCOUNTER
Patient confirmed scheduled appointment:  Date: 5/16/24  Time: 3:00 pm  Visit type: New Cardiology   Provider: [facility],  Location: [facility]  Testing/imaging:   Additional notes: scheduled soonest available appointment per nurse suggestion SC

## 2024-04-10 ENCOUNTER — ANCILLARY PROCEDURE (OUTPATIENT)
Dept: INTERVENTIONAL RADIOLOGY/VASCULAR | Facility: CLINIC | Age: 69
End: 2024-04-10
Attending: STUDENT IN AN ORGANIZED HEALTH CARE EDUCATION/TRAINING PROGRAM
Payer: COMMERCIAL

## 2024-04-10 ENCOUNTER — LAB (OUTPATIENT)
Dept: LAB | Facility: CLINIC | Age: 69
End: 2024-04-10
Attending: INTERNAL MEDICINE
Payer: COMMERCIAL

## 2024-04-10 DIAGNOSIS — C90.00 MULTIPLE MYELOMA, REMISSION STATUS UNSPECIFIED (H): ICD-10-CM

## 2024-04-10 DIAGNOSIS — C90.01 MULTIPLE MYELOMA IN REMISSION (H): ICD-10-CM

## 2024-04-10 DIAGNOSIS — C90.01 MULTIPLE MYELOMA IN REMISSION (H): Primary | ICD-10-CM

## 2024-04-10 LAB
ACANTHOCYTES BLD QL SMEAR: NORMAL
ALBUMIN SERPL BCG-MCNC: 3.6 G/DL (ref 3.5–5.2)
ALP SERPL-CCNC: 46 U/L (ref 40–150)
ALT SERPL W P-5'-P-CCNC: 15 U/L (ref 0–50)
ANION GAP SERPL CALCULATED.3IONS-SCNC: 10 MMOL/L (ref 7–15)
AST SERPL W P-5'-P-CCNC: 17 U/L (ref 0–45)
AUER BODIES BLD QL SMEAR: NORMAL
BASO STIPL BLD QL SMEAR: NORMAL
BASOPHILS # BLD AUTO: 0 10E3/UL (ref 0–0.2)
BASOPHILS NFR BLD AUTO: 1 %
BILIRUB SERPL-MCNC: 0.3 MG/DL
BITE CELLS BLD QL SMEAR: NORMAL
BLISTER CELLS BLD QL SMEAR: NORMAL
BUN SERPL-MCNC: 6.8 MG/DL (ref 8–23)
BURR CELLS BLD QL SMEAR: NORMAL
CALCIUM SERPL-MCNC: 8.9 MG/DL (ref 8.8–10.2)
CHLORIDE SERPL-SCNC: 106 MMOL/L (ref 98–107)
CREAT SERPL-MCNC: 0.54 MG/DL (ref 0.51–0.95)
DACRYOCYTES BLD QL SMEAR: NORMAL
DEPRECATED HCO3 PLAS-SCNC: 23 MMOL/L (ref 22–29)
EGFRCR SERPLBLD CKD-EPI 2021: >90 ML/MIN/1.73M2
ELLIPTOCYTES BLD QL SMEAR: NORMAL
EOSINOPHIL # BLD AUTO: 0.1 10E3/UL (ref 0–0.7)
EOSINOPHIL NFR BLD AUTO: 3 %
ERYTHROCYTE [DISTWIDTH] IN BLOOD BY AUTOMATED COUNT: 21.2 % (ref 10–15)
FRAGMENTS BLD QL SMEAR: NORMAL
GLUCOSE SERPL-MCNC: 96 MG/DL (ref 70–99)
HCT VFR BLD AUTO: 27.3 % (ref 35–47)
HGB BLD-MCNC: 8.6 G/DL (ref 11.7–15.7)
HGB C CRYSTALS: NORMAL
HOWELL-JOLLY BOD BLD QL SMEAR: NORMAL
IMM GRANULOCYTES # BLD: 0 10E3/UL
IMM GRANULOCYTES NFR BLD: 0 %
INR PPP: 1.4 (ref 0.85–1.15)
LDH SERPL L TO P-CCNC: 254 U/L (ref 0–250)
LYMPHOCYTES # BLD AUTO: 1.3 10E3/UL (ref 0.8–5.3)
LYMPHOCYTES NFR BLD AUTO: 25 %
MAGNESIUM SERPL-MCNC: 1.5 MG/DL (ref 1.7–2.3)
MCH RBC QN AUTO: 31.5 PG (ref 26.5–33)
MCHC RBC AUTO-ENTMCNC: 31.5 G/DL (ref 31.5–36.5)
MCV RBC AUTO: 100 FL (ref 78–100)
MONOCYTES # BLD AUTO: 1.2 10E3/UL (ref 0–1.3)
MONOCYTES NFR BLD AUTO: 24 %
NEUTROPHILS # BLD AUTO: 2.5 10E3/UL (ref 1.6–8.3)
NEUTROPHILS NFR BLD AUTO: 47 %
NEUTS HYPERSEG BLD QL SMEAR: NORMAL
NRBC # BLD AUTO: 0 10E3/UL
NRBC BLD AUTO-RTO: 0 /100
PHOSPHATE SERPL-MCNC: 4.5 MG/DL (ref 2.5–4.5)
PLAT MORPH BLD: NORMAL
PLATELET # BLD AUTO: 169 10E3/UL (ref 150–450)
POLYCHROMASIA BLD QL SMEAR: NORMAL
POTASSIUM SERPL-SCNC: 4 MMOL/L (ref 3.4–5.3)
PROT SERPL-MCNC: 5.5 G/DL (ref 6.4–8.3)
RBC # BLD AUTO: 2.73 10E6/UL (ref 3.8–5.2)
RBC AGGLUT BLD QL: NORMAL
RBC MORPH BLD: NORMAL
ROULEAUX BLD QL SMEAR: NORMAL
SICKLE CELLS BLD QL SMEAR: NORMAL
SMUDGE CELLS BLD QL SMEAR: NORMAL
SODIUM SERPL-SCNC: 139 MMOL/L (ref 135–145)
SPHEROCYTES BLD QL SMEAR: NORMAL
STOMATOCYTES BLD QL SMEAR: NORMAL
TARGETS BLD QL SMEAR: NORMAL
TOTAL PROTEIN SERUM FOR ELP: 5.1 G/DL (ref 6.4–8.3)
TOXIC GRANULES BLD QL SMEAR: NORMAL
URATE SERPL-MCNC: 5.9 MG/DL (ref 2.4–5.7)
VARIANT LYMPHS BLD QL SMEAR: NORMAL
WBC # BLD AUTO: 5.2 10E3/UL (ref 4–11)

## 2024-04-10 PROCEDURE — 86334 IMMUNOFIX E-PHORESIS SERUM: CPT | Mod: 26 | Performed by: PATHOLOGY

## 2024-04-10 PROCEDURE — 85048 AUTOMATED LEUKOCYTE COUNT: CPT | Performed by: INTERNAL MEDICINE

## 2024-04-10 PROCEDURE — 82784 ASSAY IGA/IGD/IGG/IGM EACH: CPT | Performed by: INTERNAL MEDICINE

## 2024-04-10 PROCEDURE — 83735 ASSAY OF MAGNESIUM: CPT | Performed by: INTERNAL MEDICINE

## 2024-04-10 PROCEDURE — 84155 ASSAY OF PROTEIN SERUM: CPT | Performed by: INTERNAL MEDICINE

## 2024-04-10 PROCEDURE — 84165 PROTEIN E-PHORESIS SERUM: CPT | Mod: 26 | Performed by: PATHOLOGY

## 2024-04-10 PROCEDURE — 84100 ASSAY OF PHOSPHORUS: CPT | Performed by: INTERNAL MEDICINE

## 2024-04-10 PROCEDURE — 83521 IG LIGHT CHAINS FREE EACH: CPT | Performed by: INTERNAL MEDICINE

## 2024-04-10 PROCEDURE — 80053 COMPREHEN METABOLIC PANEL: CPT | Performed by: INTERNAL MEDICINE

## 2024-04-10 PROCEDURE — 83615 LACTATE (LD) (LDH) ENZYME: CPT | Performed by: INTERNAL MEDICINE

## 2024-04-10 PROCEDURE — 84165 PROTEIN E-PHORESIS SERUM: CPT | Mod: TC | Performed by: PATHOLOGY

## 2024-04-10 PROCEDURE — 85610 PROTHROMBIN TIME: CPT | Performed by: INTERNAL MEDICINE

## 2024-04-10 PROCEDURE — 86334 IMMUNOFIX E-PHORESIS SERUM: CPT | Performed by: PATHOLOGY

## 2024-04-10 PROCEDURE — 36589 REMOVAL TUNNELED CV CATH: CPT | Performed by: PHYSICIAN ASSISTANT

## 2024-04-10 PROCEDURE — 84550 ASSAY OF BLOOD/URIC ACID: CPT | Performed by: INTERNAL MEDICINE

## 2024-04-10 RX ORDER — LIDOCAINE HYDROCHLORIDE 10 MG/ML
5 INJECTION, SOLUTION EPIDURAL; INFILTRATION; INTRACAUDAL; PERINEURAL ONCE
Status: COMPLETED | OUTPATIENT
Start: 2024-04-10 | End: 2024-04-10

## 2024-04-10 RX ADMIN — LIDOCAINE HYDROCHLORIDE 5 ML: 10 INJECTION, SOLUTION EPIDURAL; INFILTRATION; INTRACAUDAL; PERINEURAL at 13:41

## 2024-04-10 NOTE — NURSING NOTE
Chief Complaint   Patient presents with    Blood Draw     Labs drawn via cvc by RN in lab.      Labs collected from CVC by RN.    Lazara Arroyo RN

## 2024-04-10 NOTE — PROGRESS NOTES
Interventional Radiology Brief Post Procedure Note    Procedure: IR TCVC Removal    Proceduralist: Shaka Cramer PA-C    Assistant: None    Time Out: Prior to the start of the procedure and with procedural staff participation, I verbally confirmed the patient s identity using two indicators, relevant allergies, that the procedure was appropriate and matched the consent or emergent situation, and that the correct equipment/implants were available. Immediately prior to starting the procedure I conducted the Time Out with the procedural staff and re-confirmed the patient s name, procedure, and site/side. (The Joint Commission universal protocol was followed.)  Yes    Medications   Medication Event Details Admin User Admin Time       Sedation: None. Local Anesthestic used    Findings: Completed removal of left chest TCVC in its entirety.    Estimated Blood Loss: Minimal    SPECIMENS: None    Complications: 1. None     Condition: Stable    Plan: Follow-up per primary team.     Comments: See dictated procedure note for full details.    Shaka Cramer PA-C

## 2024-04-11 ENCOUNTER — DOCUMENTATION ONLY (OUTPATIENT)
Dept: TRANSPLANT | Facility: CLINIC | Age: 69
End: 2024-04-11
Payer: COMMERCIAL

## 2024-04-11 LAB
IGA SERPL-MCNC: 43 MG/DL (ref 84–499)
IGG SERPL-MCNC: 389 MG/DL (ref 610–1616)
IGM SERPL-MCNC: <10 MG/DL (ref 35–242)
KAPPA LC FREE SER-MCNC: 1.02 MG/DL (ref 0.33–1.94)
KAPPA LC FREE/LAMBDA FREE SER NEPH: 1.34 {RATIO} (ref 0.26–1.65)
LAMBDA LC FREE SERPL-MCNC: 0.76 MG/DL (ref 0.57–2.63)

## 2024-04-11 ASSESSMENT — EJECTION FRACTION: LAST EJECTION FRACTION (EF) PRIOR TO CONDITIONING (%): NO

## 2024-04-11 ASSESSMENT — PULMONARY FUNCTION TESTS: FEV1/FVC_PERCENT_PREDICTED: 65

## 2024-04-12 ENCOUNTER — OFFICE VISIT (OUTPATIENT)
Dept: TRANSPLANT | Facility: CLINIC | Age: 69
End: 2024-04-12
Attending: INTERNAL MEDICINE
Payer: COMMERCIAL

## 2024-04-12 VITALS
TEMPERATURE: 97.2 F | DIASTOLIC BLOOD PRESSURE: 61 MMHG | HEART RATE: 95 BPM | RESPIRATION RATE: 16 BRPM | SYSTOLIC BLOOD PRESSURE: 91 MMHG | BODY MASS INDEX: 34.17 KG/M2 | OXYGEN SATURATION: 95 % | WEIGHT: 205.1 LBS

## 2024-04-12 DIAGNOSIS — C90.00 MULTIPLE MYELOMA NOT HAVING ACHIEVED REMISSION (H): Primary | ICD-10-CM

## 2024-04-12 DIAGNOSIS — B25.9 CYTOMEGALOVIRUS (CMV) VIREMIA (H): ICD-10-CM

## 2024-04-12 DIAGNOSIS — Z94.84 H/O AUTOLOGOUS STEM CELL TRANSPLANT (H): ICD-10-CM

## 2024-04-12 LAB
ALBUMIN SERPL ELPH-MCNC: 3.2 G/DL (ref 3.7–5.1)
ALPHA1 GLOB SERPL ELPH-MCNC: 0.4 G/DL (ref 0.2–0.4)
ALPHA2 GLOB SERPL ELPH-MCNC: 0.7 G/DL (ref 0.5–0.9)
B-GLOBULIN SERPL ELPH-MCNC: 0.4 G/DL (ref 0.6–1)
GAMMA GLOB SERPL ELPH-MCNC: 0.4 G/DL (ref 0.7–1.6)
M PROTEIN SERPL ELPH-MCNC: 0.1 G/DL
PROT PATTERN SERPL ELPH-IMP: ABNORMAL
PROT PATTERN SERPL IFE-IMP: NORMAL

## 2024-04-12 PROCEDURE — 99215 OFFICE O/P EST HI 40 MIN: CPT | Mod: 24

## 2024-04-12 PROCEDURE — G0463 HOSPITAL OUTPT CLINIC VISIT: HCPCS

## 2024-04-12 ASSESSMENT — PAIN SCALES - GENERAL: PAINLEVEL: EXTREME PAIN (8)

## 2024-04-12 NOTE — LETTER
4/12/2024         RE: Rosario Terrazas  [address] [place] [address]        Dear Colleague,    Thank you for referring your patient, Rosario Terrazas, to the [facility] BLOOD AND MARROW TRANSPLANT PROGRAM [place]. Please see a copy of my visit note below.    BMT Clinic Progress Note   Apr 12, 2024     Patient ID:  Rosario Terrazas is a 68 year old female, currently day +30 s/p auto for MM readmitted 3/21 with N/F and GNB+ sepsis.     INTERVAL  HISTORY   Rosario is seen with her daughter who is translating for Day +28 BAN. Still struggling with joint/muscle pains throughout upper back, shoulders, and knees but slowly improving. Appetite is poor but trying to eat small meals throughout the day. Abdominal pain/diarrhea completely resolved. Otherwise no fevers, chest pain, SOB, N/V, or bleeding.      Review of Systems: ROS negative except as noted above.     PHYSICAL EXAM      Wt Readings from Last 4 Encounters:   04/12/24 93 kg (205 lb 1.6 oz)   04/08/24 94.3 kg (207 lb 12.8 oz)   04/05/24 94.8 kg (209 lb)   04/04/24 94.8 kg (209 lb)     BP 91/61   Pulse 95   Temp 97.2  F (36.2  C) (Tympanic)   Resp 16   Wt 93 kg (205 lb 1.6 oz)   SpO2 95%   BMI 34.17 kg/m      General: NAD; sitting in chair.   Lungs: CTAB; no crackles  Cardiovascular: RRR  Abdominal/Rectal: soft, NT, ND.   Skin: sandpaper rough, fine papular rash hyperpigmented on left elbow, a few spots on right - (4/8) improving per patient.  Lymph: no LE edema  Neuro: A&O, moving all extremities spontaneously     LABS AND IMAGING - PAST 24 HOURS     Lab Results   Component Value Date    WBC 5.2 04/10/2024    ANEU 3.0 04/02/2024    HGB 8.6 (L) 04/10/2024    HCT 27.3 (L) 04/10/2024     04/10/2024     04/10/2024    POTASSIUM 4.0 04/10/2024    CHLORIDE 106 04/10/2024    CO2 23 04/10/2024    GLC 96 04/10/2024    BUN 6.8 (L) 04/10/2024    CR 0.54 04/10/2024    MAG 1.5 (L) 04/10/2024    INR 1.40 (H)  04/10/2024     M-spike 0.1, kappa FLC 1.0, lambda FLC 0.76, K/L ratio 1.34.     ASSESSMENT/PLAN   Rosario Terrazas is a 68 year old female, currently day +30 s/p auto for MM readmitted 3/21 with N/F and GNB+ sepsis.    BMT/IEC PROTOCOL for standard risk MM Auto PBSCT  3/12 Day -1 Melphalan 200 mg/m2   3/13 Day 0 Transplant, cell total post wash 2.23 x 10^6 CD34 + cells/kg (pre freeze dose: 5.36 x 10 ^6 CD34+ cells/kg)    Day +28 restaging shows VGPR with M-spike 0.1, normal K/L ratio.    ID  #N/F and sepsis 3/22/24.  See below.  #Strep mitis bacteremia (3/22). Follow up cultures 3/23-3/25: negative.  Vanco (3/22-3/24)   Meropenem 3/22-3/27)  Ertapenem 3/27-3/29-->colitis developed on this, so changed back to merrem  Meropenem 3/29-4/2--> discovered blastocystic hominis parasite in stool, which can account for the colitis.  Thus, changed back to ertapenem  Ertapenem 4/2-4/5.  EOT is 4/5/24.  #E. Coli bacteremia (3/21, 3/22). Follow up cultures 3/23-3/25: negative.  Tobraymycin x1 (3/22)  Meropenem/ertapenem as detailed above.  In clinic giving ertapenem qday 4/3 through 4/5/2024, last dose given.  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 She had 4+ growth on the O&P test, as well as clinical symptoms and colitis on CT. Consulted transplant ID, who have recommended 2 weeks of oral flagyl (dosed at 500mg qid 4/2-4/15).   - ID will follow up 4/23, however if at the 2 week milton, she is still having symptoms, the second treatment option is a medication called paromomycin (please see ID note from 4/2 for details of dosing, etc).    - Abd pain/diarrhea resolved currently but advised to monitor for recurrence once she stops Flagyl  #Cough: RVP negative; flu/rsv/covid negative 3/27.  #Latent TB: Continue INH/B6 through 3 months post transplant, per ID.   #CMV detectable <35 on 3/30 and 4/3; per ID, follow in clinic, in  case it is a contributor to her GI issues. Recheck next week.     #PPX: acyclovir. Bactrim to start at day +28 -- aware to start on 4/15.  Note that fluconazole was discontinued upon engraftment, as patient has a prolonged QT interval.     Heme   - Anemia, thrombocytopenia secondary to chemotherapy and multiple myeloma. All counts improving nicely  - Transfuse to keep hgb >7g/dL, plt >10k.   - Resume home aspirin upon discharge as thrombocytopenia has resolved.    FEN/Renal  #Hypocalcemia: Continue oral replacement  #Vitamin D level: adequate. Briefly received high dose ergocalciferol this admission, prior to learning her vitamin D level was adequate. Discontinued ergocalciferol on discharge.   #Lymphedema consult 3/27, wraps placed.  No longer needing them as edema has mostly resolved.   #Severe malnutrition: dietician following.     CV  #Afib + SVT: paroxysmal afib with RVR and hypotension requiring transfer to MICU; also intermittent SVT.    Amiodarone 0.5mg/hour drip transitioned to PO on 3/24; discussed with cardiology on 3/28 and they advised okay to stop amiodarone today (3/28) due to rising LFTs. LFTs now improved after discontinuing amiodarone.   Metoprolol 25mg bid   Concern that cardiac processes were being driven by an overall inflammatory state, added decadron 4mg IV daily x 3 days (3/24-3/26).   Electrolytes to be replaced per HIGH sliding scale. Stopped K supplement on 4/8, recheck BMP and Mg again next week.   Prolonged QTc; continue to hold zofran/zyprexa/fluconazole  Ziopatch placement needed for 7 day ziopatch monitoring. Per discussion with the cardiology tech department (lead is Connie Morales at [phone]), their process has changed as of January, and BMT providers do not appear to have access to these orders. Currently has Ziopatch on. Outpt Cardiology follow-up moved up to 5/16.     GI:   #Colitis: noted on 3/29 CT obtained for abdominal pain; see ID plan. Imodium prn for  diarrhea  #Transaminitis: check viral studies (Hep B, Hep C, EBV, CMV, adenovirus)  Hepatitis b and c testing not reactive  CMV < 35; adenovirus negative; EBV negative (3/28)  LFT elevations resolved with stopping amiodarone    Endocrine  #Thyroid FNA Atypia of undetermined significance diagnosis has a 22 (13-30)% risk of malignancy. Repeat FNA (least 4-6 weeks from previous aspiration with optimal time being 12 weeks after last aspiration) , molecular testing, diagnostic lobectomy, or surveillance is recommended.    Has next appt with endocrine 4/16/24.               *TSH WNL 3/21    Neuro/Pain  #Neuropathy: continues on eleni, xanaflex.  #Rib pain: continue oxycodone and back brace, Tylenol prn. Robaxin prn.   #Abdominal pain: Palliative Care consult placed 4/1; they do not believe she is a candidate for long acting pain medication. Continue prn oxycodone and start prn robaxin. Okay to use tylenol and can schedule if it is helpful.      MSK: significant deconditioning.    SKIN: possible rash related to Flagyl. Now improving.      PLAN:    - Complete course of flagyl through 4/15 and follow up with ID 4/23  - Labs and BMT YAO follow-up in 1 week, then will transition care back to Dr. Ellison    Total of 40 minutes on patient visit, reviewing records, interpreting test results, placing orders, and documentation on the day of service.    Edie Hernandez MD  Attending Physician, [facility]

## 2024-04-12 NOTE — NURSING NOTE
"Oncology Rooming Note    April 12, 2024 2:58 PM   Rosario Terrazas is a 68 year old female who presents for:    Chief Complaint   Patient presents with    Oncology Clinic Visit     Multiple myeloma in remission (H)      Initial Vitals: BP 91/61   Pulse 95   Temp 97.2  F (36.2  C) (Tympanic)   Resp 16   Wt 93 kg (205 lb 1.6 oz)   SpO2 95%   BMI 34.17 kg/m   Estimated body mass index is 34.17 kg/m  as calculated from the following:    Height as of 3/22/24: 1.65 m (5' 4.96\").    Weight as of this encounter: 93 kg (205 lb 1.6 oz). Body surface area is 2.06 meters squared.  Extreme Pain (8) Comment: Data Unavailable   No LMP recorded. Patient is postmenopausal.  Allergies reviewed: Yes  Medications reviewed: Yes    Medications: Medication refills not needed today.  Pharmacy name entered into [employer]: [facility] [place], [place] - [address] [place] [address] [phone]    Frailty Screening:   Is the patient here for a new oncology consult visit in cancer care? 2. No      Clinical concerns: None       Gabi Wright CMA            "

## 2024-04-12 NOTE — PROGRESS NOTES
BMT Clinic Progress Note   Apr 12, 2024     Patient ID:  Rosario Terrazas is a 68 year old female, currently day +30 s/p auto for MM readmitted 3/21 with N/F and GNB+ sepsis.     INTERVAL  HISTORY   Rosario is seen with her daughter who is translating for Day +28 BAN. Still struggling with joint/muscle pains throughout upper back, shoulders, and knees but slowly improving. Appetite is poor but trying to eat small meals throughout the day. Abdominal pain/diarrhea completely resolved. Otherwise no fevers, chest pain, SOB, N/V, or bleeding.      Review of Systems: ROS negative except as noted above.     PHYSICAL EXAM      Wt Readings from Last 4 Encounters:   04/12/24 93 kg (205 lb 1.6 oz)   04/08/24 94.3 kg (207 lb 12.8 oz)   04/05/24 94.8 kg (209 lb)   04/04/24 94.8 kg (209 lb)     BP 91/61   Pulse 95   Temp 97.2  F (36.2  C) (Tympanic)   Resp 16   Wt 93 kg (205 lb 1.6 oz)   SpO2 95%   BMI 34.17 kg/m      General: NAD; sitting in chair.   Lungs: CTAB; no crackles  Cardiovascular: RRR  Abdominal/Rectal: soft, NT, ND.   Skin: sandpaper rough, fine papular rash hyperpigmented on left elbow, a few spots on right - (4/8) improving per patient.  Lymph: no LE edema  Neuro: A&O, moving all extremities spontaneously     LABS AND IMAGING - PAST 24 HOURS     Lab Results   Component Value Date    WBC 5.2 04/10/2024    ANEU 3.0 04/02/2024    HGB 8.6 (L) 04/10/2024    HCT 27.3 (L) 04/10/2024     04/10/2024     04/10/2024    POTASSIUM 4.0 04/10/2024    CHLORIDE 106 04/10/2024    CO2 23 04/10/2024    GLC 96 04/10/2024    BUN 6.8 (L) 04/10/2024    CR 0.54 04/10/2024    MAG 1.5 (L) 04/10/2024    INR 1.40 (H) 04/10/2024     M-spike 0.1, kappa FLC 1.0, lambda FLC 0.76, K/L ratio 1.34.     ASSESSMENT/PLAN   Rosario Terrazas is a 68 year old female, currently day +30 s/p auto for MM readmitted 3/21 with N/F and GNB+ sepsis.    BMT/IEC PROTOCOL for standard risk MM Auto PBSCT  3/12 Day -1 Melphalan 200 mg/m2    3/13 Day 0 Transplant, cell total post wash 2.23 x 10^6 CD34 + cells/kg (pre freeze dose: 5.36 x 10 ^6 CD34+ cells/kg)    Day +28 restaging shows VGPR with M-spike 0.1, normal K/L ratio.    ID  #N/F and sepsis 3/22/24.  See below.  #Strep mitis bacteremia (3/22). Follow up cultures 3/23-3/25: negative.  Vanco (3/22-3/24)   Meropenem 3/22-3/27)  Ertapenem 3/27-3/29-->colitis developed on this, so changed back to merrem  Meropenem 3/29-4/2--> discovered blastocystic hominis parasite in stool, which can account for the colitis.  Thus, changed back to ertapenem  Ertapenem 4/2-4/5.  EOT is 4/5/24.  #E. Coli bacteremia (3/21, 3/22). Follow up cultures 3/23-3/25: negative.  Tobraymycin x1 (3/22)  Meropenem/ertapenem as detailed above.  In clinic giving ertapenem qday 4/3 through 4/5/2024, last dose given.  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 She had 4+ growth on the O&P test, as well as clinical symptoms and colitis on CT. Consulted transplant ID, who have recommended 2 weeks of oral flagyl (dosed at 500mg qid 4/2-4/15).   - ID will follow up 4/23, however if at the 2 week milton, she is still having symptoms, the second treatment option is a medication called paromomycin (please see ID note from 4/2 for details of dosing, etc).    - Abd pain/diarrhea resolved currently but advised to monitor for recurrence once she stops Flagyl  #Cough: RVP negative; flu/rsv/covid negative 3/27.  #Latent TB: Continue INH/B6 through 3 months post transplant, per ID.   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 Recheck next week.     #PPX: acyclovir. Bactrim to start at day +28 -- aware to start on 4/15.  Note that fluconazole was discontinued upon engraftment, as patient has a prolonged QT interval.     Heme   - Anemia, thrombocytopenia secondary to chemotherapy and  multiple myeloma. All counts improving nicely  - Transfuse to keep hgb >7g/dL, plt >10k.   - Resume home aspirin upon discharge as thrombocytopenia has resolved.    FEN/Renal  #Hypocalcemia: Continue oral replacement  #Vitamin D level: adequate. Briefly received high dose ergocalciferol this admission, prior to learning her vitamin D level was adequate. Discontinued ergocalciferol on discharge.   #Lymphedema consult 3/27, wraps placed.  No longer needing them as edema has mostly resolved.   #Severe malnutrition: dietician following.     CV  #Afib + SVT: paroxysmal afib with RVR and hypotension requiring transfer to MICU; also intermittent SVT.    Amiodarone 0.5mg/hour drip transitioned to PO on 3/24; discussed with cardiology on 3/28 and they advised okay to stop amiodarone today (3/28) due to rising LFTs. LFTs now improved after discontinuing amiodarone.   Metoprolol 25mg bid   Concern that cardiac processes were being driven by an overall inflammatory state, added decadron 4mg IV daily x 3 days (3/24-3/26).   Electrolytes to be replaced per HIGH sliding scale. Stopped K supplement on 4/8, recheck BMP and Mg again next week.   Prolonged QTc; continue to hold zofran/zyprexa/fluconazole  Ziopatch placement needed for 7 day ziopatch monitoring. Per discussion with the cardiology tech department (lead is Connie Morales at [phone]), their process has changed as of January, and BMT providers do not appear to have access to these orders. Currently has Ziopatch on. Outpt Cardiology follow-up moved up to 5/16.     GI:   #Colitis: noted on 3/29 CT obtained for abdominal pain; see ID plan. Imodium prn for diarrhea  #Transaminitis: check viral studies (Hep B, Hep C, EBV, CMV, adenovirus)  Hepatitis b and c testing not reactive  CMV < 35; adenovirus negative; EBV negative (3/28)  LFT elevations resolved with stopping amiodarone    Endocrine  #Thyroid FNA Atypia of undetermined significance diagnosis has a 22 (13-30)% risk of  malignancy. Repeat FNA (least 4-6 weeks from previous aspiration with optimal time being 12 weeks after last aspiration) , molecular testing, diagnostic lobectomy, or surveillance is recommended.    Has next appt with endocrine 4/16/24.               *TSH WNL 3/21    Neuro/Pain  #Neuropathy: continues on eleni, xanaflex.  #Rib pain: continue oxycodone and back brace, Tylenol prn. Robaxin prn.   #Abdominal pain: Palliative Care consult placed 4/1; they do not believe she is a candidate for long acting pain medication. Continue prn oxycodone and start prn robaxin. Okay to use tylenol and can schedule if it is helpful.      MSK: significant deconditioning.    SKIN: possible rash related to Flagyl. Now improving.      PLAN:    - Complete course of flagyl through 4/15 and follow up with ID 4/23  - Labs and BMT YAO follow-up in 1 week, then will transition care back to Dr. Ellison    Total of 40 minutes on patient visit, reviewing records, interpreting test results, placing orders, and documentation on the day of service.    Edie Hernandez MD  Attending Physician, [facility]

## 2024-04-16 ENCOUNTER — CARE COORDINATION (OUTPATIENT)
Dept: TRANSPLANT | Facility: CLINIC | Age: 69
End: 2024-04-16

## 2024-04-16 ENCOUNTER — VIRTUAL VISIT (OUTPATIENT)
Dept: ENDOCRINOLOGY | Facility: CLINIC | Age: 69
End: 2024-04-16
Attending: INTERNAL MEDICINE
Payer: COMMERCIAL

## 2024-04-16 DIAGNOSIS — E04.1 THYROID NODULE: ICD-10-CM

## 2024-04-16 DIAGNOSIS — E04.2 MULTIPLE THYROID NODULES: Primary | ICD-10-CM

## 2024-04-16 PROCEDURE — 99204 OFFICE O/P NEW MOD 45 MIN: CPT | Mod: 24 | Performed by: INTERNAL MEDICINE

## 2024-04-16 NOTE — PROGRESS NOTES
Video-Visit Details    Type of service:  Video Visit  Joined the call at 4/16/2024, 3:31:59 pm.  Left the call at 4/16/2024, 3:54:44 pm.    Originating Location (pt. Location): Home  Distant Location (provider location): Off-site    Mode of Communication:  Video Conference via D1G    =======================================================  Assessment     Rosario Terrazas is a 68 year old female with a past medical history significant for metastatic multiple myeloma, diagnosed in 2023, status post bone marrow transplant in March 2024.  A right dominant thyroid nodule with mild FDG uptake was identified on the pretransplant PET/CT, which corresponded to a mixed nodule on ultrasound.  The biopsy of the nodule from 2/27/2024 revealed AUS, with a risk of cancer around 22%.  Differential diagnosis: Benign thyroid nodule, thyroid cancer, toxic nodule, plasmacytoma.  Clinically and biochemically, she is euthyroid.  She has been experiencing a cough of unclear etiology.    Discussed about the option of pursuing a second biopsy in order to reassess for plasmacytoma versus the option of pursuing Afirma molecular analysis on the prior biopsy.  The patient agreed to have the right thyroid nodule rebiopsied, with a plan to pursue flow cytometry and Afirma analysis for indeterminate results.    Orders Placed This Encounter   Procedures    US Biopsy Thyroid Fine Needle Aspiration    Fine Needle Aspirate    Flow Cytometry     =======================================================  The patient is seen in consultation at the request of Dr. Rishi Rocha.  The patient is seen with the help of her daughter Katherin, who speaks Kisii and helps with the translation.    History of Present Illness:  Rosario Terrazas is a 68 year old female with a past medical history significant for metastatic multiple myeloma (S/P BMT on 3/13/24), Afib and SVT, incidentally diagnosed with a thyroid nodule on the PET/CT from February  2024.  The thyroid ultrasound from 2/26/2024 revealed a 2.3 cm right mid inferior thyroid nodule, mixed, hypo/isoechoic, with a couple of ? macrocalcifications, and 2 spongiform nodules (0.9 cm in the right inferior lobe and 0.5 cm in the left superior lobe).  The 2.3 cm right thyroid nodule demonstrated mild FDG avidity on the 2/22/2024 PET/CT.  I reviewed both the ultrasound and PET/CT images.  The dominant mixed right thyroid nodule was biopsied on 2/27/2024 and the biopsy revealed AUS, with a risk of cancer of ~ 22%. The cytopathology revealed some atypical cells possibly of lymphocytic in origin, with enlarged nuclei and high nuclear to cytoplasmic ratios.     TSH was normal at 1.5, on 3/22/2024. She was transiently treated with amiodarone for A-fib, from 3/22/2024 to 3/28/2024.  The amiodarone was discontinued due to rising LFTs.     Rosario reports feeling weak, gradually improving after the recent transplant.  The appetite has improved.  On questioning, she denies dysphagia, voice hoarseness.  She has noticed a cough which has been present prior to transplant but got progressively worse afterwards.  The cough is described as similar to spitting and tends to occur after eating.  It is not present during the night.  She denies experiencing heartburns, nasal or sinus discharge, allergies.    There is no known family history or personal history of thyroid disease.  Clinically, she does not endorse signs or symptoms suggestive of hypo or hyperthyroidism.    Past Medical History   OA  T7 pathological compression fracture 8/2023 tx with Rx and Reclast  MM  GERD  Latent TB      Past Surgical History   Past Surgical History:   Procedure Laterality Date    INSERT CATHETER VASCULAR ACCESS Left 3/6/2024    Procedure: central venous catheter tunneled line Insert vascular access;  Surgeon: Onel Leonardo MD;  Location: [facility] OR    IR CVC TUNNEL PLACEMENT > 5 YRS OF AGE  3/6/2024    IR CVC TUNNEL REMOVAL LEFT  4/10/2024    Left knee replacement     Current Medications    Current Outpatient Medications:     acetaminophen (TYLENOL) 325 MG tablet, Take 2 tablets (650 mg) by mouth every 4 hours as needed for pain, Disp: 120 tablet, Rfl: 1    acyclovir (ZOVIRAX) 800 MG tablet, Take 1 tablet (800 mg) by mouth 2 times daily Start Day -1, Disp: 60 tablet, Rfl: 11    aspirin 81 MG EC tablet, Take 1 tablet (81 mg) by mouth daily, Disp: 30 tablet, Rfl: 0    calcium carbonate-vitamin D (OSCAL) 500-5 MG-MCG tablet, Take 1 tablet by mouth 3 times daily (with meals), Disp: 90 tablet, Rfl: 2    diclofenac (VOLTAREN) 1 % topical gel, Apply 2 g topically 4 times daily, Disp: 350 g, Rfl: 2    folic acid (FOLVITE) 1 MG tablet, Take 1 tablet (1,000 mcg) by mouth daily, Disp: 30 tablet, Rfl: 0    gabapentin (NEURONTIN) 100 MG capsule, Take 1 capsule (100 mg) by mouth 3 times daily, Disp: 90 capsule, Rfl: 0    Heparin Sod, Pork, Lock Flush 10 UNIT/ML SOLN, 5 mLs by Intracatheter route daily, Disp: , Rfl:     isoniazid (NYDRAZID) 300 MG tablet, Take 1 tablet (300 mg) by mouth daily, Disp: 30 tablet, Rfl: 0    loperamide (IMODIUM) 2 MG capsule, Take 1 capsule (2 mg) by mouth 4 times daily as needed for diarrhea, Disp: 120 capsule, Rfl: 0    methocarbamol (ROBAXIN) 500 MG tablet, Take 1 tablet (500 mg) by mouth 4 times daily as needed for muscle spasms, Disp: 60 tablet, Rfl: 0    metoprolol tartrate (LOPRESSOR) 25 MG tablet, Take 1 tablet (25 mg) by mouth 2 times daily, Disp: 60 tablet, Rfl: 0    metroNIDAZOLE (FLAGYL) 500 MG tablet, Take 1 tablet (500 mg) by mouth 4 times daily, Disp: 56 tablet, Rfl: 0    oxyCODONE (ROXICODONE) 5 MG tablet, Take 1 tablet (5 mg) by mouth every 6 hours as needed for severe pain, Disp: 30 tablet, Rfl: 0    pantoprazole (PROTONIX) 40 MG EC tablet, Take 1 tablet (40 mg) by mouth daily Start Day -1, Disp: 30 tablet, Rfl: 0    prochlorperazine (COMPAZINE) 5 MG tablet, Take 1 tablet (5 mg) by mouth every 6 hours as needed for  nausea or vomiting, Disp: 40 tablet, Rfl: 0    pyridOXINE (VITAMIN B6) 25 MG tablet, Take 1 tablet (25 mg) by mouth daily, Disp: 30 tablet, Rfl: 0    sulfamethoxazole-trimethoprim (BACTRIM DS) 800-160 MG tablet, Take 1 tablet by mouth Every Mon, Tues two times daily Start medication on 4/15., Disp: , Rfl:     triamcinolone (KENALOG) 0.1 % external cream, Apply topically 2 times daily, Disp: 80 g, Rfl: 0    Vitamin D3 (CHOLECALCIFEROL) 25 mcg (1000 units) tablet, Take 2 tablets (50 mcg) by mouth daily, Disp: 30 tablet, Rfl: 0    Family History   Non significant. Brother - diabetes; he lived in [place].     Social History  . She has 5 children. Originally from [facility] [place] [facility] [place] [facility] [place]; moved to  in 2019. She denies smoking, drinking alcohol or using illicit drugs. Occupation: unemployed.      Review of Systems   Systemic:              weight down from 250 lbs (prior to the dx of MM) to 205 lbs, after the transplant.   Eye:                      No eye symptoms   Moody-Laryngeal:     as above    Breast:                  No breast symptoms  Cardiovascular:     palpitations noticed with walking faster - short lived, present since transplant   Pulmonary:           No SOB or cough    Gastrointestinal:   No diarrhea or constipation   Genitourinary:       No genitourinary symptoms, no increased thirst or urination   Endocrine:            cold intolerance - more pronounced since the transplant   Neurological:        occasional headaches, no tremor, no numbness or tingling sensation, no dizziness   Musculoskeletal:  back, shoulders and ribs joint pain   Skin:                     hair loss related to chemo   Psychological:     No psychological symptoms                 Vital Signs     Previous Weights:    Wt Readings from Last 10 Encounters:   04/12/24 93 kg (205 lb 1.6 oz)   04/08/24 94.3 kg (207 lb 12.8 oz)   04/05/24 94.8 kg (209 lb)   04/04/24 94.8 kg (209 lb)   04/03/24 94.5 kg (208 lb 6.4 oz)   04/02/24 93.1 kg (205 lb 3.2 oz)    03/21/24 94.7 kg (208 lb 11.2 oz)   03/20/24 94.9 kg (209 lb 4.8 oz)   03/19/24 96.3 kg (212 lb 4.8 oz)   03/18/24 96.4 kg (212 lb 8 oz)        There were no vitals taken for this visit.    Physical Exam  General Appearance: alert, no distress noted.  Resting in a chair.  Eyes: grossly normal to inspection, conjunctivae and sclerae normal, no lid lag or stare   Neck: No visible masses or enlargement  Respiratory: no audible wheeze, cough, or visible cyanosis.  No visible retractions or increased work of breathing.  Neurological: Cranial nerves grossly intact; no tremor of the hands     Lab Results  I reviewed prior lab results documented in Epic.  TSH   Date Value Ref Range Status   03/22/2024 1.50 0.30 - 4.20 uIU/mL Final

## 2024-04-16 NOTE — LETTER
4/16/2024         RE: Rosario Terrazas  [address] [place] [address]        Dear Colleague,    Thank you for referring your patient, Rosario Terrazas, to the [facility]. Please see a copy of my visit note below.      Video-Visit Details    Type of service:  Video Visit  Joined the call at 4/16/2024, 3:31:59 pm.  Left the call at 4/16/2024, 3:54:44 pm.    Originating Location (pt. Location): Home  Distant Location (provider location): Off-site    Mode of Communication:  Video Conference via [employer]    =======================================================  Assessment     Rosario Terrazas is a 68 year old female with a past medical history significant for metastatic multiple myeloma, diagnosed in 2023, status post bone marrow transplant in March 2024.  A right dominant thyroid nodule with mild FDG uptake was identified on the pretransplant PET/CT, which corresponded to a mixed nodule on ultrasound.  The biopsy of the nodule from 2/27/2024 revealed AUS, with a risk of cancer around 22%.  Differential diagnosis: Benign thyroid nodule, thyroid cancer, toxic nodule, plasmacytoma.  Clinically and biochemically, she is euthyroid.  She has been experiencing a cough of unclear etiology.    Discussed about the option of pursuing a second biopsy in order to reassess for plasmacytoma versus the option of pursuing Afirma molecular analysis on the prior biopsy.  The patient agreed to have the right thyroid nodule rebiopsied, with a plan to pursue flow cytometry and Afirma analysis for indeterminate results.    Orders Placed This Encounter   Procedures     US Biopsy Thyroid Fine Needle Aspiration     Fine Needle Aspirate     Flow Cytometry     =======================================================  The patient is seen in consultation at the request of Dr. Rishi Rocha.  The patient is seen with the help of her daughter Katherin, who speaks Kisii and helps with the  translation.    History of Present Illness:  Rosario Terrazas is a 68 year old female with a past medical history significant for metastatic multiple myeloma (S/P BMT on 3/13/24), Afib and SVT, incidentally diagnosed with a thyroid nodule on the PET/CT from February 2024.  The thyroid ultrasound from 2/26/2024 revealed a 2.3 cm right mid inferior thyroid nodule, mixed, hypo/isoechoic, with a couple of ? macrocalcifications, and 2 spongiform nodules (0.9 cm in the right inferior lobe and 0.5 cm in the left superior lobe).  The 2.3 cm right thyroid nodule demonstrated mild FDG avidity on the 2/22/2024 PET/CT.  I reviewed both the ultrasound and PET/CT images.  The dominant mixed right thyroid nodule was biopsied on 2/27/2024 and the biopsy revealed AUS, with a risk of cancer of ~ 22%. The cytopathology revealed some atypical cells possibly of lymphocytic in origin, with enlarged nuclei and high nuclear to cytoplasmic ratios.     TSH was normal at 1.5, on 3/22/2024. She was transiently treated with amiodarone for A-fib, from 3/22/2024 to 3/28/2024.  The amiodarone was discontinued due to rising LFTs.     Rosario reports feeling weak, gradually improving after the recent transplant.  The appetite has improved.  On questioning, she denies dysphagia, voice hoarseness.  She has noticed a cough which has been present prior to transplant but got progressively worse afterwards.  The cough is described as similar to spitting and tends to occur after eating.  It is not present during the night.  She denies experiencing heartburns, nasal or sinus discharge, allergies.    There is no known family history or personal history of thyroid disease.  Clinically, she does not endorse signs or symptoms suggestive of hypo or hyperthyroidism.    Past Medical History   OA  T7 pathological compression fracture 8/2023 tx with Rx and Reclast  MM  GERD  Latent TB      Past Surgical History   Past Surgical History:   Procedure Laterality  Date     INSERT CATHETER VASCULAR ACCESS Left 3/6/2024    Procedure: central venous catheter tunneled line Insert vascular access;  Surgeon: Onel Leonardo MD;  Location: [facility] OR     IR CVC TUNNEL PLACEMENT > 5 YRS OF AGE  3/6/2024     IR CVC TUNNEL REMOVAL LEFT  4/10/2024   Left knee replacement     Current Medications    Current Outpatient Medications:      acetaminophen (TYLENOL) 325 MG tablet, Take 2 tablets (650 mg) by mouth every 4 hours as needed for pain, Disp: 120 tablet, Rfl: 1     acyclovir (ZOVIRAX) 800 MG tablet, Take 1 tablet (800 mg) by mouth 2 times daily Start Day -1, Disp: 60 tablet, Rfl: 11     aspirin 81 MG EC tablet, Take 1 tablet (81 mg) by mouth daily, Disp: 30 tablet, Rfl: 0     calcium carbonate-vitamin D (OSCAL) 500-5 MG-MCG tablet, Take 1 tablet by mouth 3 times daily (with meals), Disp: 90 tablet, Rfl: 2     diclofenac (VOLTAREN) 1 % topical gel, Apply 2 g topically 4 times daily, Disp: 350 g, Rfl: 2     folic acid (FOLVITE) 1 MG tablet, Take 1 tablet (1,000 mcg) by mouth daily, Disp: 30 tablet, Rfl: 0     gabapentin (NEURONTIN) 100 MG capsule, Take 1 capsule (100 mg) by mouth 3 times daily, Disp: 90 capsule, Rfl: 0     Heparin Sod, Pork, Lock Flush 10 UNIT/ML SOLN, 5 mLs by Intracatheter route daily, Disp: , Rfl:      isoniazid (NYDRAZID) 300 MG tablet, Take 1 tablet (300 mg) by mouth daily, Disp: 30 tablet, Rfl: 0     loperamide (IMODIUM) 2 MG capsule, Take 1 capsule (2 mg) by mouth 4 times daily as needed for diarrhea, Disp: 120 capsule, Rfl: 0     methocarbamol (ROBAXIN) 500 MG tablet, Take 1 tablet (500 mg) by mouth 4 times daily as needed for muscle spasms, Disp: 60 tablet, Rfl: 0     metoprolol tartrate (LOPRESSOR) 25 MG tablet, Take 1 tablet (25 mg) by mouth 2 times daily, Disp: 60 tablet, Rfl: 0     metroNIDAZOLE (FLAGYL) 500 MG tablet, Take 1 tablet (500 mg) by mouth 4 times daily, Disp: 56 tablet, Rfl: 0     oxyCODONE (ROXICODONE) 5 MG tablet, Take 1 tablet (5 mg) by mouth  every 6 hours as needed for severe pain, Disp: 30 tablet, Rfl: 0     pantoprazole (PROTONIX) 40 MG EC tablet, Take 1 tablet (40 mg) by mouth daily Start Day -1, Disp: 30 tablet, Rfl: 0     prochlorperazine (COMPAZINE) 5 MG tablet, Take 1 tablet (5 mg) by mouth every 6 hours as needed for nausea or vomiting, Disp: 40 tablet, Rfl: 0     pyridOXINE (VITAMIN B6) 25 MG tablet, Take 1 tablet (25 mg) by mouth daily, Disp: 30 tablet, Rfl: 0     sulfamethoxazole-trimethoprim (BACTRIM DS) 800-160 MG tablet, Take 1 tablet by mouth Every Mon, Tues two times daily Start medication on 4/15., Disp: , Rfl:      triamcinolone (KENALOG) 0.1 % external cream, Apply topically 2 times daily, Disp: 80 g, Rfl: 0     Vitamin D3 (CHOLECALCIFEROL) 25 mcg (1000 units) tablet, Take 2 tablets (50 mcg) by mouth daily, Disp: 30 tablet, Rfl: 0    Family History   Non significant. Brother - diabetes; he lived in [place].     Social History  . She has 5 children. Originally from [facility]; moved to  in 2019. She denies smoking, drinking alcohol or using illicit drugs. Occupation: unemployed.      Review of Systems   Systemic:              weight down from 250 lbs (prior to the dx of MM) to 205 lbs, after the transplant.   Eye:                      No eye symptoms   Moody-Laryngeal:     as above    Breast:                  No breast symptoms  Cardiovascular:     palpitations noticed with walking faster - short lived, present since transplant   Pulmonary:           No SOB or cough    Gastrointestinal:   No diarrhea or constipation   Genitourinary:       No genitourinary symptoms, no increased thirst or urination   Endocrine:            cold intolerance - more pronounced since the transplant   Neurological:        occasional headaches, no tremor, no numbness or tingling sensation, no dizziness   Musculoskeletal:  back, shoulders and ribs joint pain   Skin:                     hair loss related to chemo   Psychological:     No psychological symptoms                  Vital Signs     Previous Weights:    Wt Readings from Last 10 Encounters:   04/12/24 93 kg (205 lb 1.6 oz)   04/08/24 94.3 kg (207 lb 12.8 oz)   04/05/24 94.8 kg (209 lb)   04/04/24 94.8 kg (209 lb)   04/03/24 94.5 kg (208 lb 6.4 oz)   04/02/24 93.1 kg (205 lb 3.2 oz)   03/21/24 94.7 kg (208 lb 11.2 oz)   03/20/24 94.9 kg (209 lb 4.8 oz)   03/19/24 96.3 kg (212 lb 4.8 oz)   03/18/24 96.4 kg (212 lb 8 oz)        There were no vitals taken for this visit.    Physical Exam  General Appearance: alert, no distress noted.  Resting in a chair.  Eyes: grossly normal to inspection, conjunctivae and sclerae normal, no lid lag or stare   Neck: No visible masses or enlargement  Respiratory: no audible wheeze, cough, or visible cyanosis.  No visible retractions or increased work of breathing.  Neurological: Cranial nerves grossly intact; no tremor of the hands     Lab Results  I reviewed prior lab results documented in Epic.  TSH   Date Value Ref Range Status   03/22/2024 1.50 0.30 - 4.20 uIU/mL Final                               Again, thank you for allowing me to participate in the care of your patient.        Sincerely,        Marianela Landa MD

## 2024-04-16 NOTE — NURSING NOTE
Is the patient currently in the state of [place]? YES    Visit mode:VIDEO    If the visit is dropped, the patient can be reconnected by: VIDEO VISIT: Text to cell phone:   Telephone Information:   Mobile [phone]       Will anyone else be joining the visit? NO  (If patient encounters technical issues they should call [phone]    How would you like to obtain your AVS? MyChart    Are changes needed to the allergy or medication list? No    Are refills needed on medications prescribed by this physician? NO    Reason for visit: Consult    Shelby Kocher VVF

## 2024-04-16 NOTE — PROGRESS NOTES
Dear Dr. Lin and team,    Patient Rosario Terrazas has completed their Autologous stem cell transplant on 3/13/2024 and is in remission. We will release her care back to you. Please schedule follow up visit in the next month.    We will see her with a PET scan at Day 100 and 1 year after transplant  We will see her with CT scans at month 6 and month 24  We will give her vaccines at year 1 and 2 after transplant    Please continue acyclovir until shingles vaccine is completed (1 year after transplant)  Please continue PJP prophylaxis (bactrim or other) for 6 months after transplant    Thank you for referral. Please contact us with questions and concerns.    Rishi Rocha MD  [facility]  Division of Hematology, Oncology and Transplantation    RN Care Coordinator: Romina Santiago  Phone: [phone]  Fax: [phone]

## 2024-04-19 ENCOUNTER — ONCOLOGY VISIT (OUTPATIENT)
Dept: TRANSPLANT | Facility: CLINIC | Age: 69
End: 2024-04-19
Attending: INTERNAL MEDICINE
Payer: COMMERCIAL

## 2024-04-19 ENCOUNTER — APPOINTMENT (OUTPATIENT)
Dept: LAB | Facility: CLINIC | Age: 69
End: 2024-04-19
Attending: INTERNAL MEDICINE
Payer: COMMERCIAL

## 2024-04-19 VITALS
HEART RATE: 69 BPM | RESPIRATION RATE: 16 BRPM | SYSTOLIC BLOOD PRESSURE: 101 MMHG | OXYGEN SATURATION: 100 % | BODY MASS INDEX: 33.32 KG/M2 | DIASTOLIC BLOOD PRESSURE: 65 MMHG | WEIGHT: 200 LBS | TEMPERATURE: 98.3 F

## 2024-04-19 DIAGNOSIS — C90.01 MULTIPLE MYELOMA IN REMISSION (H): ICD-10-CM

## 2024-04-19 DIAGNOSIS — C90.00 MULTIPLE MYELOMA, REMISSION STATUS UNSPECIFIED (H): ICD-10-CM

## 2024-04-19 DIAGNOSIS — C90.00 MULTIPLE MYELOMA NOT HAVING ACHIEVED REMISSION (H): ICD-10-CM

## 2024-04-19 DIAGNOSIS — S22.068A OTHER CLOSED FRACTURE OF SEVENTH THORACIC VERTEBRA, INITIAL ENCOUNTER (H): ICD-10-CM

## 2024-04-19 DIAGNOSIS — Z94.84 H/O AUTOLOGOUS STEM CELL TRANSPLANT (H): ICD-10-CM

## 2024-04-19 DIAGNOSIS — B25.9 CYTOMEGALOVIRUS (CMV) VIREMIA (H): ICD-10-CM

## 2024-04-19 LAB
ACANTHOCYTES BLD QL SMEAR: ABNORMAL
ALBUMIN SERPL BCG-MCNC: 3.6 G/DL (ref 3.5–5.2)
ALP SERPL-CCNC: 36 U/L (ref 40–150)
ALT SERPL W P-5'-P-CCNC: 8 U/L (ref 0–50)
ANION GAP SERPL CALCULATED.3IONS-SCNC: 10 MMOL/L (ref 7–15)
AST SERPL W P-5'-P-CCNC: 17 U/L (ref 0–45)
AUER BODIES BLD QL SMEAR: ABNORMAL
BASO STIPL BLD QL SMEAR: ABNORMAL
BASOPHILS # BLD AUTO: 0.1 10E3/UL (ref 0–0.2)
BASOPHILS NFR BLD AUTO: 2 %
BILIRUB SERPL-MCNC: 0.2 MG/DL
BITE CELLS BLD QL SMEAR: ABNORMAL
BLISTER CELLS BLD QL SMEAR: ABNORMAL
BUN SERPL-MCNC: 4.9 MG/DL (ref 8–23)
BURR CELLS BLD QL SMEAR: ABNORMAL
CALCIUM SERPL-MCNC: 8.9 MG/DL (ref 8.8–10.2)
CHLORIDE SERPL-SCNC: 107 MMOL/L (ref 98–107)
CMV DNA SPEC NAA+PROBE-ACNC: <35 IU/ML
CMV DNA SPEC NAA+PROBE-LOG#: <1.5 {LOG_COPIES}/ML
CREAT SERPL-MCNC: 0.55 MG/DL (ref 0.51–0.95)
DACRYOCYTES BLD QL SMEAR: ABNORMAL
DEPRECATED HCO3 PLAS-SCNC: 22 MMOL/L (ref 22–29)
EGFRCR SERPLBLD CKD-EPI 2021: >90 ML/MIN/1.73M2
ELLIPTOCYTES BLD QL SMEAR: ABNORMAL
EOSINOPHIL # BLD AUTO: 0.1 10E3/UL (ref 0–0.7)
EOSINOPHIL NFR BLD AUTO: 2 %
ERYTHROCYTE [DISTWIDTH] IN BLOOD BY AUTOMATED COUNT: 23 % (ref 10–15)
FRAGMENTS BLD QL SMEAR: SLIGHT
GLUCOSE SERPL-MCNC: 110 MG/DL (ref 70–99)
HCT VFR BLD AUTO: 30.7 % (ref 35–47)
HGB BLD-MCNC: 10.1 G/DL (ref 11.7–15.7)
HGB C CRYSTALS: ABNORMAL
HOWELL-JOLLY BOD BLD QL SMEAR: ABNORMAL
IMM GRANULOCYTES # BLD: 0 10E3/UL
IMM GRANULOCYTES NFR BLD: 0 %
LYMPHOCYTES # BLD AUTO: 1.1 10E3/UL (ref 0.8–5.3)
LYMPHOCYTES NFR BLD AUTO: 26 %
MAGNESIUM SERPL-MCNC: 1.7 MG/DL (ref 1.7–2.3)
MCH RBC QN AUTO: 34.1 PG (ref 26.5–33)
MCHC RBC AUTO-ENTMCNC: 32.9 G/DL (ref 31.5–36.5)
MCV RBC AUTO: 104 FL (ref 78–100)
MONOCYTES # BLD AUTO: 0.9 10E3/UL (ref 0–1.3)
MONOCYTES NFR BLD AUTO: 22 %
NEUTROPHILS # BLD AUTO: 1.9 10E3/UL (ref 1.6–8.3)
NEUTROPHILS NFR BLD AUTO: 48 %
NEUTS HYPERSEG BLD QL SMEAR: ABNORMAL
NRBC # BLD AUTO: 0 10E3/UL
NRBC BLD AUTO-RTO: 0 /100
PLAT MORPH BLD: ABNORMAL
PLATELET # BLD AUTO: 239 10E3/UL (ref 150–450)
POLYCHROMASIA BLD QL SMEAR: SLIGHT
POTASSIUM SERPL-SCNC: 3.3 MMOL/L (ref 3.4–5.3)
PROT SERPL-MCNC: 5.5 G/DL (ref 6.4–8.3)
RBC # BLD AUTO: 2.96 10E6/UL (ref 3.8–5.2)
RBC AGGLUT BLD QL: ABNORMAL
RBC MORPH BLD: ABNORMAL
ROULEAUX BLD QL SMEAR: ABNORMAL
SICKLE CELLS BLD QL SMEAR: ABNORMAL
SMUDGE CELLS BLD QL SMEAR: ABNORMAL
SODIUM SERPL-SCNC: 139 MMOL/L (ref 135–145)
SPHEROCYTES BLD QL SMEAR: ABNORMAL
STOMATOCYTES BLD QL SMEAR: ABNORMAL
TARGETS BLD QL SMEAR: ABNORMAL
TOXIC GRANULES BLD QL SMEAR: ABNORMAL
VARIANT LYMPHS BLD QL SMEAR: ABNORMAL
WBC # BLD AUTO: 4 10E3/UL (ref 4–11)

## 2024-04-19 PROCEDURE — 85025 COMPLETE CBC W/AUTO DIFF WBC: CPT

## 2024-04-19 PROCEDURE — 99214 OFFICE O/P EST MOD 30 MIN: CPT

## 2024-04-19 PROCEDURE — 83735 ASSAY OF MAGNESIUM: CPT

## 2024-04-19 PROCEDURE — 36415 COLL VENOUS BLD VENIPUNCTURE: CPT

## 2024-04-19 PROCEDURE — 80053 COMPREHEN METABOLIC PANEL: CPT

## 2024-04-19 PROCEDURE — 99215 OFFICE O/P EST HI 40 MIN: CPT

## 2024-04-19 PROCEDURE — 250N000013 HC RX MED GY IP 250 OP 250 PS 637

## 2024-04-19 RX ORDER — ACETAMINOPHEN 325 MG/1
650 TABLET ORAL EVERY 4 HOURS PRN
Qty: 120 TABLET | Refills: 1 | Status: SHIPPED | OUTPATIENT
Start: 2024-04-19 | End: 2024-05-05

## 2024-04-19 RX ORDER — POTASSIUM CHLORIDE 1500 MG/1
20 TABLET, EXTENDED RELEASE ORAL ONCE
Status: COMPLETED | OUTPATIENT
Start: 2024-04-19 | End: 2024-04-19

## 2024-04-19 RX ORDER — OXYCODONE HYDROCHLORIDE 5 MG/1
5 TABLET ORAL EVERY 6 HOURS PRN
Qty: 40 TABLET | Refills: 0 | Status: SHIPPED | OUTPATIENT
Start: 2024-04-19

## 2024-04-19 RX ORDER — ACYCLOVIR 800 MG/1
800 TABLET ORAL 2 TIMES DAILY
Qty: 60 TABLET | Refills: 11 | Status: SHIPPED | OUTPATIENT
Start: 2024-04-19

## 2024-04-19 RX ORDER — SULFAMETHOXAZOLE/TRIMETHOPRIM 800-160 MG
1 TABLET ORAL
Qty: 32 TABLET | Refills: 2 | Status: SHIPPED | OUTPATIENT
Start: 2024-04-22

## 2024-04-19 RX ADMIN — POTASSIUM CHLORIDE 20 MEQ: 1500 TABLET, EXTENDED RELEASE ORAL at 10:54

## 2024-04-19 ASSESSMENT — PAIN SCALES - GENERAL: PAINLEVEL: SEVERE PAIN (6)

## 2024-04-19 NOTE — NURSING NOTE
Chief Complaint   Patient presents with    Blood Draw     Vpt blood draw by lab RN     Venipuncture labs drawn by lab RN, vitals taken and appointment arrived.    Mariel Simeon RN

## 2024-04-19 NOTE — NURSING NOTE
"Oncology Rooming Note    April 19, 2024 10:17 AM   Rosario Terrazas is a 68 year old female who presents for:    Chief Complaint   Patient presents with    Blood Draw     Vpt blood draw by lab RN    Oncology Clinic Visit     Multiple myeloma not having achieved remission        Initial Vitals: /65   Pulse 69   Temp 98.3  F (36.8  C) (Oral)   Resp 16   Wt 90.7 kg (200 lb)   SpO2 100%   BMI 33.32 kg/m   Estimated body mass index is 33.32 kg/m  as calculated from the following:    Height as of 3/22/24: 1.65 m (5' 4.96\").    Weight as of this encounter: 90.7 kg (200 lb). Body surface area is 2.04 meters squared.  Severe Pain (6) Comment: shoulders, back, and ribs   No LMP recorded. Patient is postmenopausal.  Allergies reviewed: Yes  Medications reviewed: Yes    Medications: MEDICATION REFILLS NEEDED TODAY. Provider was notified.  Pharmacy name entered into [employer]: [place], [place] - [address] [place] [address] [phone]    Frailty Screening:   Is the patient here for a new oncology consult visit in cancer care? 2. No      Clinical concerns: needs metronidazole, sulfamethoxazole, and acyclovir.          Fabricio Martin"

## 2024-04-19 NOTE — LETTER
4/19/2024         RE: Rosario Terrazas  [address] [place] [address]        Dear Colleague,    Thank you for referring your patient, Rosario Terrazas, to the [facility] BLOOD AND MARROW TRANSPLANT PROGRAM [place]. Please see a copy of my visit note below.    BMT Clinic Progress Note   Apr 19, 2024     Patient ID:  Rosario Terrazas is a 68 year old female, currently day +37 s/p auto for MM readmitted 3/21 with N/F and GNB+ sepsis.     INTERVAL  HISTORY     Rosario is seen with her daughter who is translating today. She is doing okay. Main complaints are low appetite and the chronic shoulder/lower rib pain. Her daughter said she received a steroid injection last summer with a good response and will reach out to her PCP again. She has PRN meds to help with the pain. No recurrence of diarrhea since stopping flagyl. She has an ID follow up on 4/23. Her K+ is low today, replaced in clinic PO. The daughter will give her PO K+ 2-3x week depending on her food intake. The plan is to have her transition back to her primary oncologist now, Dr. Elilson.     Review of Systems: ROS negative except as noted above.     PHYSICAL EXAM      Wt Readings from Last 4 Encounters:   04/12/24 93 kg (205 lb 1.6 oz)   04/08/24 94.3 kg (207 lb 12.8 oz)   04/05/24 94.8 kg (209 lb)   04/04/24 94.8 kg (209 lb)     There were no vitals taken for this visit.    General: NAD; sitting in chair.   Lungs: CTAB; no crackles  Cardiovascular: RRR  Abdominal/Rectal: soft, NT, ND.   Skin: sandpaper rough, fine papular rash hyperpigmented on left elbow, a few spots on right - (4/8) improving per patient.  Lymph: no LE edema  Neuro: A&O, moving all extremities spontaneously     LABS AND IMAGING - PAST 24 HOURS     Lab Results   Component Value Date    WBC 5.2 04/10/2024    ANEU 3.0 04/02/2024    HGB 8.6 (L) 04/10/2024    HCT 27.3 (L) 04/10/2024     04/10/2024     04/10/2024    POTASSIUM 4.0 04/10/2024     CHLORIDE 106 04/10/2024    CO2 23 04/10/2024    GLC 96 04/10/2024    BUN 6.8 (L) 04/10/2024    CR 0.54 04/10/2024    MAG 1.5 (L) 04/10/2024    INR 1.40 (H) 04/10/2024     M-spike 0.1, kappa FLC 1.0, lambda FLC 0.76, K/L ratio 1.34.     ASSESSMENT/PLAN   Rosario Terrazas is a 68 year old female, currently day +37 s/p auto for MM readmitted 3/21 with N/F and GNB+ sepsis.    BMT/IEC PROTOCOL for standard risk MM Auto PBSCT  3/12 Day -1 Melphalan 200 mg/m2   3/13 Day 0 Transplant, cell total post wash 2.23 x 10^6 CD34 + cells/kg (pre freeze dose: 5.36 x 10 ^6 CD34+ cells/kg)    Day +28 restaging shows VGPR with M-spike 0.1, normal K/L ratio.    ID  #N/F and sepsis 3/22/24.  See below.  #Strep mitis bacteremia (3/22). Follow up cultures 3/23-3/25: negative.  Vanco (3/22-3/24)   Meropenem 3/22-3/27)  Ertapenem 3/27-3/29-->colitis developed on this, so changed back to merrem  Meropenem 3/29-4/2--> discovered blastocystic hominis parasite in stool, which can account for the colitis.  Thus, changed back to ertapenem  Ertapenem 4/2-4/5.  EOT is 4/5/24.  #E. Coli bacteremia (3/21, 3/22). Follow up cultures 3/23-3/25: negative.  Tobraymycin x1 (3/22)  Meropenem/ertapenem as detailed above.  In clinic giving ertapenem qday 4/3 through 4/5/2024, last dose given.  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 She had 4+ growth on the O&P test, as well as clinical symptoms and colitis on CT. Consulted transplant ID, who have recommended 2 weeks of oral flagyl (dosed at 500mg qid 4/2-4/15).   - ID will follow up 4/23, however if at the 2 week milton, she is still having symptoms, the second treatment option is a medication called paromomycin (please see ID note from 4/2 for details of dosing, etc).    - Abd pain/diarrhea resolved currently but advised to monitor for recurrence once she stops Flagyl  #Cough: RVP negative;  flu/rsv/covid negative 3/27.  #Latent TB: Continue INH/B6 through 3 months post transplant, per ID.   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 Pending 4/19.     #PPX: acyclovir. Bactrim to start at day +28  Note that fluconazole was discontinued upon engraftment, as patient has a prolonged QT interval.     Heme   - Anemia, thrombocytopenia secondary to chemotherapy and multiple myeloma. All counts improving nicely  - Transfuse to keep hgb >7g/dL, plt >10k.   - Resume home aspirin upon discharge as thrombocytopenia has resolved.    FEN/Renal  #Hypocalcemia: Continue oral replacement  #Vitamin D level: adequate. Briefly received high dose ergocalciferol this admission, prior to learning her vitamin D level was adequate. Discontinued ergocalciferol on discharge.   #Lymphedema consult 3/27, wraps placed.  No longer needing them as edema has mostly resolved.   #Severe malnutrition: dietician following.     CV  #Afib + SVT: paroxysmal afib with RVR and hypotension requiring transfer to MICU; also intermittent SVT.    Amiodarone 0.5mg/hour drip transitioned to PO on 3/24; discussed with cardiology on 3/28 and they advised okay to stop amiodarone today (3/28) due to rising LFTs. LFTs now improved after discontinuing amiodarone.   Metoprolol 25mg bid   Concern that cardiac processes were being driven by an overall inflammatory state, added decadron 4mg IV daily x 3 days (3/24-3/26).   Electrolytes to be replaced per HIGH sliding scale. Stopped K supplement on 4/8, replaced today. Daughter has PO Kcl 20 mEq at home and will supplement 2-3x/week depending food intake.   Prolonged QTc; continue to hold zofran/zyprexa/fluconazole  Ziopatch placement needed for 7 day ziopatch monitoring. Per discussion with the cardiology tech department (lead is Connie Morales at [phone]), their process has changed as of January, and BMT providers do not appear to have access  to these orders. Currently has Ziopatch on. Outpt Cardiology follow-up moved up to 5/16.     GI:   #Colitis: noted on 3/29 CT obtained for abdominal pain; see ID plan. Imodium prn for diarrhea  #Transaminitis: check viral studies (Hep B, Hep C, EBV, CMV, adenovirus)  Hepatitis b and c testing not reactive  CMV < 35; adenovirus negative; EBV negative (3/28)  LFT elevations resolved with stopping amiodarone    Endocrine  #Thyroid FNA Atypia of undetermined significance diagnosis has a 22 (13-30)% risk of malignancy. Repeat FNA (least 4-6 weeks from previous aspiration with optimal time being 12 weeks after last aspiration) , molecular testing, diagnostic lobectomy, or surveillance is recommended.    Endo 4/16 recommend another thyroid bx.                *TSH WNL 3/21    Neuro/Pain  #Neuropathy: continues on eleni, xanaflex.  #Rib pain: continue oxycodone and back brace, Tylenol prn. Robaxin prn.   #Abdominal pain: Palliative Care consult placed 4/1; they do not believe she is a candidate for long acting pain medication. Continue prn oxycodone and start prn robaxin. Okay to use tylenol and can schedule if it is helpful.      MSK: significant deconditioning.    SKIN: possible rash related to Flagyl. Now improving.    Summary:  - ID follow up 4/23   - Endo appt 4/16: repeat bx (FNA thyroid)   - 20 mEq Kcl   - finished flagyl, diarrhea resolved   - instructed to reach out to PCP regarding steroid injection     Total of 40 minutes on patient visit, reviewing records, interpreting test results, placing orders, and documentation on the day of service.    Sanchez Kyle PA-C

## 2024-04-19 NOTE — PROGRESS NOTES
BMT Clinic Progress Note   Apr 19, 2024     Patient ID:  Rosario Terrazas is a 68 year old female, currently day +37 s/p auto for MM readmitted 3/21 with N/F and GNB+ sepsis.     INTERVAL  HISTORY     Rosario is seen with her daughter who is translating today. She is doing okay. Main complaints are low appetite and the chronic shoulder/lower rib pain. Her daughter said she received a steroid injection last summer with a good response and will reach out to her PCP again. She has PRN meds to help with the pain. No recurrence of diarrhea since stopping flagyl. She has an ID follow up on 4/23. Her K+ is low today, replaced in clinic PO. The daughter will give her PO K+ 2-3x week depending on her food intake. The plan is to have her transition back to her primary oncologist now, Dr. Ellison.     Review of Systems: ROS negative except as noted above.     PHYSICAL EXAM      Wt Readings from Last 4 Encounters:   04/12/24 93 kg (205 lb 1.6 oz)   04/08/24 94.3 kg (207 lb 12.8 oz)   04/05/24 94.8 kg (209 lb)   04/04/24 94.8 kg (209 lb)     There were no vitals taken for this visit.    General: NAD; sitting in chair.   Lungs: CTAB; no crackles  Cardiovascular: RRR  Abdominal/Rectal: soft, NT, ND.   Skin: sandpaper rough, fine papular rash hyperpigmented on left elbow, a few spots on right - (4/8) improving per patient.  Lymph: no LE edema  Neuro: A&O, moving all extremities spontaneously     LABS AND IMAGING - PAST 24 HOURS     Lab Results   Component Value Date    WBC 5.2 04/10/2024    ANEU 3.0 04/02/2024    HGB 8.6 (L) 04/10/2024    HCT 27.3 (L) 04/10/2024     04/10/2024     04/10/2024    POTASSIUM 4.0 04/10/2024    CHLORIDE 106 04/10/2024    CO2 23 04/10/2024    GLC 96 04/10/2024    BUN 6.8 (L) 04/10/2024    CR 0.54 04/10/2024    MAG 1.5 (L) 04/10/2024    INR 1.40 (H) 04/10/2024     M-spike 0.1, kappa FLC 1.0, lambda FLC 0.76, K/L ratio 1.34.     ASSESSMENT/PLAN   Rosario Terrazas is a 68 year old  female, currently day +37 s/p auto for MM readmitted 3/21 with N/F and GNB+ sepsis.    BMT/IEC PROTOCOL for standard risk MM Auto PBSCT  3/12 Day -1 Melphalan 200 mg/m2   3/13 Day 0 Transplant, cell total post wash 2.23 x 10^6 CD34 + cells/kg (pre freeze dose: 5.36 x 10 ^6 CD34+ cells/kg)    Day +28 restaging shows VGPR with M-spike 0.1, normal K/L ratio.    ID  #N/F and sepsis 3/22/24.  See below.  #Strep mitis bacteremia (3/22). Follow up cultures 3/23-3/25: negative.  Vanco (3/22-3/24)   Meropenem 3/22-3/27)  Ertapenem 3/27-3/29-->colitis developed on this, so changed back to merrem  Meropenem 3/29-4/2--> discovered blastocystic hominis parasite in stool, which can account for the colitis.  Thus, changed back to ertapenem  Ertapenem 4/2-4/5.  EOT is 4/5/24.  #E. Coli bacteremia (3/21, 3/22). Follow up cultures 3/23-3/25: negative.  Tobraymycin x1 (3/22)  Meropenem/ertapenem as detailed above.  In clinic giving ertapenem qday 4/3 through 4/5/2024, last dose given.  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 She had 4+ growth on the O&P test, as well as clinical symptoms and colitis on CT. Consulted transplant ID, who have recommended 2 weeks of oral flagyl (dosed at 500mg qid 4/2-4/15).   - ID will follow up 4/23, however if at the 2 week milton, she is still having symptoms, the second treatment option is a medication called paromomycin (please see ID note from 4/2 for details of dosing, etc).    - Abd pain/diarrhea resolved currently but advised to monitor for recurrence once she stops Flagyl  #Cough: RVP negative; flu/rsv/covid negative 3/27.  #Latent TB: Continue INH/B6 through 3 months post transplant, per ID.   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 Pending 4/19.     #PPX: acyclovir. Bactrim to start at day +28  Note that fluconazole was  discontinued upon engraftment, as patient has a prolonged QT interval.     Heme   - Anemia, thrombocytopenia secondary to chemotherapy and multiple myeloma. All counts improving nicely  - Transfuse to keep hgb >7g/dL, plt >10k.   - Resume home aspirin upon discharge as thrombocytopenia has resolved.    FEN/Renal  #Hypocalcemia: Continue oral replacement  #Vitamin D level: adequate. Briefly received high dose ergocalciferol this admission, prior to learning her vitamin D level was adequate. Discontinued ergocalciferol on discharge.   #Lymphedema consult 3/27, wraps placed.  No longer needing them as edema has mostly resolved.   #Severe malnutrition: dietician following.     CV  #Afib + SVT: paroxysmal afib with RVR and hypotension requiring transfer to MICU; also intermittent SVT.    Amiodarone 0.5mg/hour drip transitioned to PO on 3/24; discussed with cardiology on 3/28 and they advised okay to stop amiodarone today (3/28) due to rising LFTs. LFTs now improved after discontinuing amiodarone.   Metoprolol 25mg bid   Concern that cardiac processes were being driven by an overall inflammatory state, added decadron 4mg IV daily x 3 days (3/24-3/26).   Electrolytes to be replaced per HIGH sliding scale. Stopped K supplement on 4/8, replaced today. Daughter has PO Kcl 20 mEq at home and will supplement 2-3x/week depending food intake.   Prolonged QTc; continue to hold zofran/zyprexa/fluconazole  Ziopatch placement needed for 7 day ziopatch monitoring. Per discussion with the cardiology tech department (lead is Connie Morales at [phone]), their process has changed as of January, and BMT providers do not appear to have access to these orders. Currently has Ziopatch on. Outpt Cardiology follow-up moved up to 5/16.     GI:   #Colitis: noted on 3/29 CT obtained for abdominal pain; see ID plan. Imodium prn for diarrhea  #Transaminitis: check viral studies (Hep B, Hep C, EBV, CMV, adenovirus)  Hepatitis b and c testing not  reactive  CMV < 35; adenovirus negative; EBV negative (3/28)  LFT elevations resolved with stopping amiodarone    Endocrine  #Thyroid FNA Atypia of undetermined significance diagnosis has a 22 (13-30)% risk of malignancy. Repeat FNA (least 4-6 weeks from previous aspiration with optimal time being 12 weeks after last aspiration) , molecular testing, diagnostic lobectomy, or surveillance is recommended.    Endo 4/16 recommend another thyroid bx.                *TSH WNL 3/21    Neuro/Pain  #Neuropathy: continues on eleni, xanaflex.  #Rib pain: continue oxycodone and back brace, Tylenol prn. Robaxin prn.   #Abdominal pain: Palliative Care consult placed 4/1; they do not believe she is a candidate for long acting pain medication. Continue prn oxycodone and start prn robaxin. Okay to use tylenol and can schedule if it is helpful.      MSK: significant deconditioning.    SKIN: possible rash related to Flagyl. Now improving.    Summary:  - ID follow up 4/23   - Endo appt 4/16: repeat bx (FNA thyroid)   - 20 mEq Kcl   - finished flagyl, diarrhea resolved   - instructed to reach out to PCP regarding steroid injection     Total of 40 minutes on patient visit, reviewing records, interpreting test results, placing orders, and documentation on the day of service.    Sanchez Kyle PA-C

## 2024-04-23 ENCOUNTER — OFFICE VISIT (OUTPATIENT)
Dept: INFECTIOUS DISEASES | Facility: CLINIC | Age: 69
End: 2024-04-23
Attending: STUDENT IN AN ORGANIZED HEALTH CARE EDUCATION/TRAINING PROGRAM
Payer: COMMERCIAL

## 2024-04-23 VITALS
TEMPERATURE: 98.3 F | DIASTOLIC BLOOD PRESSURE: 66 MMHG | HEIGHT: 64 IN | HEART RATE: 80 BPM | WEIGHT: 197 LBS | BODY MASS INDEX: 33.63 KG/M2 | OXYGEN SATURATION: 99 % | SYSTOLIC BLOOD PRESSURE: 98 MMHG

## 2024-04-23 DIAGNOSIS — Z86.19 HISTORY OF INFECTION DUE TO ESBL ESCHERICHIA COLI: ICD-10-CM

## 2024-04-23 DIAGNOSIS — Z94.84 HISTORY OF AUTOLOGOUS STEM CELL TRANSPLANT (H): ICD-10-CM

## 2024-04-23 DIAGNOSIS — C90.01 MULTIPLE MYELOMA IN REMISSION (H): Primary | ICD-10-CM

## 2024-04-23 DIAGNOSIS — A07.8 BLASTOCYSTIS HOMINIS INFECTION: ICD-10-CM

## 2024-04-23 DIAGNOSIS — Z22.7 LTBI (LATENT TUBERCULOSIS INFECTION): ICD-10-CM

## 2024-04-23 PROCEDURE — G0463 HOSPITAL OUTPT CLINIC VISIT: HCPCS | Performed by: STUDENT IN AN ORGANIZED HEALTH CARE EDUCATION/TRAINING PROGRAM

## 2024-04-23 PROCEDURE — 99214 OFFICE O/P EST MOD 30 MIN: CPT | Mod: GC | Performed by: STUDENT IN AN ORGANIZED HEALTH CARE EDUCATION/TRAINING PROGRAM

## 2024-04-23 RX ORDER — PYRIDOXINE HCL (VITAMIN B6) 25 MG
25 TABLET ORAL DAILY
Qty: 99 TABLET | Refills: 0 | Status: SHIPPED | OUTPATIENT
Start: 2024-04-23 | End: 2024-07-31

## 2024-04-23 RX ORDER — ISONIAZID 300 MG/1
300 TABLET ORAL DAILY
Qty: 30 TABLET | Refills: 4 | Status: SHIPPED | OUTPATIENT
Start: 2024-04-23 | End: 2024-07-31

## 2024-04-23 ASSESSMENT — PAIN SCALES - GENERAL: PAINLEVEL: EXTREME PAIN (8)

## 2024-04-23 NOTE — LETTER
4/23/2024       RE: Rosario Terrazas  [address]  [place] [address]     Dear Colleague,    Thank you for referring your patient, Rosario Terrazas, to the [facility] INFECTIOUS DISE[facility]IN[facility] [place] at [facility]. Please see a copy of my visit note below.         [facility]  Transplant & Immunocompromised Infectious Disease Clinic Note   Patient: Rosario Terrazas, Date of birth 1955, Medical record number [id number].      Recommendations:     Blastocystis and ESBL blood stream infections have resolved  Will complete 9 months of LTBI therapy in end of July (refilled scripts)  Contact me at end of therapy for completion certificate    Signed:  Luis Miguel Dickerson MD, 04/25/2024   Transplant Infectious Disease Fellow  Pager [phone] For more paging info see [facility]-> Infectious Disease [place] HS[facility] Service        Patient Summary:   Rosario Terrazas is a 68 year old female with MM, s/p melphalan, s/p autologous PBSC transplant on 3/13/2024 who presented with neutropenic fevers 3/21 with ESBL E Coli and Strep Mitis and diarrhea with blastocystis. Seen today for hospital follow up      ID Problem List and Discussion:     # Latent TB:  Was started on treatment in October through [facility], planned for 6 months of therapy.  Given she is immunocompromised we will treat her for a total of 9 months through end of July 2024.    # Colitis  # Blastocystis Hominis on Testing 04/2024  No chronic diarrhea no animal exposure in the several months leading up to hospital admission.  Had 3 episodes of diarrhea recently.  First round was during induction chemotherapy resolved as it was thought to be a drug side effect.  Second round was during the period of neutropenic fever and resolved with carbapenem therapy and was thought to be due to neutropenic colitis.  Third episode occurs circa 3/29 and is  associated with new cramping type pain.  4+ Blastocystis hominis on testing.  CMV DNA PCR positive but not quantifiable.  Afebrile. Responded with resolution of diarrhea on flagyl and no relapse.    # Neutropenic Fevers  03/2024  # ESBL E Coli Bacteremia  3/21 blood culture: Ecoli (ESBL)   3/22 blood culture: Ecoli (ESBL),  Strep Mitis  3/22 blood culture: Ecoli (ESBL) , Strep Mitis  3/23 blood culture: Negative and subsequent negative     Source - likely GI + Oral given neutropenic fevers, abdominal cramping, and tenderness to palpation with rebound tenderness.   At the time of exam, abdomen was soft and non-distended.      Does have a CVC, however, given the organisms isolated, less likely to the be source.   She had no respiratory symptoms and CXR did nto show any evidence of infiltrate or consolidation.     Treated for 14 days through 4/5/24     Other ID considerations:  - QTc interval: 472 msec 3/22/24  - Bacterial prophylaxis: Levofloxacin PTA  - Pneumocystis prophylaxis: Bactrim at day 28  - Viral serostatus & prophylaxis: CMV+, EBV+, HSV1+/HSV2- Acyclovir   - HIV non reactive, HTLV1 NR, HepB/C non-reactive   - 8/28/23 strongyloides Ab neg   - Fungal prophylaxis: Fluconazole  - Immunoglobulins:  on 3/20       Interval History and Subjective:     She speaks Kisi.  She is accompanied by her daughter.  They specifically refuse a phone .     She has been well since her hospital discharge.  No fevers or chills.  The diarrhea has resolved.  She completed the antibiotics.  She is continuing on the BV therapy.  She started her isoniazid in mid-to-late October 2020 latent 3.  While on chemo she did have a period of transaminitis and thinks she may have missed around 1 week of her therapy and total.  She values higher efficacy of the medication over possible treatment failure and would prefer to err on the side of caution with treatment.    Overall she is generally feeling well and has no specific  "concerns or complaints.    Review of Symptoms:  A comprehensive 14 system review of symptoms was conducted and was otherwise negative (unless mentioned above)       Physical Exam:     BP 98/66   Pulse 80   Temp 98.3  F (36.8  C) (Oral)   Ht 1.626 m (5' 4\")   Wt 89.4 kg (197 lb)   SpO2 99%   BMI 33.81 kg/m      GENERAL APPEARANCE: Not in acute distress    PHYSICAL EXAM:  Eyes:     Extraocular eye movements grossly intact, no ptosis, no discharge, no scleral icterus.  Mouth, Throat:     Mucous membranes moist, pharynx normal without lesions.  Cardiovascular:    Inspection: No cyanosis, JVD not elevated.   Auscultation:  S1, S2 normal, regular rate and rhythm.  Respiratory:     Inspection: Not in respiratory distress, Chest expansion symmetrical.   Auscultation: 4 point auscultation done clear to auscultation bilaterally, no wheezes, no rales, and no rhonchi.  Gastrointestinal:  Soft, non-tender; bowel sounds normal; no masses, no organomegaly.  Musculoskeletal:     No elbow, wrist, knee or ankle tenderness, deformity or swelling. No quadriceps, calf or upper arm muscular tenderness noted.    Neurologic:     Higher Mental Function: Conversant, AOx4   Motor: Moving all 4 limbs in bed   Sensory: Crude touch intact in all 4 limbs  Psychiatric: Appropriate  Skin:   Anicteric       Other Data:     MEDICATIONS  No current facility-administered medications for this visit.       IMMUNOLOGY LABS  CD-4 Counts No results found for: \"ACD4\"  Inflammatory Markers    Recent Labs   Lab Test 02/20/24  1212   [id number] 2.7*     Immune Globulin Studies     Recent Labs   Lab Test 04/10/24  1206 02/20/24  1212   * 322*   IGM <10* <10*   IGE  --  <2   IGA 43* 9*       GENERAL LABS  Metabolic Studies       Recent Labs   Lab Test 04/19/24  1005 04/10/24  1206 03/22/24  1220 03/22/24  0918 03/22/24  0333 03/22/24  0231    139   < >  --   --  142  142   POTASSIUM 3.3* 4.0   < >  --   --  3.2*  3.2*   CHLORIDE 107 106   < >  " --   --  110*  110*   CO2 22 23   < >  --   --  17*  17*   ANIONGAP 10 10   < >  --   --  15  15   BUN 4.9* 6.8*   < >  --   --  9.3  9.3   CR 0.55 0.54   < >  --   --  0.58  0.58   GFRESTIMATED >90 >90   < >  --   --  >90  >90   * 96   < >  --   --  88  88   RAMIREZ 8.9 8.9   < >  --   --  7.2*  7.2*   PHOS  --  4.5   < >  --    < >  --    MAG 1.7 1.5*   < >  --    < >  --    LACT  --   --   --  0.5*  --  0.6*   PCAL  --   --   --   --   --  2.68*    < > = values in this interval not displayed.     Hepatic Studies    Recent Labs   Lab Test 04/19/24  1005 04/10/24  1206 04/08/24  1057 04/02/24  0345 03/12/24  1025 02/20/24  1212   BILITOTAL 0.2 0.3 0.3 0.3   < > 0.2   DBIL  --   --   --  <0.20   < >  --    ALKPHOS 36* 46 48 59   < > 58   PROTTOTAL 5.5* 5.5* 5.4* 4.8*   < > 6.2*   ALBUMIN 3.6 3.6 3.5 3.2*   < > 3.9   AST 17 17 19 15   < > 17   ALT 8 15 18 21   < > 9   LDH  --  254*  --   --   --  233    < > = values in this interval not displayed.     Pancreatitis testing    Recent Labs   Lab Test 03/23/24  0352   LIPASE 5*     Hematology Studies   Recent Labs   Lab Test 04/19/24  1005 04/10/24  1206 04/08/24  1057 04/05/24  0803 04/03/24  0707 04/02/24  0345 04/01/24  0309   WBC 4.0 5.2 4.4 4.5   < > 5.8 5.5   ANEU  --   --   --   --   --  3.0 3.6   ANEUTAUTO 1.9 2.5 2.2 2.6   < >  --   --    ALYM  --   --   --   --   --  2.1 0.3*   ALYMPAUTO 1.1 1.3 1.0 0.8   < >  --   --    RAGINI  --   --   --   --   --  0.6 1.2   AMONOAUTO 0.9 1.2 1.0 0.9   < >  --   --    AEOS  --   --   --   --   --  0.1 0.1   AEOSAUTO 0.1 0.1 0.1 0.0   < >  --   --    ABSBASO 0.1 0.0 0.0 0.0   < >  --   --    HGB 10.1* 8.6* 8.6* 8.6*   < > 7.6* 7.7*   HCT 30.7* 27.3* 26.5* 26.4*   < > 22.9* 23.3*    169 183 190   < > 95* 89*    < > = values in this interval not displayed.     Urine Studies     Recent Labs   Lab Test 03/30/24  0444 03/22/24  0548 02/20/24  1212   URINEPH 7.5* 6.0 6.5   NITRITE Negative Negative Negative  "  LEUKEST Negative Negative Negative   WBCU <1 3 1     CSF testing   No lab results found.    Invalid input(s): \"CADAM\", \"EVPCR\", \"ENTPCR\", \"ENTEROVIRUS\"  Medication levels    Recent Labs   Lab Test 03/22/24  1220   TOBRA 8.5     Body fluid stats  No lab results found.    MICROBIOLOGY  Fungal testing:  No lab results found.    Invalid input(s): \"HIFUN\", \"FUNGL\"  Last Culture results   Culture   Date Value Ref Range Status   03/25/2024 No Growth  Final   03/25/2024 No Growth  Final   03/24/2024 No Growth  Final   03/24/2024 No Growth  Final   03/23/2024 No Growth  Final   03/23/2024 No Growth  Final   03/22/2024   Final    No Vancomycin Resistant Enterococcus species isolated   03/22/2024 No Growth  Final   03/22/2024 No Growth  Final   03/22/2024 Positive on the 1st day of incubation (A)  Final   03/22/2024 Escherichia coli (AA)  Final     Comment:     1 of 2 bottles  Susceptibilities done on previous cultures   03/22/2024 Streptococcus mitis/oralis (AA)  Final     Comment:     2 of 2 bottles   03/22/2024 Positive on the 1st day of incubation (A)  Final   03/22/2024 Escherichia coli (AA)  Final     Comment:     2 of 2 bottles  Susceptibilities done on previous cultures   03/22/2024 Streptococcus mitis/oralis (AA)  Final     Comment:     1 of 2 bottles  Susceptibilities done on previous cultures   03/21/2024 Positive on the 1st day of incubation (A)  Final   03/21/2024 Escherichia coli ESBL (AA)  Final     Comment:     1 of 2 bottles   03/21/2024 No Growth  Final     Escherichia coli   Date Value Ref Range Status   03/21/2024 Detected (A) Not Detected Final     Comment:     Positive for Escherichia coli by Verigene multiplex nucleic acid test. Final identification and antimicrobial susceptibility testing will be verified by standard methods. Verigene test will not distinguish E. coli from Shigella species including Shigella dysenteriae, Shigella flexneri, Shigella boydii, and Shigella sonnei. Specimens containing " "Shigella species or E. coli will be reported as positive for E. coli.       Last checks of Clostridioides difficile testing  Recent Labs   Lab Test 03/23/24  0702   CDBPCT Negative     Infection Studies to assess Diarrhea   Recent Labs   Lab Test 03/23/24  0702   BIEPEC Negative   BISTEC Negative   BISHEI Negative   BIEAEC Negative   BIETEC Negative   [id number] NA   BIADE Negative   BICAM Negative   BISAL Negative   BIVIBS Negative   BIVIBC Negative   BIYER Negative   BIGIA Negative   BINOR Negative   BIROT Negative   BIPLE Negative   BIENT Negative   BIAST Negative   BISAP Negative     Virology:  Coronavirus-19 testing    Recent Labs   Lab Test 03/22/24  0615 03/22/24  0413 03/12/24  1528 08/14/23  1252   [id number] Negative Negative Negative  --    COVIDPCREXT  --   --   --  Not Detected     Respiratory virus (non-coronavirus-19) testing    Recent Labs   Lab Test 03/27/24  1115 03/22/24  0615 03/22/24  0413   IFLUA Not Detected   < >  --    INFZA  --   --  Negative   FLUAH1 Not Detected   < >  --    [id number] Not Detected   < >  --    FLUAH3 Not Detected   < >  --    IFLUB Not Detected   < >  --    INFZB  --   --  Negative   PIV1 Not Detected   < >  --    PIV2 Not Detected   < >  --    PIV3 Not Detected   < >  --    PIV4 Not Detected   < >  --    IRSV  --   --  Negative   RSVA Not Detected   < >  --    RSVB Not Detected   < >  --    HMPV Not Detected   < >  --    RHINEV Not Detected   < >  --    ADENOV Not Detected   < >  --    CORONA Not Detected   < >  --     < > = values in this interval not displayed.     CMV viral loads    Recent Labs   Lab Test 04/19/24  1005 04/03/24  0707   CMVQNT <35* <35*   CMVLOG <1.5 <1.5     CMV resistance testing:  No lab results found.  No results found for: \"H6RES\"  EBV DNA Copies/mL   Date Value Ref Range Status   03/28/2024 Not Detected Not Detected copies/mL Final     BK Virus Testing   No lab results found.  Parvovirus Testing  No lab results found.    Invalid input(s): " "\"PRVRES\"  Adenovirus Testing    Recent Labs   Lab Test 03/28/24  1207   ADRES Not Detected       IMAGING  No results found for this or any previous visit (from the past 48 hour(s)).    ATTESTATION  I have reviewed labs/blood-work that are part of this patients present encounter in addition to historical and baseline values. The specific values are recorded in Epic and I have incorporated them and my interpretation as relevant into my assessment and plan as recorded.  I have reviewed radiology reports/EKGs/and other diagnostic studies that are part of this patients present encounter, in addition to historical and baseline results.  The specific reports are available in Epic and I have incorporated them into my assessment and plan as described above. Independently interpreted diagnostic study results are described above.  This dictation was prepared in part using Dragon recognition software.  As a result errors may occur. When identified these transcription errors have been corrected.  While every attempt is made to correct errors during dictation, errors may still exist.      Signature:     Luis Miguel Dickerson MD   PGY-6 Transplant Infectious Disease Fellow         Attestation signed by Tushar Joiner MD at 4/25/2024  4:04 PM:  Physician Attestation  I, WILMER Jama, saw this patient with the transplant ID fellow, Dr Dickerson and agree with the findings and plan of care as documented in the note. Items personally reviewed/procedural attestation: history, vitals and labs, micro data    67 y/o lady, PMHx MM s/p melphalan, s/p autologous PBSC transplant on 3/13/2024, post-transplant course c/b neutropenic fevers 2/2 ESBL E.coli bacteremia and colitis 2/2 blastocystis hominis. Comes in for follow up visit, accompanied by daughter who helped interpret. Patient doing well, no fevers, diarrhea completely resolved. No new symptoms or concerns. Has completed treatment for ESBL E.coli bacteremia and Blastocystis. For " latent TB, recommend extending INH to complete 9 months of therapy rather than 6 months    WILMER Jama  Staff Physician, Infectious Diseases  Pager [phone]

## 2024-04-23 NOTE — PATIENT INSTRUCTIONS
Continue the isoniazid through the end of July.    Contact me in August for a certificate of treatment for the latent TB.

## 2024-04-23 NOTE — NURSING NOTE
"Chief Complaint   Patient presents with    Follow Up     BP 98/66   Pulse 80   Temp 98.3  F (36.8  C) (Oral)   Ht 1.626 m (5' 4\")   Wt 89.4 kg (197 lb)   SpO2 99%   BMI 33.81 kg/m    Mitali Wetzel MA on 4/23/2024 at 4:01 PM    "

## 2024-04-25 PROCEDURE — 93227 XTRNL ECG REC<48 HR R&I: CPT | Performed by: INTERNAL MEDICINE

## 2024-04-25 NOTE — PROGRESS NOTES
[facility]  Transplant & Immunocompromised Infectious Disease Clinic Note   Patient: Rosario Terrazas, Date of birth 1955, Medical record number [id number].      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 Seen today for hospital follow up      ID Problem List and Discussion:     # Latent TB:  Was started on treatment in October through [facility], planned for 6 months of therapy.  Given she is immunocompromised we will treat her for a total of 9 months through end of July 2024.    # Colitis  # Blastocystis Hominis on Testing 04/2024  No chronic diarrhea no animal exposure in the several months leading up to hospital admission.  Had 3 episodes of diarrhea recently.  First round was during induction chemotherapy resolved as it was thought to be a drug side effect.  Second round was during the period of neutropenic fever and resolved with carbapenem therapy and was thought to be due to neutropenic colitis.  Third episode occurs circa 3/29 and is associated with new cramping type pain.  4+ Blastocystis hominis on testing.  CMV DNA PCR positive but not quantifiable.  Afebrile. Responded with resolution of diarrhea on flagyl and no relapse.    # Neutropenic Fevers  03/2024  # ESBL E Coli Bacteremia  3/21 blood culture: Ecoli (ESBL)   3/22 blood culture: Ecoli (ESBL),  Strep  "Mitis  3/22 blood culture: Ecoli (ESBL) , Strep Mitis  3/23 blood culture: Negative and subsequent negative     Source - likely GI + Oral given neutropenic fevers, abdominal cramping, and tenderness to palpation with rebound tenderness.   At the time of exam, abdomen was soft and non-distended.      Does have a CVC, however, given the organisms isolated, less likely to the be source.   She had no respiratory symptoms and CXR did nto show any evidence of infiltrate or consolidation.     Treated for 14 days through 4/5/24     Other ID considerations:  - QTc interval: 472 msec 3/22/24  - Bacterial prophylaxis: Levofloxacin PTA  - Pneumocystis prophylaxis: Bactrim at day 28  - Viral serostatus & prophylaxis: CMV+, EBV+, HSV1+/HSV2- Acyclovir   - HIV non reactive, HTLV1 NR, HepB/C non-reactive   - 8/28/23 strongyloides Ab neg   - Fungal prophylaxis: Fluconazole  - Immunoglobulins:  on 3/20       Interval History and Subjective:     She speaks Kisi.  She is accompanied by her daughter.  They specifically refuse a phone .     She has been well since her hospital discharge.  No fevers or chills.  The diarrhea has resolved.  She completed the antibiotics.  She is continuing on the BV therapy.  She started her isoniazid in mid-to-late October 2020 latent 3.  While on chemo she did have a period of transaminitis and thinks she may have missed around 1 week of her therapy and total.  She values higher efficacy of the medication over possible treatment failure and would prefer to err on the side of caution with treatment.    Overall she is generally feeling well and has no specific concerns or complaints.    Review of Symptoms:  A comprehensive 14 system review of symptoms was conducted and was otherwise negative (unless mentioned above)       Physical Exam:     BP 98/66   Pulse 80   Temp 98.3  F (36.8  C) (Oral)   Ht 1.626 m (5' 4\")   Wt 89.4 kg (197 lb)   SpO2 99%   BMI 33.81 kg/m      GENERAL " "APPEARANCE: Not in acute distress    PHYSICAL EXAM:  Eyes:     Extraocular eye movements grossly intact, no ptosis, no discharge, no scleral icterus.  Mouth, Throat:     Mucous membranes moist, pharynx normal without lesions.  Cardiovascular:    Inspection: No cyanosis, JVD not elevated.   Auscultation:  S1, S2 normal, regular rate and rhythm.  Respiratory:     Inspection: Not in respiratory distress, Chest expansion symmetrical.   Auscultation: 4 point auscultation done clear to auscultation bilaterally, no wheezes, no rales, and no rhonchi.  Gastrointestinal:  Soft, non-tender; bowel sounds normal; no masses, no organomegaly.  Musculoskeletal:     No elbow, wrist, knee or ankle tenderness, deformity or swelling. No quadriceps, calf or upper arm muscular tenderness noted.    Neurologic:     Higher Mental Function: Conversant, AOx4   Motor: Moving all 4 limbs in bed   Sensory: Crude touch intact in all 4 limbs  Psychiatric: Appropriate  Skin:   Anicteric       Other Data:     MEDICATIONS  No current facility-administered medications for this visit.       IMMUNOLOGY LABS  CD-4 Counts No results found for: \"ACD4\"  Inflammatory Markers    Recent Labs   Lab Test 02/20/24  1212   DHPK9EDCN 2.7*     Immune Globulin Studies     Recent Labs   Lab Test 04/10/24  1206 02/20/24  1212   * 322*   IGM <10* <10*   IGE  --  <2   IGA 43* 9*       GENERAL LABS  Metabolic Studies       Recent Labs   Lab Test 04/19/24  1005 04/10/24  1206 03/22/24  1220 03/22/24  0918 03/22/24  0333 03/22/24  0231    139   < >  --   --  142  142   POTASSIUM 3.3* 4.0   < >  --   --  3.2*  3.2*   CHLORIDE 107 106   < >  --   --  110*  110*   CO2 22 23   < >  --   --  17*  17*   ANIONGAP 10 10   < >  --   --  15  15   BUN 4.9* 6.8*   < >  --   --  9.3  9.3   CR 0.55 0.54   < >  --   --  0.58  0.58   GFRESTIMATED >90 >90   < >  --   --  >90  >90   * 96   < >  --   --  88  88   RAMIREZ 8.9 8.9   < >  --   --  7.2*  7.2*   PHOS  --  " "4.5   < >  --    < >  --    MAG 1.7 1.5*   < >  --    < >  --    LACT  --   --   --  0.5*  --  0.6*   PCAL  --   --   --   --   --  2.68*    < > = values in this interval not displayed.     Hepatic Studies    Recent Labs   Lab Test 04/19/24  1005 04/10/24  1206 04/08/24  1057 04/02/24  0345 03/12/24  1025 02/20/24  1212   BILITOTAL 0.2 0.3 0.3 0.3   < > 0.2   DBIL  --   --   --  <0.20   < >  --    ALKPHOS 36* 46 48 59   < > 58   PROTTOTAL 5.5* 5.5* 5.4* 4.8*   < > 6.2*   ALBUMIN 3.6 3.6 3.5 3.2*   < > 3.9   AST 17 17 19 15   < > 17   ALT 8 15 18 21   < > 9   LDH  --  254*  --   --   --  233    < > = values in this interval not displayed.     Pancreatitis testing    Recent Labs   Lab Test 03/23/24  0352   LIPASE 5*     Hematology Studies   Recent Labs   Lab Test 04/19/24  1005 04/10/24  1206 04/08/24  1057 04/05/24  0803 04/03/24  0707 04/02/24  0345 04/01/24  0309   WBC 4.0 5.2 4.4 4.5   < > 5.8 5.5   ANEU  --   --   --   --   --  3.0 3.6   ANEUTAUTO 1.9 2.5 2.2 2.6   < >  --   --    ALYM  --   --   --   --   --  2.1 0.3*   ALYMPAUTO 1.1 1.3 1.0 0.8   < >  --   --    RAGINI  --   --   --   --   --  0.6 1.2   AMONOAUTO 0.9 1.2 1.0 0.9   < >  --   --    AEOS  --   --   --   --   --  0.1 0.1   AEOSAUTO 0.1 0.1 0.1 0.0   < >  --   --    ABSBASO 0.1 0.0 0.0 0.0   < >  --   --    HGB 10.1* 8.6* 8.6* 8.6*   < > 7.6* 7.7*   HCT 30.7* 27.3* 26.5* 26.4*   < > 22.9* 23.3*    169 183 190   < > 95* 89*    < > = values in this interval not displayed.     Urine Studies     Recent Labs   Lab Test 03/30/24  0444 03/22/24  0548 02/20/24  1212   URINEPH 7.5* 6.0 6.5   NITRITE Negative Negative Negative   LEUKEST Negative Negative Negative   WBCU <1 3 1     CSF testing   No lab results found.    Invalid input(s): \"CADAM\", \"EVPCR\", \"ENTPCR\", \"ENTEROVIRUS\"  Medication levels    Recent Labs   Lab Test 03/22/24  1220   TOBRA 8.5     Body fluid stats  No lab results found.    MICROBIOLOGY  Fungal testing:  No lab results " "found.    Invalid input(s): \"HIFUN\", \"FUNGL\"  Last Culture results   Culture   Date Value Ref Range Status   03/25/2024 No Growth  Final   03/25/2024 No Growth  Final   03/24/2024 No Growth  Final   03/24/2024 No Growth  Final   03/23/2024 No Growth  Final   03/23/2024 No Growth  Final   03/22/2024   Final    No Vancomycin Resistant Enterococcus species isolated   03/22/2024 No Growth  Final   03/22/2024 No Growth  Final   03/22/2024 Positive on the 1st day of incubation (A)  Final   03/22/2024 Escherichia coli (AA)  Final     Comment:     1 of 2 bottles  Susceptibilities done on previous cultures   03/22/2024 Streptococcus mitis/oralis (AA)  Final     Comment:     2 of 2 bottles   03/22/2024 Positive on the 1st day of incubation (A)  Final   03/22/2024 Escherichia coli (AA)  Final     Comment:     2 of 2 bottles  Susceptibilities done on previous cultures   03/22/2024 Streptococcus mitis/oralis (AA)  Final     Comment:     1 of 2 bottles  Susceptibilities done on previous cultures   03/21/2024 Positive on the 1st day of incubation (A)  Final   03/21/2024 Escherichia coli ESBL (AA)  Final     Comment:     1 of 2 bottles   03/21/2024 No Growth  Final     Escherichia coli   Date Value Ref Range Status   03/21/2024 Detected (A) Not Detected Final     Comment:     Positive for Escherichia coli by Verigene multiplex nucleic acid test. Final identification and antimicrobial susceptibility testing will be verified by standard methods. Verigene test will not distinguish E. coli from Shigella species including Shigella dysenteriae, Shigella flexneri, Shigella boydii, and Shigella sonnei. Specimens containing Shigella species or E. coli will be reported as positive for E. coli.       Last checks of Clostridioides difficile testing  Recent Labs   Lab Test 03/23/24  0702   CDBPCT Negative     Infection Studies to assess Diarrhea   Recent Labs   Lab Test 03/23/24  0702   BIEPEC Negative   BISTEC Negative   BISHEI Negative " "  BIEAEC Negative   BIETEC Negative   EUG964 NA   BIADE Negative   BICAM Negative   BISAL Negative   BIVIBS Negative   BIVIBC Negative   BIYER Negative   BIGIA Negative   BINOR Negative   BIROT Negative   BIPLE Negative   BIENT Negative   BIAST Negative   BISAP Negative     Virology:  Coronavirus-19 testing    Recent Labs   Lab Test 03/22/24  0615 03/22/24  0413 03/12/24  1528 08/14/23  1252   [id number] Negative Negative Negative  --    COVIDPCREXT  --   --   --  Not Detected     Respiratory virus (non-coronavirus-19) testing    Recent Labs   Lab Test 03/27/24  1115 03/22/24  0615 03/22/24  0413   IFLUA Not Detected   < >  --    INFZA  --   --  Negative   FLUAH1 Not Detected   < >  --    [id number] Not Detected   < >  --    FLUAH3 Not Detected   < >  --    IFLUB Not Detected   < >  --    INFZB  --   --  Negative   PIV1 Not Detected   < >  --    PIV2 Not Detected   < >  --    PIV3 Not Detected   < >  --    PIV4 Not Detected   < >  --    IRSV  --   --  Negative   RSVA Not Detected   < >  --    RSVB Not Detected   < >  --    HMPV Not Detected   < >  --    RHINEV Not Detected   < >  --    ADENOV Not Detected   < >  --    CORONA Not Detected   < >  --     < > = values in this interval not displayed.     CMV viral loads    Recent Labs   Lab Test 04/19/24  1005 04/03/24  0707   CMVQNT <35* <35*   CMVLOG <1.5 <1.5     CMV resistance testing:  No lab results found.  No results found for: \"H6RES\"  EBV DNA Copies/mL   Date Value Ref Range Status   03/28/2024 Not Detected Not Detected copies/mL Final     BK Virus Testing   No lab results found.  Parvovirus Testing  No lab results found.    Invalid input(s): \"PRVRES\"  Adenovirus Testing    Recent Labs   Lab Test 03/28/24  1207   ADRES Not Detected       IMAGING  No results found for this or any previous visit (from the past 48 hour(s)).    ATTESTATION  I have reviewed labs/blood-work that are part of this patients present encounter in addition to historical and baseline values. The " specific values are recorded in Epic and I have incorporated them and my interpretation as relevant into my assessment and plan as recorded.  I have reviewed radiology reports/EKGs/and other diagnostic studies that are part of this patients present encounter, in addition to historical and baseline results.  The specific reports are available in Epic and I have incorporated them into my assessment and plan as described above. Independently interpreted diagnostic study results are described above.  This dictation was prepared in part using Dragon recognition software.  As a result errors may occur. When identified these transcription errors have been corrected.  While every attempt is made to correct errors during dictation, errors may still exist.      Signature:     Luis Miguel Dickerson MD   PGY-6 Transplant Infectious Disease Fellow

## 2024-04-27 ENCOUNTER — MYC REFILL (OUTPATIENT)
Dept: TRANSPLANT | Facility: CLINIC | Age: 69
End: 2024-04-27
Payer: COMMERCIAL

## 2024-04-27 DIAGNOSIS — C90.00 MULTIPLE MYELOMA, REMISSION STATUS UNSPECIFIED (H): ICD-10-CM

## 2024-04-29 RX ORDER — GABAPENTIN 100 MG/1
100 CAPSULE ORAL 3 TIMES DAILY
Qty: 90 CAPSULE | Refills: 0 | Status: SHIPPED | OUTPATIENT
Start: 2024-04-29

## 2024-05-05 ENCOUNTER — MYC REFILL (OUTPATIENT)
Dept: TRANSPLANT | Facility: CLINIC | Age: 69
End: 2024-05-05
Payer: COMMERCIAL

## 2024-05-05 DIAGNOSIS — C90.00 MULTIPLE MYELOMA, REMISSION STATUS UNSPECIFIED (H): ICD-10-CM

## 2024-05-05 DIAGNOSIS — S22.068A OTHER CLOSED FRACTURE OF SEVENTH THORACIC VERTEBRA, INITIAL ENCOUNTER (H): ICD-10-CM

## 2024-05-07 RX ORDER — ACETAMINOPHEN 325 MG/1
650 TABLET ORAL EVERY 4 HOURS PRN
Qty: 120 TABLET | Refills: 1 | Status: SHIPPED | OUTPATIENT
Start: 2024-05-07

## 2024-05-10 ENCOUNTER — ANCILLARY PROCEDURE (OUTPATIENT)
Dept: ULTRASOUND IMAGING | Facility: CLINIC | Age: 69
End: 2024-05-10
Attending: INTERNAL MEDICINE
Payer: COMMERCIAL

## 2024-05-10 DIAGNOSIS — C90.01 MULTIPLE MYELOMA IN REMISSION (H): ICD-10-CM

## 2024-05-10 DIAGNOSIS — E04.1 THYROID NODULE: ICD-10-CM

## 2024-05-10 LAB
PATH REPORT.COMMENTS IMP SPEC: NORMAL
PATH REPORT.FINAL DX SPEC: NORMAL
PATH REPORT.MICROSCOPIC SPEC OTHER STN: NORMAL
PATH REPORT.RELEVANT HX SPEC: NORMAL

## 2024-05-10 PROCEDURE — 10005 FNA BX W/US GDN 1ST LES: CPT | Mod: RT | Performed by: STUDENT IN AN ORGANIZED HEALTH CARE EDUCATION/TRAINING PROGRAM

## 2024-05-10 PROCEDURE — 88184 FLOWCYTOMETRY/ TC 1 MARKER: CPT | Performed by: PATHOLOGY

## 2024-05-10 PROCEDURE — 88185 FLOWCYTOMETRY/TC ADD-ON: CPT | Performed by: INTERNAL MEDICINE

## 2024-05-10 PROCEDURE — 88188 FLOWCYTOMETRY/READ 9-15: CPT | Performed by: PATHOLOGY

## 2024-05-10 PROCEDURE — 88173 CYTOPATH EVAL FNA REPORT: CPT | Mod: 26 | Performed by: PATHOLOGY

## 2024-05-10 PROCEDURE — 88173 CYTOPATH EVAL FNA REPORT: CPT | Mod: TC | Performed by: INTERNAL MEDICINE

## 2024-05-10 RX ORDER — METHOCARBAMOL 500 MG/1
500 TABLET, FILM COATED ORAL 4 TIMES DAILY PRN
Qty: 60 TABLET | Refills: 0 | Status: SHIPPED | OUTPATIENT
Start: 2024-05-10

## 2024-05-10 RX ORDER — LIDOCAINE HYDROCHLORIDE 10 MG/ML
5 INJECTION, SOLUTION INFILTRATION; PERINEURAL ONCE
Status: COMPLETED | OUTPATIENT
Start: 2024-05-10 | End: 2024-05-10

## 2024-05-10 RX ORDER — VITAMIN B COMPLEX
2 TABLET ORAL DAILY
Qty: 30 TABLET | Refills: 0 | Status: SHIPPED | OUTPATIENT
Start: 2024-05-10

## 2024-05-10 RX ORDER — METOPROLOL TARTRATE 25 MG/1
25 TABLET, FILM COATED ORAL 2 TIMES DAILY
Qty: 60 TABLET | Refills: 0 | Status: SHIPPED | OUTPATIENT
Start: 2024-05-10

## 2024-05-10 RX ADMIN — LIDOCAINE HYDROCHLORIDE 3 ML: 10 INJECTION, SOLUTION INFILTRATION; PERINEURAL at 09:15

## 2024-05-10 NOTE — PROGRESS NOTES
Refill requests received on methocarbamol, metoprolol and vitamin D. Ok to refill per Dr. Rocha. RNCC called Katherin, patient's daughter, to let her know she should reach out to Dr. Ellison's office for future refills. Katherin stated that her mom does not have an appointment set up with Dr. Ellison. RNCC will call his office to make sure they received handoff information sent 4/16/2024.

## 2024-05-14 ENCOUNTER — TELEPHONE (OUTPATIENT)
Dept: TRANSPLANT | Facility: CLINIC | Age: 69
End: 2024-05-14
Payer: COMMERCIAL

## 2024-05-14 LAB
PATH REPORT.COMMENTS IMP SPEC: NORMAL
PATH REPORT.COMMENTS IMP SPEC: NORMAL
PATH REPORT.FINAL DX SPEC: NORMAL
PATH REPORT.GROSS SPEC: NORMAL
PATH REPORT.MICROSCOPIC SPEC OTHER STN: NORMAL
PATH REPORT.RELEVANT HX SPEC: NORMAL

## 2024-05-14 NOTE — TELEPHONE ENCOUNTER
RNCC received phone call from Dr. Ellison regarding patient. Dr. Ellison will see her on 5/21. Provided him with Dr. Rocha's phone number and he will reach out to discuss potential maintenance therapy for patient.

## 2024-05-14 NOTE — RESULT ENCOUNTER NOTE
Hi Katherin!     I called today but I was not able to get to in touch with you. The biopsy came back non diagnostic. This means not enough cells were removed at the time of the biopsy in order to make a diagnosis. Given this result, I recommend a f/up thyroid ultrasound in October, prior to your f/up visit with Dr Petit. Please let me know if you have questions.

## 2024-05-16 ENCOUNTER — OFFICE VISIT (OUTPATIENT)
Dept: CARDIOLOGY | Facility: CLINIC | Age: 69
End: 2024-05-16
Payer: COMMERCIAL

## 2024-05-16 VITALS
OXYGEN SATURATION: 99 % | DIASTOLIC BLOOD PRESSURE: 70 MMHG | WEIGHT: 191 LBS | HEIGHT: 64 IN | BODY MASS INDEX: 32.61 KG/M2 | HEART RATE: 75 BPM | SYSTOLIC BLOOD PRESSURE: 105 MMHG

## 2024-05-16 DIAGNOSIS — Z94.81 STATUS POST BONE MARROW TRANSPLANT (H): ICD-10-CM

## 2024-05-16 DIAGNOSIS — I47.10 SVT (SUPRAVENTRICULAR TACHYCARDIA) (H): ICD-10-CM

## 2024-05-16 DIAGNOSIS — I48.20 CHRONIC ATRIAL FIBRILLATION, UNSPECIFIED (H): Primary | ICD-10-CM

## 2024-05-16 PROCEDURE — 99204 OFFICE O/P NEW MOD 45 MIN: CPT | Performed by: INTERNAL MEDICINE

## 2024-05-16 NOTE — NURSING NOTE
"Chief Complaint   Patient presents with    New Patient       Initial /70   Pulse 75   Ht 1.626 m (5' 4\")   Wt 86.6 kg (191 lb)   SpO2 99%   BMI 32.79 kg/m   Estimated body mass index is 32.79 kg/m  as calculated from the following:    Height as of this encounter: 1.626 m (5' 4\").    Weight as of this encounter: 86.6 kg (191 lb)..  BP completed using cuff size: marko Rasheed CMA ([facility])    "

## 2024-05-16 NOTE — PROGRESS NOTES
SUBJECTIVE:  Rosario Terrazas is a 68 year old female who presents for evaluation of SVT/atrial fibrillation during recent hospitalization.  Patient with history of multiple myeloma status post autologous HSCT on 3/13/24 whose post transplant course has been complicated by ESBL E coli bacteremia and new episodes of supraventricular tachycardia.  Patient was treated with IV amiodarone.  Her arrhythmia improved and subsided.  Patient was discharged home.  Currently patient is asymptomatic denied palpitations or any other cardiac symptoms overall she is doing well.  Patient had an outpatient Zio patch this did not reveal any episodes of atrial fibrillation or SVT.      Patient Active Problem List    Diagnosis Date Noted    Blastocystis hominis infection 04/02/2024     Priority: Medium    Infection due to Streptococcus mitis group 04/02/2024     Priority: Medium    Administration of long-term prophylactic antibiotics 04/02/2024     Priority: Medium    Infection due to extended-spectrum beta-lactamase-producing Escherichia coli 03/22/2024     Priority: Medium    Fever, unspecified fever cause 03/22/2024     Priority: Medium    H/O autologous stem cell transplant (H) 03/13/2024     Priority: Medium    Multiple myeloma in remission (H) 09/13/2023     Priority: Medium    Malignant neoplasm (H) 09/08/2023     Priority: Medium    Latent tuberculosis by blood test 08/28/2023     Priority: Medium    Elevated serum immunoglobulin free light chain level 08/24/2023     Priority: Medium    BARRERA (acute kidney injury) (H24) 08/23/2023     Priority: Medium    Closed fracture of one rib of left side 08/23/2023     Priority: Medium    Isolated proteinuria without specific morphologic lesion 08/23/2023     Priority: Medium    Normocytic anemia 08/23/2023     Priority: Medium    Other constipation 08/23/2023     Priority: Medium    Other closed fracture of seventh thoracic vertebra, initial encounter (H) 08/19/2023     Priority: Medium     Biceps tendon tear 03/20/2023     Priority: Medium    Rotator cuff tear arthropathy of left shoulder 03/20/2023     Priority: Medium    History of total left knee replacement 10/31/2022     Priority: Medium    Shoulder impingement, right 10/31/2022     Priority: Medium    .  Current Outpatient Medications   Medication Sig Dispense Refill    acetaminophen (TYLENOL) 325 MG tablet Take 2 tablets (650 mg) by mouth every 4 hours as needed for pain 120 tablet 1    acyclovir (ZOVIRAX) 800 MG tablet Take 1 tablet (800 mg) by mouth 2 times daily Start Day -1 60 tablet 11    aspirin 81 MG EC tablet Take 1 tablet (81 mg) by mouth daily 30 tablet 0    calcium carbonate-vitamin D (OSCAL) 500-5 MG-MCG tablet Take 1 tablet by mouth 3 times daily (with meals) 90 tablet 2    diclofenac (VOLTAREN) 1 % topical gel Apply 2 g topically 4 times daily 350 g 2    folic acid (FOLVITE) 1 MG tablet Take 1 tablet (1,000 mcg) by mouth daily 30 tablet 0    gabapentin (NEURONTIN) 100 MG capsule Take 1 capsule (100 mg) by mouth 3 times daily 90 capsule 0    Heparin Sod, Pork, Lock Flush 10 UNIT/ML SOLN 5 mLs by Intracatheter route daily      isoniazid (NYDRAZID) 300 MG tablet Take 1 tablet (300 mg) by mouth daily for 99 days Complete on 7/31/24 30 tablet 4    loperamide (IMODIUM) 2 MG capsule Take 1 capsule (2 mg) by mouth 4 times daily as needed for diarrhea 120 capsule 0    methocarbamol (ROBAXIN) 500 MG tablet Take 1 tablet (500 mg) by mouth 4 times daily as needed for muscle spasms 60 tablet 0    metoprolol tartrate (LOPRESSOR) 25 MG tablet Take 1 tablet (25 mg) by mouth 2 times daily 60 tablet 0    metroNIDAZOLE (FLAGYL) 500 MG tablet Take 1 tablet (500 mg) by mouth 4 times daily 56 tablet 0    oxyCODONE (ROXICODONE) 5 MG tablet Take 1 tablet (5 mg) by mouth every 6 hours as needed for severe pain 40 tablet 0    pantoprazole (PROTONIX) 40 MG EC tablet Take 1 tablet (40 mg) by mouth daily Start Day -1 30 tablet 0    prochlorperazine  (COMPAZINE) 5 MG tablet Take 1 tablet (5 mg) by mouth every 6 hours as needed for nausea or vomiting 40 tablet 0    pyridOXINE (VITAMIN B6) 25 MG tablet Take 1 tablet (25 mg) by mouth daily for 99 days Take with isoniazid, stop 7/31/24 99 tablet 0    sulfamethoxazole-trimethoprim (BACTRIM DS) 800-160 MG tablet Take 1 tablet by mouth Every Mon, Tues two times daily Start medication on 4/15. 32 tablet 2    triamcinolone (KENALOG) 0.1 % external cream Apply topically 2 times daily 80 g 0    Vitamin D3 (CHOLECALCIFEROL) 25 mcg (1000 units) tablet Take 2 tablets (50 mcg) by mouth daily 30 tablet 0     No current facility-administered medications for this visit.     History reviewed. No pertinent past medical history.  Past Surgical History:   Procedure Laterality Date    INSERT CATHETER VASCULAR ACCESS Left 3/6/2024    Procedure: central venous catheter tunneled line Insert vascular access;  Surgeon: Onel Leonardo MD;  Location: [facility] OR    IR CVC TUNNEL PLACEMENT > 5 YRS OF AGE  3/6/2024    IR CVC TUNNEL REMOVAL LEFT  4/10/2024     No Known Allergies  Social History     Socioeconomic History    Marital status: Patient Declined     Spouse name: Not on file    Number of children: Not on file    Years of education: Not on file    Highest education level: Not on file   Occupational History    Not on file   Tobacco Use    Smoking status: Never    Smokeless tobacco: Never   Substance and Sexual Activity    Alcohol use: Never    Drug use: Never    Sexual activity: Not on file   Other Topics Concern    Not on file   Social History Narrative    Not on file     Social Determinants of Health     Financial Resource Strain: Not on file   Food Insecurity: Not on file   Transportation Needs: Not on file   Physical Activity: Not on file   Stress: Not on file   Social Connections: Not on file   Interpersonal Safety: Not on file   Housing Stability: Not on file     History reviewed. No pertinent family history.       REVIEW OF  "SYSTEMS:  General: negative, fever, chills, night sweats  Skin: negative, acne, rash, and scaling  Eyes: negative, double vision, eye pain, and photophobia  Ears/Nose/Throat: negative, nasal congestion, and purulent rhinorrhea  Respiratory: No dyspnea on exertion, No cough, No hemoptysis, and negative  Cardiovascular: negative, palpitations, tachycardia, irregular heart beat, chest pain, exertional chest pain or pressure, paroxysmal nocturnal dyspnea, dyspnea on exertion, and orthopnea         OBJECTIVE:  Blood pressure 105/70, pulse 75, height 1.626 m (5' 4\"), weight 86.6 kg (191 lb), SpO2 99%.  General Appearance: alert and no distress  Head: Normocephalic. No masses, lesions, tenderness or abnormalities  Eyes: conjuctiva clear, PERRL, EOM intact  Ears: External ears normal. Canals clear. TM's normal.  Nose: Nares normal  Mouth: normal  Neck: Supple, no cervical adenopathy, no thyromegaly  Lungs: clear to auscultation  Cardiac: regular rate and rhythm, normal S1 and S2, no murmur       ASSESSMENT/PLAN:  Patient here for posthospital discharge follow-up.  Patient's history is significant for multiple myeloma status post autologous HSCT on 3/13/24 whose post transplant course has been complicated by ESBL E coli bacteremia and new episodes of supraventricular tachycardia.  Patient had SVT as well as episodes of atrial fibrillation with a rapid ventricular response.  This was treated with amiodarone.  As her sepsis improved her SVT/arrhythmia subsided.  Since discharge patient is doing well.  She had no more recurrence of her symptoms or A-fib.  Denied palpitation.  Patient had a posthospital discharge Zio patch.  This showed no recurrence of SVT or atrial fibrillation.  It appears that her arrhythmia was related to the bone marrow transplant and sepsis.  As there is no recurrence of atrial fibrillation she will not require any anticoagulation at this time until unless this arrhythmia recurs.  Patient was also started " on Lopressor 25 mg twice a day.  She stays in normal in sinus rhythm without arrhythmia advised the patient to decrease the dose to 12.5 mg twice a day for a week and then discontinue.  Patient's EKG is reviewed.  Apart from the arrhythmia normal EKG.  Patient had an echocardiogram which showed normal biventricular function and mild left atrial enlargement.  No regular follow-up has been arranged.  Patient may return to clinic anytime if there is any recurrence of this arrhythmia.  Total visit duration 45 minutes.  This included face-to-face interview, physical exam, chart review, review of inpatient records including multiple EKGs, echocardiogram, recent Zio patch and documentation

## 2024-05-16 NOTE — PATIENT INSTRUCTIONS
Thank you for coming to the [facility] at [place]; please note the following instructions:    1. Follow-up on an as needed basis        If you have any questions regarding your visit, please contact your care team:     CARDIOLOGY  TELEPHONE NUMBER   Sabrina MOOREFaisal, Registered Nurse  Karyn CALLAWAY, Registered Nurse  Tenisha REYNOSO, Registered Nurse  Mariama GRIGGS, Registered Medical Assistant  Lazara VELEZ, Certified Medical Assistant  Connie RIDLEY, Clinic Assistant [phone] (select option 1)    *After hours: [phone]   For Scheduling Appts:     [phone] (select option 1)    *After hours: [phone]   For the Device Clinic (Pacemakers and ICD's)  Venita ESPINOZA, Registered Nurse   During business hours: [phone]    *After business hours:  [phone] (select option 4)      Normal test result notifications will be released via [employer] or mailed within 7 business days.  All other test results, will be communicated via telephone once reviewed by your cardiologist.    If you need a medication refill, please contact your pharmacy.  Please allow 3 business days for your refill to be completed.    As always, thank you for trusting us with your health care needs!

## 2024-05-16 NOTE — LETTER
5/16/2024      RE: Rosario Terrazas  [address]  [facility] [address]       Dear Colleague,    Thank you for the opportunity to participate in the care of your patient, Rosario Terrazas, at the [facility]. Please see a copy of my visit note below.       SUBJECTIVE:  Rosario Terrazas is a 68 year old female who presents for evaluation of SVT/atrial fibrillation during recent hospitalization.  Patient with history of multiple myeloma status post autologous HSCT on 3/13/24 whose post transplant course has been complicated by ESBL E coli bacteremia and new episodes of supraventricular tachycardia.  Patient was treated with IV amiodarone.  Her arrhythmia improved and subsided.  Patient was discharged home.  Currently patient is asymptomatic denied palpitations or any other cardiac symptoms overall she is doing well.  Patient had an outpatient Zio patch this did not reveal any episodes of atrial fibrillation or SVT.      Patient Active Problem List    Diagnosis Date Noted     Blastocystis hominis infection 04/02/2024     Priority: Medium     Infection due to Streptococcus mitis group 04/02/2024     Priority: Medium     Administration of long-term prophylactic antibiotics 04/02/2024     Priority: Medium     Infection due to extended-spectrum beta-lactamase-producing Escherichia coli 03/22/2024     Priority: Medium     Fever, unspecified fever cause 03/22/2024     Priority: Medium     H/O autologous stem cell transplant (H) 03/13/2024     Priority: Medium     Multiple myeloma in remission (H) 09/13/2023     Priority: Medium     Malignant neoplasm (H) 09/08/2023     Priority: Medium     Latent tuberculosis by blood test 08/28/2023     Priority: Medium     Elevated serum immunoglobulin free light chain level 08/24/2023     Priority: Medium     BARRERA (acute kidney injury) (H24) 08/23/2023     Priority: Medium     Closed fracture of one  rib of left side 08/23/2023     Priority: Medium     Isolated proteinuria without specific morphologic lesion 08/23/2023     Priority: Medium     Normocytic anemia 08/23/2023     Priority: Medium     Other constipation 08/23/2023     Priority: Medium     Other closed fracture of seventh thoracic vertebra, initial encounter (H) 08/19/2023     Priority: Medium     Biceps tendon tear 03/20/2023     Priority: Medium     Rotator cuff tear arthropathy of left shoulder 03/20/2023     Priority: Medium     History of total left knee replacement 10/31/2022     Priority: Medium     Shoulder impingement, right 10/31/2022     Priority: Medium    .  Current Outpatient Medications   Medication Sig Dispense Refill     acetaminophen (TYLENOL) 325 MG tablet Take 2 tablets (650 mg) by mouth every 4 hours as needed for pain 120 tablet 1     acyclovir (ZOVIRAX) 800 MG tablet Take 1 tablet (800 mg) by mouth 2 times daily Start Day -1 60 tablet 11     aspirin 81 MG EC tablet Take 1 tablet (81 mg) by mouth daily 30 tablet 0     calcium carbonate-vitamin D (OSCAL) 500-5 MG-MCG tablet Take 1 tablet by mouth 3 times daily (with meals) 90 tablet 2     diclofenac (VOLTAREN) 1 % topical gel Apply 2 g topically 4 times daily 350 g 2     folic acid (FOLVITE) 1 MG tablet Take 1 tablet (1,000 mcg) by mouth daily 30 tablet 0     gabapentin (NEURONTIN) 100 MG capsule Take 1 capsule (100 mg) by mouth 3 times daily 90 capsule 0     Heparin Sod, Pork, Lock Flush 10 UNIT/ML SOLN 5 mLs by Intracatheter route daily       isoniazid (NYDRAZID) 300 MG tablet Take 1 tablet (300 mg) by mouth daily for 99 days Complete on 7/31/24 30 tablet 4     loperamide (IMODIUM) 2 MG capsule Take 1 capsule (2 mg) by mouth 4 times daily as needed for diarrhea 120 capsule 0     methocarbamol (ROBAXIN) 500 MG tablet Take 1 tablet (500 mg) by mouth 4 times daily as needed for muscle spasms 60 tablet 0     metoprolol tartrate (LOPRESSOR) 25 MG tablet Take 1 tablet (25 mg) by mouth  2 times daily 60 tablet 0     metroNIDAZOLE (FLAGYL) 500 MG tablet Take 1 tablet (500 mg) by mouth 4 times daily 56 tablet 0     oxyCODONE (ROXICODONE) 5 MG tablet Take 1 tablet (5 mg) by mouth every 6 hours as needed for severe pain 40 tablet 0     pantoprazole (PROTONIX) 40 MG EC tablet Take 1 tablet (40 mg) by mouth daily Start Day -1 30 tablet 0     prochlorperazine (COMPAZINE) 5 MG tablet Take 1 tablet (5 mg) by mouth every 6 hours as needed for nausea or vomiting 40 tablet 0     pyridOXINE (VITAMIN B6) 25 MG tablet Take 1 tablet (25 mg) by mouth daily for 99 days Take with isoniazid, stop 7/31/24 99 tablet 0     sulfamethoxazole-trimethoprim (BACTRIM DS) 800-160 MG tablet Take 1 tablet by mouth Every Mon, Tues two times daily Start medication on 4/15. 32 tablet 2     triamcinolone (KENALOG) 0.1 % external cream Apply topically 2 times daily 80 g 0     Vitamin D3 (CHOLECALCIFEROL) 25 mcg (1000 units) tablet Take 2 tablets (50 mcg) by mouth daily 30 tablet 0     No current facility-administered medications for this visit.     History reviewed. No pertinent past medical history.  Past Surgical History:   Procedure Laterality Date     INSERT CATHETER VASCULAR ACCESS Left 3/6/2024    Procedure: central venous catheter tunneled line Insert vascular access;  Surgeon: Onel Leonardo MD;  Location: [facility] OR     IR CVC TUNNEL PLACEMENT > 5 YRS OF AGE  3/6/2024     IR CVC TUNNEL REMOVAL LEFT  4/10/2024     No Known Allergies  Social History     Socioeconomic History     Marital status: Patient Declined     Spouse name: Not on file     Number of children: Not on file     Years of education: Not on file     Highest education level: Not on file   Occupational History     Not on file   Tobacco Use     Smoking status: Never     Smokeless tobacco: Never   Substance and Sexual Activity     Alcohol use: Never     Drug use: Never     Sexual activity: Not on file   Other Topics Concern     Not on file   Social History  "Narrative     Not on file     Social Determinants of Health     Financial Resource Strain: Not on file   Food Insecurity: Not on file   Transportation Needs: Not on file   Physical Activity: Not on file   Stress: Not on file   Social Connections: Not on file   Interpersonal Safety: Not on file   Housing Stability: Not on file     History reviewed. No pertinent family history.       REVIEW OF SYSTEMS:  General: negative, fever, chills, night sweats  Skin: negative, acne, rash, and scaling  Eyes: negative, double vision, eye pain, and photophobia  Ears/Nose/Throat: negative, nasal congestion, and purulent rhinorrhea  Respiratory: No dyspnea on exertion, No cough, No hemoptysis, and negative  Cardiovascular: negative, palpitations, tachycardia, irregular heart beat, chest pain, exertional chest pain or pressure, paroxysmal nocturnal dyspnea, dyspnea on exertion, and orthopnea         OBJECTIVE:  Blood pressure 105/70, pulse 75, height 1.626 m (5' 4\"), weight 86.6 kg (191 lb), SpO2 99%.  General Appearance: alert and no distress  Head: Normocephalic. No masses, lesions, tenderness or abnormalities  Eyes: conjuctiva clear, PERRL, EOM intact  Ears: External ears normal. Canals clear. TM's normal.  Nose: Nares normal  Mouth: normal  Neck: Supple, no cervical adenopathy, no thyromegaly  Lungs: clear to auscultation  Cardiac: regular rate and rhythm, normal S1 and S2, no murmur       ASSESSMENT/PLAN:  Patient here for posthospital discharge follow-up.  Patient's history is significant for multiple myeloma status post autologous HSCT on 3/13/24 whose post transplant course has been complicated by ESBL E coli bacteremia and new episodes of supraventricular tachycardia.  Patient had SVT as well as episodes of atrial fibrillation with a rapid ventricular response.  This was treated with amiodarone.  As her sepsis improved her SVT/arrhythmia subsided.  Since discharge patient is doing well.  She had no more recurrence of her " symptoms or A-fib.  Denied palpitation.  Patient had a posthospital discharge Zio patch.  This showed no recurrence of SVT or atrial fibrillation.  It appears that her arrhythmia was related to the bone marrow transplant and sepsis.  As there is no recurrence of atrial fibrillation she will not require any anticoagulation at this time until unless this arrhythmia recurs.  Patient was also started on Lopressor 25 mg twice a day.  She stays in normal in sinus rhythm without arrhythmia advised the patient to decrease the dose to 12.5 mg twice a day for a week and then discontinue.  Patient's EKG is reviewed.  Apart from the arrhythmia normal EKG.  Patient had an echocardiogram which showed normal biventricular function and mild left atrial enlargement.  No regular follow-up has been arranged.  Patient may return to clinic anytime if there is any recurrence of this arrhythmia.  Total visit duration 45 minutes.  This included face-to-face interview, physical exam, chart review, review of inpatient records including multiple EKGs, echocardiogram, recent Zio patch and documentation      Please do not hesitate to contact me if you have any questions/concerns.     Sincerely,     LUH Escalante MD

## 2024-05-28 ENCOUNTER — TELEPHONE (OUTPATIENT)
Dept: CARDIOLOGY | Facility: CLINIC | Age: 69
End: 2024-05-28
Payer: COMMERCIAL

## 2024-05-28 NOTE — TELEPHONE ENCOUNTER
5/28/2024 5:24PM Radha Darin  Left Voicemail with Family Member (1st Attempt) and Sent Mychart (1st Attempt) for the patient that we have cancelled the following:    Appointment type: New Cardiology  Provider: Dr. Bean  Return date: 5/30/2024 at 3PM  Specialty phone number: [phone] option 1  Additional appointment(s) needed: NA  Additonal Notes: 5/28 LVM and MYC for pt that we are cancelling New Cardiology w/ Dr. Bean on 5/30 since pt has already seen Dr. Yin and instructions are to follow-up w/ Dr. Yni as needed. LVM in English since [employer] did not have [email or web address] in system. GET Webster 5/28/2024 5:24PM

## 2024-05-28 NOTE — TELEPHONE ENCOUNTER
----- Message from Patsy Hermosillo RN sent at 5/28/2024  4:28 PM CDT -----  This pt is scheeduled to see Dr MAR Bean on 5/30   After review it appears pt was referred on 4/2  On 4/9 pt was scheduled with Humphrey for 5/16, pt saw Dr Yin is to follow up as needed    Please call pt to cancel the 5/30 Dr Bean appointment as pt has already been seen     Ramona Garner

## 2024-06-12 ENCOUNTER — HOSPITAL ENCOUNTER (OUTPATIENT)
Dept: PET IMAGING | Facility: CLINIC | Age: 69
Discharge: HOME | End: 2024-06-12
Attending: INTERNAL MEDICINE | Admitting: INTERNAL MEDICINE
Payer: COMMERCIAL

## 2024-06-12 ENCOUNTER — OFFICE VISIT (OUTPATIENT)
Dept: TRANSPLANT | Facility: CLINIC | Age: 69
End: 2024-06-12
Attending: INTERNAL MEDICINE
Payer: COMMERCIAL

## 2024-06-12 ENCOUNTER — LAB (OUTPATIENT)
Dept: LAB | Facility: CLINIC | Age: 69
End: 2024-06-12
Attending: INTERNAL MEDICINE
Payer: COMMERCIAL

## 2024-06-12 VITALS
TEMPERATURE: 97.8 F | OXYGEN SATURATION: 96 % | BODY MASS INDEX: 32.51 KG/M2 | SYSTOLIC BLOOD PRESSURE: 93 MMHG | HEART RATE: 96 BPM | WEIGHT: 189.4 LBS | DIASTOLIC BLOOD PRESSURE: 66 MMHG | RESPIRATION RATE: 16 BRPM

## 2024-06-12 DIAGNOSIS — C90.01 MULTIPLE MYELOMA IN REMISSION (H): Primary | ICD-10-CM

## 2024-06-12 DIAGNOSIS — C90.01 MULTIPLE MYELOMA IN REMISSION (H): ICD-10-CM

## 2024-06-12 DIAGNOSIS — Z94.84 H/O AUTOLOGOUS STEM CELL TRANSPLANT (H): ICD-10-CM

## 2024-06-12 LAB
ALBUMIN MFR UR ELPH: 23 MG/DL
ALBUMIN SERPL BCG-MCNC: 4.3 G/DL (ref 3.5–5.2)
ALP SERPL-CCNC: 50 U/L (ref 40–150)
ALT SERPL W P-5'-P-CCNC: 8 U/L (ref 0–50)
ANION GAP SERPL CALCULATED.3IONS-SCNC: 11 MMOL/L (ref 7–15)
AST SERPL W P-5'-P-CCNC: 16 U/L (ref 0–45)
BASOPHILS # BLD AUTO: 0 10E3/UL (ref 0–0.2)
BASOPHILS NFR BLD AUTO: 1 %
BILIRUB SERPL-MCNC: 0.2 MG/DL
BUN SERPL-MCNC: 8.9 MG/DL (ref 8–23)
CALCIUM SERPL-MCNC: 9.8 MG/DL (ref 8.8–10.2)
CD19 CELLS # BLD: 303 CELLS/UL (ref 107–698)
CD19 CELLS NFR BLD: 24 % (ref 6–27)
CD3 CELLS # BLD: 643 CELLS/UL (ref 603–2990)
CD3 CELLS NFR BLD: 51 % (ref 49–84)
CD3+CD4+ CELLS # BLD: 190 CELLS/UL (ref 441–2156)
CD3+CD4+ CELLS NFR BLD: 15 % (ref 28–63)
CD3+CD4+ CELLS/CD3+CD8+ CLL BLD: 0.45 % (ref 1.4–2.6)
CD3+CD8+ CELLS # BLD: 418 CELLS/UL (ref 125–1312)
CD3+CD8+ CELLS NFR BLD: 33 % (ref 10–40)
CD3-CD16+CD56+ CELLS # BLD: 310 CELLS/UL (ref 95–640)
CD3-CD16+CD56+ CELLS NFR BLD: 24 % (ref 4–25)
CHLORIDE SERPL-SCNC: 103 MMOL/L (ref 98–107)
CREAT SERPL-MCNC: 0.6 MG/DL (ref 0.51–0.95)
CREAT UR-MCNC: 165 MG/DL
DEPRECATED HCO3 PLAS-SCNC: 25 MMOL/L (ref 22–29)
EGFRCR SERPLBLD CKD-EPI 2021: >90 ML/MIN/1.73M2
EOSINOPHIL # BLD AUTO: 0.1 10E3/UL (ref 0–0.7)
EOSINOPHIL NFR BLD AUTO: 2 %
ERYTHROCYTE [DISTWIDTH] IN BLOOD BY AUTOMATED COUNT: 13 % (ref 10–15)
GLUCOSE SERPL-MCNC: 90 MG/DL (ref 70–99)
HCT VFR BLD AUTO: 36.5 % (ref 35–47)
HGB BLD-MCNC: 12 G/DL (ref 11.7–15.7)
IMM GRANULOCYTES # BLD: 0 10E3/UL
IMM GRANULOCYTES NFR BLD: 0 %
LDH SERPL L TO P-CCNC: 209 U/L (ref 0–250)
LYMPHOCYTES # BLD AUTO: 1.1 10E3/UL (ref 0.8–5.3)
LYMPHOCYTES NFR BLD AUTO: 28 %
MAGNESIUM SERPL-MCNC: 2 MG/DL (ref 1.7–2.3)
MCH RBC QN AUTO: 35.5 PG (ref 26.5–33)
MCHC RBC AUTO-ENTMCNC: 32.9 G/DL (ref 31.5–36.5)
MCV RBC AUTO: 108 FL (ref 78–100)
MONOCYTES # BLD AUTO: 0.5 10E3/UL (ref 0–1.3)
MONOCYTES NFR BLD AUTO: 13 %
NEUTROPHILS # BLD AUTO: 2.3 10E3/UL (ref 1.6–8.3)
NEUTROPHILS NFR BLD AUTO: 56 %
NRBC # BLD AUTO: 0 10E3/UL
NRBC BLD AUTO-RTO: 0 /100
PHOSPHATE SERPL-MCNC: 4 MG/DL (ref 2.5–4.5)
PLATELET # BLD AUTO: 187 10E3/UL (ref 150–450)
POTASSIUM SERPL-SCNC: 4 MMOL/L (ref 3.4–5.3)
PROT SERPL-MCNC: 6.8 G/DL (ref 6.4–8.3)
PROT/CREAT 24H UR: 0.14 MG/MG CR (ref 0–0.2)
RBC # BLD AUTO: 3.38 10E6/UL (ref 3.8–5.2)
SODIUM SERPL-SCNC: 139 MMOL/L (ref 135–145)
T CELL EXTENDED COMMENT: ABNORMAL
TOTAL PROTEIN SERUM FOR ELP: 6.3 G/DL (ref 6.4–8.3)
URATE SERPL-MCNC: 5.4 MG/DL (ref 2.4–5.7)
WBC # BLD AUTO: 4.1 10E3/UL (ref 4–11)

## 2024-06-12 PROCEDURE — 38222 DX BONE MARROW BX & ASPIR: CPT | Mod: LT | Performed by: NURSE PRACTITIONER

## 2024-06-12 PROCEDURE — 88311 DECALCIFY TISSUE: CPT | Mod: 26 | Performed by: STUDENT IN AN ORGANIZED HEALTH CARE EDUCATION/TRAINING PROGRAM

## 2024-06-12 PROCEDURE — 88185 FLOWCYTOMETRY/TC ADD-ON: CPT | Performed by: INTERNAL MEDICINE

## 2024-06-12 PROCEDURE — 343N000001 HC RX 343: Performed by: INTERNAL MEDICINE

## 2024-06-12 PROCEDURE — 38222 DX BONE MARROW BX & ASPIR: CPT | Performed by: NURSE PRACTITIONER

## 2024-06-12 PROCEDURE — 84166 PROTEIN E-PHORESIS/URINE/CSF: CPT | Performed by: PATHOLOGY

## 2024-06-12 PROCEDURE — 84550 ASSAY OF BLOOD/URIC ACID: CPT | Performed by: INTERNAL MEDICINE

## 2024-06-12 PROCEDURE — 86335 IMMUNFIX E-PHORSIS/URINE/CSF: CPT | Mod: 26 | Performed by: PATHOLOGY

## 2024-06-12 PROCEDURE — 250N000013 HC RX MED GY IP 250 OP 250 PS 637: Performed by: NURSE PRACTITIONER

## 2024-06-12 PROCEDURE — 85097 BONE MARROW INTERPRETATION: CPT | Mod: GC | Performed by: STUDENT IN AN ORGANIZED HEALTH CARE EDUCATION/TRAINING PROGRAM

## 2024-06-12 PROCEDURE — 88311 DECALCIFY TISSUE: CPT | Mod: TC | Performed by: INTERNAL MEDICINE

## 2024-06-12 PROCEDURE — 84165 PROTEIN E-PHORESIS SERUM: CPT | Mod: 26 | Performed by: PATHOLOGY

## 2024-06-12 PROCEDURE — 88305 TISSUE EXAM BY PATHOLOGIST: CPT | Mod: 26 | Performed by: STUDENT IN AN ORGANIZED HEALTH CARE EDUCATION/TRAINING PROGRAM

## 2024-06-12 PROCEDURE — 83615 LACTATE (LD) (LDH) ENZYME: CPT | Performed by: INTERNAL MEDICINE

## 2024-06-12 PROCEDURE — 84166 PROTEIN E-PHORESIS/URINE/CSF: CPT | Mod: 26 | Performed by: PATHOLOGY

## 2024-06-12 PROCEDURE — 88188 FLOWCYTOMETRY/READ 9-15: CPT | Mod: GC | Performed by: STUDENT IN AN ORGANIZED HEALTH CARE EDUCATION/TRAINING PROGRAM

## 2024-06-12 PROCEDURE — 86334 IMMUNOFIX E-PHORESIS SERUM: CPT | Mod: 26 | Performed by: PATHOLOGY

## 2024-06-12 PROCEDURE — 250N000011 HC RX IP 250 OP 636: Performed by: NURSE PRACTITIONER

## 2024-06-12 PROCEDURE — 88184 FLOWCYTOMETRY/ TC 1 MARKER: CPT | Performed by: INTERNAL MEDICINE

## 2024-06-12 PROCEDURE — 85025 COMPLETE CBC W/AUTO DIFF WBC: CPT | Performed by: INTERNAL MEDICINE

## 2024-06-12 PROCEDURE — 84156 ASSAY OF PROTEIN URINE: CPT | Performed by: INTERNAL MEDICINE

## 2024-06-12 PROCEDURE — 86334 IMMUNOFIX E-PHORESIS SERUM: CPT | Performed by: PATHOLOGY

## 2024-06-12 PROCEDURE — 84155 ASSAY OF PROTEIN SERUM: CPT | Mod: 91 | Performed by: INTERNAL MEDICINE

## 2024-06-12 PROCEDURE — 82040 ASSAY OF SERUM ALBUMIN: CPT | Performed by: INTERNAL MEDICINE

## 2024-06-12 PROCEDURE — 36415 COLL VENOUS BLD VENIPUNCTURE: CPT | Performed by: INTERNAL MEDICINE

## 2024-06-12 PROCEDURE — 84165 PROTEIN E-PHORESIS SERUM: CPT | Mod: TC | Performed by: PATHOLOGY

## 2024-06-12 PROCEDURE — 86357 NK CELLS TOTAL COUNT: CPT | Mod: XU | Performed by: INTERNAL MEDICINE

## 2024-06-12 PROCEDURE — 83521 IG LIGHT CHAINS FREE EACH: CPT | Performed by: INTERNAL MEDICINE

## 2024-06-12 PROCEDURE — 78816 PET IMAGE W/CT FULL BODY: CPT | Mod: 26 | Performed by: RADIOLOGY

## 2024-06-12 PROCEDURE — 83735 ASSAY OF MAGNESIUM: CPT | Performed by: INTERNAL MEDICINE

## 2024-06-12 PROCEDURE — 78816 PET IMAGE W/CT FULL BODY: CPT | Mod: PS

## 2024-06-12 PROCEDURE — 88341 IMHCHEM/IMCYTCHM EA ADD ANTB: CPT | Mod: 26 | Performed by: STUDENT IN AN ORGANIZED HEALTH CARE EDUCATION/TRAINING PROGRAM

## 2024-06-12 PROCEDURE — A9552 F18 FDG: HCPCS | Performed by: INTERNAL MEDICINE

## 2024-06-12 PROCEDURE — 82784 ASSAY IGA/IGD/IGG/IGM EACH: CPT | Performed by: INTERNAL MEDICINE

## 2024-06-12 PROCEDURE — 84100 ASSAY OF PHOSPHORUS: CPT | Performed by: INTERNAL MEDICINE

## 2024-06-12 PROCEDURE — 88342 IMHCHEM/IMCYTCHM 1ST ANTB: CPT | Mod: 26 | Performed by: STUDENT IN AN ORGANIZED HEALTH CARE EDUCATION/TRAINING PROGRAM

## 2024-06-12 PROCEDURE — 86335 IMMUNFIX E-PHORSIS/URINE/CSF: CPT | Performed by: PATHOLOGY

## 2024-06-12 RX ORDER — FLUDEOXYGLUCOSE F 18 200 MCI/ML
10-18 INJECTION, SOLUTION INTRAVENOUS ONCE
Status: COMPLETED | OUTPATIENT
Start: 2024-06-12 | End: 2024-06-12

## 2024-06-12 RX ORDER — ACETAMINOPHEN 325 MG/1
650 TABLET ORAL ONCE
Status: COMPLETED | OUTPATIENT
Start: 2024-06-12 | End: 2024-06-12

## 2024-06-12 RX ADMIN — ACETAMINOPHEN 650 MG: 325 TABLET ORAL at 14:11

## 2024-06-12 RX ADMIN — MIDAZOLAM 1 MG: 1 INJECTION INTRAMUSCULAR; INTRAVENOUS at 12:55

## 2024-06-12 RX ADMIN — FLUDEOXYGLUCOSE F 18 11.42 MILLICURIE: 200 INJECTION, SOLUTION INTRAVENOUS at 09:40

## 2024-06-12 ASSESSMENT — PAIN SCALES - GENERAL: PAINLEVEL: EXTREME PAIN (8)

## 2024-06-12 NOTE — NURSING NOTE
"Oncology Rooming Note    June 12, 2024 12:47 PM   Rosario Terrazas is a 68 year old female who presents for:    Bone marrow biopsy r/t Multiple Myeloma.    Initial Vitals: BP 94/64 (BP Location: Right arm)   Pulse 92   Temp 97.9  F (36.6  C) (Oral)   Resp 17   Wt 85.9 kg (189 lb 6.4 oz)   SpO2 99%   BMI 32.51 kg/m   Estimated body mass index is 32.51 kg/m  as calculated from the following:    Height as of 5/16/24: 1.626 m (5' 4\").    Weight as of this encounter: 85.9 kg (189 lb 6.4 oz). Body surface area is 1.97 meters squared.  Extreme Pain (8) Comment: Data Unavailable   No LMP recorded. Patient is postmenopausal.  Allergies reviewed: Yes  Medications reviewed: Yes    Medications: Medication refills needed for Tylenol and Acyclovir. Refills already ordered to [facility] pharmacy. RN informed patient to pick it up downstairs when done with procedure.  Pharmacy name entered into [facility]: [facility] [place], [place] - [address] [place] [address] [phone]    Frailty Screening:   Is the patient here for a new oncology consult visit in cancer care?    Clinical concerns: N/A    Briana Collier RN             "

## 2024-06-12 NOTE — PROGRESS NOTES
BMT ONC Adult Bone Marrow Biopsy Procedure Note  June 12, 2024  BP 94/64 (BP Location: Right arm)   Pulse 92   Temp 97.9  F (36.6  C) (Oral)   Resp 17   Wt 85.9 kg (189 lb 6.4 oz)   SpO2 99%   BMI 32.51 kg/m       Learning needs assessment complete within 12 months? YES    DIAGNOSIS: MM     PROCEDURE: Unilateral Bone Marrow Biopsy and Unilateral Aspirate    LOCATION: [facility] 2nd [facility]    Patient s identification was positively verified by verbal identification and invasive procedure safety checklist was completed. Informed consent was obtained. Following the administration of Midazolam as pre-medication, patient was placed in the prone position and prepped and draped in a sterile manner. Approximately 33 cc of 1% Lidocaine was used over the left posterior iliac spine. Following this a 3 mm incision was made. Trephine bone marrow core(s) was (were) obtained from the [facility]. Bone marrow aspirates were obtained from the [facility]. Aspirates were sent for morphology, immunophenotyping, and clonoseq. A total of approximately 10 ml of marrow was aspirated. Following this procedure a sterile dressing was applied to the bone marrow biopsy site(s). The patient was placed in the supine position to maintain pressure on the biopsy site. Post-procedure wound care instructions were given.     Complications: YES, difficult body habitus, very painful/crying dispite 33cc lidocaine, versed. Messaged NC to schedule future bone marrow biopsy under sedation     Interventions: NO    Length of procedure:46 minutes to 1 hour    Procedure performed by: Jennifer Mosher NP

## 2024-06-12 NOTE — NURSING NOTE
BMBX Teaching and Assessment       Teaching concerns addressed: Bone marrow biopsy and infection prevention.     Person(s) involved in teaching: Patient  Motivation Level  Asks Questions: Yes  Eager to Learn: Yes  Cooperative: Yes  Receptive (willing/able to accept information): Yes    Patient demonstrates understanding of the following:     Reason for the appointment, diagnosis and treatment plan: Yes  Knowledge of proper use of medications and conditions for which they are ordered (with special attention to potential side effects or drug interactions): Yes  Which situations necessitate calling provider and whom to contact: Yes    Teaching concerns addressed:   Reviewed activity restrictions if received premeds, potential for bleeding and actions to take if develops any of the issues below    Pain management techniques: Yes  Patient instructed on hand hygiene: Yes  How and/when to access community resources: Yes    Infection Control:  Patient demonstrates understanding of the following:   Bone marrow procedure site care taught: Yes  Signs and symptoms of infection taught: Yes       Instructional Materials Used/Given: Pt instructed to keep bmbx site clean and dry for 24hrs. Pt educated to monitor site for signs of infection such as redness, rash, oozing, puss, bleeding, pain, and elevated temp. Pt instructed to go to call the [facility] triage line or go to the ER if any signs of infection should occur. Pt educated to not operate machinery if receiving versed. Pt and daughter verbalize understanding.     Pre-procedure labs drawn via PIV. Post procedure: Patient vital signs stable, ambulating, site is clean, dry and intact prior to discharge and line removed. Pt discharged with daughter as .     Provider order received to administer Versed 1mg IVP as premed for BMBX. Procedural consent discussed and pt's signature obtained.  Allergies reviewed.  PT currently alert and oriented to plan of care.  Pt lying prone in  stretcher.  Call light w/in reach.  Provider and  at bedside.

## 2024-06-12 NOTE — LETTER
6/12/2024      Rosario Terrazas  [address] [place] [address]      Dear Colleague,    Thank you for referring your patient, Rosario Terrazas, to the [facility] BLOOD AND MARROW TRANSPLANT PROGRAM [place]. Please see a copy of my visit note below.    BMT ONC Adult Bone Marrow Biopsy Procedure Note  June 12, 2024  BP 94/64 (BP Location: Right arm)   Pulse 92   Temp 97.9  F (36.6  C) (Oral)   Resp 17   Wt 85.9 kg (189 lb 6.4 oz)   SpO2 99%   BMI 32.51 kg/m       Learning needs assessment complete within 12 months? YES    DIAGNOSIS: MM     PROCEDURE: Unilateral Bone Marrow Biopsy and Unilateral Aspirate    LOCATION: [facility] 2nd [facility]    Patient s identification was positively verified by verbal identification and invasive procedure safety checklist was completed. Informed consent was obtained. Following the administration of Midazolam as pre-medication, patient was placed in the prone position and prepped and draped in a sterile manner. Approximately 33 cc of 1% Lidocaine was used over the left posterior iliac spine. Following this a 3 mm incision was made. Trephine bone marrow core(s) was (were) obtained from the [facility]. Bone marrow aspirates were obtained from the [facility]. Aspirates were sent for morphology, immunophenotyping, and clonoseq. A total of approximately 10 ml of marrow was aspirated. Following this procedure a sterile dressing was applied to the bone marrow biopsy site(s). The patient was placed in the supine position to maintain pressure on the biopsy site. Post-procedure wound care instructions were given.     Complications: YES, difficult body habitus, very painful/crying dispite 33cc lidocaine, versed. Messaged NC to schedule future bone marrow biopsy under sedation     Interventions: NO    Length of procedure:46 minutes to 1 hour    Procedure performed by: Jennifer Mosher NP

## 2024-06-13 LAB
ALBUMIN SERPL ELPH-MCNC: 3.9 G/DL (ref 3.7–5.1)
ALPHA1 GLOB SERPL ELPH-MCNC: 0.3 G/DL (ref 0.2–0.4)
ALPHA2 GLOB SERPL ELPH-MCNC: 0.9 G/DL (ref 0.5–0.9)
B-GLOBULIN SERPL ELPH-MCNC: 0.6 G/DL (ref 0.6–1)
GAMMA GLOB SERPL ELPH-MCNC: 0.6 G/DL (ref 0.7–1.6)
IGA SERPL-MCNC: 30 MG/DL (ref 84–499)
IGG SERPL-MCNC: 664 MG/DL (ref 610–1616)
IGM SERPL-MCNC: 21 MG/DL (ref 35–242)
KAPPA LC FREE SER-MCNC: 1.07 MG/DL (ref 0.33–1.94)
KAPPA LC FREE/LAMBDA FREE SER NEPH: 1.29 {RATIO} (ref 0.26–1.65)
LAMBDA LC FREE SERPL-MCNC: 0.83 MG/DL (ref 0.57–2.63)
M PROTEIN SERPL ELPH-MCNC: 0.1 G/DL
PATH REPORT.COMMENTS IMP SPEC: NORMAL
PATH REPORT.FINAL DX SPEC: NORMAL
PATH REPORT.MICROSCOPIC SPEC OTHER STN: NORMAL
PATH REPORT.RELEVANT HX SPEC: NORMAL
PROT ELPH PNL UR ELPH: NORMAL
PROT PATTERN SERPL ELPH-IMP: ABNORMAL
PROT PATTERN SERPL IFE-IMP: NORMAL
PROT PATTERN UR ELPH-IMP: NORMAL

## 2024-06-14 LAB
PATH REPORT.COMMENTS IMP SPEC: NORMAL
PATH REPORT.COMMENTS IMP SPEC: NORMAL
PATH REPORT.FINAL DX SPEC: NORMAL
PATH REPORT.GROSS SPEC: NORMAL
PATH REPORT.MICROSCOPIC SPEC OTHER STN: NORMAL
PATH REPORT.MICROSCOPIC SPEC OTHER STN: NORMAL
PATH REPORT.RELEVANT HX SPEC: NORMAL

## 2024-06-19 ENCOUNTER — OFFICE VISIT (OUTPATIENT)
Dept: TRANSPLANT | Facility: CLINIC | Age: 69
End: 2024-06-19
Attending: INTERNAL MEDICINE
Payer: COMMERCIAL

## 2024-06-19 VITALS
WEIGHT: 195 LBS | SYSTOLIC BLOOD PRESSURE: 97 MMHG | HEART RATE: 82 BPM | RESPIRATION RATE: 16 BRPM | TEMPERATURE: 97.6 F | BODY MASS INDEX: 33.47 KG/M2 | OXYGEN SATURATION: 100 % | DIASTOLIC BLOOD PRESSURE: 65 MMHG

## 2024-06-19 DIAGNOSIS — C90.01 MULTIPLE MYELOMA IN REMISSION (H): ICD-10-CM

## 2024-06-19 DIAGNOSIS — C90.01 MULTIPLE MYELOMA IN REMISSION (H): Primary | ICD-10-CM

## 2024-06-19 LAB
ABC 95% CONFIDENCE INTERVAL (B-CELL): NORMAL
ABC CLONOSEQ B-CELL TRACKING (MRD) RESULT: NORMAL
ABC DOMINANT SEQUENCES (B-CELL): 4
ABC RESIDUAL CLONAL CELLS/ MILL NUCLEATED CELLS BCELL: NORMAL

## 2024-06-19 PROCEDURE — 99215 OFFICE O/P EST HI 40 MIN: CPT | Mod: GC | Performed by: INTERNAL MEDICINE

## 2024-06-19 PROCEDURE — G0463 HOSPITAL OUTPT CLINIC VISIT: HCPCS | Performed by: INTERNAL MEDICINE

## 2024-06-19 PROCEDURE — G2211 COMPLEX E/M VISIT ADD ON: HCPCS | Performed by: INTERNAL MEDICINE

## 2024-06-19 ASSESSMENT — PAIN SCALES - GENERAL: PAINLEVEL: SEVERE PAIN (6)

## 2024-06-19 NOTE — LETTER
6/19/2024      Rosario Terrazas  [address] [place] [address]      Dear Colleague,    Thank you for referring your patient, Rosario Terrazas, to the [facility] BLOOD AND MARROW TRANSPLANT PROGRAM [place]. Please see a copy of my visit note below.    BMT Clinic Progress Note   Jun 19, 2024     Patient ID:  Rosario Terrazas is a 68 year old female, currently day +98 s/p Elena-ASCT for IgD kappa MM.     INTERVAL  HISTORY     Rosario is seen with her daughter who is translating today. She is doing okay. Main complaints are low appetite and the chronic shoulder/lower rib pain. Her daughter said she received a steroid injection last week with transient response. She has PRN meds to help with the pain. No recurrence of diarrhea since stopping flagyl. Still with low energy, feels slowly improving.     Review of Systems: ROS negative except as noted above.     PHYSICAL EXAM      Wt Readings from Last 4 Encounters:   06/19/24 88.5 kg (195 lb)   06/12/24 85.9 kg (189 lb 6.4 oz)   05/16/24 86.6 kg (191 lb)   04/23/24 89.4 kg (197 lb)     BP 97/65   Pulse 82   Temp 97.6  F (36.4  C)   Resp 16   Wt 88.5 kg (195 lb)   SpO2 100%   BMI 33.47 kg/m      General: NAD; sitting in chair.   Lungs: CTAB; no crackles  Cardiovascular: RRR  Abdominal/Rectal: soft, NT, ND.   Skin: sandpaper rough, fine papular rash hyperpigmented on left elbow, a few spots on right - (4/8) improving per patient.  Lymph: no LE edema  Neuro: A&O, moving all extremities spontaneously     LABS AND IMAGING - PAST 24 HOURS     Lab Results   Component Value Date    WBC 4.1 06/12/2024    ANEU 3.0 04/02/2024    HGB 12.0 06/12/2024    HCT 36.5 06/12/2024     06/12/2024     06/12/2024    POTASSIUM 4.0 06/12/2024    CHLORIDE 103 06/12/2024    CO2 25 06/12/2024    GLC 90 06/12/2024    BUN 8.9 06/12/2024    CR 0.60 06/12/2024    MAG 2.0 06/12/2024    INR 1.40 (H) 04/10/2024     M-spike 0.1, kappa FLC 1.0, lambda FLC 0.76, K/L  ratio 1.34.     ASSESSMENT/PLAN   Rosario Terrazas is a 68 year old female, currently day +98 s/p s/p Elena-ASCT for IgD kappa MM.    BMT/IEC PROTOCOL for standard risk MM Auto PBSCT  3/12 Day -1 Melphalan 200 mg/m2   3/13 Day 0 Transplant, cell total post wash 2.23 x 10^6 CD34 + cells/kg (pre freeze dose: 5.36 x 10 ^6 CD34+ cells/kg)    Day +28 restaging shows VGPR with M-spike 0.1, normal K/L ratio.  D +100 remains in VGPR - M-spike 0.1, normal K/L ratio. Negative BM and PET/CT.  - Plan to start maintenance Daratumumab + Revlimid once recovered from a functional standpoint.  Restaging at 6 months per protocol.    Heme   # Anemia, thrombocytopenia secondary to chemotherapy and multiple myeloma.   Counts improving.  - Transfuse to keep hgb >7g/dL, plt >10k.   - Resume home aspirin upon discharge as thrombocytopenia has resolved.    ID  History of neutropenic fever with sepsis secondary to colitis from Blastocystis hominis, S.mitis and E.coli bacteremia after transplant. Completed treatment, currentl following with ID.    # Latent TB  - Continue INH/B6 through 3 months post transplant, per ID.     # CMV detectable <35 Continue to monitor.    # PPx: Acyclovir and Bactrim.     CV  # Afib/SVT  Paroxysmal afib with RVR and hypotension requiring transfer to MICU after transplant. Following with cardiology. On metoprolol.    Endocrine  # Thyroid FNA Atypia of undetermined significance diagnosis has a 22 (13-30)% risk of malignancy. Repeated 5/10/2024, non-diagnostic.  Persistence FDG avidity on surveillance PET/CT.   - Repeat biopsy or excision, molecular testing, diagnostic lobectomy is recommended.      Neuro/Pain  # Neuropathy: continues on eleni, xanaflex.  # Rib pain: continue oxycodone and back brace, Tylenol prn. Robaxin prn.   # Possible aspiration: Reports intermittent cough with eating/drinking, improved. Continue to monitor. Consider SLP evaluation if persists.    MSK: Significant deconditioning. Emphasized  on exercise program.    Summary:  - Follow-up with Dr. Ellison. Plan to start maintenance Daratumumab + Revlimid once recovered from a functional standpoint.  - Thyroid biopsy or diagnostic lobectomy  - Restaging at 6 months per protocol.    Patient was seen and plan of care was discussed with attending physician Dr. Rocha.    Rizwan Gil MD  Hematology/Medical Oncology/BMT (PGY-5)  P: [phone]      Attestation signed by Rishi Rocha MD at 6/19/2024  8:17 PM:  I, Rishi Rocha MD, have personally seen this patient independently of the fellow, Dr. Gil. I have personally reviewed vital signs, medications, labs, imaging, and hospital course. I agree with their findings and plan of care as documented in the note with the following additions/exceptions:    Key findings:  day 100 in remission. She still has significant fatigue. She says she is not sleeping at night due to shoulder pain. She also has thyroid-FDA avidity with 2 suspicious biopsies. She also mentioned intermittent dysphagia.    We discussed maintenance with Opal/rev  once better. She will need a referral for thyroid surgery, continuation of PT. If dysphagia recurs, she will also need a work-up for central and/or GI causes. We will discuss these with Dr. Ellison.    Rishi Rocha MD  Date of Service (when I saw the patient): 06/19/2024     40 minutes spent on the date of the encounter doing chart review, interpretation of results, patient visit, documentation and coordination of care.    The longitudinal plan of care for these issues were addressed during this visit. Due to the added complexity in care, my team and I will continue to support the patient in the subsequent management and ongoing continuity of care of these conditions.

## 2024-06-19 NOTE — NURSING NOTE
"Oncology Rooming Note    June 19, 2024 10:55 AM   Rosario Terrazas is a 68 year old female who presents for:    Chief Complaint   Patient presents with    Oncology Clinic Visit     Multiple myeloma in remission      Initial Vitals: BP 97/65   Pulse 82   Temp 97.6  F (36.4  C)   Resp 16   Wt 88.5 kg (195 lb)   SpO2 100%   BMI 33.47 kg/m   Estimated body mass index is 33.47 kg/m  as calculated from the following:    Height as of 5/16/24: 1.626 m (5' 4\").    Weight as of this encounter: 88.5 kg (195 lb). Body surface area is 2 meters squared.  Severe Pain (6) Comment: Data Unavailable   No LMP recorded. Patient is postmenopausal.  Allergies reviewed: Yes  Medications reviewed: Yes    Medications: Medication refills not needed today.  Pharmacy name entered into [email or web address]: [facility] [place], [place] - [address] [facility] [phone]    Frailty Screening:   Is the patient here for a new oncology consult visit in cancer care? 2. No      Clinical concerns: no other complaints      Jim Curry"

## 2024-06-20 DIAGNOSIS — C90.01 MULTIPLE MYELOMA IN REMISSION (H): ICD-10-CM

## 2024-06-28 NOTE — TELEPHONE ENCOUNTER
aspirin 81 MG EC tablet 30 tablet 0 4/2/2024 -- No   Sig - Route: Take 1 tablet (81 mg) by mouth daily - Oral     ----------------------  Last Office Visit : 5/16/2024  [facility] Office visit:  0  ----------------------      Routing refill request to provider for review/approval because:  Medication not on protocol.

## 2024-07-03 NOTE — TELEPHONE ENCOUNTER
aspirin (ASPIRIN LOW DOSE) 81 MG EC tablet          Sig: Take 1 tablet (81 mg) by mouth daily    Original sig: TAKE ONE TABLET BY MOUTH ONCE DAILY    Disp: 30 tablet    Refills: Not specified (Pharmacy requested: 0)    Start: 6/28/2024    Class: E-Prescribe    Non-formulary For: Multiple myeloma in remission (H)    Last ordered: 3 months ago (4/2/2024) by Yudelka Richards PA-C    Last refill: 4/2/2024    Rx #: [id number]    Analgesics (Non-Narcotic Tylenol and ASA Only) [id number]/28/2024 10:08 AM   Protocol Details Patient is age 20 years or older    Medication is active on med list    Medication matches indication    Recent (12 mo) or future (90 days) visit within the authorizing provider's specialty      To be filled at: [id number]     Patient saw Dr. Yin on 5/16/24. Patient was to follow up as needed.     Karyn Strong, RN, BSN  Cardiology RN Care Coordinator   [place]   Phone: [phone]  Fax: [phone] ([place]) [phone] ([place])

## 2024-07-05 RX ORDER — ASPIRIN 81 MG/1
81 TABLET ORAL DAILY
Qty: 30 TABLET | Refills: 11 | Status: SHIPPED | OUTPATIENT
Start: 2024-07-05

## 2024-07-08 RX ORDER — FOLIC ACID 1 MG/1
1000 TABLET ORAL DAILY
Qty: 30 TABLET | Refills: 0 | OUTPATIENT
Start: 2024-07-08

## 2024-07-08 NOTE — TELEPHONE ENCOUNTER
Please reach out to your primary oncologist for med refills. Let us know if they are unable to assist you.

## 2024-09-09 ENCOUNTER — LAB (OUTPATIENT)
Dept: LAB | Facility: CLINIC | Age: 69
End: 2024-09-09
Attending: INTERNAL MEDICINE
Payer: COMMERCIAL

## 2024-09-09 DIAGNOSIS — C90.01 MULTIPLE MYELOMA IN REMISSION (H): Primary | ICD-10-CM

## 2024-09-09 LAB
ACANTHOCYTES BLD QL SMEAR: NORMAL
ALBUMIN MFR UR ELPH: 6.7 MG/DL
ALBUMIN SERPL BCG-MCNC: 3.9 G/DL (ref 3.5–5.2)
ALP SERPL-CCNC: 50 U/L (ref 40–150)
ALT SERPL W P-5'-P-CCNC: 14 U/L (ref 0–50)
ANION GAP SERPL CALCULATED.3IONS-SCNC: 8 MMOL/L (ref 7–15)
AST SERPL W P-5'-P-CCNC: 15 U/L (ref 0–45)
AUER BODIES BLD QL SMEAR: NORMAL
BASO STIPL BLD QL SMEAR: NORMAL
BASOPHILS # BLD AUTO: 0 10E3/UL (ref 0–0.2)
BASOPHILS NFR BLD AUTO: 1 %
BILIRUB SERPL-MCNC: 0.3 MG/DL
BITE CELLS BLD QL SMEAR: NORMAL
BLISTER CELLS BLD QL SMEAR: NORMAL
BUN SERPL-MCNC: 7.7 MG/DL (ref 8–23)
BURR CELLS BLD QL SMEAR: NORMAL
CALCIUM SERPL-MCNC: 8.9 MG/DL (ref 8.8–10.4)
CD19 CELLS # BLD: 79 CELLS/UL (ref 107–698)
CD19 CELLS NFR BLD: 9 % (ref 6–27)
CD3 CELLS # BLD: 733 CELLS/UL (ref 603–2990)
CD3 CELLS NFR BLD: 88 % (ref 49–84)
CD3+CD4+ CELLS # BLD: 227 CELLS/UL (ref 441–2156)
CD3+CD4+ CELLS NFR BLD: 27 % (ref 28–63)
CD3+CD4+ CELLS/CD3+CD8+ CLL BLD: 0.46 % (ref 1.4–2.6)
CD3+CD8+ CELLS # BLD: 496 CELLS/UL (ref 125–1312)
CD3+CD8+ CELLS NFR BLD: 59 % (ref 10–40)
CD3-CD16+CD56+ CELLS # BLD: 21 CELLS/UL (ref 95–640)
CD3-CD16+CD56+ CELLS NFR BLD: 3 % (ref 4–25)
CHLORIDE SERPL-SCNC: 103 MMOL/L (ref 98–107)
CREAT SERPL-MCNC: 0.62 MG/DL (ref 0.51–0.95)
CREAT UR-MCNC: 41.5 MG/DL
DACRYOCYTES BLD QL SMEAR: NORMAL
EGFRCR SERPLBLD CKD-EPI 2021: >90 ML/MIN/1.73M2
ELLIPTOCYTES BLD QL SMEAR: NORMAL
EOSINOPHIL # BLD AUTO: 0.3 10E3/UL (ref 0–0.7)
EOSINOPHIL NFR BLD AUTO: 9 %
ERYTHROCYTE [DISTWIDTH] IN BLOOD BY AUTOMATED COUNT: 13.8 % (ref 10–15)
FRAGMENTS BLD QL SMEAR: NORMAL
GLUCOSE SERPL-MCNC: 84 MG/DL (ref 70–99)
HCO3 SERPL-SCNC: 26 MMOL/L (ref 22–29)
HCT VFR BLD AUTO: 34.6 % (ref 35–47)
HGB BLD-MCNC: 11.8 G/DL (ref 11.7–15.7)
HGB C CRYSTALS: NORMAL
HOWELL-JOLLY BOD BLD QL SMEAR: NORMAL
IMM GRANULOCYTES # BLD: 0 10E3/UL
IMM GRANULOCYTES NFR BLD: 0 %
LDH SERPL L TO P-CCNC: 197 U/L (ref 0–250)
LYMPHOCYTES # BLD AUTO: 0.8 10E3/UL (ref 0.8–5.3)
LYMPHOCYTES NFR BLD AUTO: 22 %
MAGNESIUM SERPL-MCNC: 2.1 MG/DL (ref 1.7–2.3)
MCH RBC QN AUTO: 33.7 PG (ref 26.5–33)
MCHC RBC AUTO-ENTMCNC: 34.1 G/DL (ref 31.5–36.5)
MCV RBC AUTO: 99 FL (ref 78–100)
MONOCYTES # BLD AUTO: 0.8 10E3/UL (ref 0–1.3)
MONOCYTES NFR BLD AUTO: 21 %
NEUTROPHILS # BLD AUTO: 1.8 10E3/UL (ref 1.6–8.3)
NEUTROPHILS NFR BLD AUTO: 47 %
NEUTS HYPERSEG BLD QL SMEAR: NORMAL
NRBC # BLD AUTO: 0 10E3/UL
NRBC BLD AUTO-RTO: 0 /100
PHOSPHATE SERPL-MCNC: 3.3 MG/DL (ref 2.5–4.5)
PLAT MORPH BLD: NORMAL
PLATELET # BLD AUTO: 124 10E3/UL (ref 150–450)
POLYCHROMASIA BLD QL SMEAR: NORMAL
POTASSIUM SERPL-SCNC: 4 MMOL/L (ref 3.4–5.3)
PROT SERPL-MCNC: 6.1 G/DL (ref 6.4–8.3)
PROT/CREAT 24H UR: 0.16 MG/MG CR (ref 0–0.2)
RBC # BLD AUTO: 3.5 10E6/UL (ref 3.8–5.2)
RBC AGGLUT BLD QL: NORMAL
RBC MORPH BLD: NORMAL
ROULEAUX BLD QL SMEAR: NORMAL
SICKLE CELLS BLD QL SMEAR: NORMAL
SMUDGE CELLS BLD QL SMEAR: NORMAL
SODIUM SERPL-SCNC: 137 MMOL/L (ref 135–145)
SPHEROCYTES BLD QL SMEAR: NORMAL
STOMATOCYTES BLD QL SMEAR: NORMAL
T CELL EXTENDED COMMENT: ABNORMAL
TARGETS BLD QL SMEAR: NORMAL
TOTAL PROTEIN SERUM FOR ELP: 5.7 G/DL (ref 6.4–8.3)
TOXIC GRANULES BLD QL SMEAR: NORMAL
URATE SERPL-MCNC: 3.6 MG/DL (ref 2.4–5.7)
VARIANT LYMPHS BLD QL SMEAR: NORMAL
WBC # BLD AUTO: 3.8 10E3/UL (ref 4–11)

## 2024-09-09 PROCEDURE — 86360 T CELL ABSOLUTE COUNT/RATIO: CPT | Performed by: INTERNAL MEDICINE

## 2024-09-09 PROCEDURE — 84156 ASSAY OF PROTEIN URINE: CPT | Performed by: INTERNAL MEDICINE

## 2024-09-09 PROCEDURE — 84166 PROTEIN E-PHORESIS/URINE/CSF: CPT | Mod: 26 | Performed by: PATHOLOGY

## 2024-09-09 PROCEDURE — 82784 ASSAY IGA/IGD/IGG/IGM EACH: CPT | Performed by: INTERNAL MEDICINE

## 2024-09-09 PROCEDURE — 84165 PROTEIN E-PHORESIS SERUM: CPT | Mod: TC | Performed by: PATHOLOGY

## 2024-09-09 PROCEDURE — 84155 ASSAY OF PROTEIN SERUM: CPT | Mod: 91 | Performed by: INTERNAL MEDICINE

## 2024-09-09 PROCEDURE — 86335 IMMUNFIX E-PHORSIS/URINE/CSF: CPT | Mod: 26 | Performed by: PATHOLOGY

## 2024-09-09 PROCEDURE — 84550 ASSAY OF BLOOD/URIC ACID: CPT | Performed by: INTERNAL MEDICINE

## 2024-09-09 PROCEDURE — 83521 IG LIGHT CHAINS FREE EACH: CPT | Performed by: INTERNAL MEDICINE

## 2024-09-09 PROCEDURE — 84166 PROTEIN E-PHORESIS/URINE/CSF: CPT | Performed by: PATHOLOGY

## 2024-09-09 PROCEDURE — 84165 PROTEIN E-PHORESIS SERUM: CPT | Mod: 26 | Performed by: PATHOLOGY

## 2024-09-09 PROCEDURE — 84100 ASSAY OF PHOSPHORUS: CPT | Performed by: INTERNAL MEDICINE

## 2024-09-09 PROCEDURE — 83615 LACTATE (LD) (LDH) ENZYME: CPT | Performed by: INTERNAL MEDICINE

## 2024-09-09 PROCEDURE — 86335 IMMUNFIX E-PHORSIS/URINE/CSF: CPT | Performed by: PATHOLOGY

## 2024-09-09 PROCEDURE — 84155 ASSAY OF PROTEIN SERUM: CPT | Performed by: INTERNAL MEDICINE

## 2024-09-09 PROCEDURE — 82306 VITAMIN D 25 HYDROXY: CPT | Performed by: INTERNAL MEDICINE

## 2024-09-09 PROCEDURE — 36415 COLL VENOUS BLD VENIPUNCTURE: CPT | Performed by: INTERNAL MEDICINE

## 2024-09-09 PROCEDURE — 82247 BILIRUBIN TOTAL: CPT | Performed by: INTERNAL MEDICINE

## 2024-09-09 PROCEDURE — 86334 IMMUNOFIX E-PHORESIS SERUM: CPT | Performed by: PATHOLOGY

## 2024-09-09 PROCEDURE — 86334 IMMUNOFIX E-PHORESIS SERUM: CPT | Mod: 26 | Performed by: PATHOLOGY

## 2024-09-09 PROCEDURE — 85025 COMPLETE CBC W/AUTO DIFF WBC: CPT | Performed by: INTERNAL MEDICINE

## 2024-09-09 PROCEDURE — 86359 T CELLS TOTAL COUNT: CPT | Performed by: INTERNAL MEDICINE

## 2024-09-09 PROCEDURE — 83735 ASSAY OF MAGNESIUM: CPT | Performed by: INTERNAL MEDICINE

## 2024-09-09 NOTE — NURSING NOTE
Chief Complaint   Patient presents with    Labs Only     Blood drawn via VPT by LPN.      CHRISTINA Solomon LPN

## 2024-09-10 LAB
ALBUMIN SERPL ELPH-MCNC: 3.6 G/DL (ref 3.7–5.1)
ALPHA1 GLOB SERPL ELPH-MCNC: 0.3 G/DL (ref 0.2–0.4)
ALPHA2 GLOB SERPL ELPH-MCNC: 0.8 G/DL (ref 0.5–0.9)
B-GLOBULIN SERPL ELPH-MCNC: 0.6 G/DL (ref 0.6–1)
DEPRECATED CALCIDIOL+CALCIFEROL SERPL-MC: <59 UG/L (ref 20–75)
GAMMA GLOB SERPL ELPH-MCNC: 0.4 G/DL (ref 0.7–1.6)
IGA SERPL-MCNC: 8 MG/DL (ref 84–499)
IGG SERPL-MCNC: 392 MG/DL (ref 610–1616)
IGM SERPL-MCNC: 11 MG/DL (ref 35–242)
KAPPA LC FREE SER-MCNC: 0.81 MG/DL (ref 0.33–1.94)
KAPPA LC FREE/LAMBDA FREE SER NEPH: 1.08 {RATIO} (ref 0.26–1.65)
LAMBDA LC FREE SERPL-MCNC: 0.75 MG/DL (ref 0.57–2.63)
M PROTEIN SERPL ELPH-MCNC: 0.1 G/DL
PROT ELPH PNL UR ELPH: NORMAL
PROT PATTERN SERPL ELPH-IMP: ABNORMAL
PROT PATTERN SERPL IFE-IMP: NORMAL
PROT PATTERN UR ELPH-IMP: NORMAL
VITAMIN D2 SERPL-MCNC: <5 UG/L
VITAMIN D3 SERPL-MCNC: 54 UG/L

## 2024-09-11 ENCOUNTER — OFFICE VISIT (OUTPATIENT)
Dept: TRANSPLANT | Facility: CLINIC | Age: 69
End: 2024-09-11
Attending: INTERNAL MEDICINE
Payer: COMMERCIAL

## 2024-09-11 VITALS
RESPIRATION RATE: 16 BRPM | WEIGHT: 188.8 LBS | DIASTOLIC BLOOD PRESSURE: 65 MMHG | SYSTOLIC BLOOD PRESSURE: 103 MMHG | OXYGEN SATURATION: 99 % | BODY MASS INDEX: 32.41 KG/M2 | HEART RATE: 67 BPM | TEMPERATURE: 97.7 F

## 2024-09-11 DIAGNOSIS — C90.01 MULTIPLE MYELOMA IN REMISSION (H): Primary | ICD-10-CM

## 2024-09-11 PROCEDURE — G0463 HOSPITAL OUTPT CLINIC VISIT: HCPCS | Performed by: INTERNAL MEDICINE

## 2024-09-11 PROCEDURE — G2211 COMPLEX E/M VISIT ADD ON: HCPCS | Performed by: INTERNAL MEDICINE

## 2024-09-11 PROCEDURE — 99215 OFFICE O/P EST HI 40 MIN: CPT | Performed by: INTERNAL MEDICINE

## 2024-09-11 RX ORDER — LENALIDOMIDE 10 MG/1
CAPSULE ORAL
COMMUNITY
Start: 2024-08-20

## 2024-09-11 RX ORDER — SENNOSIDES A AND B 8.6 MG/1
8.6 TABLET, FILM COATED ORAL
COMMUNITY
Start: 2024-08-07

## 2024-09-11 RX ORDER — LANOLIN ALCOHOL/MO/W.PET/CERES
CREAM (GRAM) TOPICAL
COMMUNITY
Start: 2024-08-07

## 2024-09-11 RX ORDER — POTASSIUM CHLORIDE 750 MG/1
2 TABLET, EXTENDED RELEASE ORAL DAILY
COMMUNITY
Start: 2024-08-13

## 2024-09-11 ASSESSMENT — PAIN SCALES - GENERAL: PAINLEVEL: MODERATE PAIN (4)

## 2024-09-11 NOTE — LETTER
9/11/2024      Rosario Terrazas  [address] [place] [address]      Dear Colleague,    Thank you for referring your patient, Rosario Terrazas, to the [facility] [place]. Please see a copy of my visit note below.    BMT Clinic Progress Note   Sep 11, 2024     Patient ID:  Rosario Terrazas is a 69 year old female, currently day +182 s/p Elena-ASCT for IgD kappa MM.     INTERVAL  HISTORY     Rosario is seen with her daughter who is translating today. She is doing well. Independent.     Review of Systems: ROS negative except as noted above.     PHYSICAL EXAM      Wt Readings from Last 4 Encounters:   09/11/24 85.6 kg (188 lb 12.8 oz)   06/19/24 88.5 kg (195 lb)   06/12/24 85.9 kg (189 lb 6.4 oz)   05/16/24 86.6 kg (191 lb)     /65 (BP Location: Right arm, Patient Position: Sitting, Cuff Size: Adult Regular)   Pulse 67   Temp 97.7  F (36.5  C) (Oral)   Resp 16   Wt 85.6 kg (188 lb 12.8 oz)   SpO2 99%   BMI 32.41 kg/m      General: NAD; sitting in chair.   Lungs: CTAB; no crackles  Cardiovascular: RRR  Abdominal/Rectal: soft, NT, ND.   Skin: sandpaper rough, fine papular rash hyperpigmented on left elbow, a few spots on right - (4/8) improving per patient.  Lymph: no LE edema  Neuro: A&O, moving all extremities spontaneously     LABS AND IMAGING - PAST 24 HOURS     Lab Results   Component Value Date    WBC 3.8 (L) 09/09/2024    ANEU 3.0 04/02/2024    HGB 11.8 09/09/2024    HCT 34.6 (L) 09/09/2024     (L) 09/09/2024     09/09/2024    POTASSIUM 4.0 09/09/2024    CHLORIDE 103 09/09/2024    CO2 26 09/09/2024    GLC 84 09/09/2024    BUN 7.7 (L) 09/09/2024    CR 0.62 09/09/2024    MAG 2.1 09/09/2024    INR 1.40 (H) 04/10/2024     M-spike 0.1, kappa FLC 1.0, lambda FLC 0.76, K/L ratio 1.34.     ASSESSMENT/PLAN   Rosario Terrazas is a 69 year old female, currently day +182 s/p s/p Elena-ASCT for IgD kappa MM.    BMT/IEC PROTOCOL for standard  risk MM Auto PBSCT  3/12 Day -1 Melphalan 200 mg/m2   3/13 Day 0 Transplant, cell total post wash 2.23 x 10^6 CD34 + cells/kg (pre freeze dose: 5.36 x 10 ^6 CD34+ cells/kg)    Day +28 restaging shows VGPR with M-spike 0.1, normal K/L ratio.  D +100 remains in VGPR - M-spike 0.1, normal K/L ratio. Negative BM and PET/CT.  Day 180 - in CR.  - Continue maintenance Daratumumab + Revlimid. Could use some dex reduction  Restaging  per protocol.    ID  History of neutropenic fever with sepsis secondary to colitis from Blastocystis hominis, S.mitis and E.coli bacteremia after transplant. Completed treatment, currentl following with ID.    # Latent TB  - Continue INH/B6 through 3 months post transplant, per ID.     # CMV detectable <35 Continue to monitor.    # PPx: Acyclovir and Bactrim.     CV  # Afib/SVT  Paroxysmal afib with RVR and hypotension requiring transfer to MICU after transplant. Following with cardiology. On metoprolol.    Endocrine  # Thyroid FNA Atypia of undetermined significance diagnosis has a 22 (13-30)% risk of malignancy. Repeated 5/10/2024, non-diagnostic.  Persistence FDG avidity on surveillance PET/CT.   - Repeat biopsy or excision, molecular testing, diagnostic lobectomy is recommended.      Neuro/Pain  # Neuropathy: continues on eleni, xanaflex.  # Rib pain: continue oxycodone and back brace, Tylenol prn. Robaxin prn.   # Possible aspiration: Reports intermittent cough with eating/drinking, improved. Continue to monitor. Consider SLP evaluation if persists.    MSK: Significant deconditioning. Emphasized on exercise program.    40 minutes spent on the date of the encounter doing chart review, interpretation of results, patient visit, documentation and coordination of care.  The longitudinal plan of care for the diagnosis(es)/condition(s) as documented were addressed during this visit. Due to the added complexity in care, I will continue to support Rosario in the subsequent management and with ongoing  continuity of care.      Again, thank you for allowing me to participate in the care of your patient.        Sincerely,        Rishi Rocha MD

## 2024-09-11 NOTE — PROGRESS NOTES
BMT Clinic Progress Note   Sep 11, 2024     Patient ID:  Rosario Terrazas is a 69 year old female, currently day +182 s/p Elena-ASCT for IgD kappa MM.     INTERVAL  HISTORY     Rosario is seen with her daughter who is translating today. She is doing well. Independent.     Review of Systems: ROS negative except as noted above.     PHYSICAL EXAM      Wt Readings from Last 4 Encounters:   09/11/24 85.6 kg (188 lb 12.8 oz)   06/19/24 88.5 kg (195 lb)   06/12/24 85.9 kg (189 lb 6.4 oz)   05/16/24 86.6 kg (191 lb)     /65 (BP Location: Right arm, Patient Position: Sitting, Cuff Size: Adult Regular)   Pulse 67   Temp 97.7  F (36.5  C) (Oral)   Resp 16   Wt 85.6 kg (188 lb 12.8 oz)   SpO2 99%   BMI 32.41 kg/m      General: NAD; sitting in chair.   Lungs: CTAB; no crackles  Cardiovascular: RRR  Abdominal/Rectal: soft, NT, ND.   Skin: sandpaper rough, fine papular rash hyperpigmented on left elbow, a few spots on right - (4/8) improving per patient.  Lymph: no LE edema  Neuro: A&O, moving all extremities spontaneously     LABS AND IMAGING - PAST 24 HOURS     Lab Results   Component Value Date    WBC 3.8 (L) 09/09/2024    ANEU 3.0 04/02/2024    HGB 11.8 09/09/2024    HCT 34.6 (L) 09/09/2024     (L) 09/09/2024     09/09/2024    POTASSIUM 4.0 09/09/2024    CHLORIDE 103 09/09/2024    CO2 26 09/09/2024    GLC 84 09/09/2024    BUN 7.7 (L) 09/09/2024    CR 0.62 09/09/2024    MAG 2.1 09/09/2024    INR 1.40 (H) 04/10/2024     M-spike 0.1, kappa FLC 1.0, lambda FLC 0.76, K/L ratio 1.34.     ASSESSMENT/PLAN   Rosario Terrazas is a 69 year old female, currently day +182 s/p s/p Elena-ASCT for IgD kappa MM.    BMT/IEC PROTOCOL for standard risk MM Auto PBSCT  3/12 Day -1 Melphalan 200 mg/m2   3/13 Day 0 Transplant, cell total post wash 2.23 x 10^6 CD34 + cells/kg (pre freeze dose: 5.36 x 10 ^6 CD34+ cells/kg)    Day +28 restaging shows VGPR with M-spike 0.1, normal K/L ratio.  D +100 remains in VGPR -  M-spike 0.1, normal K/L ratio. Negative BM and PET/CT.  Day 180 - in CR.  - Continue maintenance Daratumumab + Revlimid. Could use some dex reduction  Restaging  per protocol.    ID  History of neutropenic fever with sepsis secondary to colitis from Blastocystis hominis, S.mitis and E.coli bacteremia after transplant. Completed treatment, currentl following with ID.    # Latent TB  - Continue INH/B6 through 3 months post transplant, per ID.     # CMV detectable <35 Continue to monitor.    # PPx: Acyclovir and Bactrim.     CV  # Afib/SVT  Paroxysmal afib with RVR and hypotension requiring transfer to MICU after transplant. Following with cardiology. On metoprolol.    Endocrine  # Thyroid FNA Atypia of undetermined significance diagnosis has a 22 (13-30)% risk of malignancy. Repeated 5/10/2024, non-diagnostic.  Persistence FDG avidity on surveillance PET/CT.   - Repeat biopsy or excision, molecular testing, diagnostic lobectomy is recommended.      Neuro/Pain  # Neuropathy: continues on eleni, xanaflex.  # Rib pain: continue oxycodone and back brace, Tylenol prn. Robaxin prn.   # Possible aspiration: Reports intermittent cough with eating/drinking, improved. Continue to monitor. Consider SLP evaluation if persists.    MSK: Significant deconditioning. Emphasized on exercise program.    40 minutes spent on the date of the encounter doing chart review, interpretation of results, patient visit, documentation and coordination of care.  The longitudinal plan of care for the diagnosis(es)/condition(s) as documented were addressed during this visit. Due to the added complexity in care, I will continue to support Rosario in the subsequent management and with ongoing continuity of care.

## 2024-09-11 NOTE — NURSING NOTE
"Oncology Rooming Note    September 11, 2024 10:33 AM   Rosario Terrazas is a 69 year old female who presents for:    Chief Complaint   Patient presents with    Oncology Clinic Visit     Multiple myeloma     Initial Vitals: /65 (BP Location: Right arm, Patient Position: Sitting, Cuff Size: Adult Regular)   Pulse 67   Temp 97.7  F (36.5  C) (Oral)   Resp 16   Wt 85.6 kg (188 lb 12.8 oz)   SpO2 99%   BMI 32.41 kg/m   Estimated body mass index is 32.41 kg/m  as calculated from the following:    Height as of 5/16/24: 1.626 m (5' 4\").    Weight as of this encounter: 85.6 kg (188 lb 12.8 oz). Body surface area is 1.97 meters squared.  Moderate Pain (4) Comment: Data Unavailable   No LMP recorded. Patient is postmenopausal.  Allergies reviewed: Yes  Medications reviewed: Yes    Medications: Medication refills not needed today.  Pharmacy name entered into [facility]: [place], [place] - [address] [place] [address] [phone]    Frailty Screening:   Is the patient here for a new oncology consult visit in cancer care? 2. No      Clinical concerns: none      Yuridia Toussaint, EMT  9/11/2024            "

## 2024-10-03 ENCOUNTER — ANCILLARY PROCEDURE (OUTPATIENT)
Dept: ULTRASOUND IMAGING | Facility: CLINIC | Age: 69
End: 2024-10-03
Attending: INTERNAL MEDICINE
Payer: COMMERCIAL

## 2024-10-03 DIAGNOSIS — E04.2 MULTIPLE THYROID NODULES: ICD-10-CM

## 2024-10-03 PROCEDURE — 76536 US EXAM OF HEAD AND NECK: CPT | Performed by: STUDENT IN AN ORGANIZED HEALTH CARE EDUCATION/TRAINING PROGRAM

## 2024-10-08 NOTE — RESULT ENCOUNTER NOTE
You do have a f/up clinic apt on 10/28/24. Please keep that apt, so we can discuss in detail the result of the thyroid ultrasound.

## 2024-10-29 ENCOUNTER — VIRTUAL VISIT (OUTPATIENT)
Dept: ENDOCRINOLOGY | Facility: CLINIC | Age: 69
End: 2024-10-29
Payer: COMMERCIAL

## 2024-10-29 DIAGNOSIS — E04.2 MULTIPLE THYROID NODULES: Primary | ICD-10-CM

## 2024-10-29 PROCEDURE — 99215 OFFICE O/P EST HI 40 MIN: CPT | Mod: 95 | Performed by: INTERNAL MEDICINE

## 2024-10-29 NOTE — PROGRESS NOTES
Video-Visit Details    Type of service:  Video Visit  Joined the call at 10/29/2024, 1:49:31 pm.  Left the call at 10/29/2024, 2:00:53 pm.  Originating Location (pt. Location): Home  Distant Location (provider location): Off-site    Mode of Communication:  Video Conference via [employer]    =======================================================  Assessment     Rosario Terrazas is a 69 year old female with a past medical history significant for metastatic multiple myeloma, diagnosed in 2023, status post bone marrow transplant in March 2024.  A right dominant thyroid nodule with FDG uptake was identified on the pretransplant PET/CT, which corresponded to a mixed nodule on ultrasound.  The biopsy of the nodule from 2/27/2024 revealed AUS, with a risk of cancer around 22%.  The second biopsy from May 2024 was nondiagnostic.  The nodule had a higher SUV uptake on the PET/CT from June 2024.  On the follow-up ultrasound from August, the nodule did not change its size or ultrasound characteristics.  Clinically and biochemically, she is euthyroid.     Discussed with the patient the option of pursuing a core biopsy by interventional radiology, especially if a diagnosis of plasmacytoma would be relevant  The patient's daughter agreed to discuss this with oncology at the next appointment and get back to me.  Otherwise, the plan is to schedule a follow-up neck and thyroid ultrasound in 6 months, together with TFTs    Orders Placed This Encounter   Procedures    US Thyroid    US Head Neck Soft Tissue    TSH with free T4 reflex     =======================================================  The patient is seen in f/up.  The patient is seen with the help of her daughter Katherin, who speaks Kis[place] and helps with the translation.    History of Present Illness:  Rosario Terrazas is a 69 year old female with a past medical history significant for metastatic multiple myeloma (S/P BMT on 3/13/24), Afib and SVT, incidentally  diagnosed with a thyroid nodule on the PET/CT from February 2024.  The thyroid ultrasound from 2/26/2024 revealed a 2.3 cm right mid inferior thyroid nodule, mixed, hypo/isoechoic, with a couple of ? macrocalcifications, and 2 spongiform nodules (0.9 cm in the right inferior lobe and 0.5 cm in the left superior lobe).  The 2.3 cm right thyroid nodule demonstrated mild FDG avidity on the 2/22/2024 PET/CT.  The dominant mixed right thyroid nodule was biopsied on 2/27/2024 and the biopsy revealed AUS, with a risk of cancer of ~ 22%. The cytopathology revealed some atypical cells possibly of lymphocytic in origin, with enlarged nuclei and high nuclear to cytoplasmic ratios.  The nodule was rebiopsied on 5/10/2024 and the biopsy was nondiagnostic.    The patient was evaluated at [facility] by Dr. Armenta in August 2024.  A follow-up thyroid ultrasound was done at [facility] on 8/20/2024, which did not reveal significant changes of the 2 cm right thyroid nodule.  On the PET/CT from June 2024, the nodule showed increased uptake with a maximum SUV of 7.53.    The patient has been experiencing pain in her back, ribs and shoulder.  Otherwise, she denies any specific complaints.   Most recent TSH was normal at 3.74 on 8/20/2024.  A follow-up appointment with oncology is scheduled mid November.    On questioning, she denies dysphagia, voice hoarseness, cough.  Lost 50 lbs around the transplant. Now, her weight is stable.  She has always been intolerant to cold temperature and this has not changed.  She denies tremor, palpitations, heat or cold intolerance.     There is no known family history or personal history of thyroid disease.      Past Medical History   OA  T7 pathological compression fracture 8/2023 tx with Rx and Reclast  MM  GERD  Latent TB      Past Surgical History   Past Surgical History:   Procedure Laterality Date    INSERT CATHETER VASCULAR ACCESS Left 3/6/2024    Procedure: central venous catheter tunneled  line Insert vascular access;  Surgeon: Onel Leonardo MD;  Location: [facility] OR    IR CVC TUNNEL PLACEMENT > 5 YRS OF AGE  3/6/2024    IR CVC TUNNEL REMOVAL LEFT  4/10/2024   Left knee replacement     Current Medications    Current Outpatient Medications:     acetaminophen (TYLENOL) 325 MG tablet, Take 2 tablets (650 mg) by mouth every 4 hours as needed for pain, Disp: 120 tablet, Rfl: 1    acyclovir (ZOVIRAX) 800 MG tablet, Take 1 tablet (800 mg) by mouth 2 times daily Start Day -1, Disp: 60 tablet, Rfl: 11    aspirin (ASPIRIN LOW DOSE) 81 MG EC tablet, Take 1 tablet (81 mg) by mouth daily, Disp: 30 tablet, Rfl: 11    calcium carbonate-vitamin D (OSCAL) 500-5 MG-MCG tablet, Take 1 tablet by mouth 3 times daily (with meals), Disp: 90 tablet, Rfl: 2    diclofenac (VOLTAREN) 1 % topical gel, Apply 2 g topically 4 times daily, Disp: 350 g, Rfl: 2    folic acid (FOLVITE) 1 MG tablet, Take 1 tablet (1,000 mcg) by mouth daily, Disp: 30 tablet, Rfl: 0    gabapentin (NEURONTIN) 100 MG capsule, Take 1 capsule (100 mg) by mouth 3 times daily (Patient not taking: Reported on 6/12/2024), Disp: 90 capsule, Rfl: 0    Heparin Sod, Pork, Lock Flush 10 UNIT/ML SOLN, 5 mLs by Intracatheter route daily, Disp: , Rfl:     LENalidomide (REVLIMID) 10 MG CAPS capsule, , Disp: , Rfl:     loperamide (IMODIUM) 2 MG capsule, Take 1 capsule (2 mg) by mouth 4 times daily as needed for diarrhea, Disp: 120 capsule, Rfl: 0    melatonin 3 MG tablet, , Disp: , Rfl:     methocarbamol (ROBAXIN) 500 MG tablet, Take 1 tablet (500 mg) by mouth 4 times daily as needed for muscle spasms, Disp: 60 tablet, Rfl: 0    metoprolol tartrate (LOPRESSOR) 25 MG tablet, Take 1 tablet (25 mg) by mouth 2 times daily (Patient not taking: Reported on 6/12/2024), Disp: 60 tablet, Rfl: 0    oxyCODONE (ROXICODONE) 5 MG tablet, Take 1 tablet (5 mg) by mouth every 6 hours as needed for severe pain, Disp: 40 tablet, Rfl: 0    potassium chloride [place] ER (KLOR-CON M10) 10 MEQ  CR tablet, Take 2 tablets by mouth daily., Disp: , Rfl:     prochlorperazine (COMPAZINE) 5 MG tablet, Take 1 tablet (5 mg) by mouth every 6 hours as needed for nausea or vomiting, Disp: 40 tablet, Rfl: 0    senna (SENOKOT) 8.6 MG tablet, Take 8.6 mg by mouth., Disp: , Rfl:     sulfamethoxazole-trimethoprim (BACTRIM DS) 800-160 MG tablet, Take 1 tablet by mouth Every Mon, Tues two times daily Start medication on 4/15., Disp: 32 tablet, Rfl: 2    triamcinolone (KENALOG) 0.1 % external cream, Apply topically 2 times daily, Disp: 80 g, Rfl: 0    Vitamin D3 (CHOLECALCIFEROL) 25 mcg (1000 units) tablet, Take 2 tablets (50 mcg) by mouth daily, Disp: 30 tablet, Rfl: 0    Family History   Non significant. Brother - diabetes; he lived in [place].     Social History  . She has 5 children. Originally from [facility]; moved to  in 2019. She denies smoking, drinking alcohol or using illicit drugs. Occupation: unemployed.                Vital Signs     Previous Weights:    Wt Readings from Last 10 Encounters:   09/11/24 85.6 kg (188 lb 12.8 oz)   06/19/24 88.5 kg (195 lb)   06/12/24 85.9 kg (189 lb 6.4 oz)   05/16/24 86.6 kg (191 lb)   04/23/24 89.4 kg (197 lb)   04/19/24 90.7 kg (200 lb)   04/12/24 93 kg (205 lb 1.6 oz)   04/08/24 94.3 kg (207 lb 12.8 oz)   04/05/24 94.8 kg (209 lb)   04/04/24 94.8 kg (209 lb)        There were no vitals taken for this visit.    Physical Exam  General Appearance: alert, no distress noted.  Resting in a chair.  Eyes: grossly normal to inspection, conjunctivae and sclerae normal, no lid lag or stare   Neck: No visible masses or enlargement  Respiratory: no audible wheeze, cough, or visible cyanosis.  No visible retractions or increased work of breathing.  Neurological: Cranial nerves grossly intact; no tremor of the hands     Lab Results  I reviewed prior lab results documented in Epic.  TSH   Date Value Ref Range Status   03/22/2024 1.50 0.30 - 4.20 uIU/mL Final     40 minutes spent on the  date of the encounter doing chart review, history and exam, documentation and further activities as noted above.

## 2024-10-29 NOTE — NURSING NOTE
Current patient location: [place]     Is the patient currently in the state of [place]? YES    Visit mode:VIDEO    If the visit is dropped, the patient can be reconnected by: Connected to call prior to appointment. Question not asked.      Will anyone else be joining the visit?  Yes, Katherin (daughter)  (If patient encounters technical issues they should call [phone])    Are changes needed to the allergy or medication list? No  Katherin reported no changes to e-check in information for visit. VF did not review e-check in information again with Katherin due to this.       Are refills needed on medications prescribed by this physician? NO    Rooming Documentation:  Not applicable    Reason for visit: RECHECK    Jan[facility] LIVE     Katherin requested to interpret for visit. Language Services contacted and notified that  is no longer needed for visit per daughter request.   Name (if in person): N/A   ID # (if video/phone): N/A  Language: Rick  Agency: [employer]  Phone Number: [phone]  Method of Interpretation: Video  Patient agrees to use  Services: Daughter requested to interpret for visit.

## 2024-10-29 NOTE — LETTER
10/29/2024      Rosario Terrazas  [address] [place] [address]      Dear Colleague,    Thank you for referring your patient, Rosario Terrazas, to the [facility]. Please see a copy of my visit note below.      Video-Visit Details    Type of service:  Video Visit  Joined the call at 10/29/2024, 1:49:31 pm.  Left the call at 10/29/2024, 2:00:53 pm.  Originating Location (pt. Location): Home  Distant Location (provider location): Off-site    Mode of Communication:  Video Conference via FRUCT    =======================================================  Assessment     Rosario Terrazas is a 69 year old female with a past medical history significant for metastatic multiple myeloma, diagnosed in 2023, status post bone marrow transplant in March 2024.  A right dominant thyroid nodule with FDG uptake was identified on the pretransplant PET/CT, which corresponded to a mixed nodule on ultrasound.  The biopsy of the nodule from 2/27/2024 revealed AUS, with a risk of cancer around 22%.  The second biopsy from May 2024 was nondiagnostic.  The nodule had a higher SUV uptake on the PET/CT from June 2024.  On the follow-up ultrasound from August, the nodule did not change its size or ultrasound characteristics.  Clinically and biochemically, she is euthyroid.     Discussed with the patient the option of pursuing a core biopsy by interventional radiology, especially if a diagnosis of plasmacytoma would be relevant  The patient's daughter agreed to discuss this with oncology at the next appointment and get back to me.  Otherwise, the plan is to schedule a follow-up neck and thyroid ultrasound in 6 months, together with TFTs    Orders Placed This Encounter   Procedures    US Thyroid    US Head Neck Soft Tissue    TSH with free T4 reflex     =======================================================  The patient is seen in f/up.  The patient is seen with the help of her daughter Katherin, who  speaks Kisii and helps with the translation.    History of Present Illness:  Rosario Terrazas is a 69 year old female with a past medical history significant for metastatic multiple myeloma (S/P BMT on 3/13/24), Afib and SVT, incidentally diagnosed with a thyroid nodule on the PET/CT from February 2024.  The thyroid ultrasound from 2/26/2024 revealed a 2.3 cm right mid inferior thyroid nodule, mixed, hypo/isoechoic, with a couple of ? macrocalcifications, and 2 spongiform nodules (0.9 cm in the right inferior lobe and 0.5 cm in the left superior lobe).  The 2.3 cm right thyroid nodule demonstrated mild FDG avidity on the 2/22/2024 PET/CT.  The dominant mixed right thyroid nodule was biopsied on 2/27/2024 and the biopsy revealed AUS, with a risk of cancer of ~ 22%. The cytopathology revealed some atypical cells possibly of lymphocytic in origin, with enlarged nuclei and high nuclear to cytoplasmic ratios.  The nodule was rebiopsied on 5/10/2024 and the biopsy was nondiagnostic.    The patient was evaluated at [facility] by Dr. Armenta in August 2024.  A follow-up thyroid ultrasound was done at [place] on 8/20/2024, which did not reveal significant changes of the 2 cm right thyroid nodule.  On the PET/CT from June 2024, the nodule showed increased uptake with a maximum SUV of 7.53.    The patient has been experiencing pain in her back, ribs and shoulder.  Otherwise, she denies any specific complaints.   Most recent TSH was normal at 3.74 on 8/20/2024.  A follow-up appointment with oncology is scheduled mid November.    On questioning, she denies dysphagia, voice hoarseness, cough.  Lost 50 lbs around the transplant. Now, her weight is stable.  She has always been intolerant to cold temperature and this has not changed.  She denies tremor, palpitations, heat or cold intolerance.     There is no known family history or personal history of thyroid disease.      Past Medical History   OA  T7 pathological  compression fracture 8/2023 tx with Rx and Reclast  MM  GERD  Latent TB      Past Surgical History   Past Surgical History:   Procedure Laterality Date    INSERT CATHETER VASCULAR ACCESS Left 3/6/2024    Procedure: central venous catheter tunneled line Insert vascular access;  Surgeon: Onel Leonardo MD;  Location: [facility] OR    IR CVC TUNNEL PLACEMENT > 5 YRS OF AGE  3/6/2024    IR CVC TUNNEL REMOVAL LEFT  4/10/2024   Left knee replacement     Current Medications    Current Outpatient Medications:     acetaminophen (TYLENOL) 325 MG tablet, Take 2 tablets (650 mg) by mouth every 4 hours as needed for pain, Disp: 120 tablet, Rfl: 1    acyclovir (ZOVIRAX) 800 MG tablet, Take 1 tablet (800 mg) by mouth 2 times daily Start Day -1, Disp: 60 tablet, Rfl: 11    aspirin (ASPIRIN LOW DOSE) 81 MG EC tablet, Take 1 tablet (81 mg) by mouth daily, Disp: 30 tablet, Rfl: 11    calcium carbonate-vitamin D (OSCAL) 500-5 MG-MCG tablet, Take 1 tablet by mouth 3 times daily (with meals), Disp: 90 tablet, Rfl: 2    diclofenac (VOLTAREN) 1 % topical gel, Apply 2 g topically 4 times daily, Disp: 350 g, Rfl: 2    folic acid (FOLVITE) 1 MG tablet, Take 1 tablet (1,000 mcg) by mouth daily, Disp: 30 tablet, Rfl: 0    gabapentin (NEURONTIN) 100 MG capsule, Take 1 capsule (100 mg) by mouth 3 times daily (Patient not taking: Reported on 6/12/2024), Disp: 90 capsule, Rfl: 0    Heparin Sod, Pork, Lock Flush 10 UNIT/ML SOLN, 5 mLs by Intracatheter route daily, Disp: , Rfl:     LENalidomide (REVLIMID) 10 MG CAPS capsule, , Disp: , Rfl:     loperamide (IMODIUM) 2 MG capsule, Take 1 capsule (2 mg) by mouth 4 times daily as needed for diarrhea, Disp: 120 capsule, Rfl: 0    melatonin 3 MG tablet, , Disp: , Rfl:     methocarbamol (ROBAXIN) 500 MG tablet, Take 1 tablet (500 mg) by mouth 4 times daily as needed for muscle spasms, Disp: 60 tablet, Rfl: 0    metoprolol tartrate (LOPRESSOR) 25 MG tablet, Take 1 tablet (25 mg) by mouth 2 times daily (Patient  not taking: Reported on 6/12/2024), Disp: 60 tablet, Rfl: 0    oxyCODONE (ROXICODONE) 5 MG tablet, Take 1 tablet (5 mg) by mouth every 6 hours as needed for severe pain, Disp: 40 tablet, Rfl: 0    potassium chloride dallas ER (KLOR-CON M10) 10 MEQ CR tablet, Take 2 tablets by mouth daily., Disp: , Rfl:     prochlorperazine (COMPAZINE) 5 MG tablet, Take 1 tablet (5 mg) by mouth every 6 hours as needed for nausea or vomiting, Disp: 40 tablet, Rfl: 0    senna (SENOKOT) 8.6 MG tablet, Take 8.6 mg by mouth., Disp: , Rfl:     sulfamethoxazole-trimethoprim (BACTRIM DS) 800-160 MG tablet, Take 1 tablet by mouth Every Mon, Tues two times daily Start medication on 4/15., Disp: 32 tablet, Rfl: 2    triamcinolone (KENALOG) 0.1 % external cream, Apply topically 2 times daily, Disp: 80 g, Rfl: 0    Vitamin D3 (CHOLECALCIFEROL) 25 mcg (1000 units) tablet, Take 2 tablets (50 mcg) by mouth daily, Disp: 30 tablet, Rfl: 0    Family History   Non significant. Brother - diabetes; he lived in [place].     Social History  . She has 5 children. Originally from [facility]; moved to  in 2019. She denies smoking, drinking alcohol or using illicit drugs. Occupation: unemployed.                Vital Signs     Previous Weights:    Wt Readings from Last 10 Encounters:   09/11/24 85.6 kg (188 lb 12.8 oz)   06/19/24 88.5 kg (195 lb)   06/12/24 85.9 kg (189 lb 6.4 oz)   05/16/24 86.6 kg (191 lb)   04/23/24 89.4 kg (197 lb)   04/19/24 90.7 kg (200 lb)   04/12/24 93 kg (205 lb 1.6 oz)   04/08/24 94.3 kg (207 lb 12.8 oz)   04/05/24 94.8 kg (209 lb)   04/04/24 94.8 kg (209 lb)        There were no vitals taken for this visit.    Physical Exam  General Appearance: alert, no distress noted.  Resting in a chair.  Eyes: grossly normal to inspection, conjunctivae and sclerae normal, no lid lag or stare   Neck: No visible masses or enlargement  Respiratory: no audible wheeze, cough, or visible cyanosis.  No visible retractions or increased work of  breathing.  Neurological: Cranial nerves grossly intact; no tremor of the hands     Lab Results  I reviewed prior lab results documented in Epic.  TSH   Date Value Ref Range Status   03/22/2024 1.50 0.30 - 4.20 uIU/mL Final     40 minutes spent on the date of the encounter doing chart review, history and exam, documentation and further activities as noted above.             Again, thank you for allowing me to participate in the care of your patient.        Sincerely,        Marianela Landa MD

## 2024-10-30 ENCOUNTER — TELEPHONE (OUTPATIENT)
Dept: ENDOCRINOLOGY | Facility: CLINIC | Age: 69
End: 2024-10-30
Payer: COMMERCIAL

## 2024-10-30 NOTE — TELEPHONE ENCOUNTER
Left Voicemail with Family Member (1st Attempt) and Sent [employer] (1st Attempt) for the patient to call back and schedule the following:    Appointment type: Return Endo  Provider: Dr. Landa  Return date: 4/29/2025  Specialty phone number: [phone]  Additional appointment(s) needed: yes  Additonal Notes: labs/US    Attempted to schedule a lab and US appointment prior to appointment with Dr. Landa.    Also sent a Kids Calendar message.    [facility]/Complex Procedure    [facility]   Neurology, NeuroSurgery, NeuroPsychology, Pain Management and Cardiology Specialties  Medical/Surgical Adult Specialties

## 2024-11-04 NOTE — TELEPHONE ENCOUNTER
Left Voicemail (2nd Attempt) and Sent [employer] (2nd Attempt) for the patient to call back and schedule the following:    Appointment type: Return Endo  Provider: Dr. Landa  Return date: 4/29/2025  Specialty phone number: [phone]  Additional appointment(s) needed: yes  Additonal Notes: labs/US    2nd attempt to schedule a lab and ultrasound appointment, then a follow up with Dr. Landa due in April 2025.    Return in about 6 months (around 4/29/2025) for labs prior to f/up apt, thyroid US prior to f/up apt.     Also sent 2nd [employer] message.    [facility]/Complex Procedure    [facility]   Neurology, NeuroSurgery, NeuroPsychology, Pain Management and Cardiology Specialties  Medical/Surgical Adult Specialties

## 2024-11-12 NOTE — NURSING NOTE
Refill Decision Note   Domenicdenise Carlos Manuel  is requesting a refill authorization.  Brief Assessment and Rationale for Refill:  Approve     Medication Therapy Plan:        Comments:     Note composed:1:38 PM 11/12/2024           "Oncology Rooming Note    March 17, 2024 8:20 AM   Rosario Terrazas is a 68 year old female who presents for:    Chief Complaint   Patient presents with    Blood Draw     Labs drawn via CVC by RN. VS taken.    RECHECK     MM post transplant here for provider visit     Initial Vitals: BP 93/62 (BP Location: Right arm, Patient Position: Sitting, Cuff Size: Adult Large)   Pulse 97   Temp 98.2  F (36.8  C) (Oral)   Resp 18   Wt 98.2 kg (216 lb 8 oz)   SpO2 99%   BMI 38.35 kg/m   Estimated body mass index is 38.35 kg/m  as calculated from the following:    Height as of 3/6/24: 1.6 m (5' 3\").    Weight as of this encounter: 98.2 kg (216 lb 8 oz). Body surface area is 2.09 meters squared.  Extreme Pain (8) Comment: Data Unavailable   No LMP recorded. Patient is postmenopausal.  Allergies reviewed: Yes  Medications reviewed: Yes    Medications: Medication refills not needed today.  Pharmacy name entered into [facility]: [place], [place] - [address] [facility] [id number]    Frailty Screening:   Is the patient here for a new oncology consult visit in cancer care? 2. No      Clinical concerns: Pain, fatigue      Abilio Alatorre RN             "

## 2025-01-02 DIAGNOSIS — Z94.84 H/O AUTOLOGOUS STEM CELL TRANSPLANT (H): ICD-10-CM

## 2025-01-02 DIAGNOSIS — C90.01 MULTIPLE MYELOMA IN REMISSION (H): Primary | ICD-10-CM

## 2025-01-19 ENCOUNTER — HEALTH MAINTENANCE LETTER (OUTPATIENT)
Age: 70
End: 2025-01-19

## 2025-01-22 NOTE — TELEPHONE ENCOUNTER
FUTURE VISIT INFORMATION      SURGERY INFORMATION:  Date: 3/14/25  [facility]  Surgeon:  Marianela Jefferson PA-C   Anesthesia Type:  MAC with Local  Procedure: BIOPSY, BONE MARROW     RECORDS REQUESTED FROM:       Primary Care Provider: [facility]    Most recent EKG+ Tracing: 3/22/24    Most recent ECHO: 2/22/24    Most recent PFT's: 2/21/24

## 2025-03-06 ENCOUNTER — ANESTHESIA EVENT (OUTPATIENT)
Dept: SURGERY | Facility: AMBULATORY SURGERY CENTER | Age: 70
End: 2025-03-06
Payer: COMMERCIAL

## 2025-03-06 ENCOUNTER — PRE VISIT (OUTPATIENT)
Dept: SURGERY | Facility: CLINIC | Age: 70
End: 2025-03-06

## 2025-03-06 ENCOUNTER — VIRTUAL VISIT (OUTPATIENT)
Dept: SURGERY | Facility: CLINIC | Age: 70
End: 2025-03-06
Attending: INTERNAL MEDICINE
Payer: COMMERCIAL

## 2025-03-06 VITALS — HEIGHT: 64 IN | BODY MASS INDEX: 32.1 KG/M2 | WEIGHT: 188 LBS

## 2025-03-06 DIAGNOSIS — Z01.818 PREOP EXAMINATION: Primary | ICD-10-CM

## 2025-03-06 DIAGNOSIS — Z94.84 H/O AUTOLOGOUS STEM CELL TRANSPLANT (H): ICD-10-CM

## 2025-03-06 DIAGNOSIS — C90.01 MULTIPLE MYELOMA IN REMISSION (H): ICD-10-CM

## 2025-03-06 RX ORDER — BUPRENORPHINE HYDROCHLORIDE 75 UG/1
75 FILM, SOLUBLE BUCCAL EVERY 12 HOURS
COMMUNITY
Start: 2024-12-26

## 2025-03-06 ASSESSMENT — ENCOUNTER SYMPTOMS
SEIZURES: 0
DYSRHYTHMIAS: 1

## 2025-03-06 ASSESSMENT — LIFESTYLE VARIABLES: TOBACCO_USE: 0

## 2025-03-06 NOTE — H&P
Pre-Operative H & P     CC:  Preoperative exam to assess for increased cardiopulmonary risk while undergoing surgery and anesthesia.    Date of Encounter: 3/6/2025  Primary Care Physician:  Dusty Maya     Reason for visit:   Encounter Diagnoses   Name Primary?    Multiple myeloma in remission (H) Yes    Preop examination     H/O autologous stem cell transplant (H)        HPI  Rosario Terrazas is a 69 year old female who presents for pre-operative H & P in preparation for  Procedure Information       Case: [id number] Date/Time: 03/13/25 1300    Procedure: BIOPSY, BONE MARROW (Update)    Anesthesia type: MAC with Local    Diagnosis: Multiple myeloma in remission (H) [C90.01]    Pre-op diagnosis: Multiple myeloma in remission (H) [C90.01]    Location: [facility] PROCEDURE ROOM 01 / [facility]    Providers: Reginald Kyle PA-C            Patient is being evaluated for comorbid conditions of A-fib, SVTs, neuropathy, thyroid nodule, s/p left TKA, h/o T7 compression fracture, latent TB.    Ms. Terrazas has a history of multiple myeloma, s/p BMT 3/2024. She now presents for the above procedure for continued surveillance.    History is obtained from the patient and chart review. She is accompanied by her daughter who is interpreting.    Hx of abnormal bleeding or anti-platelet use: on ASA 81 mg    Menstrual history: No LMP recorded. Patient is postmenopausal.:       Past Medical History  Past Medical History:   Diagnosis Date    Multiple myeloma in remission (H)     Neuropathy     Osteoarthritis     Paroxysmal atrial fibrillation (H)     SVT (supraventricular tachycardia)     TB lung, latent     Thyroid nodule        Past Surgical History  Past Surgical History:   Procedure Laterality Date    AS TOTAL KNEE ARTHROPLASTY Left 2023    INSERT CATHETER VASCULAR ACCESS Left 03/06/2024    Procedure: central venous catheter tunneled line Insert vascular access;  Surgeon: Onel Leonardo  MD Jesus Alberto;  Location: [facility] OR    IR CVC TUNNEL PLACEMENT > 5 YRS OF AGE  03/06/2024    IR CVC TUNNEL REMOVAL LEFT  04/10/2024       Prior to Admission Medications  Current Outpatient Medications   Medication Sig Dispense Refill    acetaminophen (TYLENOL) 325 MG tablet Take 2 tablets (650 mg) by mouth every 4 hours as needed for pain 120 tablet 1    acyclovir (ZOVIRAX) 800 MG tablet Take 1 tablet (800 mg) by mouth 2 times daily Start Day -1 60 tablet 11    aspirin (ASPIRIN LOW DOSE) 81 MG EC tablet Take 1 tablet (81 mg) by mouth daily (Patient taking differently: Take 81 mg by mouth every morning.) 30 tablet 11    BELBUCA 75 MCG FILM buccal film Place 75 mcg inside cheek every 12 hours.      calcium carbonate-vitamin D (OSCAL) 500-5 MG-MCG tablet Take 1 tablet by mouth 3 times daily (with meals) (Patient taking differently: Take 1 tablet by mouth 2 times daily.) 90 tablet 2    folic acid (FOLVITE) 1 MG tablet Take 1 tablet (1,000 mcg) by mouth daily (Patient taking differently: Take 1,000 mcg by mouth every morning.) 30 tablet 0    gabapentin (NEURONTIN) 100 MG capsule Take 1 capsule (100 mg) by mouth 3 times daily (Patient taking differently: Take 100 mg by mouth 2 times daily.) 90 capsule 0    LENalidomide (REVLIMID) 10 MG CAPS capsule Take 10 mg by mouth daily      loperamide (IMODIUM) 2 MG capsule Take 1 capsule (2 mg) by mouth 4 times daily as needed for diarrhea 120 capsule 0    melatonin 3 MG tablet Take 3 mg by mouth at bedtime.      methocarbamol (ROBAXIN) 500 MG tablet Take 1 tablet (500 mg) by mouth 4 times daily as needed for muscle spasms 60 tablet 0    oxyCODONE (ROXICODONE) 5 MG tablet Take 1 tablet (5 mg) by mouth every 6 hours as needed for severe pain 40 tablet 0    potassium chloride dallas ER (KLOR-CON M10) 10 MEQ CR tablet Take 2 tablets by mouth every morning.      prochlorperazine (COMPAZINE) 5 MG tablet Take 1 tablet (5 mg) by mouth every 6 hours as needed for nausea or vomiting 40 tablet 0     senna (SENOKOT) 8.6 MG tablet Take 8.6 mg by mouth.      sulfamethoxazole-trimethoprim (BACTRIM DS) 800-160 MG tablet Take 1 tablet by mouth Every Mon, Tues two times daily Start medication on 4/15. (Patient taking differently: Take 1 tablet by mouth 2 times daily. Start medication on 4/15.) 32 tablet 2    Vitamin D3 (CHOLECALCIFEROL) 25 mcg (1000 units) tablet Take 2 tablets (50 mcg) by mouth daily (Patient taking differently: Take 2 tablets by mouth every morning.) 30 tablet 0    diclofenac (VOLTAREN) 1 % topical gel Apply 2 g topically 4 times daily 350 g 2    Heparin Sod, Pork, Lock Flush 10 UNIT/ML SOLN 5 mLs by Intracatheter route daily      triamcinolone (KENALOG) 0.1 % external cream Apply topically 2 times daily 80 g 0       Allergies  No Known Allergies    Social History  Social History     Socioeconomic History    Marital status: Patient Declined     Spouse name: Not on file    Number of children: Not on file    Years of education: Not on file    Highest education level: Not on file   Occupational History    Not on file   Tobacco Use    Smoking status: Never     Passive exposure: Never    Smokeless tobacco: Never   Substance and Sexual Activity    Alcohol use: Never    Drug use: Never    Sexual activity: Not on file   Other Topics Concern    Not on file   Social History Narrative    Not on file     Social Drivers of Health     Financial Resource Strain: High Risk (8/5/2024)    Received from [facility]    Overall Financial Resource Strain (CARDIA)     Difficulty of Paying Living Expenses: Hard   Food Insecurity: No Food Insecurity (8/5/2024)    Received from [facility]    Hunger Vital Sign     Worried About Running Out of Food in the Last Year: Never true     Ran Out of Food in the Last Year: Never true   Transportation Needs: No Transportation Needs (8/5/2024)    Received from [facility]    PRAPARE - Transportation     Lack of Transportation (Medical): No     Lack of  Transportation (Non-Medical): No   Physical Activity: Not on file   Stress: Not on file   Social Connections: Not on file   Interpersonal Safety: Patient Unable To Answer (8/5/2024)    [facility]    Humiliation, Afraid, Rape, and Kick questionnaire     Fear of Current or Ex-Partner: Patient unable to answer     Emotionally Abused: Patient unable to answer     Physically Abused: Patient unable to answer     Sexually Abused: Patient unable to answer   Housing Stability: Low Risk  (8/5/2024)    [facility]    Housing Stability     What is your housing situation today?: 3 - I have housing       Family History  Family History   Problem Relation Age of Onset    Anesthesia Reaction No family hx of     Deep Vein Thrombosis (DVT) No family hx of        Review of Systems  The complete review of systems is negative other than noted in the HPI or here.     Anesthesia Evaluation   Pt has had prior anesthetic.     No history of anesthetic complications       ROS/MED HX  ENT/Pulmonary:  - neg pulmonary ROS  (-) tobacco use, asthma, sleep apnea and recent URI   Neurologic:     (+)    peripheral neuropathy,                         (-) no seizures and no CVA   Cardiovascular:     (+)  - -   -  - -   Taking blood thinners (ASA 81 mg)                     dysrhythmias (SVTs), a-fib,             METS/Exercise Tolerance: 3 - Able to walk 1-2 blocks without stopping Comment: Uses cane, uses stairs daily.   Hematologic:     (+)       history of blood transfusion, no previous transfusion reaction,     (-) history of blood clots   Musculoskeletal: Comment: Hx T7 compression fracture    S/p left TKA    B/L shoulder pain    (+)  arthritis,             GI/Hepatic:  - neg GI/hepatic ROS  (-) GERD and liver disease   Renal/Genitourinary:  - neg Renal ROS  (-) renal disease   Endo:     (+)          thyroid problem, nodule,        (-) Type II DM   Psychiatric/Substance Use:  - neg psychiatric ROS      Infectious Disease:     (+)      TB (latent),      Malignancy:   (+) Malignancy (s/p BMT 3/2024), History of Lymphoma/Leukemia.Lymph CA status post Chemo.      Other:  - neg other ROS          Virtual visit -  No vitals were obtained    Physical Exam  Constitutional: Awake, alert, cooperative, no apparent distress, and appears stated age.  HENT: Normocephalic  Respiratory: non labored breathing   Neurologic: Awake, alert, oriented to name, place and time.   Neuropsychiatric: Calm, cooperative. Normal affect.      Prior Labs/Diagnostic Studies   All pertinent records, results, labs and imaging personally reviewed     EKG/ stress test - please see ROS above    Echo result w/o MOPS: Interpretation SummaryGlobal and regional left ventricular function is normal with an EF of 55-60%.Global right ventricular function is normal.Mild left atrial enlargement is present.The inferior vena cava was normal in size with preserved respiratoryvariability.No pericardial effusion is present.          Latest Ref Rng & Units 2/21/2024     7:43 AM   PFT   FVC L 1.58    FEV1 L 1.33    FVC% % 61    FEV1% % 65          The patient's records and results personally reviewed by this provider.     Outside records reviewed from: Care Everywhere      Assessment    Rosario Terrazas is a 69 year old female seen as a PAC referral for risk assessment and optimization for anesthesia.    Plan/Recommendations  Pt will be optimized for the proposed procedure.  See below for details on the assessment, risk, and preoperative recommendations    NEUROLOGY  - No history of TIA, CVA or seizure    -Post Op delirium risk factors:  No risk identified    ANESTHESIA  - Patient reports significant pain with prior bone marrow biopsy. She would prefer appropriate level of sedation to prevent a similar experience, if possible.    ENT  - No current airway concerns.  Will need to be reassessed day of surgery.  Mallampati: Unable to assess  TM: Unable to  "assess    CARDIAC  - No history of CAD and Hypertension    - Hx SVT/atrial fibrillation during prior hospitalization.  Treated successfully w/ IV amiodarone.  Outpatient Zio patch did not reveal any recurrence.    - METS (Metabolic Equivalents) 3 - Uses cane, uses stairs daily.  Patient CANNOT perform 4 METS exercise without symptoms             Total Score: 1    Functional Capacity: Unable to complete 4 METS      RCRI-Very low risk: Class 1 0.4% complication rate             Total Score: 0        PULMONARY  JOSLYN Low Risk             Total Score: 1    JOSLYN: Over 50 ys old      - Denies asthma or inhaler use  - Tobacco History    History   Smoking Status    Never   Smokeless Tobacco    Never       GI  - Denies GERD  PONV High Risk  Total Score: 3           1 AN PONV: Pt is Female    1 AN PONV: Patient is not a current smoker    1 AN PONV: Intended Post Op Opioids          ENDOCRINE    - BMI: Estimated body mass index is 32.27 kg/m  as calculated from the following:    Height as of this encounter: 1.626 m (5' 4\").    Weight as of this encounter: 85.3 kg (188 lb).  Obesity (BMI >30)  - No history of Diabetes Mellitus    HEME/ ONC  VTE Low Risk 0.26%             Total Score: 1    VTE: Greater than 59 yrs old      - On ASA, hold x7d prior  - Multiple myeloma, s/p BMT 3/2024. In remission.    MSK  - Hx T7 compression fracture  - S/p left TKA  - B/L shoulder pain      The patient is aware that the final anesthesia plan will be decided by the assigned anesthesia provider on the date of service.      The patient is optimized for their procedure. AVS with information on surgery time/arrival time, meds and NPO status given by nursing staff. No further diagnostic testing indicated.    Please refer to the physical examination documented by the anesthesiologist in the anesthesia record on the day of surgery.    Video-Visit Details    Type of service:  Video Visit    Provider received verbal consent for a Video Visit from the " patient? Yes   Video Start Time:  1505  Video End Time: 1517    Originating Location (pt. Location): Home    Distant Location (provider location):  Off-site  Mode of Communication:  Video Conference via [employer]  On the day of service:     Prep time: 14 minutes  Visit time: 12 minutes  Documentation time: 12 minutes  ------------------------------------------  Total time: 38 minutes      Sara Acosta PA-C  Preoperative Assessment Center  [facility]  Clinic and Surgery Center  Phone: [phone]  Fax: [phone]

## 2025-03-06 NOTE — PATIENT INSTRUCTIONS
Preparing for Your Surgery      Name:  Rosario Terrazas   MRN:  [id number]   :  1955   Today's Date:  3/6/2025         Arriving for surgery:  Surgery date:  2025  Arrival time:  11:30 am    Restrictions due to COVID 19:    Please maintain social distance.  Masking is optional.      parking is available for anyone with mobility limitations or disabilities. (Monday- Friday 7 am- 5 pm)    Please come to:    [facility]  [address] [place] [id number]    Please check in on the 5th floor at the [facility].      What can I eat or drink?    -  You may eat and drink normally until 8 hours prior to arrival  time. (Until 3 am)  -  You may have clear liquids until 2 hours prior to arrival  time. (Until 9:30 am)    Examples of clear liquids:  Water  Clear broth  Juices (apple, white grape, white cranberry  and cider) without pulp  Noncarbonated, powder based beverages  (lemonade and Bob-Aid)  Sodas (Sprite, 7-Up, ginger ale and seltzer)  Coffee or tea (without milk or cream)  Gatorade      Which medicines can I take?    Hold Aspirin for 7 days before surgery.   Hold Multivitamins for 7 days before surgery.  Hold Supplements for 7 days before surgery.  Hold Ibuprofen (Advil, Motrin) for 1 day before surgery--unless otherwise directed by surgeon.  Hold Naproxen (Aleve) for 4 days before surgery.    No alcohol or cannabis products for 24 hours prior to procedure.      -  DO NOT take the following medications the day of surgery:  Oscal   Folic Acid  Potassium  Senna   Vitamin D        -  PLEASE TAKE the following medications the day of surgery:   Acyclovir  Belbuca per your routine   Gabapentin  Lenalidomide (Revlimid)  Loperamide (Imodium) if needed  Oxycodone if needed  Prochlorperazine if needed  Sulfamethoxazole (Bactrim)      How do I prepare myself?  - Please take 2 showers (one the night prior to surgery and one the morning of surgery) using Scrubcare or  Hibiclens soap.    Use this soap only from the neck to your toes. Avoid genital area      Leave the soap on your skin for one minute--then rinse thoroughly.      You may use your own shampoo and conditioner. No other hair products.   - Please remove all jewelry and body piercings.  - No lotions, deodorants or fragrance.  - No makeup or fingernail polish.   - Bring your ID and insurance card.    -If you have a Deep Brain Stimulator, a Spinal Cord Stimulator, or any implanted Neuro Device, you must bring the remote to the Surgery Center.         ALL PATIENTS ARE REQUIRED TO HAVE A RESPONSIBLE ADULT TO DRIVE AND BE IN ATTENDANCE WITH THEM FOR 24 HOURS FOLLOWING SURGERY.     Covid testing policy as of 12/06/2022  Your surgeon will notify and schedule you for a COVID test if one is needed before surgery--please direct any questions or COVID symptoms to your surgeon      Questions or Concerns:    -For questions regarding the day of surgery, please contact the Ambulatory Surgery Center at [phone].    -If you have health changes between today and your surgery, please contact your surgeon.     - For questions after surgery, please contact your surgeon's office.

## 2025-03-06 NOTE — PROGRESS NOTES
Rosario is a 69 year old who is being evaluated via a billable video visit.      How would you like to obtain your AVS? Anahart          Subjective   Rosario is a 69 year old, presenting for the following health issues:  Pre-Op Exam (/)               LIDYA Smith LPN

## 2025-03-12 ASSESSMENT — ENCOUNTER SYMPTOMS
SEIZURES: 0
DYSRHYTHMIAS: 1

## 2025-03-12 ASSESSMENT — LIFESTYLE VARIABLES: TOBACCO_USE: 0

## 2025-03-12 NOTE — ANESTHESIA PREPROCEDURE EVALUATION
Anesthesia Pre-Procedure Evaluation    Patient: Rosario Terrazas   MRN: [id number] : 1955        Procedure : Procedure(s):  BIOPSY, BONE MARROW          Past Medical History:   Diagnosis Date     Multiple myeloma in remission (H)      Neuropathy      Osteoarthritis      Paroxysmal atrial fibrillation (H)      SVT (supraventricular tachycardia)      TB lung, latent      Thyroid nodule       Past Surgical History:   Procedure Laterality Date     AS TOTAL KNEE ARTHROPLASTY Left      INSERT CATHETER VASCULAR ACCESS Left 2024    Procedure: central venous catheter tunneled line Insert vascular access;  Surgeon: Onel Leonardo MD;  Location: [facility] OR     IR CVC TUNNEL PLACEMENT > 5 YRS OF AGE  2024     IR CVC TUNNEL REMOVAL LEFT  04/10/2024      No Known Allergies   Social History     Tobacco Use     Smoking status: Never     Passive exposure: Never     Smokeless tobacco: Never   Substance Use Topics     Alcohol use: Never      Wt Readings from Last 1 Encounters:   25 85.3 kg (188 lb)        Anesthesia Evaluation   Pt has had prior anesthetic. Type: MAC.    No history of anesthetic complications       ROS/MED HX  ENT/Pulmonary:  - neg pulmonary ROS  (-) tobacco use, asthma, sleep apnea and recent URI   Neurologic:     (+)    peripheral neuropathy,                         (-) no seizures and no CVA   Cardiovascular: Comment: Pafib.     (+)  - -   -  - -   Taking blood thinners (ASA 81 mg)                     dysrhythmias (SVTs), a-fib,             METS/Exercise Tolerance: 3 - Able to walk 1-2 blocks without stopping Comment: Uses cane, uses stairs daily.   Hematologic:     (+)       history of blood transfusion, no previous transfusion reaction,     (-) history of blood clots   Musculoskeletal: Comment: Hx T7 compression fracture    S/p left TKA    B/L shoulder pain    (+)  arthritis,             GI/Hepatic:  - neg GI/hepatic ROS  (-) GERD and liver disease   Renal/Genitourinary:  - neg Renal  "ROS  (-) renal disease   Endo:     (+)          thyroid problem, nodule,        (-) Type II DM   Psychiatric/Substance Use:  - neg psychiatric ROS     Infectious Disease:     (+)      TB (latent),      Malignancy:   (+) Malignancy (s/p BMT 3/2024), History of Lymphoma/Leukemia.Lymph CA status post Chemo.      Other:  - neg other ROS          Physical Exam    Airway        Mallampati: II   TM distance: > 3 FB   Neck ROM: full   Mouth opening: > 3 cm    Respiratory Devices and Support         Dental       (+) Minor Abnormalities - some fillings, tiny chips      Cardiovascular   cardiovascular exam normal       Rhythm and rate: regular     Pulmonary   pulmonary exam normal            OUTSIDE LABS:  CBC:   Lab Results   Component Value Date    WBC 3.8 (L) 09/09/2024    WBC 4.1 06/12/2024    HGB 11.8 09/09/2024    HGB 12.0 06/12/2024    HCT 34.6 (L) 09/09/2024    HCT 36.5 06/12/2024     (L) 09/09/2024     06/12/2024     BMP:   Lab Results   Component Value Date     09/09/2024     06/12/2024    POTASSIUM 4.0 09/09/2024    POTASSIUM 4.0 06/12/2024    CHLORIDE 103 09/09/2024    CHLORIDE 103 06/12/2024    CO2 26 09/09/2024    CO2 25 06/12/2024    BUN 7.7 (L) 09/09/2024    BUN 8.9 06/12/2024    CR 0.62 09/09/2024    CR 0.60 06/12/2024    GLC 84 09/09/2024    GLC 90 06/12/2024     COAGS:   Lab Results   Component Value Date    PTT 29 02/20/2024    INR 1.40 (H) 04/10/2024     POC: No results found for: \"BGM\", \"HCG\", \"HCGS\"  HEPATIC:   Lab Results   Component Value Date    ALBUMIN 3.9 09/09/2024    PROTTOTAL 6.1 (L) 09/09/2024    ALT 14 09/09/2024    AST 15 09/09/2024    ALKPHOS 50 09/09/2024    BILITOTAL 0.3 09/09/2024     OTHER:   Lab Results   Component Value Date    LACT 0.5 (L) 03/22/2024    RAMIREZ 8.9 09/09/2024    PHOS 3.3 09/09/2024    MAG 2.1 09/09/2024    LIPASE 5 (L) 03/23/2024    TSH 1.50 03/22/2024       Anesthesia Plan    ASA Status:  3                     Consents            Postoperative " "Care            Comments:               Yuridia Chin MD    I have reviewed the pertinent notes and labs in the chart from the past 30 days and (re)examined the patient.  Any updates or changes from those notes are reflected in this note.    Clinically Significant Risk Factors Present on Admission                             # Obesity: Estimated body mass index is 32.27 kg/m  as calculated from the following:    Height as of 3/6/25: 1.626 m (5' 4\").    Weight as of 3/6/25: 85.3 kg (188 lb).       # Financial/Environmental Concerns:             "

## 2025-03-13 ENCOUNTER — APPOINTMENT (OUTPATIENT)
Dept: LAB | Facility: CLINIC | Age: 70
End: 2025-03-13
Attending: INTERNAL MEDICINE
Payer: COMMERCIAL

## 2025-03-13 ENCOUNTER — OFFICE VISIT (OUTPATIENT)
Dept: TRANSPLANT | Facility: CLINIC | Age: 70
End: 2025-03-13
Attending: INTERNAL MEDICINE
Payer: COMMERCIAL

## 2025-03-13 ENCOUNTER — HOSPITAL ENCOUNTER (OUTPATIENT)
Facility: AMBULATORY SURGERY CENTER | Age: 70
Discharge: HOME | End: 2025-03-13
Payer: COMMERCIAL

## 2025-03-13 ENCOUNTER — HOSPITAL ENCOUNTER (OUTPATIENT)
Dept: PET IMAGING | Facility: CLINIC | Age: 70
Discharge: HOME | End: 2025-03-13
Attending: INTERNAL MEDICINE
Payer: COMMERCIAL

## 2025-03-13 ENCOUNTER — ANESTHESIA (OUTPATIENT)
Dept: SURGERY | Facility: AMBULATORY SURGERY CENTER | Age: 70
End: 2025-03-13
Payer: COMMERCIAL

## 2025-03-13 VITALS
DIASTOLIC BLOOD PRESSURE: 68 MMHG | HEART RATE: 58 BPM | HEIGHT: 64 IN | WEIGHT: 184 LBS | OXYGEN SATURATION: 96 % | TEMPERATURE: 97 F | RESPIRATION RATE: 16 BRPM | SYSTOLIC BLOOD PRESSURE: 129 MMHG | BODY MASS INDEX: 31.41 KG/M2

## 2025-03-13 VITALS — HEART RATE: 59 BPM

## 2025-03-13 DIAGNOSIS — C90.01 MULTIPLE MYELOMA IN REMISSION (H): ICD-10-CM

## 2025-03-13 DIAGNOSIS — C90.01 MULTIPLE MYELOMA IN REMISSION (H): Primary | ICD-10-CM

## 2025-03-13 LAB
ALBUMIN MFR UR ELPH: 10.7 MG/DL
ALBUMIN SERPL BCG-MCNC: 4 G/DL (ref 3.5–5.2)
ALP SERPL-CCNC: 45 U/L (ref 40–150)
ALT SERPL W P-5'-P-CCNC: 13 U/L (ref 0–50)
ANION GAP SERPL CALCULATED.3IONS-SCNC: 10 MMOL/L (ref 7–15)
AST SERPL W P-5'-P-CCNC: 18 U/L (ref 0–45)
BASOPHILS # BLD AUTO: 0.1 10E3/UL (ref 0–0.2)
BASOPHILS NFR BLD AUTO: 2 %
BILIRUB SERPL-MCNC: 0.3 MG/DL
BUN SERPL-MCNC: 10.3 MG/DL (ref 8–23)
CALCIUM SERPL-MCNC: 9.2 MG/DL (ref 8.8–10.4)
CD19 CELLS # BLD: 68 CELLS/UL (ref 107–698)
CD19 CELLS NFR BLD: 6 % (ref 6–27)
CD3 CELLS # BLD: 1046 CELLS/UL (ref 603–2990)
CD3 CELLS NFR BLD: 91 % (ref 49–84)
CD3+CD4+ CELLS # BLD: 340 CELLS/UL (ref 441–2156)
CD3+CD4+ CELLS NFR BLD: 30 % (ref 28–63)
CD3+CD4+ CELLS/CD3+CD8+ CLL BLD: 0.51 % (ref 1.4–2.6)
CD3+CD8+ CELLS # BLD: 673 CELLS/UL (ref 125–1312)
CD3+CD8+ CELLS NFR BLD: 59 % (ref 10–40)
CD3-CD16+CD56+ CELLS # BLD: 31 CELLS/UL (ref 95–640)
CD3-CD16+CD56+ CELLS NFR BLD: 3 % (ref 4–25)
CHLORIDE SERPL-SCNC: 105 MMOL/L (ref 98–107)
CREAT SERPL-MCNC: 0.65 MG/DL (ref 0.51–0.95)
CREAT UR-MCNC: 101 MG/DL
EGFRCR SERPLBLD CKD-EPI 2021: >90 ML/MIN/1.73M2
EOSINOPHIL # BLD AUTO: 0.2 10E3/UL (ref 0–0.7)
EOSINOPHIL NFR BLD AUTO: 7 %
ERYTHROCYTE [DISTWIDTH] IN BLOOD BY AUTOMATED COUNT: 13.2 % (ref 10–15)
GLUCOSE SERPL-MCNC: 86 MG/DL (ref 70–99)
HCO3 SERPL-SCNC: 26 MMOL/L (ref 22–29)
HCT VFR BLD AUTO: 36.1 % (ref 35–47)
HGB BLD-MCNC: 12.3 G/DL (ref 11.7–15.7)
IMM GRANULOCYTES # BLD: 0 10E3/UL
IMM GRANULOCYTES NFR BLD: 0 %
LDH SERPL L TO P-CCNC: 201 U/L (ref 0–250)
LYMPHOCYTES # BLD AUTO: 1.1 10E3/UL (ref 0.8–5.3)
LYMPHOCYTES NFR BLD AUTO: 35 %
MAGNESIUM SERPL-MCNC: 1.8 MG/DL (ref 1.7–2.3)
MCH RBC QN AUTO: 34.9 PG (ref 26.5–33)
MCHC RBC AUTO-ENTMCNC: 34.1 G/DL (ref 31.5–36.5)
MCV RBC AUTO: 103 FL (ref 78–100)
MONOCYTES # BLD AUTO: 0.6 10E3/UL (ref 0–1.3)
MONOCYTES NFR BLD AUTO: 18 %
NEUTROPHILS # BLD AUTO: 1.2 10E3/UL (ref 1.6–8.3)
NEUTROPHILS NFR BLD AUTO: 38 %
NRBC # BLD AUTO: 0 10E3/UL
NRBC BLD AUTO-RTO: 0 /100
PHOSPHATE SERPL-MCNC: 4.1 MG/DL (ref 2.5–4.5)
PLATELET # BLD AUTO: 182 10E3/UL (ref 150–450)
POTASSIUM SERPL-SCNC: 3.6 MMOL/L (ref 3.4–5.3)
PROT SERPL-MCNC: 6.2 G/DL (ref 6.4–8.3)
PROT/CREAT 24H UR: 0.11 MG/MG CR (ref 0–0.2)
RBC # BLD AUTO: 3.52 10E6/UL (ref 3.8–5.2)
SODIUM SERPL-SCNC: 141 MMOL/L (ref 135–145)
T CELL EXTENDED COMMENT: ABNORMAL
TOTAL PROTEIN SERUM FOR ELP: 5.4 G/DL (ref 6.4–8.3)
URATE SERPL-MCNC: 3.9 MG/DL (ref 2.4–5.7)
WBC # BLD AUTO: 3.2 10E3/UL (ref 4–11)

## 2025-03-13 PROCEDURE — 85018 HEMOGLOBIN: CPT | Performed by: INTERNAL MEDICINE

## 2025-03-13 PROCEDURE — 86357 NK CELLS TOTAL COUNT: CPT | Performed by: INTERNAL MEDICINE

## 2025-03-13 PROCEDURE — 343N000001 HC RX 343 MED OP 636: Performed by: INTERNAL MEDICINE

## 2025-03-13 PROCEDURE — 84100 ASSAY OF PHOSPHORUS: CPT | Performed by: INTERNAL MEDICINE

## 2025-03-13 PROCEDURE — 80053 COMPREHEN METABOLIC PANEL: CPT | Performed by: INTERNAL MEDICINE

## 2025-03-13 PROCEDURE — 84155 ASSAY OF PROTEIN SERUM: CPT | Performed by: INTERNAL MEDICINE

## 2025-03-13 PROCEDURE — 83615 LACTATE (LD) (LDH) ENZYME: CPT | Performed by: INTERNAL MEDICINE

## 2025-03-13 PROCEDURE — 84550 ASSAY OF BLOOD/URIC ACID: CPT | Performed by: INTERNAL MEDICINE

## 2025-03-13 PROCEDURE — 78816 PET IMAGE W/CT FULL BODY: CPT | Mod: PS

## 2025-03-13 PROCEDURE — 85004 AUTOMATED DIFF WBC COUNT: CPT | Performed by: INTERNAL MEDICINE

## 2025-03-13 PROCEDURE — 83735 ASSAY OF MAGNESIUM: CPT | Performed by: INTERNAL MEDICINE

## 2025-03-13 PROCEDURE — 84156 ASSAY OF PROTEIN URINE: CPT | Performed by: INTERNAL MEDICINE

## 2025-03-13 PROCEDURE — 36415 COLL VENOUS BLD VENIPUNCTURE: CPT | Performed by: INTERNAL MEDICINE

## 2025-03-13 PROCEDURE — 86355 B CELLS TOTAL COUNT: CPT | Performed by: INTERNAL MEDICINE

## 2025-03-13 PROCEDURE — A9552 F18 FDG: HCPCS | Performed by: INTERNAL MEDICINE

## 2025-03-13 RX ORDER — ONDANSETRON 4 MG/1
4 TABLET, ORALLY DISINTEGRATING ORAL EVERY 30 MIN PRN
Status: ACTIVE | OUTPATIENT
Start: 2025-03-13

## 2025-03-13 RX ORDER — OXYCODONE HYDROCHLORIDE 5 MG/1
10 TABLET ORAL
Status: ACTIVE | OUTPATIENT
Start: 2025-03-13

## 2025-03-13 RX ORDER — ALBUTEROL SULFATE 0.83 MG/ML
2.5 SOLUTION RESPIRATORY (INHALATION) EVERY 4 HOURS PRN
Status: ACTIVE | OUTPATIENT
Start: 2025-03-13

## 2025-03-13 RX ORDER — ONDANSETRON 2 MG/ML
4 INJECTION INTRAMUSCULAR; INTRAVENOUS EVERY 30 MIN PRN
Status: ACTIVE | OUTPATIENT
Start: 2025-03-13

## 2025-03-13 RX ORDER — LIDOCAINE 40 MG/G
CREAM TOPICAL
Status: CANCELLED | OUTPATIENT
Start: 2025-03-13

## 2025-03-13 RX ORDER — LIDOCAINE HYDROCHLORIDE 10 MG/ML
8-10 INJECTION, SOLUTION EPIDURAL; INFILTRATION; INTRACAUDAL; PERINEURAL
Status: ACTIVE | OUTPATIENT
Start: 2025-03-13

## 2025-03-13 RX ORDER — MEPERIDINE HYDROCHLORIDE 25 MG/ML
12.5 INJECTION INTRAMUSCULAR; INTRAVENOUS; SUBCUTANEOUS EVERY 5 MIN PRN
Status: ACTIVE | OUTPATIENT
Start: 2025-03-13

## 2025-03-13 RX ORDER — LIDOCAINE HYDROCHLORIDE 20 MG/ML
INJECTION, SOLUTION INFILTRATION; PERINEURAL PRN
Status: DISCONTINUED | OUTPATIENT
Start: 2025-03-13 | End: 2025-03-13

## 2025-03-13 RX ORDER — FLUDEOXYGLUCOSE F 18 200 MCI/ML
1-18 INJECTION, SOLUTION INTRAVENOUS ONCE
Status: COMPLETED | OUTPATIENT
Start: 2025-03-13 | End: 2025-03-13

## 2025-03-13 RX ORDER — ACETAMINOPHEN 325 MG/1
975 TABLET ORAL ONCE
Status: ACTIVE | OUTPATIENT
Start: 2025-03-13

## 2025-03-13 RX ORDER — DEXAMETHASONE SODIUM PHOSPHATE 10 MG/ML
4 INJECTION, SOLUTION INTRAMUSCULAR; INTRAVENOUS
Status: ACTIVE | OUTPATIENT
Start: 2025-03-13

## 2025-03-13 RX ORDER — DIPHENHYDRAMINE HYDROCHLORIDE 50 MG/ML
12.5 INJECTION, SOLUTION INTRAMUSCULAR; INTRAVENOUS EVERY 6 HOURS PRN
Status: ACTIVE | OUTPATIENT
Start: 2025-03-13

## 2025-03-13 RX ORDER — HYDROMORPHONE HYDROCHLORIDE 1 MG/ML
0.4 INJECTION, SOLUTION INTRAMUSCULAR; INTRAVENOUS; SUBCUTANEOUS EVERY 5 MIN PRN
Status: ACTIVE | OUTPATIENT
Start: 2025-03-13

## 2025-03-13 RX ORDER — LABETALOL HYDROCHLORIDE 5 MG/ML
10 INJECTION, SOLUTION INTRAVENOUS
Status: ACTIVE | OUTPATIENT
Start: 2025-03-13

## 2025-03-13 RX ORDER — LIDOCAINE 40 MG/G
CREAM TOPICAL
Status: ACTIVE | OUTPATIENT
Start: 2025-03-13

## 2025-03-13 RX ORDER — NALOXONE HYDROCHLORIDE 0.4 MG/ML
0.1 INJECTION, SOLUTION INTRAMUSCULAR; INTRAVENOUS; SUBCUTANEOUS
Status: ACTIVE | OUTPATIENT
Start: 2025-03-13

## 2025-03-13 RX ORDER — HYDRALAZINE HYDROCHLORIDE 20 MG/ML
2.5-5 INJECTION INTRAMUSCULAR; INTRAVENOUS EVERY 10 MIN PRN
Status: ACTIVE | OUTPATIENT
Start: 2025-03-13

## 2025-03-13 RX ORDER — DIPHENHYDRAMINE HCL 12.5 MG/5ML
12.5 SOLUTION ORAL EVERY 6 HOURS PRN
Status: DISPENSED | OUTPATIENT
Start: 2025-03-13

## 2025-03-13 RX ORDER — PROPOFOL 10 MG/ML
INJECTION, EMULSION INTRAVENOUS PRN
Status: DISCONTINUED | OUTPATIENT
Start: 2025-03-13 | End: 2025-03-13

## 2025-03-13 RX ORDER — SODIUM CHLORIDE, SODIUM LACTATE, POTASSIUM CHLORIDE, CALCIUM CHLORIDE 600; 310; 30; 20 MG/100ML; MG/100ML; MG/100ML; MG/100ML
INJECTION, SOLUTION INTRAVENOUS CONTINUOUS
Status: ACTIVE | OUTPATIENT
Start: 2025-03-13

## 2025-03-13 RX ORDER — OXYCODONE HYDROCHLORIDE 5 MG/1
5 TABLET ORAL
Status: ACTIVE | OUTPATIENT
Start: 2025-03-13

## 2025-03-13 RX ORDER — SODIUM CHLORIDE, SODIUM LACTATE, POTASSIUM CHLORIDE, CALCIUM CHLORIDE 600; 310; 30; 20 MG/100ML; MG/100ML; MG/100ML; MG/100ML
INJECTION, SOLUTION INTRAVENOUS CONTINUOUS PRN
Status: DISCONTINUED | OUTPATIENT
Start: 2025-03-13 | End: 2025-03-13

## 2025-03-13 RX ORDER — FENTANYL CITRATE 50 UG/ML
25 INJECTION, SOLUTION INTRAMUSCULAR; INTRAVENOUS
Status: ACTIVE | OUTPATIENT
Start: 2025-03-13

## 2025-03-13 RX ORDER — LIDOCAINE HYDROCHLORIDE 10 MG/ML
8-10 INJECTION, SOLUTION EPIDURAL; INFILTRATION; INTRACAUDAL; PERINEURAL
Status: CANCELLED | OUTPATIENT
Start: 2025-03-13

## 2025-03-13 RX ORDER — FENTANYL CITRATE 50 UG/ML
50 INJECTION, SOLUTION INTRAMUSCULAR; INTRAVENOUS EVERY 5 MIN PRN
Status: ACTIVE | OUTPATIENT
Start: 2025-03-13

## 2025-03-13 RX ORDER — KETOROLAC TROMETHAMINE 30 MG/ML
15 INJECTION, SOLUTION INTRAMUSCULAR; INTRAVENOUS
Status: ACTIVE | OUTPATIENT
Start: 2025-03-13

## 2025-03-13 RX ORDER — HYDROMORPHONE HYDROCHLORIDE 1 MG/ML
0.2 INJECTION, SOLUTION INTRAMUSCULAR; INTRAVENOUS; SUBCUTANEOUS EVERY 5 MIN PRN
Status: ACTIVE | OUTPATIENT
Start: 2025-03-13

## 2025-03-13 RX ORDER — FENTANYL CITRATE 50 UG/ML
25 INJECTION, SOLUTION INTRAMUSCULAR; INTRAVENOUS EVERY 5 MIN PRN
Status: ACTIVE | OUTPATIENT
Start: 2025-03-13

## 2025-03-13 RX ORDER — PROPOFOL 10 MG/ML
INJECTION, EMULSION INTRAVENOUS CONTINUOUS PRN
Status: DISCONTINUED | OUTPATIENT
Start: 2025-03-13 | End: 2025-03-13

## 2025-03-13 RX ADMIN — PROPOFOL 50 MG: 10 INJECTION, EMULSION INTRAVENOUS at 12:52

## 2025-03-13 RX ADMIN — SODIUM CHLORIDE, SODIUM LACTATE, POTASSIUM CHLORIDE, CALCIUM CHLORIDE: 600; 310; 30; 20 INJECTION, SOLUTION INTRAVENOUS at 12:50

## 2025-03-13 RX ADMIN — PROPOFOL 200 MCG/KG/MIN: 10 INJECTION, EMULSION INTRAVENOUS at 12:52

## 2025-03-13 RX ADMIN — LIDOCAINE HYDROCHLORIDE 60 MG: 20 INJECTION, SOLUTION INFILTRATION; PERINEURAL at 12:50

## 2025-03-13 RX ADMIN — FLUDEOXYGLUCOSE F 18 11.18 MILLICURIE: 200 INJECTION, SOLUTION INTRAVENOUS at 07:06

## 2025-03-13 NOTE — LETTER
3/13/2025      Rosario Terrazas  [address]  [place] [address]      Dear Colleague,    Thank you for referring your patient, Rosario Terrazas, to the [facility] BLOOD AND MARROW TRANSPLANT PROGRAM [place]. Please see a copy of my visit note below.    See procedure note.       Again, thank you for allowing me to participate in the care of your patient.        Sincerely,        [place] [facility] Provider    Electronically signed

## 2025-03-13 NOTE — ANESTHESIA POSTPROCEDURE EVALUATION
Patient: Rosario Terrazas    Procedure: Procedure(s):  BIOPSY, BONE MARROW       Anesthesia Type:  No value filed.    Note:  Disposition: Outpatient   Postop Pain Control: Uneventful            Sign Out: Well controlled pain   PONV: No   Neuro/Psych: Uneventful            Sign Out: Acceptable/Baseline neuro status   Airway/Respiratory: Uneventful            Sign Out: Acceptable/Baseline resp. status   CV/Hemodynamics: Uneventful            Sign Out: Acceptable CV status; No obvious hypovolemia; No obvious fluid overload   Other NRE: NONE   DID A NON-ROUTINE EVENT OCCUR? No       Last vitals:  Vitals Value Taken Time   /68 03/13/25 1415   Temp 36.1  C (97  F) 03/13/25 1415   Pulse 58 03/13/25 1415   Resp 16 03/13/25 1415   SpO2 100 % 03/13/25 1415   Vitals shown include unfiled device data.    Electronically Signed By: Yuridia Chin MD  March 13, 2025  2:40 PM

## 2025-03-13 NOTE — ANESTHESIA CARE TRANSFER NOTE
Patient: Rosario Terrazas    Procedure: Procedure(s):  BIOPSY, BONE MARROW       Diagnosis: Multiple myeloma in remission (H) [C90.01]  Diagnosis Additional Information: No value filed.    Anesthesia Type:   No value filed.     Note:    Oropharynx: oropharynx clear of all foreign objects and spontaneously breathing  Level of Consciousness: awake  Oxygen Supplementation: room air    Independent Airway: airway patency satisfactory and stable  Dentition: dentition unchanged  Vital Signs Stable: post-procedure vital signs reviewed and stable  Report to RN Given: handoff report given  Patient transferred to: Phase II    Handoff Report: Identifed the Patient, Identified the Reponsible Provider, Reviewed the pertinent medical history, Discussed the surgical course, Reviewed Intra-OP anesthesia mangement and issues during anesthesia, Set expectations for post-procedure period and Allowed opportunity for questions and acknowledgement of understanding      Vitals:  Vitals Value Taken Time   /48 03/13/25 1347   Temp 36.1  C (97  F) 03/13/25 1347   Pulse 68 03/13/25 1347   Resp 16 03/13/25 1347   SpO2 99 % 03/13/25 1350   Vitals shown include unfiled device data.    Electronically Signed By: Dorcas Curry, KIKO CRNA  March 13, 2025  1:51 PM

## 2025-03-13 NOTE — NURSING NOTE
Chief Complaint   Patient presents with    Blood Draw     Labs drawn via PIV by RN in lab, vitals taken.      Labs drawn by RN from previously placed PIV. Line flushed with saline. Vitals taken. Pt to 5th floor for sedated biopsy.     Krupa Issa RN

## 2025-03-13 NOTE — PROCEDURES
BMT ONC Adult Bone Marrow Biopsy Procedure Note  March 13, 2025  There were no vitals taken for this visit.     Learning needs assessment complete within 12 months? NO    DIAGNOSIS: Multiple Myeloma      PROCEDURE: Unilateral Bone Marrow Biopsy and Unilateral Aspirate    [place] [facility] [place]    Patient s identification was positively verified by verbal identification and invasive procedure safety checklist was completed. Informed consent was obtained. Following the administration of Propofol as pre-medication, patient was placed in the prone position and prepped and draped in a sterile manner. Approximately 15 cc of 1% Lidocaine was used over the right posterior iliac spine. Following this a 3 mm incision was made. Trephine bone marrow core(s) was (were) obtained from the [facility]. Bone marrow aspirates were obtained from the [facility]. Aspirates were sent for morphology, flow and clonoseq. A total of approximately 10 ml of marrow was aspirated. Following this procedure a sterile dressing was applied to the bone marrow biopsy site(s). The patient was placed in the supine position to maintain pressure on the biopsy site. Post-procedure wound care instructions were given.     Complications: Difficult body habitus, required assistance by Marianela Jefferson PA-C, regular trephine used, went deep for core     Sedated.     Length of procedure:46 minutes to 1 hour    Procedure performed by: Sanchez Kyle PA-C    Last ofv 2/15/21

## 2025-03-13 NOTE — DISCHARGE INSTRUCTIONS
How to Care for your Bone Marrow Biopsy    Activity  Relax and take it easy for the next 24 hours.   Resume regular activity after 24 hours.    Diet   Resume pre-procedure diet and drink plenty of fluids.    If you received sedation, you may feel a little nauseated so start with a clear liquid diet until the nausea passes.    Do Not Immerse Bone Marrow Biopsy Puncture Site in Water  Do not take a bath until the puncture site has healed.  Do not sit in a hot tub or spa until the puncture site has healed.  Do not swim until the puncture site has healed.  Wait 24 hours before taking a shower.    Drainage  Drainage should be minimal.  IF bleeding should occur and soaks through the dressing, lie down and put pressure on the puncture site.    IF bleeding persists, apply gentle pressure with your hand over the dressing for 5 minutes.    IF the pressure doesn't stop the bleeding, contact your provider immediately.    Dressing  Keep the dressing dry and in place for 24 hours, unless instructed otherwise.    IF bleeding soaks through the dressing in the first 24 hours do NOT remove the dressing as you may pull off any scab that has formed.  Instead, reinforce the dressing with extra gauze and tape.    No Alcohol  Do not drink alcoholic beverages for the next 24 hours.    No Driving or Operating Machinery  No driving or operating machinery for the next 24 hours.    Notify your provider IF:    Excessive bleeding or drainage at the puncture site    Excessive swelling, redness or tenderness at the puncture site    Fever above 100.5 degrees taken orally    Severe pain    Drainage that is green, yellow, thick white or has a bad odor    Telephone Numbers  Bone Marrow transplant clinic:  [phone] (Monday thru Friday, 8:00 am to 4:00 pm)  After business hours call the [facility]:  [phone] and ask for the Hematology/BMT doctor on call.  Or call the Emergency Room at the [facility]  [facility]:  [phone].

## 2025-03-18 ENCOUNTER — VIRTUAL VISIT (OUTPATIENT)
Dept: TRANSPLANT | Facility: CLINIC | Age: 70
End: 2025-03-18
Attending: INTERNAL MEDICINE
Payer: COMMERCIAL

## 2025-03-18 VITALS — HEIGHT: 65 IN | WEIGHT: 184 LBS | BODY MASS INDEX: 30.66 KG/M2

## 2025-03-18 DIAGNOSIS — Z91.89 AT HIGH RISK FOR OSTEOPOROSIS: ICD-10-CM

## 2025-03-18 DIAGNOSIS — Z92.89 HISTORY OF BLOOD TRANSFUSION: ICD-10-CM

## 2025-03-18 DIAGNOSIS — C90.01 MULTIPLE MYELOMA IN REMISSION (H): ICD-10-CM

## 2025-03-18 DIAGNOSIS — Z79.2 ADMINISTRATION OF LONG-TERM PROPHYLACTIC ANTIBIOTICS: ICD-10-CM

## 2025-03-18 DIAGNOSIS — Z94.84 H/O AUTOLOGOUS STEM CELL TRANSPLANT (H): Primary | ICD-10-CM

## 2025-03-18 DIAGNOSIS — Z91.89 AT RISK FOR HYPOTHYROIDISM: ICD-10-CM

## 2025-03-18 NOTE — NURSING NOTE
Patient confirms medications and allergies are accurate via patients echeck in completion, and or denies any changes since last reviewed/verified.       Current patient location: [id number]  [facility] [address]    Is the patient currently in the state of [place]? YES    Visit mode: VIDEO    If the visit is dropped, the patient can be reconnected by:VIDEO VISIT: Text to cell phone:   Telephone Information:   Mobile [phone]       Will anyone else be joining the visit? NO daughter Katherin  (If patient encounters technical issues they should call [phone])    Are changes needed to the allergy or medication list? No    Are refills needed on medications prescribed by this physician? NO    Rooming Documentation:  Patient not present at check in     Reason for visit: RECHECK     Name (if in person): MELISSA   ID # (if video/phone): NA  Language: NA  Agency: NA  Phone Number: NA  Method of Interpretation: Video  Patient agrees to use  Services: No Daughter will  for patient  Nury ESCUDEROF

## 2025-03-18 NOTE — PROGRESS NOTES
"    BMT 1-Year Post-Autologous BMT  Survivorship Care Plan        Date: March 18, 2025    Treatment Team:  Patient Care Team:  Dusty Maya MD as PCP - General (Family Medicine)  iRshi Rocha MD as Assigned Cancer Care Provider  Dedra Weeks MD as MD (Infectious Diseases)  Rishi Rocha MD as BMT Physician (Transplant)  Romina Santiago RN as BMT Nurse Coordinator  Rozina Petit MD as MD (Endocrinology, Diabetes, and Metabolism)  Marianela Landa MD as Assigned Endocrinology Provider  Luis Miguel Dickerson MD as Assigned Infectious Disease Provider  LUH Escalante MD as Assigned Heart and Vascular Provider    BMT Transplant Date: Auto PBSC Txp; Day 1y (3/13/24)    Patient ID:  Rsoario Terrazas is a 69 year old female, currently day 370 of her autologous stem cell transplant for Multiple Myeloma.    CC: Rosario Terrazas was seen in the BMT Survivorship clinic on March 18, 2025 for a comprehensive, 1-year post autologous stem cell transplant, consultation and evaluation for transplant late effects. This is a 60-minute visit designed to educate patients about recommended follow-up care based on contemporary clinical practice guidelines, create a care plan that can be used as a guide to follow up care for the patient and their care team, and place any screening or diagnostic tests recommended for the screening of post-transplant complications based on patient risk and transplant type. All recommendations are outlined in the note below. Any tests not ordered during this visit are traditionally performed by the patient's PCP. All patients are advised to schedule a routine preventative care or \"Well Visit\" with their PCP if they have not done so already in the past year since transplant, to discuss these current and future recommendations.    Hematology/Oncology Treatment History: Obtained from the medical record.    PCP: WILMER Valentino     Hematology / Oncology Summary:      Diagnosis: " multiple myeloma      Stage: intermediate       Molecular: There was no evidence of the high-risk chromosomal abnormalities [del(17p), t(4;14), t(14;16)] as defined by the   International Myeloma Working Group (J Clin Oncol 2015, 33:2863-9), or the high-risk chromosomal   abnormalities [del(17p), t(4;14), t(14;16), t(14;20), 1q gain] as defined by the [facility]         Oncology history:  Admitted 8/18-8/23/2023 with back pain, chest pain, found to have L 8th rib Fx and T7  vertebral compression, without any Hx of a fall. Also had Acute Kidney Injury. Seen by neurosurgery; did not have neuro deficits, and there was no cord compression, so no surgery recommended. Given TLSO brace for next 3 months.     Completed XRT to T5-%8 11/2-11/8/2023          Radiation Oncology Treatment Summary      Name: Rosario Terrazas    MRN: [id number]         Diagnosis: Multiple Myeloma      Requesting  Physician:  Mika Ellison MD              External Radiation   Area Treated Beam Energy  Total Dose cGy Fraction Dose cGy No. Rx Dates From Dates      To   Spine (T5-T8) (1a-c) 10/15 MV 2000 400 5 11/02/23 11/08/23         Started VRd 9/20/2023.        Oncology Flowsheet       Day, Cycle bortezomib 2.5 mg/mL (VELCADE) subcutaneous   9/20/2023 Day 1, Cycle 1  1.3 mg/m2 = 2.8 mg    9/27/2023 Day 8, Cycle 1  1.3 mg/m2 = 2.8 mg    10/4/2023 Day 15, Cycle 1  1.3 mg/m2 = 2.8 mg    10/12/2023 Day 1, Cycle 2   1.3 mg/m2 = 2.8 mg    10/19/2023 Day 8, Cycle 2 1.3 mg/m2 = 2.8 mg    10/26/2023 Day 15, Cycle 2 1.3 mg/m2 = 2.8 mg    11/2/2023  Day 1, Cycle 3 1.3 mg/m2 = 2.8 mg   11/9/2023 Day 8, Cycle 3 1.3 mg/m2 = 2.8 mg   11/19/2023 Day 15, Cycle 3 1.3 mg/m2 = 2.8 mg   11/21/2023  Day 1, Cycle 4 1.3 mg/m2 = 2.8 mg   12/1/2023 Day 8, Cycle 4 1.3 mg/m2 = 2.8 mg   12/8/2023 Day 15, Cycle 4 1.3 mg/m2 = 2.8 mg   12/14/2023  Day 1, Cycle 5 1.3 mg/m2 = 2.8 mg   12/21/2023 Day 8, Cycle 5 1.3 mg/m2 = 2.8 mg   12/29/2023 Day 15, Cycle 5 1.3 mg/m2 = 2.8  mg   1/5/2024 Day 1, Cycle 6 1.3 mg/m2 = 2.8 mg   1/12/2024 Day 8, Cycle 6 1.3 mg/m2 = 2.8 mg   1/18/2024 Day 15, Cycle 6 1.3 mg/m2 = 2.8 mg   1/24/2024  Day 1, Cycle 7 1.3 mg/m2 = 2.8 mg   2/2/2024 Day 8, Cycle 7 1.3 mg/m2 = 2.8 mg   2/9/2024 Day 15, Cycle 7 1.3 mg/m2 = 2.8 mg      BMT/IEC PROTOCOL for standard risk MM Auto PBSCT  3/12/2024  Day -1 Melphalan 200 mg/m2   3/13 Day 0 Transplant, cell total post wash 2.23 x 10^6 CD34 + cells/kg (pre freeze dose: 5.36 x 10 ^6 CD34+ cells/kg)    Day +28 restaging @ [facility] shows VGPR with M-spike 0.1, normal K/L ratio.   Admitted 3/21 with N/F and GNB+ sepsis    Day +100 restaging @ [facility] remains in VGPR - M-spike 0.1, normal K/L ratio. Negative BM and PET/CT      6/24/2024 started maintenance lenalidomide (Revlimid) 10 mg daily 21/28 days  7/23/2024 starting maintenance daratumumab in addition to lenalidomide        Oncology Flowsheet       Day, Cycle daratumumab-HYALURONIDASE (DARZELEX FASPRO) subcutaneous   7/23/2024 Day 1, Cycle 1  1,800 mg    7/30/2024 Day 8, Cycle 1 1,800 mg   8/6/2024 Deferred, fever     8/13/2024  Day 15, Cycle 1 1,800 mg   8/20/2024  Day 22, Cycle 1 1,800 mg   8/27/2024 Day 1, Cycle 2  1,800 mg   9/3/2024 Day 8, Cycle 2 1,800 mg   9/10/2024 Day 15, Cycle 2  1,800 mg   9/17/2024 Day 22, Cycle 2 1,800 mg   -------#8-------   10/1/2024 Day 1, Cycle 3 1,800 mg   10/15/2024 Day 15, Cycle 3  1,800 mg   10/29/2024 Day 1,Cycle 4  1,800 mg   11/11/2024 Day 15, Cycle 4  1,800 mg   11/25/2024 Day 1, Cycle 5 1,800 mg   12/9/2024  Day 15, Cycle 5 1,800 mg   12/23/2024 Day 1, Cycle 6 1,800 mg   1/6/2025 Day 15, Cycle 6 1,800 mg   1/21/2025 Day 1, Cycle 7 1,800 mg                  Oncology Flowsheet     zoledronic acid (ZOMETA) IV   10/12/2023 4 mg    11/9/2023 4 mg    12/8/2023 4 mg    1/5/2024 4 mg    2/2/2024 4 mg    5/21/2024 4 mg    6/24/2024 4 mg    7/23/2024 4 mg    8/20/2024 4 mg    9/17/2024 4 mg    10/29/2024 4 mg    11/25/2024 4 mg         Medical  Decision Making:   IgD kappa multiple myeloma.   Dx 8/2023. She presented with pathologic fracture at T7 and L 8th rib, anemia, renal insufficiency.  Fortunately her calcium is normal.  She does have overt proteinuria with protein to creatinine ratio of 3.63.  NT proBNP is normal.  No symptoms of neuropathy.  SPEP indicates this is an IgD kappa multiple myeloma with a high kappa free light chain. An  IgD kappa myeloma is a rare subtype of myeloma. Prognosis of IgD subtype is worse than others.      PET CT 9/5/2023 shows path fractures at T7, but no FDG avid myeloma lesions overall. They can see lytic change in L 7th rib. There is diffuse marrow change c/w myeloma.      BMBx 8/30/2023 confirmed Dx of multiple myeloma, with 60% marrow involvement. FISH was negative for any high risk cytogenetic mutations. [place] red stain negative for amyloid.      She tested negative for hepatitis B and for HIV and strongyloides. We have had her start acyclovir for VZV prophylaxis while on VELCADE. We have had her start SMP/TMX for  ppx. Has latent TB. No sign of active TB on CT scans. Seen in TB clinic, started INH/pyridoxine x 9 mo.         She had significant pain in her left posterior back/rib region.  She has a T7 compression fracture from myeloma and a left eighth posterior rib fracture.   With a TLSO brace, pain gradually improving.  No sign of neurologic deficits.  In 9/20/2023 and 10/20/2023 she was unable to lay flat for positioning for radiation, but completed radiation 11/2/2023 - 11/8/2023 (2000 cGy)     Started cycle # 1 VRd 9/20/2023. Started the REVLIMID 9/30/2023. Changed from ASA to low dose XARELTO 10mg/d due to decreased mobility while on REVLIMID. Denies bleeding. Tolerating Tx well overall. No PN from VELCADE. No diabetes from dexamethasone. HGB improved. Acute Kidney Injury resolved. In 12/2023, displaying better mobility, and no sign of previous DVT, I had her stop Xarelto and switch back to aspirin 81 mg  daily.      Had ASCT consult on [facility] with Dr Rishi Rocha on 12/20/2023. They discussed possible ASCT at [facility] followed by maintenance Tx.      Scheduling for autologous stem cell transplant took some time, we ultimately completed cycle #7 of Velcade Revlimid dexamethasone in 2/2024.     Had BMBx at [facility]  2/21/2024 showing 5% kappa restricted plasma cells.      At [facility], she received high dose melphalan 3/12/2024, stem cell reinfusion 3/13/2024.  Day 28 lab @ [facility] showed 0.1 g M protein consistent with VGPR response.      Admitted to [facility] 3/22/2024-4/2/2024 with neutropenic fever.  I am having difficulty finding a discharge summary due to Care Everywhere challenges.  Piecing together various notes, it appears her blood cultures grew E. coli and strep mitis.  Had colitis on CT scan.  Stool showed Blastocystis hominis.  Had atrial fibrillation with RVR and hypotension, spent 2 days in the MICU.  Treated initially with amiodarone, then metoprolol.  Arrhythmia resolved, tapered off metoprolol now.  Had transaminitis, viral studies came back negative.  No sign of CMV.  EBV negative.  LFT elevations resolved with discontinuation of amiodarone.       Had abnormal thyroid ultrasound at [facility]  in 2/2024, initial FNA in 2/2024 showed atypia of undetermined significance.  Had [facility] endocrine consult 4/16/2024.  TSH normal.  Repeat FNA at [facility]  5/10/2024 nondiagnostic.  [facility] recommends referral to surgery.     Had cardiology follow-up at [facility] 5/16/2024.  Outpatient Zio patch did not show any episodes of recurrent atrial fibrillation or SVT.  Staying in normal sinus rhythm, Lopressor reduced from 25 twice daily to 12.5 twice daily x 1 week and then discontinued.  No further cardiology follow-up deemed necessary.     Had PET/CT at [facility] 6/12/2024 showing no hypermetabolic typically except in the right thyroid nodule inferiorly.  Had bone marrow biopsy at [facility] 6/12/2024  "showing no morphologic evidence of residual multiple myeloma, normocellular 40% marrow.  Peripheral blood smear showed macrocytosis .  SPEP at [facility] showed 2 very small monoclonal protein 0.1, 0.1.  PIERO at [facility] showed 3 different IgG kappa monoclonal proteins, no evidence of initial IgD kappa monoclonal protein.  Immunoglobulin free light chain analysis was normal 6/12/2024..       Dr. Rocha recommends maintenance with daratumumab and lenalidomide (Revlimid). Last visit with him was 9/11/2024, day 182 S/P Elena-ASCT.  We  started the lenalidomide (Revlimid) 10 mg, 21/28 days, on 6/24/2024.  The lenalidomide dose can be 10 mg daily for 21/28 days for the first 3 months, and if well-tolerated, might be increased to 15 Mg daily for 21/28 days thereafter.  We started the daratumumab 7/23/2024. Since she never received daratumumab as part of initial induction, so we gave it weekly x 8 doses, then changed to giving it every other week x 8 doses, will then give it monthly until disease progression.      She developed a fever with very mild neutropenia 8/4, hospitalized 8/4-8/7/2024, treated with empiric IV antibiotics but workup was negative.  Afebrile within 2 days.  Afebrile since discharge.  On 8/13, resumed the treatment \"day 15 cycle 1\" daratumumab.     Local oncologist started her on daratumumab/Revlimid maintenance treatment 7/23/2024.  This includes Revlimid 10 mg daily 21/28 days/month, and daratumumab weekly for 8 doses, then every other week for 8 additional doses, and then monthly. Changed to monthly daratumumab after 1/21/2025.  We continue this maintenance treatment for at least 2 years or until disease progression, ( suspect literature will evolve on length of maintenance treatment). We can reduce her dexamethasone dose now. Current rev cycle: 2/8-2/28/2025.      Received ZOMETA on 11/25/2024. Has had 12 doses, now on q 3 mo dosing for 4 doses.     HPI:   Bilateral shoulder pain  Eating & " Drinking  Lives with daughter Katherin, who is her primary care giver  Capable of basic ADLs   Unable to cook or carry out shopping  Does not drive  [place] is good, grateful, optimistic she will be healed completely           Medical History:  Past Medical History:   Diagnosis Date    Multiple myeloma in remission (H)     Neuropathy     Osteoarthritis     Paroxysmal atrial fibrillation (H)     SVT (supraventricular tachycardia)     TB lung, latent     Thyroid nodule        Surgical History:  Past Surgical History:   Procedure Laterality Date    AS TOTAL KNEE ARTHROPLASTY Left 2023    BONE MARROW BIOPSY, BONE SPECIMEN, NEEDLE/TROCAR Right 3/13/2025    Procedure: BIOPSY, BONE MARROW;  Surgeon: Reginald Kyle PA-C;  Location: [facility] OR    INSERT CATHETER VASCULAR ACCESS Left 03/06/2024    Procedure: central venous catheter tunneled line Insert vascular access;  Surgeon: Onel Leonardo MD;  Location: [facility] OR    IR CVC TUNNEL PLACEMENT > 5 YRS OF AGE  03/06/2024    IR CVC TUNNEL REMOVAL LEFT  04/10/2024       Family History:  Family History   Problem Relation Age of Onset    Anesthesia Reaction No family hx of     Deep Vein Thrombosis (DVT) No family hx of        Social History:  Social History     Socioeconomic History    Marital status: Patient Declined     Spouse name: Not on file    Number of children: Not on file    Years of education: Not on file    Highest education level: Not on file   Occupational History    Not on file   Tobacco Use    Smoking status: Never     Passive exposure: Never    Smokeless tobacco: Never   Substance and Sexual Activity    Alcohol use: Never    Drug use: Never    Sexual activity: Not on file   Other Topics Concern    Not on file   Social History Narrative    Not on file     Social Drivers of Health     Financial Resource Strain: High Risk (8/5/2024)    Received from [facility]    Overall Financial Resource Strain (CARDIA)     Difficulty of Paying Living Expenses: Hard    Food Insecurity: No Food Insecurity (8/5/2024)    Received from [facility]    Hunger Vital Sign     Worried About Running Out of Food in the Last Year: Never true     Ran Out of Food in the Last Year: Never true   Transportation Needs: No Transportation Needs (8/5/2024)    Received from [facility]    PRAPARE - Transportation     Lack of Transportation (Medical): No     Lack of Transportation (Non-Medical): No   Physical Activity: Not on file   Stress: Not on file   Social Connections: Not on file   Interpersonal Safety: Low Risk  (3/13/2025)    Interpersonal Safety     Do you feel physically and emotionally safe where you currently live?: Yes     Within the past 12 months, have you been hit, slapped, kicked or otherwise physically hurt by someone?: No     Within the past 12 months, have you been humiliated or emotionally abused in other ways by your partner or ex-partner?: No   Housing Stability: Low Risk  (8/5/2024)    Received from [facility]    Housing Stability     What is your housing situation today?: 3 - I have housing       Survivorship Care Plan:     This individualized care plan is designed to inform you and your healthcare team of the recommended follow-up visits, tests, health maintenance activities, and cancer screening you should receive after transplant.     General Health Maintenance:   Vaccinations should be given at 1 and 2 years after your transplant, these may be given at your annual anniversary visits in the BMT clinic, by your primary care provider, or your local oncologist. An exception is the influenza vaccine, which can be given after day +60 post-transplant during influenza season. See table below for more information.   You can receive the COVID19 vaccine if you have not already, for more information on how to sign up for an appointment you can the [employer] vaccine website at [email or web address] or the         [facility] Vaccine Connector portal at https://[place].gov/covid19/vaccine/connector/connector.jsp  For general health concerns you can be seen by your primary care provider.   If you have questions about your transplant or follow up tests contact your BMT RN coordinator.        Vaccine Administration Schedule       Vaccinations Post-BMT: Please refer to our [employer] [place] BMT Vaccination Guidelines updated in March of 2021.         Immune System:  Risks Preventative Measures Recommendations   Infections Symptoms of a cold such as fever, cough, congestion, and shortness of breath should be reported to your primary care provider  Immunizations will be administered at 1 & 2 years after transplant. See table above. Discussed vaccine schedule. Likely to receive 1-year vaccines in BMT clinic on 3/19/2025.    Will receive vaccine boosters in two months at local oncology clinic. Will send message with the vaccine schedule to Dr. Oropeza     Eyes:   Risks Preventative Measures Recommendations   Cataracts, dry eyes, viral infections, and other eye changes Yearly eye exam   Screening and treatment for high blood pressure or diabetes  Call if you have eye pain, visual changes, or floaters immediately Patient will schedule an annual routine exam with community ophthalmologist    Has not had her eyes examined since transplant. Her daughter will schedule an appointment.     Mouth:  Risks Preventative Measures Recommendations   Dry mouth, cavities, and oral cancer You can use over the counter Biotene for dry mouth or try sucking on sour candy before meals  Get a dental checkup every year and a cleaning every 6 months  If you have a prosthetic heart valve or central venous catheter or  port , you may need to take an antibiotic before your dental visits None at this time, patient has dental provider and will schedule routine exam    Sees dentist every 6 months and has been following oral hygiene recommendations. Brushes her teeth twice  daily.         Lungs  Risks Preventative Measures Recommendations   Changes in function from chemotherapy or radiation; lung infections (pneumonia) Tell your provider about difficulty breathing, a cough, or new shortness of breath   Avoid use of tobacco products or smoking  Routine lung exams Pulmonary Function Tests (Recommended annually post-transplant)    Placed order for PFT test. Schedulers will contact Katherin to schedule the test at a local [facility] facility closer to their place of residence.       Heart and Blood Vessels  Heart Disease Risk Score: The ASCVD Risk score (Maggie TESFAYE, et al., 2019) failed to calculate for the following reasons:    Cannot find a previous HDL lab    Cannot find a previous total cholesterol lab  Risks Preventative Measures Recommendations   Damage from chemotherapy and/or radiation; early development of heart valve disease  Ask your provider if you should receive consultation from a cardiologist (heart doctor) or have special screening  Follow a  heart healthy  lifestyle. For more information visit the National Heart, Lung, and Blood [place] website at: https://www.nhlbi.nih.gov/health/health-topics/topics/heart-healthy-lifestyle-changes   Don t smoke. If you currently smoke and are ready to quit, we can help you find ways to quit  Maintain a healthy weight  Exercise regularly  Avoid foods that have high amounts of:  Salt/sodium (less than 2,300 mg of sodium per day)  Saturated and trans fats  Limit alcohol to less than 1 drink for women and 2 drinks for men per day  Sugar such as soft drinks or sugary snacks Fasting Lipid Profile or Direct LDL (Recommended annually)       Hormones  Risks Preventative Measures Recommendations   Low thyroid function, low function of other glands (adrenals and others) Certain endocrine/hormone disorders are more common after transplant. For this reason, talk to your provider about:  Fatigue  Muscle weakness  Changes in cold tolerance  Reduced interest  in sex  Erectile dysfunction   Certain tests may be used to monitor for hormone changes if you are experiencing symptoms. These tests are done at 1 year TSH with T4 reflex (Recommended 1 & 2 years post-transplant), Fasting Glucose (Recommended 6 mos & annually post-transplant), and Hgb A1C (Recommended annually post-transplant)    Placed order for TSH and had lab appointment added to clinic visit scheduled for tomorrow.       Liver:  Risks Preventative Measures Recommendations    Damage from chemotherapy or other drugs, buildup of iron from blood transfusions, and infections Certain tests may be ordered at your next survivorship visit to evaluate liver function based on your individual risk factors.  Talk to your provider before taking herbal supplements or over the counter drugs like Tylenol.  Ask your doctor if you should be treated for iron overload  Avoid alcohol in excess   Hepatic Panel/LFTs (Recommended every 3-6 mos the 1st year post-transplant & annually) and Serum Ferritin (Recommended 1 year post-transplant)    Of note, she is taking Tylenol twice daily for her bilateral shoulder pain.     Kidneys and Bladder:  Risks Preventative Measures Recommendations   Damage from chemotherapy or other drugs, infections, high blood pressure Monitoring blood tests of kidney function during follow up visits  Treating high blood pressure and diabetes   Drink adequate amounts of water  Report symptoms of infection such as frequent urination, pain with urination, foul odor to urine, or blood in the urine  Talk to your provider before taking herbal supplements or over the counter drugs like Ibuprofen Serum creatinine checked as part of anniversary labs or at least annually by primary provider       Nervous System:  Risks Preventative Measures Recommendations   Neuropathy from chemotherapy, changes in cognitive function Report changes in sensation or discomfort in feet or hands  Tell your provider about ongoing changes in  memory, ability to concentrate, or inability to make decisions   None at this time       Muscle & Connective Tissue  Risks Preventative Measures Recommendations   Reduced muscle strength & stamina Let your provider know if:  You notice changes in your muscle strength  Require extra assistance with daily activities  Need help creating an exercise routine  Notice reduced range of motion of the arms, hips, or legs  Follow general guidelines for physical activity as recommended by the Office of Disease Prevention & Health Promotion:    Avoid Inactivity  Some physical activity is better than none -- any amount has benefits.    Do Aerobic Activity  Do aerobic physical activity in episodes of at least 10 minutes, as many times as possible per day. This could include going for walks or using the elliptical or stationary bike.  Ask your doctor what aerobic activities would be safe and helpful for you, and set a goal for yourself!    Strengthen Muscles  Do muscle-strengthening activities (such as lifting light weights or using resistance bands and/or going up and down stairs) that are moderate or high intensity and involve all major muscle groups at least 4 days a week. Bone Scan (Recommended 1 year) and Calcium & Vitamin D Levels (Recommended annually)      Order for physical therapy previously placed but have not contacted Katherin yet to schedule her first appointment.         Emotional Health  Risks Preventative Measures Recommendations   Stress, depression, anxiety Talk to your provider about:  Changes in feelings, mood, or emotional wellbeing  Interest in support groups  If you are concerned about your caregivers emotional wellbeing  Desire to speak with a counselor for ongoing support None at this time    She has family and a good support network.       Sexual Health  Risks Preventative Measures Recommendations   Reduced libido due to hormonal changes, erectile dysfunction, vaginal dryness, and sexually transmitted  diseases  Talk to your provider about:  Reduced libido or concerns regarding your sexual health  Men: Erectile dysfunction   Women: Vaginal dryness, pain during intercourse, vaginal bleeding after intercourse, changes in vaginal discharge that may indicate infection (green/white/foul odor)   General Recommendations:  It is safe to have sex if your platelet count is > 50,000 and you feel physically and emotionally ready   Women can use water-based lubricants to reduce discomfort from dryness. Prescription topical estrogen may help as well.  Use barrier protection such as condoms to prevent sexually transmitted diseases, you are at higher risk for infections due to a weakened immune system    For more information check out the [employer] web page on this topic:  https://bethematch.org/patients-and-families/life-after-transplant/coping-with-life-after-transplant/relationships-and-sexual-health/  None at this time         Cancer Screening:  Risks Preventative Measures Recommendations   Higher risk for the development of solid tumors, PTLD, and blood cancers Preform a full self skin exam monthly to assess for any changes in moles or signs of skin cancer  Minimize excessive sun exposure and wear sunscreen  Women should preform breast-self exams and report any changes in such as a new lump, discharge from nipples, and red or dimpled areas of the breast.   All women should be screened for breast cancer following the guidelines below:  Average Risk (no radiation exposure)   Age 20-40 years: Annual clinical breast exam  Age >40 years: Annual clinical breast exam & annual mammogram  High Risk (radiation exposure) starting at age 25 years or 8 years after radiation therapy/TBI, whichever comes first, but no later than age 40 years:  Annual clinical breast exam  Annual mammogram  AND Annual breast MRI  Screening for colorectal cancer should begin at age 50.   All women should have a pap smear, assessment for  vaginal GVHD, and HPV DNA test every year beginning at age 21  Men should perform a monthly testicular self-exam if they have been exposed to radiation as part of their cancer treatment.    Mammogram (Women, 1 year and annually based on radiation exposure/age), Fecal Occult Blood Test (Recommended annually), Colonoscopy (Recommended every 10 years after the age of 50), and Dermatology Referral (Annual skin exam or evaluation of lesion)    Katherin will call to schedule a mammogram for Rosario.    Katherin and Rosario will schedule an appointment with her PCP Dr. Maya and will discuss plan for future colorectal cancer screening (Katherin was unsure of the last colonoscopy Rosario had).     Order placed to dermatology for a full skin check. Recommend she continues this annually.        Recommended Tests & Follow-Up:  Referrals & Tests Ordered Today:  Your BMT physician will review these tests and referral results. If you require treatment based on the results, a member of the BMT team will contact you.  Orders placed today:  No orders of the defined types were placed in this encounter.    (Note to providers: After signing orders use the refresh tool in the note window to display results)   Future Tests/Referrals:   All recommendations above should be completed within 2 months either by your primary care provider or local oncologist (cancer doctor).   Recommendations that were not ordered today should be completed by your:  Primary Care Provider     KIKO Mc CNP    I spent 40 minutes with the patient and family, over half of which was spent discussing preventative care strategies, self-management practices, and potential complications after transplant.      Information used for these recommendations was obtained from: Harpreet, NFaisal S., MAURA Moreno, INDU Luu, Evette S., MAR Lance., ANDREW Kramer.,   Perla, KFaisal M. (2013). Prevalence of Hematopoietic Cell Transplant Survivors in the [place]. Biology of Blood  and Marrow Transplantation?: Journal of the American Society for Blood and Marrow Transplantation, 19(10), 2148-8024. doi [email or web address]

## 2025-03-18 NOTE — LETTER
3/18/2025      Rosario Terrazas  [address] [place] [address]      Dear Colleague,    Thank you for referring your patient, Rosario Terrazas, to the [facility] BLOOD AND MARROW TRANSPLANT PROGRAM [facility]. Please see a copy of my visit note below.    Virtual Visit Details    Type of service:  Video Visit   Video Start Time: 7:05 AM  Video End Time:7:45AM    Originating Location (pt. Location): Home    Distant Location (provider location):  Off-site  Platform used for Video Visit: [facility]        BMT 1-Year Post-Autologous BMT  Survivorship Care Plan        Date: March 18, 2025    Treatment Team:  Patient Care Team:  Dusty Maya MD as PCP - General (Family Medicine)  Rishi Rocha MD as Assigned Cancer Care Provider  Dedra Weeks MD as MD (Infectious Diseases)  Rishi Rocha MD as BMT Physician (Transplant)  Romina Santiago RN as BMT Nurse Coordinator  Rozina Petit MD as MD (Endocrinology, Diabetes, and Metabolism)  Marianela Landa MD as Assigned Endocrinology Provider  Luis Miguel Dickerson MD as Assigned Infectious Disease Provider  LUH Escalante MD as Assigned Heart and Vascular Provider    BMT Transplant Date: Auto PBSC Txp; Day 1y (3/13/24)    Patient ID:  Rosario Terrazas is a 69 year old female, currently day 370 of her autologous stem cell transplant for Multiple Myeloma.    CC: Rosario Terrazas was seen in the BMT Survivorship clinic on March 18, 2025 for a comprehensive, 1-year post autologous stem cell transplant, consultation and evaluation for transplant late effects. This is a 60-minute visit designed to educate patients about recommended follow-up care based on contemporary clinical practice guidelines, create a care plan that can be used as a guide to follow up care for the patient and their care team, and place any screening or diagnostic tests recommended for the screening of post-transplant complications based on patient risk and transplant  "type. All recommendations are outlined in the note below. Any tests not ordered during this visit are traditionally performed by the patient's PCP. All patients are advised to schedule a routine preventative care or \"Well Visit\" with their PCP if they have not done so already in the past year since transplant, to discuss these current and future recommendations.    Hematology/Oncology Treatment History: Obtained from the medical record.    PCP: WILMER Valentino     Hematology / Oncology Summary:      Diagnosis: multiple myeloma      Stage: intermediate       Molecular: There was no evidence of the high-risk chromosomal abnormalities [del(17p), t(4;14), t(14;16)] as defined by the   International Myeloma Working Group (J Clin Oncol 2015, 33:2863-9), or the high-risk chromosomal   abnormalities [del(17p), t(4;14), t(14;16), t(14;20), 1q gain] as defined by the [facility]         Oncology history:  Admitted 8/18-8/23/2023 with back pain, chest pain, found to have L 8th rib Fx and T7  vertebral compression, without any Hx of a fall. Also had Acute Kidney Injury. Seen by neurosurgery; did not have neuro deficits, and there was no cord compression, so no surgery recommended. Given TLSO brace for next 3 months.     Completed XRT to T5-%8 11/2-11/8/2023          Radiation Oncology Treatment Summary      Name: Rosario Terrazas    MRN: [id number]         Diagnosis: Multiple Myeloma      Requesting  Physician:  Mika Ellison MD              External Radiation   Area Treated Beam Energy  Total Dose cGy Fraction Dose cGy No. Rx Dates From Dates      To   Spine (T5-T8) (1a-c) 10/15 MV 2000 400 5 11/02/23 11/08/23         Started VRd 9/20/2023.        Oncology Flowsheet       Day, Cycle bortezomib 2.5 mg/mL (VELCADE) subcutaneous   9/20/2023 Day 1, Cycle 1  1.3 mg/m2 = 2.8 mg    9/27/2023 Day 8, Cycle 1  1.3 mg/m2 = 2.8 mg    10/4/2023 Day 15, Cycle 1  1.3 mg/m2 = 2.8 mg    10/12/2023 Day 1, Cycle 2   1.3 mg/m2 = 2.8 mg  "   10/19/2023 Day 8, Cycle 2 1.3 mg/m2 = 2.8 mg    10/26/2023 Day 15, Cycle 2 1.3 mg/m2 = 2.8 mg    11/2/2023  Day 1, Cycle 3 1.3 mg/m2 = 2.8 mg   11/9/2023 Day 8, Cycle 3 1.3 mg/m2 = 2.8 mg   11/19/2023 Day 15, Cycle 3 1.3 mg/m2 = 2.8 mg   11/21/2023  Day 1, Cycle 4 1.3 mg/m2 = 2.8 mg   12/1/2023 Day 8, Cycle 4 1.3 mg/m2 = 2.8 mg   12/8/2023 Day 15, Cycle 4 1.3 mg/m2 = 2.8 mg   12/14/2023  Day 1, Cycle 5 1.3 mg/m2 = 2.8 mg   12/21/2023 Day 8, Cycle 5 1.3 mg/m2 = 2.8 mg   12/29/2023 Day 15, Cycle 5 1.3 mg/m2 = 2.8 mg   1/5/2024 Day 1, Cycle 6 1.3 mg/m2 = 2.8 mg   1/12/2024 Day 8, Cycle 6 1.3 mg/m2 = 2.8 mg   1/18/2024 Day 15, Cycle 6 1.3 mg/m2 = 2.8 mg   1/24/2024  Day 1, Cycle 7 1.3 mg/m2 = 2.8 mg   2/2/2024 Day 8, Cycle 7 1.3 mg/m2 = 2.8 mg   2/9/2024 Day 15, Cycle 7 1.3 mg/m2 = 2.8 mg      BMT/IEC PROTOCOL for standard risk MM Auto PBSCT  3/12/2024  Day -1 Melphalan 200 mg/m2   3/13 Day 0 Transplant, cell total post wash 2.23 x 10^6 CD34 + cells/kg (pre freeze dose: 5.36 x 10 ^6 CD34+ cells/kg)    Day +28 restaging @ [facility] shows VGPR with M-spike 0.1, normal K/L ratio.   Admitted 3/21 with N/F and GNB+ sepsis    Day +100 restaging @ [facility] remains in VGPR - M-spike 0.1, normal K/L ratio. Negative BM and PET/CT      6/24/2024 started maintenance lenalidomide (Revlimid) 10 mg daily 21/28 days  7/23/2024 starting maintenance daratumumab in addition to lenalidomide        Oncology Flowsheet       Day, Cycle daratumumab-HYALURONIDASE (DARZELEX FASPRO) subcutaneous   7/23/2024 Day 1, Cycle 1  1,800 mg    7/30/2024 Day 8, Cycle 1 1,800 mg   8/6/2024 Deferred, fever     8/13/2024  Day 15, Cycle 1 1,800 mg   8/20/2024  Day 22, Cycle 1 1,800 mg   8/27/2024 Day 1, Cycle 2  1,800 mg   9/3/2024 Day 8, Cycle 2 1,800 mg   9/10/2024 Day 15, Cycle 2  1,800 mg   9/17/2024 Day 22, Cycle 2 1,800 mg   -------#8-------   10/1/2024 Day 1, Cycle 3 1,800 mg   10/15/2024 Day 15, Cycle 3  1,800 mg   10/29/2024 Day 1,Cycle 4  1,800 mg    11/11/2024 Day 15, Cycle 4  1,800 mg   11/25/2024 Day 1, Cycle 5 1,800 mg   12/9/2024  Day 15, Cycle 5 1,800 mg   12/23/2024 Day 1, Cycle 6 1,800 mg   1/6/2025 Day 15, Cycle 6 1,800 mg   1/21/2025 Day 1, Cycle 7 1,800 mg                  Oncology Flowsheet     zoledronic acid (ZOMETA) IV   10/12/2023 4 mg    11/9/2023 4 mg    12/8/2023 4 mg    1/5/2024 4 mg    2/2/2024 4 mg    5/21/2024 4 mg    6/24/2024 4 mg    7/23/2024 4 mg    8/20/2024 4 mg    9/17/2024 4 mg    10/29/2024 4 mg    11/25/2024 4 mg         Medical Decision Making:   IgD kappa multiple myeloma.   Dx 8/2023. She presented with pathologic fracture at T7 and L 8th rib, anemia, renal insufficiency.  Fortunately her calcium is normal.  She does have overt proteinuria with protein to creatinine ratio of 3.63.  NT proBNP is normal.  No symptoms of neuropathy.  SPEP indicates this is an IgD kappa multiple myeloma with a high kappa free light chain. An  IgD kappa myeloma is a rare subtype of myeloma. Prognosis of IgD subtype is worse than others.      PET CT 9/5/2023 shows path fractures at T7, but no FDG avid myeloma lesions overall. They can see lytic change in L 7th rib. There is diffuse marrow change c/w myeloma.      BMBx 8/30/2023 confirmed Dx of multiple myeloma, with 60% marrow involvement. FISH was negative for any high risk cytogenetic mutations. [place] red stain negative for amyloid.      She tested negative for hepatitis B and for HIV and strongyloides. We have had her start acyclovir for VZV prophylaxis while on VELCADE. We have had her start SMP/TMX for  ppx. Has latent TB. No sign of active TB on CT scans. Seen in TB clinic, started INH/pyridoxine x 9 mo.         She had significant pain in her left posterior back/rib region.  She has a T7 compression fracture from myeloma and a left eighth posterior rib fracture.   With a TLSO brace, pain gradually improving.  No sign of neurologic deficits.  In 9/20/2023 and 10/20/2023 she was unable to  lay flat for positioning for radiation, but completed radiation 11/2/2023 - 11/8/2023 (2000 cGy)     Started cycle # 1 VRd 9/20/2023. Started the REVLIMID 9/30/2023. Changed from ASA to low dose XARELTO 10mg/d due to decreased mobility while on REVLIMID. Denies bleeding. Tolerating Tx well overall. No PN from VELCADE. No diabetes from dexamethasone. HGB improved. Acute Kidney Injury resolved. In 12/2023, displaying better mobility, and no sign of previous DVT, I had her stop Xarelto and switch back to aspirin 81 mg daily.      Had ASCT consult on [facility] with Dr Rishi Rocha on 12/20/2023. They discussed possible ASCT at [facility] followed by maintenance Tx.      Scheduling for autologous stem cell transplant took some time, we ultimately completed cycle #7 of Velcade Revlimid dexamethasone in 2/2024.     Had BMBx at [facility]  2/21/2024 showing 5% kappa restricted plasma cells.      At [facility], she received high dose melphalan 3/12/2024, stem cell reinfusion 3/13/2024.  Day 28 lab @ [facility] showed 0.1 g M protein consistent with VGPR response.      Admitted to [facility] 3/22/2024-4/2/2024 with neutropenic fever.  I am having difficulty finding a discharge summary due to Care Everywhere challenges.  Piecing together various notes, it appears her blood cultures grew E. coli and strep mitis.  Had colitis on CT scan.  Stool showed Blastocystis hominis.  Had atrial fibrillation with RVR and hypotension, spent 2 days in the MICU.  Treated initially with amiodarone, then metoprolol.  Arrhythmia resolved, tapered off metoprolol now.  Had transaminitis, viral studies came back negative.  No sign of CMV.  EBV negative.  LFT elevations resolved with discontinuation of amiodarone.       Had abnormal thyroid ultrasound at [facility]  in 2/2024, initial FNA in 2/2024 showed atypia of undetermined significance.  Had [facility] endocrine consult 4/16/2024.  TSH normal.  Repeat FNA at [facility]  5/10/2024 nondiagnostic.  [facility]  "[place] recommends referral to surgery.     Had cardiology follow-up at [facility] 5/16/2024.  Outpatient Zio patch did not show any episodes of recurrent atrial fibrillation or SVT.  Staying in normal sinus rhythm, Lopressor reduced from 25 twice daily to 12.5 twice daily x 1 week and then discontinued.  No further cardiology follow-up deemed necessary.     Had PET/CT at [facility] 6/12/2024 showing no hypermetabolic typically except in the right thyroid nodule inferiorly.  Had bone marrow biopsy at [facility] 6/12/2024 showing no morphologic evidence of residual multiple myeloma, normocellular 40% marrow.  Peripheral blood smear showed macrocytosis .  SPEP at [facility] showed 2 very small monoclonal protein 0.1, 0.1.  PIERO at [facility] showed 3 different IgG kappa monoclonal proteins, no evidence of initial IgD kappa monoclonal protein.  Immunoglobulin free light chain analysis was normal 6/12/2024..       Dr. Rocha recommends maintenance with daratumumab and lenalidomide (Revlimid). Last visit with him was 9/11/2024, day 182 S/P Elena-ASCT.  We  started the lenalidomide (Revlimid) 10 mg, 21/28 days, on 6/24/2024.  The lenalidomide dose can be 10 mg daily for 21/28 days for the first 3 months, and if well-tolerated, might be increased to 15 Mg daily for 21/28 days thereafter.  We started the daratumumab 7/23/2024. Since she never received daratumumab as part of initial induction, so we gave it weekly x 8 doses, then changed to giving it every other week x 8 doses, will then give it monthly until disease progression.      She developed a fever with very mild neutropenia 8/4, hospitalized 8/4-8/7/2024, treated with empiric IV antibiotics but workup was negative.  Afebrile within 2 days.  Afebrile since discharge.  On 8/13, resumed the treatment \"day 15 cycle 1\" daratumumab.     Local oncologist started her on daratumumab/Revlimid maintenance treatment 7/23/2024.  This includes Revlimid 10 mg daily 21/28 days/month, and " daratumumab weekly for 8 doses, then every other week for 8 additional doses, and then monthly. Changed to monthly daratumumab after 1/21/2025.  We continue this maintenance treatment for at least 2 years or until disease progression, ( suspect literature will evolve on length of maintenance treatment). We can reduce her dexamethasone dose now. Current rev cycle: 2/8-2/28/2025.      Received ZOMETA on 11/25/2024. Has had 12 doses, now on q 3 mo dosing for 4 doses.     HPI:   Bilateral shoulder pain  Eating & Drinking  Lives with daughter Katherin, who is her primary care giver  Capable of basic ADLs   Unable to cook or carry out shopping  Does not drive  [place] is good, grateful, optimistic she will be healed completely           Medical History:  Past Medical History:   Diagnosis Date     Multiple myeloma in remission (H)      Neuropathy      Osteoarthritis      Paroxysmal atrial fibrillation (H)      SVT (supraventricular tachycardia)      TB lung, latent      Thyroid nodule        Surgical History:  Past Surgical History:   Procedure Laterality Date     AS TOTAL KNEE ARTHROPLASTY Left 2023     BONE MARROW BIOPSY, BONE SPECIMEN, NEEDLE/TROCAR Right 3/13/2025    Procedure: BIOPSY, BONE MARROW;  Surgeon: Reginald Kyle PA-C;  Location: [facility] OR     INSERT CATHETER VASCULAR ACCESS Left 03/06/2024    Procedure: central venous catheter tunneled line Insert vascular access;  Surgeon: Onel Leonardo MD;  Location: [facility] OR     IR CVC TUNNEL PLACEMENT > 5 YRS OF AGE  03/06/2024     IR CVC TUNNEL REMOVAL LEFT  04/10/2024       Family History:  Family History   Problem Relation Age of Onset     Anesthesia Reaction No family hx of      Deep Vein Thrombosis (DVT) No family hx of        Social History:  Social History     Socioeconomic History     Marital status: Patient Declined     Spouse name: Not on file     Number of children: Not on file     Years of education: Not on file     Highest education level: Not on file    Occupational History     Not on file   Tobacco Use     Smoking status: Never     Passive exposure: Never     Smokeless tobacco: Never   Substance and Sexual Activity     Alcohol use: Never     Drug use: Never     Sexual activity: Not on file   Other Topics Concern     Not on file   Social History Narrative     Not on file     Social Drivers of Health     Financial Resource Strain: High Risk (8/5/2024)    Received from [facility]    Overall Financial Resource Strain (CARDIA)      Difficulty of Paying Living Expenses: Hard   Food Insecurity: No Food Insecurity (8/5/2024)    Received from [facility]    Hunger Vital Sign      Worried About Running Out of Food in the Last Year: Never true      Ran Out of Food in the Last Year: Never true   Transportation Needs: No Transportation Needs (8/5/2024)    Received from [facility]    PRAPARE - Transportation      Lack of Transportation (Medical): No      Lack of Transportation (Non-Medical): No   Physical Activity: Not on file   Stress: Not on file   Social Connections: Not on file   Interpersonal Safety: Low Risk  (3/13/2025)    Interpersonal Safety      Do you feel physically and emotionally safe where you currently live?: Yes      Within the past 12 months, have you been hit, slapped, kicked or otherwise physically hurt by someone?: No      Within the past 12 months, have you been humiliated or emotionally abused in other ways by your partner or ex-partner?: No   Housing Stability: Low Risk  (8/5/2024)    Received from [facility]    Housing Stability      What is your housing situation today?: 3 - I have housing       Survivorship Care Plan:     This individualized care plan is designed to inform you and your healthcare team of the recommended follow-up visits, tests, health maintenance activities, and cancer screening you should receive after transplant.     General Health Maintenance:   Vaccinations should be given at 1 and 2 years  after your transplant, these may be given at your annual anniversary visits in the BMT clinic, by your primary care provider, or your local oncologist. An exception is the influenza vaccine, which can be given after day +60 post-transplant during influenza season. See table below for more information.   You can receive the COVID19 vaccine if you have not already, for more information on how to sign up for an appointment you can the Emme E2MS vaccine website at [email or web address] & Information/covid19/covid19-vaccine or the         [place] Department of Health Vaccine Connector portal at [place]://[place].gov/covid19/vaccine/connector/connector.jsp  For general health concerns you can be seen by your primary care provider.   If you have questions about your transplant or follow up tests contact your BMT RN coordinator.        Vaccine Administration Schedule       Vaccinations Post-BMT: Please refer to our Emme E2MS [facility] BMT Vaccination Guidelines updated in March of 2021.         Immune System:  Risks Preventative Measures Recommendations   Infections Symptoms of a cold such as fever, cough, congestion, and shortness of breath should be reported to your primary care provider  Immunizations will be administered at 1 & 2 years after transplant. See table above. Discussed vaccine schedule. Likely to receive 1-year vaccines in BMT clinic on 3/19/2025.    Will receive vaccine boosters in two months at local oncology clinic. Will send message with the vaccine schedule to Dr. Oropeza     Eyes:   Risks Preventative Measures Recommendations   Cataracts, dry eyes, viral infections, and other eye changes Yearly eye exam   Screening and treatment for high blood pressure or diabetes  Call if you have eye pain, visual changes, or floaters immediately Patient will schedule an annual routine exam with community ophthalmologist    Has not had her eyes examined since transplant. Her daughter will schedule an appointment.     Mouth:  Risks  Preventative Measures Recommendations   Dry mouth, cavities, and oral cancer You can use over the counter Biotene for dry mouth or try sucking on sour candy before meals  Get a dental checkup every year and a cleaning every 6 months  If you have a prosthetic heart valve or central venous catheter or  port , you may need to take an antibiotic before your dental visits None at this time, patient has dental provider and will schedule routine exam    Sees dentist every 6 months and has been following oral hygiene recommendations. Brushes her teeth twice daily.         Lungs  Risks Preventative Measures Recommendations   Changes in function from chemotherapy or radiation; lung infections (pneumonia) Tell your provider about difficulty breathing, a cough, or new shortness of breath   Avoid use of tobacco products or smoking  Routine lung exams Pulmonary Function Tests (Recommended annually post-transplant)    Placed order for PFT test. Schedulers will contact Katherin to schedule the test at a local ealth facility closer to their place of residence.       Heart and Blood Vessels  Heart Disease Risk Score: The ASCVD Risk score (Maggie TESFAYE, et al., 2019) failed to calculate for the following reasons:    Cannot find a previous HDL lab    Cannot find a previous total cholesterol lab  Risks Preventative Measures Recommendations   Damage from chemotherapy and/or radiation; early development of heart valve disease  Ask your provider if you should receive consultation from a cardiologist (heart doctor) or have special screening  Follow a  heart healthy  lifestyle. For more information visit the National Heart, Lung, and Blood Centerview website at: https://www.nhlbi.nih.gov/health/health-topics/topics/heart-healthy-lifestyle-changes   Don t smoke. If you currently smoke and are ready to quit, we can help you find ways to quit  Maintain a healthy weight  Exercise regularly  Avoid foods that have high amounts of:  Salt/sodium (less  than 2,300 mg of sodium per day)  Saturated and trans fats  Limit alcohol to less than 1 drink for women and 2 drinks for men per day  Sugar such as soft drinks or sugary snacks Fasting Lipid Profile or Direct LDL (Recommended annually)       Hormones  Risks Preventative Measures Recommendations   Low thyroid function, low function of other glands (adrenals and others) Certain endocrine/hormone disorders are more common after transplant. For this reason, talk to your provider about:  Fatigue  Muscle weakness  Changes in cold tolerance  Reduced interest in sex  Erectile dysfunction   Certain tests may be used to monitor for hormone changes if you are experiencing symptoms. These tests are done at 1 year TSH with T4 reflex (Recommended 1 & 2 years post-transplant), Fasting Glucose (Recommended 6 mos & annually post-transplant), and Hgb A1C (Recommended annually post-transplant)    Placed order for TSH and had lab appointment added to clinic visit scheduled for tomorrow.       Liver:  Risks Preventative Measures Recommendations    Damage from chemotherapy or other drugs, buildup of iron from blood transfusions, and infections Certain tests may be ordered at your next survivorship visit to evaluate liver function based on your individual risk factors.  Talk to your provider before taking herbal supplements or over the counter drugs like Tylenol.  Ask your doctor if you should be treated for iron overload  Avoid alcohol in excess   Hepatic Panel/LFTs (Recommended every 3-6 mos the 1st year post-transplant & annually) and Serum Ferritin (Recommended 1 year post-transplant)    Of note, she is taking Tylenol twice daily for her bilateral shoulder pain.     Kidneys and Bladder:  Risks Preventative Measures Recommendations   Damage from chemotherapy or other drugs, infections, high blood pressure Monitoring blood tests of kidney function during follow up visits  Treating high blood pressure and diabetes   Drink adequate  amounts of water  Report symptoms of infection such as frequent urination, pain with urination, foul odor to urine, or blood in the urine  Talk to your provider before taking herbal supplements or over the counter drugs like Ibuprofen Serum creatinine checked as part of anniversary labs or at least annually by primary provider       Nervous System:  Risks Preventative Measures Recommendations   Neuropathy from chemotherapy, changes in cognitive function Report changes in sensation or discomfort in feet or hands  Tell your provider about ongoing changes in memory, ability to concentrate, or inability to make decisions   None at this time       Muscle & Connective Tissue  Risks Preventative Measures Recommendations   Reduced muscle strength & stamina Let your provider know if:  You notice changes in your muscle strength  Require extra assistance with daily activities  Need help creating an exercise routine  Notice reduced range of motion of the arms, hips, or legs  Follow general guidelines for physical activity as recommended by the Office of Disease Prevention & Health Promotion:    Avoid Inactivity  Some physical activity is better than none -- any amount has benefits.    Do Aerobic Activity  Do aerobic physical activity in episodes of at least 10 minutes, as many times as possible per day. This could include going for walks or using the elliptical or stationary bike.  Ask your doctor what aerobic activities would be safe and helpful for you, and set a goal for yourself!    Strengthen Muscles  Do muscle-strengthening activities (such as lifting light weights or using resistance bands and/or going up and down stairs) that are moderate or high intensity and involve all major muscle groups at least 4 days a week. Bone Scan (Recommended 1 year) and Calcium & Vitamin D Levels (Recommended annually)      Order for physical therapy previously placed but have not contacted Katherin yet to schedule her first appointment.          Emotional Health  Risks Preventative Measures Recommendations   Stress, depression, anxiety Talk to your provider about:  Changes in feelings, mood, or emotional wellbeing  Interest in support groups  If you are concerned about your caregivers emotional wellbeing  Desire to speak with a counselor for ongoing support None at this time    She has family and a good support network.       Sexual Health  Risks Preventative Measures Recommendations   Reduced libido due to hormonal changes, erectile dysfunction, vaginal dryness, and sexually transmitted diseases  Talk to your provider about:  Reduced libido or concerns regarding your sexual health  Men: Erectile dysfunction   Women: Vaginal dryness, pain during intercourse, vaginal bleeding after intercourse, changes in vaginal discharge that may indicate infection (green/white/foul odor)   General Recommendations:  It is safe to have sex if your platelet count is > 50,000 and you feel physically and emotionally ready   Women can use water-based lubricants to reduce discomfort from dryness. Prescription topical estrogen may help as well.  Use barrier protection such as condoms to prevent sexually transmitted diseases, you are at higher risk for infections due to a weakened immune system    For more information check out the [employer] web page on this topic:  https://bethematch.org/patients-and-families/life-after-transplant/coping-with-life-after-transplant/relationships-and-sexual-health/  None at this time         Cancer Screening:  Risks Preventative Measures Recommendations   Higher risk for the development of solid tumors, PTLD, and blood cancers Preform a full self skin exam monthly to assess for any changes in moles or signs of skin cancer  Minimize excessive sun exposure and wear sunscreen  Women should preform breast-self exams and report any changes in such as a new lump, discharge from nipples, and red or dimpled areas of the breast.    All women should be screened for breast cancer following the guidelines below:  Average Risk (no radiation exposure)   Age 20-40 years: Annual clinical breast exam  Age >40 years: Annual clinical breast exam & annual mammogram  High Risk (radiation exposure) starting at age 25 years or 8 years after radiation therapy/TBI, whichever comes first, but no later than age 40 years:  Annual clinical breast exam  Annual mammogram  AND Annual breast MRI  Screening for colorectal cancer should begin at age 50.   All women should have a pap smear, assessment for vaginal GVHD, and HPV DNA test every year beginning at age 21  Men should perform a monthly testicular self-exam if they have been exposed to radiation as part of their cancer treatment.    Mammogram (Women, 1 year and annually based on radiation exposure/age), Fecal Occult Blood Test (Recommended annually), Colonoscopy (Recommended every 10 years after the age of 50), and Dermatology Referral (Annual skin exam or evaluation of lesion)    Katherin will call to schedule a mammogram for Rosario.    Katherin and Rosario will schedule an appointment with her PCP Dr. Maya and will discuss plan for future colorectal cancer screening (Katherin was unsure of the last colonoscopy Rosario had).     Order placed to dermatology for a full skin check. Recommend she continues this annually.        Recommended Tests & Follow-Up:  Referrals & Tests Ordered Today:  Your BMT physician will review these tests and referral results. If you require treatment based on the results, a member of the BMT team will contact you.  Orders placed today:  No orders of the defined types were placed in this encounter.    (Note to providers: After signing orders use the refresh tool in the note window to display results)   Future Tests/Referrals:   All recommendations above should be completed within 2 months either by your primary care provider or local oncologist (cancer doctor).   Recommendations that were  not ordered today should be completed by your:  Primary Care Provider     KIKO Mc CNP    I spent 40 minutes with the patient and family, over half of which was spent discussing preventative care strategies, self-management practices, and potential complications after transplant.      Information used for these recommendations was obtained from: RAN Mullins., MAURA Moreno, INDU Luu, KHAI Alas, MAR Lance., ANDREW Kramer.,   Perla, KFaisal PEREZ. (2013). Prevalence of Hematopoietic Cell Transplant Survivors in the [place]. Biology of Blood and Marrow Transplantation?: Journal of the American Society for Blood and Marrow Transplantation, 19(10), 7110-3328. doi [email or web address]/j.[email or web address]        Again, thank you for allowing me to participate in the care of your patient.        Sincerely,        KIKO Mc CNP    Electronically signed

## 2025-03-18 NOTE — LETTER
3/18/2025         RE: Rosario Terrazas  [id number]  [facility] [address]      Virtual Visit Details    Type of service:  Video Visit   Video Start Time: 7:05 AM  Video End Time:7:45AM    Originating Location (pt. Location): Home    Distant Location (provider location):  Off-site  Platform used for Video Visit: [facility]        BMT 1-Year Post-Autologous BMT  Survivorship Care Plan        Date: March 18, 2025    Treatment Team:  Patient Care Team:  Dusty Maya MD as PCP - General (Family Medicine)  Rishi Rocha MD as Assigned Cancer Care Provider  Dedra Weeks MD as MD (Infectious Diseases)  Rishi Rocha MD as BMT Physician (Transplant)  Romina Santiago RN as BMT Nurse Coordinator  Rozina Petit MD as MD (Endocrinology, Diabetes, and Metabolism)  Marianela Landa MD as Assigned Endocrinology Provider  Luis Miguel Dickerson MD as Assigned Infectious Disease Provider  LUH Escalante MD as Assigned Heart and Vascular Provider    BMT Transplant Date: Auto PBSC Txp; Day 1y (3/13/24)    Patient ID:  Rosario Terrazas is a 69 year old female, currently day 370 of her autologous stem cell transplant for Multiple Myeloma.    CC: Rosario Terrazas was seen in the BMT Survivorship clinic on March 18, 2025 for a comprehensive, 1-year post autologous stem cell transplant, consultation and evaluation for transplant late effects. This is a 60-minute visit designed to educate patients about recommended follow-up care based on contemporary clinical practice guidelines, create a care plan that can be used as a guide to follow up care for the patient and their care team, and place any screening or diagnostic tests recommended for the screening of post-transplant complications based on patient risk and transplant type. All recommendations are outlined in the note below. Any tests not ordered during this visit are traditionally performed by the patient's PCP. All patients are advised to schedule a  "routine preventative care or \"Well Visit\" with their PCP if they have not done so already in the past year since transplant, to discuss these current and future recommendations.    Hematology/Oncology Treatment History: Obtained from the medical record.    PCP: WILMER Valentino     Hematology / Oncology Summary:      Diagnosis: multiple myeloma      Stage: intermediate       Molecular: There was no evidence of the high-risk chromosomal abnormalities [del(17p), t(4;14), t(14;16)] as defined by the   International Myeloma Working Group (J Clin Oncol 2015, 33:2863-9), or the high-risk chromosomal   abnormalities [del(17p), t(4;14), t(14;16), t(14;20), 1q gain] as defined by the [facility]         Oncology history:  Admitted 8/18-8/23/2023 with back pain, chest pain, found to have L 8th rib Fx and T7  vertebral compression, without any Hx of a fall. Also had Acute Kidney Injury. Seen by neurosurgery; did not have neuro deficits, and there was no cord compression, so no surgery recommended. Given TLSO brace for next 3 months.     Completed XRT to T5-%8 11/2-11/8/2023          Radiation Oncology Treatment Summary      Name: Rosario Terrazas    MRN: [id number]         Diagnosis: Multiple Myeloma      Requesting  Physician:  Mika Ellison MD              External Radiation   Area Treated Beam Energy  Total Dose cGy Fraction Dose cGy No. Rx Dates From Dates      To   Spine (T5-T8) (1a-c) 10/15 MV 2000 400 5 11/02/23 11/08/23         Started VRd 9/20/2023.        Oncology Flowsheet       Day, Cycle bortezomib 2.5 mg/mL (VELCADE) subcutaneous   9/20/2023 Day 1, Cycle 1  1.3 mg/m2 = 2.8 mg    9/27/2023 Day 8, Cycle 1  1.3 mg/m2 = 2.8 mg    10/4/2023 Day 15, Cycle 1  1.3 mg/m2 = 2.8 mg    10/12/2023 Day 1, Cycle 2   1.3 mg/m2 = 2.8 mg    10/19/2023 Day 8, Cycle 2 1.3 mg/m2 = 2.8 mg    10/26/2023 Day 15, Cycle 2 1.3 mg/m2 = 2.8 mg    11/2/2023  Day 1, Cycle 3 1.3 mg/m2 = 2.8 mg   11/9/2023 Day 8, Cycle 3 1.3 mg/m2 = 2.8 " mg   11/19/2023 Day 15, Cycle 3 1.3 mg/m2 = 2.8 mg   11/21/2023  Day 1, Cycle 4 1.3 mg/m2 = 2.8 mg   12/1/2023 Day 8, Cycle 4 1.3 mg/m2 = 2.8 mg   12/8/2023 Day 15, Cycle 4 1.3 mg/m2 = 2.8 mg   12/14/2023  Day 1, Cycle 5 1.3 mg/m2 = 2.8 mg   12/21/2023 Day 8, Cycle 5 1.3 mg/m2 = 2.8 mg   12/29/2023 Day 15, Cycle 5 1.3 mg/m2 = 2.8 mg   1/5/2024 Day 1, Cycle 6 1.3 mg/m2 = 2.8 mg   1/12/2024 Day 8, Cycle 6 1.3 mg/m2 = 2.8 mg   1/18/2024 Day 15, Cycle 6 1.3 mg/m2 = 2.8 mg   1/24/2024  Day 1, Cycle 7 1.3 mg/m2 = 2.8 mg   2/2/2024 Day 8, Cycle 7 1.3 mg/m2 = 2.8 mg   2/9/2024 Day 15, Cycle 7 1.3 mg/m2 = 2.8 mg      BMT/IEC PROTOCOL for standard risk MM Auto PBSCT  3/12/2024  Day -1 Melphalan 200 mg/m2   3/13 Day 0 Transplant, cell total post wash 2.23 x 10^6 CD34 + cells/kg (pre freeze dose: 5.36 x 10 ^6 CD34+ cells/kg)    Day +28 restaging @ [facility] shows VGPR with M-spike 0.1, normal K/L ratio.   Admitted 3/21 with N/F and GNB+ sepsis    Day +100 restaging @ [facility] remains in VGPR - M-spike 0.1, normal K/L ratio. Negative BM and PET/CT      6/24/2024 started maintenance lenalidomide (Revlimid) 10 mg daily 21/28 days  7/23/2024 starting maintenance daratumumab in addition to lenalidomide        Oncology Flowsheet       Day, Cycle daratumumab-HYALURONIDASE (DARZELEX FASPRO) subcutaneous   7/23/2024 Day 1, Cycle 1  1,800 mg    7/30/2024 Day 8, Cycle 1 1,800 mg   8/6/2024 Deferred, fever     8/13/2024  Day 15, Cycle 1 1,800 mg   8/20/2024  Day 22, Cycle 1 1,800 mg   8/27/2024 Day 1, Cycle 2  1,800 mg   9/3/2024 Day 8, Cycle 2 1,800 mg   9/10/2024 Day 15, Cycle 2  1,800 mg   9/17/2024 Day 22, Cycle 2 1,800 mg   -------#8-------   10/1/2024 Day 1, Cycle 3 1,800 mg   10/15/2024 Day 15, Cycle 3  1,800 mg   10/29/2024 Day 1,Cycle 4  1,800 mg   11/11/2024 Day 15, Cycle 4  1,800 mg   11/25/2024 Day 1, Cycle 5 1,800 mg   12/9/2024  Day 15, Cycle 5 1,800 mg   12/23/2024 Day 1, Cycle 6 1,800 mg   1/6/2025 Day 15, Cycle 6 1,800 mg   1/21/2025  Day 1, Cycle 7 1,800 mg                  Oncology Flowsheet     zoledronic acid (ZOMETA) IV   10/12/2023 4 mg    11/9/2023 4 mg    12/8/2023 4 mg    1/5/2024 4 mg    2/2/2024 4 mg    5/21/2024 4 mg    6/24/2024 4 mg    7/23/2024 4 mg    8/20/2024 4 mg    9/17/2024 4 mg    10/29/2024 4 mg    11/25/2024 4 mg         Medical Decision Making:   IgD kappa multiple myeloma.   Dx 8/2023. She presented with pathologic fracture at T7 and L 8th rib, anemia, renal insufficiency.  Fortunately her calcium is normal.  She does have overt proteinuria with protein to creatinine ratio of 3.63.  NT proBNP is normal.  No symptoms of neuropathy.  SPEP indicates this is an IgD kappa multiple myeloma with a high kappa free light chain. An  IgD kappa myeloma is a rare subtype of myeloma. Prognosis of IgD subtype is worse than others.      PET CT 9/5/2023 shows path fractures at T7, but no FDG avid myeloma lesions overall. They can see lytic change in L 7th rib. There is diffuse marrow change c/w myeloma.      BMBx 8/30/2023 confirmed Dx of multiple myeloma, with 60% marrow involvement. FISH was negative for any high risk cytogenetic mutations. [place] red stain negative for amyloid.      She tested negative for hepatitis B and for HIV and strongyloides. We have had her start acyclovir for VZV prophylaxis while on VELCADE. We have had her start SMP/TMX for  ppx. Has latent TB. No sign of active TB on CT scans. Seen in TB clinic, started INH/pyridoxine x 9 mo.         She had significant pain in her left posterior back/rib region.  She has a T7 compression fracture from myeloma and a left eighth posterior rib fracture.   With a TLSO brace, pain gradually improving.  No sign of neurologic deficits.  In 9/20/2023 and 10/20/2023 she was unable to lay flat for positioning for radiation, but completed radiation 11/2/2023 - 11/8/2023 (2000 cGy)     Started cycle # 1 VRd 9/20/2023. Started the REVLIMID 9/30/2023. Changed from ASA to low dose  XARELTO 10mg/d due to decreased mobility while on REVLIMID. Denies bleeding. Tolerating Tx well overall. No PN from VELCADE. No diabetes from dexamethasone. HGB improved. Acute Kidney Injury resolved. In 12/2023, displaying better mobility, and no sign of previous DVT, I had her stop Xarelto and switch back to aspirin 81 mg daily.      Had ASCT consult on [facility] with Dr Rishi Rocha on 12/20/2023. They discussed possible ASCT at [facility] followed by maintenance Tx.      Scheduling for autologous stem cell transplant took some time, we ultimately completed cycle #7 of Velcade Revlimid dexamethasone in 2/2024.     Had BMBx at [facility]  2/21/2024 showing 5% kappa restricted plasma cells.      At [facility], she received high dose melphalan 3/12/2024, stem cell reinfusion 3/13/2024.  Day 28 lab @ [facility] showed 0.1 g M protein consistent with VGPR response.      Admitted to [facility] 3/22/2024-4/2/2024 with neutropenic fever.  I am having difficulty finding a discharge summary due to Care Everywhere challenges.  Piecing together various notes, it appears her blood cultures grew E. coli and strep mitis.  Had colitis on CT scan.  Stool showed Blastocystis hominis.  Had atrial fibrillation with RVR and hypotension, spent 2 days in the MICU.  Treated initially with amiodarone, then metoprolol.  Arrhythmia resolved, tapered off metoprolol now.  Had transaminitis, viral studies came back negative.  No sign of CMV.  EBV negative.  LFT elevations resolved with discontinuation of amiodarone.       Had abnormal thyroid ultrasound at [facility]  in 2/2024, initial FNA in 2/2024 showed atypia of undetermined significance.  Had [facility] endocrine consult 4/16/2024.  TSH normal.  Repeat FNA at [facility]  5/10/2024 nondiagnostic.  [facility] recommends referral to surgery.     Had cardiology follow-up at [facility] 5/16/2024.  Outpatient Zio patch did not show any episodes of recurrent atrial fibrillation or SVT.   "Staying in normal sinus rhythm, Lopressor reduced from 25 twice daily to 12.5 twice daily x 1 week and then discontinued.  No further cardiology follow-up deemed necessary.     Had PET/CT at [facility] 6/12/2024 showing no hypermetabolic typically except in the right thyroid nodule inferiorly.  Had bone marrow biopsy at [facility] 6/12/2024 showing no morphologic evidence of residual multiple myeloma, normocellular 40% marrow.  Peripheral blood smear showed macrocytosis .  SPEP at [facility] showed 2 very small monoclonal protein 0.1, 0.1.  PIERO at [facility] showed 3 different IgG kappa monoclonal proteins, no evidence of initial IgD kappa monoclonal protein.  Immunoglobulin free light chain analysis was normal 6/12/2024..       Dr. Rocha recommends maintenance with daratumumab and lenalidomide (Revlimid). Last visit with him was 9/11/2024, day 182 S/P Elena-ASCT.  We  started the lenalidomide (Revlimid) 10 mg, 21/28 days, on 6/24/2024.  The lenalidomide dose can be 10 mg daily for 21/28 days for the first 3 months, and if well-tolerated, might be increased to 15 Mg daily for 21/28 days thereafter.  We started the daratumumab 7/23/2024. Since she never received daratumumab as part of initial induction, so we gave it weekly x 8 doses, then changed to giving it every other week x 8 doses, will then give it monthly until disease progression.      She developed a fever with very mild neutropenia 8/4, hospitalized 8/4-8/7/2024, treated with empiric IV antibiotics but workup was negative.  Afebrile within 2 days.  Afebrile since discharge.  On 8/13, resumed the treatment \"day 15 cycle 1\" daratumumab.     Local oncologist started her on daratumumab/Revlimid maintenance treatment 7/23/2024.  This includes Revlimid 10 mg daily 21/28 days/month, and daratumumab weekly for 8 doses, then every other week for 8 additional doses, and then monthly. Changed to monthly daratumumab after 1/21/2025.  We continue this maintenance treatment for at least 2 " years or until disease progression, ( suspect literature will evolve on length of maintenance treatment). We can reduce her dexamethasone dose now. Current rev cycle: 2/8-2/28/2025.      Received ZOMETA on 11/25/2024. Has had 12 doses, now on q 3 mo dosing for 4 doses.     HPI:   Bilateral shoulder pain  Eating & Drinking  Lives with daughter Katherin, who is her primary care giver  Capable of basic ADLs   Unable to cook or carry out shopping  Does not drive  [place] is good, grateful, optimistic she will be healed completely           Medical History:  Past Medical History:   Diagnosis Date     Multiple myeloma in remission (H)      Neuropathy      Osteoarthritis      Paroxysmal atrial fibrillation (H)      SVT (supraventricular tachycardia)      TB lung, latent      Thyroid nodule        Surgical History:  Past Surgical History:   Procedure Laterality Date     AS TOTAL KNEE ARTHROPLASTY Left 2023     BONE MARROW BIOPSY, BONE SPECIMEN, NEEDLE/TROCAR Right 3/13/2025    Procedure: BIOPSY, BONE MARROW;  Surgeon: Reginald Kyle PA-C;  Location: [facility] OR     INSERT CATHETER VASCULAR ACCESS Left 03/06/2024    Procedure: central venous catheter tunneled line Insert vascular access;  Surgeon: Onel Leonardo MD;  Location: [facility] OR     IR CVC TUNNEL PLACEMENT > 5 YRS OF AGE  03/06/2024     IR CVC TUNNEL REMOVAL LEFT  04/10/2024       Family History:  Family History   Problem Relation Age of Onset     Anesthesia Reaction No family hx of      Deep Vein Thrombosis (DVT) No family hx of        Social History:  Social History     Socioeconomic History     Marital status: Patient Declined     Spouse name: Not on file     Number of children: Not on file     Years of education: Not on file     Highest education level: Not on file   Occupational History     Not on file   Tobacco Use     Smoking status: Never     Passive exposure: Never     Smokeless tobacco: Never   Substance and Sexual Activity     Alcohol use: Never     Drug  use: Never     Sexual activity: Not on file   Other Topics Concern     Not on file   Social History Narrative     Not on file     Social Drivers of Health     Financial Resource Strain: High Risk (8/5/2024)    Received from [facility]    Overall Financial Resource Strain (CARDIA)      Difficulty of Paying Living Expenses: Hard   Food Insecurity: No Food Insecurity (8/5/2024)    Received from [facility]    Hunger Vital Sign      Worried About Running Out of Food in the Last Year: Never true      Ran Out of Food in the Last Year: Never true   Transportation Needs: No Transportation Needs (8/5/2024)    Received from [facility]    PRAPARE - Transportation      Lack of Transportation (Medical): No      Lack of Transportation (Non-Medical): No   Physical Activity: Not on file   Stress: Not on file   Social Connections: Not on file   Interpersonal Safety: Low Risk  (3/13/2025)    Interpersonal Safety      Do you feel physically and emotionally safe where you currently live?: Yes      Within the past 12 months, have you been hit, slapped, kicked or otherwise physically hurt by someone?: No      Within the past 12 months, have you been humiliated or emotionally abused in other ways by your partner or ex-partner?: No   Housing Stability: Low Risk  (8/5/2024)    Received from [facility]    Housing Stability      What is your housing situation today?: 3 - I have housing       Survivorship Care Plan:     This individualized care plan is designed to inform you and your healthcare team of the recommended follow-up visits, tests, health maintenance activities, and cancer screening you should receive after transplant.     General Health Maintenance:   Vaccinations should be given at 1 and 2 years after your transplant, these may be given at your annual anniversary visits in the BMT clinic, by your primary care provider, or your local oncologist. An exception is the influenza vaccine, which can be  given after day +60 post-transplant during influenza season. See table below for more information.   You can receive the COVID19 vaccine if you have not already, for more information on how to sign up for an appointment you can the [place] vaccine website at [email or web address] or the         [place] Department of Health Vaccine Connector portal at https://[place].gov/covid19/vaccine/connector/connector.jsp  For general health concerns you can be seen by your primary care provider.   If you have questions about your transplant or follow up tests contact your BMT RN coordinator.        Vaccine Administration Schedule       Vaccinations Post-BMT: Please refer to our [place] BMT Vaccination Guidelines updated in March of 2021.         Immune System:  Risks Preventative Measures Recommendations   Infections Symptoms of a cold such as fever, cough, congestion, and shortness of breath should be reported to your primary care provider  Immunizations will be administered at 1 & 2 years after transplant. See table above. Discussed vaccine schedule. Likely to receive 1-year vaccines in BMT clinic on 3/19/2025.    Will receive vaccine boosters in two months at local oncology clinic. Will send message with the vaccine schedule to Dr. Oropeza     Eyes:   Risks Preventative Measures Recommendations   Cataracts, dry eyes, viral infections, and other eye changes Yearly eye exam   Screening and treatment for high blood pressure or diabetes  Call if you have eye pain, visual changes, or floaters immediately Patient will schedule an annual routine exam with community ophthalmologist    Has not had her eyes examined since transplant. Her daughter will schedule an appointment.     Mouth:  Risks Preventative Measures Recommendations   Dry mouth, cavities, and oral cancer You can use over the counter Biotene for dry mouth or try sucking on sour candy before meals  Get a dental checkup every year and a  cleaning every 6 months  If you have a prosthetic heart valve or central venous catheter or  port , you may need to take an antibiotic before your dental visits None at this time, patient has dental provider and will schedule routine exam    Sees dentist every 6 months and has been following oral hygiene recommendations. Brushes her teeth twice daily.         Lungs  Risks Preventative Measures Recommendations   Changes in function from chemotherapy or radiation; lung infections (pneumonia) Tell your provider about difficulty breathing, a cough, or new shortness of breath   Avoid use of tobacco products or smoking  Routine lung exams Pulmonary Function Tests (Recommended annually post-transplant)    Placed order for PFT test. Schedulers will contact Katherin to schedule the test at a local [employer] facility closer to their place of residence.       Heart and Blood Vessels  Heart Disease Risk Score: The ASCVD Risk score (Maggie TESFAYE, et al., 2019) failed to calculate for the following reasons:    Cannot find a previous HDL lab    Cannot find a previous total cholesterol lab  Risks Preventative Measures Recommendations   Damage from chemotherapy and/or radiation; early development of heart valve disease  Ask your provider if you should receive consultation from a cardiologist (heart doctor) or have special screening  Follow a  heart healthy  lifestyle. For more information visit the National Heart, Lung, and Blood [place] website at: https://www.nhlbi.nih.gov/health/health-topics/topics/heart-healthy-lifestyle-changes   Don t smoke. If you currently smoke and are ready to quit, we can help you find ways to quit  Maintain a healthy weight  Exercise regularly  Avoid foods that have high amounts of:  Salt/sodium (less than 2,300 mg of sodium per day)  Saturated and trans fats  Limit alcohol to less than 1 drink for women and 2 drinks for men per day  Sugar such as soft drinks or sugary snacks Fasting Lipid Profile or Direct  LDL (Recommended annually)       Hormones  Risks Preventative Measures Recommendations   Low thyroid function, low function of other glands (adrenals and others) Certain endocrine/hormone disorders are more common after transplant. For this reason, talk to your provider about:  Fatigue  Muscle weakness  Changes in cold tolerance  Reduced interest in sex  Erectile dysfunction   Certain tests may be used to monitor for hormone changes if you are experiencing symptoms. These tests are done at 1 year TSH with T4 reflex (Recommended 1 & 2 years post-transplant), Fasting Glucose (Recommended 6 mos & annually post-transplant), and Hgb A1C (Recommended annually post-transplant)    Placed order for TSH and had lab appointment added to clinic visit scheduled for tomorrow.       Liver:  Risks Preventative Measures Recommendations    Damage from chemotherapy or other drugs, buildup of iron from blood transfusions, and infections Certain tests may be ordered at your next survivorship visit to evaluate liver function based on your individual risk factors.  Talk to your provider before taking herbal supplements or over the counter drugs like Tylenol.  Ask your doctor if you should be treated for iron overload  Avoid alcohol in excess   Hepatic Panel/LFTs (Recommended every 3-6 mos the 1st year post-transplant & annually) and Serum Ferritin (Recommended 1 year post-transplant)    Of note, she is taking Tylenol twice daily for her bilateral shoulder pain.     Kidneys and Bladder:  Risks Preventative Measures Recommendations   Damage from chemotherapy or other drugs, infections, high blood pressure Monitoring blood tests of kidney function during follow up visits  Treating high blood pressure and diabetes   Drink adequate amounts of water  Report symptoms of infection such as frequent urination, pain with urination, foul odor to urine, or blood in the urine  Talk to your provider before taking herbal supplements or over the counter  drugs like Ibuprofen Serum creatinine checked as part of anniversary labs or at least annually by primary provider       Nervous System:  Risks Preventative Measures Recommendations   Neuropathy from chemotherapy, changes in cognitive function Report changes in sensation or discomfort in feet or hands  Tell your provider about ongoing changes in memory, ability to concentrate, or inability to make decisions   None at this time       Muscle & Connective Tissue  Risks Preventative Measures Recommendations   Reduced muscle strength & stamina Let your provider know if:  You notice changes in your muscle strength  Require extra assistance with daily activities  Need help creating an exercise routine  Notice reduced range of motion of the arms, hips, or legs  Follow general guidelines for physical activity as recommended by the Office of Disease Prevention & Health Promotion:    Avoid Inactivity  Some physical activity is better than none -- any amount has benefits.    Do Aerobic Activity  Do aerobic physical activity in episodes of at least 10 minutes, as many times as possible per day. This could include going for walks or using the elliptical or stationary bike.  Ask your doctor what aerobic activities would be safe and helpful for you, and set a goal for yourself!    Strengthen Muscles  Do muscle-strengthening activities (such as lifting light weights or using resistance bands and/or going up and down stairs) that are moderate or high intensity and involve all major muscle groups at least 4 days a week. Bone Scan (Recommended 1 year) and Calcium & Vitamin D Levels (Recommended annually)      Order for physical therapy previously placed but have not contacted Katherin yet to schedule her first appointment.         Emotional Health  Risks Preventative Measures Recommendations   Stress, depression, anxiety Talk to your provider about:  Changes in feelings, mood, or emotional wellbeing  Interest in support groups  If you are  concerned about your caregivers emotional wellbeing  Desire to speak with a counselor for ongoing support None at this time    She has family and a good support network.       Sexual Health  Risks Preventative Measures Recommendations   Reduced libido due to hormonal changes, erectile dysfunction, vaginal dryness, and sexually transmitted diseases  Talk to your provider about:  Reduced libido or concerns regarding your sexual health  Men: Erectile dysfunction   Women: Vaginal dryness, pain during intercourse, vaginal bleeding after intercourse, changes in vaginal discharge that may indicate infection (green/white/foul odor)   General Recommendations:  It is safe to have sex if your platelet count is > 50,000 and you feel physically and emotionally ready   Women can use water-based lubricants to reduce discomfort from dryness. Prescription topical estrogen may help as well.  Use barrier protection such as condoms to prevent sexually transmitted diseases, you are at higher risk for infections due to a weakened immune system    For more information check out the National Mar[employer] Program web page on this topic:  https://bethematch.org/patients-and-families/life-after-transplant/coping-with-life-after-transplant/relationships-and-sexual-health/  None at this time         Cancer Screening:  Risks Preventative Measures Recommendations   Higher risk for the development of solid tumors, PTLD, and blood cancers Preform a full self skin exam monthly to assess for any changes in moles or signs of skin cancer  Minimize excessive sun exposure and wear sunscreen  Women should preform breast-self exams and report any changes in such as a new lump, discharge from nipples, and red or dimpled areas of the breast.   All women should be screened for breast cancer following the guidelines below:  Average Risk (no radiation exposure)   Age 20-40 years: Annual clinical breast exam  Age >40 years: Annual clinical breast exam & annual  mammogram  High Risk (radiation exposure) starting at age 25 years or 8 years after radiation therapy/TBI, whichever comes first, but no later than age 40 years:  Annual clinical breast exam  Annual mammogram  AND Annual breast MRI  Screening for colorectal cancer should begin at age 50.   All women should have a pap smear, assessment for vaginal GVHD, and HPV DNA test every year beginning at age 21  Men should perform a monthly testicular self-exam if they have been exposed to radiation as part of their cancer treatment.    Mammogram (Women, 1 year and annually based on radiation exposure/age), Fecal Occult Blood Test (Recommended annually), Colonoscopy (Recommended every 10 years after the age of 50), and Dermatology Referral (Annual skin exam or evaluation of lesion)    Katherin will call to schedule a mammogram for Rosario.    Katherin and Rosario will schedule an appointment with her PCP Dr. Maya and will discuss plan for future colorectal cancer screening (Katherin was unsure of the last colonoscopy Rosario had).     Order placed to dermatology for a full skin check. Recommend she continues this annually.        Recommended Tests & Follow-Up:  Referrals & Tests Ordered Today:  Your BMT physician will review these tests and referral results. If you require treatment based on the results, a member of the BMT team will contact you.  Orders placed today:  No orders of the defined types were placed in this encounter.    (Note to providers: After signing orders use the refresh tool in the note window to display results)   Future Tests/Referrals:   All recommendations above should be completed within 2 months either by your primary care provider or local oncologist (cancer doctor).   Recommendations that were not ordered today should be completed by your:  Primary Care Provider     KIKO Mc CNP    I spent 40 minutes with the patient and family, over half of which was spent discussing preventative care  strategies, self-management practices, and potential complications after transplant.      Information used for these recommendations was obtained from: RAN Mullins., MAURA Moreno, INDU Luu, KHAI Alas, ANDREW Lance, ANDREW Kramer.,   Perla, CESIA PEREZ. (2013). Prevalence of Hematopoietic Cell Transplant Survivors in the [place]. Biology of Blood and Marrow Transplantation?: Journal of the American Society for Blood and Marrow Transplantation, 19(10), 2294-9763. doi 10.1016/j.bbmt.2013.07.020          KIKO Mc CNP

## 2025-03-19 ENCOUNTER — OFFICE VISIT (OUTPATIENT)
Dept: TRANSPLANT | Facility: CLINIC | Age: 70
End: 2025-03-19
Attending: SPECIALIST
Payer: COMMERCIAL

## 2025-03-19 VITALS
OXYGEN SATURATION: 100 % | BODY MASS INDEX: 31.85 KG/M2 | WEIGHT: 191.4 LBS | TEMPERATURE: 97.9 F | RESPIRATION RATE: 16 BRPM | HEART RATE: 74 BPM | SYSTOLIC BLOOD PRESSURE: 93 MMHG | DIASTOLIC BLOOD PRESSURE: 63 MMHG

## 2025-03-19 DIAGNOSIS — C90.01 MULTIPLE MYELOMA IN REMISSION (H): Primary | ICD-10-CM

## 2025-03-19 PROCEDURE — 250N000011 HC RX IP 250 OP 636: Performed by: INTERNAL MEDICINE

## 2025-03-19 PROCEDURE — G0009 ADMIN PNEUMOCOCCAL VACCINE: HCPCS | Performed by: INTERNAL MEDICINE

## 2025-03-19 PROCEDURE — 90697 DTAP-IPV-HIB-HEPB VACCINE IM: CPT | Performed by: INTERNAL MEDICINE

## 2025-03-19 PROCEDURE — 90472 IMMUNIZATION ADMIN EACH ADD: CPT | Performed by: INTERNAL MEDICINE

## 2025-03-19 PROCEDURE — 90480 ADMN SARSCOV2 VAC 1/ONLY CMP: CPT | Performed by: INTERNAL MEDICINE

## 2025-03-19 PROCEDURE — 90677 PCV20 VACCINE IM: CPT | Performed by: INTERNAL MEDICINE

## 2025-03-19 PROCEDURE — G0463 HOSPITAL OUTPT CLINIC VISIT: HCPCS | Mod: 25 | Performed by: INTERNAL MEDICINE

## 2025-03-19 PROCEDURE — 90750 HZV VACC RECOMBINANT IM: CPT | Performed by: INTERNAL MEDICINE

## 2025-03-19 PROCEDURE — 91320 SARSCV2 VAC 30MCG TRS-SUC IM: CPT | Performed by: INTERNAL MEDICINE

## 2025-03-19 PROCEDURE — 250N000021 HC RX MED A9270 GY (STAT IND- M) 250: Performed by: INTERNAL MEDICINE

## 2025-03-19 RX ORDER — DIPHENHYDRAMINE HYDROCHLORIDE 50 MG/ML
50 INJECTION, SOLUTION INTRAMUSCULAR; INTRAVENOUS
Status: DISCONTINUED | OUTPATIENT
Start: 2025-03-19 | End: 2025-03-19 | Stop reason: HOSPADM

## 2025-03-19 RX ORDER — HEPARIN SODIUM (PORCINE) LOCK FLUSH IV SOLN 100 UNIT/ML 100 UNIT/ML
5 SOLUTION INTRAVENOUS
OUTPATIENT
Start: 2025-05-14

## 2025-03-19 RX ORDER — DIPHENHYDRAMINE HCL 25 MG
50 CAPSULE ORAL
Status: DISCONTINUED | OUTPATIENT
Start: 2025-03-19 | End: 2025-03-19 | Stop reason: HOSPADM

## 2025-03-19 RX ORDER — EPINEPHRINE 1 MG/ML
0.3 INJECTION, SOLUTION INTRAMUSCULAR; SUBCUTANEOUS EVERY 5 MIN PRN
Status: DISCONTINUED | OUTPATIENT
Start: 2025-03-19 | End: 2025-03-19 | Stop reason: HOSPADM

## 2025-03-19 RX ORDER — HEPARIN SODIUM,PORCINE 10 UNIT/ML
5-20 VIAL (ML) INTRAVENOUS DAILY PRN
OUTPATIENT
Start: 2025-05-14

## 2025-03-19 RX ADMIN — DIPHTHERIA AND TETANUS TOXOIDS AND ACELLULAR PERTUSSIS, INACTIVATED POLIOVIRUS, HAEMOPHILUS B CONJUGATE AND HEPATITIS B VACCINE 0.5 ML: 15; 5; 20; 20; 3; 5; 29; 7; 26; 10; 3 INJECTION, SUSPENSION INTRAMUSCULAR at 09:03

## 2025-03-19 RX ADMIN — ZOSTER VACCINE RECOMBINANT, ADJUVANTED 0.5 ML: KIT at 09:05

## 2025-03-19 RX ADMIN — COVID-19 VACCINE, MRNA 30 MCG: 0.04 INJECTION, SUSPENSION INTRAMUSCULAR at 08:56

## 2025-03-19 RX ADMIN — PNEUMOCOCCAL 20-VALENT CONJUGATE VACCINE 0.5 ML
2.2; 2.2; 2.2; 2.2; 2.2; 2.2; 2.2; 2.2; 2.2; 2.2; 2.2; 2.2; 2.2; 2.2; 2.2; 2.2; 4.4; 2.2; 2.2; 2.2 INJECTION, SUSPENSION INTRAMUSCULAR at 09:00

## 2025-03-19 ASSESSMENT — PAIN SCALES - GENERAL: PAINLEVEL_OUTOF10: SEVERE PAIN (8)

## 2025-03-19 NOTE — PROGRESS NOTES
BMT Clinic Progress Note   Mar 19, 2025     Patient ID:  Rosario Terrazas is a 69 year old female, currently day +371 s/p Elena-ASCT for IgD kappa MM.     INTERVAL  HISTORY     Rosario is seen with her daughter who is translating today. Continues to do well. Feeling more independent. Doing more things around the house. Planning a trip to [place].     Review of Systems: ROS negative except as noted above.     PHYSICAL EXAM      Wt Readings from Last 4 Encounters:   03/19/25 86.8 kg (191 lb 6.4 oz)   03/18/25 83.5 kg (184 lb)   03/13/25 83.5 kg (184 lb)   03/06/25 85.3 kg (188 lb)     BP 93/63 (BP Location: Right arm, Patient Position: Sitting, Cuff Size: Adult Regular)   Pulse 74   Temp 97.9  F (36.6  C) (Oral)   Resp 16   Wt 86.8 kg (191 lb 6.4 oz)   SpO2 100%   BMI 31.85 kg/m      General: NAD; sitting in chair.   Lymph: no LE edema  Neuro: A&O, moving all extremities spontaneously     LABS AND IMAGING - PAST 24 HOURS     Lab Results   Component Value Date    WBC 3.2 (L) 03/13/2025    ANEU 3.0 04/02/2024    HGB 12.3 03/13/2025    HCT 36.1 03/13/2025     03/13/2025     03/13/2025    POTASSIUM 3.6 03/13/2025    CHLORIDE 105 03/13/2025    CO2 26 03/13/2025    GLC 86 03/13/2025    BUN 10.3 03/13/2025    CR 0.65 03/13/2025    MAG 1.8 03/13/2025    INR 1.40 (H) 04/10/2024     M-spike 0.1, kappa FLC 1.0, lambda FLC 0.76, K/L ratio 1.34.     ASSESSMENT/PLAN   Rosario Terrazas is a 69 year old female, currently day +371 s/p s/p Elena-ASCT for IgD kappa MM.    BMT/IEC PROTOCOL for standard risk MM Auto PBSCT  3/12 Day -1 Melphalan 200 mg/m2   3/13 Day 0 Transplant, cell total post wash 2.23 x 10^6 CD34 + cells/kg (pre freeze dose: 5.36 x 10 ^6 CD34+ cells/kg)    Day +28 restaging shows VGPR with M-spike 0.1, normal K/L ratio.  D +100 remains in VGPR - M-spike 0.1, normal K/L ratio. Negative BM and PET/CT.  Day 180 - in CR  Day 365: CR, no MRD by our flow. Clonoseq pending.  - Continue maintenance  Daratumumab + Revlimid.  - Restaging  per protocol.    # Latent TB  - Supposed to have completed therapy per [facility] ID    # PPx: Acyclovir. Stop bactrim.    CV  # Afib/SVT  Paroxysmal afib with RVR and hypotension requiring transfer to MICU after transplant. Following with cardiology. On metoprolol.    Endocrine  # Thyroid FNA Atypia of undetermined significance diagnosis has a 22 (13-30)% risk of malignancy. Repeated 5/10/2024, non-diagnostic.  Persistence FDG avidity on surveillance PET/CT.   - Repeat biopsy or excision, molecular testing, diagnostic lobectomy is recommended.  Seen endo at [facility] and recommended US follow up.    40 minutes spent on the date of the encounter doing chart review, interpretation of results, patient visit, documentation and coordination of care.

## 2025-03-19 NOTE — NURSING NOTE
"Oncology Rooming Note    March 19, 2025 8:19 AM   Rosario Terrazas is a 69 year old female who presents for:    Chief Complaint   Patient presents with    Oncology Clinic Visit     RTN MM     Initial Vitals: BP 93/63 (BP Location: Right arm, Patient Position: Sitting, Cuff Size: Adult Regular)   Pulse 74   Temp 97.9  F (36.6  C) (Oral)   Resp 16   Wt 86.8 kg (191 lb 6.4 oz)   SpO2 100%   BMI 31.85 kg/m   Estimated body mass index is 31.85 kg/m  as calculated from the following:    Height as of 3/18/25: 1.651 m (5' 5\").    Weight as of this encounter: 86.8 kg (191 lb 6.4 oz). Body surface area is 2 meters squared.  Severe Pain (8) Comment: Data Unavailable   No LMP recorded. Patient is postmenopausal.  Allergies reviewed: Yes  Medications reviewed: Yes    Medications: Medication refills not needed today.  Pharmacy name entered into [employer]:    [facility] - [place], [place] - [address] [place] [address] [phone]  [facility] - [place], [place] - [address] [place] [address]    Frailty Screening:   Is the patient here for a new oncology consult visit in cancer care? 2. No    PHQ9:  Did this patient require a PHQ9?: No      Clinical concerns: Would like to  know if will be getting vaccines today.       Alva Levin             "

## 2025-03-19 NOTE — NURSING NOTE
Administered COVID 19 and Prevnar 20 vaccinations in the left deltoid without incidence. Administered Vaxelis and Shingrix vaccinations in the right deltoid without incidence. Patient tolerated vaccinations well. Provided with written education sheet and instructed patient to remain in clinic for 15 minutes after vaccination. Discussed possible side effects. Discussed signs of hypersensitivity and how to alert staff. Patient and daughter verbalized understanding. Patient discharged in stable condition.     Sarai Caballero RN on 3/19/2025 at 9:12 AM

## 2025-03-20 DIAGNOSIS — C90.01 MULTIPLE MYELOMA IN REMISSION (H): ICD-10-CM

## 2025-03-20 DIAGNOSIS — Z94.84 H/O AUTOLOGOUS STEM CELL TRANSPLANT (H): ICD-10-CM

## 2025-03-20 LAB
DLCOUNC-%PRED-PRE: 104 %
DLCOUNC-PRE: 19.42 ML/MIN/MMHG
DLCOUNC-PRED: 18.53 ML/MIN/MMHG
ERV-%PRED-PRE: 46 %
ERV-PRE: 0.45 L
ERV-PRED: 0.95 L
EXPTIME-PRE: 3.41 SEC
FEF2575-%PRED-PRE: 79 %
FEF2575-PRE: 1.38 L/SEC
FEF2575-PRED: 1.75 L/SEC
FEFMAX-%PRED-PRE: 51 %
FEFMAX-PRE: 2.86 L/SEC
FEFMAX-PRED: 5.51 L/SEC
FEV1-%PRED-PRE: 77 %
FEV1-PRE: 1.56 L
FEV1FEV6-PRE: 77 %
FEV1FEV6-PRED: 79 %
FEV1FVC-PRE: 75 %
FEV1FVC-PRED: 79 %
FEV1SVC-PRE: 64 %
FEV1SVC-PRED: 67 %
FIFMAX-PRE: 2.81 L/SEC
FRCPLETH-%PRED-PRE: 88 %
FRCPLETH-PRE: 2.47 L
FRCPLETH-PRED: 2.79 L
FVC-%PRED-PRE: 81 %
FVC-PRE: 2.07 L
FVC-PRED: 2.54 L
IC-%PRED-PRE: 103 %
IC-PRE: 1.97 L
IC-PRED: 1.9 L
RVPLETH-%PRED-PRE: 100 %
RVPLETH-PRE: 2.03 L
RVPLETH-PRED: 2.01 L
TLCPLETH-%PRED-PRE: 93 %
TLCPLETH-PRE: 4.45 L
TLCPLETH-PRED: 4.77 L
VA-%PRED-PRE: 91 %
VA-PRE: 4.05 L
VC-%PRED-PRE: 80 %
VC-PRE: 2.42 L
VC-PRED: 3 L

## 2025-03-22 LAB
DEPRECATED CALCIDIOL+CALCIFEROL SERPL-MC: <58 UG/L (ref 20–75)
VITAMIN D2 SERPL-MCNC: <5 UG/L
VITAMIN D3 SERPL-MCNC: 53 UG/L

## 2025-04-23 ENCOUNTER — ANCILLARY PROCEDURE (OUTPATIENT)
Dept: ULTRASOUND IMAGING | Facility: CLINIC | Age: 70
End: 2025-04-23
Attending: INTERNAL MEDICINE
Payer: COMMERCIAL

## 2025-04-23 DIAGNOSIS — E04.2 MULTIPLE THYROID NODULES: ICD-10-CM

## 2025-04-23 PROCEDURE — 76536 US EXAM OF HEAD AND NECK: CPT

## 2025-07-18 NOTE — PROGRESS NOTES
BMT Daily Progress Note   03/26/2024    Patient ID:  Rosario Terrazas is a 68 year old female, currently day 13 s/p auto for MM readmitted 3/21 with N/F and GNB+ sepsis.    INTERVAL  HISTORY   Rosario is feeling a bit better this morning -- Nausea has resolved and she has not had a BM since yesterday. Eating/drinking better this morning. Still mildly hypotensive, but denies feeling lightheaded or dizzy. Daughter reports Rosario was talking more in her sleep last night.    Review of Systems: ROS negative except as noted above.  Scheduled Medications   acyclovir  800 mg Oral BID    amiodarone  400 mg Oral BID    [START ON 4/1/2024] amiodarone  400 mg Oral Daily    calcium carbonate  500 mg Oral BID w/meals    dexAMETHasone  4 mg Intravenous Q24H    dextrose 5% water  10-20 mL Intravenous Daily at 8 pm    And    filgrastim-aafi  5 mcg/kg Intravenous Daily at 8 pm    And    dextrose 5% water  10-20 mL Intravenous Daily at 8 pm    diclofenac  2 g Topical 4x Daily    [Held by provider] fluconazole  200 mg Oral Daily    folic acid  1,000 mcg Oral Daily    gabapentin  100 mg Oral TID    heparin lock flush  5-10 mL Intracatheter Q24H    isoniazid  300 mg Oral Daily    lidocaine  1 patch Transdermal Q24h    loperamide  4 mg Oral 4x Daily    meropenem  1 g Intravenous Q8H    metoprolol tartrate  50 mg Oral or Feeding Tube BID    pantoprazole  40 mg Oral Daily    sodium phosphate  15 mmol Intravenous Once    pyridOXINE  25 mg Oral Daily    sodium chloride (PF)  3 mL Intracatheter Q8H    [Held by provider] sulfamethoxazole-trimethoprim  1 tablet Oral Daily    vitamin D2  50,000 Units Oral Q7 Days     Current Facility-Administered Medications   Medication    acetaminophen (TYLENOL) tablet 325-650 mg    acyclovir (ZOVIRAX) tablet 800 mg    amiodarone (PACERONE) tablet 400 mg    [START ON 4/1/2024] amiodarone (PACERONE) tablet 400 mg    benzocaine 20% (HURRICAINE/TOPEX) 20 % spray 0.5-1 mL    benzocaine-menthol (CHLORASEPTIC  Flavio Dill - Cardiology Stepdown  Discharge Assessment    Primary Care Provider: No, Primary Doctor     Discharge Assessment (most recent)       BRIEF DISCHARGE ASSESSMENT - 07/18/25 1430          Discharge Planning    Assessment Type Discharge Planning Brief Assessment (P)      Resource/Environmental Concerns none (P)      Support Systems Spouse/significant other (P)      Equipment Currently Used at Home none (P)      Current Living Arrangements home (P)      Patient/Family Anticipates Transition to home (P)      Patient/Family Anticipated Services at Transition none (P)      DME Needed Upon Discharge  none (P)      Discharge Plan A Home with family (P)      Discharge Plan B Home (P)                    Pt. Admitted in Observation Status for hydronephrosis. Pt. Evaluation by the Heart Transplant Team. Planned discharge home today, 07/18/25.    Discharge Plan A and Plan B have been determined by review of patient's clinical status, future medical and therapeutic needs, and coverage/benefits for post-acute care in coordination with multidisciplinary team members.     Leny Frazier LMSW          MAX) 15-10 MG lozenge 1 lozenge    calcium carbonate (TUMS) chewable tablet 1,000 mg    calcium carbonate 500 mg (elemental) (OSCAL) tablet 500 mg    dexAMETHasone PF (DECADRON) injection 4 mg    dextrose 5 % flush PRE/POST medication    And    filgrastim-aafi (NIVESTYM) 480 mcg in D5W 25 mL infusion    And    dextrose 5 % flush PRE/POST medication    diclofenac (VOLTAREN) 1 % topical gel 2 g    [Held by provider] fluconazole (DIFLUCAN) tablet 200 mg    folic acid (FOLVITE) tablet 1,000 mcg    gabapentin (NEURONTIN) capsule 100 mg    heparin lock flush 10 UNIT/ML injection 5-10 mL    heparin lock flush 10 UNIT/ML injection 5-10 mL    isoniazid (NYDRAZID) tablet 300 mg    Lidocaine (LIDOCARE) 4 % Patch 1 patch    lidocaine (LMX4) cream    lidocaine 1 % 0.1-1 mL    loperamide (IMODIUM) capsule 4 mg    LORazepam (ATIVAN) injection 0.5-1 mg    Or    LORazepam (ATIVAN) tablet 0.5-1 mg    melatonin tablet 3 mg    meropenem (MERREM) 1 g vial to attach to  mL bag    metoprolol tartrate (LOPRESSOR) tablet 50 mg    naloxone (NARCAN) injection 0.2 mg    Or    naloxone (NARCAN) injection 0.4 mg    Or    naloxone (NARCAN) injection 0.2 mg    Or    naloxone (NARCAN) injection 0.4 mg    oxyCODONE (ROXICODONE) tablet 5 mg    pantoprazole (PROTONIX) EC tablet 40 mg    Potassium Phosphate 15 mmol in NS intermittent infusion 15 mmol    prochlorperazine (COMPAZINE) injection 5 mg    Or    prochlorperazine (COMPAZINE) tablet 5 mg    pyridOXINE (VITAMIN B6) tablet 25 mg    senna-docusate (SENOKOT-S/PERICOLACE) 8.6-50 MG per tablet 1 tablet    Or    senna-docusate (SENOKOT-S/PERICOLACE) 8.6-50 MG per tablet 2 tablet    sodium chloride (PF) 0.9% PF flush 10-20 mL    sodium chloride (PF) 0.9% PF flush 10-40 mL    sodium chloride (PF) 0.9% PF flush 3 mL    sodium chloride (PF) 0.9% PF flush 3 mL    [Held by provider] sulfamethoxazole-trimethoprim (BACTRIM) 400-80 MG per tablet 1 tablet    vitamin D2 (ERGOCALCIFEROL) 52620 units (1250  "mcg) capsule 50,000 Units       PHYSICAL EXAM     Weight In/Out     Wt Readings from Last 3 Encounters:   03/25/24 106 kg (233 lb 9.6 oz)   03/21/24 94.7 kg (208 lb 11.2 oz)   03/20/24 94.9 kg (209 lb 4.8 oz)      I/O last 3 completed shifts:  In: 1570.01 [P.O.:300; I.V.:780.01]  Out: 1825 [Urine:1825]       BP 96/60 (BP Location: Left arm)   Pulse 90   Temp 98.1  F (36.7  C) (Axillary)   Resp 16   Ht 1.65 m (5' 4.96\")   Wt 106 kg (233 lb 9.6 oz)   SpO2 98%   BMI 38.92 kg/m       General: alert, interactive  Lungs: CTAB  Cardiovascular: RRR  Abdominal/Rectal: +BS (no longer hyperactive), not tender, distended (daughter states this is her normal habitus), soft.     Skin: no rashes or petechaie  Lymph: 1-2+ peripheral edema at ankles  Neuro: A&O, moving all extremities spontaneously, PERRL  Additional Findings: Wilder site NT, no drainage.    LABS AND IMAGING - PAST 24 HOURS     Results for orders placed or performed during the hospital encounter of 03/22/24 (from the past 24 hour(s))   EKG 12-lead, complete   Result Value Ref Range    Systolic Blood Pressure  mmHg    Diastolic Blood Pressure  mmHg    Ventricular Rate 87 BPM    Atrial Rate 87 BPM    TN Interval 142 ms    QRS Duration 74 ms     ms    QTc 517 ms    P Axis 56 degrees    R AXIS 15 degrees    T Axis 48 degrees    Interpretation ECG       Sinus rhythm with Premature atrial complexes  Prolonged QT  Abnormal ECG  When compared with ECG of 24-MAR-2024 12:40, (unconfirmed)  Premature atrial complexes are now Present     CBC with Platelets & Differential    Narrative    The following orders were created for panel order CBC with Platelets & Differential.  Procedure                               Abnormality         Status                     ---------                               -----------         ------                     CBC with platelets and d...[[id number]]  Abnormal            Final result               Manual Differential[[id number]]         "  Abnormal            Final result                 Please view results for these tests on the individual orders.   Comprehensive metabolic panel   Result Value Ref Range    Sodium 140 135 - 145 mmol/L    Potassium 3.7 3.4 - 5.3 mmol/L    Carbon Dioxide (CO2) 23 22 - 29 mmol/L    Anion Gap 9 7 - 15 mmol/L    Urea Nitrogen 16.3 8.0 - 23.0 mg/dL    Creatinine 0.48 (L) 0.51 - 0.95 mg/dL    GFR Estimate >90 >60 mL/min/1.73m2    Calcium 7.2 (L) 8.8 - 10.2 mg/dL    Chloride 108 (H) 98 - 107 mmol/L    Glucose 125 (H) 70 - 99 mg/dL    Alkaline Phosphatase 53 40 - 150 U/L    AST 26 0 - 45 U/L    ALT 27 0 - 50 U/L    Protein Total 4.6 (L) 6.4 - 8.3 g/dL    Albumin 2.6 (L) 3.5 - 5.2 g/dL    Bilirubin Total 0.4 <=1.2 mg/dL   Magnesium   Result Value Ref Range    Magnesium 1.7 1.7 - 2.3 mg/dL   Phosphorus   Result Value Ref Range    Phosphorus 2.2 (L) 2.5 - 4.5 mg/dL   CBC with platelets and differential   Result Value Ref Range    WBC Count 3.6 (L) 4.0 - 11.0 10e3/uL    RBC Count 2.37 (L) 3.80 - 5.20 10e6/uL    Hemoglobin 7.4 (L) 11.7 - 15.7 g/dL    Hematocrit 22.0 (L) 35.0 - 47.0 %    MCV 93 78 - 100 fL    MCH 31.2 26.5 - 33.0 pg    MCHC 33.6 31.5 - 36.5 g/dL    RDW 18.6 (H) 10.0 - 15.0 %    Platelet Count 31 (LL) 150 - 450 10e3/uL    % Neutrophils      % Lymphocytes      % Monocytes      % Eosinophils      % Basophils      % Immature Granulocytes      NRBCs per 100 WBC 0 <1 /100    Absolute Neutrophils      Absolute Lymphocytes      Absolute Monocytes      Absolute Eosinophils      Absolute Basophils      Absolute Immature Granulocytes      Absolute NRBCs 0.0 10e3/uL   Manual Differential   Result Value Ref Range    % Neutrophils 58 %    % Lymphocytes 16 %    % Monocytes 24 %    % Eosinophils 0 %    % Basophils 0 %    % Metamyelocytes 2 %    Absolute Neutrophils 2.1 1.6 - 8.3 10e3/uL    Absolute Lymphocytes 0.6 (L) 0.8 - 5.3 10e3/uL    Absolute Monocytes 0.9 0.0 - 1.3 10e3/uL    Absolute Eosinophils 0.0 0.0 - 0.7 10e3/uL     Absolute Basophils 0.0 0.0 - 0.2 10e3/uL    Absolute Metamyelocytes 0.1 (H) <=0.0 10e3/uL    RBC Morphology Confirmed RBC Indices     Platelet Assessment  Automated Count Confirmed. Platelet morphology is normal.     Automated Count Confirmed. Platelet morphology is normal.    Elliptocytes Slight (A) None Seen         ASSESSMENT BY SYSTEMS     Rosario Adan Terrazas is a 68 year old female, currently day +13 s/p auto for MM readmitted 3/21 with N/F and GNB+ sepsis.     BMT/IEC PROTOCOL for MM Auto     Transplants for multiple myeloma:  The patient has Standard risk IgG D MM, planning on 2 transplants     3/12 Day -1 Melphalan 200 mg/m2   3/13 Day 0 Transplant, cell total post wash 2.23 x 10^6 CD34 + cells/kg (pre freeze dose: 5.36 x 10 ^6 CD34+ cells/kg)     ID  #N/F 3/22  #Strep mitis (3/22) Vanco started (3/22-3/24; discontinued as it is sensitive to merrem)  #E. Coli bacteremia (3/21, 3/22)- Tobraymycin x1 (3/22); meropenem (3/22-x)  Follow up cultures 3/23-3/25: negative to date.    #Abdominal pain, diarrhea.  Stool studies ok.  Likely related to transplant prep.  Pain has resolved.  #Sepsis (hypotension, tachycardia, fever); resolved  #Latent TB: Continue INH/B6 through transplant  #Diarrhea: studies negative.  Continue scheduled imodium.     PPX:   Acyclovir  fluconazole (held 3/25 d/t prolonged Qtc and then discontinued 3/26 with engraftment)  Bactrim from D28      Heme   - pancytopenia secondary to chemotherapy and multiple myeloma  - transfuse to keep hgb >7g/dL, plt >10k  - Hold home aspirin (3/17), resume once platelets have recovered.   - INR 1.8, given vitamin K in clinic 3/21. Recheck 3/25 down to 1.5.     FEN/Renal  #Hypocalcemia: Suspect 2/2 hypoalbuminemia. 2g IV on 3/25 and added daily oral replacement 3/25.   #Vitamin D level: adequate. Keep on ergocalciferol at this time (weekly dosing) to prevent, as it is still winter in [place].  #Fluid overload related to sepsis management: diuresed with lasix  20mg IV on 3/25 (cautious dosing with lower blood pressures). Repeat lasix 20mg IV on 3/26.       CV  #Afib: paroxysmal afib with RVR and hypotension requiring transfer to MICU.  Returned to unit 5C on 3/24.  She is now stable and no longer having very rapid heart rates.  Amiodarone gtt conversion to PO 3/25 per cardiology note; off after 14 days of oral amiodarone.  Continue metoprolol tartrate, which was increased from 25mg bid to 50mg bid on 3/24 (and seemed to make a big difference for controlling her HR spikes to the 150s). Additionally, due to the concern that this is being driven by an overall inflammatory state, added decadron 4mg IV daily x 3 days (3/24-3/26).   Repeat EKG 3/26 -- pending  Electrolytes to be replaced per HIGH sliding scale     Please have techs placed a ziopatch prior to discharge and arrange for cardiology follow up after discharge.       #SVT: management as above    ECHO 55-60%     #QT prolongation: repeat EKG 3/25 with QTc of 517; continue to hold zyprexa/zofran.     GI:   #Nausea: prn compazine. Zofran/zyprexa on hold due to prolonged QT.  #Diarrhea: imodium QID.   #Mucositis: hurricaine spray prn       Endocrine  #Thyroid FNA Atypia of undetermined significance diagnosis has a 22 (13-30)% risk of malignancy. Repeat FNA (least 4-6 weeks from previous aspiration with optimal time being 12 weeks after last aspiration) , molecular testing, diagnostic lobectomy, or surveillance is recommended.    Has next appt with endocrine 4/16/24.   *TSH WNL 3/21     Neuro/Pain  #Neuropathy: continues on eleni, xanaflex. Gabapentin   #Rib pain: continue oxycodone and back brace, Tylenol on hold since 3/19       Dispo: anticipate discharge toward the end of this week if she is stable.    Clinically Significant Risk Factors          # Hypocalcemia: Lowest iCa = 4.1 mg/dL in last 2 days, will monitor and replace as appropriate     # Hypoalbuminemia: Lowest albumin = 2.5 g/dL at 3/24/2024  3:59 AM, will  "monitor as appropriate    # Coagulation Defect: INR = 1.50 (Ref range: 0.85 - 1.15) and/or PTT = 29 Seconds (Ref range: 22 - 38 Seconds), will monitor for bleeding  # Thrombocytopenia: Lowest platelets = 7 in last 2 days, will monitor for bleeding          # Obesity: Estimated body mass index is 38.92 kg/m  as calculated from the following:    Height as of this encounter: 1.65 m (5' 4.96\").    Weight as of this encounter: 106 kg (233 lb 9.6 oz).        # Financial/Environmental Concerns: none       I spent 30 minutes in the care of this patient today, which included time necessary for preparation for the visit, obtaining history, ordering medications/tests/procedures as medically indicated, review of pertinent medical literature, counseling of the patient, communication of recommendations to the care team, and documentation time.  Mahogany Dunaway PA-C      "

## (undated) DEVICE — GUIDEWIRE AMPLATZ SUPER STIFF 0.035"X75CM STR TIP M001465630

## (undated) DEVICE — DRSG BIOPATCH GERMICIDAL SPLIT SPONGE 7MM LG

## (undated) DEVICE — PROBE COVER INTRAOPERATIVE 5"X96" PC1308

## (undated) DEVICE — COVER EASY EQUIP BAG W/BAND LATEX FREE EZ-28

## (undated) DEVICE — ADH SKIN CLOSURE PREMIERPRO EXOFIN MICOR HV 0.5ML 3471

## (undated) DEVICE — DECANTER BAG 2002S

## (undated) DEVICE — LINEN TOWEL PACK X5 5464

## (undated) DEVICE — GOWN XLG DISP 9545

## (undated) DEVICE — CATH ANGIO MARINER KUMPE 4FRX40CM 11732704

## (undated) DEVICE — GLOVE BIOGEL PI ULTRATOUCH G SZ 8.0 42180

## (undated) DEVICE — DILATOR VASCULAR COONS 12FRX20CM DIST TPR G03929

## (undated) DEVICE — SU ETHILON 2-0 FS 18" 664H

## (undated) DEVICE — PACK CENTRAL LINE INSERTION SAN32CLFCG

## (undated) DEVICE — KIT INTRODUCER FLUENT MICRO 5FRX10CM ECHO TIP KIT-038-04

## (undated) DEVICE — LINEN GOWN XLG 5407

## (undated) DEVICE — CAP LUER LOCK MALE/FEMALE DUAL 2C6250

## (undated) RX ORDER — LIDOCAINE HYDROCHLORIDE 10 MG/ML
INJECTION, SOLUTION EPIDURAL; INFILTRATION; INTRACAUDAL; PERINEURAL
Status: DISPENSED
Start: 2024-05-10

## (undated) RX ORDER — ACETAMINOPHEN 325 MG/1
TABLET ORAL
Status: DISPENSED
Start: 2024-03-06

## (undated) RX ORDER — CALCIUM GLUCONATE 94 MG/ML
INJECTION, SOLUTION INTRAVENOUS
Status: DISPENSED
Start: 2024-03-07

## (undated) RX ORDER — CALCIUM GLUCONATE 94 MG/ML
INJECTION, SOLUTION INTRAVENOUS
Status: DISPENSED
Start: 2024-03-08

## (undated) RX ORDER — LIDOCAINE HYDROCHLORIDE 10 MG/ML
INJECTION, SOLUTION EPIDURAL; INFILTRATION; INTRACAUDAL; PERINEURAL
Status: DISPENSED
Start: 2024-04-10

## (undated) RX ORDER — ACETAMINOPHEN 325 MG/1
TABLET ORAL
Status: DISPENSED
Start: 2024-03-07

## (undated) RX ORDER — CEFAZOLIN SODIUM 2 G/50ML
SOLUTION INTRAVENOUS
Status: DISPENSED
Start: 2024-03-06

## (undated) RX ORDER — LIDOCAINE HYDROCHLORIDE 10 MG/ML
INJECTION, SOLUTION EPIDURAL; INFILTRATION; INTRACAUDAL; PERINEURAL
Status: DISPENSED
Start: 2024-02-27